# Patient Record
Sex: FEMALE | Race: WHITE | Employment: OTHER | URBAN - METROPOLITAN AREA
[De-identification: names, ages, dates, MRNs, and addresses within clinical notes are randomized per-mention and may not be internally consistent; named-entity substitution may affect disease eponyms.]

---

## 2017-11-18 ENCOUNTER — HOSPITAL ENCOUNTER (EMERGENCY)
Age: 77
Discharge: HOME | End: 2017-11-18
Attending: EMERGENCY MEDICINE
Payer: MEDICARE

## 2017-11-18 ENCOUNTER — APPOINTMENT (OUTPATIENT)
Dept: GENERAL RADIOLOGY | Age: 77
End: 2017-11-18
Attending: STUDENT IN AN ORGANIZED HEALTH CARE EDUCATION/TRAINING PROGRAM
Payer: MEDICARE

## 2017-11-18 VITALS
RESPIRATION RATE: 18 BRPM | HEART RATE: 79 BPM | WEIGHT: 117 LBS | SYSTOLIC BLOOD PRESSURE: 127 MMHG | TEMPERATURE: 97.6 F | DIASTOLIC BLOOD PRESSURE: 62 MMHG | OXYGEN SATURATION: 100 % | HEIGHT: 61 IN | BODY MASS INDEX: 22.09 KG/M2

## 2017-11-18 DIAGNOSIS — K85.30 DRUG-INDUCED ACUTE PANCREATITIS, UNSPECIFIED COMPLICATION STATUS: Primary | ICD-10-CM

## 2017-11-18 LAB
ALBUMIN SERPL-MCNC: 3 G/DL (ref 3.2–4.6)
ALBUMIN/GLOB SERPL: 0.8 {RATIO} (ref 1.2–3.5)
ALP SERPL-CCNC: 125 U/L (ref 50–136)
ALT SERPL-CCNC: 47 U/L (ref 12–65)
ANION GAP SERPL CALC-SCNC: 8 MMOL/L (ref 7–16)
AST SERPL-CCNC: 109 U/L (ref 15–37)
ATRIAL RATE: 67 BPM
ATRIAL RATE: 97 BPM
BASOPHILS # BLD: 0 K/UL (ref 0–0.2)
BASOPHILS NFR BLD: 0 % (ref 0–2)
BILIRUB SERPL-MCNC: 0.5 MG/DL (ref 0.2–1.1)
BUN SERPL-MCNC: 16 MG/DL (ref 8–23)
CALCIUM SERPL-MCNC: 8.1 MG/DL (ref 8.3–10.4)
CALCULATED P AXIS, ECG09: 66 DEGREES
CALCULATED P AXIS, ECG09: 80 DEGREES
CALCULATED R AXIS, ECG10: 57 DEGREES
CALCULATED R AXIS, ECG10: 86 DEGREES
CALCULATED T AXIS, ECG11: 33 DEGREES
CALCULATED T AXIS, ECG11: 69 DEGREES
CHLORIDE SERPL-SCNC: 110 MMOL/L (ref 98–107)
CO2 SERPL-SCNC: 24 MMOL/L (ref 21–32)
CREAT SERPL-MCNC: 0.89 MG/DL (ref 0.6–1)
DIAGNOSIS, 93000: NORMAL
DIAGNOSIS, 93000: NORMAL
DIFFERENTIAL METHOD BLD: ABNORMAL
EOSINOPHIL # BLD: 0.2 K/UL (ref 0–0.8)
EOSINOPHIL NFR BLD: 2 % (ref 0.5–7.8)
ERYTHROCYTE [DISTWIDTH] IN BLOOD BY AUTOMATED COUNT: 13.7 % (ref 11.9–14.6)
GLOBULIN SER CALC-MCNC: 3.7 G/DL (ref 2.3–3.5)
GLUCOSE SERPL-MCNC: 139 MG/DL (ref 65–100)
HCT VFR BLD AUTO: 39.8 % (ref 35.8–46.3)
HGB BLD-MCNC: 13.7 G/DL (ref 11.7–15.4)
IMM GRANULOCYTES # BLD: 0 K/UL (ref 0–0.5)
IMM GRANULOCYTES NFR BLD AUTO: 0 % (ref 0–5)
LIPASE SERPL-CCNC: 4137 U/L (ref 73–393)
LYMPHOCYTES # BLD: 1.2 K/UL (ref 0.5–4.6)
LYMPHOCYTES NFR BLD: 12 % (ref 13–44)
MCH RBC QN AUTO: 28.6 PG (ref 26.1–32.9)
MCHC RBC AUTO-ENTMCNC: 34.4 G/DL (ref 31.4–35)
MCV RBC AUTO: 83.1 FL (ref 79.6–97.8)
MONOCYTES # BLD: 0.3 K/UL (ref 0.1–1.3)
MONOCYTES NFR BLD: 3 % (ref 4–12)
NEUTS SEG # BLD: 8.8 K/UL (ref 1.7–8.2)
NEUTS SEG NFR BLD: 83 % (ref 43–78)
P-R INTERVAL, ECG05: 204 MS
P-R INTERVAL, ECG05: 206 MS
PLATELET # BLD AUTO: 281 K/UL (ref 150–450)
PMV BLD AUTO: 9.5 FL (ref 10.8–14.1)
POTASSIUM SERPL-SCNC: 3.7 MMOL/L (ref 3.5–5.1)
PROT SERPL-MCNC: 6.7 G/DL (ref 6.3–8.2)
Q-T INTERVAL, ECG07: 360 MS
Q-T INTERVAL, ECG07: 384 MS
QRS DURATION, ECG06: 72 MS
QRS DURATION, ECG06: 78 MS
QTC CALCULATION (BEZET), ECG08: 405 MS
QTC CALCULATION (BEZET), ECG08: 457 MS
RBC # BLD AUTO: 4.79 M/UL (ref 4.05–5.25)
SODIUM SERPL-SCNC: 142 MMOL/L (ref 136–145)
TROPONIN I BLD-MCNC: 0 NG/ML (ref 0.02–0.05)
TROPONIN I BLD-MCNC: 0 NG/ML (ref 0.02–0.05)
VENTRICULAR RATE, ECG03: 67 BPM
VENTRICULAR RATE, ECG03: 97 BPM
WBC # BLD AUTO: 10.6 K/UL (ref 4.3–11.1)

## 2017-11-18 PROCEDURE — 83690 ASSAY OF LIPASE: CPT | Performed by: STUDENT IN AN ORGANIZED HEALTH CARE EDUCATION/TRAINING PROGRAM

## 2017-11-18 PROCEDURE — 71020 XR CHEST PA LAT: CPT

## 2017-11-18 PROCEDURE — 84484 ASSAY OF TROPONIN QUANT: CPT

## 2017-11-18 PROCEDURE — 80053 COMPREHEN METABOLIC PANEL: CPT | Performed by: STUDENT IN AN ORGANIZED HEALTH CARE EDUCATION/TRAINING PROGRAM

## 2017-11-18 PROCEDURE — 74011250636 HC RX REV CODE- 250/636: Performed by: EMERGENCY MEDICINE

## 2017-11-18 PROCEDURE — 96360 HYDRATION IV INFUSION INIT: CPT | Performed by: EMERGENCY MEDICINE

## 2017-11-18 PROCEDURE — 85025 COMPLETE CBC W/AUTO DIFF WBC: CPT | Performed by: STUDENT IN AN ORGANIZED HEALTH CARE EDUCATION/TRAINING PROGRAM

## 2017-11-18 PROCEDURE — 93005 ELECTROCARDIOGRAM TRACING: CPT | Performed by: STUDENT IN AN ORGANIZED HEALTH CARE EDUCATION/TRAINING PROGRAM

## 2017-11-18 PROCEDURE — 93005 ELECTROCARDIOGRAM TRACING: CPT | Performed by: EMERGENCY MEDICINE

## 2017-11-18 PROCEDURE — 99285 EMERGENCY DEPT VISIT HI MDM: CPT | Performed by: EMERGENCY MEDICINE

## 2017-11-18 RX ADMIN — SODIUM CHLORIDE 1000 ML: 900 INJECTION, SOLUTION INTRAVENOUS at 11:51

## 2017-11-18 NOTE — ED TRIAGE NOTES
Pt states substernal chest pain that just started. Pt states she broke out in a sweat. Pt states she had diarrhea all morning.

## 2017-11-18 NOTE — ED PROVIDER NOTES
HPI:  78 F, hx of prior hypertension (taking off by PCP because it has improved), hyperlipidemia, prior  Gastric ulcer with surgery, here with epigastric pain that radiates into the chest.  Started today. Associated diaphoresis and nausea during this episode. Recently seen a primary care physician for sinusitis and was given 5 day course of Z-J Luis which she finished today. Denies any fever. Pain completely resolved at this point. She has not had any chest pain, dyspnea with exertion prior to today. No prior history of cardiac disease. Prior cholecystectomy. ROS  Constitutional: No fever, no chills  Skin: no rash  Eye: No vision changes  ENMT: No sore throat, no congestion  Respiratory: No shortness of breath, no cough  Cardiovascular: + chest pain, no palpitations  Gastrointestinal: No vomiting, + nausea, no diarrhea, no constipation, no rectal bleeding, + abdominal pain  : No dysuria, no hematuria  MSK: No back pain, no muscle pain, no joint pain  Neuro: No headache, no change in mental status, no numbness, no tingling, no weakness  Psych: No anxiety, no depression  Endocrine: No hyperglycemia  All other review of systems positive per history of present illness and the above otherwise negative or noncontributory.     Visit Vitals    /58    Pulse 74    Temp 98 °F (36.7 °C)    Resp 20    Ht 5' 1\" (1.549 m)    Wt 53.1 kg (117 lb)    SpO2 98%    BMI 22.11 kg/m2     Past Medical History:   Diagnosis Date    Abdominal pain, RUQ     STEPHEN (acute kidney injury) ([facility] [address] 12/4/2014    Back pain 6/10/2016    Bronchitis     Discharge from the vagina     Dysuria     Eczema 6/10/2016    Fungal dermatitis     Headache 6/10/2016    HTN (hypertension) 12/4/2014    Hypercholesteremia     in pravastatin    Hypercholesterolemia 12/4/2014    Hypertension     controlled by medication    Hypoalbuminemia 12/4/2014    Hypokalemia 12/4/2014    Hyponatremia 12/4/2014    Menopausal vaginal dryness     Osteoarthritis 6/10/2016    Osteoporosis 6/10/2016    PUD (peptic ulcer disease)     last 2003 which a rupture an extensive surgery    Sepsis ([facility] [place] [address] 12/4/2014    Sinusitis, acute frontal     also acute maxillary    Symptomatic cholelithiasis 5/20/2014    Tobacco abuse 12/5/2014    Tobacco abuse     UTI (lower urinary tract infection) 12/4/2014    UTI (urinary tract infection)     Vaginal candidiasis      Past Surgical History:   Procedure Laterality Date    ABDOMEN SURGERY PROC UNLISTED  2003    stomach surgery for ruptured ulcer and extensive rerouting of intestestines per patient     BREAST SURGERY PROCEDURE UNLISTED Left 1975    breast lumpectomy, benign  (left)    HX APPENDECTOMY  1977    with hysterectomy    HX BREAST BIOPSY Left 1975    HX CHOLECYSTECTOMY      HX ENDOSCOPY      HX HYSTERECTOMY  1977    HX OTHER SURGICAL      hernicolectomy 2 cm    HX TUBAL LIGATION  1975     Prior to Admission Medications   Prescriptions Last Dose Informant Patient Reported? Taking? azithromycin (ZITHROMAX) 250 mg tablet   No No   Sig: Take as usually directed on the package. ibuprofen (MOTRIN) 800 mg tablet   No No   Sig: Take 1 Tab by mouth three (3) times daily. pravastatin (PRAVACHOL) 40 mg tablet   No No   Sig: Take 1 Tab by mouth nightly. Facility-Administered Medications: None         Adult Exam   General: alert, no acute distress  Head: normocephalic, atraumatic  ENT: moist mucous membranes  Neck: supple, non-tender; full range of motion  Cardiovascular: regular rate and rhythm, normal peripheral perfusion, no edema.  Equal radial pulses  Respiratory: lungs are clear to auscultation; normal respirations; no wheezing, rales or rhonchi  Gastrointestinal: soft, non-tender; no rebound or guarding, no peritoneal signs, no distension  Back: non-tender, full range of motion  Musculoskeletal: normal range of motion, normal strength, no gross deformities  Neurological: alert and oriented x 4, no gross focal deficits; normal speech  Psychiatric: cooperative; appropriate mood and affect      EKG obtained and interpreted by me. Sinus rhythm rate of 97 with normal axis, interval.  Normal QTc interval.  No STEMI noted. Nonspecific T-wave inversion with downtrend and QRS complex noted aVL. Troponin is negative. fairly healthy. I will low suspicion for acute ACS. Normal EKG, normal troponin. She has no pain at this time. Asymptomatic. Her lipase level is 4100. Recently finished with azithromycin. Potentially may be a side effects of taken azithromycin. She had prior cholecystectomy. Liver enzymes within normal limits. Vital signs stable nontoxic well-appearing. Spoke with the on-call physician for primary care physician and recommend follow-up. They will see her in the hospital on Monday. She tolerated by mouth here. Serial troponin negative x 2   Second EKG obtained rate of 67 sinus rhythm normal axis and interval.  No ST changes consistent with acute STEMI. Nonspecific T wave inversion noted lead V5  No ectopy, Brugada. Tolerated PO here. Spoke with patient and daughter about the findings today. Her daughter works in our GI lab and is very familiar and is aware of pancreatitis. Recommend clear liquid diet, note: Zofran as needed. Stable for discharge home.       Recent Results (from the past 8 hour(s))   EKG, 12 LEAD, INITIAL    Collection Time: 11/18/17 10:19 AM   Result Value Ref Range    Ventricular Rate 97 BPM    Atrial Rate 97 BPM    P-R Interval 206 ms    QRS Duration 72 ms    Q-T Interval 360 ms    QTC Calculation (Bezet) 457 ms    Calculated P Axis 80 degrees    Calculated R Axis 86 degrees    Calculated T Axis 69 degrees    Diagnosis       Normal sinus rhythm  Septal infarct (cited on or before 05-MAY-2005)  Abnormal ECG  When compared with ECG of 05-MAY-2005 18:33,  RSR' pattern in V1 is no longer Present  Questionable change in initial forces of Septal leads     CBC WITH AUTOMATED DIFF    Collection Time: 11/18/17 10:27 AM   Result Value Ref Range    WBC 10.6 4.3 - 11.1 K/uL    RBC 4.79 4.05 - 5.25 M/uL    HGB 13.7 11.7 - 15.4 g/dL    HCT 39.8 35.8 - 46.3 %    MCV 83.1 79.6 - 97.8 FL    MCH 28.6 26.1 - 32.9 PG    MCHC 34.4 31.4 - 35.0 g/dL    RDW 13.7 11.9 - 14.6 %    PLATELET 654 180 - 939 K/uL    MPV 9.5 (L) 10.8 - 14.1 FL    DF AUTOMATED      NEUTROPHILS 83 (H) 43 - 78 %    LYMPHOCYTES 12 (L) 13 - 44 %    MONOCYTES 3 (L) 4.0 - 12.0 %    EOSINOPHILS 2 0.5 - 7.8 %    BASOPHILS 0 0.0 - 2.0 %    IMMATURE GRANULOCYTES 0 0.0 - 5.0 %    ABS. NEUTROPHILS 8.8 (H) 1.7 - 8.2 K/UL    ABS. LYMPHOCYTES 1.2 0.5 - 4.6 K/UL    ABS. MONOCYTES 0.3 0.1 - 1.3 K/UL    ABS. EOSINOPHILS 0.2 0.0 - 0.8 K/UL    ABS. BASOPHILS 0.0 0.0 - 0.2 K/UL    ABS. IMM. GRANS. 0.0 0.0 - 0.5 K/UL   METABOLIC PANEL, COMPREHENSIVE    Collection Time: 11/18/17 10:27 AM   Result Value Ref Range    Sodium 142 136 - 145 mmol/L    Potassium 3.7 3.5 - 5.1 mmol/L    Chloride 110 (H) 98 - 107 mmol/L    CO2 24 21 - 32 mmol/L    Anion gap 8 7 - 16 mmol/L    Glucose 139 (H) 65 - 100 mg/dL    BUN 16 8 - 23 MG/DL    Creatinine 0.89 0.6 - 1.0 MG/DL    GFR est AA >60 >60 ml/min/1.73m2    GFR est non-AA >60 >60 ml/min/1.73m2    Calcium 8.1 (L) 8.3 - 10.4 MG/DL    Bilirubin, total 0.5 0.2 - 1.1 MG/DL    ALT (SGPT) 47 12 - 65 U/L    AST (SGOT) 109 (H) 15 - 37 U/L    Alk. phosphatase 125 50 - 136 U/L    Protein, total 6.7 6.3 - 8.2 g/dL    Albumin 3.0 (L) 3.2 - 4.6 g/dL    Globulin 3.7 (H) 2.3 - 3.5 g/dL    A-G Ratio 0.8 (L) 1.2 - 3.5     LIPASE    Collection Time: 11/18/17 10:27 AM   Result Value Ref Range    Lipase 4137 (H) 73 - 393 U/L   POC TROPONIN-I    Collection Time: 11/18/17 10:27 AM   Result Value Ref Range    Troponin-I (POC) 0 (L) 0.02 - 0.05 ng/ml         Dragon voice recognition software was used to create this note.  Although the note has been reviewed and corrected where necessary, additional errors may have been overlooked and remain in the text.

## 2017-11-18 NOTE — DISCHARGE INSTRUCTIONS
Pancreatitis: Care Instructions  Your Care Instructions    The pancreas is an organ behind the stomach. It makes hormones and enzymes to help your body digest food. But if these enzymes attack the pancreas, it can get inflamed. This is called pancreatitis. Most cases are caused by gallstones or by heavy alcohol use. If you take care of yourself at home, it will help you get better. It will also help you avoid more problems with your pancreas. Follow-up care is a key part of your treatment and safety. Be sure to make and go to all appointments, and call your doctor if you are having problems. It's also a good idea to know your test results and keep a list of the medicines you take. How can you care for yourself at home? · Drink clear liquids and eat bland foods until you feel better. Screven foods include rice, dry toast, and crackers. They also include bananas and applesauce. · Eat a low-fat diet until your doctor says your pancreas is healed. · Do not drink alcohol. Tell your doctor if you need help to quit. Counseling, support groups, and sometimes medicines can help you stay sober. · Be safe with medicines. Read and follow all instructions on the label. ¨ If the doctor gave you a prescription medicine for pain, take it as prescribed. ¨ If you are not taking a prescription pain medicine, ask your doctor if you can take an over-the-counter medicine. · If your doctor prescribed antibiotics, take them as directed. Do not stop taking them just because you feel better. You need to take the full course of antibiotics. · Get extra rest until you feel better. To prevent future problems with your pancreas  · Do not drink alcohol. · Tell your doctors and pharmacist that you've had pancreatitis. They can help you avoid medicines that may cause this problem again. When should you call for help? Call 911 anytime you think you may need emergency care.  For example, call if:  ? · You vomit blood or what looks like coffee grounds. ? · Your stools are maroon or very bloody. ?Call your doctor now or seek immediate medical care if:  ? · You have new or worse belly pain. ? · Your stools are black and look like tar, or they have streaks of blood. ? · You are vomiting. ? Watch closely for changes in your health, and be sure to contact your doctor if:  ? · You do not get better as expected. Where can you learn more? Go to http://shira-kalen.info/. Enter T602 in the search box to learn more about \"Pancreatitis: Care Instructions. \"  Current as of: May 12, 2017  Content Version: 11.4  © 7448-8880 [employer]. Care instructions adapted under license by [employer] (which disclaims liability or warranty for this information). If you have questions about a medical condition or this instruction, always ask your healthcare professional. [address] any warranty or liability for your use of this information. Learning About Acute Pancreatitis  What is acute pancreatitis? The pancreas is an organ behind the stomach. It makes hormones like insulin that help control how your body uses sugar. It also makes enzymes that help you digest food. Usually these enzymes flow from the pancreas to the intestines. But if they leak into the pancreas, they can irritate it and cause pain and swelling. When this happens suddenly, it's called acute pancreatitis. What causes it? Most of the time, acute pancreatitis is caused by gallstones or by heavy alcohol use. But several other things can cause it, such as:  · High levels of fats in the blood. · An injury. · A problem after a surgery or a procedure. · Certain medicines. What are the symptoms? The main symptom is pain in the upper belly. The pain can be severe. You also may have a fever, nausea, or vomiting. Some people get so sick that they have problems breathing. They also may have low blood pressure.   How is it diagnosed? Your doctor will diagnose pancreatitis with an exam and by looking at your lab tests. Your doctor may think that you have this problem based on your symptoms and where you have pain in your belly. You may have blood tests of enzymes called amylase and lipase. In pancreatitis, the level of these enzymes is usually much higher than normal.  You also may have imaging tests of the belly. These may include an ultrasound, a CT scan, or an MRI. A special MRI called MRCP can show images of the bile ducts. This test can be very helpful when gallstones are causing the problem. How is it treated? Treatment of acute pancreatitis usually takes place in the hospital. It focuses on taking care of pain and supporting your body while your pancreas heals. In severe cases, treatment may occur in an intensive care unit to support breathing, treat serious infections, or help raise very low blood pressure. If a gallstone is causing the problem, the gallstone may need to be removed. This is done during a procedure called ERCP. The doctor puts a scope in your mouth and moves it gently through the stomach and into the ducts from the pancreas and gallbladder. The doctor is then able to see a stone and remove it. Sometimes the gallbladder, which makes gallstones, needs to be removed by surgery. People with pancreatitis often need a lot of fluid to help support their other organs and their blood pressure. They get fluids through a vein (intravenous, or IV). Instead of food by mouth, nutrition is sometimes given through a vein while the pancreas heals. Follow-up care is a key part of your treatment and safety. Be sure to make and go to all appointments, and call your doctor if you are having problems. It's also a good idea to know your test results and keep a list of the medicines you take. Where can you learn more? Go to http://shira-kalen.info/.   Enter M744 in the search box to learn more about \"Learning About Acute Pancreatitis. \"  Current as of: May 12, 2017  Content Version: 11.4  © 5659-2888 [employer]. Care instructions adapted under license by [employer] (which disclaims liability or warranty for this information). If you have questions about a medical condition or this instruction, always ask your healthcare professional. [place] [address] any warranty or liability for your use of this information.

## 2017-11-18 NOTE — Clinical Note
STOP AZITHROMYCIN. Follow-up with your primary care doctor on Monday. Clear liquid diet. Your lipase level was 4100. I have spoken with your primary care physician office. They will likely need to repeat lab work on Monday when the ICU.   Return  if you have any further concerns such as unable to tolerate pain, nausea, vomiting, fever, chest pain, shortness of breath

## 2017-11-18 NOTE — ED NOTES
I have reviewed discharge instructions with the patient. The patient verbalized understanding. Patient left ED via Discharge Method: ambulatory to Home with self). Opportunity for questions and clarification provided. Patient given 0 scripts. To continue your aftercare when you leave the hospital, you may receive an automated call from our care team to check in on how you are doing. This is a free service and part of our promise to provide the best care and service to meet your aftercare needs.  If you have questions, or wish to unsubscribe from this service please call [phone]. Thank you for Choosing our [facility].

## 2018-01-16 ENCOUNTER — HOSPITAL ENCOUNTER (OUTPATIENT)
Dept: MAMMOGRAPHY | Age: 78
Discharge: HOME | End: 2018-01-16
Attending: FAMILY MEDICINE
Payer: MEDICARE

## 2018-01-16 DIAGNOSIS — Z12.31 SCREENING MAMMOGRAM, ENCOUNTER FOR: ICD-10-CM

## 2018-01-16 DIAGNOSIS — R92.8 ABNORMAL MAMMOGRAM OF LEFT BREAST: ICD-10-CM

## 2018-01-16 DIAGNOSIS — N63.0 BREAST LUMP IN FEMALE: ICD-10-CM

## 2018-01-16 PROCEDURE — 76642 ULTRASOUND BREAST LIMITED: CPT

## 2018-01-16 PROCEDURE — 77066 DX MAMMO INCL CAD BI: CPT

## 2018-01-31 ENCOUNTER — HOSPITAL ENCOUNTER (OUTPATIENT)
Dept: MAMMOGRAPHY | Age: 78
Discharge: HOME | End: 2018-01-31
Attending: FAMILY MEDICINE
Payer: MEDICARE

## 2018-01-31 VITALS — SYSTOLIC BLOOD PRESSURE: 185 MMHG | HEART RATE: 52 BPM | DIASTOLIC BLOOD PRESSURE: 77 MMHG

## 2018-01-31 DIAGNOSIS — N63.20 LEFT BREAST MASS: ICD-10-CM

## 2018-01-31 DIAGNOSIS — R92.8 ABNORMALITY OF LEFT BREAST ON SCREENING MAMMOGRAM: ICD-10-CM

## 2018-01-31 PROCEDURE — 77065 DX MAMMO INCL CAD UNI: CPT

## 2018-01-31 PROCEDURE — 77030013267 US BX BREAST LT 1ST LESION W/CLIP AND SPECIMEN

## 2018-01-31 PROCEDURE — 74011000250 HC RX REV CODE- 250: Performed by: FAMILY MEDICINE

## 2018-01-31 PROCEDURE — 88305 TISSUE EXAM BY PATHOLOGIST: CPT | Performed by: FAMILY MEDICINE

## 2018-01-31 RX ORDER — LIDOCAINE HYDROCHLORIDE 10 MG/ML
5 INJECTION INFILTRATION; PERINEURAL
Status: COMPLETED | OUTPATIENT
Start: 2018-01-31 | End: 2018-01-31

## 2018-01-31 RX ADMIN — LIDOCAINE HYDROCHLORIDE 5 ML: 10 INJECTION, SOLUTION INFILTRATION; PERINEURAL at 09:28

## 2018-02-05 NOTE — PROGRESS NOTES
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 Dr. Boy Mcintyre and I discussed the results as well as the next steps with the patient, she will have a bilateral breast MRI and then will follow up with Dr. Esperanza Miller as she has been a previous patient of his. The patient has greast support and is eager to move forward and get the surgery behind her. She did receive a new breast cancer patient packet of information which includes educational information as well as my contact information in case she has any further questions.

## 2018-02-07 ENCOUNTER — HOSPITAL ENCOUNTER (OUTPATIENT)
Dept: MRI IMAGING | Age: 78
Discharge: HOME | End: 2018-02-07
Attending: FAMILY MEDICINE
Payer: MEDICARE

## 2018-02-07 VITALS — BODY MASS INDEX: 21.73 KG/M2 | WEIGHT: 115 LBS

## 2018-02-07 DIAGNOSIS — C50.912 BREAST CANCER, LEFT (HCC): ICD-10-CM

## 2018-02-07 LAB — CREAT BLD-MCNC: 0.9 MG/DL (ref 0.8–1.5)

## 2018-02-07 PROCEDURE — 82565 ASSAY OF CREATININE: CPT

## 2018-02-07 PROCEDURE — 77059 MRI BREAST BI W WO CONT: CPT

## 2018-02-07 PROCEDURE — 74011250636 HC RX REV CODE- 250/636: Performed by: FAMILY MEDICINE

## 2018-02-07 PROCEDURE — 74011000258 HC RX REV CODE- 258: Performed by: FAMILY MEDICINE

## 2018-02-07 PROCEDURE — A9577 INJ MULTIHANCE: HCPCS | Performed by: FAMILY MEDICINE

## 2018-02-07 RX ORDER — SODIUM CHLORIDE 0.9 % (FLUSH) 0.9 %
10 SYRINGE (ML) INJECTION
Status: COMPLETED | OUTPATIENT
Start: 2018-02-07 | End: 2018-02-07

## 2018-02-07 RX ADMIN — Medication 10 ML: at 12:15

## 2018-02-07 RX ADMIN — SODIUM CHLORIDE 100 ML: 900 INJECTION, SOLUTION INTRAVENOUS at 12:15

## 2018-02-07 RX ADMIN — GADOBENATE DIMEGLUMINE: 529 INJECTION, SOLUTION INTRAVENOUS at 12:15

## 2018-02-13 PROBLEM — C50.412 MALIGNANT NEOPLASM OF UPPER-OUTER QUADRANT OF LEFT BREAST IN FEMALE, ESTROGEN RECEPTOR POSITIVE (HCC): Status: ACTIVE | Noted: 2018-02-13

## 2018-02-13 PROBLEM — Z17.0 MALIGNANT NEOPLASM OF UPPER-OUTER QUADRANT OF LEFT BREAST IN FEMALE, ESTROGEN RECEPTOR POSITIVE (HCC): Status: ACTIVE | Noted: 2018-02-13

## 2018-02-15 RX ORDER — CEFAZOLIN SODIUM IN 0.9 % NACL 2 G/50 ML
2 INTRAVENOUS SOLUTION, PIGGYBACK (ML) INTRAVENOUS ONCE
Status: CANCELLED | OUTPATIENT
Start: 2018-02-22 | End: 2018-02-22

## 2018-02-21 ENCOUNTER — HOSPITAL ENCOUNTER (OUTPATIENT)
Dept: NUCLEAR MEDICINE | Age: 78
Discharge: HOME | End: 2018-02-21
Attending: SURGERY

## 2018-02-21 DIAGNOSIS — C50.412 MALIGNANT NEOPLASM OF UPPER-OUTER QUADRANT OF LEFT BREAST IN FEMALE, ESTROGEN RECEPTOR POSITIVE (HCC): ICD-10-CM

## 2018-02-21 DIAGNOSIS — Z17.0 MALIGNANT NEOPLASM OF UPPER-OUTER QUADRANT OF LEFT BREAST IN FEMALE, ESTROGEN RECEPTOR POSITIVE (HCC): ICD-10-CM

## 2018-02-22 ENCOUNTER — APPOINTMENT (OUTPATIENT)
Dept: NUCLEAR MEDICINE | Age: 78
End: 2018-02-22
Attending: SURGERY
Payer: MEDICARE

## 2018-02-22 ENCOUNTER — ANESTHESIA (OUTPATIENT)
Dept: SURGERY | Age: 78
End: 2018-02-22
Payer: MEDICARE

## 2018-02-22 ENCOUNTER — ANESTHESIA EVENT (OUTPATIENT)
Dept: SURGERY | Age: 78
End: 2018-02-22
Payer: MEDICARE

## 2018-02-22 ENCOUNTER — HOSPITAL ENCOUNTER (OUTPATIENT)
Age: 78
Setting detail: OUTPATIENT SURGERY
Discharge: HOME | End: 2018-02-22
Attending: SURGERY | Admitting: SURGERY
Payer: MEDICARE

## 2018-02-22 VITALS
RESPIRATION RATE: 24 BRPM | HEIGHT: 61 IN | HEART RATE: 63 BPM | SYSTOLIC BLOOD PRESSURE: 136 MMHG | DIASTOLIC BLOOD PRESSURE: 62 MMHG | BODY MASS INDEX: 21.64 KG/M2 | WEIGHT: 114.6 LBS | OXYGEN SATURATION: 97 % | TEMPERATURE: 97.4 F

## 2018-02-22 DIAGNOSIS — C50.412 MALIGNANT NEOPLASM OF UPPER-OUTER QUADRANT OF LEFT BREAST IN FEMALE, ESTROGEN RECEPTOR POSITIVE (HCC): Primary | ICD-10-CM

## 2018-02-22 DIAGNOSIS — Z17.0 MALIGNANT NEOPLASM OF UPPER-OUTER QUADRANT OF LEFT BREAST IN FEMALE, ESTROGEN RECEPTOR POSITIVE (HCC): Primary | ICD-10-CM

## 2018-02-22 PROCEDURE — 77030020143 HC AIRWY LARYN INTUB CGAS -A: Performed by: ANESTHESIOLOGY

## 2018-02-22 PROCEDURE — 74011250636 HC RX REV CODE- 250/636

## 2018-02-22 PROCEDURE — 76210000021 HC REC RM PH II 0.5 TO 1 HR: Performed by: SURGERY

## 2018-02-22 PROCEDURE — 76060000033 HC ANESTHESIA 1 TO 1.5 HR: Performed by: SURGERY

## 2018-02-22 PROCEDURE — 77030018836 HC SOL IRR NACL ICUM -A: Performed by: SURGERY

## 2018-02-22 PROCEDURE — A9541 TC99M SULFUR COLLOID: HCPCS

## 2018-02-22 PROCEDURE — 74011250636 HC RX REV CODE- 250/636: Performed by: SURGERY

## 2018-02-22 PROCEDURE — 77030020782 HC GWN BAIR PAWS FLX 3M -B: Performed by: ANESTHESIOLOGY

## 2018-02-22 PROCEDURE — 74011000250 HC RX REV CODE- 250: Performed by: SURGERY

## 2018-02-22 PROCEDURE — 74011000250 HC RX REV CODE- 250

## 2018-02-22 PROCEDURE — 77030032490 HC SLV COMPR SCD KNE COVD -B: Performed by: SURGERY

## 2018-02-22 PROCEDURE — 88307 TISSUE EXAM BY PATHOLOGIST: CPT | Performed by: SURGERY

## 2018-02-22 PROCEDURE — 74011250637 HC RX REV CODE- 250/637: Performed by: ANESTHESIOLOGY

## 2018-02-22 PROCEDURE — 88305 TISSUE EXAM BY PATHOLOGIST: CPT | Performed by: SURGERY

## 2018-02-22 PROCEDURE — 76210000016 HC OR PH I REC 1 TO 1.5 HR: Performed by: SURGERY

## 2018-02-22 PROCEDURE — 74011250636 HC RX REV CODE- 250/636: Performed by: ANESTHESIOLOGY

## 2018-02-22 PROCEDURE — 77030003028 HC SUT VCRL J&J -A: Performed by: SURGERY

## 2018-02-22 PROCEDURE — 77030031139 HC SUT VCRL2 J&J -A: Performed by: SURGERY

## 2018-02-22 PROCEDURE — 76010000149 HC OR TIME 1 TO 1.5 HR: Performed by: SURGERY

## 2018-02-22 PROCEDURE — 77030002996 HC SUT SLK J&J -A: Performed by: SURGERY

## 2018-02-22 PROCEDURE — 77030002933 HC SUT MCRYL J&J -A: Performed by: SURGERY

## 2018-02-22 PROCEDURE — 77030011640 HC PAD GRND REM COVD -A: Performed by: SURGERY

## 2018-02-22 RX ORDER — OXYCODONE HYDROCHLORIDE 5 MG/1
10 TABLET ORAL
Status: DISCONTINUED | OUTPATIENT
Start: 2018-02-22 | End: 2018-02-22 | Stop reason: HOSPADM

## 2018-02-22 RX ORDER — MIDAZOLAM HYDROCHLORIDE 1 MG/ML
2 INJECTION, SOLUTION INTRAMUSCULAR; INTRAVENOUS ONCE
Status: COMPLETED | OUTPATIENT
Start: 2018-02-22 | End: 2018-02-22

## 2018-02-22 RX ORDER — NALOXONE HYDROCHLORIDE 0.4 MG/ML
0.1 INJECTION, SOLUTION INTRAMUSCULAR; INTRAVENOUS; SUBCUTANEOUS AS NEEDED
Status: DISCONTINUED | OUTPATIENT
Start: 2018-02-22 | End: 2018-02-22 | Stop reason: HOSPADM

## 2018-02-22 RX ORDER — SODIUM CHLORIDE 0.9 % (FLUSH) 0.9 %
5-10 SYRINGE (ML) INJECTION EVERY 8 HOURS
Status: DISCONTINUED | OUTPATIENT
Start: 2018-02-22 | End: 2018-02-22 | Stop reason: HOSPADM

## 2018-02-22 RX ORDER — SODIUM CHLORIDE, SODIUM LACTATE, POTASSIUM CHLORIDE, CALCIUM CHLORIDE 600; 310; 30; 20 MG/100ML; MG/100ML; MG/100ML; MG/100ML
25 INJECTION, SOLUTION INTRAVENOUS CONTINUOUS
Status: DISCONTINUED | OUTPATIENT
Start: 2018-02-22 | End: 2018-02-22 | Stop reason: HOSPADM

## 2018-02-22 RX ORDER — SODIUM CHLORIDE 0.9 % (FLUSH) 0.9 %
5-10 SYRINGE (ML) INJECTION AS NEEDED
Status: DISCONTINUED | OUTPATIENT
Start: 2018-02-22 | End: 2018-02-22 | Stop reason: HOSPADM

## 2018-02-22 RX ORDER — FENTANYL CITRATE 50 UG/ML
INJECTION, SOLUTION INTRAMUSCULAR; INTRAVENOUS AS NEEDED
Status: DISCONTINUED | OUTPATIENT
Start: 2018-02-22 | End: 2018-02-22 | Stop reason: HOSPADM

## 2018-02-22 RX ORDER — HYDROMORPHONE HYDROCHLORIDE 2 MG/ML
0.5 INJECTION, SOLUTION INTRAMUSCULAR; INTRAVENOUS; SUBCUTANEOUS
Status: DISCONTINUED | OUTPATIENT
Start: 2018-02-22 | End: 2018-02-22 | Stop reason: HOSPADM

## 2018-02-22 RX ORDER — DEXAMETHASONE SODIUM PHOSPHATE 4 MG/ML
INJECTION, SOLUTION INTRA-ARTICULAR; INTRALESIONAL; INTRAMUSCULAR; INTRAVENOUS; SOFT TISSUE AS NEEDED
Status: DISCONTINUED | OUTPATIENT
Start: 2018-02-22 | End: 2018-02-22 | Stop reason: HOSPADM

## 2018-02-22 RX ORDER — CEFAZOLIN SODIUM/WATER 2 G/20 ML
2 SYRINGE (ML) INTRAVENOUS
Status: COMPLETED | OUTPATIENT
Start: 2018-02-22 | End: 2018-02-22

## 2018-02-22 RX ORDER — ACETAMINOPHEN 500 MG
1000 TABLET ORAL ONCE
Status: COMPLETED | OUTPATIENT
Start: 2018-02-22 | End: 2018-02-22

## 2018-02-22 RX ORDER — LIDOCAINE HYDROCHLORIDE 10 MG/ML
INJECTION INFILTRATION; PERINEURAL AS NEEDED
Status: DISCONTINUED | OUTPATIENT
Start: 2018-02-22 | End: 2018-02-22 | Stop reason: HOSPADM

## 2018-02-22 RX ORDER — LIDOCAINE HYDROCHLORIDE 10 MG/ML
0.1 INJECTION INFILTRATION; PERINEURAL AS NEEDED
Status: DISCONTINUED | OUTPATIENT
Start: 2018-02-22 | End: 2018-02-22 | Stop reason: HOSPADM

## 2018-02-22 RX ORDER — SODIUM CHLORIDE, SODIUM LACTATE, POTASSIUM CHLORIDE, CALCIUM CHLORIDE 600; 310; 30; 20 MG/100ML; MG/100ML; MG/100ML; MG/100ML
125 INJECTION, SOLUTION INTRAVENOUS CONTINUOUS
Status: DISCONTINUED | OUTPATIENT
Start: 2018-02-22 | End: 2018-02-22 | Stop reason: HOSPADM

## 2018-02-22 RX ORDER — KETOROLAC TROMETHAMINE 30 MG/ML
15 INJECTION, SOLUTION INTRAMUSCULAR; INTRAVENOUS AS NEEDED
Status: DISCONTINUED | OUTPATIENT
Start: 2018-02-22 | End: 2018-02-22 | Stop reason: HOSPADM

## 2018-02-22 RX ORDER — KETOROLAC TROMETHAMINE 30 MG/ML
INJECTION, SOLUTION INTRAMUSCULAR; INTRAVENOUS AS NEEDED
Status: DISCONTINUED | OUTPATIENT
Start: 2018-02-22 | End: 2018-02-22 | Stop reason: HOSPADM

## 2018-02-22 RX ORDER — PROMETHAZINE HYDROCHLORIDE 25 MG/1
25 TABLET ORAL
Qty: 20 TAB | Refills: 0 | Status: SHIPPED | OUTPATIENT
Start: 2018-02-22 | End: 2018-03-30

## 2018-02-22 RX ORDER — ONDANSETRON 2 MG/ML
INJECTION INTRAMUSCULAR; INTRAVENOUS AS NEEDED
Status: DISCONTINUED | OUTPATIENT
Start: 2018-02-22 | End: 2018-02-22 | Stop reason: HOSPADM

## 2018-02-22 RX ORDER — EPHEDRINE SULFATE 50 MG/ML
INJECTION, SOLUTION INTRAVENOUS AS NEEDED
Status: DISCONTINUED | OUTPATIENT
Start: 2018-02-22 | End: 2018-02-22 | Stop reason: HOSPADM

## 2018-02-22 RX ORDER — LORAZEPAM 1 MG/1
2 TABLET ORAL ONCE
Status: DISCONTINUED | OUTPATIENT
Start: 2018-02-22 | End: 2018-02-22

## 2018-02-22 RX ORDER — LIDOCAINE HYDROCHLORIDE 20 MG/ML
INJECTION, SOLUTION EPIDURAL; INFILTRATION; INTRACAUDAL; PERINEURAL AS NEEDED
Status: DISCONTINUED | OUTPATIENT
Start: 2018-02-22 | End: 2018-02-22 | Stop reason: HOSPADM

## 2018-02-22 RX ORDER — OXYCODONE AND ACETAMINOPHEN 5; 325 MG/1; MG/1
TABLET ORAL
Qty: 40 TAB | Refills: 0 | Status: SHIPPED | OUTPATIENT
Start: 2018-02-22 | End: 2018-03-30

## 2018-02-22 RX ORDER — PROPOFOL 10 MG/ML
INJECTION, EMULSION INTRAVENOUS AS NEEDED
Status: DISCONTINUED | OUTPATIENT
Start: 2018-02-22 | End: 2018-02-22 | Stop reason: HOSPADM

## 2018-02-22 RX ADMIN — KETOROLAC TROMETHAMINE 30 MG: 30 INJECTION, SOLUTION INTRAMUSCULAR; INTRAVENOUS at 10:00

## 2018-02-22 RX ADMIN — SODIUM CHLORIDE, SODIUM LACTATE, POTASSIUM CHLORIDE, AND CALCIUM CHLORIDE 25 ML/HR: 600; 310; 30; 20 INJECTION, SOLUTION INTRAVENOUS at 06:22

## 2018-02-22 RX ADMIN — ACETAMINOPHEN 1000 MG: 500 TABLET, FILM COATED ORAL at 08:22

## 2018-02-22 RX ADMIN — PROPOFOL 180 MG: 10 INJECTION, EMULSION INTRAVENOUS at 09:32

## 2018-02-22 RX ADMIN — EPHEDRINE SULFATE 5 MG: 50 INJECTION, SOLUTION INTRAVENOUS at 09:53

## 2018-02-22 RX ADMIN — FENTANYL CITRATE 50 MCG: 50 INJECTION, SOLUTION INTRAMUSCULAR; INTRAVENOUS at 09:22

## 2018-02-22 RX ADMIN — SODIUM CHLORIDE, SODIUM LACTATE, POTASSIUM CHLORIDE, AND CALCIUM CHLORIDE: 600; 310; 30; 20 INJECTION, SOLUTION INTRAVENOUS at 10:15

## 2018-02-22 RX ADMIN — Medication 2 G: at 09:30

## 2018-02-22 RX ADMIN — MIDAZOLAM HYDROCHLORIDE 2 MG: 1 INJECTION, SOLUTION INTRAMUSCULAR; INTRAVENOUS at 08:23

## 2018-02-22 RX ADMIN — ONDANSETRON 4 MG: 2 INJECTION INTRAMUSCULAR; INTRAVENOUS at 09:53

## 2018-02-22 RX ADMIN — DEXAMETHASONE SODIUM PHOSPHATE 4 MG: 4 INJECTION, SOLUTION INTRA-ARTICULAR; INTRALESIONAL; INTRAMUSCULAR; INTRAVENOUS; SOFT TISSUE at 09:39

## 2018-02-22 RX ADMIN — FENTANYL CITRATE 10 MCG: 50 INJECTION, SOLUTION INTRAMUSCULAR; INTRAVENOUS at 10:00

## 2018-02-22 RX ADMIN — LIDOCAINE HYDROCHLORIDE 70 MG: 20 INJECTION, SOLUTION EPIDURAL; INFILTRATION; INTRACAUDAL; PERINEURAL at 09:32

## 2018-02-22 RX ADMIN — FENTANYL CITRATE 15 MCG: 50 INJECTION, SOLUTION INTRAMUSCULAR; INTRAVENOUS at 10:05

## 2018-02-22 NOTE — IP AVS SNAPSHOT
[facility] 
 
 
 [address] [facility] [address] 
[phone] Patient: Doc Freeman MRN: [id number] About your hospitalization You were admitted on:  February 22, 2018 You last received care in the:  [facility] PACU You were discharged on:  February 22, 2018 Why you were hospitalized Your primary diagnosis was:  Not on File Follow-up Information Follow up With Details Comments Contact Info Merry Colbert MD   [address] 
[phone] Edel Leonardo MD  Follow up on Thursday March 1st at 3:30  [address] 
[address] [address] 
[phone] Your Scheduled Appointments Thursday March 01, 2018  3:30 PM EST Global Post Op with Edel Leonardo MD  
[facility] ([facility]) [address] [place] [address]  
[phone] Monday March 05, 2018 11:30 AM EST New Patient with Merry Colbert MD  
[facility] [address]  
[phone] Discharge Orders None A check silvia indicates which time of day the medication should be taken. My Medications START taking these medications Instructions Each Dose to Equal  
 Morning Noon Evening Bedtime  
 oxyCODONE-acetaminophen 5-325 mg per tablet Commonly known as:  PERCOCET Your last dose was: Your next dose is:    
   
   
 1-2 tabs by mouth every four hours prn pain  
     
   
   
   
  
 promethazine 25 mg tablet Commonly known as:  PHENERGAN Your last dose was: Your next dose is: Take 1 Tab by mouth every six (6) hours as needed for Nausea. 25 mg CHANGE how you take these medications Instructions Each Dose to Equal  
 Morning Noon Evening Bedtime ibuprofen 800 mg tablet Commonly known as:  MOTRIN What changed:  additional instructions Your last dose was: Your next dose is: Take 1 Tab by mouth three (3) times daily. 800 mg CONTINUE taking these medications Instructions Each Dose to Equal  
 Morning Noon Evening Bedtime BYSTOLIC 10 mg tablet Generic drug:  nebivolol Your last dose was: Your next dose is: Take 10 mg by mouth daily. 10 mg  
    
   
   
   
  
 pravastatin 40 mg tablet Commonly known as:  PRAVACHOL Your last dose was: Your next dose is: Take 1 Tab by mouth nightly. 40 mg Where to Get Your Medications Information on where to get these meds will be given to you by the nurse or doctor. ! Ask your nurse or doctor about these medications  
  oxyCODONE-acetaminophen 5-325 mg per tablet  
 promethazine 25 mg tablet Discharge Instructions May shower on Saturday [place] Node Biopsy for Breast Cancer: What to Expect at Home Your Recovery After a sentinel node biopsy, many women have no side effects. Some women have pain or bruising at the cut (incision) and feel tired. Your breast and underarm area may be slightly swollen. This may last a few days. You should feel close to normal in a few days. The incision the doctor made usually heals in about 2 weeks. The scar usually fades with time. Some women have a buildup of fluid in the area where the lymph nodes were removed. This is known as seroma. This goes away on its own, or your doctor can drain it. When you had this test, your doctor injected blue dye or radioactive material (or both) into your breast. The blue dye may give your breast a bluish color and turn your urine green for about 24 hours. The radioactive material leaves the body on its own in 24 to 48 hours. A sentinel node biopsy may be done at the same time as other breast surgeries. If this is the case, how you recover will be different. This care sheet gives you a general idea about how long it will take for you to recover. But each person recovers at a different pace. Follow the steps below to get better as quickly as possible. How can you care for yourself at home? Activity ? · Rest when you feel tired. Getting enough sleep will help you recover. ? · Try to walk each day. Start by walking a little more than you did the day before. Bit by bit, increase the amount you walk. Walking boosts blood flow and helps prevent pneumonia and constipation. ? · You may drive when you are no longer taking pain medicine and you feel up to it. ? · You can lift things when you feel comfortable doing so.  
? · Most women return to work and their normal routines in 2 to 7 days. ? · You may shower 24 to 48 hours after surgery, if your doctor okays it. Pat the incision dry. Do not take a bath for the first 2 weeks, or until your doctor tells you it is okay. ? · Avoid activity or exercise that may put stress on the cut. This includes washing windows, vacuuming, or gardening with the affected arm. Diet ? · You can eat your normal diet. If your stomach is upset, try bland, low-fat foods like plain rice, broiled chicken, toast, and yogurt. ? · You may notice that your bowels are not regular right after your surgery. This is common. Try to avoid constipation and straining with bowel movements. Take a fiber supplement such as Citrucel or Metamucil every day. If you have not had a bowel movement after a couple of days, take a mild laxative. Medicines ? · Your doctor will tell you if and when you can restart your medicines. He or she will also give you instructions about taking any new medicines.   
? · If you take blood thinners, such as warfarin (Coumadin), clopidogrel (Plavix), or aspirin, be sure to talk to your doctor. He or she will tell you if and when to start taking those medicines again. Make sure that you understand exactly what your doctor wants you to do. ? · Take pain medicines exactly as directed. ¨ If the doctor gave you a prescription medicine for pain, take it as prescribed. ¨ If you are not taking a prescription pain medicine, take an over-the-counter medicine such as acetaminophen (Tylenol), ibuprofen (Advil, Motrin), or naproxen (Aleve). Read and follow all instructions on the label. ¨ Do not take two or more pain medicines at the same time unless the doctor told you to. Many pain medicines have acetaminophen, which is Tylenol. Too much acetaminophen (Tylenol) can be harmful. ? · If your doctor prescribed antibiotics, take them as directed. Do not stop taking them just because you feel better. You need to take the full course of antibiotics. ? · If you think your pain medicine is making you sick to your stomach: 
¨ Take your medicine after meals (unless your doctor has told you not to). ¨ Ask your doctor for a different pain medicine. Incision care ? · If you have strips of tape on the cut (incision) the doctor made, leave the tape on for about 1 week or until it falls off.  
? · After you can shower, wash the area daily with warm, soapy water and pat it dry. Follow-up care is a key part of your treatment and safety. Be sure to make and go to all appointments, and call your doctor if you are having problems. It's also a good idea to know your test results and keep a list of the medicines you take. When should you call for help? Call 911 anytime you think you may need emergency care. For example, call if: 
? · You passed out (lost consciousness). ? · You have chest pain, are short of breath, or cough up blood. ?Call your doctor now or seek immediate medical care if: 
? · You have pain that does not get better after you take pain medicine. ? · You cannot pass stools or gas. ? · You are sick to your stomach or cannot drink fluids. ? · You have signs of a blood clot in your leg (called a deep vein thrombosis), such as: 
¨ Pain in your calf, back of the knee, thigh, or groin. ¨ Redness or swelling in your leg. ? · You have signs of infection, such as: 
¨ Increased pain, swelling, warmth, or redness. ¨ Red streaks leading from the incision. ¨ Pus draining from the incision. ¨ A fever. ? · You have loose stitches, or your incision comes open. ? · Bright red blood has soaked through the bandage over your incision. ? Watch closely for changes in your health, and be sure to contact your doctor if: 
? · You have any problems. ? · You have new or worse swelling or pain in your arm. Where can you learn more? Go to http://shira-kalen.info/. Enter 570 [id number] in the search box to learn more about \"[place] Node Biopsy for Breast Cancer: What to Expect at Home. \" Current as of: May 12, 2017 Content Version: 11.4 © 6339-9420 [employer]3 Search. Care instructions adapted under license by [employer] (which disclaims liability or warranty for this information). If you have questions about a medical condition or this instruction, always ask your healthcare professional. [address] any warranty or liability for your use of this information. After general anesthesia or intravenous sedation, for 24 hours or while taking prescription Narcotics: · Limit your activities · Do not drive and operate hazardous machinery · Do not make important personal or business decisions · Do  not drink alcoholic beverages · If you have not urinated within 8 hours after discharge, please contact your surgeon on call. *  Please give a list of your current medications to your Primary Care Provider.  
*  Please update this list whenever your medications are discontinued, doses are 
 changed, or new medications (including over-the-counter products) are added. *  Please carry medication information at all times in case of emergency situations. These are general instructions for a healthy lifestyle: No smoking/ No tobacco products/ Avoid exposure to second hand smoke Surgeon General's Warning:  Quitting smoking now greatly reduces serious risk to your health. Obesity, smoking, and sedentary lifestyle greatly increases your risk for illness A healthy diet, regular physical exercise & weight monitoring are important for maintaining a healthy lifestyle You may be retaining fluid if you have a history of heart failure or if you experience any of the following symptoms:  Weight gain of 3 pounds or more overnight or 5 pounds in a week, increased swelling in our hands or feet or shortness of breath while lying flat in bed. Please call your doctor as soon as you notice any of these symptoms; do not wait until your next office visit. Recognize signs and symptoms of STROKE: 
F-face looks uneven A-arms unable to move or move unevenly S-speech slurred or non-existent T-time-call 911 as soon as signs and symptoms begin-DO NOT go Back to bed or wait to see if you get better-TIME IS BRAIN. Introducing [facility]! Dee León introduces BO.LT patient portal. Now you can access parts of your medical record, email your doctor's office, and request medication refills online. 1. In your internet browser, go to [email or web address] 2. Click on the First Time User? Click Here link in the Sign In box. You will see the New Member Sign Up page. 3. Enter your BO.LT Access Code exactly as it appears below. You will not need to use this code after youve completed the sign-up process. If you do not sign up before the expiration date, you must request a new code. · BO.LT Access Code: [email or web address] Expires: 5/17/2018  9:23 AM 
 
 4. Enter the last four digits of your Social Security Number (xxxx) and Date of Birth (mm/dd/yyyy) as indicated and click Submit. You will be taken to the next sign-up page. 5. Create a LifeVantage ID. This will be your LifeVantage login ID and cannot be changed, so think of one that is secure and easy to remember. 6. Create a LifeVantage password. You can change your password at any time. 7. Enter your Password Reset Question and Answer. This can be used at a later time if you forget your password. 8. Enter your e-mail address. You will receive e-mail notification when new information is available in [address] 9. Click Sign Up. You can now view and download portions of your medical record. 10. Click the Download Summary menu link to download a portable copy of your medical information. If you have questions, please visit the Frequently Asked Questions section of the LifeVantage website. Remember, LifeVantage is NOT to be used for urgent needs. For medical emergencies, dial 911. Now available from your iPhone and Android! Providers Seen During Your Hospitalization Provider Specialty Primary office phone Trisha Lerma MD General Surgery [phone] Your Primary Care Physician (PCP) Primary Care Physician Office Phone Office Fax [address], [address] [place] [address] [phone] You are allergic to the following Allergen Reactions Azithromycin Other (comments)  
 pancreatitis Amoxicillin Diarrhea Codeine Nausea and Vomiting Recent Documentation Height Weight BMI OB Status Smoking Status 1.549 m 52 kg 21.65 kg/m2 Hysterectomy Current Every Day Smoker Emergency Contacts Name Discharge Info Relation Home Work Mobile Tia Murillo  Daughter [21] [phone] Rita Calix  Daughter [21] [phone] Fabiola Gutierrezissa  Daughter [21] [phone] Patient Belongings The following personal items are in your possession at time of discharge: 
  Dental Appliances: Uppers, Lowers         Home Medications: None   Jewelry: None  Clothing: Shirt, Pants, Footwear, Undergarments    Other Valuables: None Please provide this summary of care documentation to your next provider. Signatures-by signing, you are acknowledging that this After Visit Summary has been reviewed with you and you have received a copy. Patient Signature:  ____________________________________________________________ Date:  ____________________________________________________________  
  
[facility] Provider Signature:  ____________________________________________________________ Date:  ____________________________________________________________

## 2018-02-22 NOTE — ANESTHESIA POSTPROCEDURE EVALUATION
Post-Anesthesia Evaluation and Assessment    Patient: Lee Freedman MRN: [id number]  SSN: [id number]    YOB: 1940  Age: 78 y.o. Sex: female       Cardiovascular Function/Vital Signs  Visit Vitals    /61 (BP 1 Location: Right arm, BP Patient Position: At rest)    Pulse 61    Temp 36.3 °C (97.4 °F)    Resp 17    Ht 5' 1\" (1.549 m)    Wt 52 kg (114 lb 9.6 oz)    SpO2 96%    BMI 21.65 kg/m2       Patient is status post general anesthesia for Procedure(s):  LEFT BREAST LUMPECTOMY/ SENTINEL NODE BIOPSY. Nausea/Vomiting: None    Postoperative hydration reviewed and adequate. Pain:  Pain Scale 1: Numeric (0 - 10) (02/22/18 1120)  Pain Intensity 1: 2 (02/22/18 1120)   Managed    Neurological Status:   Neuro (WDL): Within Defined Limits (02/22/18 1120)  Neuro  Neurologic State: Sleeping (02/22/18 1024)  LUE Motor Response: Spontaneous  (02/22/18 1024)  LLE Motor Response: Spontaneous  (02/22/18 1024)  RUE Motor Response: Spontaneous  (02/22/18 1024)  RLE Motor Response: Spontaneous  (02/22/18 1024)   At baseline    Mental Status and Level of Consciousness: Arousable    Pulmonary Status:   O2 Device: Room air (02/22/18 1120)   Adequate oxygenation and airway patent    Complications related to anesthesia: None    Post-anesthesia assessment completed.  No concerns    Signed By: Alberto Bowen MD     February 22, 2018

## 2018-02-22 NOTE — OP NOTES
Breast Lumpectomy, needle localization with [place] Node    PRE-PROCEDURE DIAGNOSIS: left breast cancer     POSTOPERATIVE DIAGNOSIS: same    NAME OF PROCEDURE: left breast lumpectomy, and sentinel node biopsy with lymphatic mapping     SURGEON: Nickolas Izaguirre MD.     ASSISTANT: None. ANESTHESIA: General.     ESTIMATED BLOOD LOSS: 10ml    SPECIMENS: lumpectomy, inferior margin, sentinel node     Drains: none     INDICATIONS: The patient is a 79yo WF with diagnosis of a T1N0 left breast cancer. Risks, benefits, and alternatives were discussed in detail with the patient preoperatively. She understood the risks and wished to   proceed and consent was obtained. PROCEDURE: She was taken to the operating room, placed on table in supine   Position. The lymph node was mapped using radioactive tracer. The left breast and axilla were prepped and draped in   routine sterile fashion. The sentinel lymph node portion was done first.   A  Small incision was made at the inferior border of the hairline in the   axilla and dissection was carefully performed through the subcutaneous   tissue to the axillary lymph nodes identified by neoprobe. The sentinel node was    identified. It was dissected out. All lymph nodes with sufficient increase in uptake were removed and sent to pathology. Irrigation was performed and   the wound closed in 2 layers with 3-0 Vicryl and 4-0 subcu Monocryl. Sterile dressing was applied. Attention was then turned to the   lumpectomy. Incision was made along skin lines over the palpable mass in the 12 o'clock position  A skin ellipse was taken as the superficial margin. .Dissection   around the mass was performed with electrocautery to include a generous margin and the pectoral fascia as the deep margin. An extra 1cm inferior margin was taken as well and marked with suture on the surgical margin  Irrigation was performed of the cavity. Hemostasis was assured.  The   tissues were closed with 3-0 Vicryl and 4-0 subcuticular Monocryl. Sterile dressing applied. She tolerated the procedure well with no   complications and was taken to the recovery room in good condition.

## 2018-02-22 NOTE — ANESTHESIA PREPROCEDURE EVALUATION
Anesthetic History               Review of Systems / Medical History  Patient summary reviewed, nursing notes reviewed and pertinent labs reviewed    Pulmonary    COPD: moderate      Smoker         Neuro/Psych              Cardiovascular    Hypertension              Exercise tolerance: >4 METS     GI/Hepatic/Renal           PUD (remote hx)     Endo/Other        Arthritis and cancer (breast)     Other Findings            Physical Exam    Airway  Mallampati: II  TM Distance: 4 - 6 cm  Neck ROM: decreased range of motion   Mouth opening: Normal     Cardiovascular  Regular rate and rhythm,  S1 and S2 normal,  no murmur, click, rub, or gallop             Dental    Dentition: Full lower dentures and Full upper dentures     Pulmonary  Breath sounds clear to auscultation               Abdominal  GI exam deferred       Other Findings            Anesthetic Plan    ASA: 3  Anesthesia type: general            Anesthetic plan and risks discussed with: Patient and Son / Daughter

## 2018-02-22 NOTE — PERIOP NOTES
PT and family verbalized understanding of discharge paperwork including reasons to call MD, s/s of infection, diet, follow up, activity, medication (time to take next dose, signs of respiratory depression and interaction with home medication) and wound care. Denies questions     PT able to ambulate to bathroom and voided without difficulty.

## 2018-02-22 NOTE — DISCHARGE INSTRUCTIONS
May shower on Saturday       [place] Node Biopsy for Breast Cancer: What to Expect at Home  Your Recovery  After a sentinel node biopsy, many women have no side effects. Some women have pain or bruising at the cut (incision) and feel tired. Your breast and underarm area may be slightly swollen. This may last a few days. You should feel close to normal in a few days. The incision the doctor made usually heals in about 2 weeks. The scar usually fades with time. Some women have a buildup of fluid in the area where the lymph nodes were removed. This is known as seroma. This goes away on its own, or your doctor can drain it. When you had this test, your doctor injected blue dye or radioactive material (or both) into your breast. The blue dye may give your breast a bluish color and turn your urine green for about 24 hours. The radioactive material leaves the body on its own in 24 to 48 hours. A sentinel node biopsy may be done at the same time as other breast surgeries. If this is the case, how you recover will be different. This care sheet gives you a general idea about how long it will take for you to recover. But each person recovers at a different pace. Follow the steps below to get better as quickly as possible. How can you care for yourself at home? Activity  ? · Rest when you feel tired. Getting enough sleep will help you recover. ? · Try to walk each day. Start by walking a little more than you did the day before. Bit by bit, increase the amount you walk. Walking boosts blood flow and helps prevent pneumonia and constipation. ? · You may drive when you are no longer taking pain medicine and you feel up to it. ? · You can lift things when you feel comfortable doing so.   ? · Most women return to work and their normal routines in 2 to 7 days. ? · You may shower 24 to 48 hours after surgery, if your doctor okays it. Pat the incision dry.  Do not take a bath for the first 2 weeks, or until your doctor tells you it is okay. ? · Avoid activity or exercise that may put stress on the cut. This includes washing windows, vacuuming, or gardening with the affected arm. Diet  ? · You can eat your normal diet. If your stomach is upset, try bland, low-fat foods like plain rice, broiled chicken, toast, and yogurt. ? · You may notice that your bowels are not regular right after your surgery. This is common. Try to avoid constipation and straining with bowel movements. Take a fiber supplement such as Citrucel or Metamucil every day. If you have not had a bowel movement after a couple of days, take a mild laxative. Medicines  ? · Your doctor will tell you if and when you can restart your medicines. He or she will also give you instructions about taking any new medicines. ? · If you take blood thinners, such as warfarin (Coumadin), clopidogrel (Plavix), or aspirin, be sure to talk to your doctor. He or she will tell you if and when to start taking those medicines again. Make sure that you understand exactly what your doctor wants you to do. ? · Take pain medicines exactly as directed. ¨ If the doctor gave you a prescription medicine for pain, take it as prescribed. ¨ If you are not taking a prescription pain medicine, take an over-the-counter medicine such as acetaminophen (Tylenol), ibuprofen (Advil, Motrin), or naproxen (Aleve). Read and follow all instructions on the label. ¨ Do not take two or more pain medicines at the same time unless the doctor told you to. Many pain medicines have acetaminophen, which is Tylenol. Too much acetaminophen (Tylenol) can be harmful. ? · If your doctor prescribed antibiotics, take them as directed. Do not stop taking them just because you feel better. You need to take the full course of antibiotics. ? · If you think your pain medicine is making you sick to your stomach:  ¨ Take your medicine after meals (unless your doctor has told you not to).   ¨ Ask your doctor for a different pain medicine. Incision care  ? · If you have strips of tape on the cut (incision) the doctor made, leave the tape on for about 1 week or until it falls off.   ? · After you can shower, wash the area daily with warm, soapy water and pat it dry. Follow-up care is a key part of your treatment and safety. Be sure to make and go to all appointments, and call your doctor if you are having problems. It's also a good idea to know your test results and keep a list of the medicines you take. When should you call for help? Call 911 anytime you think you may need emergency care. For example, call if:  ? · You passed out (lost consciousness). ? · You have chest pain, are short of breath, or cough up blood. ?Call your doctor now or seek immediate medical care if:  ? · You have pain that does not get better after you take pain medicine. ? · You cannot pass stools or gas. ? · You are sick to your stomach or cannot drink fluids. ? · You have signs of a blood clot in your leg (called a deep vein thrombosis), such as:  ¨ Pain in your calf, back of the knee, thigh, or groin. ¨ Redness or swelling in your leg. ? · You have signs of infection, such as:  ¨ Increased pain, swelling, warmth, or redness. ¨ Red streaks leading from the incision. ¨ Pus draining from the incision. ¨ A fever. ? · You have loose stitches, or your incision comes open. ? · Bright red blood has soaked through the bandage over your incision. ? Watch closely for changes in your health, and be sure to contact your doctor if:  ? · You have any problems. ? · You have new or worse swelling or pain in your arm. Where can you learn more? Go to http://shira-kalen.info/. Enter 392 1987 in the search box to learn more about \"[place] Node Biopsy for Breast Cancer: What to Expect at Home. \"  Current as of: May 12, 2017  Content Version: 11.4  © 4963-4107 Healthwise, [employer].  Care instructions adapted under license by [facility] (which disclaims liability or warranty for this information). If you have questions about a medical condition or this instruction, always ask your healthcare professional. [address] any warranty or liability for your use of this information. After general anesthesia or intravenous sedation, for 24 hours or while taking prescription Narcotics:  · Limit your activities  · Do not drive and operate hazardous machinery  · Do not make important personal or business decisions  · Do  not drink alcoholic beverages  · If you have not urinated within 8 hours after discharge, please contact your surgeon on call. *  Please give a list of your current medications to your Primary Care Provider. *  Please update this list whenever your medications are discontinued, doses are      changed, or new medications (including over-the-counter products) are added. *  Please carry medication information at all times in case of emergency situations. These are general instructions for a healthy lifestyle:  No smoking/ No tobacco products/ Avoid exposure to second hand smoke  Surgeon General's Warning:  Quitting smoking now greatly reduces serious risk to your health. Obesity, smoking, and sedentary lifestyle greatly increases your risk for illness  A healthy diet, regular physical exercise & weight monitoring are important for maintaining a healthy lifestyle    You may be retaining fluid if you have a history of heart failure or if you experience any of the following symptoms:  Weight gain of 3 pounds or more overnight or 5 pounds in a week, increased swelling in our hands or feet or shortness of breath while lying flat in bed. Please call your doctor as soon as you notice any of these symptoms; do not wait until your next office visit.     Recognize signs and symptoms of STROKE:  F-face looks uneven  A-arms unable to move or move unevenly  S-speech slurred or non-existent  T-time-call 911 as soon as signs and symptoms begin-DO NOT go       Back to bed or wait to see if you get better-TIME IS BRAIN.

## 2018-02-22 NOTE — BRIEF OP NOTE
BRIEF OPERATIVE NOTE    Date of Procedure: 2/22/2018   Preoperative Diagnosis: Malignant neoplasm of left female breast, unspecified estrogen receptor status, unspecified site of breast ([facility] [C50.912]  Postoperative Diagnosis: Malignant neoplasm of left female breast, unspecified estrogen receptor status, unspecified site of breast ([facility].) [C50.912]    Procedure(s):  LEFT BREAST LUMPECTOMY/ SENTINEL NODE BIOPSY  Surgeon(s) and Role:     * Ada Workman MD - Primary         Assistant Staff: None      Surgical Staff:  Circ-1: Kirsten Kramer RN  Circ-Relief: Candice Davis RN  Scrub Tech-1: Brook Lipps  Event Time In   Incision Start 7945   Incision Close 1010     Anesthesia: General   Estimated Blood Loss: 10ml  Specimens:   ID Type Source Tests Collected by Time Destination   1 : LEFT BREAST MASS Preservative   Ada Workman MD 8/75/5932 8747 Pathology   2 : INFERIOR MARGIN Preservative Breast  Ada Workman MD 1/06/0349 0710 Pathology   3 : SENTINEL LYMPH NODE Preservative Lymph Node  Ada Workman MD 9/58/1521 9043 Pathology      Findings: palpable mass left breast   Complications: none  Implants: * No implants in log *

## 2018-03-05 PROBLEM — Z87.11 HISTORY OF PEPTIC ULCER: Chronic | Status: ACTIVE | Noted: 2018-03-05

## 2018-03-05 PROBLEM — Z87.19 HISTORY OF ACUTE PANCREATITIS: Chronic | Status: ACTIVE | Noted: 2018-03-05

## 2018-03-05 PROBLEM — Z87.11 HISTORY OF PEPTIC ULCER: Status: ACTIVE | Noted: 2018-03-05

## 2018-03-05 PROBLEM — Z87.19 HISTORY OF ACUTE PANCREATITIS: Status: ACTIVE | Noted: 2018-03-05

## 2018-03-13 ENCOUNTER — HOSPITAL ENCOUNTER (OUTPATIENT)
Dept: ULTRASOUND IMAGING | Age: 78
Discharge: HOME | End: 2018-03-13
Attending: FAMILY MEDICINE
Payer: MEDICARE

## 2018-03-13 DIAGNOSIS — Z87.11 HISTORY OF PEPTIC ULCER: ICD-10-CM

## 2018-03-13 DIAGNOSIS — R09.89 BILATERAL CAROTID BRUITS: ICD-10-CM

## 2018-03-13 DIAGNOSIS — Z87.19 HISTORY OF ACUTE PANCREATITIS: ICD-10-CM

## 2018-03-13 PROCEDURE — 93880 EXTRACRANIAL BILAT STUDY: CPT

## 2018-03-13 PROCEDURE — 76700 US EXAM ABDOM COMPLETE: CPT

## 2018-03-13 NOTE — PROGRESS NOTES
Please let Mrs. Majo Evans know that her abdominal ultrasound shows only a fatty liver. Her pancreas was unremarkable.

## 2018-03-14 NOTE — PROGRESS NOTES
I called patient to inform them of normal lab/radiology results. Patient verbalized understanding on all. She said that she stopped taking the antibiotics because it was causing her legs to hurt. ... She said she feels fine and thought the wbc elevated could be from her recent surgery on her breast. She would like a call back for the next steps at this point. This call was initiated due to the “Monthly Recall Report” 1st attempt.     I attempted to call patient to set up an appointment with Dr Elam.   Reason for visit: cpe   Date last seen: 11-21-16   Follow-up notes: patient is no longer a patient of Dr Elam.

## 2018-03-26 ENCOUNTER — PATIENT OUTREACH (OUTPATIENT)
Dept: CASE MANAGEMENT | Age: 78
End: 2018-03-26

## 2018-03-26 NOTE — PROGRESS NOTES
3/26/2018 Saw the patient for her initial consultation with medical oncology. She had a left lumpectomy 2/22/2018 the pathology is as follows:     LEFT BREAST MASS: INVASIVE DUCTAL ADENOCARCINOMA LEXY GRADE II/III. CARCINOMA IS 1.5 CM IN GREATEST DIMENSION. MARGINS ARE NEGATIVE FOR CARCINOMA. ONE LYMPH NODE NEGATIVE FOR METASTATIC CARCINOMA (0/1). BIOPSY SITE AND BENIGN SKIN. SEE ATTACHED TUMOR PROFILE. B: INFERIOR MARGIN:   NO CARCINOMA IDENTIFIED. C: SENTINEL LYMPH NODE: ONE LYMPH NODE NEGATIVE FOR METASTATIC CARCINOMA (0/1). % IN 55% Her 2 negative by [facility]    Dr Aimee Mas spent time explaining her pathology. She explained she could send Oncotype Dx to see if chemo would be of benefit. She explained the patient would be good candidate for the hormone blocking pill. The patient is adamant that she does not wish to do anything to treat her breast cancer further. She currently smokes a pack a day. She again does not wish to pursue radiation or even hormone blocking pill. She will not follow up with us unless needed.

## 2018-04-26 ENCOUNTER — HOSPITAL ENCOUNTER (EMERGENCY)
Age: 78
Discharge: HOME | End: 2018-04-26
Attending: EMERGENCY MEDICINE
Payer: MEDICARE

## 2018-04-26 ENCOUNTER — APPOINTMENT (OUTPATIENT)
Dept: GENERAL RADIOLOGY | Age: 78
End: 2018-04-26
Attending: EMERGENCY MEDICINE
Payer: MEDICARE

## 2018-04-26 VITALS
RESPIRATION RATE: 18 BRPM | DIASTOLIC BLOOD PRESSURE: 67 MMHG | TEMPERATURE: 98.3 F | SYSTOLIC BLOOD PRESSURE: 153 MMHG | HEART RATE: 72 BPM | WEIGHT: 115 LBS | BODY MASS INDEX: 21.03 KG/M2 | OXYGEN SATURATION: 98 %

## 2018-04-26 DIAGNOSIS — S32.010A CLOSED COMPRESSION FRACTURE OF FIRST LUMBAR VERTEBRA, INITIAL ENCOUNTER: Primary | ICD-10-CM

## 2018-04-26 PROCEDURE — 74011250637 HC RX REV CODE- 250/637: Performed by: EMERGENCY MEDICINE

## 2018-04-26 PROCEDURE — 96372 THER/PROPH/DIAG INJ SC/IM: CPT | Performed by: EMERGENCY MEDICINE

## 2018-04-26 PROCEDURE — 99284 EMERGENCY DEPT VISIT MOD MDM: CPT | Performed by: EMERGENCY MEDICINE

## 2018-04-26 PROCEDURE — 74011250636 HC RX REV CODE- 250/636: Performed by: EMERGENCY MEDICINE

## 2018-04-26 PROCEDURE — 72170 X-RAY EXAM OF PELVIS: CPT

## 2018-04-26 PROCEDURE — 72100 X-RAY EXAM L-S SPINE 2/3 VWS: CPT

## 2018-04-26 RX ORDER — MORPHINE SULFATE 15 MG/1
15 TABLET ORAL
Qty: 15 TAB | Refills: 0 | Status: SHIPPED | OUTPATIENT
Start: 2018-04-26 | End: 2018-04-30 | Stop reason: SDUPTHER

## 2018-04-26 RX ORDER — ONDANSETRON 8 MG/1
8 TABLET, ORALLY DISINTEGRATING ORAL
Qty: 12 TAB | Refills: 0 | Status: SHIPPED | OUTPATIENT
Start: 2018-04-26 | End: 2018-06-08

## 2018-04-26 RX ORDER — ACETAMINOPHEN 325 MG/1
650 TABLET ORAL
Status: DISCONTINUED | OUTPATIENT
Start: 2018-04-26 | End: 2018-04-26 | Stop reason: HOSPADM

## 2018-04-26 RX ORDER — ONDANSETRON 8 MG/1
8 TABLET, ORALLY DISINTEGRATING ORAL
Status: COMPLETED | OUTPATIENT
Start: 2018-04-26 | End: 2018-04-26

## 2018-04-26 RX ORDER — KETOROLAC TROMETHAMINE 30 MG/ML
15 INJECTION, SOLUTION INTRAMUSCULAR; INTRAVENOUS
Status: COMPLETED | OUTPATIENT
Start: 2018-04-26 | End: 2018-04-26

## 2018-04-26 RX ADMIN — ONDANSETRON 8 MG: 8 TABLET, ORALLY DISINTEGRATING ORAL at 07:47

## 2018-04-26 RX ADMIN — KETOROLAC TROMETHAMINE 15 MG: 30 INJECTION, SOLUTION INTRAMUSCULAR at 07:47

## 2018-04-26 NOTE — ED NOTES
I have reviewed discharge instructions with the patient. The patient verbalized understanding. Patient left ED via Discharge Method: ambulatory to Home with self. Opportunity for questions and clarification provided. Patient given 2 scripts. To continue your aftercare when you leave the hospital, you may receive an automated call from our care team to check in on how you are doing. This is a free service and part of our promise to provide the best care and service to meet your aftercare needs.  If you have questions, or wish to unsubscribe from this service please call [phone]. Thank you for Choosing our [facility] Emergency Department.

## 2018-04-26 NOTE — DISCHARGE INSTRUCTIONS
Compression Fracture of the Spine: Care Instructions  Your Care Instructions    A compression fracture happens when the front part of a spinal bone breaks and collapses. A fall or other accident can cause it. A minor injury or moving the wrong way can cause a break if you have thin or brittle bones (osteoporosis). These types of breaks will heal in 8 to 10 weeks. You will need rest and pain medicines. Your doctor may recommend physical therapy. Some doctors recommend that certain people with compression fractures wear braces. Your doctor also may treat thin or brittle bones. You may need surgery if you have lasting pain or if the bone presses on the spinal cord or nerves. You heal best when you take good care of yourself. Eat a variety of healthy foods, and don't smoke. Follow-up care is a key part of your treatment and safety. Be sure to make and go to all appointments, and call your doctor if you are having problems. It's also a good idea to know your test results and keep a list of the medicines you take. How can you care for yourself at home? · Be safe with medicines. Read and follow all instructions on the label. ¨ If the doctor gave you a prescription medicine for pain, take it as prescribed. ¨ If you are not taking a prescription pain medicine, ask your doctor if you can take an over-the-counter medicine. · Talk to your doctor about how to make your bones stronger. You may need medicines or a change in what you eat. · Be active only as directed by your doctor. When should you call for help? Call 911 anytime you think you may need emergency care. For example, call if:  ? · You are unable to move an arm or a leg at all. ?Call your doctor now or seek immediate medical care if:  ? · You have new or worse symptoms in your arms, legs, belly, or buttocks. Symptoms may include:  ¨ Numbness or tingling. ¨ Weakness. ¨ Pain. ? · You lose bladder or bowel control.    ? · You have belly pain, bloating, vomiting, or nausea. ? Watch closely for changes in your health, and be sure to contact your doctor if:  ? · You do not get better as expected. Where can you learn more? Go to http://shira-kalen.info/. Enter P445 in the search box to learn more about \"Compression Fracture of the Spine: Care Instructions. \"  Current as of: March 21, 2017  Content Version: 11.4  © 8381-1296 [employer]. Care instructions adapted under license by [employer] (which disclaims liability or warranty for this information). If you have questions about a medical condition or this instruction, always ask your healthcare professional. [address] any warranty or liability for your use of this information. Kyphoplasty: Before Your Procedure  What is kyphoplasty? Kyphoplasty (say \"IG-oml-whwo-giuseppe\") is a procedure for your back. It is done to relieve pain from compression fractures of the spine. It can return your vertebrae to a more normal shape. The doctor makes a small cut in your back. Then he or she inserts a hollow needle or tube called a trocar. Fluoroscopy, a kind of X-ray, is used to guide the needle to the fractured vertebra. When the needle is in place, the doctor inserts a balloon. The balloon is inflated and then deflated. Using the hollow needle, the doctor puts a cement substance into the space created by the balloon. It takes about 1 to 2 hours to treat each vertebra. You may go home that day, or you may spend the night in the hospital.  Most people are able to go back to their normal activities within a day. Follow-up care is a key part of your treatment and safety. Be sure to make and go to all appointments, and call your doctor if you are having problems. It's also a good idea to know your test results and keep a list of the medicines you take. What happens before the procedure? ?Preparing for the procedure  ?  · Understand exactly what procedure is planned, along with the risks, benefits, and other options. · Tell your doctors ALL the medicines, vitamins, supplements, and herbal remedies you take. Some of these can increase the risk of bleeding or interact with anesthesia. ? · If you take blood thinners, such as warfarin (Coumadin), clopidogrel (Plavix), or aspirin, be sure to talk to your doctor. He or she will tell you if you should stop taking these medicines before your procedure. Make sure that you understand exactly what your doctor wants you to do.   ? · Your doctor will tell you which medicines to take or stop before your procedure. You may need to stop taking certain medicines a week or more before the procedure. So talk to your doctor as soon as you can.   ? · If you have an advance directive, let your doctor know. It may include a living will and a durable power of  for health care. Bring a copy to the hospital. If you don't have one, you may want to prepare one. It lets your doctor and loved ones know your health care wishes. Doctors advise that everyone prepare these papers before any type of surgery or procedure. Procedures can be stressful. This information will help you understand what you can expect. And it will help you safely prepare for your procedure. What happens on the day of the procedure? · Follow the instructions exactly about when to stop eating and drinking. If you don't, your procedure may be canceled. If your doctor told you to take your medicines on the day of the procedure, take them with only a sip of water. ? · Take a bath or shower before you come in for your procedure. Do not apply lotions, perfumes, deodorants, or nail polish. ? · Take off all jewelry and piercings. And take out contact lenses, if you wear them. ? At the hospital or surgery center   · Bring a picture ID. ? · You will be kept comfortable and safe by your anesthesia provider.  You may get medicine that relaxes you or puts you in a light sleep. The area being worked on will be numb. Going home   · Be sure you have someone to drive you home. Anesthesia and pain medicine make it unsafe for you to drive. ? · You will be given more specific instructions about recovering from your procedure. They will cover things like diet, wound care, follow-up care, driving, and getting back to your normal routine. When should you call your doctor? · You have questions or concerns. ? · You don't understand how to prepare for your procedure. ? · You become ill before the procedure (such as fever, flu, or a cold). ? · You need to reschedule or have changed your mind about having the procedure. Where can you learn more? Go to http://shira-kalen.info/. Enter C178 in the search box to learn more about \"Kyphoplasty: Before Your Procedure. \"  Current as of: March 21, 2017  Content Version: 11.4  © 3626-9281 Healthwise, [employer]. Care instructions adapted under license by [employer] (which disclaims liability or warranty for this information). If you have questions about a medical condition or this instruction, always ask your healthcare professional. [place] [address] any warranty or liability for your use of this information.

## 2018-04-26 NOTE — ED PROVIDER NOTES
HPI Comments: 42-year-old female was pulling weeds in her yard yesterday and fell backwards onto her buttocks and back. Pain instantly at that time and worsening since. No radiation of the pain. Straight breast cancer. Denies fever or weight loss. No loss of bladder or bowel function. Patient is a 78 y.o. female presenting with back pain. The history is provided by the patient. Back Pain    This is a new problem. The current episode started yesterday. The problem has been gradually worsening. The problem occurs constantly. Patient reports not work related injury. The pain is associated with a fall. The pain is present in the lumbar spine. The quality of the pain is described as sharp. The pain does not radiate. The pain is severe. The symptoms are aggravated by certain positions. Pertinent negatives include no fever, no numbness, no weight loss, no abdominal pain, no perianal numbness, no bladder incontinence, no dysuria, no leg pain, no paresthesias, no paresis, no tingling and no weakness. She has tried nothing for the symptoms.         Past Medical History:   Diagnosis Date    STEPHEN (acute kidney injury) ([facility] [address] [address] 12/04/2014    pt denies this dx    Back pain 6/10/2016    Breast lump dx 2/2018    left    Bronchitis     Discharge from the vagina     Dysuria     Eczema 6/10/2016    Fungal dermatitis     Headache 6/10/2016    Hypercholesterolemia 12/4/2014    Hypertension     not well controlled--per pt    Menopausal vaginal dryness     Osteoarthritis 6/10/2016    Osteoporosis 6/10/2016    PUD (peptic ulcer disease)     last 2003 which a rupture an extensive surgery    Sepsis ([address] 12/4/2014    Tobacco abuse     1ppd x 49 yrs       Past Surgical History:   Procedure Laterality Date    ABDOMEN SURGERY PROC UNLISTED  2003    stomach surgery for ruptured ulcer and extensive rerouting of intestestines per patient     BREAST SURGERY PROCEDURE UNLISTED Left 1975    breast lumpectomy, benign  (left)    HX APPENDECTOMY  1977    with hysterectomy    HX BREAST BIOPSY Left 1975    HX BREAST LUMPECTOMY Left 2/22/2018    LEFT BREAST LUMPECTOMY/ SENTINEL NODE BIOPSY performed by Jesse Paredes MD at [facility] MAIN OR    HX CATARACT REMOVAL Bilateral     HX CHOLECYSTECTOMY      HX ENDOSCOPY      HX [facility] [place]    HX OTHER SURGICAL      hernicolectomy 2 cm    HX TUBAL LIGATION  1975         Family History:   Problem Relation Age of Onset    Diabetes Sister     Heart Disease Sister     Heart Disease Father     Heart Attack Father     Cancer Brother      prostate    Cancer Sister     Breast Cancer Sister 79    Heart Disease Sister     Heart Disease Brother     Hypertension Mother     Hypertension Other      Brother and sister with htn       Social History     Social History    Marital status:      Spouse name: N/A    Number of children: N/A    Years of education: N/A     Occupational History    Not on file. Social History Main Topics    Smoking status: Current Every Day Smoker     Packs/day: 1.00     Years: 49.00     Start date: 5/6/1966    Smokeless tobacco: Never Used    Alcohol use No    Drug use: No    Sexual activity: Not on file     Other Topics Concern    Not on file     Social History Narrative         ALLERGIES: Azithromycin; Amoxicillin; and Codeine    Review of Systems   Constitutional: Negative for fever, unexpected weight change and weight loss. Gastrointestinal: Positive for nausea. Negative for abdominal pain and vomiting. Genitourinary: Negative for bladder incontinence, difficulty urinating and dysuria. Musculoskeletal: Positive for back pain. Neurological: Negative for tingling, weakness, numbness and paresthesias. Vitals:    04/26/18 0701   BP: 160/76   Pulse: 93   Resp: 20   Temp: 98.5 °F (36.9 °C)   SpO2: 97%   Weight: 52.2 kg (115 lb)            Physical Exam   Constitutional: She appears well-developed and well-nourished.    Cardiovascular: Normal rate, regular rhythm and normal heart sounds. Pulmonary/Chest: Effort normal and breath sounds normal.   Abdominal: Soft. She exhibits no distension and no mass. There is no tenderness. There is no rebound and no guarding. Musculoskeletal: Normal range of motion. She exhibits tenderness (sacral or coccygeal tenderness. There ismoderate midline mid and lower lumbar tenderness. Lateral lumbar paraspinous tenderness present. No midline thoracic tenderness is present). Neurological: She has normal strength. No sensory deficit. Reflex Scores:       Patellar reflexes are 2+ on the right side and 2+ on the left side. Nursing note and vitals reviewed. MDM  Number of Diagnoses or Management Options  Closed compression fracture of first lumbar vertebra, initial encounter [facility]):   Diagnosis management comments: Back pain after pulling weeds and falling backwards. Midline tenderness present. X-ray ordered to evaluate for compression fracture. Neurologically intact. Muscle strain versus compression fracture. No radicular pain to suggest disc herniation. No loss of bladder or bowel dysfunction and no sign of cauda equina syndrome. 8:53 AM  Patient does not believe she'll be able to find a ride home. I have prescribed her pain medication to take upon return home. An additional dose of Tylenol will be given prior to discharge. Amount and/or Complexity of Data Reviewed  Tests in the radiology section of CPT®: ordered and reviewed (Xr Spine Lumb 2 Or 3 V    Result Date: 4/26/2018  Lumbar spine series: 4/26/2018 INDICATION: Trauma 3 views are remarkable for a subacute compression fracture of the body of L1 with loss of height of approximately 50%. Joint spaces are intact. There is mild mid lumbar scoliosis concave to the left. Abdominal sutures are noted. SI joints are unremarkable. IMPRESSION: Lumbar scoliosis, subacute compression fracture of the body of L1.     Xr Pelv Ap Only    Result Date: 4/26/2018  AP pelvis 4/26/2018 INDICATION: Pelvis pain since fall yesterday There is no fracture or dislocation. Joint spaces are intact. Soft tissues are unremarkable. IMPRESSION: No fracture    )  Discuss the patient with other providers: yes (Discussed with Dr. Chema Polanco. No CT needed.   He will obtain an MRI and evaluate for kyphoplasty and follow-up.)          ED Course       Procedures

## 2018-04-26 NOTE — ED TRIAGE NOTES
Complains of lower back pain after falling while pulling weeds yesterday. Denies hitting her head. States she landed on her butt. Went to a chiropractor last night.

## 2018-05-06 ENCOUNTER — APPOINTMENT (OUTPATIENT)
Dept: MRI IMAGING | Age: 78
End: 2018-05-06
Attending: EMERGENCY MEDICINE
Payer: MEDICARE

## 2018-05-06 ENCOUNTER — HOSPITAL ENCOUNTER (OUTPATIENT)
Age: 78
Setting detail: OBSERVATION
Discharge: HOME | End: 2018-05-09
Attending: EMERGENCY MEDICINE | Admitting: INTERNAL MEDICINE
Payer: MEDICARE

## 2018-05-06 DIAGNOSIS — R33.9 URINARY RETENTION: ICD-10-CM

## 2018-05-06 DIAGNOSIS — S32.010A CLOSED COMPRESSION FRACTURE OF FIRST LUMBAR VERTEBRA, INITIAL ENCOUNTER: Primary | ICD-10-CM

## 2018-05-06 DIAGNOSIS — G95.20 SPINAL CORD COMPRESSION, POST-TRAUMATIC (HCC): ICD-10-CM

## 2018-05-06 LAB
ANION GAP SERPL CALC-SCNC: 11 MMOL/L (ref 7–16)
BUN SERPL-MCNC: 33 MG/DL (ref 8–23)
CALCIUM SERPL-MCNC: 8.7 MG/DL (ref 8.3–10.4)
CHLORIDE SERPL-SCNC: 94 MMOL/L (ref 98–107)
CO2 SERPL-SCNC: 27 MMOL/L (ref 21–32)
CREAT SERPL-MCNC: 1.03 MG/DL (ref 0.6–1)
ERYTHROCYTE [DISTWIDTH] IN BLOOD BY AUTOMATED COUNT: 13.7 % (ref 11.9–14.6)
GLUCOSE SERPL-MCNC: 109 MG/DL (ref 65–100)
HCT VFR BLD AUTO: 36.9 % (ref 35.8–46.3)
HGB BLD-MCNC: 13.4 G/DL (ref 11.7–15.4)
MCH RBC QN AUTO: 28.9 PG (ref 26.1–32.9)
MCHC RBC AUTO-ENTMCNC: 36.3 G/DL (ref 31.4–35)
MCV RBC AUTO: 79.7 FL (ref 79.6–97.8)
PLATELET # BLD AUTO: 428 K/UL (ref 150–450)
PMV BLD AUTO: 9.1 FL (ref 10.8–14.1)
POTASSIUM SERPL-SCNC: 2.9 MMOL/L (ref 3.5–5.1)
RBC # BLD AUTO: 4.63 M/UL (ref 4.05–5.25)
SODIUM SERPL-SCNC: 132 MMOL/L (ref 136–145)
WBC # BLD AUTO: 12.2 K/UL (ref 4.3–11.1)

## 2018-05-06 PROCEDURE — 96375 TX/PRO/DX INJ NEW DRUG ADDON: CPT | Performed by: EMERGENCY MEDICINE

## 2018-05-06 PROCEDURE — 81003 URINALYSIS AUTO W/O SCOPE: CPT | Performed by: EMERGENCY MEDICINE

## 2018-05-06 PROCEDURE — 74011250636 HC RX REV CODE- 250/636: Performed by: EMERGENCY MEDICINE

## 2018-05-06 PROCEDURE — 51798 US URINE CAPACITY MEASURE: CPT | Performed by: EMERGENCY MEDICINE

## 2018-05-06 PROCEDURE — 96361 HYDRATE IV INFUSION ADD-ON: CPT | Performed by: EMERGENCY MEDICINE

## 2018-05-06 PROCEDURE — 96374 THER/PROPH/DIAG INJ IV PUSH: CPT | Performed by: EMERGENCY MEDICINE

## 2018-05-06 PROCEDURE — 77030005515 HC CATH URETH FOL14 BARD -B

## 2018-05-06 PROCEDURE — 85027 COMPLETE CBC AUTOMATED: CPT | Performed by: EMERGENCY MEDICINE

## 2018-05-06 PROCEDURE — 51702 INSERT TEMP BLADDER CATH: CPT | Performed by: EMERGENCY MEDICINE

## 2018-05-06 PROCEDURE — 80048 BASIC METABOLIC PNL TOTAL CA: CPT | Performed by: EMERGENCY MEDICINE

## 2018-05-06 PROCEDURE — 99285 EMERGENCY DEPT VISIT HI MDM: CPT | Performed by: EMERGENCY MEDICINE

## 2018-05-06 PROCEDURE — 72148 MRI LUMBAR SPINE W/O DYE: CPT

## 2018-05-06 RX ORDER — MORPHINE SULFATE 4 MG/ML
4 INJECTION INTRAVENOUS
Status: COMPLETED | OUTPATIENT
Start: 2018-05-06 | End: 2018-05-06

## 2018-05-06 RX ORDER — ONDANSETRON 2 MG/ML
4 INJECTION INTRAMUSCULAR; INTRAVENOUS
Status: COMPLETED | OUTPATIENT
Start: 2018-05-06 | End: 2018-05-06

## 2018-05-06 RX ADMIN — SODIUM CHLORIDE 1000 ML: 900 INJECTION, SOLUTION INTRAVENOUS at 20:15

## 2018-05-06 RX ADMIN — ONDANSETRON 4 MG: 2 INJECTION INTRAMUSCULAR; INTRAVENOUS at 20:18

## 2018-05-06 RX ADMIN — MORPHINE SULFATE 4 MG: 4 INJECTION INTRAVENOUS at 20:18

## 2018-05-06 NOTE — IP AVS SNAPSHOT
[facility] 
 
 
 [address] [place] [address] [address] [facility] 
[phone] Patient: Katherin Harper MRN: [id number] [id number] About your hospitalization You were admitted on: May 7, 2018 You last received care in the:  [facility] 7 MED SURG You were discharged on:  May 9, 2018 Why you were hospitalized Your primary diagnosis was:  Compression Fracture Of L1 Lumbar Vertebra (Hcc) Your diagnoses also included:  Urinary Retention, Htn (Hypertension), Hypokalemia, Hyponatremia Follow-up Information Follow up With Details Comments Contact Info Dez Muñoz MD On 5/16/2018 11 AM [address] 
[phone] Your Scheduled Appointments Wednesday May 16, 2018 11:00 AM EDT Extended Office Visit with Verenice Walter PA-C [address] ([facility]) [facility] [address]  
[phone] Discharge Orders None A check silvia indicates which time of day the medication should be taken. My Medications CONTINUE taking these medications Instructions Each Dose to Equal  
 Morning Noon Evening Bedtime  
 ibuprofen 800 mg tablet Commonly known as:  MOTRIN Your next dose is:  TODAY as needed Take 1 Tab by mouth three (3) times daily. 800 mg  
    
   
   
   
  
 losartan-hydroCHLOROthiazide 50-12.5 mg per tablet Commonly known as:  HYZAAR Your next dose is:  Tomorrow Morning Take 1 Tab by mouth daily. 1 Tab  
    
  
   
   
   
  
 morphine IR 15 mg tablet Commonly known as:  MS IR Your next dose is: Take on as needed schedule Take 1 Tab by mouth every six (6) hours as needed for Pain. Max Daily Amount: 60 mg.  
 15 mg  
    
   
   
   
  
 ondansetron 8 mg disintegrating tablet Commonly known as:  ZOFRAN ODT Your next dose is: Take on as needed schedule Take 1 Tab by mouth every eight (8) hours as needed for Nausea. 8 mg  
    
   
   
   
  
 pravastatin 40 mg tablet Commonly known as:  PRAVACHOL Your next dose is: Take tonight Take 1 Tab by mouth nightly. 40 mg Opioid Education Prescription Opioids: What You Need to Know: 
 
Prescription opioids can be used to help relieve moderate-to-severe pain and are often prescribed following a surgery or injury, or for certain health conditions. These medications can be an important part of treatment but also come with serious risks. Opioids are strong pain medicines. Examples include hydrocodone, oxycodone, fentanyl, and morphine. Heroin is an example of an illegal opioid. It is important to work with your health care provider to make sure you are getting the safest, most effective care. WHAT ARE THE RISKS AND SIDE EFFECTS OF OPIOID USE? Prescription opioids carry serious risks of addiction and overdose, especially with prolonged use. An opioid overdose, often marked by slow breathing, can cause sudden death. The use of prescription opioids can have a number of side effects as well, even when taken as directed. · Tolerance-meaning you might need to take more of a medication for the same pain relief · Physical dependence-meaning you have symptoms of withdrawal when the medication is stopped. Withdrawal symptoms can include nausea, sweating, chills, diarrhea, stomach cramps, and muscle aches. Withdrawal can last up to several weeks, depending on which drug you took and how long you took it. · Increased sensitivity to pain · Constipation · Nausea, vomiting, and dry mouth · Sleepiness and dizziness · Confusion · Depression · Low levels of testosterone that can result in lower sex drive, energy, and strength · Itching and sweating RISKS ARE GREATER WITH:      
· History of drug misuse, substance use disorder, or overdose · Mental health conditions (such as depression or anxiety) · Sleep apnea · Older age (72 years or older) · Pregnancy Avoid alcohol while taking prescription opioids. Also, unless specifically advised by your health care provider, medications to avoid include: · Benzodiazepines (such as Xanax or Valium) · Muscle relaxants (such as Soma or Flexeril) · Hypnotics (such as Ambien or Lunesta) · Other prescription opioids KNOW YOUR OPTIONS Talk to your health care provider about ways to manage your pain that don't involve prescription opioids. Some of these options may actually work better and have fewer risks and side effects. Options may include: 
· Pain relievers such as acetaminophen, ibuprofen, and naproxen · Some medications that are also used for depression or seizures · Physical therapy and exercise · Counseling to help patients learn how to cope better with triggers of pain and stress. · Application of heat or cold compress · Massage therapy · Relaxation techniques Be Informed Make sure you know the name of your medication, how much and how often to take it, and its potential risks & side effects. IF YOU ARE PRESCRIBED OPIOIDS FOR PAIN: 
· Never take opioids in greater amounts or more often than prescribed. Remember the goal is not to be pain-free but to manage your pain at a tolerable level. · Follow up with your primary care provider to: · Work together to create a plan on how to manage your pain. · Talk about ways to help manage your pain that don't involve prescription opioids. · Talk about any and all concerns and side effects. · Help prevent misuse and abuse. · Never sell or share prescription opioids · Help prevent misuse and abuse. · Store prescription opioids in a secure place and out of reach of others (this may include visitors, children, friends, and family).  
· Safely dispose of unused/unwanted prescription opioids: Find your community drug take-back program or your pharmacy mail-back program, or flush them down the toilet, following guidance from the Food and Drug Administration (www.fda.gov/Drugs/ResourcesForYou). · Visit www.cdc.gov/drugoverdose to learn about the risks of opioid abuse and overdose. · If you believe you may be struggling with addiction, tell your health care provider and ask for guidance or call Anneliese sambaash at 3-815-592-KZZF. Discharge Instructions Compression Fracture of the Spine: Care Instructions Your Care Instructions A compression fracture happens when the front part of a spinal bone breaks and collapses. A fall or other accident can cause it. A minor injury or moving the wrong way can cause a break if you have thin or brittle bones (osteoporosis). These types of breaks will heal in 8 to 10 weeks. You will need rest and pain medicines. Your doctor may recommend physical therapy. Some doctors recommend that certain people with compression fractures wear braces. Your doctor also may treat thin or brittle bones. You may need surgery if you have lasting pain or if the bone presses on the spinal cord or nerves. You heal best when you take good care of yourself. Eat a variety of healthy foods, and don't smoke. Follow-up care is a key part of your treatment and safety. Be sure to make and go to all appointments, and call your doctor if you are having problems. It's also a good idea to know your test results and keep a list of the medicines you take. How can you care for yourself at home? · Be safe with medicines. Read and follow all instructions on the label. ¨ If the doctor gave you a prescription medicine for pain, take it as prescribed. ¨ If you are not taking a prescription pain medicine, ask your doctor if you can take an over-the-counter medicine. · Talk to your doctor about how to make your bones stronger. You may need medicines or a change in what you eat. · Be active only as directed by your doctor. When should you call for help? Call 911 anytime you think you may need emergency care. For example, call if: 
? · You are unable to move an arm or a leg at all. ?Call your doctor now or seek immediate medical care if: 
? · You have new or worse symptoms in your arms, legs, belly, or buttocks. Symptoms may include: ¨ Numbness or tingling. ¨ Weakness. ¨ Pain. ? · You lose bladder or bowel control. ? · You have belly pain, bloating, vomiting, or nausea. ? Watch closely for changes in your health, and be sure to contact your doctor if: 
? · You do not get better as expected. Where can you learn more? Go to http://shira-kalen.info/. Enter P445 in the search box to learn more about \"Compression Fracture of the Spine: Care Instructions. \" Current as of: March 21, 2017 Content Version: 11.4 © 1087-4033 [employer]. Care instructions adapted under license by [employer] (which disclaims liability or warranty for this information). If you have questions about a medical condition or this instruction, always ask your healthcare professional. [place] [address] any warranty or liability for your use of this information. DISCHARGE SUMMARY from Nurse PATIENT INSTRUCTIONS: 
 
 
F-face looks uneven A-arms unable to move or move unevenly S-speech slurred or non-existent T-time-call 911 as soon as signs and symptoms begin-DO NOT go Back to bed or wait to see if you get better-TIME IS BRAIN. Warning Signs of HEART ATTACK Call 911 if you have these symptoms: 
? Chest discomfort.  Most heart attacks involve discomfort in the center of the chest that lasts more than a few minutes, or that goes away and comes back. It can feel like uncomfortable pressure, squeezing, fullness, or pain. ? Discomfort in other areas of the upper body. Symptoms can include pain or discomfort in one or both arms, the back, neck, jaw, or stomach. ? Shortness of breath with or without chest discomfort. ? Other signs may include breaking out in a cold sweat, nausea, or lightheadedness. Don't wait more than five minutes to call [address]! Fast action can save your life. Calling 911 is almost always the fastest way to get lifesaving treatment. Emergency Medical Services staff can begin treatment when they arrive  up to an hour sooner than if someone gets to the hospital by car. The discharge information has been reviewed with the patient. The patient verbalized understanding. Discharge medications reviewed with the patient and appropriate educational materials and side effects teaching were provided. ___________________________________________________________________________________________________________________________________ [employer] Announcement We are excited to announce that we are making your provider's discharge notes available to you in [employer]. You will see these notes when they are completed and signed by the physician that discharged you from your recent hospital stay. If you have any questions or concerns about any information you see in [employer], please call the Health Information Department where you were seen or reach out to your Primary Care Provider for more information about your plan of care. Introducing hospitals & [facility] SERVICES! [employer] introduces [employer] patient portal. Now you can access parts of your medical record, email your doctor's office, and request medication refills online. 1. In your internet browser, go to [email or web address] customer [email or web address] 2. Click on the First Time User? Click Here link in the Sign In box. You will see the New Member Sign Up page. 3. Enter your [employer] Access Code exactly as it appears below. You will not need to use this code after youve completed the sign-up process. If you do not sign up before the expiration date, you must request a new code. · [employer] Access Code: [email or web address] Expires: 5/17/2018 10:23 AM 
 
4. Enter the last four digits of your Social Security Number (xxxx) and Date of Birth (mm/dd/yyyy) as indicated and click Submit. You will be taken to the next sign-up page. 5. Create a [employer] ID. This will be your [employer] login ID and cannot be changed, so think of one that is secure and easy to remember. 6. Create a [employer] password. You can change your password at any time. 7. Enter your Password Reset Question and Answer. This can be used at a later time if you forget your password. 8. Enter your e-mail address. You will receive e-mail notification when new information is available in [address] 9. Click Sign Up. You can now view and download portions of your medical record. 10. Click the Download Summary menu link to download a portable copy of your medical information. If you have questions, please visit the Frequently Asked Questions section of the [employer] website. Remember, [employer] is NOT to be used for urgent needs. For medical emergencies, dial 911. Now available from your iPhone and Android! Introducing Poi Villanueva As a [employer] patient, I wanted to make you aware of our electronic visit tool called Pio Villanueva. [employer] 24/7 allows you to connect within minutes with a medical provider 24 hours a day, seven days a week via a mobile device or tablet or logging into a secure website from your computer. You can access Pio Villanueva from anywhere in the [place].  
 
A virtual visit might be right for you when you have a simple condition and feel like you just dont want to get out of bed, or cant get away from work for an appointment, when your regular [employer] provider is not available (evenings, weekends or holidays), or when youre out of town and need minor care. Electronic visits cost only $49 and if the [employer] 24/7 provider determines a prescription is needed to treat your condition, one can be electronically transmitted to a nearby pharmacy*. Please take a moment to enroll today if you have not already done so. The enrollment process is free and takes just a few minutes. To enroll, please download the [employer] 24/7 seth to your tablet or phone, or visit [email or web address] to enroll on your computer. And, as an [address] [place] [address] patient with a [employer] account, the results of your visits will be scanned into your electronic medical record and your primary care provider will be able to view the scanned results. We urge you to continue to see your regular [employer] provider for your ongoing medical care. And while your primary care provider may not be the one available when you seek a [employer] virtual visit, the peace of mind you get from getting a real diagnosis real time can be priceless. For more information on [employer], view our Frequently Asked Questions (FAQs) at [email or web address]. Sincerely, 
 
Jean Elaine MD 
Chief Medical Officer Miriam8 Marilia Ferrera *:  certain medications cannot be prescribed via [employer] Providers Seen During Your Hospitalization Provider Specialty Primary office phone Dinesh Taylor MD Emergency Medicine [phone] Leona Hartley, [facility] Internal Medicine [phone] Immunizations Administered for This Admission Name Date  
 TB Skin Test (PPD) Intradermal  Deferred (), 5/7/2018 Your Primary Care Physician (PCP) Primary Care Physician Office Phone Office Fax [address], [address] [place] [address] [phone] You are allergic to the following Allergen Reactions Azithromycin Other (comments)  
 pancreatitis Amoxicillin Diarrhea Codeine Nausea and Vomiting Recent Documentation OB Status Smoking Status Hysterectomy Current Every Day Smoker Emergency Contacts Name Discharge Info Relation Home Work Mobile Tia Murillo  Daughter [21] [phone] Tia Haque  Daughter [21] [phone] Carol Gutierrez  Daughter [21] [phone] Patient Belongings The following personal items are in your possession at time of discharge: 
  Dental Appliances: Lowers, Uppers         Home Medications: None   Jewelry: None  Clothing: None    Other Valuables: Cell Phone Please provide this summary of care documentation to your next provider. Signatures-by signing, you are acknowledging that this After Visit Summary has been reviewed with you and you have received a copy. Patient Signature:  ____________________________________________________________ Date:  ____________________________________________________________  
  
Ivana Liz Provider Signature:  ____________________________________________________________ Date:  ____________________________________________________________

## 2018-05-06 NOTE — ED TRIAGE NOTES
Has L1 compression fracture from a fall last week. Was evaluated then for the fall. Now, having difficulty emptying bladder. Only able to dribble.

## 2018-05-06 NOTE — ED NOTES
Pt states she fell a few days back and was dx with a  Compression fx at L1 in the spine. Pt states she is in a lot of pain for that and isn't having much relief. Pt states she is taking Morphine 10mg PO every 4 hours as needed for pain. Pt states since this began and she has been taking the medications, pt has had some difficulty urinating. Pt states last dose was 1105 today.

## 2018-05-06 NOTE — ED PROVIDER NOTES
Patient is a 78 y.o. female presenting with back pain and frequency. The history is provided by the patient and a relative. Back Pain    This is a recurrent problem. The current episode started more than 1 week ago. The problem has not changed since onset. The problem occurs constantly. Patient reports not work related injury. The pain is associated with a fall. The pain is present in the lumbar spine. The quality of the pain is described as aching. The pain does not radiate. The pain is moderate. The symptoms are aggravated by bending and twisting. Associated symptoms include dysuria. Pertinent negatives include no chest pain, no fever, no numbness, no headaches, no abdominal pain, no bladder incontinence and no weakness. She has tried analgesics for the symptoms. Risk factors include a history of cancer. patient had a recent left breast surgery with node biopsy  Urinary Frequency    Associated symptoms include frequency and back pain. Pertinent negatives include no chills, no vomiting, no flank pain and no abdominal pain.         Past Medical History:   Diagnosis Date    STEPHEN (acute kidney injury) ([facility] [address] 12/04/2014    pt denies this dx    Back pain 6/10/2016    Breast lump dx 2/2018    left    Bronchitis     Discharge from the vagina     Dysuria     Eczema 6/10/2016    Fungal dermatitis     Headache 6/10/2016    Hypercholesterolemia 12/4/2014    Hypertension     not well controlled--per pt    Menopausal vaginal dryness     Osteoarthritis 6/10/2016    Osteoporosis 6/10/2016    PUD (peptic ulcer disease)     last 2003 which a rupture an extensive surgery    Sepsis ([facility] [address] 12/4/2014    Tobacco abuse     1ppd x 49 yrs       Past Surgical History:   Procedure Laterality Date    ABDOMEN SURGERY PROC UNLISTED  2003    stomach surgery for ruptured ulcer and extensive rerouting of intestestines per patient     BREAST SURGERY PROCEDURE UNLISTED Left 1975    breast lumpectomy, benign  (left)    HX APPENDECTOMY 1977    with hysterectomy    HX BREAST BIOPSY Left 1975    HX BREAST LUMPECTOMY Left 2/22/2018    LEFT BREAST LUMPECTOMY/ SENTINEL NODE BIOPSY performed by Halle Trammell [facility] at [facility] MA[facility] OR    HX CATARACT REMOVAL Bilateral     HX CHOLECYSTECTOMY      HX ENDOSCOPY      HX [facility]    HX OTHER SURGICAL      hernicolectomy 2 cm    HX TUBAL LIGATION  1975         Family History:   Problem Relation Age of Onset    Diabetes Sister     Heart Disease Sister     Heart Disease Father     Heart Attack Father     Cancer Brother      prostate    Cancer Sister     Breast Cancer Sister 79    Heart Disease Sister     Heart Disease Brother     Hypertension Mother     Hypertension Other      Brother and sister with htn       Social History     Social History    Marital status:      Spouse name: N/A    Number of children: N/A    Years of education: N/A     Occupational History    Not on file. Social History Main Topics    Smoking status: Current Every Day Smoker     Packs/day: 1.00     Years: 49.00     Start date: 5/6/1966    Smokeless tobacco: Never Used    Alcohol use No    Drug use: No    Sexual activity: Not on file     Other Topics Concern    Not on file     Social History Narrative         ALLERGIES: Azithromycin; Amoxicillin; and Codeine    Review of Systems   Constitutional: Negative for chills and fever. HENT: Negative for congestion, ear pain and rhinorrhea. Eyes: Negative for photophobia and discharge. Respiratory: Negative for cough and shortness of breath. Cardiovascular: Negative for chest pain and palpitations. Gastrointestinal: Negative for abdominal pain, constipation, diarrhea and vomiting. Endocrine: Negative for cold intolerance and heat intolerance. Genitourinary: Positive for dysuria and frequency. Negative for bladder incontinence and flank pain. Musculoskeletal: Positive for back pain. Negative for arthralgias, myalgias and neck pain. Skin: Negative for rash and wound. Allergic/Immunologic: Negative for environmental allergies and food allergies. Neurological: Negative for syncope, weakness, numbness and headaches. Hematological: Negative for adenopathy. Does not bruise/bleed easily. Psychiatric/Behavioral: Negative for dysphoric mood. The patient is not nervous/anxious. All other systems reviewed and are negative. Vitals:    05/06/18 1732 05/06/18 1825   BP: 90/43 92/45   Pulse: (!) 104 95   Resp: 16    Temp: 98 °F (36.7 °C)    SpO2: 98% 97%            Physical Exam   Constitutional: She is oriented to person, place, and time. She appears well-developed and well-nourished. She appears distressed. HENT:   Head: Normocephalic and atraumatic. Mouth/Throat: Oropharynx is clear and moist. No oropharyngeal exudate. Eyes: Conjunctivae and EOM are normal. Pupils are equal, round, and reactive to light. Right eye exhibits no discharge. Left eye exhibits no discharge. No scleral icterus. Neck: Normal range of motion. Neck supple. No thyromegaly present. Cardiovascular: Normal rate, regular rhythm, normal heart sounds and intact distal pulses. Exam reveals no gallop and no friction rub. No murmur heard. Pulmonary/Chest: Effort normal and breath sounds normal. No respiratory distress. She has no wheezes. She exhibits no tenderness. Abdominal: Soft. Bowel sounds are normal. She exhibits no distension. There is no hepatosplenomegaly. There is no tenderness. There is no rebound and no guarding. No hernia. Mild suprapubic fullness     Musculoskeletal: Normal range of motion. She exhibits no edema or tenderness. Neurological: She is alert and oriented to person, place, and time. No cranial nerve deficit or sensory deficit. She exhibits normal muscle tone. She displays a negative Romberg sign. GCS eye subscore is 4. GCS verbal subscore is 5. GCS motor subscore is 6. Skin: Skin is warm. No rash noted.  She is not diaphoretic. No erythema. Psychiatric: She has a normal mood and affect. Her behavior is normal. Judgment and thought content normal.   Nursing note and vitals reviewed. MDM  Number of Diagnoses or Management Options  Closed compression fracture of first lumbar vertebra, initial encounter [facility]): established and worsening  Spinal cord compression, post-traumatic [facility]): new and requires workup  Urinary retention: new and requires workup  Diagnosis management comments: 12:06 AM  Case discussed with Dr. Stacia Johnson from neurosurgery  He will review MRI results and return call    12:35 AM  After discussing management options with Dr. Stacia Johnson, we will admit the patient to the hospital service for neuro surgery consult in the morning  Case is discussed with the hospitalist service, who graciously agreed to admit the patient. Re-exam:  Patient remains awake, alert and oriented, still having a moderate degree of pain  Patient  Remains with 5 out of 5 motor strength in both of her lower extremities  Tolerating Ballard catheter without difficulty         Amount and/or Complexity of Data Reviewed  Clinical lab tests: ordered and reviewed  Tests in the radiology section of CPT®: ordered and reviewed  Tests in the medicine section of CPT®: ordered and reviewed  Review and summarize past medical records: yes    Risk of Complications, Morbidity, and/or Mortality  Presenting problems: moderate  Diagnostic procedures: high  Management options: moderate  General comments: Elements of this note have been dictated via voice recognition software. Text and phrases may be limited by the accuracy of the software. The chart has been reviewed, but errors may still be present.       Patient Progress  Patient progress: stable        ED Course       Procedures

## 2018-05-07 PROBLEM — S32.010A COMPRESSION FRACTURE OF L1 LUMBAR VERTEBRA (HCC): Status: ACTIVE | Noted: 2018-05-07

## 2018-05-07 PROBLEM — R33.9 URINARY RETENTION: Status: ACTIVE | Noted: 2018-05-07

## 2018-05-07 LAB
ANION GAP SERPL CALC-SCNC: 9 MMOL/L (ref 7–16)
BUN SERPL-MCNC: 21 MG/DL (ref 8–23)
CALCIUM SERPL-MCNC: 8.4 MG/DL (ref 8.3–10.4)
CHLORIDE SERPL-SCNC: 105 MMOL/L (ref 98–107)
CO2 SERPL-SCNC: 24 MMOL/L (ref 21–32)
CREAT SERPL-MCNC: 0.71 MG/DL (ref 0.6–1)
GLUCOSE SERPL-MCNC: 101 MG/DL (ref 65–100)
POTASSIUM SERPL-SCNC: 3.4 MMOL/L (ref 3.5–5.1)
SODIUM SERPL-SCNC: 138 MMOL/L (ref 136–145)

## 2018-05-07 PROCEDURE — 36415 COLL VENOUS BLD VENIPUNCTURE: CPT | Performed by: INTERNAL MEDICINE

## 2018-05-07 PROCEDURE — 74011250636 HC RX REV CODE- 250/636: Performed by: INTERNAL MEDICINE

## 2018-05-07 PROCEDURE — 74011000302 HC RX REV CODE- 302: Performed by: INTERNAL MEDICINE

## 2018-05-07 PROCEDURE — 96376 TX/PRO/DX INJ SAME DRUG ADON: CPT | Performed by: EMERGENCY MEDICINE

## 2018-05-07 PROCEDURE — 96366 THER/PROPH/DIAG IV INF ADDON: CPT

## 2018-05-07 PROCEDURE — 74011250636 HC RX REV CODE- 250/636: Performed by: EMERGENCY MEDICINE

## 2018-05-07 PROCEDURE — 86580 TB INTRADERMAL TEST: CPT | Performed by: INTERNAL MEDICINE

## 2018-05-07 PROCEDURE — 80048 BASIC METABOLIC PNL TOTAL CA: CPT | Performed by: INTERNAL MEDICINE

## 2018-05-07 PROCEDURE — 74011250637 HC RX REV CODE- 250/637: Performed by: INTERNAL MEDICINE

## 2018-05-07 PROCEDURE — 96365 THER/PROPH/DIAG IV INF INIT: CPT

## 2018-05-07 PROCEDURE — 74011000258 HC RX REV CODE- 258: Performed by: INTERNAL MEDICINE

## 2018-05-07 PROCEDURE — 99218 HC RM OBSERVATION: CPT

## 2018-05-07 RX ORDER — DEXTROSE MONOHYDRATE AND SODIUM CHLORIDE 5; .9 G/100ML; G/100ML
75 INJECTION, SOLUTION INTRAVENOUS CONTINUOUS
Status: DISCONTINUED | OUTPATIENT
Start: 2018-05-07 | End: 2018-05-08

## 2018-05-07 RX ORDER — NICOTINE 7MG/24HR
1 PATCH, TRANSDERMAL 24 HOURS TRANSDERMAL EVERY 24 HOURS
Status: DISCONTINUED | OUTPATIENT
Start: 2018-05-07 | End: 2018-05-09 | Stop reason: HOSPADM

## 2018-05-07 RX ORDER — NALOXONE HYDROCHLORIDE 0.4 MG/ML
0.4 INJECTION, SOLUTION INTRAMUSCULAR; INTRAVENOUS; SUBCUTANEOUS AS NEEDED
Status: DISCONTINUED | OUTPATIENT
Start: 2018-05-07 | End: 2018-05-09 | Stop reason: HOSPADM

## 2018-05-07 RX ORDER — ACETAMINOPHEN 325 MG/1
650 TABLET ORAL
Status: DISCONTINUED | OUTPATIENT
Start: 2018-05-07 | End: 2018-05-09 | Stop reason: HOSPADM

## 2018-05-07 RX ORDER — MORPHINE SULFATE 4 MG/ML
6 INJECTION INTRAVENOUS
Status: COMPLETED | OUTPATIENT
Start: 2018-05-07 | End: 2018-05-07

## 2018-05-07 RX ORDER — SODIUM CHLORIDE 0.9 % (FLUSH) 0.9 %
5-10 SYRINGE (ML) INJECTION EVERY 8 HOURS
Status: DISCONTINUED | OUTPATIENT
Start: 2018-05-07 | End: 2018-05-09 | Stop reason: HOSPADM

## 2018-05-07 RX ORDER — OXYCODONE AND ACETAMINOPHEN 7.5; 325 MG/1; MG/1
1 TABLET ORAL
Status: DISCONTINUED | OUTPATIENT
Start: 2018-05-07 | End: 2018-05-09 | Stop reason: HOSPADM

## 2018-05-07 RX ORDER — ONDANSETRON 2 MG/ML
4 INJECTION INTRAMUSCULAR; INTRAVENOUS
Status: DISCONTINUED | OUTPATIENT
Start: 2018-05-07 | End: 2018-05-09 | Stop reason: HOSPADM

## 2018-05-07 RX ORDER — PRAVASTATIN SODIUM 20 MG/1
40 TABLET ORAL
Status: DISCONTINUED | OUTPATIENT
Start: 2018-05-07 | End: 2018-05-09 | Stop reason: HOSPADM

## 2018-05-07 RX ORDER — SODIUM CHLORIDE 0.9 % (FLUSH) 0.9 %
5-10 SYRINGE (ML) INJECTION AS NEEDED
Status: DISCONTINUED | OUTPATIENT
Start: 2018-05-07 | End: 2018-05-09 | Stop reason: HOSPADM

## 2018-05-07 RX ORDER — POTASSIUM CHLORIDE 14.9 MG/ML
20 INJECTION INTRAVENOUS
Status: COMPLETED | OUTPATIENT
Start: 2018-05-07 | End: 2018-05-07

## 2018-05-07 RX ORDER — MORPHINE SULFATE 10 MG/ML
5 INJECTION, SOLUTION INTRAMUSCULAR; INTRAVENOUS
Status: DISCONTINUED | OUTPATIENT
Start: 2018-05-07 | End: 2018-05-09 | Stop reason: HOSPADM

## 2018-05-07 RX ADMIN — POTASSIUM CHLORIDE 20 MEQ: 14.9 INJECTION, SOLUTION INTRAVENOUS at 04:32

## 2018-05-07 RX ADMIN — TUBERCULIN PURIFIED PROTEIN DERIVATIVE 5 UNITS: 5 INJECTION, SOLUTION INTRADERMAL at 02:22

## 2018-05-07 RX ADMIN — MORPHINE SULFATE 5 MG: 10 INJECTION INTRAMUSCULAR; INTRAVENOUS; SUBCUTANEOUS at 05:07

## 2018-05-07 RX ADMIN — Medication 10 ML: at 02:27

## 2018-05-07 RX ADMIN — Medication 10 ML: at 21:16

## 2018-05-07 RX ADMIN — Medication 10 ML: at 05:07

## 2018-05-07 RX ADMIN — OXYCODONE HYDROCHLORIDE AND ACETAMINOPHEN 1 TABLET: 7.5; 325 TABLET ORAL at 17:58

## 2018-05-07 RX ADMIN — POTASSIUM CHLORIDE 20 MEQ: 14.9 INJECTION, SOLUTION INTRAVENOUS at 02:29

## 2018-05-07 RX ADMIN — MORPHINE SULFATE 6 MG: 4 INJECTION INTRAVENOUS at 00:41

## 2018-05-07 RX ADMIN — OXYCODONE HYDROCHLORIDE AND ACETAMINOPHEN 1 TABLET: 7.5; 325 TABLET ORAL at 10:36

## 2018-05-07 RX ADMIN — PRAVASTATIN SODIUM 40 MG: 20 TABLET ORAL at 21:16

## 2018-05-07 RX ADMIN — DEXTROSE MONOHYDRATE AND SODIUM CHLORIDE 75 ML/HR: 5; .9 INJECTION, SOLUTION INTRAVENOUS at 02:24

## 2018-05-07 NOTE — PROGRESS NOTES
TRANSFER - IN REPORT:    Verbal report received from Sandrine Sarah RN(name) on Dede Johns  being received from ER(unit) for routine progression of care      Report consisted of patients Situation, Background, Assessment and   Recommendations(SBAR). Information from the following report(s) SBAR, Kardex, ED Summary, Intake/Output, MAR and Recent Results was reviewed with the receiving nurse. Opportunity for questions and clarification was provided. Assessment completed upon patients arrival to unit and care assumed.

## 2018-05-07 NOTE — PROGRESS NOTES
Spine  Patient seen and examined for L1 burst fracture with urinary retention    Patient reports fall onto buttocks 4/26/18 with acute onset of LBP but no leg pain or numbness. Patient has had normal several times a week BM but noticed not emptying bladder well. She states she feels the urge to urinate and denies any saddle anesthesia  She is unsure if difficulty urinating is related to her severe pain, her pain is under much better control since admission yesterday. Exam is normal with normal motor function and sensation and good rectal tone. Discussed options with patient in light of the fact we are unsure if bladder retention secondary to severe pain and medications or from neural compression. Patient would like to test ability to urinate tomorrow morning following catheter removal and then check bladder residual. If bladder working well she would to treat L1 fracture with bracing. If she fails test she would be amendable to surgical treatment.

## 2018-05-07 NOTE — PROGRESS NOTES
Spiritual Care Visit, initial visit. Visited with patient at bedside. Listened as patient spoke of her injury, of her gomez, her family and what they are doing for her.  affirmed both her illness as well as her gomez. Prayed for patient's healing and health. Visit by Yosef Espinal, Staff .  Andrea, [email or web address]., B.A.

## 2018-05-07 NOTE — PROGRESS NOTES
Admitted earlier today for back pain and urinary retention. To attempt voiding trial tomorrow morning. If able to void, will treat spinal compression fracture with a brace. If unable to tolerate voiding trial, she will likely pursue surgical intervention for her compression fracture.      Katya Johns MD

## 2018-05-07 NOTE — PROGRESS NOTES
Problem: Falls - Risk of  Goal: *Absence of Falls  Document Lucien Fall Risk and appropriate interventions in the flowsheet.    Outcome: Progressing Towards Goal  Fall Risk Interventions:  Mobility Interventions: Bed/chair exit alarm, Communicate number of staff needed for ambulation/transfer, Patient to call before getting OOB         Medication Interventions: Assess postural VS orthostatic hypotension, Bed/chair exit alarm, Patient to call before getting OOB    Elimination Interventions: Bed/chair exit alarm, Call light in reach, Patient to call for help with toileting needs    History of Falls Interventions: Bed/chair exit alarm, Door open when patient unattended, Investigate reason for fall

## 2018-05-07 NOTE — ED NOTES
TRANSFER - OUT REPORT:    Verbal report given to Mildred LUJAN on Toshia Ferro  being transferred to  [address] for routine progression of care       Report consisted of patients Situation, Background, Assessment and   Recommendations(SBAR). Information from the following report(s) SBAR, ED Summary, Intake/Output and MAR was reviewed with the receiving nurse. Lines:   Peripheral IV 05/06/18 Right Antecubital (Active)   Site Assessment Clean, dry, & intact 5/6/2018  8:21 PM   Phlebitis Assessment 0 5/6/2018  8:21 PM   Infiltration Assessment 0 5/6/2018  8:21 PM   Dressing Status Clean, dry, & intact 5/6/2018  8:21 PM   Dressing Type Transparent 5/6/2018  8:21 PM        Opportunity for questions and clarification was provided.       Patient transported with:   [employer]

## 2018-05-07 NOTE — H&P
HOSPITALIST H&P  NAME:  Donte Rosas   Age:  78 y.o.  :   1940   MRN:   [id number]  PCP: Olga Thomas MD  Consulting MD:  Treatment Team: Attending Provider: Landy Cisneros. Jordyn Glover MD; Primary Nurse: Kristie Terry  HPI:   Linda Myers is a 77 yo F with history of HTN, tobaccoa abuse, breast cancer s/p L mastectomy, who presents to the ED with urinary difficulty and dribbling for the last 4-5 days. She reports she fell ~20 while gardening and trying to pull up a vine, and she landed on her buttocks and back. Had severe pain start after that. Seen in ED on  and found to have L1 compression fracture. She was prescribed PO morphine 15 mg q 6 hours to control the pain, which she has been taking. Still having significant back pain despite this. Then over the last 4-5 days, again, she is unable to void well. She states she is eating and drinking okay. In the ED, she voided and post-void bladder scan showed >450 ml of urine retained. Ballard catheter inserted. MRI lumbar spine showing L1 compression fracture with retropulsion of posterior wall causing some impingement on lower conus. Of note, on exam, there is no reduced sensation of legs or saddle area and she has normal rectal tone.     Dr. Marcelo Manjarrez discussed the case with Dr. Sierra Shaffer (neurosurgery) and ospitalist service asked to admit for further evaluation and official neurosurgery consultation in the AM.    Complete ROS done and is as stated in HPI or otherwise negative  Past Medical History:   Diagnosis Date    STEPHEN [facility]acute kidney injury) ([facility] [address] 2014    pt denies this dx    Back pain 6/10/2016    Breast lump dx 2018    left    Bronchitis     Discharge from the vagina     Dysuria     Eczema 6/10/2016    Fungal dermatitis     Headache 6/10/2016    Hypercholesterolemia 2014    Hypertension     not well controlled--per pt    Menopausal vaginal dryness     Osteoarthritis 6/10/2016    Osteoporosis 6/10/2016    PUD (peptic ulcer disease)     last 2003 which a rupture an extensive surgery    Sepsis ([address] 12/4/2014    Tobacco abuse     1ppd x 49 yrs      Past Surgical History:   Procedure Laterality Date    ABDOMEN SURGERY PROC UNLISTED  2003    stomach surgery for ruptured ulcer and extensive rerouting of intestestines per patient     BREAST SURGERY PROCEDURE UNLISTED Left 1975    breast lumpectomy, benign  (left)    HX APPENDECTOMY  1977    with hysterectomy    HX BREAST BIOPSY Left 1975    HX BREAST LUMPECTOMY Left 2/22/2018    LEFT BREAST LUMPECTOMY/ SENTINEL NODE BIOPSY performed by Guicho Tavera MD at [facility] MAIN OR    HX CATARACT REMOVAL Bilateral     HX CHOLECYSTECTOMY      HX ENDOSCOPY      HX HYSTERECTOMY  1977    HX OTHER SURGICAL      hernicolectomy 2 cm    HX TUBAL LIGATION  1975      Prior to Admission Medications   Prescriptions Last Dose Informant Patient Reported? Taking?   ibuprofen (MOTRIN) 800 mg tablet   No Yes   Sig: Take 1 Tab by mouth three (3) times daily. losartan-hydroCHLOROthiazide (HYZAAR) 50-12.5 mg per tablet   No Yes   Sig: Take 1 Tab by mouth daily. morphine IR (MS IR) 15 mg tablet   No Yes   Sig: Take 1 Tab by mouth every six (6) hours as needed for Pain. Max Daily Amount: 60 mg.   ondansetron (ZOFRAN ODT) 8 mg disintegrating tablet   No Yes   Sig: Take 1 Tab by mouth every eight (8) hours as needed for Nausea. pravastatin (PRAVACHOL) 40 mg tablet   No Yes   Sig: Take 1 Tab by mouth nightly.       Facility-Administered Medications: None     Allergies   Allergen Reactions    Azithromycin Other (comments)     pancreatitis    Amoxicillin Diarrhea    Codeine Nausea and Vomiting      Social History   Substance Use Topics    Smoking status: Current Every Day Smoker     Packs/day: 1.00     Years: 49.00     Start date: 5/6/1966    Smokeless tobacco: Never Used    Alcohol use No      Family History   Problem Relation Age of Onset    Diabetes Sister     Heart Disease Sister  Heart Disease Father     Heart Attack Father     Cancer Brother      prostate    Cancer Sister     Breast Cancer Sister 79    Heart Disease Sister     Heart Disease Brother     Hypertension Mother     Hypertension Other      Brother and sister with htn      Objective:     Visit Vitals    /54    Pulse 95    Temp 98 °F (36.7 °C)    Resp 16    SpO2 98%      Temp (24hrs), Av °F (36.7 °C), Min:98 °F (36.7 °C), Max:98 °F (36.7 °C)    Oxygen Therapy  O2 Sat (%): 98 % (18)  Pulse via Oximetry: 95 beats per minute (18)  O2 Device: Room air (18)  Physical Exam:  General:    Alert, cooperative, no distress, appears stated age. Head:   Normocephalic, without obvious abnormality, atraumatic. Nose:  Nares normal. No drainage or sinus tenderness. Lungs:   Clear to auscultation bilaterally. No Wheezing or Rhonchi. No rales. Heart:   Regular rate and rhythm,  no murmur, rub or gallop. Abdomen:   Soft, non-tender. Not distended. Bowel sounds normal.   Extremities: No cyanosis. No edema. No clubbing  Skin:     Texture, turgor normal. No rashes or lesions. Not Jaundiced  Neurologic: Alert and oriented x 3, no focal deficits, legs 5/5 in strength, sensation of legs and saddle area normal, normal rectal tone (supervised by Nereyda Cortez).   Data Review:   Recent Results (from the past 24 hour(s))   CBC W/O DIFF    Collection Time: 18  8:20 PM   Result Value Ref Range    WBC 12.2 (H) 4.3 - 11.1 K/uL    RBC 4.63 4.05 - 5.25 M/uL    HGB 13.4 11.7 - 15.4 g/dL    HCT 36.9 35.8 - 46.3 %    MCV 79.7 79.6 - 97.8 FL    MCH 28.9 26.1 - 32.9 PG    MCHC 36.3 (H) 31.4 - 35.0 g/dL    RDW 13.7 11.9 - 14.6 %    PLATELET 021 244 - 069 K/uL    MPV 9.1 (L) 10.8 - 59.1 FL   METABOLIC PANEL, BASIC    Collection Time: 18  8:20 PM   Result Value Ref Range    Sodium 132 (L) 136 - 145 mmol/L    Potassium 2.9 (LL) 3.5 - 5.1 mmol/L    Chloride 94 (L) 98 - 107 mmol/L    CO2 27 21 - 32 mmol/L    Anion gap 11 7 - 16 mmol/L    Glucose 109 (H) 65 - 100 mg/dL    BUN 33 (H) 8 - 23 MG/DL    Creatinine 1.03 (H) 0.6 - 1.0 MG/DL    GFR est AA >60 >60 ml/min/1.73m2    GFR est non-AA 56 (L) >60 ml/min/1.73m2    Calcium 8.7 8.3 - 10.4 MG/DL     Imaging /Procedures /Studies   MRI LUMB SPINE WO CONT   Final Result   IMPRESSION:   1. Some compression of L1 seen with retropulsion of the posterior wall of L1   causing some impingement on the lower conus but only moderate canal narrowing. 2. Some spondylosis as described above. Assessment and Plan: Active Hospital Problems    Diagnosis Date Noted    Urinary retention 05/07/2018    Compression fracture of L1 lumbar vertebra (HCC) 05/07/2018    HTN (hypertension) 12/04/2014    Hypokalemia 12/04/2014    Hyponatremia 12/04/2014       PLAN  ·  Place in observation to medical bed. · Consult neurosurgery to evaluate for spinal cord compression from L1 compression fracture to see if she needs intervention. · NPO until neurosurgery evaluation. · Continue dupree for now- urinary retention may be related to spinal cord impingement at L1 level vs narcotic therapy. · If neurosurgery does not feel urinary retention neurologically mediated, recommend urology consult. · HTN- hold losartan/hctz for now as bp low-normal.  Consider switching to amlodipine vs other med if needed due to electrolyte abnormalities. · Hypokalemia- replete. · Hyponatremia- mild. Likely related to losartan/hctz that was started in March (had bp med switched from bystolic to losartan/hctz at that time). Monitor. · Start D5NS at 75 ml/hr as NPO. Code Status: Full    Anticipated discharge: 1-2 days pending clinical course    Signed By: Tahmina Alvarez.  Pradeep Chase MD     May 7, 2018      hypo

## 2018-05-07 NOTE — PROGRESS NOTES
Problem: Falls - Risk of  Goal: *Absence of Falls  Document Lucien Fall Risk and appropriate interventions in the flowsheet. Outcome: Progressing Towards Goal  Fall Risk Interventions:  Mobility Interventions: Bed/chair exit alarm         Medication Interventions: Bed/chair exit alarm    Elimination Interventions: Bed/chair exit alarm    History of Falls Interventions: Bed/chair exit alarm    Patient remains free from falls at this time. Uses call light appropriately when needing assistance.

## 2018-05-08 LAB
ANION GAP SERPL CALC-SCNC: 10 MMOL/L (ref 7–16)
BUN SERPL-MCNC: 9 MG/DL (ref 8–23)
CALCIUM SERPL-MCNC: 8.1 MG/DL (ref 8.3–10.4)
CHLORIDE SERPL-SCNC: 105 MMOL/L (ref 98–107)
CO2 SERPL-SCNC: 23 MMOL/L (ref 21–32)
CREAT SERPL-MCNC: 0.6 MG/DL (ref 0.6–1)
GLUCOSE SERPL-MCNC: 110 MG/DL (ref 65–100)
MAGNESIUM SERPL-MCNC: 1.7 MG/DL (ref 1.8–2.4)
MM INDURATION POC: 0 MM (ref 0–5)
POTASSIUM SERPL-SCNC: 2.8 MMOL/L (ref 3.5–5.1)
POTASSIUM SERPL-SCNC: 3.8 MMOL/L (ref 3.5–5.1)
PPD POC: NEGATIVE NEGATIVE
SODIUM SERPL-SCNC: 138 MMOL/L (ref 136–145)

## 2018-05-08 PROCEDURE — 36415 COLL VENOUS BLD VENIPUNCTURE: CPT | Performed by: INTERNAL MEDICINE

## 2018-05-08 PROCEDURE — 97161 PT EVAL LOW COMPLEX 20 MIN: CPT

## 2018-05-08 PROCEDURE — 96366 THER/PROPH/DIAG IV INF ADDON: CPT

## 2018-05-08 PROCEDURE — 74011250637 HC RX REV CODE- 250/637: Performed by: INTERNAL MEDICINE

## 2018-05-08 PROCEDURE — 99218 HC RM OBSERVATION: CPT

## 2018-05-08 PROCEDURE — 84132 ASSAY OF SERUM POTASSIUM: CPT | Performed by: INTERNAL MEDICINE

## 2018-05-08 PROCEDURE — 80048 BASIC METABOLIC PNL TOTAL CA: CPT | Performed by: INTERNAL MEDICINE

## 2018-05-08 PROCEDURE — G8979 MOBILITY GOAL STATUS: HCPCS

## 2018-05-08 PROCEDURE — 96376 TX/PRO/DX INJ SAME DRUG ADON: CPT

## 2018-05-08 PROCEDURE — G8978 MOBILITY CURRENT STATUS: HCPCS

## 2018-05-08 PROCEDURE — 96367 TX/PROPH/DG ADDL SEQ IV INF: CPT

## 2018-05-08 PROCEDURE — G8980 MOBILITY D/C STATUS: HCPCS

## 2018-05-08 PROCEDURE — 74011250636 HC RX REV CODE- 250/636: Performed by: INTERNAL MEDICINE

## 2018-05-08 PROCEDURE — 83735 ASSAY OF MAGNESIUM: CPT | Performed by: INTERNAL MEDICINE

## 2018-05-08 RX ORDER — MAGNESIUM SULFATE HEPTAHYDRATE 40 MG/ML
2 INJECTION, SOLUTION INTRAVENOUS ONCE
Status: COMPLETED | OUTPATIENT
Start: 2018-05-08 | End: 2018-05-08

## 2018-05-08 RX ORDER — POTASSIUM CHLORIDE 14.9 MG/ML
20 INJECTION INTRAVENOUS
Status: COMPLETED | OUTPATIENT
Start: 2018-05-08 | End: 2018-05-08

## 2018-05-08 RX ORDER — POTASSIUM CHLORIDE 20 MEQ/1
40 TABLET, EXTENDED RELEASE ORAL
Status: COMPLETED | OUTPATIENT
Start: 2018-05-08 | End: 2018-05-08

## 2018-05-08 RX ADMIN — MAGNESIUM SULFATE HEPTAHYDRATE 2 G: 40 INJECTION, SOLUTION INTRAVENOUS at 16:10

## 2018-05-08 RX ADMIN — POTASSIUM CHLORIDE 40 MEQ: 20 TABLET, EXTENDED RELEASE ORAL at 08:00

## 2018-05-08 RX ADMIN — POTASSIUM CHLORIDE 20 MEQ: 14.9 INJECTION, SOLUTION INTRAVENOUS at 10:00

## 2018-05-08 RX ADMIN — Medication 10 ML: at 21:28

## 2018-05-08 RX ADMIN — PRAVASTATIN SODIUM 40 MG: 20 TABLET ORAL at 21:28

## 2018-05-08 RX ADMIN — OXYCODONE HYDROCHLORIDE AND ACETAMINOPHEN 1 TABLET: 7.5; 325 TABLET ORAL at 05:58

## 2018-05-08 RX ADMIN — POTASSIUM CHLORIDE 20 MEQ: 14.9 INJECTION, SOLUTION INTRAVENOUS at 09:24

## 2018-05-08 RX ADMIN — Medication 10 ML: at 05:59

## 2018-05-08 RX ADMIN — OXYCODONE HYDROCHLORIDE AND ACETAMINOPHEN 1 TABLET: 7.5; 325 TABLET ORAL at 16:14

## 2018-05-08 RX ADMIN — OXYCODONE HYDROCHLORIDE AND ACETAMINOPHEN 1 TABLET: 7.5; 325 TABLET ORAL at 21:33

## 2018-05-08 RX ADMIN — Medication 10 ML: at 13:35

## 2018-05-08 RX ADMIN — ONDANSETRON 4 MG: 2 INJECTION INTRAMUSCULAR; INTRAVENOUS at 16:23

## 2018-05-08 NOTE — PROGRESS NOTES
Problem: Mobility Impaired (Adult and Pediatric)  Goal: *Acute Goals and Plan of Care (Insert Text)  LTG:  (1.)Ms. Linus Boothe will move from supine to sit and sit to supine, scoot up and down and roll side to side INDEPENDENTLY with bed flat within 7 treatment day(s). (2.)Ms. Linus Boothe will transfer from bed to chair and chair to bed with MODIFIED INDEPENDENCE using the least restrictive device within 7 treatment day(s). (3.)Ms. Linus Boothe will ambulate with SUPERVISION for 450 feet with the least restrictive device within 7 treatment day(s). (4.)Ms. Linus Boothe will ascend and descend 3 steps with SUPERVISION using handrail(s) within 7 days. ________________________________________________________________________________________________    PHYSICAL THERAPY: Initial Assessment, PM 5/8/2018  OBSERVATION: Hospital Day: 3  Payor: [facility] MEDICARE / Plan: [facility] [place] / Product Type: Managed Care Medicare /      NAME/AGE/GENDER: Dede Johns is a 78 y.o. female   PRIMARY DIAGNOSIS: Urinary retention  Compression fracture of L1 lumbar vertebra (HCC) Compression fracture of L1 lumbar vertebra (HCC) Compression fracture of L1 lumbar vertebra (HCC)        ICD-10: Treatment Diagnosis:    · Difficulty in walking, Not elsewhere classified (R26.2)  · Other abnormalities of gait and mobility (R26.89)  · History of falling (Z91.81)   Precaution/Allergies:  Azithromycin; Amoxicillin; and Codeine      ASSESSMENT:     Ms. Linus Boothe is a 78year old female admitted from home with urinary retention after recent L1 compression fracture. She has been staying with family since her fall and has had limited mobility due to pain. Presents in supine without complaints, agreeable to therapy evaluation. Transfers to sitting independently. LE assessment shows AROM WFL bilaterally with generally decreased strength. Sensation intact and equal. Pt performs sit-stand transfers with supervision.  Ambulates 250' in hallway with min handheld assist and significant lateral trunk sway. Reports feeling \"woozy\" however /65 and O2 sats 98% on room air. Ms. Chaka Cifuentes is reluctant to discuss discharge plans but it appears that she will stay with granddaughter who is at home during the day. Suggested rolling walker for a few days until pt feels stronger and balance improves. She is awaiting ortho recommendation for back brace. Will continue with therapy efforts during hospital stay to maximize safety and independence for discharge. Ms. Chaka Cifuentes was discharged from our facility before further treatment could be provided in this setting. This section established at most recent assessment   PROBLEM LIST (Impairments causing functional limitations):  1. Decreased Strength  2. Decreased ADL/Functional Activities  3. Decreased Transfer Abilities  4. Decreased Ambulation Ability/Technique  5. Decreased Balance  6. Decreased Activity Tolerance   INTERVENTIONS PLANNED: (Benefits and precautions of physical therapy have been discussed with the patient.)  1. Balance Exercise  2. Bed Mobility  3. Gait Training  4. Therapeutic Activites  5. Therapeutic Exercise/Strengthening  6. Transfer Training  7. Group Therapy     TREATMENT PLAN: Frequency/Duration: 3 times a week for one week  Rehabilitation Potential For Stated Goals: Good     RECOMMENDED REHABILITATION/EQUIPMENT: (at time of discharge pending progress): Due to the probability of continued deficits (see above) this patient will likely need continued skilled physical therapy after discharge. Equipment:    rolling walker              HISTORY:   History of Present Injury/Illness (Reason for Referral):  Per H&P, Jeffrey Hermosillo is a 79 yo F with history of HTN, tobaccoa abuse, breast cancer s/p L mastectomy, who presents to the ED with urinary difficulty and dribbling for the last 4-5 days.   She reports she fell ~4/25/18 while gardening and trying to pull up a vine, and she landed on her buttocks and back. Had severe pain start after that. Seen in ED on 4/26 and found to have L1 compression fracture. She was prescribed PO morphine 15 mg q 6 hours to control the pain, which she has been taking. Still having significant back pain despite this. Then over the last 4-5 days, again, she is unable to void well. She states she is eating and drinking okay. In the ED, she voided and post-void bladder scan showed >450 ml of urine retained. Ballard catheter inserted. MRI lumbar spine showing L1 compression fracture with retropulsion of posterior wall causing some impingement on lower conus. Of note, on exam, there is no reduced sensation of legs or saddle area and she has normal rectal tone.     Dr. Rashawn Gilmore discussed the case with Dr. Tiago Boone (neurosurgery) and ospitalist service asked to admit for further evaluation and official neurosurgery consultation in the AM\"    Past Medical History/Comorbidities:   Ms. Brooke Hartley  has a past medical history of STEPHEN (acute kidney injury) ([facility] [address] (12/04/2014); Back pain (6/10/2016); Breast lump (dx 2/2018); Bronchitis; Discharge from the vagina; Dysuria; Eczema (6/10/2016); Fungal dermatitis; Headache (6/10/2016); Hypercholesterolemia (12/4/2014); Hypertension; Menopausal vaginal dryness; Osteoarthritis (6/10/2016); Osteoporosis (6/10/2016); PUD (peptic ulcer disease); Sepsis ([facility] [address] (12/4/2014); and Tobacco abuse. She also has no past medical history of Adverse effect of anesthesia; Difficult intubation; Malignant hyperthermia due to anesthesia; Nausea & vomiting; or Pseudocholinesterase deficiency. Ms. Brooke Hartley  has a past surgical history that includes hx endoscopy; hx hysterectomy (1977); hx tubal ligation (1975); hx appendectomy (1977); hx other surgical; pr abdomen surgery proc unlisted (2003); hx cholecystectomy; hx cataract removal (Bilateral); pr breast surgery procedure unlisted (Left, 1975); hx breast biopsy (Left, 1975); and hx breast lumpectomy (Left, 2/22/2018).   Social History/Living Environment:   Home Environment: Private residence  # Steps to Enter: 3  One/Two Story Residence: One story  Living Alone: No  Support Systems: Child(sheela), Family member(s)  Patient Expects to be Discharged to[de-identified] Private residence  Current DME Used/Available at Home: None  Prior Level of Function/Work/Activity:  Has been staying with daughter and granddaughter after her fall due to pain and limited mobility. Has not been using any DME but reports unsteady with some pain. Granddaughter supervises her ADLs has no DME. Family home during the day. Number of Personal Factors/Comorbidities that affect the Plan of Care: 1-2: MODERATE COMPLEXITY   EXAMINATION:   Most Recent Physical Functioning:   Gross Assessment:  AROM: Within functional limits  Strength: Generally decreased, functional  Coordination: Within functional limits  Sensation: Intact               Posture:  Posture (WDL): Exceptions to WDL  Posture Assessment: Forward head, Rounded shoulders  Balance:  Sitting: Intact  Standing: Intact Bed Mobility:  Rolling: Independent  Supine to Sit: Independent  Sit to Supine: Independent  Scooting: Independent  Wheelchair Mobility:     Transfers:  Sit to Stand: Independent  Stand to Sit: Independent  Bed to Chair: Supervision  Gait:     Base of Support: Narrowed  Step Length: Left shortened;Right shortened  Gait Abnormalities: Trunk sway increased  Distance (ft): 250 Feet (ft)  Assistive Device: Other (comment) (handheld assist)  Ambulation - Level of Assistance: Minimal assistance  Interventions: Verbal cues; Safety awareness training      Body Structures Involved:  1. Bones Body Functions Affected:  1. Sensory/Pain  2. Movement Related Activities and Participation Affected:  1. General Tasks and Demands  2. Mobility  3. Self Care  4. Domestic Life  5.  Community, Social and [place]   Number of elements that affect the Plan of Care: 4+: HIGH COMPLEXITY   CLINICAL PRESENTATION:   Presentation: Stable and uncomplicated: LOW COMPLEXITY   CLINICAL DECISION MAKIN[address] [place] [id number] AM-PAC 6 Clicks   Basic Mobility Inpatient Short Form  How much difficulty does the patient currently have. .. Unable A Lot A Little None   1. Turning over in bed (including adjusting bedclothes, sheets and blankets)? [] 1   [] 2   [] 3   [x] 4   2. Sitting down on and standing up from a chair with arms ( e.g., wheelchair, bedside commode, etc.)   [] 1   [] 2   [] 3   [x] 4   3. Moving from lying on back to sitting on the side of the bed? [] 1   [] 2   [] 3   [x] 4   How much help from another person does the patient currently need. .. Total A Lot A Little None   4. Moving to and from a bed to a chair (including a wheelchair)? [] 1   [] 2   [] 3   [x] 4   5. Need to walk in hospital room? [] 1   [] 2   [x] 3   [] 4   6. Climbing 3-5 steps with a railing? [] 1   [] 2   [x] 3   [] 4   © 2007, Trustees of [address] [place] [address] [address] [address], under license to [employer]. All rights reserved      Score:  Initial: 22 Most Recent: X (Date: -- )    Interpretation of Tool:  Represents activities that are increasingly more difficult (i.e. Bed mobility, Transfers, Gait). Score 24 23 22-20 19-15 14-10 9-7 6     Modifier CH CI CJ CK CL CM CN      ? Mobility - Walking and Moving Around:     - CURRENT STATUS: CJ - 20%-39% impaired, limited or restricted    - GOAL STATUS: CH - 0% impaired, limited or restricted    - D/C STATUS:  CJ - 20%-39% impaired, limited or restricted  Payor: [address] [id number] / Plan: [facility] MEDICARE HMO/PPO / Product Type: Managed Care Medicare /      Medical Necessity:     · Patient demonstrates good rehab potential due to higher previous functional level.   Reason for Services/Other Comments:  · Patient continues to demonstrate capacity to improve strength, mobility, balance, transfers, activity tolerance which will increase independence, decrease amount of assistance required from caregiver and increase safety. Use of outcome tool(s) and clinical judgement create a POC that gives a: Clear prediction of patient's progress: LOW COMPLEXITY            TREATMENT:   (In addition to Assessment/Re-Assessment sessions the following treatments were rendered)   Pre-treatment Symptoms/Complaints:  \"thank you\"  Pain: Initial:   Pain Intensity 1: 0  Post Session:  0/10     Assessment/Reassessment only, no treatment provided today    Braces/Orthotics/Lines/Etc:   · O2 Device: Room air  Treatment/Session Assessment:    · Response to Treatment:  Pt performs mobility with supervision, min assist w/ambulation due to unsteady gait. · Interdisciplinary Collaboration:   o Physical Therapist  o Registered Nurse  · After treatment position/precautions:   o Supine in bed  o Bed/Chair-wheels locked  o Bed in low position  o Call light within reach   · Compliance with Program/Exercises: Will assess as treatment progresses. · Recommendations/Intent for next treatment session: \"Next visit will focus on advancements to more challenging activities and reduction in assistance provided\".   Total Treatment Duration:  PT Patient Time In/Time Out  Time In: 1438  Time Out: [address], DPT

## 2018-05-08 NOTE — PROGRESS NOTES
Patient told nurse that she has a chronic problem with her potassium. It is always low when she goes to the dr. And doesn't understand why.

## 2018-05-08 NOTE — PROGRESS NOTES
Problem: Interdisciplinary Rounds  Goal: Interdisciplinary Rounds  Outcome: Progressing Towards Goal  Interdisciplinary team rounds were held 5/8/2018 with the following team members:Care Management, Occupational Therapy, Physician and . Anticipate discharge home tomorrow. Plan of care discussed. See clinical pathway and/or care plan for interventions and desired outcomes.

## 2018-05-08 NOTE — PROGRESS NOTES
SW provided patient with copy of [address] letter, and answered questions as needed. Patient declined to sign document. Copy of document placed in patient's chart. MASSIMO met with patient regarding possible discharge needs. Per Dr. Tabby Pressley, [place] recommended for PT after discharge. Patient declined [place] or outpatient PT referrals. She reports plan to discharge home with daughter or to granddaughter's home. Patient was previously staying with her daughter and son-in-law in a one level home with a basement. The entrance of the home has 2 steps. Patient does not have any DME at home, and declined need for new DME order. She reports having no difficulties walking or managing ADLs prior to admission. SW will remain available as needed. Care Management Interventions  PCP Verified by CM: Yes  Last Visit to PCP: 02/08/18  Mode of Transport at Discharge:  Other (see comment) (family)  Transition of Care Consult (CM Consult): Discharge Planning  Discharge Durable Medical Equipment: No  Physical Therapy Consult: Yes  Occupational Therapy Consult: No  Speech Therapy Consult: No  Current Support Network: Relative's Home, Family Lives Nearby  Confirm Follow Up Transport: Family  Plan discussed with Pt/Family/Caregiver: Yes  Freedom of Choice Offered: Yes  Discharge Location  Discharge Placement: Home

## 2018-05-08 NOTE — PROGRESS NOTES
Called Pharmacy to ask about potassium for patient and it is still in the pharmacy. They were going to tube it.

## 2018-05-08 NOTE — PROGRESS NOTES
Hospitalist Progress Note    Subjective:   Daily Progress Note: 2018 11:41 AM    Ms. Rema Patel is a 79 yo WF who presented  for back pain after a recent fall associated with difficulty urinating and is found to have a L1 compression fracture on imaging. Ballard inserted on admission and was removed this morning for a voiding trial.  She has urinated well on her own this morning. Thus no plans for surgery for fracture, rather, plan is for back brace. Her potassium is quite low at 2.8 this morning. She is in good spirits, without dysuria or uncontrolled pain. Current Facility-Administered Medications   Medication Dose Route Frequency    potassium chloride 20 mEq in 100 ml IVPB  20 mEq IntraVENous Q2H    magnesium sulfate 2 g/50 ml IVPB (premix or compounded)  2 g IntraVENous ONCE    pravastatin (PRAVACHOL) tablet 40 mg  40 mg Oral QHS    sodium chloride (NS) flush 5-10 mL  5-10 mL IntraVENous Q8H    sodium chloride (NS) flush 5-10 mL  5-10 mL IntraVENous PRN    acetaminophen (TYLENOL) tablet 650 mg  650 mg Oral Q4H PRN    oxyCODONE-acetaminophen (PERCOCET 7.5) 7.5-325 mg per tablet 1 Tab  1 Tab Oral Q4H PRN    morphine injection 5 mg  5 mg IntraVENous Q4H PRN    naloxone (NARCAN) injection 0.4 mg  0.4 mg IntraVENous PRN    ondansetron (ZOFRAN) injection 4 mg  4 mg IntraVENous Q4H PRN    nicotine (NICODERM CQ) 7 mg/24 hr patch 1 Patch  1 Patch TransDERmal Q24H        Review of Systems  A comprehensive review of systems was negative except for that written in the HPI.     Objective:     Visit Vitals    /86 (BP 1 Location: Left leg, BP Patient Position: At rest)    Pulse 97    Temp 97.9 °F (36.6 °C)    Resp 20    SpO2 100%      O2 Device: Room air    Temp (24hrs), Av.2 °F (36.8 °C), Min:97.1 °F (36.2 °C), Max:99.6 °F (37.6 °C)          1901 -  0700  In: 0   Out: 3085 [Urine:3085]    General: awake, alert, oriented, normal weight, talkative  Eyes; non icteric  Neck; supple  CV: RRR  Pulm; non labored, no accessory muscle use  Neuro: moving all extremities without focal deficit, normal sensory exam    Additional comments:I reviewed the patient's new clinical lab test results. j    Data Review    Recent Results (from the past 24 hour(s))   PLEASE READ & DOCUMENT PPD TEST IN 24 HRS    Collection Time: 05/08/18  2:19 AM   Result Value Ref Range    PPD negative Negative    mm Induration 0 mm   METABOLIC PANEL, BASIC    Collection Time: 05/08/18  6:53 AM   Result Value Ref Range    Sodium 138 136 - 145 mmol/L    Potassium 2.8 (LL) 3.5 - 5.1 mmol/L    Chloride 105 98 - 107 mmol/L    CO2 23 21 - 32 mmol/L    Anion gap 10 7 - 16 mmol/L    Glucose 110 (H) 65 - 100 mg/dL    BUN 9 8 - 23 MG/DL    Creatinine 0.60 0.6 - 1.0 MG/DL    GFR est AA >60 >60 ml/min/1.73m2    GFR est non-AA >60 >60 ml/min/1.73m2    Calcium 8.1 (L) 8.3 - 10.4 MG/DL   MAGNESIUM    Collection Time: 05/08/18  6:53 AM   Result Value Ref Range    Magnesium 1.7 (L) 1.8 - 2.4 mg/dL         Assessment/Plan:     Principal Problem:    Compression fracture of L1 lumbar vertebra (HCC) (5/7/2018)    Active Problems:    Hypokalemia (12/4/2014)      Hyponatremia (12/4/2014)      HTN (hypertension) (12/4/2014)      Urinary retention (5/7/2018)      Give potassium 40 meq iv x one, 40 meq po x one, magnesium 2 g iv one. Recheck K one hour after replacement. PT/OT. Ortho to determine what kind of brace patient needs. Hopefully home in morning.      Care Plan discussed with: Patient/Family and Nurse      Signed By: Kyle Rodriguez MD     May 8, 2018

## 2018-05-09 VITALS
TEMPERATURE: 99 F | OXYGEN SATURATION: 95 % | DIASTOLIC BLOOD PRESSURE: 61 MMHG | RESPIRATION RATE: 18 BRPM | SYSTOLIC BLOOD PRESSURE: 121 MMHG | HEART RATE: 94 BPM

## 2018-05-09 LAB
ANION GAP SERPL CALC-SCNC: 10 MMOL/L (ref 7–16)
BUN SERPL-MCNC: 9 MG/DL (ref 8–23)
CALCIUM SERPL-MCNC: 8.5 MG/DL (ref 8.3–10.4)
CHLORIDE SERPL-SCNC: 106 MMOL/L (ref 98–107)
CO2 SERPL-SCNC: 22 MMOL/L (ref 21–32)
CREAT SERPL-MCNC: 0.61 MG/DL (ref 0.6–1)
GLUCOSE SERPL-MCNC: 82 MG/DL (ref 65–100)
MAGNESIUM SERPL-MCNC: 2.1 MG/DL (ref 1.8–2.4)
MM INDURATION POC: 0 MM (ref 0–5)
PHOSPHATE SERPL-MCNC: 2.5 MG/DL (ref 2.3–3.7)
POTASSIUM SERPL-SCNC: 4 MMOL/L (ref 3.5–5.1)
PPD POC: NEGATIVE NEGATIVE
SODIUM SERPL-SCNC: 138 MMOL/L (ref 136–145)

## 2018-05-09 PROCEDURE — 83735 ASSAY OF MAGNESIUM: CPT | Performed by: INTERNAL MEDICINE

## 2018-05-09 PROCEDURE — 99218 HC RM OBSERVATION: CPT

## 2018-05-09 PROCEDURE — 84100 ASSAY OF PHOSPHORUS: CPT | Performed by: INTERNAL MEDICINE

## 2018-05-09 PROCEDURE — 36415 COLL VENOUS BLD VENIPUNCTURE: CPT | Performed by: INTERNAL MEDICINE

## 2018-05-09 PROCEDURE — 80048 BASIC METABOLIC PNL TOTAL CA: CPT | Performed by: INTERNAL MEDICINE

## 2018-05-09 PROCEDURE — 74011250637 HC RX REV CODE- 250/637: Performed by: INTERNAL MEDICINE

## 2018-05-09 RX ADMIN — Medication 10 ML: at 06:19

## 2018-05-09 NOTE — DISCHARGE SUMMARY
Physician Discharge Summary       Patient: Daryn Valenzuela MRN: [id number]  SSN: [id number]    YOB: 1940  Age: 78 y.o. Sex: female    PCP: Nikia Hassan MD    Allergies: Azithromycin; Amoxicillin; and Codeine    Admit date: 5/6/2018  Admitting Provider: Pj Arrieta. Dinesh Albright MD    Discharge date: 5/9/2018  Discharging Provider: Deepa Snell MD    * Admission Diagnoses: Urinary retention  Compression fracture of L1 lumbar vertebra [facility])    * Discharge Diagnoses:    Hospital Problems as of 5/9/2018  Date Reviewed: 3/1/2018          Codes Class Noted - Resolved POA    Urinary retention ICD-10-CM: R33.9  ICD-9-CM: 788.20  5/7/2018 - Present Yes        * (Principal)Compression fracture of L1 lumbar vertebra ([facility] [address] ICD-10-CM: S32.010A  ICD-9-CM: 805.4  5/7/2018 - Present Yes        Hypokalemia ICD-10-CM: E87.6  ICD-9-CM: 276.8  12/4/2014 - Present Yes        Hyponatremia ICD-10-CM: E87.1  ICD-9-CM: 276.1  12/4/2014 - Present Yes        HTN (hypertension) (Chronic) ICD-10-CM: I10  ICD-9-CM: 401.9  12/4/2014 - Present Yes              * Hospital Course:     Ms. Dinesh Albright is a 77 yo WF who presented 5/6 for back pain after a recent fall associated with difficulty urinating and is found to have a L1 compression fracture on imaging. Dupree inserted on admission and was removed yesterday morning for a voiding trial.  She has urinated well on her own since dupree removal.  Thus no plans for surgery for fracture, rather, plan is for back brace and orthospine recommendations will be followed. Her potassium was quite low at 2.8 yesterday morning and her magnesium/potassium was replaced via IV and PO. Today her potassium and magnesium are within normal limits. She has declined home health PT services and is medically stable for discharge home.     Consults:   Orthospine Surgery    Significant Diagnostic Studies:     MRI lumbar spine without contrast May 6, 2018     Reference exam: Plain films April 26, 2018     INDICATION: L1 compression fracture after fall last week now having difficulty  emptying bladder     FINDINGS: Routine MRI of the lumbar spine was performed using sagittal and axial  imaging. The tip of the conus is seen at the L1 level, L1 shows compression with  posterior retropulsion of the wall, but only moderate canal narrowing with some  impingement on the lower conus. T11-T12 shows no canal or foraminal stenosis. T12-L1 shows no canal or foraminal stenosis. L1-L2 shows no canal or foraminal  stenosis. Probable simple cysts are seen within the left kidney. L2-L3 shows  some facet hypertrophy with mild canal narrowing but no neural foraminal  stenosis. L3-L4 shows facet hypertrophy but no canal or foraminal stenosis. L4-L5, L5-S1 show no canal or foraminal stenosis.     IMPRESSION  IMPRESSION:  1. Some compression of L1 seen with retropulsion of the posterior wall of L1  causing some impingement on the lower conus but only moderate canal narrowing. 2. Some spondylosis as described above. Discharge Exam:    General: awake, alert, oriented, normal weight  Eyes; non icteric, EOMI  Neck; supple  CV: RRR  Pulm; CTAB  Abd; soft, non tender, active BS  Neuro: normal strength and range of motion of all four extremities, normal speech, cranial nerves II-XII grossly intact    * Discharge Condition: stable  * Disposition: Home    Discharge Medications:  Current Discharge Medication List      CONTINUE these medications which have NOT CHANGED    Details   morphine IR (MS IR) 15 mg tablet Take 1 Tab by mouth every six (6) hours as needed for Pain. Max Daily Amount: 60 mg.  Qty: 15 Tab, Refills: 0    Associated Diagnoses: Closed compression fracture of first lumbar vertebra, initial encounter ([facility])      ondansetron (ZOFRAN ODT) 8 mg disintegrating tablet Take 1 Tab by mouth every eight (8) hours as needed for Nausea.   Qty: 12 Tab, Refills: 0      ibuprofen (MOTRIN) 800 mg tablet Take 1 Tab by mouth three (3) times daily. Qty: 90 Tab, Refills: 5    Associated Diagnoses: Osteoarthritis, unspecified osteoarthritis type, unspecified site      losartan-hydroCHLOROthiazide (HYZAAR) 50-12.5 mg per tablet Take 1 Tab by mouth daily. Qty: 30 Tab, Refills: 5    Associated Diagnoses: Essential hypertension      pravastatin (PRAVACHOL) 40 mg tablet Take 1 Tab by mouth nightly. Qty: 30 Tab, Refills: 5    Associated Diagnoses: Hypercholesterolemia             * Follow-up Care/Patient Instructions:   Activity: ad evonne  Diet: regular    Follow-up Information     Follow up With Details MD Luma On 5/16/2018 [id number]  [phone]            Signed:  Ralf Cantu MD  5/9/2018  9:30 AM

## 2018-05-09 NOTE — DISCHARGE INSTRUCTIONS
Compression Fracture of the Spine: Care Instructions  Your Care Instructions    A compression fracture happens when the front part of a spinal bone breaks and collapses. A fall or other accident can cause it. A minor injury or moving the wrong way can cause a break if you have thin or brittle bones (osteoporosis). These types of breaks will heal in 8 to 10 weeks. You will need rest and pain medicines. Your doctor may recommend physical therapy. Some doctors recommend that certain people with compression fractures wear braces. Your doctor also may treat thin or brittle bones. You may need surgery if you have lasting pain or if the bone presses on the spinal cord or nerves. You heal best when you take good care of yourself. Eat a variety of healthy foods, and don't smoke. Follow-up care is a key part of your treatment and safety. Be sure to make and go to all appointments, and call your doctor if you are having problems. It's also a good idea to know your test results and keep a list of the medicines you take. How can you care for yourself at home? · Be safe with medicines. Read and follow all instructions on the label. ¨ If the doctor gave you a prescription medicine for pain, take it as prescribed. ¨ If you are not taking a prescription pain medicine, ask your doctor if you can take an over-the-counter medicine. · Talk to your doctor about how to make your bones stronger. You may need medicines or a change in what you eat. · Be active only as directed by your doctor. When should you call for help? Call 911 anytime you think you may need emergency care. For example, call if:  ? · You are unable to move an arm or a leg at all. ?Call your doctor now or seek immediate medical care if:  ? · You have new or worse symptoms in your arms, legs, belly, or buttocks. Symptoms may include:  ¨ Numbness or tingling. ¨ Weakness. ¨ Pain. ? · You lose bladder or bowel control.    ? · You have belly pain, bloating, vomiting, or nausea. ? Watch closely for changes in your health, and be sure to contact your doctor if:  ? · You do not get better as expected. Where can you learn more? Go to http://shira-kalen.info/. Enter P445 in the search box to learn more about \"Compression Fracture of the Spine: Care Instructions. \"  Current as of: March 21, 2017  Content Version: 11.4  © 0040-3717 [employer]. Care instructions adapted under license by [employer] (which disclaims liability or warranty for this information). If you have questions about a medical condition or this instruction, always ask your healthcare professional. [place] [address] any warranty or liability for your use of this information. DISCHARGE SUMMARY from Nurse    PATIENT INSTRUCTIONS:    After general anesthesia or intravenous sedation, for 24 hours or while taking prescription Narcotics:  · Limit your activities  · Do not drive and operate hazardous machinery  · Do not make important personal or business decisions  · Do  not drink alcoholic beverages  · If you have not urinated within 8 hours after discharge, please contact your surgeon on call. Report the following to your surgeon:  · Excessive pain, swelling, redness or odor of or around the surgical area  · Temperature over 100.5  · Nausea and vomiting lasting longer than 4 hours or if unable to take medications  · Any signs of decreased circulation or nerve impairment to extremity: change in color, persistent  numbness, tingling, coldness or increase pain  · Any questions    What to do at Home:  Recommended activity: Activity as tolerated, diet as tolerated. *  Please give a list of your current medications to your Primary Care Provider. *  Please update this list whenever your medications are discontinued, doses are      changed, or new medications (including over-the-counter products) are added.     *  Please carry medication information at all times in case of emergency situations. These are general instructions for a healthy lifestyle:    No smoking/ No tobacco products/ Avoid exposure to second hand smoke  Surgeon General's Warning:  Quitting smoking now greatly reduces serious risk to your health. Obesity, smoking, and sedentary lifestyle greatly increases your risk for illness    A healthy diet, regular physical exercise & weight monitoring are important for maintaining a healthy lifestyle    You may be retaining fluid if you have a history of heart failure or if you experience any of the following symptoms:  Weight gain of 3 pounds or more overnight or 5 pounds in a week, increased swelling in our hands or feet or shortness of breath while lying flat in bed. Please call your doctor as soon as you notice any of these symptoms; do not wait until your next office visit. Recognize signs and symptoms of STROKE:    F-face looks uneven    A-arms unable to move or move unevenly    S-speech slurred or non-existent    T-time-call 911 as soon as signs and symptoms begin-DO NOT go       Back to bed or wait to see if you get better-TIME IS BRAIN. Warning Signs of HEART ATTACK     Call 911 if you have these symptoms:   Chest discomfort. Most heart attacks involve discomfort in the center of the chest that lasts more than a few minutes, or that goes away and comes back. It can feel like uncomfortable pressure, squeezing, fullness, or pain.  Discomfort in other areas of the upper body. Symptoms can include pain or discomfort in one or both arms, the back, neck, jaw, or stomach.  Shortness of breath with or without chest discomfort.  Other signs may include breaking out in a cold sweat, nausea, or lightheadedness. Don't wait more than five minutes to call 911 - MINUTES MATTER! Fast action can save your life. Calling 911 is almost always the fastest way to get lifesaving treatment.  Emergency Medical Services staff can begin treatment when they arrive -- up to an hour sooner than if someone gets to the hospital by car. The discharge information has been reviewed with the patient. The patient verbalized understanding. Discharge medications reviewed with the patient and appropriate educational materials and side effects teaching were provided.   ___________________________________________________________________________________________________________________________________

## 2018-05-09 NOTE — PROGRESS NOTES
Discharge instructions and prescriptions given and explained to pt. Pt verbalized understanding. Medication side effects sheet reviewed with pt. Pt to be discharged home, ride will be to the hospital soon.

## 2018-05-09 NOTE — PHYSICIAN ADVISORY
Letter of Determination:  Outpatient states receiving Observation Services    This case was reviewed, and I concur with Outpatient status receiving Observation services. This determination may change depending on further medical information, condition changes of the patient, or treatment requirements.       Ovi Ballesteros MD, TAMI,   Physician Justus Cabrera.

## 2018-05-13 ENCOUNTER — APPOINTMENT (OUTPATIENT)
Dept: CT IMAGING | Age: 78
End: 2018-05-13
Attending: EMERGENCY MEDICINE
Payer: MEDICARE

## 2018-05-13 ENCOUNTER — HOSPITAL ENCOUNTER (EMERGENCY)
Age: 78
Discharge: HOME | End: 2018-05-13
Attending: EMERGENCY MEDICINE
Payer: MEDICARE

## 2018-05-13 ENCOUNTER — APPOINTMENT (OUTPATIENT)
Dept: GENERAL RADIOLOGY | Age: 78
End: 2018-05-13
Attending: EMERGENCY MEDICINE
Payer: MEDICARE

## 2018-05-13 VITALS
RESPIRATION RATE: 16 BRPM | BODY MASS INDEX: 18.4 KG/M2 | HEIGHT: 62 IN | TEMPERATURE: 98.4 F | HEART RATE: 95 BPM | WEIGHT: 100 LBS | OXYGEN SATURATION: 96 % | SYSTOLIC BLOOD PRESSURE: 115 MMHG | DIASTOLIC BLOOD PRESSURE: 52 MMHG

## 2018-05-13 DIAGNOSIS — J32.4 PANSINUSITIS, UNSPECIFIED CHRONICITY: Primary | ICD-10-CM

## 2018-05-13 LAB
ALBUMIN SERPL-MCNC: 2.2 G/DL (ref 3.2–4.6)
ALBUMIN/GLOB SERPL: 0.6 {RATIO} (ref 1.2–3.5)
ALP SERPL-CCNC: 104 U/L (ref 50–136)
ALT SERPL-CCNC: 15 U/L (ref 12–65)
ANION GAP SERPL CALC-SCNC: 12 MMOL/L (ref 7–16)
APPEARANCE UR: ABNORMAL
AST SERPL-CCNC: 15 U/L (ref 15–37)
BACTERIA URNS QL MICRO: ABNORMAL /HPF
BASOPHILS # BLD: 0 K/UL (ref 0–0.2)
BASOPHILS NFR BLD: 0 % (ref 0–2)
BILIRUB SERPL-MCNC: 0.4 MG/DL (ref 0.2–1.1)
BILIRUB UR QL: NEGATIVE
BUN SERPL-MCNC: 9 MG/DL (ref 8–23)
CALCIUM SERPL-MCNC: 8 MG/DL (ref 8.3–10.4)
CASTS URNS QL MICRO: ABNORMAL /LPF
CHLORIDE SERPL-SCNC: 95 MMOL/L (ref 98–107)
CO2 SERPL-SCNC: 27 MMOL/L (ref 21–32)
COLOR UR: YELLOW
CREAT SERPL-MCNC: 0.91 MG/DL (ref 0.6–1)
DIFFERENTIAL METHOD BLD: ABNORMAL
EOSINOPHIL # BLD: 0 K/UL (ref 0–0.8)
EOSINOPHIL NFR BLD: 0 % (ref 0.5–7.8)
EPI CELLS #/AREA URNS HPF: ABNORMAL /HPF
ERYTHROCYTE [DISTWIDTH] IN BLOOD BY AUTOMATED COUNT: 14.2 % (ref 11.9–14.6)
FLUAV AG NPH QL IA: NEGATIVE
FLUBV AG NPH QL IA: NEGATIVE
GLOBULIN SER CALC-MCNC: 3.6 G/DL (ref 2.3–3.5)
GLUCOSE SERPL-MCNC: 130 MG/DL (ref 65–100)
GLUCOSE UR STRIP.AUTO-MCNC: NEGATIVE MG/DL
HCT VFR BLD AUTO: 34.6 % (ref 35.8–46.3)
HGB BLD-MCNC: 11.9 G/DL (ref 11.7–15.4)
HGB UR QL STRIP: NEGATIVE
IMM GRANULOCYTES # BLD: 0.1 K/UL (ref 0–0.5)
IMM GRANULOCYTES NFR BLD AUTO: 0 % (ref 0–5)
KETONES UR QL STRIP.AUTO: NEGATIVE MG/DL
LACTATE BLD-SCNC: 1 MMOL/L (ref 0.5–1.9)
LEUKOCYTE ESTERASE UR QL STRIP.AUTO: ABNORMAL
LYMPHOCYTES # BLD: 0.9 K/UL (ref 0.5–4.6)
LYMPHOCYTES NFR BLD: 5 % (ref 13–44)
MAGNESIUM SERPL-MCNC: 1.3 MG/DL (ref 1.8–2.4)
MCH RBC QN AUTO: 27.4 PG (ref 26.1–32.9)
MCHC RBC AUTO-ENTMCNC: 34.4 G/DL (ref 31.4–35)
MCV RBC AUTO: 79.7 FL (ref 79.6–97.8)
MONOCYTES # BLD: 1.2 K/UL (ref 0.1–1.3)
MONOCYTES NFR BLD: 6 % (ref 4–12)
NEUTS SEG # BLD: 18.3 K/UL (ref 1.7–8.2)
NEUTS SEG NFR BLD: 89 % (ref 43–78)
NITRITE UR QL STRIP.AUTO: NEGATIVE
PH UR STRIP: 7.5 [PH] (ref 5–9)
PLATELET # BLD AUTO: 430 K/UL (ref 150–450)
PMV BLD AUTO: 8.4 FL (ref 10.8–14.1)
POTASSIUM SERPL-SCNC: 2.4 MMOL/L (ref 3.5–5.1)
PROCALCITONIN SERPL-MCNC: 0.3 NG/ML
PROT SERPL-MCNC: 5.8 G/DL (ref 6.3–8.2)
PROT UR STRIP-MCNC: NEGATIVE MG/DL
RBC # BLD AUTO: 4.34 M/UL (ref 4.05–5.25)
RBC #/AREA URNS HPF: ABNORMAL /HPF
SODIUM SERPL-SCNC: 134 MMOL/L (ref 136–145)
SP GR UR REFRACTOMETRY: 1.01 (ref 1–1.02)
UROBILINOGEN UR QL STRIP.AUTO: 1 EU/DL (ref 0.2–1)
WBC # BLD AUTO: 20.5 K/UL (ref 4.3–11.1)
WBC URNS QL MICRO: ABNORMAL /HPF

## 2018-05-13 PROCEDURE — 85025 COMPLETE CBC W/AUTO DIFF WBC: CPT | Performed by: EMERGENCY MEDICINE

## 2018-05-13 PROCEDURE — 74011250636 HC RX REV CODE- 250/636: Performed by: EMERGENCY MEDICINE

## 2018-05-13 PROCEDURE — 74011636320 HC RX REV CODE- 636/320: Performed by: EMERGENCY MEDICINE

## 2018-05-13 PROCEDURE — 87086 URINE CULTURE/COLONY COUNT: CPT | Performed by: EMERGENCY MEDICINE

## 2018-05-13 PROCEDURE — 87040 BLOOD CULTURE FOR BACTERIA: CPT | Performed by: EMERGENCY MEDICINE

## 2018-05-13 PROCEDURE — 83735 ASSAY OF MAGNESIUM: CPT | Performed by: EMERGENCY MEDICINE

## 2018-05-13 PROCEDURE — 81001 URINALYSIS AUTO W/SCOPE: CPT | Performed by: EMERGENCY MEDICINE

## 2018-05-13 PROCEDURE — 80053 COMPREHEN METABOLIC PANEL: CPT | Performed by: EMERGENCY MEDICINE

## 2018-05-13 PROCEDURE — 87804 INFLUENZA ASSAY W/OPTIC: CPT | Performed by: EMERGENCY MEDICINE

## 2018-05-13 PROCEDURE — 96365 THER/PROPH/DIAG IV INF INIT: CPT | Performed by: EMERGENCY MEDICINE

## 2018-05-13 PROCEDURE — 84145 PROCALCITONIN (PCT): CPT | Performed by: EMERGENCY MEDICINE

## 2018-05-13 PROCEDURE — 74011250637 HC RX REV CODE- 250/637: Performed by: EMERGENCY MEDICINE

## 2018-05-13 PROCEDURE — 87205 SMEAR GRAM STAIN: CPT | Performed by: EMERGENCY MEDICINE

## 2018-05-13 PROCEDURE — 71046 X-RAY EXAM CHEST 2 VIEWS: CPT

## 2018-05-13 PROCEDURE — 99284 EMERGENCY DEPT VISIT MOD MDM: CPT | Performed by: EMERGENCY MEDICINE

## 2018-05-13 PROCEDURE — 70487 CT MAXILLOFACIAL W/DYE: CPT

## 2018-05-13 PROCEDURE — 96367 TX/PROPH/DG ADDL SEQ IV INF: CPT | Performed by: EMERGENCY MEDICINE

## 2018-05-13 PROCEDURE — 83605 ASSAY OF LACTIC ACID: CPT

## 2018-05-13 PROCEDURE — 74011000258 HC RX REV CODE- 258: Performed by: EMERGENCY MEDICINE

## 2018-05-13 RX ORDER — OXYMETAZOLINE HCL 0.05 %
2 SPRAY, NON-AEROSOL (ML) NASAL
Qty: 1 BOTTLE | Refills: 0 | Status: SHIPPED | OUTPATIENT
Start: 2018-05-13 | End: 2018-06-08

## 2018-05-13 RX ORDER — FLUTICASONE PROPIONATE 50 MCG
2 SPRAY, SUSPENSION (ML) NASAL DAILY
Qty: 1 BOTTLE | Refills: 3 | Status: SHIPPED | OUTPATIENT
Start: 2018-05-13 | End: 2018-10-24 | Stop reason: ALTCHOICE

## 2018-05-13 RX ORDER — POTASSIUM CHLORIDE 20MEQ/15ML
40 LIQUID (ML) ORAL
Status: COMPLETED | OUTPATIENT
Start: 2018-05-13 | End: 2018-05-13

## 2018-05-13 RX ORDER — SODIUM CHLORIDE 0.9 % (FLUSH) 0.9 %
5-10 SYRINGE (ML) INJECTION AS NEEDED
Status: DISCONTINUED | OUTPATIENT
Start: 2018-05-13 | End: 2018-05-14 | Stop reason: HOSPADM

## 2018-05-13 RX ORDER — SULFAMETHOXAZOLE AND TRIMETHOPRIM 800; 160 MG/1; MG/1
1 TABLET ORAL 2 TIMES DAILY
Qty: 28 TAB | Refills: 0 | Status: SHIPPED | OUTPATIENT
Start: 2018-05-13 | End: 2018-05-22

## 2018-05-13 RX ORDER — SODIUM CHLORIDE 0.9 % (FLUSH) 0.9 %
10 SYRINGE (ML) INJECTION
Status: COMPLETED | OUTPATIENT
Start: 2018-05-13 | End: 2018-05-13

## 2018-05-13 RX ORDER — OXYMETAZOLINE HCL 0.05 %
2 SPRAY, NON-AEROSOL (ML) NASAL
Status: COMPLETED | OUTPATIENT
Start: 2018-05-13 | End: 2018-05-13

## 2018-05-13 RX ORDER — MAGNESIUM SULFATE HEPTAHYDRATE 40 MG/ML
2 INJECTION, SOLUTION INTRAVENOUS
Status: COMPLETED | OUTPATIENT
Start: 2018-05-13 | End: 2018-05-13

## 2018-05-13 RX ORDER — GUAIFENESIN, PSEUDOEPHEDRINE HYDROCHLORIDE 600; 60 MG/1; MG/1
1 TABLET, EXTENDED RELEASE ORAL
Qty: 20 TAB | Refills: 0 | Status: SHIPPED | OUTPATIENT
Start: 2018-05-13 | End: 2018-06-08

## 2018-05-13 RX ORDER — SODIUM CHLORIDE 9 MG/ML
1000 INJECTION, SOLUTION INTRAVENOUS ONCE
Status: COMPLETED | OUTPATIENT
Start: 2018-05-13 | End: 2018-05-13

## 2018-05-13 RX ADMIN — IOPAMIDOL 100 ML: 755 INJECTION, SOLUTION INTRAVENOUS at 20:29

## 2018-05-13 RX ADMIN — OXYMETAZOLINE HYDROCHLORIDE 2 SPRAY: 5 SPRAY NASAL at 20:18

## 2018-05-13 RX ADMIN — POTASSIUM CHLORIDE 40 MEQ: 20 SOLUTION ORAL at 20:49

## 2018-05-13 RX ADMIN — SODIUM CHLORIDE 1000 ML: 900 INJECTION, SOLUTION INTRAVENOUS at 19:20

## 2018-05-13 RX ADMIN — Medication 10 ML: at 20:30

## 2018-05-13 RX ADMIN — CEFTRIAXONE SODIUM 2 G: 2 INJECTION, POWDER, FOR SOLUTION INTRAMUSCULAR; INTRAVENOUS at 20:52

## 2018-05-13 RX ADMIN — SODIUM CHLORIDE 100 ML: 900 INJECTION, SOLUTION INTRAVENOUS at 20:30

## 2018-05-13 RX ADMIN — MAGNESIUM SULFATE IN WATER 2 G: 40 INJECTION, SOLUTION INTRAVENOUS at 20:58

## 2018-05-13 NOTE — ED TRIAGE NOTES
Pt reports sinus pressure, bilateral ear pain, headache and fever that started today.       Jie Nguyễn RN

## 2018-05-13 NOTE — ED NOTES
IV placed and blood wk collected, orthostatics completed w/o pt tilting, no dizziness. Pt is completing her CXR at this time, primary RN notified.

## 2018-05-13 NOTE — ED PROVIDER NOTES
HPI Comments: Patient presents to the emergency room complaining of sinus infection. She reports significant nasal congestion and rhinorrhea and ear pain as well as scratchiness in the throat. She states the symptoms started prior to her hospitalization recently. She was hospitalized for an L1 compression fracture and some urinary retention and discharged yesterday. She has been voiding spontaneously and denies any dysuria although she has reported a cough which also was present prior to her hospitalization for the back pain. She reports cough is productive of sputum yellow in color. She was noted to have a fever of 103 at home today. Patient is a 78 y.o. female presenting with sinus problems. The history is provided by the patient. Sinus Infection    This is a new problem. The current episode started more than 2 days ago. The problem has been gradually worsening. There has been a fever of 103 - 104 F. The pain is at a severity of 10/10. The pain is severe. The pain has been constant since onset. Associated symptoms include chills, congestion, ear pain, sinus pressure, cough and rhinorrhea. Pertinent negatives include no sweats, no hoarse voice, no sore throat, no swollen glands, no shortness of breath, no neck pain, no neck pain, no headaches and no chest pain. She has tried nothing for the symptoms. The treatment provided no relief.         Past Medical History:   Diagnosis Date    STEPHEN (acute kidney injury) ([address] 12/04/2014    pt denies this dx    Back pain 6/10/2016    Breast lump dx 2/2018    left    Bronchitis     Discharge from the vagina     Dysuria     Eczema 6/10/2016    Fungal dermatitis     Headache 6/10/2016    Hypercholesterolemia 12/4/2014    Hypertension     not well controlled--per pt    Menopausal vaginal dryness     Osteoarthritis 6/10/2016    Osteoporosis 6/10/2016    PUD (peptic ulcer disease)     last 2003 which a rupture an extensive surgery    Sepsis ([address] 12/4/2014  Tobacco abuse     1ppd x 49 yrs       Past Surgical History:   Procedure Laterality Date    ABDOMEN SURGERY PROC UNLISTED  2003    stomach surgery for ruptured ulcer and extensive rerouting of intestestines per patient     BREAST SURGERY PROCEDURE UNLISTED Left 1975    breast lumpectomy, benign  (left)    HX APPENDECTOMY  1977    with hysterectomy    HX BREAST BIOPSY Left 1975    HX BREAST LUMPECTOMY Left 2/22/2018    LEFT BREAST LUMPECTOMY/ SENTINEL NODE BIOPSY performed by G[facility] at [facility] MAIN OR    HX CATARACT REMOVAL Bilateral     HX CHOLECYSTECTOMY      HX ENDOSCOPY      HX HYSTERECTOMY  1977    HX OTHER SURGICAL      hernicolectomy 2 cm    HX TUBAL LIGATION  1975         Family History:   Problem Relation Age of Onset    Diabetes Sister     Heart Disease Sister     Heart Disease Father     Heart Attack Father     Cancer Brother      prostate    Cancer Sister     Breast Cancer Sister 79    Heart Disease Sister     Heart Disease Brother     Hypertension Mother     Hypertension Other      Brother and sister with htn       Social History     Social History    Marital status:      Spouse name: N/A    Number of children: N/A    Years of education: N/A     Occupational History    Not on file. Social History Main Topics    Smoking status: Current Every Day Smoker     Packs/day: 1.00     Years: 49.00     Start date: 5/6/1966    Smokeless tobacco: Never Used    Alcohol use No    Drug use: No    Sexual activity: Not on file     Other Topics Concern    Not on file     Social History Narrative         ALLERGIES: Azithromycin; Amoxicillin; and Codeine    Review of Systems   Constitutional: Positive for chills. HENT: Positive for congestion, ear pain, rhinorrhea and sinus pressure. Negative for hoarse voice and sore throat. Respiratory: Positive for cough. Negative for shortness of breath. Cardiovascular: Negative for chest pain.    Musculoskeletal: Negative for neck pain. Neurological: Negative for headaches. All other systems reviewed and are negative. Vitals:    05/13/18 1849 05/13/18 1905 05/13/18 1907   BP: (!) 88/51 132/55    Pulse: (!) 120     Resp: 16     Temp: 98.4 °F (36.9 °C)     SpO2: 94%  96%   Weight: 45.4 kg (100 lb)     Height: 5' 2\" (1.575 m)              Physical Exam   Constitutional: She is oriented to person, place, and time. She appears well-developed and well-nourished. No distress. HENT:   Head: Normocephalic and atraumatic. Mouth/Throat: No oropharyngeal exudate. Mucous membranes are dry. Left TM is notably bulging mildly erythematous under pressure without any evidence of effusion in the middle ear. Right TM is normal in appearance with no sinus tenderness noted to percussion   Eyes: Conjunctivae and EOM are normal. Pupils are equal, round, and reactive to light. Neck: Normal range of motion. Neck supple. Cardiovascular: Normal rate, regular rhythm and normal heart sounds. Pulmonary/Chest: Effort normal and breath sounds normal.   Abdominal: Soft. Bowel sounds are normal.   Musculoskeletal: Normal range of motion. Neurological: She is alert and oriented to person, place, and time. Skin: Skin is warm and dry. Psychiatric: She has a normal mood and affect. Her behavior is normal.   Nursing note and vitals reviewed. MDM  Number of Diagnoses or Management Options  Diagnosis management comments: Given the patient's vital signs at presentation of a pulse of 120 and a blood pressure 88/51 although repeat blood pressure was 132/55 and a fever of 103 which is unusual in conjunction with only a sinus infection. Patient also has a recent hospitalization which will require further workup to include sepsis workup for possible pneumonia or urinary tract infection.        Amount and/or Complexity of Data Reviewed  Clinical lab tests: ordered and reviewed  Tests in the radiology section of CPT®: ordered and reviewed  Independent visualization of images, tracings, or specimens: yes    Risk of Complications, Morbidity, and/or Mortality  Presenting problems: moderate  Diagnostic procedures: moderate  Management options: moderate    Patient Progress  Patient progress: stable        ED Course       Procedures    Patient is clinically stable without signs of sepsis. Evaluation  Negative for evidence of pneumonia minimal evidence for possible urinary tract infection with CT of the sinuses demonstrates pansinusitis.   Although unusual to have such a high fever and eye white count was sinusitis his only focus of infection for which she'll be treated outpatient

## 2018-05-14 NOTE — DISCHARGE INSTRUCTIONS
Saline Nasal Washes: Care Instructions  Your Care Instructions  Saline nasal washes help keep the nasal passages open by washing out thick or dried mucus. This simple remedy can help relieve symptoms of allergies, sinusitis, and colds. It also can make the nose feel more comfortable by keeping the mucous membranes moist. You may notice a little burning sensation in your nose the first few times you use the solution, but this usually gets better in a few days. Follow-up care is a key part of your treatment and safety. Be sure to make and go to all appointments, and call your doctor if you are having problems. It's also a good idea to know your test results and keep a list of the medicines you take. How can you care for yourself at home? · You can buy premixed saline solution in a squeeze bottle or other sinus rinse products at a drugstore. Read and follow the instructions on the label. · You also can make your own saline solution by adding 1 teaspoon of salt and 1 teaspoon of baking soda to 2 cups of distilled water. · If you use a homemade solution, pour a small amount into a clean bowl. Using a rubber bulb syringe, squeeze the syringe and place the tip in the salt water. Pull a small amount of the salt water into the syringe by relaxing your hand. · Sit down with your head tilted slightly back. Do not lie down. Put the tip of the bulb syringe or the squeeze bottle a little way into one of your nostrils. Gently drip or squirt a few drops into the nostril. Repeat with the other nostril. Some sneezing and gagging are normal at first.  · Gently blow your nose. · Wipe the syringe or bottle tip clean after each use. · Repeat this 2 or 3 times a day. · Use nasal washes gently if you have nosebleeds often. When should you call for help? Watch closely for changes in your health, and be sure to contact your doctor if:  ? · You often get nosebleeds. ? · You have problems doing the nasal washes.    Where can you learn more? Go to http://shira-kalen.info/. Enter 071 [id number] [id number] 47 in the search box to learn more about \"Saline Nasal Washes: Care Instructions. \"  Current as of: May 12, 2017  Content Version: 11.4  © 0133-1068 [employer]. Care instructions adapted under license by [employer] (which disclaims liability or warranty for this information). If you have questions about a medical condition or this instruction, always ask your healthcare professional. [address] any warranty or liability for your use of this information. Sinusitis: Care Instructions  Your Care Instructions    Sinusitis is an infection of the lining of the sinus cavities in your head. Sinusitis often follows a cold. It causes pain and pressure in your head and face. In most cases, sinusitis gets better on its own in 1 to 2 weeks. But some mild symptoms may last for several weeks. Sometimes antibiotics are needed. Follow-up care is a key part of your treatment and safety. Be sure to make and go to all appointments, and call your doctor if you are having problems. It's also a good idea to know your test results and keep a list of the medicines you take. How can you care for yourself at home? · Take an over-the-counter pain medicine, such as acetaminophen (Tylenol), ibuprofen (Advil, Motrin), or naproxen (Aleve). Read and follow all instructions on the label. · If the doctor prescribed antibiotics, take them as directed. Do not stop taking them just because you feel better. You need to take the full course of antibiotics. · Be careful when taking over-the-counter cold or flu medicines and Tylenol at the same time. Many of these medicines have acetaminophen, which is Tylenol. Read the labels to make sure that you are not taking more than the recommended dose. Too much acetaminophen (Tylenol) can be harmful.   · Breathe warm, moist air from a steamy shower, a hot bath, or a sink filled with hot water. Avoid cold, dry air. Using a humidifier in your home may help. Follow the directions for cleaning the machine. · Use saline (saltwater) nasal washes to help keep your nasal passages open and wash out mucus and bacteria. You can buy saline nose drops at a grocery store or drugstore. Or you can make your own at home by adding 1 teaspoon of salt and 1 teaspoon of baking soda to 2 cups of distilled water. If you make your own, fill a bulb syringe with the solution, insert the tip into your nostril, and squeeze gently. [place] your nose. · Put a hot, wet towel or a warm gel pack on your face 3 or 4 times a day for 5 to 10 minutes each time. · Try a decongestant nasal spray like oxymetazoline (Afrin). Do not use it for more than 3 days in a row. Using it for more than 3 days can make your congestion worse. When should you call for help? Call your doctor now or seek immediate medical care if:  ? · You have new or worse swelling or redness in your face or around your eyes. ? · You have a new or higher fever. ? Watch closely for changes in your health, and be sure to contact your doctor if:  ? · You have new or worse facial pain. ? · The mucus from your nose becomes thicker (like pus) or has new blood in it. ? · You are not getting better as expected. Where can you learn more? Go to http://shira-kalen.info/. Enter K893 in the search box to learn more about \"Sinusitis: Care Instructions. \"  Current as of: May 12, 2017  Content Version: 11.4  © 0451-1599 [email or web address]. Care instructions adapted under license by [employer] (which disclaims liability or warranty for this information). If you have questions about a medical condition or this instruction, always ask your healthcare professional. [address] any warranty or liability for your use of this information.

## 2018-05-14 NOTE — ED NOTES
I have reviewed discharge instructions with the patient. The patient verbalized understanding. Patient left ED via Discharge Method: ambulatory to Home with family. Opportunity for questions and clarification provided. Patient given 4 scripts. To continue your aftercare when you leave the hospital, you may receive an automated call from our care team to check in on how you are doing. This is a free service and part of our promise to provide the best care and service to meet your aftercare needs.  If you have questions, or wish to unsubscribe from this service please call [phone]. Thank you for Choosing our [facility] Emergency Department.

## 2018-05-16 LAB
BACTERIA SPEC CULT: NORMAL
SERVICE CMNT-IMP: NORMAL

## 2018-05-18 LAB
BACTERIA SPEC CULT: NORMAL
SERVICE CMNT-IMP: NORMAL

## 2018-05-18 NOTE — PROGRESS NOTES
This patient was contacted and I spoke with her on the phone regarding preliminary blood culture results. Final culture and susceptibility are still pending. Patient states that she is feeling better as she developed a sinus infection while she was in the hospital and was placed on Bactrim which she has been taking. She denies any chills, fever or body aches. I advised patient if she started to feel worse or developed a fever or vomiting she needed to return to the emergency room. We will await final subculture and susceptibility.

## 2018-05-20 ENCOUNTER — HOSPITAL ENCOUNTER (OUTPATIENT)
Age: 78
Setting detail: OBSERVATION
Discharge: HOME | End: 2018-05-22
Admitting: INTERNAL MEDICINE
Payer: MEDICARE

## 2018-05-20 ENCOUNTER — APPOINTMENT (OUTPATIENT)
Dept: GENERAL RADIOLOGY | Age: 78
End: 2018-05-20
Payer: MEDICARE

## 2018-05-20 DIAGNOSIS — Z78.9 FAILURE OF OUTPATIENT TREATMENT: Primary | ICD-10-CM

## 2018-05-20 DIAGNOSIS — R11.10 INTRACTABLE VOMITING, PRESENCE OF NAUSEA NOT SPECIFIED, UNSPECIFIED VOMITING TYPE: ICD-10-CM

## 2018-05-20 LAB
ALBUMIN SERPL-MCNC: 2.7 G/DL (ref 3.2–4.6)
ALBUMIN/GLOB SERPL: 0.6 {RATIO} (ref 1.2–3.5)
ALP SERPL-CCNC: 111 U/L (ref 50–136)
ALT SERPL-CCNC: 22 U/L (ref 12–65)
ANION GAP SERPL CALC-SCNC: 14 MMOL/L (ref 7–16)
APPEARANCE UR: CLEAR
AST SERPL-CCNC: 23 U/L (ref 15–37)
BASOPHILS # BLD: 0.1 K/UL (ref 0–0.2)
BASOPHILS NFR BLD: 1 % (ref 0–2)
BILIRUB SERPL-MCNC: 0.3 MG/DL (ref 0.2–1.1)
BILIRUB UR QL: NEGATIVE
BUN SERPL-MCNC: 9 MG/DL (ref 8–23)
CALCIUM SERPL-MCNC: 9.1 MG/DL (ref 8.3–10.4)
CHLORIDE SERPL-SCNC: 94 MMOL/L (ref 98–107)
CO2 SERPL-SCNC: 23 MMOL/L (ref 21–32)
COLOR UR: YELLOW
CREAT SERPL-MCNC: 1.35 MG/DL (ref 0.6–1)
DIFFERENTIAL METHOD BLD: ABNORMAL
EOSINOPHIL # BLD: 0.1 K/UL (ref 0–0.8)
EOSINOPHIL NFR BLD: 1 % (ref 0.5–7.8)
ERYTHROCYTE [DISTWIDTH] IN BLOOD BY AUTOMATED COUNT: 15 % (ref 11.9–14.6)
GLOBULIN SER CALC-MCNC: 4.5 G/DL (ref 2.3–3.5)
GLUCOSE SERPL-MCNC: 102 MG/DL (ref 65–100)
GLUCOSE UR STRIP.AUTO-MCNC: NEGATIVE MG/DL
HCT VFR BLD AUTO: 36.3 % (ref 35.8–46.3)
HGB BLD-MCNC: 12.4 G/DL (ref 11.7–15.4)
HGB UR QL STRIP: NEGATIVE
IMM GRANULOCYTES # BLD: 0.1 K/UL (ref 0–0.5)
IMM GRANULOCYTES NFR BLD AUTO: 0 % (ref 0–5)
KETONES UR QL STRIP.AUTO: NEGATIVE MG/DL
LACTATE BLD-SCNC: 1.8 MMOL/L (ref 0.5–1.9)
LEUKOCYTE ESTERASE UR QL STRIP.AUTO: NEGATIVE
LYMPHOCYTES # BLD: 2.1 K/UL (ref 0.5–4.6)
LYMPHOCYTES NFR BLD: 13 % (ref 13–44)
MAGNESIUM SERPL-MCNC: 1.5 MG/DL (ref 1.8–2.4)
MCH RBC QN AUTO: 27.1 PG (ref 26.1–32.9)
MCHC RBC AUTO-ENTMCNC: 34.2 G/DL (ref 31.4–35)
MCV RBC AUTO: 79.4 FL (ref 79.6–97.8)
MONOCYTES # BLD: 0.7 K/UL (ref 0.1–1.3)
MONOCYTES NFR BLD: 4 % (ref 4–12)
NEUTS SEG # BLD: 12.4 K/UL (ref 1.7–8.2)
NEUTS SEG NFR BLD: 81 % (ref 43–78)
NITRITE UR QL STRIP.AUTO: NEGATIVE
PH UR STRIP: 7 [PH] (ref 5–9)
PLATELET # BLD AUTO: 623 K/UL (ref 150–450)
PMV BLD AUTO: 8 FL (ref 10.8–14.1)
POTASSIUM SERPL-SCNC: 3.9 MMOL/L (ref 3.5–5.1)
PROCALCITONIN SERPL-MCNC: 0.2 NG/ML
PROT SERPL-MCNC: 7.2 G/DL (ref 6.3–8.2)
PROT UR STRIP-MCNC: NEGATIVE MG/DL
RBC # BLD AUTO: 4.57 M/UL (ref 4.05–5.25)
SODIUM SERPL-SCNC: 131 MMOL/L (ref 136–145)
SP GR UR REFRACTOMETRY: 1 (ref 1–1.02)
UROBILINOGEN UR QL STRIP.AUTO: 0.2 EU/DL (ref 0.2–1)
WBC # BLD AUTO: 15.3 K/UL (ref 4.3–11.1)

## 2018-05-20 PROCEDURE — 80053 COMPREHEN METABOLIC PANEL: CPT

## 2018-05-20 PROCEDURE — 96372 THER/PROPH/DIAG INJ SC/IM: CPT

## 2018-05-20 PROCEDURE — 84145 PROCALCITONIN (PCT): CPT

## 2018-05-20 PROCEDURE — 96375 TX/PRO/DX INJ NEW DRUG ADDON: CPT

## 2018-05-20 PROCEDURE — 87040 BLOOD CULTURE FOR BACTERIA: CPT

## 2018-05-20 PROCEDURE — 74011250636 HC RX REV CODE- 250/636: Performed by: INTERNAL MEDICINE

## 2018-05-20 PROCEDURE — 71045 X-RAY EXAM CHEST 1 VIEW: CPT

## 2018-05-20 PROCEDURE — 74011000258 HC RX REV CODE- 258

## 2018-05-20 PROCEDURE — 81003 URINALYSIS AUTO W/O SCOPE: CPT

## 2018-05-20 PROCEDURE — 96365 THER/PROPH/DIAG IV INF INIT: CPT

## 2018-05-20 PROCEDURE — 99285 EMERGENCY DEPT VISIT HI MDM: CPT

## 2018-05-20 PROCEDURE — 99218 HC RM OBSERVATION: CPT

## 2018-05-20 PROCEDURE — 85025 COMPLETE CBC W/AUTO DIFF WBC: CPT

## 2018-05-20 PROCEDURE — 96367 TX/PROPH/DG ADDL SEQ IV INF: CPT

## 2018-05-20 PROCEDURE — 83735 ASSAY OF MAGNESIUM: CPT

## 2018-05-20 PROCEDURE — 74019 RADEX ABDOMEN 2 VIEWS: CPT

## 2018-05-20 PROCEDURE — 74011250636 HC RX REV CODE- 250/636

## 2018-05-20 PROCEDURE — 83605 ASSAY OF LACTIC ACID: CPT

## 2018-05-20 PROCEDURE — 74011250637 HC RX REV CODE- 250/637: Performed by: INTERNAL MEDICINE

## 2018-05-20 RX ORDER — PRAVASTATIN SODIUM 20 MG/1
40 TABLET ORAL
Status: DISCONTINUED | OUTPATIENT
Start: 2018-05-20 | End: 2018-05-20

## 2018-05-20 RX ORDER — PSEUDOEPHEDRINE HYDROCHLORIDE 60 MG/1
30 TABLET ORAL
Status: DISCONTINUED | OUTPATIENT
Start: 2018-05-20 | End: 2018-05-22 | Stop reason: HOSPADM

## 2018-05-20 RX ORDER — HEPARIN SODIUM 5000 [USP'U]/ML
5000 INJECTION, SOLUTION INTRAVENOUS; SUBCUTANEOUS EVERY 8 HOURS
Status: DISCONTINUED | OUTPATIENT
Start: 2018-05-20 | End: 2018-05-22 | Stop reason: HOSPADM

## 2018-05-20 RX ORDER — SODIUM CHLORIDE 0.9 % (FLUSH) 0.9 %
5-10 SYRINGE (ML) INJECTION AS NEEDED
Status: DISCONTINUED | OUTPATIENT
Start: 2018-05-20 | End: 2018-05-22 | Stop reason: HOSPADM

## 2018-05-20 RX ORDER — SODIUM CHLORIDE 0.9 % (FLUSH) 0.9 %
5-10 SYRINGE (ML) INJECTION EVERY 8 HOURS
Status: DISCONTINUED | OUTPATIENT
Start: 2018-05-20 | End: 2018-05-22 | Stop reason: HOSPADM

## 2018-05-20 RX ORDER — PRAVASTATIN SODIUM 20 MG/1
40 TABLET ORAL
Status: DISCONTINUED | OUTPATIENT
Start: 2018-05-21 | End: 2018-05-21

## 2018-05-20 RX ORDER — ACETAMINOPHEN 325 MG/1
650 TABLET ORAL
Status: DISCONTINUED | OUTPATIENT
Start: 2018-05-20 | End: 2018-05-22 | Stop reason: HOSPADM

## 2018-05-20 RX ORDER — FLUTICASONE PROPIONATE 50 MCG
2 SPRAY, SUSPENSION (ML) NASAL DAILY
Status: DISCONTINUED | OUTPATIENT
Start: 2018-05-21 | End: 2018-05-22 | Stop reason: HOSPADM

## 2018-05-20 RX ORDER — MAGNESIUM SULFATE HEPTAHYDRATE 40 MG/ML
2 INJECTION, SOLUTION INTRAVENOUS
Status: COMPLETED | OUTPATIENT
Start: 2018-05-20 | End: 2018-05-20

## 2018-05-20 RX ORDER — ONDANSETRON 2 MG/ML
4 INJECTION INTRAMUSCULAR; INTRAVENOUS
Status: DISCONTINUED | OUTPATIENT
Start: 2018-05-20 | End: 2018-05-22 | Stop reason: HOSPADM

## 2018-05-20 RX ORDER — AMOXICILLIN AND CLAVULANATE POTASSIUM 875; 125 MG/1; MG/1
1 TABLET, FILM COATED ORAL EVERY 12 HOURS
Status: DISCONTINUED | OUTPATIENT
Start: 2018-05-20 | End: 2018-05-22 | Stop reason: HOSPADM

## 2018-05-20 RX ORDER — HYDROCODONE BITARTRATE AND ACETAMINOPHEN 10; 325 MG/1; MG/1
1 TABLET ORAL
Status: DISCONTINUED | OUTPATIENT
Start: 2018-05-20 | End: 2018-05-22

## 2018-05-20 RX ORDER — IBUPROFEN 200 MG
1 TABLET ORAL DAILY
Status: DISCONTINUED | OUTPATIENT
Start: 2018-05-21 | End: 2018-05-21

## 2018-05-20 RX ADMIN — CEFTRIAXONE 1 G: 1 INJECTION, POWDER, FOR SOLUTION INTRAMUSCULAR; INTRAVENOUS at 09:31

## 2018-05-20 RX ADMIN — SODIUM CHLORIDE 1000 ML: 900 INJECTION, SOLUTION INTRAVENOUS at 09:23

## 2018-05-20 RX ADMIN — SODIUM CHLORIDE 500 ML: 900 INJECTION, SOLUTION INTRAVENOUS at 11:56

## 2018-05-20 RX ADMIN — PRAVASTATIN SODIUM 40 MG: 20 TABLET ORAL at 21:42

## 2018-05-20 RX ADMIN — ONDANSETRON 4 MG: 2 INJECTION INTRAMUSCULAR; INTRAVENOUS at 18:50

## 2018-05-20 RX ADMIN — MAGNESIUM SULFATE HEPTAHYDRATE 2 G: 40 INJECTION, SOLUTION INTRAVENOUS at 11:51

## 2018-05-20 RX ADMIN — FLUTICASONE PROPIONATE 2 SPRAY: 50 SPRAY, METERED NASAL at 18:43

## 2018-05-20 RX ADMIN — HYDROCODONE BITARTRATE AND ACETAMINOPHEN 1 TABLET: 10; 325 TABLET ORAL at 18:43

## 2018-05-20 RX ADMIN — HEPARIN SODIUM 5000 UNITS: 5000 INJECTION, SOLUTION INTRAVENOUS; SUBCUTANEOUS at 18:43

## 2018-05-20 RX ADMIN — AMOXICILLIN AND CLAVULANATE POTASSIUM 1 TABLET: 875; 125 TABLET, FILM COATED ORAL at 21:42

## 2018-05-20 NOTE — IP AVS SNAPSHOT
Saulo Johnson 
 
 
 [address] [id number] [facility] 
[phone] Patient: Melissa Marvin MRN: [id number] BET:[id number] About your hospitalization You were admitted on:  May 20, 2018 [facility] You were discharged on:  May 22, 2018 Why you were hospitalized Your primary diagnosis was: Intractable Vomiting Your diagnoses also included:  Kit (Acute Kidney Injury) (Hcc), Hyponatremia, Htn (Hypertension), Back Pain, Sinusitis, Compression Fracture Of L1 Lumbar Vertebra (Hcc) Follow-up Information Follow up With Details Comments Contact Info Whitney Chang MD In 1 week Call office to schedule [address] 
[phone] Daniel Holliday MD  As needed [address] [facility] [address] [facility] [address] 
[phone] Your Scheduled Appointments Friday May 25, 2018 10:00 AM EDT Extended Office Visit with Adam Gonzales PA-C [address] ([facility] [address]  
[phone] Discharge Orders None A check silvia indicates which time of day the medication should be taken. My Medications START taking these medications Instructions Each Dose to Equal  
 Morning Noon Evening Bedtime  
 pantoprazole 40 mg tablet Commonly known as:  PROTONIX Your next dose is:  Tomorrow Take 1 Tab by mouth daily. 40 mg  
    
  
   
   
   
  
 pseudoephedrine 30 mg tablet Commonly known as:  SUDAFED Your next dose is: Today Take 1 Tab by mouth every six (6) hours as needed for Congestion. 30 mg  
    
   
   
  
   
  
 sucralfate 1 gram tablet Commonly known as:  Pomerene Bushy Your next dose is: Today Take 1 Tab by mouth Before breakfast, lunch, dinner and at bedtime for 28 days. 1 g CONTINUE taking these medications Instructions Each Dose to Equal  
 Morning Noon Evening Bedtime  
 fluticasone 50 mcg/actuation nasal spray Commonly known as:  Brigid Bah Your next dose is:  Tomorrow 2 Sprays by Both Nostrils route daily. 2 [place] HYDROcodone-acetaminophen  mg tablet Commonly known as:  Austin Levi Your next dose is: Today Take 1 Tab by mouth every eight (8) hours as needed for Pain. Max Daily Amount: 3 Tabs. 1 Tab  
    
   
   
  
   
  
 losartan-hydroCHLOROthiazide 50-12.5 mg per tablet Commonly known as:  HYZAAR Your next dose is:  Tomorrow Take 1 Tab by mouth daily. 1 Tab  
    
  
   
   
   
  
 morphine IR 15 mg tablet Commonly known as:  MS IR Your next dose is:  NO LONGER TAKING Take 1 Tab by mouth every six (6) hours as needed for Pain. Max Daily Amount: 60 mg.  
 15 mg  
    
   
   
   
  
 ondansetron 8 mg disintegrating tablet Commonly known as:  ZOFRAN ODT Your next dose is: Today Take 1 Tab by mouth every eight (8) hours as needed for Nausea. 8 mg  
    
   
   
  
   
  
 oxymetazoline 0.05 % nasal spray Commonly known as:  AFRIN (OXYMETAZOLINE) Your next dose is: Today 2 Sprays by Both Nostrils route nightly as needed for Congestion. 2 Spray  
    
   
   
   
  
  
 pravastatin 40 mg tablet Commonly known as:  PRAVACHOL Your next dose is: Today Take 1 Tab by mouth nightly. 40 mg  
    
   
   
   
  
  
 PSEUDOEPHEDRINE-guaiFENesin  mg per tablet Commonly known as:  Erwin & Erwin D Your next dose is:  Tomorrow Take 1 Tab by mouth every morning. 1 Tab STOP taking these medications   
 ibuprofen 800 mg tablet Commonly known as:  MOTRIN  
   
  
 trimethoprim-sulfamethoxazole 160-800 mg per tablet Commonly known as:  BACTRIM DS Where to Get Your Medications Information on where to get these meds will be given to you by the nurse or doctor. ! Ask your nurse or doctor about these medications  
  pantoprazole 40 mg tablet  
 pseudoephedrine 30 mg tablet  
 sucralfate 1 gram tablet Opioid Education Prescription Opioids: What You Need to Know: 
 
 
PATIENT INSTRUCTIONS: 
 
After general anesthesia or intravenous sedation, for 24 hours or while taking prescription Narcotics: · Limit your activities · Do not drive and operate hazardous machinery · Do not make important personal or business decisions · Do  not drink alcoholic beverages · If you have not urinated within 8 hours after discharge, please contact your surgeon on call. Report the following to your surgeon: 
· Excessive pain, swelling, redness or odor of or around the surgical area · Temperature over 101 · Nausea and vomiting lasting longer than 4 hours or if unable to take medications · Any signs of decreased circulation or nerve impairment to extremity: change in color, persistent  numbness, tingling, coldness or increase pain · Any questions,follow up with PCP. Keep scheduled follow up appointment. *  Please give a list of your current medications to your Primary Care Provider. *  Please update this list whenever your medications are discontinued, doses are 
    changed, or new medications (including over-the-counter products) are added. *  Please carry medication information at all times in case of emergency situations. Soft-Textured, [place] Diet: Care Instructions Your Care Instructions A soft-textured, bland diet is used when you need food that is easy to chew, swallow, and digest. You will need to choose soft foods that are low in spices and seasonings. You will need to avoid high-fat foods, as well as caffeine and alcohol. Your doctor or dietitian can help you plan a soft-textured, bland diet based on your health and what you prefer to eat. Ask your doctor how long you should stay on this diet. As you get better, you will probably be able to go back to a regular diet. Talk with your doctor or dietitian before you make changes in your diet. Follow-up care is a key part of your treatment and safety. Be sure to make and go to all appointments, and call your doctor if you are having problems. It's also a good idea to know your test results and keep a list of the medicines you take. How can you care for yourself at home? · Choose foods that are easy to chew and swallow. Good choices are mashed potatoes, soft breads and rolls, cream soups, oatmeal, and Cream of Wheat. · Choose soft, well-cooked vegetables and soft or canned fruits. Good choices are applesauce, ripe bananas, and non-citrus fruit juice. · Try milk, yogurt, or other milk products, if you can digest dairy without too many problems. Your doctor may limit milk and milk products for a while. If so, he or she may recommend a calcium and vitamin D supplement. · Choose soft protein foods such as eggs, tofu, steamed fish, chicken, and turkey. Slow-cooking methods, such as stewing, will help soften meat. Chopping meat in a  or  also will make it easier to eat. · Avoid nuts, raw vegetables, hard crackers, tough meats, and prunes and prune juice.  
· Avoid foods that are very spicy, such as foods seasoned with black pepper, chili peppers, horseradish, or hot sauce. · Avoid highly acidic foods such as citrus fruits, citrus fruit juices, and tomato-based foods. · Avoid high-fat foods such as fried meat, chips, and rich desserts. · Check with your doctor before you drink alcohol or beverages that have caffeine, such as coffee, tea, and cola beverages. Where can you learn more? Go to http://shira-kalen.info/. Enter T622 in the search box to learn more about \"Soft-Textured, [place] Rough Diet: Care Instructions. \" Current as of: May 12, 2017 Content Version: 11.4 © 9003-6199 [employer] Care instructions adapted under license by [employer] (which disclaims liability or warranty for this information). If you have questions about a medical condition or this instruction, always ask your healthcare professional. [place] [address] any warranty or liability for your use of this information. These are general instructions for a healthy lifestyle: No smoking/ No tobacco products/ Avoid exposure to second hand smoke Surgeon General's Warning:  Quitting smoking now greatly reduces serious risk to your health. Obesity, smoking, and sedentary lifestyle greatly increases your risk for illness A healthy diet, regular physical exercise & weight monitoring are important for maintaining a healthy lifestyle You may be retaining fluid if you have a history of heart failure or if you experience any of the following symptoms:  Weight gain of 3 pounds or more overnight or 5 pounds in a week, increased swelling in our hands or feet or shortness of breath while lying flat in bed. Please call your doctor as soon as you notice any of these symptoms; do not wait until your next office visit. Recognize signs and symptoms of STROKE: 
 
F-face looks uneven A-arms unable to move or move even S-speech slurred or non-existent T-time-call 911 as soon as signs and symptoms begin-DO NOT go Back to bed or wait to see if you get better-TIME IS BRAIN. The discharge information has been reviewed with the patient. The patient verbalized understanding. Introducing [facility]! [facility] introduces [employer] patient portal. Now you can access parts of your medical record, email your doctor's office, and request medication refills online. 1. In your internet browser, go to [email or web address] 2. Click on the First Time User? Click Here link in the Sign In box. You will see the New Member Sign Up page. 3. Enter your [employer] Access Code exactly as it appears below. You will not need to use this code after youve completed the sign-up process. If you do not sign up before the expiration date, you must request a new code. · [employer] Access Code: [email or web address] Expires: 8/18/2018  8:54 AM 
 
4. Enter the last four digits of your Social Security Number (xxxx) and Date of Birth (mm/dd/yyyy) as indicated and click Submit. You will be taken to the next sign-up page. 5. Create a [employer] ID. This will be your [employer] login ID and cannot be changed, so think of one that is secure and easy to remember. 6. Create a [employer] password. You can change your password at any time. 7. Enter your Password Reset Question and Answer. This can be used at a later time if you forget your password. 8. Enter your e-mail address. You will receive e-mail notification when new information is available in [address] 9. Click Sign Up. You can now view and download portions of your medical record. 10. Click the Download Summary menu link to download a portable copy of your medical information. If you have questions, please visit the Frequently Asked Questions section of the [employer] website. Remember, [employer] is NOT to be used for urgent needs. For medical emergencies, dial 911. Now available from your iPhone and Android! Introducing Pio Villanueva As a [employer] patient, I wanted to make you aware of our electronic visit tool called Pio Villanueva. [employer] 24/7 allows you to connect within minutes with a medical provider 24 hours a day, seven days a week via a mobile device or tablet or logging into a secure website from your computer. You can access Pio Villanueva from anywhere in the [place]. A virtual visit might be right for you when you have a simple condition and feel like you just dont want to get out of bed, or cant get away from work for an appointment, when your regular [employer] provider is not available (evenings, weekends or holidays), or when youre out of town and need minor care. Electronic visits cost only $49 and if the [employer] 24/7 provider determines a prescription is needed to treat your condition, one can be electronically transmitted to a nearby pharmacy*. Please take a moment to enroll today if you have not already done so. The enrollment process is free and takes just a few minutes. To enroll, please download the [employer] 24/7 seth to your tablet or phone, or visit [email or web address] to enroll on your computer. And, as an [address] [place] [address] patient with a [employer] account, the results of your visits will be scanned into your electronic medical record and your primary care provider will be able to view the scanned results. We urge you to continue to see your regular [employer] provider for your ongoing medical care. And while your primary care provider may not be the one available when you seek a Pio Villanueva virtual visit, the peace of mind you get from getting a real diagnosis real time can be priceless. For more information on Pio Villanueva, view our Frequently Asked Questions (FAQs) at [email or web address]. Sincerely, 
 
Gilmar Riggs MD 
Chief Medical Officer Miriam Marilia Ferrera *:  certain medications cannot be prescribed via Pio Villanueva Unresulted Labs-Please follow up with your PCP about these lab tests Order Current Status CULTURE, BLOOD Preliminary result CULTURE, BLOOD Preliminary result MISC. LAB TEST Preliminary result Providers Seen During Your Hospitalization Provider Specialty Primary office phone Lu Flores MD Emergency Medicine [phone] Christina Dickson MD Internal Medicine [phone] Your Primary Care Physician (PCP) Primary Care Physician Office Phone Office Fax [address], [address] [place], [place] [phone] [phone] You are allergic to the following Allergen Reactions Azithromycin Other (comments)  
 pancreatitis Amoxicillin Diarrhea Codeine Nausea and Vomiting Recent Documentation Height Weight BMI OB Status Smoking Status 1.575 m 45.4 kg 18.29 kg/m2 Hysterectomy Current Every Day Smoker Emergency Contacts Name Discharge Info Relation Home Work Mobile Tia Murillo  Daughter [21] [phone] Shantelle Jacome  Daughter [21] [phone] Carol Gutierrez  Daughter [21] [phone] Patient Belongings The following personal items are in your possession at time of discharge: 
  Dental Appliances: Lowers, Uppers  Visual Aid: None Please provide this summary of care documentation to your next provider. Signatures-by signing, you are acknowledging that this After Visit Summary has been reviewed with you and you have received a copy. Patient Signature:  ____________________________________________________________ Date:  ____________________________________________________________  
  
Damion Ochoa Provider Signature:  ____________________________________________________________ Date:  ____________________________________________________________

## 2018-05-20 NOTE — PROGRESS NOTES
Gi consulted called. Norco 10mg given for c/o lower back pain 4/10. Pt vomitted moments after taking medication. Brown liquid emesis.  IV zofran given for n/v

## 2018-05-20 NOTE — ED NOTES
TRANSFER - OUT REPORT:    Verbal report given to Destiny(name) on David Reasoner  being transferred to [facility](unit) for routine progression of care       Report consisted of patients Situation, Background, Assessment and   Recommendations(SBAR). Information from the following report(s) Kardex and ED Summary was reviewed with the receiving nurse. Lines:   Peripheral IV 05/20/18 Right Antecubital (Active)   Site Assessment Clean, dry, & intact 5/20/2018  9:14 AM   Phlebitis Assessment 0 5/20/2018  9:14 AM   Infiltration Assessment 0 5/20/2018  9:14 AM   Dressing Status Clean, dry, & intact 5/20/2018  9:14 AM        Opportunity for questions and clarification was provided.       Patient transported with:   [employer]

## 2018-05-20 NOTE — H&P
History and Physical    Patient: Tre Lilly MRN: [id number]  SSN: [id number]    YOB: 1940  Age: 78 y.o. Sex: female        Date of Admission: 5/20/2018  8:58 AM  Primary Care Physician: Abraham Velasquez MD  Attending on Admission: Sage Reyes MD     Subjective   History of Present Illness: Tre Lilly is a 78 y.o. female with PMH of HTN who was hospitalized 5/6-5/9/2018 after sustained L1 compression fracture following a fall and developed urinary retention. She had some vomiting at the time that was attributed to her pain. She was seen in the ED again on 5/13 with elevated WBC and diagnosed with a sinus infection and given Bactrim, Flonase, Afrin and Mucinex. She returns today after she was called with abnormal BCx results and continued vomiting since her last discharge. Family reports 13 lb weight loss over the past 2 weeks. She notes that pills sometimes get stuck in her throat and then she starts vomiting, other times she gets nauseated without taking medication. No abdominal pain, daily BMs, no blood in her stool. Sinus symptoms began after the vomiting was occurring and she now has nasal congestion with decreased hearing. She has been eating but not as much as normal. Cr was elevated in the ED, along with elevated WBC. She has not been wearing her back brace because it is uncomfortable and she felt it was causing LE edema. Review of Systems:  10 point review of systems was obtained and was positive for vomiting, nausea, back pain, but was otherwise negative unless mentioned in the HPI.      Objective   Past Medical History:   Past Medical History:   Diagnosis Date    Hypercholesterolemia 12/4/2014    Hypertension     not well controlled--per pt    Osteoarthritis 6/10/2016    Osteoporosis 6/10/2016    PUD (peptic ulcer disease)     last 2003 which a rupture an extensive surgery    Tobacco abuse     1ppd x 49 yrs   L1 compression fracture    Past Surgical History:  Past Surgical History:   Procedure Laterality Date    ABDOMEN SURGERY PROC UNLISTED  2003    stomach surgery for ruptured ulcer and extensive rerouting of intestestines per patient     BREAST SURGERY PROCEDURE UNLISTED Left 1975    breast lumpectomy, benign  (left)    HX APPENDECTOMY  1977    with hysterectomy    HX BREAST BIOPSY Left 1975    HX BREAST LUMPECTOMY Left 2/22/2018    LEFT BREAST LUMPECTOMY/ SENTINEL NODE BIOPSY performed by [facility] at [facility] MA[facility] OR    HX CATARACT REMOVAL Bilateral     HX CHOLECYSTECTOMY      HX ENDOSCOPY      HX HYSTERECTOMY  1977    HX OTHER SURGICAL      hernicolectomy 2 cm    HX TUBAL LIGATION  1975       Past Family and Social History:  Social History     Social History    Marital status:      Spouse name: N/A    Number of children: N/A    Years of education: N/A     Occupational History    Not on file. Social History Main Topics    Smoking status: Current Every Day Smoker     Packs/day: 1.00     Years: 49.00     Start date: 5/6/1966    Smokeless tobacco: Never Used    Alcohol use No    Drug use: No    Sexual activity: Not on file     Other Topics Concern    Not on file     Social History Narrative     Family History   Problem Relation Age of Onset    Diabetes Sister     Heart Disease Sister     Heart Disease Father     Heart Attack Father     Cancer Brother      prostate    Cancer Sister     Breast Cancer Sister 79    Heart Disease Sister     Heart Disease Brother     Hypertension Mother     Hypertension Other      Brother and sister with htn       Allergy:     Allergies   Allergen Reactions    Azithromycin Other (comments)     pancreatitis    Amoxicillin Diarrhea    Codeine Nausea and Vomiting       Medications:    No current facility-administered medications on file prior to encounter.       Current Outpatient Prescriptions on File Prior to Encounter   Medication Sig Dispense Refill    trimethoprim-sulfamethoxazole (BACTRIM DS) 160-800 mg per tablet Take 1 Tab by mouth two (2) times a day for 14 days. 28 Tab 0    oxymetazoline (AFRIN, OXYMETAZOLINE,) 0.05 % nasal spray 2 Sprays by Both Nostrils route nightly as needed for Congestion. 1 Bottle 0    PSEUDOEPHEDRINE-guaiFENesin (MUCINEX D)  mg per tablet Take 1 Tab by mouth every morning. 20 Tab 0    fluticasone (FLONASE) 50 mcg/actuation nasal spray 2 Sprays by Both Nostrils route daily. 1 Bottle 3    HYDROcodone-acetaminophen (NORCO)  mg tablet Take 1 Tab by mouth every eight (8) hours as needed for Pain. Max Daily Amount: 3 Tabs. 60 Tab 0    morphine IR (MS IR) 15 mg tablet Take 1 Tab by mouth every six (6) hours as needed for Pain. Max Daily Amount: 60 mg. 15 Tab 0    ondansetron (ZOFRAN ODT) 8 mg disintegrating tablet Take 1 Tab by mouth every eight (8) hours as needed for Nausea. 12 Tab 0    ibuprofen (MOTRIN) 800 mg tablet Take 1 Tab by mouth three (3) times daily. 90 Tab 5    losartan-hydroCHLOROthiazide (HYZAAR) 50-12.5 mg per tablet Take 1 Tab by mouth daily. 30 Tab 5    pravastatin (PRAVACHOL) 40 mg tablet Take 1 Tab by mouth nightly. 30 Tab 5        Physical Examination   Vital signs:   Visit Vitals    /51    Pulse 85    Temp 98.6 °F (37 °C)    Resp 20    Ht 5' 2\" (1.575 m)    Wt 45.4 kg (100 lb)    SpO2 97%    BMI 18.29 kg/m2    Weight: 45.4 kg (100 lb) with Body mass index is 18.29 kg/(m^2). General: elderly female lying in bed, uncomfortable and [place]   HENT: Normocephalic and atraumatic. Pupils are equal, round, and reactive to light. Extraocular movements intact. Oropharynx pink/moist. TM with scarring on L, bulging some on the R, minimal erythema. Neck: Normal range of motion. Neck supple. Cardiovascular: Regular rate and rhythm; no murmurs, rubs, gallops. Pulmonary/Chest: No increased work of breathing. No respiratory distress. Clear to auscultation bilaterally. No wheezes, crackles. Abdominal: Soft, non distended, non tender. Extremities: No edema. Neurological: Cranial nerves II-XII intact. No focal deficits. Skin: Warm, intact. No rashes/lesions. Data   Pertinent Labs:  Recent Results (from the past 12 hour(s))   CBC WITH AUTOMATED DIFF    Collection Time: 05/20/18  9:09 AM   Result Value Ref Range    WBC 15.3 (H) 4.3 - 11.1 K/uL    RBC 4.57 4.05 - 5.25 M/uL    HGB 12.4 11.7 - 15.4 g/dL    HCT 36.3 35.8 - 46.3 %    MCV 79.4 (L) 79.6 - 97.8 FL    MCH 27.1 26.1 - 32.9 PG    MCHC 34.2 31.4 - 35.0 g/dL    RDW 15.0 (H) 11.9 - 14.6 %    PLATELET 326 (H) 699 - 450 K/uL    MPV 8.0 (L) 10.8 - 14.1 FL    DF AUTOMATED      NEUTROPHILS 81 (H) 43 - 78 %    LYMPHOCYTES 13 13 - 44 %    MONOCYTES 4 4.0 - 12.0 %    EOSINOPHILS 1 0.5 - 7.8 %    BASOPHILS 1 0.0 - 2.0 %    IMMATURE GRANULOCYTES 0 0.0 - 5.0 %    ABS. NEUTROPHILS 12.4 (H) 1.7 - 8.2 K/UL    ABS. LYMPHOCYTES 2.1 0.5 - 4.6 K/UL    ABS. MONOCYTES 0.7 0.1 - 1.3 K/UL    ABS. EOSINOPHILS 0.1 0.0 - 0.8 K/UL    ABS. BASOPHILS 0.1 0.0 - 0.2 K/UL    ABS. IMM. GRANS. 0.1 0.0 - 0.5 K/UL   METABOLIC PANEL, COMPREHENSIVE    Collection Time: 05/20/18  9:09 AM   Result Value Ref Range    Sodium 131 (L) 136 - 145 mmol/L    Potassium 3.9 3.5 - 5.1 mmol/L    Chloride 94 (L) 98 - 107 mmol/L    CO2 23 21 - 32 mmol/L    Anion gap 14 7 - 16 mmol/L    Glucose 102 (H) 65 - 100 mg/dL    BUN 9 8 - 23 MG/DL    Creatinine 1.35 (H) 0.6 - 1.0 MG/DL    GFR est AA 49 (L) >60 ml/min/1.73m2    GFR est non-AA 41 (L) >60 ml/min/1.73m2    Calcium 9.1 8.3 - 10.4 MG/DL    Bilirubin, total 0.3 0.2 - 1.1 MG/DL    ALT (SGPT) 22 12 - 65 U/L    AST (SGOT) 23 15 - 37 U/L    Alk.  phosphatase 111 50 - 136 U/L    Protein, total 7.2 6.3 - 8.2 g/dL    Albumin 2.7 (L) 3.2 - 4.6 g/dL    Globulin 4.5 (H) 2.3 - 3.5 g/dL    A-G Ratio 0.6 (L) 1.2 - 3.5     PROCALCITONIN    Collection Time: 05/20/18  9:09 AM   Result Value Ref Range    Procalcitonin 0.2 ng/mL   MAGNESIUM    Collection Time: 05/20/18  9:09 AM   Result Value Ref Range    Magnesium 1.5 (L) 1.8 - 2.4 mg/dL   POC LACTIC ACID    Collection Time: 05/20/18  9:16 AM   Result Value Ref Range    Lactic Acid (POC) 1.8 0.5 - 1.9 mmol/L   URINALYSIS W/ RFLX MICROSCOPIC    Collection Time: 05/20/18 10:55 AM   Result Value Ref Range    Color YELLOW      Appearance CLEAR      Specific gravity 1.003 1.001 - 1.023      pH (UA) 7.0 5.0 - 9.0      Protein NEGATIVE  NEG mg/dL    Glucose NEGATIVE  mg/dL    Ketone NEGATIVE  NEG mg/dL    Bilirubin NEGATIVE  NEG      Blood NEGATIVE  NEG      Urobilinogen 0.2 0.2 - 1.0 EU/dL    Nitrites NEGATIVE  NEG      Leukocyte Esterase NEGATIVE  NEG         Imaging Studies:     XR ABD (AP AND ERECT OR DECUB)   Final Result   IMPRESSION: No acute findings in the abdomen         XR CHEST PORT   Final Result   IMPRESSION: No acute abnormality          Emergency Department course:     Medications   pravastatin (PRAVACHOL) tablet 40 mg (not administered)   HYDROcodone-acetaminophen (NORCO)  mg tablet 1 Tab (not administered)   fluticasone (FLONASE) 50 mcg/actuation nasal spray 2 Spray (not administered)   sodium chloride 0.9 % bolus infusion 500 mL (0 mL IntraVENous IV Completed 5/20/18 1257)   sodium chloride 0.9 % bolus infusion 1,000 mL (0 mL IntraVENous IV Completed 5/20/18 1102)   cefTRIAXone (ROCEPHIN) 1 g in 0.9% sodium chloride (MBP/ADV) 50 mL (0 g IntraVENous IV Completed 5/20/18 1102)   magnesium sulfate 2 g/50 ml IVPB (premix or compounded) (0 g IntraVENous IV Completed 5/20/18 1251)        Assessment and Plan   Ms. Paula De La Rosa is a 79 yo F with HTN and HLD admitted with intractable vomiting and STEPHEN. 1. Intractable vomiting - Concern for stricture or other obstructive process. Pt does have h/o partial gastrectomy (or other similar procedure) 15 years ago. GI consult for potential EGD. Antiemetics will be continued. KUB r/o SBO. No abdominal tenderness on exam so will hold off on CT abd for now.   2. STEPHEN - Dehdyration from vomiting. IVF. 3. Hypomagnesemia - Replete. 4. Sinusitis - Likely from emesis extending upwards into her sinuses. She has been treated with Bactrim over the past week. Will discontinue and start Augmentin to cover for anaerobes. Decongestant may help with hearing difficulties. 5. HTN - Hold Hyzaar for nwo. 6. L1 compression fracture - Daughter to bring in back brace. Norco prn.  7. DVT ppx - heparin SQ    FULL CODE    Tia, daughter POA. Pt admitted as observation for work up of vomiting. On the date of admission approximately 55 minutes was spent with the patient with greater than 50% of time spent on counseling or coordination of care.

## 2018-05-20 NOTE — ED PROVIDER NOTES
HPI Comments: 68-year-old female complaining of head congestion fever chills cough sinus congestion and pressure. Patient was seen last week started with pneumonia she returned she wasn't getting better and had a CT which showed sinusitis she was switched to Bactrim however her blood cultures grew gram positive rods at this point the sensitivities are not known however she's not getting any better. Patient is a 78 y.o. female presenting with vomiting. The history is provided by the patient. Vomiting    This is a recurrent problem. The current episode started more than 1 week ago. The problem has been gradually worsening. The emesis has an appearance of bilious material. Patient reports a subjective fever - was not measured. Associated symptoms include chills, a fever, headaches, cough, URI and headaches. The patient is not pregnant. Her pertinent negatives include no irritable bowel syndrome, no bowel resection, no gastric bypass and no DM.         Past Medical History:   Diagnosis Date    STEPHEN (acute kidney injury) ([facility] [address] 12/04/2014    pt denies this dx    Back pain 6/10/2016    Breast lump dx 2/2018    left    Bronchitis     Discharge from the vagina     Dysuria     Eczema 6/10/2016    Fungal dermatitis     Headache 6/10/2016    Hypercholesterolemia 12/4/2014    Hypertension     not well controlled--per pt    Menopausal vaginal dryness     Osteoarthritis 6/10/2016    Osteoporosis 6/10/2016    PUD (peptic ulcer disease)     last 2003 which a rupture an extensive surgery    Sepsis ([facility] [address] 12/4/2014    Tobacco abuse     1ppd x 49 yrs       Past Surgical History:   Procedure Laterality Date    ABDOMEN SURGERY PROC UNLISTED  2003    stomach surgery for ruptured ulcer and extensive rerouting of intestestines per patient     BREAST SURGERY PROCEDURE UNLISTED Left 1975    breast lumpectomy, benign  (left)    HX APPENDECTOMY  1977    with hysterectomy    HX BREAST BIOPSY Left 1975    HX BREAST LUMPECTOMY Left 2/22/2018    LEFT BREAST LUMPECTOMY/ SENTINEL NODE BIOPSY performed by Kayden Cha MD at [facility] MAIN OR    HX CATARACT REMOVAL Bilateral     HX CHOLECYSTECTOMY      HX ENDOSCOPY      HX HYSTERECTOMY  1977    HX OTHER SURGICAL      hernicolectomy 2 cm    HX TUBAL LIGATION  1975         Family History:   Problem Relation Age of Onset    Diabetes Sister     Heart Disease Sister     Heart Disease Father     Heart Attack Father     Cancer Brother      prostate    Cancer Sister     Breast Cancer Sister 79    Heart Disease Sister     Heart Disease Brother     Hypertension Mother     Hypertension Other      Brother and sister with htn       Social History     Social History    Marital status:      Spouse name: N/A    Number of children: N/A    Years of education: N/A     Occupational History    Not on file. Social History Main Topics    Smoking status: Current Every Day Smoker     Packs/day: 1.00     Years: 49.00     Start date: 5/6/1966    Smokeless tobacco: Never Used    Alcohol use No    Drug use: No    Sexual activity: Not on file     Other Topics Concern    Not on file     Social History Narrative         ALLERGIES: Azithromycin; Amoxicillin; and Codeine    Review of Systems   Constitutional: Positive for chills and fever. Negative for activity change. HENT: Negative. Eyes: Negative. Respiratory: Positive for cough. Cardiovascular: Negative. Gastrointestinal: Positive for vomiting. Genitourinary: Negative. Musculoskeletal: Negative. Skin: Negative. Neurological: Positive for headaches. Psychiatric/Behavioral: Negative. All other systems reviewed and are negative. Vitals:    05/20/18 0900 05/20/18 0914   BP: 121/56    Pulse: (!) 108    Resp: 20    Temp: 98.6 °F (37 °C)    SpO2: 98% 98%   Weight: 45.4 kg (100 lb)    Height: 5' 2\" (1.575 m)             Physical Exam   Constitutional: She is oriented to person, place, and time.  She appears well-developed and well-nourished. No distress. HENT:   Head: Normocephalic and atraumatic. Right Ear: External ear normal. Tympanic membrane is erythematous. Tympanic membrane mobility is abnormal. A middle ear effusion is present. Left Ear: External ear normal. Tympanic membrane is erythematous. Tympanic membrane mobility is abnormal. A middle ear effusion is present. Nose: Nose normal.   Mouth/Throat: Oropharynx is clear and moist. No oropharyngeal exudate. Eyes: Conjunctivae and EOM are normal. Pupils are equal, round, and reactive to light. Right eye exhibits no discharge. Left eye exhibits no discharge. No scleral icterus. Neck: Normal range of motion. Neck supple. No JVD present. No tracheal deviation present. Cardiovascular: Normal rate, regular rhythm and intact distal pulses. Pulmonary/Chest: Effort normal and breath sounds normal. No stridor. No respiratory distress. She has no wheezes. She exhibits no tenderness. Abdominal: Soft. Bowel sounds are normal. She exhibits no distension and no mass. There is no tenderness. Musculoskeletal: Normal range of motion. She exhibits no edema or tenderness. Neurological: She is alert and oriented to person, place, and time. No cranial nerve deficit. Skin: Skin is warm and dry. No rash noted. She is not diaphoretic. No erythema. No pallor. Psychiatric: She has a normal mood and affect. Her behavior is normal. Thought content normal.   Nursing note and vitals reviewed. MDM  Number of Diagnoses or Management Options  Diagnosis management comments: Patient has been to the ED several times she was admitted to the hospital for back injury and dysuria/urinary retention. She will return to the ED for what initially sought to be pneumonia however after CT of the head showed pansinusitis. Patient's been having vomiting since before the original admission patient has lost approximately 13 pounds in the last month according to family. Patient's energy is gone and her hearing is decreased due to the sinus infection. .  She's been on Bactrim was not getting any better. She has allergies to amoxicillin and Zithromax. This felt due to her failure of outpatient treatment and her unexplained vomiting for over 3 weeks and loss of weight that she needed to be admitted for further workup and treatment.        Amount and/or Complexity of Data Reviewed  Clinical lab tests: ordered and reviewed  Tests in the radiology section of CPT®: ordered and reviewed  Tests in the medicine section of CPT®: ordered and reviewed          ED Course       Procedures

## 2018-05-20 NOTE — PROGRESS NOTES
TRANSFER - IN REPORT:    Verbal report received from [facility]) on [place]  being received from ED(unit) for routine progression of care      Report consisted of patients Situation, Background, Assessment and   Recommendations(SBAR). Information from the following report(s) ED Summary, Intake/Output, MAR and Recent Results was reviewed with the receiving nurse. Opportunity for questions and clarification was provided. Assessment completed upon patients arrival to unit and care assumed.

## 2018-05-20 NOTE — ED TRIAGE NOTES
Pt here a week ago for sinus infection. States she was called a day or two ago and told that blood culture grew something. Pt started vomiting this morning and just not feeling well. She is still having difficulty hearing from sinus infection.

## 2018-05-21 ENCOUNTER — ANESTHESIA EVENT (OUTPATIENT)
Dept: ENDOSCOPY | Age: 78
End: 2018-05-21
Payer: MEDICARE

## 2018-05-21 ENCOUNTER — ANESTHESIA (OUTPATIENT)
Dept: ENDOSCOPY | Age: 78
End: 2018-05-21
Payer: MEDICARE

## 2018-05-21 PROBLEM — R33.9 URINARY RETENTION: Status: RESOLVED | Noted: 2018-05-07 | Resolved: 2018-05-21

## 2018-05-21 LAB
ANION GAP SERPL CALC-SCNC: 12 MMOL/L (ref 7–16)
BUN SERPL-MCNC: 5 MG/DL (ref 8–23)
CALCIUM SERPL-MCNC: 8.3 MG/DL (ref 8.3–10.4)
CHLORIDE SERPL-SCNC: 104 MMOL/L (ref 98–107)
CO2 SERPL-SCNC: 20 MMOL/L (ref 21–32)
CREAT SERPL-MCNC: 0.83 MG/DL (ref 0.6–1)
ERYTHROCYTE [DISTWIDTH] IN BLOOD BY AUTOMATED COUNT: 15.3 % (ref 11.9–14.6)
GLUCOSE SERPL-MCNC: 89 MG/DL (ref 65–100)
HCT VFR BLD AUTO: 31.2 % (ref 35.8–46.3)
HGB BLD-MCNC: 10.3 G/DL (ref 11.7–15.4)
MAGNESIUM SERPL-MCNC: 1.7 MG/DL (ref 1.8–2.4)
MCH RBC QN AUTO: 27 PG (ref 26.1–32.9)
MCHC RBC AUTO-ENTMCNC: 33 G/DL (ref 31.4–35)
MCV RBC AUTO: 81.9 FL (ref 79.6–97.8)
PLATELET # BLD AUTO: 397 K/UL (ref 150–450)
PMV BLD AUTO: 8.4 FL (ref 10.8–14.1)
POTASSIUM SERPL-SCNC: 3.8 MMOL/L (ref 3.5–5.1)
RBC # BLD AUTO: 3.81 M/UL (ref 4.05–5.25)
SODIUM SERPL-SCNC: 136 MMOL/L (ref 136–145)
WBC # BLD AUTO: 7.7 K/UL (ref 4.3–11.1)

## 2018-05-21 PROCEDURE — C9113 INJ PANTOPRAZOLE SODIUM, VIA: HCPCS | Performed by: INTERNAL MEDICINE

## 2018-05-21 PROCEDURE — 96366 THER/PROPH/DIAG IV INF ADDON: CPT

## 2018-05-21 PROCEDURE — 94760 N-INVAS EAR/PLS OXIMETRY 1: CPT

## 2018-05-21 PROCEDURE — 99218 HC RM OBSERVATION: CPT

## 2018-05-21 PROCEDURE — 76040000025: Performed by: INTERNAL MEDICINE

## 2018-05-21 PROCEDURE — 96372 THER/PROPH/DIAG INJ SC/IM: CPT

## 2018-05-21 PROCEDURE — 76060000031 HC ANESTHESIA FIRST 0.5 HR: Performed by: INTERNAL MEDICINE

## 2018-05-21 PROCEDURE — 74011250636 HC RX REV CODE- 250/636: Performed by: INTERNAL MEDICINE

## 2018-05-21 PROCEDURE — 74011250637 HC RX REV CODE- 250/637: Performed by: INTERNAL MEDICINE

## 2018-05-21 PROCEDURE — 74011000250 HC RX REV CODE- 250

## 2018-05-21 PROCEDURE — G8978 MOBILITY CURRENT STATUS: HCPCS

## 2018-05-21 PROCEDURE — 97161 PT EVAL LOW COMPLEX 20 MIN: CPT

## 2018-05-21 PROCEDURE — 77010033678 HC OXYGEN DAILY

## 2018-05-21 PROCEDURE — 74011000250 HC RX REV CODE- 250: Performed by: INTERNAL MEDICINE

## 2018-05-21 PROCEDURE — 74011250636 HC RX REV CODE- 250/636

## 2018-05-21 PROCEDURE — 85027 COMPLETE CBC AUTOMATED: CPT | Performed by: INTERNAL MEDICINE

## 2018-05-21 PROCEDURE — 96375 TX/PRO/DX INJ NEW DRUG ADDON: CPT

## 2018-05-21 PROCEDURE — 87153 DNA/RNA SEQUENCING: CPT

## 2018-05-21 PROCEDURE — 96376 TX/PRO/DX INJ SAME DRUG ADON: CPT

## 2018-05-21 PROCEDURE — 83735 ASSAY OF MAGNESIUM: CPT | Performed by: INTERNAL MEDICINE

## 2018-05-21 PROCEDURE — G8980 MOBILITY D/C STATUS: HCPCS

## 2018-05-21 PROCEDURE — 87076 CULTURE ANAEROBE IDENT EACH: CPT

## 2018-05-21 PROCEDURE — 36415 COLL VENOUS BLD VENIPUNCTURE: CPT | Performed by: INTERNAL MEDICINE

## 2018-05-21 PROCEDURE — G8979 MOBILITY GOAL STATUS: HCPCS

## 2018-05-21 PROCEDURE — 80048 BASIC METABOLIC PNL TOTAL CA: CPT | Performed by: INTERNAL MEDICINE

## 2018-05-21 RX ORDER — MIDAZOLAM HYDROCHLORIDE 1 MG/ML
2 INJECTION, SOLUTION INTRAMUSCULAR; INTRAVENOUS
Status: CANCELLED | OUTPATIENT
Start: 2018-05-21

## 2018-05-21 RX ORDER — ALBUTEROL SULFATE 0.83 MG/ML
2.5 SOLUTION RESPIRATORY (INHALATION) AS NEEDED
Status: DISCONTINUED | OUTPATIENT
Start: 2018-05-21 | End: 2018-05-21 | Stop reason: HOSPADM

## 2018-05-21 RX ORDER — IBUPROFEN 200 MG
1 TABLET ORAL DAILY
Status: DISCONTINUED | OUTPATIENT
Start: 2018-05-21 | End: 2018-05-22 | Stop reason: HOSPADM

## 2018-05-21 RX ORDER — METOCLOPRAMIDE 10 MG/1
10 TABLET ORAL
Status: DISCONTINUED | OUTPATIENT
Start: 2018-05-21 | End: 2018-05-22 | Stop reason: HOSPADM

## 2018-05-21 RX ORDER — PROPOFOL 10 MG/ML
INJECTION, EMULSION INTRAVENOUS AS NEEDED
Status: DISCONTINUED | OUTPATIENT
Start: 2018-05-21 | End: 2018-05-21 | Stop reason: HOSPADM

## 2018-05-21 RX ORDER — FENTANYL CITRATE 50 UG/ML
100 INJECTION, SOLUTION INTRAMUSCULAR; INTRAVENOUS ONCE
Status: CANCELLED | OUTPATIENT
Start: 2018-05-21 | End: 2018-05-21

## 2018-05-21 RX ORDER — ONDANSETRON 2 MG/ML
4 INJECTION INTRAMUSCULAR; INTRAVENOUS ONCE
Status: DISCONTINUED | OUTPATIENT
Start: 2018-05-21 | End: 2018-05-21 | Stop reason: HOSPADM

## 2018-05-21 RX ORDER — DIPHENHYDRAMINE HYDROCHLORIDE 50 MG/ML
12.5 INJECTION, SOLUTION INTRAMUSCULAR; INTRAVENOUS
Status: DISCONTINUED | OUTPATIENT
Start: 2018-05-21 | End: 2018-05-21 | Stop reason: HOSPADM

## 2018-05-21 RX ORDER — LIDOCAINE HYDROCHLORIDE 10 MG/ML
0.1 INJECTION INFILTRATION; PERINEURAL AS NEEDED
Status: CANCELLED | OUTPATIENT
Start: 2018-05-21

## 2018-05-21 RX ORDER — OXYCODONE HYDROCHLORIDE 5 MG/1
10 TABLET ORAL
Status: DISCONTINUED | OUTPATIENT
Start: 2018-05-21 | End: 2018-05-21 | Stop reason: HOSPADM

## 2018-05-21 RX ORDER — OXYCODONE HYDROCHLORIDE 5 MG/1
5 TABLET ORAL
Status: DISCONTINUED | OUTPATIENT
Start: 2018-05-21 | End: 2018-05-21 | Stop reason: HOSPADM

## 2018-05-21 RX ORDER — SUCRALFATE 1 G/1
1 TABLET ORAL
Status: DISCONTINUED | OUTPATIENT
Start: 2018-05-21 | End: 2018-05-22 | Stop reason: HOSPADM

## 2018-05-21 RX ORDER — SODIUM CHLORIDE, SODIUM LACTATE, POTASSIUM CHLORIDE, CALCIUM CHLORIDE 600; 310; 30; 20 MG/100ML; MG/100ML; MG/100ML; MG/100ML
100 INJECTION, SOLUTION INTRAVENOUS CONTINUOUS
Status: CANCELLED | OUTPATIENT
Start: 2018-05-21 | End: 2018-05-22

## 2018-05-21 RX ORDER — SODIUM CHLORIDE, SODIUM LACTATE, POTASSIUM CHLORIDE, CALCIUM CHLORIDE 600; 310; 30; 20 MG/100ML; MG/100ML; MG/100ML; MG/100ML
INJECTION, SOLUTION INTRAVENOUS
Status: DISCONTINUED | OUTPATIENT
Start: 2018-05-21 | End: 2018-05-21 | Stop reason: HOSPADM

## 2018-05-21 RX ORDER — MIDAZOLAM HYDROCHLORIDE 1 MG/ML
2 INJECTION, SOLUTION INTRAMUSCULAR; INTRAVENOUS ONCE
Status: CANCELLED | OUTPATIENT
Start: 2018-05-21 | End: 2018-05-21

## 2018-05-21 RX ORDER — MAGNESIUM SULFATE HEPTAHYDRATE 40 MG/ML
2 INJECTION, SOLUTION INTRAVENOUS ONCE
Status: COMPLETED | OUTPATIENT
Start: 2018-05-21 | End: 2018-05-21

## 2018-05-21 RX ORDER — PRAVASTATIN SODIUM 20 MG/1
40 TABLET ORAL DAILY
Status: DISCONTINUED | OUTPATIENT
Start: 2018-05-21 | End: 2018-05-22 | Stop reason: HOSPADM

## 2018-05-21 RX ORDER — LIDOCAINE HYDROCHLORIDE 20 MG/ML
INJECTION, SOLUTION EPIDURAL; INFILTRATION; INTRACAUDAL; PERINEURAL AS NEEDED
Status: DISCONTINUED | OUTPATIENT
Start: 2018-05-21 | End: 2018-05-21 | Stop reason: HOSPADM

## 2018-05-21 RX ORDER — SODIUM CHLORIDE, SODIUM LACTATE, POTASSIUM CHLORIDE, CALCIUM CHLORIDE 600; 310; 30; 20 MG/100ML; MG/100ML; MG/100ML; MG/100ML
100 INJECTION, SOLUTION INTRAVENOUS CONTINUOUS
Status: DISCONTINUED | OUTPATIENT
Start: 2018-05-21 | End: 2018-05-21 | Stop reason: HOSPADM

## 2018-05-21 RX ORDER — NALOXONE HYDROCHLORIDE 0.4 MG/ML
0.1 INJECTION, SOLUTION INTRAMUSCULAR; INTRAVENOUS; SUBCUTANEOUS AS NEEDED
Status: DISCONTINUED | OUTPATIENT
Start: 2018-05-21 | End: 2018-05-21 | Stop reason: HOSPADM

## 2018-05-21 RX ORDER — HYDROMORPHONE HYDROCHLORIDE 2 MG/ML
0.5 INJECTION, SOLUTION INTRAMUSCULAR; INTRAVENOUS; SUBCUTANEOUS
Status: DISCONTINUED | OUTPATIENT
Start: 2018-05-21 | End: 2018-05-21 | Stop reason: HOSPADM

## 2018-05-21 RX ORDER — MORPHINE SULFATE 2 MG/ML
1 INJECTION, SOLUTION INTRAMUSCULAR; INTRAVENOUS ONCE
Status: COMPLETED | OUTPATIENT
Start: 2018-05-21 | End: 2018-05-21

## 2018-05-21 RX ADMIN — MORPHINE SULFATE 1 MG: 2 INJECTION, SOLUTION INTRAMUSCULAR; INTRAVENOUS at 11:01

## 2018-05-21 RX ADMIN — HYDROCODONE BITARTRATE AND ACETAMINOPHEN 1 TABLET: 10; 325 TABLET ORAL at 19:46

## 2018-05-21 RX ADMIN — PRAVASTATIN SODIUM 40 MG: 20 TABLET ORAL at 08:35

## 2018-05-21 RX ADMIN — SODIUM CHLORIDE 40 MG: 9 INJECTION INTRAMUSCULAR; INTRAVENOUS; SUBCUTANEOUS at 01:00

## 2018-05-21 RX ADMIN — SUCRALFATE 1 G: 1 TABLET ORAL at 15:49

## 2018-05-21 RX ADMIN — PROPOFOL 20 MG: 10 INJECTION, EMULSION INTRAVENOUS at 11:49

## 2018-05-21 RX ADMIN — AMOXICILLIN AND CLAVULANATE POTASSIUM 1 TABLET: 875; 125 TABLET, FILM COATED ORAL at 19:47

## 2018-05-21 RX ADMIN — MAGNESIUM SULFATE HEPTAHYDRATE 2 G: 40 INJECTION, SOLUTION INTRAVENOUS at 12:59

## 2018-05-21 RX ADMIN — METOCLOPRAMIDE HYDROCHLORIDE 10 MG: 10 TABLET ORAL at 15:49

## 2018-05-21 RX ADMIN — METOCLOPRAMIDE HYDROCHLORIDE 10 MG: 10 TABLET ORAL at 22:09

## 2018-05-21 RX ADMIN — HEPARIN SODIUM 5000 UNITS: 5000 INJECTION, SOLUTION INTRAVENOUS; SUBCUTANEOUS at 02:35

## 2018-05-21 RX ADMIN — PSEUDOEPHEDRINE HYDROCHLORIDE 30 MG: 60 TABLET, FILM COATED ORAL at 22:10

## 2018-05-21 RX ADMIN — HEPARIN SODIUM 5000 UNITS: 5000 INJECTION, SOLUTION INTRAVENOUS; SUBCUTANEOUS at 17:12

## 2018-05-21 RX ADMIN — PROPOFOL 10 MG: 10 INJECTION, EMULSION INTRAVENOUS at 11:51

## 2018-05-21 RX ADMIN — PROPOFOL 40 MG: 10 INJECTION, EMULSION INTRAVENOUS at 11:48

## 2018-05-21 RX ADMIN — AMOXICILLIN AND CLAVULANATE POTASSIUM 1 TABLET: 875; 125 TABLET, FILM COATED ORAL at 08:33

## 2018-05-21 RX ADMIN — SUCRALFATE 1 G: 1 TABLET ORAL at 22:09

## 2018-05-21 RX ADMIN — PSEUDOEPHEDRINE HYDROCHLORIDE 30 MG: 60 TABLET, FILM COATED ORAL at 17:12

## 2018-05-21 RX ADMIN — FLUTICASONE PROPIONATE 2 SPRAY: 50 SPRAY, METERED NASAL at 08:33

## 2018-05-21 RX ADMIN — PROPOFOL 10 MG: 10 INJECTION, EMULSION INTRAVENOUS at 11:50

## 2018-05-21 RX ADMIN — HEPARIN SODIUM 5000 UNITS: 5000 INJECTION, SOLUTION INTRAVENOUS; SUBCUTANEOUS at 08:41

## 2018-05-21 RX ADMIN — LIDOCAINE HYDROCHLORIDE 40 MG: 20 INJECTION, SOLUTION EPIDURAL; INFILTRATION; INTRACAUDAL; PERINEURAL at 11:48

## 2018-05-21 RX ADMIN — SODIUM CHLORIDE, SODIUM LACTATE, POTASSIUM CHLORIDE, CALCIUM CHLORIDE: 600; 310; 30; 20 INJECTION, SOLUTION INTRAVENOUS at 11:44

## 2018-05-21 NOTE — PROGRESS NOTES
Patient back from the GI lab.per stretcher,,awake,alert,with O2 per nasal cannula,,no  Distress noted so far,,no complaints of pain or nausea,,stated \"she is very hungry\". ...,,assessment completed,vital signs checked. Amy Judge

## 2018-05-21 NOTE — PERIOP NOTES
TRANSFER - OUT REPORT:    Verbal report given to Tania Garg RN on Maria Eugenia Rough  being transferred to 324 for routine post - op       Report consisted of patients Situation, Background, Assessment and   Recommendations(SBAR). Information from the following report(s) OR Summary, Procedure Summary, Intake/Output and MAR was reviewed with the receiving nurse. Opportunity for questions and clarification was provided.       Patient transported with:   O2 @ 2 liters

## 2018-05-21 NOTE — INTERVAL H&P NOTE
H&P Update: Maritza Lopez was seen and examined. History and physical has been reviewed. The patient has been examined.  There have been no significant clinical changes since the completion of the originally dated History and Physical.    Signed By: Perla Olivo MD     May 21, 2018 11:29 AM

## 2018-05-21 NOTE — PROGRESS NOTES
GI DAILY PROGRESS NOTE    Admit Date:  5/20/2018    Today's Date:  5/21/2018    CC:  N/V and dysphagia     Subjective:     EGD today with gastritis and empiric dilation. Patient reports feeling well this afternoon. Biggest complaint is ear fullness. Medications:   Current Facility-Administered Medications   Medication Dose Route Frequency    nicotine (NICODERM CQ) 21 mg/24 hr patch 1 Patch  1 Patch TransDERmal DAILY    pravastatin (PRAVACHOL) tablet 40 mg  40 mg Oral DAILY    sucralfate (CARAFATE) tablet 1 g  1 g Oral AC&HS    metoclopramide HCl (REGLAN) tablet 10 mg  10 mg Oral AC&HS    HYDROcodone-acetaminophen (NORCO)  mg tablet 1 Tab  1 Tab Oral Q8H PRN    fluticasone (FLONASE) 50 mcg/actuation nasal spray 2 Spray  2 Spray Both Nostrils DAILY    sodium chloride (NS) flush 5-10 mL  5-10 mL IntraVENous Q8H    sodium chloride (NS) flush 5-10 mL  5-10 mL IntraVENous PRN    acetaminophen (TYLENOL) tablet 650 mg  650 mg Oral Q4H PRN    ondansetron (ZOFRAN) injection 4 mg  4 mg IntraVENous Q4H PRN    heparin (porcine) injection 5,000 Units  5,000 Units SubCUTAneous Q8H    amoxicillin-clavulanate (AUGMENTIN) 875-125 mg per tablet 1 Tab  1 Tab Oral Q12H    pseudoephedrine (SUDAFED) tablet 30 mg  30 mg Oral Q4H PRN    pantoprazole (PROTONIX) 40 mg in sodium chloride 0.9% 10 mL injection  40 mg IntraVENous DAILY       Review of Systems:  ROS was obtained, with pertinent positives as listed above. No chest pain or SOB.     Diet:  Regular    Objective:   Vitals:  Visit Vitals    /67 (BP 1 Location: Right arm, BP Patient Position: At rest)    Pulse 95    Temp 98.1 °F (36.7 °C)    Resp 16    Ht 5' 2\" (1.575 m)    Wt 45.4 kg (100 lb)    SpO2 99%    BMI 18.29 kg/m2     Intake/Output:  05/21 0701 - 05/21 1900  In: 200 [I.V.:200]  Out: 0      Exam:  General appearance: alert, cooperative, no distress  Lungs: clear to auscultation bilaterally anteriorly  Heart: regular rate and rhythm  Abdomen: soft, non-tender. Bowel sounds normal. No masses, no organomegaly  Extremities: extremities normal, atraumatic, no cyanosis or edema  Neuro:  alert and oriented    Data Review (Labs):    Recent Labs      05/21/18   0250  05/20/18   0909   WBC  7.7  15.3*   HGB  10.3*  12.4   HCT  31.2*  36.3   PLT  397  623*   MCV  81.9  79.4*   NA  136  131*   K  3.8  3.9   CL  104  94*   CO2  20*  23   BUN  5*  9   CREA  0.83  1.35*   CA  8.3  9.1   MG  1.7*  1.5*   GLU  89  102*   AP   --   111   SGOT   --   23   ALT   --   22   TBILI   --   0.3   ALB   --   2.7*   TP   --   7.2     EGD 5/21/18  ASSESSMENT:  1. Normal esophagus- empiric dilation- no trauma  2. Bilroth II anatomy- both limbs normal  3. Bile gastritis     PLAN:   1. Reglan, carafate  2. F/U inpt     C Chloe Webster MD    Assessment:     Principal Problem:    Intractable vomiting (5/20/2018)    Active Problems:    Hyponatremia (12/4/2014)      STEPHEN (acute kidney injury) ([address] (12/4/2014)      HTN (hypertension) (12/4/2014)      Back pain (6/10/2016)      Sinusitis (6/10/2016)      Compression fracture of L1 lumbar vertebra ([address] (5/7/2018)      Patient is a 78 y.o. WF with h/o invasive ductal carcinoma s/p mastectomy 2/2018, HTN, HLD, OA, osteoporosis, PUD, partial gastrectomy, tobacco abuse, lumbar compression fracture, and recent diagnosis of sinusitis being treated with bactrim as an outpatient who is seen in consultation at the request of Dr. Annette Buck for N/V, weight loss, and dysphagia. Recent use of abx, NSAIDS, and pain medications may be contributing to nausea symptoms. EGD today with Bilroth II anatomy, bile gastritis and empiric dilation    Plan:     - Continue PPI, Carafate and Reglan  - Sinusitis treatment per primary team  - Will sign off and arraign out patient follow up. Please call with any questions/concerns    Maggie Bustamante NP    Patient is seen and examined in collaboration with Dr. Yamileth Parry.   Assessment and plan as per Dr. Jolie Edouard.

## 2018-05-21 NOTE — CONSULTS
[facility] Consult Note       Referring Physician:  Dr. Joy Ramos Date:  5/20/2018    Admit Date:  5/20/2018    Chief Complaint:  N/V, dysphagia    Subjective:     History of Present Illness:  Patient is a 78 y.o. WF with h/o invasive ductal carcinoma s/p mastectomy 2/2018, HTN, HLD, OA, osteoporosis, remote h/o PUD with partial gastrectomy, ongoing tobacco use with 1/2 ppd, lumbar compression fracture after fall earlier this month, and recent diagnosis of sinusitis currently being treated with bactrim who is seen in consultation at the request of Dr. Carmen Chambers for N/V and dysphagia. Patient reports back pain less severe than earlier in month. She complains of sinus drainage, sinus congestion, and pressure in her ears with a reduction in hearing acuity. She denies dizziness. Positive for N/V that is worse with PO intake. Pills and solid food to lesser degree feel they \"get stuck\" at level of sternal notch. Negative dysphagia with liquids. Negative for regurgitation of food/pills, hematemesis, and coffee-ground emesis. Negative for GERD, abdominal pain, diarrhea, rectal bleeding, melena. She does have occasional mild constipation. Reports good BM yesterday. XR abdomen this admission unremarkable. +NSAID use with ibuprofen. On bactrim as outpatient. Recent pain medication use as well. Also reports h/o sensitivity/poor tolerance of many medications. She was unable to tolerate Mucinex-D. She did not take chemotherapy after breast surgery for cancer performed 2/2018. Denies h/o radiation therapy as well. Negative for recent EGD. Negative for prior h/o colonoscopy. Family history negative for colon cancer. Positive for weight loss. ROS otherwise negative. Leukocytosis and mild STEPHEN noted on labs.     PMH:  Past Medical History:   Diagnosis Date    STEPHEN (acute kidney injury) ([facility] [address] 12/04/2014    pt denies this dx    Back pain 6/10/2016    Breast lump dx 2/2018    left    Bronchitis     Discharge from the vagina     Dysuria     Eczema 6/10/2016    Fungal dermatitis     Headache 6/10/2016    Hypercholesterolemia 12/4/2014    Hypertension     not well controlled--per pt    Intractable vomiting 5/20/2018    Menopausal vaginal dryness     Osteoarthritis 6/10/2016    Osteoporosis 6/10/2016    PUD (peptic ulcer disease)     last 2003 which a rupture an extensive surgery    Sepsis ([address] 12/4/2014    Tobacco abuse     1ppd x 49 yrs       PSH:  Past Surgical History:   Procedure Laterality Date    ABDOMEN SURGERY PROC UNLISTED  2003    stomach surgery for ruptured ulcer and extensive rerouting of intestestines per patient     BREAST SURGERY PROCEDURE UNLISTED Left 1975    breast lumpectomy, benign  (left)    HX APPENDECTOMY  1977    with hysterectomy    HX BREAST BIOPSY Left 1975    HX BREAST LUMPECTOMY Left 2/22/2018    LEFT BREAST LUMPECTOMY/ SENTINEL NODE BIOPSY performed by Sil [facility] at [facility] MAIN OR    HX CATARACT REMOVAL Bilateral     HX CHOLECYSTECTOMY      HX ENDOSCOPY      HX HYSTERECTOMY  1977    HX OTHER SURGICAL      hernicolectomy 2 cm    HX TUBAL LIGATION  1975       Allergies: Allergies   Allergen Reactions    Azithromycin Other (comments)     pancreatitis    Amoxicillin Diarrhea    Codeine Nausea and Vomiting       Home Medications:  Prior to Admission medications    Medication Sig Start Date End Date Taking? Authorizing Provider   trimethoprim-sulfamethoxazole (BACTRIM DS) 160-800 mg per tablet Take 1 Tab by mouth two (2) times a day for 14 days. 5/13/18 5/27/18  David Dale, DO   oxymetazoline (AFRIN, OXYMETAZOLINE,) 0.05 % nasal spray 2 Sprays by Both Nostrils route nightly as needed for Congestion. 5/13/18   David Dale, DO   PSEUDOEPHEDRINE-guaiFENesin [facility] D)  mg per tablet Take 1 Tab by mouth every morning.  5/13/18   David Dale, DO   fluticasone (FLONASE) 50 mcg/actuation nasal spray 2 Sprays by Both Nostrils route daily. 5/13/18   Julie Blizzard, DO   HYDROcodone-acetaminophen [facility])  mg tablet Take 1 Tab by mouth every eight (8) hours as needed for Pain. Max Daily Amount: 3 Tabs. 5/9/18   Dharmesh Mcguire MD   morphine IR (MS IR) 15 mg tablet Take 1 Tab by mouth every six (6) hours as needed for Pain. Max Daily Amount: 60 mg. 4/30/18   Dharmesh Mcguire MD   ondansetron (ZOFRAN ODT) 8 mg disintegrating tablet Take 1 Tab by mouth every eight (8) hours as needed for Nausea. 4/26/18   Benson Kawasaki, MD   ibuprofen (MOTRIN) 800 mg tablet Take 1 Tab by mouth three (3) times daily. 4/6/18   Dharmesh Mcguire MD   losartan-hydroCHLOROthiazide [facility]) 50-12.5 mg per tablet Take 1 Tab by mouth daily. 4/6/18   Dharmesh Mcguire MD   pravastatin (PRAVACHOL) 40 mg tablet Take 1 Tab by mouth nightly.  3/5/18   Dharmesh Mcguire MD       Hospital Medications:  Current Facility-Administered Medications   Medication Dose Route Frequency    pravastatin (PRAVACHOL) tablet 40 mg  40 mg Oral QHS    HYDROcodone-acetaminophen (NORCO)  mg tablet 1 Tab  1 Tab Oral Q8H PRN    [START ON 5/21/2018] fluticasone (FLONASE) 50 mcg/actuation nasal spray 2 Spray  2 Spray Both Nostrils DAILY    sodium chloride (NS) flush 5-10 mL  5-10 mL IntraVENous Q8H    sodium chloride (NS) flush 5-10 mL  5-10 mL IntraVENous PRN    acetaminophen (TYLENOL) tablet 650 mg  650 mg Oral Q4H PRN    ondansetron (ZOFRAN) injection 4 mg  4 mg IntraVENous Q4H PRN    heparin (porcine) injection 5,000 Units  5,000 Units SubCUTAneous Q8H    amoxicillin-clavulanate (AUGMENTIN) 875-125 mg per tablet 1 Tab  1 Tab Oral Q12H    pseudoephedrine (SUDAFED) tablet 30 mg  30 mg Oral Q4H PRN       Social History:  Social History   Substance Use Topics    Smoking status: Current Every Day Smoker     Packs/day: 1.00     Years: 49.00     Start date: 5/6/1966    Smokeless tobacco: Never Used    Alcohol use No         Family History:  Family History   Problem Relation Age of Onset    Diabetes Sister     Heart Disease Sister     Heart Disease Father     Heart Attack Father     Cancer Brother      prostate    Cancer Sister     Breast Cancer Sister 79    Heart Disease Sister     Heart Disease Brother     Hypertension Mother     Hypertension Other      Brother and sister with htn       Review of Systems:  A detailed 10 system ROS is obtained, with pertinent positives as listed above. All others are negative. Objective:     Physical Exam:  Vitals:  Visit Vitals    /65    Pulse 90    Temp 98.3 °F (36.8 °C)    Resp 20    Ht 5' 2\" (1.575 m)    Wt 45.4 kg (100 lb)    SpO2 95%    BMI 18.29 kg/m2     Gen:  NAD  HEENT: Anicteric sclera, mmm  Cardiovascular: Regular rate and rhythm. Respiratory:  CTA bilaterally  GI:  Abdomen soft, NT, ND, +BS  Rectal:  Deferred  Neurological:  AAOx3      Laboratory:    Recent Labs      05/20/18   0909   WBC  15.3*   HGB  12.4   HCT  36.3   PLT  623*   MCV  79.4*   NA  131*   K  3.9   CL  94*   CO2  23   BUN  9   CREA  1.35*   CA  9.1   MG  1.5*   GLU  102*   AP  111   SGOT  23   ALB  2.7*   TP  7.2          Assessment:       Active Problems:    Intractable vomiting (5/20/2018)    Dysphagia  Weight Loss    Patient is a 78 y.o. WF with h/o invasive ductal carcinoma s/p mastectomy 2/2018, HTN, HLD, OA, osteoporosis, PUD, partial gastrectomy, tobacco abuse, lumbar compression fracture, and recent diagnosis of sinusitis being treated with bactrim as an outpatient who is seen in consultation at the request of Dr. Ernie Costello for N/V, weight loss, and dysphagia. Recent use of abx, NSAIDS, and pain medications may be contributing to nausea symptoms. Good BM in last 24 hours, non-obstructive XR abdomen, and normal abdominal exam would indicate SBO, ileus, and severe constipation are not likely contributors to symptoms.  Nausea earlier in month felt to be due to back pain related to compression fracture however pain is now improved. Sinus pressure/drainage potentially could contribute to nausea as well. She has h/o PUD and prior gastric surgery. Given dysphagia, persistent N/V, and weight loss will pursue EGD evaluation. Plan:       1. Protonix 40 mg IV daily. First dose now. 2. NPO except meds after midnight for EGD in AM  3. PRN anti-emetics  4. IVF  5. Avoid NSAIDS  6. Minimize pain medication use  7. Primary team treating for possible sinusitis. 8. Tobacco cessation  9.  Needs outpatient colonscopy for screening    Will follow    Neema Cash MD  [facility], [place]

## 2018-05-21 NOTE — ANESTHESIA POSTPROCEDURE EVALUATION
Post-Anesthesia Evaluation and Assessment    Patient: Leon Orozco MRN: [id number]  SSN: [id number]    YOB: 1940  Age: 78 y.o. Sex: female       Cardiovascular Function/Vital Signs  Visit Vitals    /54    Pulse 93    Temp 37 °C (98.6 °F)    Resp 14    Ht 5' 2\" (1.575 m)    Wt 45.4 kg (100 lb)    SpO2 98%    BMI 18.29 kg/m2       Patient is status post total IV anesthesia anesthesia for Procedure(s):  ESOPHAGOGASTRODUODENOSCOPY (EGD)  ESOPHAGEAL DILATION. Nausea/Vomiting: None    Postoperative hydration reviewed and adequate. Pain:  Pain Scale 1: Visual (05/21/18 1158)  Pain Intensity 1: 0 (05/21/18 1158)   Managed    Neurological Status: At baseline    Mental Status and Level of Consciousness: Arousable    Pulmonary Status:   O2 Device: CO2 nasal cannula (05/21/18 1213)   Adequate oxygenation and airway patent    Complications related to anesthesia: None    Post-anesthesia assessment completed.  No concerns    Signed By: Claudia Pizarro MD     May 21, 2018

## 2018-05-21 NOTE — PROGRESS NOTES
's follow-up visit with Ms. Gutierrez's daughters as they waited after surgery. Conveyed care and concern along with assurance of prayers.      Wyvonne Square, [address]  Board Certified

## 2018-05-21 NOTE — PROGRESS NOTES
(Hospitalist) in to see patient,,orders given,,patient was given slow IV push Morphine (1 mg.),prn for paiin,lumbar area then taken to GI lab. For EGD this morning,per stretcher. .. Aneudy Ring Aneudy Olivier

## 2018-05-21 NOTE — PROCEDURES
ESOPHAGOGASTRODUODENOSCOPY    DATE of PROCEDURE: 5/21/2018    PT NAME: Lyn Ardon     [id number]    MEDICATION:   MAC      INSTRUMENT: GIFH 190    SPECIAL PROCEDURE: 56 fr arredondo  BLOOD LOSS- 0 or min. SPEC- no  IMPLANT- none    PROCEDURE:  Standard EGD w/ dilation      ASSESSMENT:  1. Normal esophagus- empiric dilation- no trauma  2. Bilroth II anatomy- both limbs normal  3. Bile gastritis    PLAN:   1. Reglan, carafate  2.  F/U inpt    C Cathy Bear MD

## 2018-05-21 NOTE — PROGRESS NOTES
Problem: Mobility Impaired (Adult and Pediatric)  Goal: *Acute Goals and Plan of Care (Insert Text)  1 WEEK GOALS :  (1.)Ms. Adam Ramachandran will move from supine to sit and sit to supine  with MODIFIED INDEPENDENCE within 7 treatment day(s). (2.)Ms. Adam Ramachandran will transfer from bed to chair and chair to bed with SUPERVISION using the least restrictive device within 7 treatment day(s). (3.)Ms. Adam Ramachandran will ambulate with SUPERVISION for 400 feet with the least restrictive device within 7 treatment day(s). 4) Pt ambulating up & down 2 steps no rail with SBA.    ________________________________________________________________________________________________    PHYSICAL THERAPY: Initial Assessment, Discharge, Treatment Day: Day of Assessment, AM 5/21/2018  OBSERVATION: Hospital Day: 2  Payor: [facility] [place] MEDICARE / Plan: [facility] [place] MEDICARE HMO/PPO / Product Type: Managed Care Medicare /      NAME/AGE/GENDER: Toshia Ferro is a 78 y.o. female   PRIMARY DIAGNOSIS: Nausea and vomiting, intractability of vomiting not specified, unspecified vomiting type [R11.2] Intractable vomiting Intractable vomiting  Procedure(s) (LRB):  ESOPHAGOGASTRODUODENOSCOPY (EGD) (N/A)  ESOPHAGEAL DILATION (N/A)  Day of Surgery  ICD-10: Treatment Diagnosis:    · Generalized Muscle Weakness (M62.81)  · Other lack of cordination (R27.8)  · Difficulty in walking, Not elsewhere classified (R26.2)  · Other abnormalities of gait and mobility (R26.89)   Precaution/Allergies:  Azithromycin; Amoxicillin; and Codeine      ASSESSMENT:     Ms. Adam Ramachandran presents with general weakness & unsteady gait & transfers. Pt had tendency to reach for support but refused to use a cane or assistive device. This pt is not functioning at her baseline which is independent. This pt will benefit from follow up therapy to help restore safe function & will need to return to home with assist , not recommended for pt to attempt to get around by herself.    Dr Vaishali Webb gave permission to get pt up today without TLSO that was proscribed by ortho after pt had a compression fracture ~ a month ago. This section established at most recent assessment   PROBLEM LIST (Impairments causing functional limitations):  1. Decreased Strength  2. Decreased ADL/Functional Activities  3. Decreased Transfer Abilities  4. Decreased Ambulation Ability/Technique  5. Decreased Balance  6. Decreased Activity Tolerance  7. Decreased Flexibility/Joint Mobility  8. Decreased [place] with Home Exercise Program   INTERVENTIONS PLANNED: (Benefits and precautions of physical therapy have been discussed with the patient.)  1. Bed Mobility  2. Family Education  3. Gait Training  4. Therapeutic Activites  5. Transfer Training     TREATMENT PLAN: Frequency/Duration: daily for duration of hospital stay  Rehabilitation Potential For Stated Goals: Good     RECOMMENDED REHABILITATION/EQUIPMENT: (at time of discharge pending progress): Due to the probability of continued deficits (see above) this patient will likely need continued skilled physical therapy after discharge. Equipment:    to be determined              HISTORY:   History of Present Injury/Illness (Reason for Referral):   Patient is a 78 y.o. WF with h/o invasive ductal carcinoma s/p mastectomy 2/2018, HTN, HLD, OA, osteoporosis, remote h/o PUD with partial gastrectomy, ongoing tobacco use with 1/2 ppd, lumbar compression fracture after fall earlier this month, and recent diagnosis of sinusitis currently being treated with bactrim who is seen in consultation at the request of Dr. Maya Parry for N/V and dysphagia. Patient reports back pain less severe than earlier in month. She complains of sinus drainage, sinus congestion, and pressure in her ears with a reduction in hearing acuity. She denies dizziness. Positive for N/V that is worse with PO intake. Pills and solid food to lesser degree feel they \"get stuck\" at level of sternal notch.   Negative dysphagia with liquids. Negative for regurgitation of food/pills, hematemesis, and coffee-ground emesis. Negative for GERD, abdominal pain, diarrhea, rectal bleeding, melena. She does have occasional mild constipation. Reports good BM yesterday. XR abdomen this admission unremarkable. +NSAID use with ibuprofen. On bactrim as outpatient. Recent pain medication use as well. Also reports h/o sensitivity/poor tolerance of many medications. She was unable to tolerate Mucinex-D. She did not take chemotherapy after breast surgery for cancer performed 2/2018. Denies h/o radiation therapy as well. Negative for recent EGD. Negative for prior h/o colonoscopy. Family history negative for colon cancer. Positive for weight loss. ROS otherwise negative. Leukocytosis and mild STEPHEN noted on labs.     Past Medical History/Comorbidities:   Ms. Marlys Basilio  has a past medical history of STEPHEN (acute kidney injury) ([facility] (12/04/2014); Back pain (6/10/2016); Breast lump (dx 2/2018); Bronchitis; Discharge from the vagina; Dysuria; Eczema (6/10/2016); Fungal dermatitis; Headache (6/10/2016); Hypercholesterolemia (12/4/2014); Hypertension; Intractable vomiting (5/20/2018); Menopausal vaginal dryness; Osteoarthritis (6/10/2016); Osteoporosis (6/10/2016); PUD (peptic ulcer disease); Sepsis ([facility] (12/4/2014); and Tobacco abuse. She also has no past medical history of Adverse effect of anesthesia; Difficult intubation; Malignant hyperthermia due to anesthesia; or Pseudocholinesterase deficiency. Ms. Marlys Basilio  has a past surgical history that includes hx endoscopy; hx hysterectomy (1977); hx tubal ligation (1975); hx appendectomy (1977); hx other surgical; pr abdomen surgery proc unlisted (2003); hx cholecystectomy; hx cataract removal (Bilateral); pr breast surgery procedure unlisted (Left, 1975); hx breast biopsy (Left, 1975); hx breast lumpectomy (Left, 2/22/2018); and hx endoscopy (05/21/2018).   Social History/Living Environment:   Home Environment: Private residence  # Steps to Enter: 2  Rails to [facility]: No  One/Two Story Residence: One story  Patient Expects to be Discharged to[de-identified] Private residence  Current DME Used/Available at Home: None  Prior Level of Function/Work/Activity:  Pt was independent without an assistive device prior to this admission  Personal Factors: Other factors that influence how disability is experienced by the patient:  Current & PMH   Number of Personal Factors/Comorbidities that affect the Plan of Care: 3+: HIGH COMPLEXITY   EXAMINATION:   Most Recent Physical Functioning:   Gross Assessment:  AROM: Within functional limits (all limbs & core)  Strength: Within functional limits (all limbs & core)  Coordination: Within functional limits (all limbs & core)                    Balance:  Sitting: Intact; Without support  Standing: Impaired; Without support Bed Mobility:  Supine to Sit: Stand-by assistance  Sit to Supine:  (NT)       Transfers:  Sit to Stand: Contact guard assistance  Stand to Sit: Contact guard assistance  Bed to Chair: Contact guard assistance  Gait:     Speed/Rosalind: Delayed  Step Length: Left shortened;Right shortened  Gait Abnormalities: Decreased step clearance (mild sway, pt has tendency to reach for support)  Distance (ft): 200 Feet (ft)  Assistive Device:  (none)  Ambulation - Level of Assistance:  (CGA)   Functional Mobility:         Gait/Ambulation:  cga        Transfers:  cga        Bed Mobility:  sba   Body Structures Involved:  1. Metabolic  2. Muscles Body Functions Affected:  1. Movement Related  2. Metobolic/Endocrine Activities and Participation Affected:  1. General Tasks and Demands  2.  Mobility   Number of elements that affect the Plan of Care: 4+: HIGH COMPLEXITY   CLINICAL PRESENTATION:   Presentation: Stable and uncomplicated: LOW COMPLEXITY   CLINICAL DECISION MAKIN [facility] [address] [id number] AM-PAC 6 Clicks   Basic Mobility Inpatient Short Form  How much difficulty does the patient currently have. .. Unable A Lot A Little None   1. Turning over in bed (including adjusting bedclothes, sheets and blankets)? [] 1   [] 2   [x] 3   [] 4   2. Sitting down on and standing up from a chair with arms ( e.g., wheelchair, bedside commode, etc.)   [] 1   [] 2   [x] 3   [] 4   3. Moving from lying on back to sitting on the side of the bed? [] 1   [] 2   [x] 3   [] 4   How much help from another person does the patient currently need. .. Total A Lot A Little None   4. Moving to and from a bed to a chair (including a wheelchair)? [] 1   [] 2   [x] 3   [] 4   5. Need to walk in hospital room? [] 1   [] 2   [x] 3   [] 4   6. Climbing 3-5 steps with a railing? [x] 1   [] 2   [] 3   [] 4   © 2007, Trustees of [address] [place] [address] [address] [id number], under license to [employer]. All rights reserved      Score:  Initial: 16 Most Recent: X (Date: -- )    Interpretation of Tool:  Represents activities that are increasingly more difficult (i.e. Bed mobility, Transfers, Gait). Score 24 23 22-20 19-15 14-10 9-7 6     Modifier CH CI CJ CK CL CM CN      ? Mobility - Walking and Moving Around:     - CURRENT STATUS: CK - 40%-59% impaired, limited or restricted    - GOAL STATUS: CJ - 20%-39% impaired, limited or restricted    - D/C STATUS:  CK - 40%-59% impaired, limited or restricted  Payor: Hortensia [id number] / Plan: [place] [facility] MEDICARE HMO/PPO / Product Type: Managed Care Medicare /      Medical Necessity:     · Patient is expected to demonstrate progress in strength, range of motion, balance, coordination and functional technique to decrease assistance required with bed mobility, transfers & gait. Reason for Services/Other Comments:  · Patient continues to require skilled intervention due to pt not independent with functional mobility.    Use of outcome tool(s) and clinical judgement create a POC that gives a: Clear prediction of patient's progress: LOW COMPLEXITY            TREATMENT:   (In addition to Assessment/Re-Assessment sessions the following treatments were rendered)   Pre-treatment Symptoms/Complaints:  Weakness & unsteadiness  Pain: Initial: visual scale  Pain Intensity 1: 0  Post Session:  0/10     Assessment/Reassessment only, no treatment provided today    Braces/Orthotics/Lines/Etc:   · none  Treatment/Session Assessment:    · Response to Treatment:  tolerated well, pt not agreeable that she is unsafe  · Interdisciplinary Collaboration:   o Registered Nurse  · After treatment position/precautions:   o Up in chair  o Bed alarm/tab alert on  o Bed/Chair-wheels locked  o Call light within reach  o RN notified   · Compliance with Program/Exercises: Will assess as treatment progresses. · Recommendations/Intent for next treatment session: \"Next visit will focus on reduction in assistance provided\".   Total Treatment Duration:  PT Patient Time In/Time Out  Time In: 0830  Time Out: Sage Johnson PT

## 2018-05-21 NOTE — H&P (VIEW-ONLY)
[facility] Consult Note       Referring Physician:  Dr. Hyun Gracia Date:  5/20/2018    Admit Date:  5/20/2018    Chief Complaint:  N/V, dysphagia    Subjective:     History of Present Illness:  Patient is a 78 y.o. WF with h/o invasive ductal carcinoma s/p mastectomy 2/2018, HTN, HLD, OA, osteoporosis, remote h/o PUD with partial gastrectomy, ongoing tobacco use with 1/2 ppd, lumbar compression fracture after fall earlier this month, and recent diagnosis of sinusitis currently being treated with bactrim who is seen in consultation at the request of Dr. Roly Mullins for N/V and dysphagia. Patient reports back pain less severe than earlier in month. She complains of sinus drainage, sinus congestion, and pressure in her ears with a reduction in hearing acuity. She denies dizziness. Positive for N/V that is worse with PO intake. Pills and solid food to lesser degree feel they \"get stuck\" at level of sternal notch. Negative dysphagia with liquids. Negative for regurgitation of food/pills, hematemesis, and coffee-ground emesis. Negative for GERD, abdominal pain, diarrhea, rectal bleeding, melena. She does have occasional mild constipation. Reports good BM yesterday. XR abdomen this admission unremarkable. +NSAID use with ibuprofen. On bactrim as outpatient. Recent pain medication use as well. Also reports h/o sensitivity/poor tolerance of many medications. She was unable to tolerate Mucinex-D. She did not take chemotherapy after breast surgery for cancer performed 2/2018. Denies h/o radiation therapy as well. Negative for recent EGD. Negative for prior h/o colonoscopy. Family history negative for colon cancer. Positive for weight loss. ROS otherwise negative. Leukocytosis and mild STEPHEN noted on labs.     PMH:  Past Medical History:   Diagnosis Date    STEPHEN (acute kidney injury) ([facility] [address] 12/04/2014    pt denies this dx    Back pain 6/10/2016    Breast lump dx 2/2018    left    Bronchitis     Discharge from the vagina     Dysuria     Eczema 6/10/2016    Fungal dermatitis     Headache 6/10/2016    Hypercholesterolemia 12/4/2014    Hypertension     not well controlled--per pt    Intractable vomiting 5/20/2018    Menopausal vaginal dryness     Osteoarthritis 6/10/2016    Osteoporosis 6/10/2016    PUD (peptic ulcer disease)     last 2003 which a rupture an extensive surgery    Sepsis ([address] 12/4/2014    Tobacco abuse     1ppd x 49 yrs       PSH:  Past Surgical History:   Procedure Laterality Date    ABDOMEN SURGERY PROC UNLISTED  2003    stomach surgery for ruptured ulcer and extensive rerouting of intestestines per patient     BREAST SURGERY PROCEDURE UNLISTED Left 1975    breast lumpectomy, benign  (left)    HX APPENDECTOMY  1977    with hysterectomy    HX BREAST BIOPSY Left 1975    HX BREAST LUMPECTOMY Left 2/22/2018    LEFT BREAST LUMPECTOMY/ SENTINEL NODE BIOPSY performed [facility]se [facility] MA[facility] OR    HX CATARACT REMOVAL Bilateral     HX CHOLECYSTECTOMY      HX ENDOSCOPY      HX HYSTERECTOMY  1977    HX OTHER SURGICAL      hernicolectomy 2 cm    HX TUBAL LIGATION  1975       Allergies: Allergies   Allergen Reactions    Azithromycin Other (comments)     pancreatitis    Amoxicillin Diarrhea    Codeine Nausea and Vomiting       Home Medications:  Prior to Admission medications    Medication Sig Start Date End Date Taking? Authorizing Provider   trimethoprim-sulfamethoxazole (BACTRIM DS) 160-800 mg per tablet Take 1 Tab by mouth two (2) times a day for 14 days. 5/13/18 5/27/18  Eyvonne Spare, DO   oxymetazoline (AFRIN, OXYMETAZOLINE,) 0.05 % nasal spray 2 Sprays by Both Nostrils route nightly as needed for Congestion. 5/13/18   Eyvonne Spare, DO   PSEUDOEPHEDRINE-guaiFENesin [facility] D)  mg per tablet Take 1 Tab by mouth every morning.  5/13/18   Eyvonne Spare, DO   fluticasone (FLONASE) 50 mcg/actuation nasal spray 2 Sprays by Both Nostrils route daily. 5/13/18   Shreyas Urbina DO   HYDROcodone-acetaminophen [facility])  mg tablet Take 1 Tab by mouth every eight (8) hours as needed for Pain. Max Daily Amount: 3 Tabs. 5/9/18   Zi Lane MD   morphine IR (MS IR) 15 mg tablet Take 1 Tab by mouth every six (6) hours as needed for Pain. Max Daily Amount: 60 mg. 4/30/18   Zi Lane MD   ondansetron (ZOFRAN ODT) 8 mg disintegrating tablet Take 1 Tab by mouth every eight (8) hours as needed for Nausea. 4/26/18   Keisha Chowdhury MD   ibuprofen (MOTRIN) 800 mg tablet Take 1 Tab by mouth three (3) times daily. 4/6/18   Zi Lane MD   losartan-hydroCHLOROthiazide [facility]) 50-12.5 mg per tablet Take 1 Tab by mouth daily. 4/6/18   Zi Lane MD   pravastatin (PRAVACHOL) 40 mg tablet Take 1 Tab by mouth nightly.  3/5/18   Zi Lane MD       Hospital Medications:  Current Facility-Administered Medications   Medication Dose Route Frequency    pravastatin (PRAVACHOL) tablet 40 mg  40 mg Oral QHS    HYDROcodone-acetaminophen (NORCO)  mg tablet 1 Tab  1 Tab Oral Q8H PRN    [START ON 5/21/2018] fluticasone (FLONASE) 50 mcg/actuation nasal spray 2 Spray  2 Spray Both Nostrils DAILY    sodium chloride (NS) flush 5-10 mL  5-10 mL IntraVENous Q8H    sodium chloride (NS) flush 5-10 mL  5-10 mL IntraVENous PRN    acetaminophen (TYLENOL) tablet 650 mg  650 mg Oral Q4H PRN    ondansetron (ZOFRAN) injection 4 mg  4 mg IntraVENous Q4H PRN    heparin (porcine) injection 5,000 Units  5,000 Units SubCUTAneous Q8H    amoxicillin-clavulanate (AUGMENTIN) 875-125 mg per tablet 1 Tab  1 Tab Oral Q12H    pseudoephedrine (SUDAFED) tablet 30 mg  30 mg Oral Q4H PRN       Social History:  Social History   Substance Use Topics    Smoking status: Current Every Day Smoker     Packs/day: 1.00     Years: 49.00     Start date: 5/6/1966    Smokeless tobacco: Never Used    Alcohol use No         Family History:  Family History   Problem Relation Age of Onset    Diabetes Sister     Heart Disease Sister     Heart Disease Father     Heart Attack Father     Cancer Brother      prostate    Cancer Sister     Breast Cancer Sister 79    Heart Disease Sister     Heart Disease Brother     Hypertension Mother     Hypertension Other      Brother and sister with htn       Review of Systems:  A detailed 10 system ROS is obtained, with pertinent positives as listed above. All others are negative. Objective:     Physical Exam:  Vitals:  Visit Vitals    /65    Pulse 90    Temp 98.3 °F (36.8 °C)    Resp 20    Ht 5' 2\" (1.575 m)    Wt 45.4 kg (100 lb)    SpO2 95%    BMI 18.29 kg/m2     Gen:  NAD  HEENT: Anicteric sclera, mmm  Cardiovascular: Regular rate and rhythm. Respiratory:  CTA bilaterally  GI:  Abdomen soft, NT, ND, +BS  Rectal:  Deferred  Neurological:  AAOx3      Laboratory:    Recent Labs      05/20/18   0909   WBC  15.3*   HGB  12.4   HCT  36.3   PLT  623*   MCV  79.4*   NA  131*   K  3.9   CL  94*   CO2  23   BUN  9   CREA  1.35*   CA  9.1   MG  1.5*   GLU  102*   AP  111   SGOT  23   ALB  2.7*   TP  7.2          Assessment:       Active Problems:    Intractable vomiting (5/20/2018)    Dysphagia  Weight Loss    Patient is a 78 y.o. WF with h/o invasive ductal carcinoma s/p mastectomy 2/2018, HTN, HLD, OA, osteoporosis, PUD, partial gastrectomy, tobacco abuse, lumbar compression fracture, and recent diagnosis of sinusitis being treated with bactrim as an outpatient who is seen in consultation at the request of Dr. Ernie Costello for N/V, weight loss, and dysphagia. Recent use of abx, NSAIDS, and pain medications may be contributing to nausea symptoms. Good BM in last 24 hours, non-obstructive XR abdomen, and normal abdominal exam would indicate SBO, ileus, and severe constipation are not likely contributors to symptoms.  Nausea earlier in month felt to be due to back pain related to compression fracture however pain is now improved. Sinus pressure/drainage potentially could contribute to nausea as well. She has h/o PUD and prior gastric surgery. Given dysphagia, persistent N/V, and weight loss will pursue EGD evaluation. Plan:       1. Protonix 40 mg IV daily. First dose now. 2. NPO except meds after midnight for EGD in AM  3. PRN anti-emetics  4. IVF  5. Avoid NSAIDS  6. Minimize pain medication use  7. Primary team treating for possible sinusitis. 8. Tobacco cessation  9.  Needs outpatient colonscopy for screening    Will follow    Homero Alcantara MD  [facility], [place]

## 2018-05-21 NOTE — PROGRESS NOTES
Up to the bathroom and voided,,no complaints of nausea or pain,,immediate needs attended to by her daughter. .. Claudia Bunch

## 2018-05-21 NOTE — PROGRESS NOTES
Patient ambulated down the kimbrough,no DME's,,accompanied by PT personnels,,sitting up on recliner chair at this time,,NPO except for meds. ,,complaints of lower back pain,,pain level=2/10 as stated,,IV access on left AC space intact with fluids infusing,,no distress noted,,very alert,,has productive coughing,clear  Sputum. .. Annamary Ripa Annamary Ripa

## 2018-05-21 NOTE — PROGRESS NOTES
Progress Note    Patient: Henry Whitehead MRN: [id number]  SSN: [id number]    YOB: 1940  Age: 78 y.o. Sex: female      Admit Date: 5/20/2018    LOS: 0 days     Subjective:     Pt is having some back pain. No vomiting since admission. Objective:     Vitals:    05/21/18 1256 05/21/18 1353 05/21/18 1515 05/21/18 1551   BP: 124/67   124/75   Pulse: 95   83   Resp: 16   16   Temp: 98.1 °F (36.7 °C)   98.1 °F (36.7 °C)   SpO2: 99% 99% 97% 98%   Weight:       Height:              Physical Exam:   General: elderly female lying in bed, [place], NAD  HENT: Normocephalic and atraumatic. Pupils are equal, round, and reactive to light. Extraocular movements intact. Oropharynx pink/moist.  Neck: Normal range of motion. Neck supple. Cardiovascular: Regular rate and rhythm; no murmurs, rubs, gallops. Pulmonary/Chest: No increased work of breathing. No respiratory distress. Clear to auscultation bilaterally. No wheezes, crackles. Abdominal: Soft, non distended, non tender. Extremities: No edema. Neurological: Cranial nerves II-XII intact. No focal deficits. Skin: Warm, intact. No rashes/lesions.        Lab/Data Review:  Recent Results (from the past 24 hour(s))   METABOLIC PANEL, BASIC    Collection Time: 05/21/18  2:50 AM   Result Value Ref Range    Sodium 136 136 - 145 mmol/L    Potassium 3.8 3.5 - 5.1 mmol/L    Chloride 104 98 - 107 mmol/L    CO2 20 (L) 21 - 32 mmol/L    Anion gap 12 7 - 16 mmol/L    Glucose 89 65 - 100 mg/dL    BUN 5 (L) 8 - 23 MG/DL    Creatinine 0.83 0.6 - 1.0 MG/DL    GFR est AA >60 >60 ml/min/1.73m2    GFR est non-AA >60 >60 ml/min/1.73m2    Calcium 8.3 8.3 - 10.4 MG/DL   CBC W/O DIFF    Collection Time: 05/21/18  2:50 AM   Result Value Ref Range    WBC 7.7 4.3 - 11.1 K/uL    RBC 3.81 (L) 4.05 - 5.25 M/uL    HGB 10.3 (L) 11.7 - 15.4 g/dL    HCT 31.2 (L) 35.8 - 46.3 %    MCV 81.9 79.6 - 97.8 FL    MCH 27.0 26.1 - 32.9 PG    MCHC 33.0 31.4 - 35.0 g/dL    RDW 15.3 (H) 11.9 - 14.6 %    PLATELET 953 802 - 184 K/uL    MPV 8.4 (L) 10.8 - 14.1 FL   MAGNESIUM    Collection Time: 05/21/18  2:50 AM   Result Value Ref Range    Magnesium 1.7 (L) 1.8 - 2.4 mg/dL       Assessment:   Ms. Betty Michael is a 77 yo F with HTN and HLD admitted with intractable vomiting and STEPHEN. Plan:   1. Intractable vomiting - No vomiting since admission. EGD this morning. GI following. 1/2 BCx + for GNR on 5/13. Awaiting final culture results. 2. STEPHEN - Dehdyration from vomiting. Cr normalized. 3. Hypomagnesemia - Replete. 4. Sinusitis - Likely from emesis extending upwards into her sinuses. She has been treated with Bactrim over the past week. This was discontinued and Augmentin started yesterday to cover for anaerobes. Continue decongestant. WBC normalized. 5. HTN - Hyzaar being held. 6. L1 compression fracture - Daughter to bring in back brace. Norco prn.  7. DVT ppx - heparin SQ    FULL CODE    Dispo - Probable d/c tomorrow pending EGD and symptoms.    Signed By: Oriana Nathan MD     May 21, 2018

## 2018-05-21 NOTE — PERIOP NOTES
TRANSFER - IN REPORT:    Verbal report received from [address] on [address]  being received from 3rd floor ortho (unit) for routine progression of care      Report consisted of patients Situation, Background, Assessment and   Recommendations(SBAR). Information from the following report(s) SBAR, Procedure Summary, Intake/Output, MAR and Recent Results was reviewed with the receiving nurse. Opportunity for questions and clarification was provided. Assessment completed upon patients arrival to unit and care assumed.

## 2018-05-21 NOTE — PROGRESS NOTES
Medicare Outpatient Observation Notice provided to the patient. Oral explanation was provided and all questions answered. Signed document placed in the medical record under media tab.  Copy to patient    DCR

## 2018-05-21 NOTE — ANESTHESIA PREPROCEDURE EVALUATION
Anesthetic History               Review of Systems / Medical History  Patient summary reviewed, nursing notes reviewed and pertinent labs reviewed    Pulmonary    COPD: moderate      Smoker         Neuro/Psych              Cardiovascular    Hypertension              Exercise tolerance: >4 METS     GI/Hepatic/Renal           PUD (remote hx)     Endo/Other        Arthritis and cancer (breast)     Other Findings              Physical Exam    Airway  Mallampati: II  TM Distance: 4 - 6 cm  Neck ROM: decreased range of motion   Mouth opening: Normal     Cardiovascular  Regular rate and rhythm,  S1 and S2 normal,  no murmur, click, rub, or gallop             Dental    Dentition: Full lower dentures and Full upper dentures     Pulmonary  Breath sounds clear to auscultation               Abdominal  GI exam deferred       Other Findings            Anesthetic Plan    ASA: 3  Anesthesia type: total IV anesthesia            Anesthetic plan and risks discussed with: Patient and Son / Daughter

## 2018-05-21 NOTE — PROGRESS NOTES
Initial visit by  to convey care and concern and encourage patient that  services are available if desired. No needs were voiced during the visit. Provided business card for future reference.      Wallace Lewis 70  Board Certified

## 2018-05-22 VITALS
OXYGEN SATURATION: 98 % | HEIGHT: 62 IN | WEIGHT: 100 LBS | HEART RATE: 86 BPM | TEMPERATURE: 97.7 F | SYSTOLIC BLOOD PRESSURE: 136 MMHG | DIASTOLIC BLOOD PRESSURE: 60 MMHG | BODY MASS INDEX: 18.4 KG/M2 | RESPIRATION RATE: 18 BRPM

## 2018-05-22 PROCEDURE — 96376 TX/PRO/DX INJ SAME DRUG ADON: CPT

## 2018-05-22 PROCEDURE — 74011000250 HC RX REV CODE- 250: Performed by: INTERNAL MEDICINE

## 2018-05-22 PROCEDURE — C9113 INJ PANTOPRAZOLE SODIUM, VIA: HCPCS | Performed by: INTERNAL MEDICINE

## 2018-05-22 PROCEDURE — 74011250637 HC RX REV CODE- 250/637: Performed by: INTERNAL MEDICINE

## 2018-05-22 PROCEDURE — 99218 HC RM OBSERVATION: CPT

## 2018-05-22 PROCEDURE — 74011250636 HC RX REV CODE- 250/636: Performed by: INTERNAL MEDICINE

## 2018-05-22 PROCEDURE — 96372 THER/PROPH/DIAG INJ SC/IM: CPT

## 2018-05-22 RX ORDER — PANTOPRAZOLE SODIUM 40 MG/1
40 TABLET, DELAYED RELEASE ORAL DAILY
Qty: 10 TAB | Refills: 0 | Status: SHIPPED | OUTPATIENT
Start: 2018-05-22 | End: 2018-06-08

## 2018-05-22 RX ORDER — PSEUDOEPHEDRINE HCL 30 MG
30 TABLET ORAL
Qty: 15 TAB | Refills: 0 | Status: SHIPPED | OUTPATIENT
Start: 2018-05-22 | End: 2018-06-08

## 2018-05-22 RX ORDER — SUCRALFATE 1 G/1
1 TABLET ORAL
Qty: 112 TAB | Refills: 0 | Status: SHIPPED | OUTPATIENT
Start: 2018-05-22 | End: 2018-06-19

## 2018-05-22 RX ADMIN — METOCLOPRAMIDE HYDROCHLORIDE 10 MG: 10 TABLET ORAL at 10:24

## 2018-05-22 RX ADMIN — SUCRALFATE 1 G: 1 TABLET ORAL at 10:24

## 2018-05-22 RX ADMIN — HEPARIN SODIUM 5000 UNITS: 5000 INJECTION, SOLUTION INTRAVENOUS; SUBCUTANEOUS at 10:19

## 2018-05-22 RX ADMIN — FLUTICASONE PROPIONATE 2 SPRAY: 50 SPRAY, METERED NASAL at 07:58

## 2018-05-22 RX ADMIN — PSEUDOEPHEDRINE HYDROCHLORIDE 30 MG: 60 TABLET, FILM COATED ORAL at 06:39

## 2018-05-22 RX ADMIN — AMOXICILLIN AND CLAVULANATE POTASSIUM 1 TABLET: 875; 125 TABLET, FILM COATED ORAL at 07:56

## 2018-05-22 RX ADMIN — METOCLOPRAMIDE HYDROCHLORIDE 10 MG: 10 TABLET ORAL at 06:39

## 2018-05-22 RX ADMIN — SUCRALFATE 1 G: 1 TABLET ORAL at 06:39

## 2018-05-22 RX ADMIN — PSEUDOEPHEDRINE HYDROCHLORIDE 30 MG: 60 TABLET, FILM COATED ORAL at 11:37

## 2018-05-22 RX ADMIN — SODIUM CHLORIDE 40 MG: 9 INJECTION INTRAMUSCULAR; INTRAVENOUS; SUBCUTANEOUS at 07:56

## 2018-05-22 RX ADMIN — HEPARIN SODIUM 5000 UNITS: 5000 INJECTION, SOLUTION INTRAVENOUS; SUBCUTANEOUS at 01:52

## 2018-05-22 RX ADMIN — HYDROCODONE BITARTRATE AND ACETAMINOPHEN 1 TABLET: 10; 325 TABLET ORAL at 07:56

## 2018-05-22 RX ADMIN — PRAVASTATIN SODIUM 40 MG: 20 TABLET ORAL at 07:56

## 2018-05-22 NOTE — PROGRESS NOTES
Shift assessment complete. Pt resting on side of bed eating breakfast. Call light within reach, side rails up x 3, and bed rails up x 3. Pt states she feels much better other than her ears \"being stopped up\". No nausea or vomiting. Pt states she wants to go home today. No needs at this time.

## 2018-05-22 NOTE — PROGRESS NOTES
Discharge instructions reviewed, prescriptions given to pt. Pt taken down via wheelchair, home with family.

## 2018-05-22 NOTE — PHYSICIAN ADVISORY
Letter of Determination:  Outpatient states receiving Observation Services    This case was reviewed, and I concur with Outpatient status receiving Observation services. This determination may change depending on further medical information, condition changes of the patient, or treatment requirements.       Lance Walton MD, TAMI,   Physician Justus Cabrera.

## 2018-05-22 NOTE — PROGRESS NOTES
Pt resting quietly in the bed but was assisted up to the bathroom. Pt has c/o back pain,  10 mg Norco given PO. Pt reported about the EGD exam they did today. Lungs clear bilaterally,  No  Noted distress.

## 2018-05-22 NOTE — DISCHARGE INSTRUCTIONS
DISCHARGE SUMMARY from Nurse    The following personal items collected during your admission are returned to you:   Dental Appliance: Dental Appliances: Lowers, Uppers  Vision: Visual Aid: None  Hearing Aid:   na  Jewelry:   na  Clothing:   self  Other Valuables:   na  Valuables sent to safe:   na    PATIENT INSTRUCTIONS:    After general anesthesia or intravenous sedation, for 24 hours or while taking prescription Narcotics:  · Limit your activities  · Do not drive and operate hazardous machinery  · Do not make important personal or business decisions  · Do  not drink alcoholic beverages  · If you have not urinated within 8 hours after discharge, please contact your surgeon on call. Report the following to your surgeon:  · Excessive pain, swelling, redness or odor of or around the surgical area  · Temperature over 101  · Nausea and vomiting lasting longer than 4 hours or if unable to take medications  · Any signs of decreased circulation or nerve impairment to extremity: change in color, persistent  numbness, tingling, coldness or increase pain  · Any questions,follow up with PCP. Keep scheduled follow up appointment. *  Please give a list of your current medications to your Primary Care Provider. *  Please update this list whenever your medications are discontinued, doses are      changed, or new medications (including over-the-counter products) are added. *  Please carry medication information at all times in case of emergency situations. Soft-Textured, Colo Diet: Care Instructions  Your Care Instructions    A soft-textured, bland diet is used when you need food that is easy to chew, swallow, and digest. You will need to choose soft foods that are low in spices and seasonings. You will need to avoid high-fat foods, as well as caffeine and alcohol. Your doctor or dietitian can help you plan a soft-textured, bland diet based on your health and what you prefer to eat.  Ask your doctor how long you should stay on this diet. As you get better, you will probably be able to go back to a regular diet. Talk with your doctor or dietitian before you make changes in your diet. Follow-up care is a key part of your treatment and safety. Be sure to make and go to all appointments, and call your doctor if you are having problems. It's also a good idea to know your test results and keep a list of the medicines you take. How can you care for yourself at home? · Choose foods that are easy to chew and swallow. Good choices are mashed potatoes, soft breads and rolls, cream soups, oatmeal, and Cream of Wheat. · Choose soft, well-cooked vegetables and soft or canned fruits. Good choices are applesauce, ripe bananas, and non-citrus fruit juice. · Try milk, yogurt, or other milk products, if you can digest dairy without too many problems. Your doctor may limit milk and milk products for a while. If so, he or she may recommend a calcium and vitamin D supplement. · Choose soft protein foods such as eggs, tofu, steamed fish, chicken, and turkey. Slow-cooking methods, such as stewing, will help soften meat. Chopping meat in a  or  also will make it easier to eat. · Avoid nuts, raw vegetables, hard crackers, tough meats, and prunes and prune juice. · Avoid foods that are very spicy, such as foods seasoned with black pepper, chili peppers, horseradish, or hot sauce. · Avoid highly acidic foods such as citrus fruits, citrus fruit juices, and tomato-based foods. · Avoid high-fat foods such as fried meat, chips, and rich desserts. · Check with your doctor before you drink alcohol or beverages that have caffeine, such as coffee, tea, and cola beverages. Where can you learn more? Go to http://shira-kalen.info/. Enter F768 in the search box to learn more about \"Soft-Textured, Suzzane Proper Diet: Care Instructions. \"  Current as of:  May 12, 2017  Content Version: 11.4  © 0304-0456 Healthwise[employer]. Care instructions adapted under license by [employer] (which disclaims liability or warranty for this information). If you have questions about a medical condition or this instruction, always ask your healthcare professional. [address] any warranty or liability for your use of this information. These are general instructions for a healthy lifestyle:    No smoking/ No tobacco products/ Avoid exposure to second hand smoke    Surgeon General's Warning:  Quitting smoking now greatly reduces serious risk to your health. Obesity, smoking, and sedentary lifestyle greatly increases your risk for illness    A healthy diet, regular physical exercise & weight monitoring are important for maintaining a healthy lifestyle    You may be retaining fluid if you have a history of heart failure or if you experience any of the following symptoms:  Weight gain of 3 pounds or more overnight or 5 pounds in a week, increased swelling in our hands or feet or shortness of breath while lying flat in bed. Please call your doctor as soon as you notice any of these symptoms; do not wait until your next office visit. Recognize signs and symptoms of STROKE:    F-face looks uneven    A-arms unable to move or move even    S-speech slurred or non-existent    T-time-call 911 as soon as signs and symptoms begin-DO NOT go       Back to bed or wait to see if you get better-TIME IS BRAIN. The discharge information has been reviewed with the patient. The patient verbalized understanding.

## 2018-05-22 NOTE — DISCHARGE SUMMARY
Discharge Summary     Patient: Laurie Smith MRN: [id number]  SSN: [id number]    YOB: 1940  Age: 78 y.o.   Sex: female       Admit Date: 5/20/2018    Discharge Date: 5/22/2018      Admission Diagnoses: Nausea and vomiting, intractability of vomiting not specified, unspecified vomiting type [R11.2]    Discharge Diagnoses:   Problem List as of 5/22/2018  Date Reviewed: 5/21/2018          Codes Class Noted - Resolved    * (Principal)Intractable vomiting ICD-10-CM: R11.10  ICD-9-CM: 536.2  5/20/2018 - Present        Compression fracture of L1 lumbar vertebra ([facility] ICD-10-CM: S32.010A  ICD-9-CM: 805.4  5/7/2018 - Present        History of peptic ulcer (Chronic) ICD-10-CM: Z87.11  ICD-9-CM: V12.71  3/5/2018 - Present        History of acute pancreatitis (Chronic) ICD-10-CM: Z87.19  ICD-9-CM: V12.79  3/5/2018 - Present        Malignant neoplasm of upper-outer quadrant of left breast in female, estrogen receptor positive ([facility] ICD-10-CM: C50.412, Z17.0  ICD-9-CM: 174.4, V86.0  2/13/2018 - Present        Osteoarthritis ICD-10-CM: M19.90  ICD-9-CM: 715.90  6/10/2016 - Present        Osteoporosis ICD-10-CM: M81.0  ICD-9-CM: 733.00  6/10/2016 - Present        Back pain ICD-10-CM: M54.9  ICD-9-CM: 724.5  6/10/2016 - Present        Sinusitis ICD-10-CM: J32.9  ICD-9-CM: 473.9  6/10/2016 - Present        Eczema ICD-10-CM: L30.9  ICD-9-CM: 692.9  6/10/2016 - Present        Tobacco abuse ICD-10-CM: Z72.0  ICD-9-CM: 305.1  12/5/2014 - Present        Hyponatremia ICD-10-CM: E87.1  ICD-9-CM: 276.1  12/4/2014 - Present        STEPHEN (acute kidney injury) ([facility] ICD-10-CM: N17.9  ICD-9-CM: 584.9  12/4/2014 - Present        HTN (hypertension) (Chronic) ICD-10-CM: I10  ICD-9-CM: 401.9  12/4/2014 - Present        Hypercholesterolemia (Chronic) ICD-10-CM: E78.00  ICD-9-CM: 272.0  12/4/2014 - Present        RESOLVED: Urinary retention ICD-10-CM: R33.9  ICD-9-CM: 788.20  5/7/2018 - 5/21/2018        RESOLVED: Headache ICD-10-CM: R51  ICD-9-CM: 784.0  6/10/2016 - 5/21/2018        RESOLVED: Sepsis ([address] ICD-10-CM: A41.9  ICD-9-CM: 038.9, 995.91  12/4/2014 - 5/21/2018        RESOLVED: UTI (lower urinary tract infection) ICD-10-CM: N39.0  ICD-9-CM: 599.0  12/4/2014 - 5/21/2018        RESOLVED: Hypokalemia ICD-10-CM: E87.6  ICD-9-CM: 276.8  12/4/2014 - 5/21/2018        RESOLVED: Hypoalbuminemia ICD-10-CM: E88.09  ICD-9-CM: 273.8  12/4/2014 - 5/21/2018        RESOLVED: Symptomatic cholelithiasis ICD-10-CM: K80.20  ICD-9-CM: 574.20  5/20/2014 - 5/21/2018               Discharge Condition: [facility], [place] Course:   Ms.Annette Freddie Menendez is a 78 y.o. female with PMH of HTN, invasive ductal carcinoma s/p mastectomy 2/2018, HTN, HLD, OA, osteoporosis, PUD, partial gastrectomy, tobacco abuse, lumbar compression fracture, and recent diagnosis of sinusitis on 5/13/18 being treated with bactrim as an outpatient who was hospitalized 5/6-5/9/2018 after sustained L1 compression fracture following a fall and developed urinary retention. Has been taking NSAIDs at home. She was admitted with a diagnosis of vomiting and after she was called with abnormal BCx (GNR) results from 5/13/18 and continued vomiting since her last discharge. Further questioning suggested dysphagia. She was started on ivf, antiemetics. GI consulted and patient underwent EGD with empiric esophageal dilation. She had bile gastritis and she continued treatment with protonix and carafate. Her nausea, vomiting and dysphagia resolved. She was able to tolerate food. She remained afebrile, with normalization of leukocytes. Repeated blood cultures came back negative. Patient completed treatment with augmentin,f for a total of 9 days. She was counseled to stop.  She is stable enough to be discharge and continue care at home    Physical exam:  VS stable  Alert, in no distress, able to speak in full sentences  Heent: allen  Neck: supple  Cardiac: normal s1, s2, no murmurs, no gallops  Lungs: clear, no rales  Abdomen: soft, non tender, no masses, no rebound  Extremities: no edema, atraumatic, no cyanosis  Neurology: grossly normal  Vascular: pulses present     Consults: Gastroenterology    Significant Diagnostic Studies: SEE DC SUMMARY NOTE    Disposition: home    Discharge Medications:   No current facility-administered medications for this encounter. Current Outpatient Prescriptions:     pseudoephedrine (SUDAFED) 30 mg tablet, Take 1 Tab by mouth every six (6) hours as needed for Congestion. , Disp: 15 Tab, Rfl: 0    pantoprazole (PROTONIX) 40 mg tablet, Take 1 Tab by mouth daily. , Disp: 10 Tab, Rfl: 0    sucralfate (CARAFATE) 1 gram tablet, Take 1 Tab by mouth Before breakfast, lunch, dinner and at bedtime for 28 days. , Disp: 112 Tab, Rfl: 0    oxymetazoline (AFRIN, OXYMETAZOLINE,) 0.05 % nasal spray, 2 Sprays by Both Nostrils route nightly as needed for Congestion. , Disp: 1 Bottle, Rfl: 0    PSEUDOEPHEDRINE-guaiFENesin (MUCINEX D)  mg per tablet, Take 1 Tab by mouth every morning., Disp: 20 Tab, Rfl: 0    fluticasone (FLONASE) 50 mcg/actuation nasal spray, 2 Sprays by Both Nostrils route daily. , Disp: 1 Bottle, Rfl: 3    HYDROcodone-acetaminophen (NORCO)  mg tablet, Take 1 Tab by mouth every eight (8) hours as needed for Pain. Max Daily Amount: 3 Tabs., Disp: 60 Tab, Rfl: 0    morphine IR (MS IR) 15 mg tablet, Take 1 Tab by mouth every six (6) hours as needed for Pain. Max Daily Amount: 60 mg., Disp: 15 Tab, Rfl: 0    ondansetron (ZOFRAN ODT) 8 mg disintegrating tablet, Take 1 Tab by mouth every eight (8) hours as needed for Nausea., Disp: 12 Tab, Rfl: 0    losartan-hydroCHLOROthiazide (HYZAAR) 50-12.5 mg per tablet, Take 1 Tab by mouth daily. , Disp: 30 Tab, Rfl: 5    pravastatin (PRAVACHOL) 40 mg tablet, Take 1 Tab by mouth nightly., Disp: 30 Tab, Rfl: 5    Activity: Activity as tolerated  Diet: Regular Diet  Wound Care: None needed    Follow-up Appointments Procedures    FOLLOW UP VISIT Appointment in: One Week pcp     pcp     Standing Status:   Standing     Number of Occurrences:   1     Order Specific Question:   Appointment in     Answer:    One Week    FOLLOW UP VISIT Appointment in: One Month Gastroenterology     Gastroenterology     Standing Status:   Standing     Number of Occurrences:   1     Standing Expiration Date:   5/23/2018     Order Specific Question:   Appointment in     Answer:   One Month       Signed By: Ani Alejandre MD     May 22, 2018

## 2018-05-25 LAB
BACTERIA SPEC CULT: NORMAL
BACTERIA SPEC CULT: NORMAL
SERVICE CMNT-IMP: NORMAL
SERVICE CMNT-IMP: NORMAL

## 2018-05-30 LAB
BACTERIA SPEC CULT: ABNORMAL
GRAM STN SPEC: ABNORMAL
GRAM STN SPEC: ABNORMAL
Lab: NORMAL
REFERENCE LAB,REFLB: NORMAL
SERVICE CMNT-IMP: ABNORMAL
TEST DESCRIPTION:,ATST: NORMAL

## 2018-06-08 PROBLEM — E83.42 HYPOMAGNESEMIA: Status: ACTIVE | Noted: 2018-06-08

## 2018-06-28 PROBLEM — Z98.890 S/P LUMPECTOMY, LEFT BREAST: Status: ACTIVE | Noted: 2018-06-28

## 2018-07-02 ENCOUNTER — PATIENT OUTREACH (OUTPATIENT)
Dept: CASE MANAGEMENT | Age: 78
End: 2018-07-02

## 2018-07-02 NOTE — PROGRESS NOTES
Medication Adherence Outreach    Date/Time of Call:  7/2/2018  3:20 pm    Name of medication and dosage:   * Losartin /HCTZ 50/12.5 mg 1 every day    Does the patient know the purpose and dosage of medication? * Yes    Are you getting a #30 day or #90 day supply of your medication? * 30 day supply  * Discussed insurance eligibility to obtain 90 day supply of medication. Are you still taking this medication? If not, why? * Yes    Transportation issues or any problems paying for the medication or other reason? * No    What pharmacy are you using to fill your medication and do you know the last time that you got your medication filled? * [facility]   * Last refill 6/4/2018      Are you having any side effects from taking your medication? * No          Any other questions or concerns expressed by the patient. * No    Comments:  * Discussed medication adherence with Mrs. Santa Lenont and she states she has no barriers with obtaining or taking her medication.           Call Completed By:  31 Arley Griggs

## 2018-08-21 PROBLEM — F17.200 SMOKER: Status: ACTIVE | Noted: 2018-08-21

## 2018-08-30 ENCOUNTER — HOSPITAL ENCOUNTER (OUTPATIENT)
Dept: MAMMOGRAPHY | Age: 78
Discharge: HOME | End: 2018-08-30
Attending: FAMILY MEDICINE
Payer: MEDICARE

## 2018-08-30 DIAGNOSIS — M80.00XA AGE-RELATED OSTEOPOROSIS WITH CURRENT PATHOLOGICAL FRACTURE, INITIAL ENCOUNTER: ICD-10-CM

## 2018-08-30 PROCEDURE — 77080 DXA BONE DENSITY AXIAL: CPT

## 2018-09-06 ENCOUNTER — HOSPITAL ENCOUNTER (OUTPATIENT)
Dept: CT IMAGING | Age: 78
Discharge: HOME | End: 2018-09-06
Attending: FAMILY MEDICINE
Payer: MEDICARE

## 2018-09-06 DIAGNOSIS — R05.3 CHRONIC COUGH: ICD-10-CM

## 2018-09-06 PROCEDURE — 71250 CT THORAX DX C-: CPT

## 2018-09-06 NOTE — PROGRESS NOTES
Please let mrs. Valentina Bettencourt know that her chest CT was normal except for a 1.3cm thyroid nodules. They recommend following up with an ultrasound of her thyroid.

## 2018-09-19 NOTE — PROGRESS NOTES
Please let Mrs. Valentina Bettencourt know that her bone density shows osteoporosis. I think she should be taking prolia to prevent more fractures like the one she had in her back. See if she is interested in going to the infusion center to get these injections or if she would rather just try fosamax.

## 2018-09-28 ENCOUNTER — HOSPITAL ENCOUNTER (OUTPATIENT)
Dept: ULTRASOUND IMAGING | Age: 78
Discharge: HOME | End: 2018-09-28
Attending: FAMILY MEDICINE
Payer: MEDICARE

## 2018-09-28 DIAGNOSIS — E04.1 THYROID NODULE: ICD-10-CM

## 2018-09-28 PROCEDURE — 76536 US EXAM OF HEAD AND NECK: CPT

## 2018-10-01 NOTE — PROGRESS NOTES
Please let this patient know that her thyroid has a large nodule and she needs to be referred to endocrinology.

## 2018-10-08 PROBLEM — M80.00XA AGE-RELATED OSTEOPOROSIS WITH CURRENT PATHOLOGICAL FRACTURE: Status: ACTIVE | Noted: 2018-10-08

## 2018-10-24 PROBLEM — E04.2 MULTIPLE THYROID NODULES: Status: ACTIVE | Noted: 2018-10-24

## 2018-12-03 NOTE — PROGRESS NOTES
System down time [id number]. Writer met with pt to discuss discharge planning. [facility] had made arrangements for pt to receive home care services from [facility] home care (phone [phone]; fax [phone]) when he discharged from their facility. They had not yet started providing services. Pt would like to have home care from [facility] when he discharges from [facility]. He is hoping for discharge tomorrow.    Pts pulmonologist at [facility] is Dr. Suni Shukla, phone [phone].   Explained that we try to make patients with COPD or pneumonia a follow up appt with either their primary doctor or pulmonary doctor within 1 week of discharge to make sure they are doing well. Pt wishes to discuss this with his wife and requests I not make such an appt for him at this time.  CM will check back with him tomorrow and make appt if pt wishes us to do so.     Spoke with Judi at [facility]. They had planned for pt to receive RN and OT visits from them. [facility] will need the following faxed to them at discharge:  H & P, AVS and order to resume home care services.   Shonda Martinez RN CM (pager [phone]; phone [phone])

## 2019-01-19 ENCOUNTER — HOSPITAL ENCOUNTER (OUTPATIENT)
Dept: MAMMOGRAPHY | Age: 79
Discharge: HOME | End: 2019-01-19
Attending: FAMILY MEDICINE
Payer: MEDICARE

## 2019-01-19 DIAGNOSIS — Z12.39 SCREENING FOR BREAST CANCER: ICD-10-CM

## 2019-01-19 PROCEDURE — 77067 SCR MAMMO BI INCL CAD: CPT

## 2019-05-05 ENCOUNTER — HOSPITAL ENCOUNTER (EMERGENCY)
Age: 79
Discharge: HOME | End: 2019-05-05
Attending: EMERGENCY MEDICINE
Payer: MEDICARE

## 2019-05-05 ENCOUNTER — APPOINTMENT (OUTPATIENT)
Dept: GENERAL RADIOLOGY | Age: 79
End: 2019-05-05
Payer: MEDICARE

## 2019-05-05 VITALS
HEART RATE: 88 BPM | WEIGHT: 115 LBS | BODY MASS INDEX: 21.16 KG/M2 | HEIGHT: 62 IN | DIASTOLIC BLOOD PRESSURE: 85 MMHG | OXYGEN SATURATION: 99 % | TEMPERATURE: 97.9 F | RESPIRATION RATE: 14 BRPM | SYSTOLIC BLOOD PRESSURE: 149 MMHG

## 2019-05-05 DIAGNOSIS — J42 CHRONIC BRONCHITIS, UNSPECIFIED CHRONIC BRONCHITIS TYPE (HCC): Primary | ICD-10-CM

## 2019-05-05 PROCEDURE — 71046 X-RAY EXAM CHEST 2 VIEWS: CPT

## 2019-05-05 PROCEDURE — 99283 EMERGENCY DEPT VISIT LOW MDM: CPT | Performed by: EMERGENCY MEDICINE

## 2019-05-05 PROCEDURE — 74011250637 HC RX REV CODE- 250/637: Performed by: EMERGENCY MEDICINE

## 2019-05-05 PROCEDURE — 74011000250 HC RX REV CODE- 250: Performed by: EMERGENCY MEDICINE

## 2019-05-05 PROCEDURE — 94640 AIRWAY INHALATION TREATMENT: CPT

## 2019-05-05 RX ORDER — IPRATROPIUM BROMIDE AND ALBUTEROL SULFATE 2.5; .5 MG/3ML; MG/3ML
3 SOLUTION RESPIRATORY (INHALATION)
Status: COMPLETED | OUTPATIENT
Start: 2019-05-05 | End: 2019-05-05

## 2019-05-05 RX ORDER — HYDROCODONE BITARTRATE AND HOMATROPINE METHYLBROMIDE 1.5; 5 MG/5ML; MG/5ML
5 SYRUP ORAL
Qty: 120 ML | Refills: 0 | Status: SHIPPED | OUTPATIENT
Start: 2019-05-05 | End: 2019-05-08

## 2019-05-05 RX ORDER — PREDNISONE 20 MG/1
40 TABLET ORAL DAILY
Qty: 10 TAB | Refills: 0 | Status: SHIPPED | OUTPATIENT
Start: 2019-05-05 | End: 2019-05-10

## 2019-05-05 RX ORDER — ALBUTEROL SULFATE 90 UG/1
2 AEROSOL, METERED RESPIRATORY (INHALATION)
Qty: 1 INHALER | Refills: 0 | Status: SHIPPED | OUTPATIENT
Start: 2019-05-05 | End: 2020-07-14

## 2019-05-05 RX ORDER — HYDROCODONE BITARTRATE AND HOMATROPINE METHYLBROMIDE 1.5; 5 MG/5ML; MG/5ML
5 SYRUP ORAL
Status: COMPLETED | OUTPATIENT
Start: 2019-05-05 | End: 2019-05-05

## 2019-05-05 RX ADMIN — IPRATROPIUM BROMIDE AND ALBUTEROL SULFATE 3 ML: .5; 3 SOLUTION RESPIRATORY (INHALATION) at 08:31

## 2019-05-05 RX ADMIN — HYDROCODONE BITARTRATE AND HOMATROPINE METHYLBROMIDE 5 ML: 5; 1.5 SOLUTION ORAL at 08:32

## 2019-05-05 NOTE — ED TRIAGE NOTES
Patient reports cough and feeling poorly for a couple of weeks. States she saw her PCP and was put on an abx for 1 week, which she completed 1 week ago. Reports congestion in her nose and sinus pressure. States her throat doesn't feel sore, but it feels irritated.

## 2019-05-05 NOTE — DISCHARGE INSTRUCTIONS
Take medications as prescribed  Call and arrange follow-up with your primary care physician  Call and arrange follow-up with pulmonary  Return to the ER for any new or worsening symptoms    Learning About Chronic Bronchitis  What is chronic bronchitis? Chronic bronchitis is long-term swelling and the buildup of mucus in the airways of your lungs. The airways (bronchial tubes) get inflamed and make a lot of mucus. This can narrow or block the airways, making it hard for you to breathe. It is a form of COPD (chronic obstructive pulmonary disease). Chronic bronchitis is usually caused by smoking. But chemical fumes, dust, or air pollution also can cause it over time. What can you expect when you have chronic bronchitis? Chronic bronchitis gets worse over time. You cannot undo the damage to your lungs. Over time, you may find that:  · You get short of breath even when you do simple things like get dressed or fix a meal.  · It is hard to eat or exercise. · You lose weight and feel weaker. Over many years, the swelling and mucus from chronic bronchitis make it more likely that you will get lung infections. But there are things you can do to prevent more damage and feel better. What are the symptoms? The main symptoms of chronic bronchitis are:  · A cough that will not go away. · Mucus that comes up when you cough. · Shortness of breath that gets worse when you exercise. At times, your symptoms may suddenly flare up and get much worse. This is a called an exacerbation (say \"egg-MALLY--BAY-shbecka\"). When this happens, your usual symptoms quickly get worse and stay bad. This can be dangerous. You may have to go to the hospital.  How can you keep chronic bronchitis from getting worse? Don't smoke. That is the best way to keep chronic bronchitis from getting worse. If you already smoke, it is never too late to stop. If you need help quitting, talk to your doctor about stop-smoking programs and medicines. These can increase your chances of quitting for good. You can do other things to keep chronic bronchitis from getting worse:  · Avoid bad air. Air pollution, chemical fumes, and dust also can make chronic bronchitis worse. · Get a flu shot every year. A shot may keep the flu from turning into something more serious, like pneumonia. A flu shot also may lower your chances of having a flare-up. · Get a pneumococcal shot. A shot can prevent some of the serious complications of pneumonia. Ask your doctor how often you should get this shot. How is chronic bronchitis treated? Chronic bronchitis is treated with medicines and oxygen. You also can take steps at home to stay healthy and keep your condition from getting worse. Medicines and oxygen therapy  · You may be taking medicines such as:  ? Bronchodilators. These help open your airways and make breathing easier. Bronchodilators are either short-acting (work for 6 to 9 hours) or long-acting (work for 24 hours). You inhale most bronchodilators, so they start to act quickly. Always carry your quick-relief inhaler with you in case you need it while you are away from home. ? Corticosteroids. These reduce airway inflammation. They come in pill or inhaled form. You must take these medicines every day for them to work well. ? Antibiotics. These medicines are used when you have a bacterial lung infection. · Take your medicines exactly as prescribed. Call your doctor if you think you are having a problem with your medicine. · Oxygen therapy boosts the amount of oxygen in your blood and helps you breathe easier. Use the flow rate your doctor has recommended, and do not change it without talking to your doctor first.  Other care at home  · If your doctor recommends it, get more exercise. Walking is a good choice. Bit by bit, increase the amount you walk every day. Try for at least 30 minutes on most days of the week.   · Learn breathing methods--such as breathing through pursed lips--to help you become less short of breath. · If your doctor has not set you up with a pulmonary rehabilitation program, talk to him or her about whether rehab is right for you. Rehab includes exercise programs, education about your disease and how to manage it, help with diet and other changes, and emotional support. · Eat regular, healthy meals. Use bronchodilators about 1 hour before you eat to make it easier to eat. Eat several small meals instead of three large ones. Drink beverages at the end of the meal. Avoid foods that are hard to chew. Follow-up care is a key part of your treatment and safety. Be sure to make and go to all appointments, and call your doctor if you are having problems. It's also a good idea to know your test results and keep a list of the medicines you take. Where can you learn more? Go to http://shira-kalen.info/. Enter V739 in the search box to learn more about \"Learning About Chronic Bronchitis. \"  Current as of: September 5, 2018  Content Version: 11.9  © 7078-2712 [employer]. Care instructions adapted under license by [employer] (which disclaims liability or warranty for this information). If you have questions about a medical condition or this instruction, always ask your healthcare professional. [address] any warranty or liability for your use of this information.

## 2019-05-05 NOTE — ED NOTES
I have reviewed discharge instructions with the patient. The patient verbalized understanding. Patient left ED via Discharge Method: ambulatory to Home with daughter Opportunity for questions and clarification provided. Patient given 3 scripts. To continue your aftercare when you leave the hospital, you may receive an automated call from our care team to check in on how you are doing. This is a free service and part of our promise to provide the best care and service to meet your aftercare needs.  If you have questions, or wish to unsubscribe from this service please call [phone]. Thank you for Choosing our [facility] Emergency Department.

## 2019-05-05 NOTE — ED PROVIDER NOTES
Patient presents to ER complaining of cough. Reports She's had cough, fairly persistently for the past couple weeks. Reports cough has been intermittently productive of sputum. Reports shortness of breath as well. Patient is a smoker. Reports she has used albuterol as needed in the past but currently does not have any. States her primary care physician did place her on antibiotics without any improvement. The history is provided by the patient. Cough This is a recurrent problem. The current episode started more than 1 week ago. The problem occurs constantly. The cough is productive of sputum. There has been no fever. Associated symptoms include myalgias and shortness of breath. Pertinent negatives include no eye redness, no wheezing and no confusion. She is a smoker. Past Medical History:  
Diagnosis Date  STEPHEN (acute kidney injury) ([address] 12/04/2014  
 pt denies this dx  Back pain 6/10/2016  Breast cancer ([address] 2018  Breast lump dx 2/2018  
 left  Bronchitis  Discharge from the vagina  Dysuria  Eczema 6/10/2016  Fungal dermatitis  Headache 6/10/2016  Hypercholesterolemia 12/4/2014  Hypertension   
 not well controlled--per pt  Intractable vomiting 5/20/2018  Menopausal vaginal dryness  Osteoarthritis 6/10/2016  Osteoporosis 6/10/2016  PUD (peptic ulcer disease)   
 last 2003 which a rupture an extensive surgery  Sepsis ([address] 12/4/2014  Tobacco abuse   
 1ppd x 49 yrs Past Surgical History:  
Procedure Laterality Date  ABDOMEN SURGERY PROC UNLISTED  2003  
 stomach surgery for ruptured ulcer and extensive rerouting of intestestines per patient  BREAST SURGERY PROCEDURE UNLISTED Left 1975  
 breast lumpectomy, benign  (left) [address]  
 with hysterectomy [address] BIOPSY Left 1975  HX BREAST LUMPECTOMY Left 2/22/2018  LEFT BREAST LUMPECTOMY/ SENTINEL NODE BIOPSY performed by Carlos Parker Jourdan Kwok MD at [address] CATARACT REMOVAL Bilateral 2018  HX CHOLECYSTECTOMY  HX ENDOSCOPY    
 HX ENDOSCOPY  05/21/2018  
 empiric dilation [facility]  HX OTHER SURGICAL    
 hernicolectomy 2 cm [address] Family History:  
Problem Relation Age of Onset  Diabetes Sister  Heart Disease Sister  Heart Disease Father  Heart Attack Father  Cancer Brother   
     prostate  Cancer Sister  Breast Cancer Sister 79  
 Heart Disease Sister  Heart Disease Brother  Hypertension Mother  Hypertension Other Brother and sister with htn  Thyroid Cancer Neg Hx Social History Socioeconomic History  Marital status:  Spouse name: Not on file  Number of children: Not on file  Years of education: Not on file  Highest education level: Not on file Occupational History  Not on file Social Needs  Financial resource strain: Not on file  Food insecurity:  
  Worry: Not on file Inability: Not on file  Transportation needs:  
  Medical: Not on file Non-medical: Not on file Tobacco Use  Smoking status: Current Every Day Smoker Packs/day: 1.00 Years: 49.00 Pack years: 49.00 Start date: 5/6/1966  Smokeless tobacco: Never Used Substance and Sexual Activity  Alcohol use: No  
 Drug use: No  
 Sexual activity: Not Currently Lifestyle  Physical activity:  
  Days per week: Not on file Minutes per session: Not on file  Stress: Not on file Relationships  Social connections:  
  Talks on phone: Not on file Gets together: Not on file Attends Anabaptism service: Not on file Active member of club or organization: Not on file Attends meetings of clubs or organizations: Not on file Relationship status: Not on file  Intimate partner violence:  
  Fear of current or ex partner: Not on file Emotionally abused: Not on file Physically abused: Not on file Forced sexual activity: Not on file Other Topics Concern  Not on file Social History Narrative  Not on file ALLERGIES: Azithromycin; Amoxicillin; and Codeine Review of Systems Constitutional: Negative for fatigue, fever and unexpected weight change. Eyes: Negative for photophobia, redness and visual disturbance. Respiratory: Positive for cough and shortness of breath. Negative for wheezing. Cardiovascular: Negative for palpitations and leg swelling. Gastrointestinal: Negative for abdominal pain. Endocrine: Negative for polydipsia, polyphagia and polyuria. Genitourinary: Negative for flank pain, frequency and urgency. Musculoskeletal: Positive for myalgias. Negative for back pain. Skin: Negative for color change and pallor. Neurological: Negative for syncope and numbness. Hematological: Negative for adenopathy. Psychiatric/Behavioral: Negative for behavioral problems and confusion. All other systems reviewed and are negative. Vitals:  
 05/05/19 0749 BP: 157/67 Pulse: 98 Resp: 16 Temp: 98.3 °F (36.8 °C) SpO2: 98% Weight: 52.2 kg (115 lb) Height: 5' 2\" (1.575 m) Physical Exam  
Constitutional: She is oriented to person, place, and time. She appears well-developed and well-nourished. Eyes: Pupils are equal, round, and reactive to light. EOM are normal. Right eye exhibits no discharge. Left eye exhibits no discharge. Neck: Normal range of motion. Neck supple. No thyromegaly present. Cardiovascular: Normal rate and regular rhythm. Exam reveals no friction rub. No murmur heard. Pulmonary/Chest: Effort normal. No respiratory distress. She has wheezes. She has no rales. She exhibits no tenderness. Abdominal: Soft. Bowel sounds are normal.  
Musculoskeletal: She exhibits no edema. Neurological: She is alert and oriented to person, place, and time. Nursing note and vitals reviewed.  
  
 
JOHN 
 Number of Diagnoses or Management Options Diagnosis management comments: Patient with some wheezing here. We'll treat with nebs, as well as antitussives 9:16 AM 
Wheezing and cough has improved. Chest x-ray is clear Scarring in the apex noted chronically. This patient was also chest x-ray. We'll discharge him on prednisone, albuterol, as well as antitussives. She will be given the number. Call and follow-up with Pulmonary, longtime smoker, likely has chronic lung disease Amount and/or Complexity of Data Reviewed Tests in the radiology section of CPT®: ordered and reviewed Risk of Complications, Morbidity, and/or Mortality Presenting problems: moderate Diagnostic procedures: low Management options: low Patient Progress Patient progress: stable Procedures Voice dictation software was used during the making of this note. This software is not perfect and grammatical and other typographical errors may be present. This note has been proofread, but may still contain errors.  
Cheryl Morataya MD; 5/5/2019 @9:17 AM  
===================================================================

## 2019-05-08 PROBLEM — J43.8 PARASEPTAL EMPHYSEMA (HCC): Status: ACTIVE | Noted: 2019-05-08

## 2019-05-08 PROBLEM — F17.200 SMOKER: Status: RESOLVED | Noted: 2018-08-21 | Resolved: 2019-05-08

## 2019-10-30 ENCOUNTER — HOSPITAL ENCOUNTER (OUTPATIENT)
Dept: ULTRASOUND IMAGING | Age: 79
Discharge: HOME | End: 2019-10-30
Attending: FAMILY MEDICINE
Payer: MEDICARE

## 2019-10-30 DIAGNOSIS — I73.9 CLAUDICATION OF LEFT LOWER EXTREMITY (HCC): ICD-10-CM

## 2019-10-30 PROCEDURE — 93926 LOWER EXTREMITY STUDY: CPT

## 2019-11-05 NOTE — PROGRESS NOTES
Please let Mrs. 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 She needs to see a vascular surgeon to have this addressed.   I will place the referral.

## 2019-12-26 ENCOUNTER — APPOINTMENT (OUTPATIENT)
Dept: GENERAL RADIOLOGY | Age: 79
DRG: 439 | End: 2019-12-26
Attending: EMERGENCY MEDICINE
Payer: MEDICARE

## 2019-12-26 ENCOUNTER — HOSPITAL ENCOUNTER (INPATIENT)
Age: 79
LOS: 4 days | Discharge: HOME | DRG: 439 | End: 2019-12-30
Attending: EMERGENCY MEDICINE | Admitting: INTERNAL MEDICINE
Payer: MEDICARE

## 2019-12-26 ENCOUNTER — APPOINTMENT (OUTPATIENT)
Dept: CT IMAGING | Age: 79
DRG: 439 | End: 2019-12-26
Attending: EMERGENCY MEDICINE
Payer: MEDICARE

## 2019-12-26 DIAGNOSIS — K85.90 ACUTE PANCREATITIS, UNSPECIFIED COMPLICATION STATUS, UNSPECIFIED PANCREATITIS TYPE: Primary | ICD-10-CM

## 2019-12-26 LAB
ALBUMIN SERPL-MCNC: 3.2 G/DL (ref 3.2–4.6)
ALBUMIN/GLOB SERPL: 0.9 {RATIO} (ref 1.2–3.5)
ALP SERPL-CCNC: 149 U/L (ref 50–136)
ALT SERPL-CCNC: 17 U/L (ref 12–65)
ANION GAP SERPL CALC-SCNC: 8 MMOL/L (ref 7–16)
AST SERPL-CCNC: 13 U/L (ref 15–37)
ATRIAL RATE: 100 BPM
BASOPHILS # BLD: 0.1 K/UL (ref 0–0.2)
BASOPHILS NFR BLD: 1 % (ref 0–2)
BILIRUB SERPL-MCNC: 0.4 MG/DL (ref 0.2–1.1)
BUN SERPL-MCNC: 34 MG/DL (ref 8–23)
CALCIUM SERPL-MCNC: 9.3 MG/DL (ref 8.3–10.4)
CALCULATED P AXIS, ECG09: 79 DEGREES
CALCULATED R AXIS, ECG10: 68 DEGREES
CALCULATED T AXIS, ECG11: 72 DEGREES
CHLORIDE SERPL-SCNC: 104 MMOL/L (ref 98–107)
CHOLEST SERPL-MCNC: 80 MG/DL
CO2 SERPL-SCNC: 23 MMOL/L (ref 21–32)
CREAT SERPL-MCNC: 1.12 MG/DL (ref 0.6–1)
DIAGNOSIS, 93000: NORMAL
DIFFERENTIAL METHOD BLD: ABNORMAL
EOSINOPHIL # BLD: 0.1 K/UL (ref 0–0.8)
EOSINOPHIL NFR BLD: 0 % (ref 0.5–7.8)
ERYTHROCYTE [DISTWIDTH] IN BLOOD BY AUTOMATED COUNT: 14.2 % (ref 11.9–14.6)
GLOBULIN SER CALC-MCNC: 3.7 G/DL (ref 2.3–3.5)
GLUCOSE SERPL-MCNC: 120 MG/DL (ref 65–100)
HCT VFR BLD AUTO: 40.6 % (ref 35.8–46.3)
HDLC SERPL-MCNC: 48 MG/DL (ref 40–60)
HDLC SERPL: 1.7 {RATIO}
HGB BLD-MCNC: 13.8 G/DL (ref 11.7–15.4)
IMM GRANULOCYTES # BLD AUTO: 0.1 K/UL (ref 0–0.5)
IMM GRANULOCYTES NFR BLD AUTO: 1 % (ref 0–5)
LACTATE BLD-SCNC: 1.69 MMOL/L (ref 0.5–1.9)
LDLC SERPL CALC-MCNC: 16.2 MG/DL
LIPASE SERPL-CCNC: 2602 U/L (ref 73–393)
LIPID PROFILE,FLP: NORMAL
LYMPHOCYTES # BLD: 2.9 K/UL (ref 0.5–4.6)
LYMPHOCYTES NFR BLD: 16 % (ref 13–44)
MCH RBC QN AUTO: 28.3 PG (ref 26.1–32.9)
MCHC RBC AUTO-ENTMCNC: 34 G/DL (ref 31.4–35)
MCV RBC AUTO: 83.2 FL (ref 79.6–97.8)
MONOCYTES # BLD: 1.2 K/UL (ref 0.1–1.3)
MONOCYTES NFR BLD: 7 % (ref 4–12)
NEUTS SEG # BLD: 13.2 K/UL (ref 1.7–8.2)
NEUTS SEG NFR BLD: 76 % (ref 43–78)
NRBC # BLD: 0 K/UL (ref 0–0.2)
P-R INTERVAL, ECG05: 152 MS
PLATELET # BLD AUTO: 478 K/UL (ref 150–450)
PMV BLD AUTO: 9.3 FL (ref 9.4–12.3)
POTASSIUM SERPL-SCNC: 3.2 MMOL/L (ref 3.5–5.1)
PROCALCITONIN SERPL-MCNC: 0.07 NG/ML
PROT SERPL-MCNC: 6.9 G/DL (ref 6.3–8.2)
Q-T INTERVAL, ECG07: 322 MS
QRS DURATION, ECG06: 78 MS
QTC CALCULATION (BEZET), ECG08: 415 MS
RBC # BLD AUTO: 4.88 M/UL (ref 4.05–5.2)
SODIUM SERPL-SCNC: 135 MMOL/L (ref 136–145)
TRIGL SERPL-MCNC: 79 MG/DL (ref 35–150)
TROPONIN I SERPL-MCNC: <0.02 NG/ML (ref 0.02–0.05)
VENTRICULAR RATE, ECG03: 100 BPM
VLDLC SERPL CALC-MCNC: 15.8 MG/DL (ref 6–23)
WBC # BLD AUTO: 17.5 K/UL (ref 4.3–11.1)

## 2019-12-26 PROCEDURE — 96375 TX/PRO/DX INJ NEW DRUG ADDON: CPT | Performed by: EMERGENCY MEDICINE

## 2019-12-26 PROCEDURE — 83605 ASSAY OF LACTIC ACID: CPT

## 2019-12-26 PROCEDURE — 96376 TX/PRO/DX INJ SAME DRUG ADON: CPT | Performed by: EMERGENCY MEDICINE

## 2019-12-26 PROCEDURE — 77030020263 HC SOL INJ SOD CL0.9% LFCR 1000ML

## 2019-12-26 PROCEDURE — 74011250637 HC RX REV CODE- 250/637: Performed by: INTERNAL MEDICINE

## 2019-12-26 PROCEDURE — 80053 COMPREHEN METABOLIC PANEL: CPT

## 2019-12-26 PROCEDURE — 74011250636 HC RX REV CODE- 250/636: Performed by: INTERNAL MEDICINE

## 2019-12-26 PROCEDURE — 99284 EMERGENCY DEPT VISIT MOD MDM: CPT | Performed by: EMERGENCY MEDICINE

## 2019-12-26 PROCEDURE — 80061 LIPID PANEL: CPT

## 2019-12-26 PROCEDURE — 65270000029 HC RM PRIVATE

## 2019-12-26 PROCEDURE — 96374 THER/PROPH/DIAG INJ IV PUSH: CPT | Performed by: EMERGENCY MEDICINE

## 2019-12-26 PROCEDURE — 71045 X-RAY EXAM CHEST 1 VIEW: CPT

## 2019-12-26 PROCEDURE — 74177 CT ABD & PELVIS W/CONTRAST: CPT

## 2019-12-26 PROCEDURE — 85025 COMPLETE CBC W/AUTO DIFF WBC: CPT

## 2019-12-26 PROCEDURE — 74011000258 HC RX REV CODE- 258: Performed by: EMERGENCY MEDICINE

## 2019-12-26 PROCEDURE — 83690 ASSAY OF LIPASE: CPT

## 2019-12-26 PROCEDURE — 74011636320 HC RX REV CODE- 636/320: Performed by: EMERGENCY MEDICINE

## 2019-12-26 PROCEDURE — 84484 ASSAY OF TROPONIN QUANT: CPT

## 2019-12-26 PROCEDURE — 74011250636 HC RX REV CODE- 250/636: Performed by: EMERGENCY MEDICINE

## 2019-12-26 PROCEDURE — 84145 PROCALCITONIN (PCT): CPT

## 2019-12-26 PROCEDURE — 93005 ELECTROCARDIOGRAM TRACING: CPT | Performed by: EMERGENCY MEDICINE

## 2019-12-26 RX ORDER — SODIUM CHLORIDE 9 MG/ML
100 INJECTION, SOLUTION INTRAVENOUS CONTINUOUS
Status: DISPENSED | OUTPATIENT
Start: 2019-12-26 | End: 2019-12-27

## 2019-12-26 RX ORDER — HYDROMORPHONE HYDROCHLORIDE 1 MG/ML
1 INJECTION, SOLUTION INTRAMUSCULAR; INTRAVENOUS; SUBCUTANEOUS
Status: DISCONTINUED | OUTPATIENT
Start: 2019-12-26 | End: 2019-12-30 | Stop reason: HOSPADM

## 2019-12-26 RX ORDER — SODIUM CHLORIDE 0.9 % (FLUSH) 0.9 %
5-40 SYRINGE (ML) INJECTION EVERY 8 HOURS
Status: DISCONTINUED | OUTPATIENT
Start: 2019-12-26 | End: 2019-12-30 | Stop reason: HOSPADM

## 2019-12-26 RX ORDER — MORPHINE SULFATE 4 MG/ML
8 INJECTION INTRAVENOUS
Status: COMPLETED | OUTPATIENT
Start: 2019-12-26 | End: 2019-12-26

## 2019-12-26 RX ORDER — MORPHINE SULFATE 4 MG/ML
4 INJECTION INTRAVENOUS
Status: COMPLETED | OUTPATIENT
Start: 2019-12-26 | End: 2019-12-26

## 2019-12-26 RX ORDER — PRAVASTATIN SODIUM 20 MG/1
40 TABLET ORAL DAILY
Status: DISCONTINUED | OUTPATIENT
Start: 2019-12-27 | End: 2019-12-30 | Stop reason: HOSPADM

## 2019-12-26 RX ORDER — ONDANSETRON 2 MG/ML
4 INJECTION INTRAMUSCULAR; INTRAVENOUS
Status: COMPLETED | OUTPATIENT
Start: 2019-12-26 | End: 2019-12-26

## 2019-12-26 RX ORDER — OXYCODONE AND ACETAMINOPHEN 5; 325 MG/1; MG/1
1 TABLET ORAL
Status: DISCONTINUED | OUTPATIENT
Start: 2019-12-26 | End: 2019-12-30 | Stop reason: HOSPADM

## 2019-12-26 RX ORDER — ACETAMINOPHEN 325 MG/1
650 TABLET ORAL
Status: DISCONTINUED | OUTPATIENT
Start: 2019-12-26 | End: 2019-12-30 | Stop reason: HOSPADM

## 2019-12-26 RX ORDER — SODIUM CHLORIDE 0.9 % (FLUSH) 0.9 %
5-40 SYRINGE (ML) INJECTION AS NEEDED
Status: DISCONTINUED | OUTPATIENT
Start: 2019-12-26 | End: 2019-12-30 | Stop reason: HOSPADM

## 2019-12-26 RX ORDER — LEVOFLOXACIN 500 MG/1
500 TABLET, FILM COATED ORAL EVERY 24 HOURS
Status: DISCONTINUED | OUTPATIENT
Start: 2019-12-26 | End: 2019-12-26

## 2019-12-26 RX ORDER — POTASSIUM CHLORIDE 14.9 MG/ML
20 INJECTION INTRAVENOUS
Status: COMPLETED | OUTPATIENT
Start: 2019-12-26 | End: 2019-12-26

## 2019-12-26 RX ORDER — FAMOTIDINE 10 MG/1
20 TABLET ORAL DAILY
Status: DISCONTINUED | OUTPATIENT
Start: 2019-12-26 | End: 2019-12-30 | Stop reason: HOSPADM

## 2019-12-26 RX ORDER — SODIUM CHLORIDE 0.9 % (FLUSH) 0.9 %
10 SYRINGE (ML) INJECTION
Status: COMPLETED | OUTPATIENT
Start: 2019-12-26 | End: 2019-12-26

## 2019-12-26 RX ORDER — HEPARIN SODIUM 5000 [USP'U]/ML
5000 INJECTION, SOLUTION INTRAVENOUS; SUBCUTANEOUS EVERY 8 HOURS
Status: DISCONTINUED | OUTPATIENT
Start: 2019-12-26 | End: 2019-12-30 | Stop reason: HOSPADM

## 2019-12-26 RX ORDER — ONDANSETRON 2 MG/ML
4 INJECTION INTRAMUSCULAR; INTRAVENOUS
Status: DISCONTINUED | OUTPATIENT
Start: 2019-12-26 | End: 2019-12-30 | Stop reason: HOSPADM

## 2019-12-26 RX ADMIN — PROMETHAZINE HYDROCHLORIDE 12.5 MG: 25 INJECTION INTRAMUSCULAR; INTRAVENOUS at 11:48

## 2019-12-26 RX ADMIN — IOPAMIDOL 100 ML: 755 INJECTION, SOLUTION INTRAVENOUS at 10:11

## 2019-12-26 RX ADMIN — ONDANSETRON 4 MG: 2 INJECTION INTRAMUSCULAR; INTRAVENOUS at 22:36

## 2019-12-26 RX ADMIN — HEPARIN SODIUM 5000 UNITS: 5000 INJECTION INTRAVENOUS; SUBCUTANEOUS at 22:36

## 2019-12-26 RX ADMIN — ONDANSETRON 4 MG: 2 INJECTION INTRAMUSCULAR; INTRAVENOUS at 09:58

## 2019-12-26 RX ADMIN — FAMOTIDINE 20 MG: 10 TABLET ORAL at 12:25

## 2019-12-26 RX ADMIN — MORPHINE SULFATE 8 MG: 4 INJECTION INTRAVENOUS at 09:39

## 2019-12-26 RX ADMIN — SODIUM CHLORIDE 100 ML: 900 INJECTION, SOLUTION INTRAVENOUS at 10:11

## 2019-12-26 RX ADMIN — MORPHINE SULFATE 4 MG: 4 INJECTION INTRAVENOUS at 08:14

## 2019-12-26 RX ADMIN — OXYCODONE HYDROCHLORIDE AND ACETAMINOPHEN 1 TABLET: 5; 325 TABLET ORAL at 16:46

## 2019-12-26 RX ADMIN — Medication 10 ML: at 10:11

## 2019-12-26 RX ADMIN — Medication 10 ML: at 10:53

## 2019-12-26 RX ADMIN — ONDANSETRON 4 MG: 2 INJECTION INTRAMUSCULAR; INTRAVENOUS at 16:46

## 2019-12-26 RX ADMIN — OXYCODONE HYDROCHLORIDE AND ACETAMINOPHEN 1 TABLET: 5; 325 TABLET ORAL at 22:36

## 2019-12-26 RX ADMIN — Medication 10 ML: at 14:45

## 2019-12-26 RX ADMIN — SODIUM CHLORIDE 100 ML/HR: 900 INJECTION, SOLUTION INTRAVENOUS at 22:36

## 2019-12-26 RX ADMIN — ONDANSETRON 4 MG: 2 INJECTION INTRAMUSCULAR; INTRAVENOUS at 08:14

## 2019-12-26 RX ADMIN — OXYCODONE HYDROCHLORIDE AND ACETAMINOPHEN 1 TABLET: 5; 325 TABLET ORAL at 11:52

## 2019-12-26 RX ADMIN — LEVOFLOXACIN 750 MG: 500 TABLET, FILM COATED ORAL at 12:27

## 2019-12-26 RX ADMIN — POTASSIUM CHLORIDE 20 MEQ: 200 INJECTION, SOLUTION INTRAVENOUS at 14:40

## 2019-12-26 RX ADMIN — SODIUM CHLORIDE 100 ML/HR: 900 INJECTION, SOLUTION INTRAVENOUS at 10:53

## 2019-12-26 RX ADMIN — DIATRIZOATE MEGLUMINE AND DIATRIZOATE SODIUM 15 ML: 660; 100 LIQUID ORAL; RECTAL at 08:40

## 2019-12-26 RX ADMIN — HEPARIN SODIUM 5000 UNITS: 5000 INJECTION INTRAVENOUS; SUBCUTANEOUS at 14:40

## 2019-12-26 RX ADMIN — HYDROMORPHONE HYDROCHLORIDE 1 MG: 1 INJECTION, SOLUTION INTRAMUSCULAR; INTRAVENOUS; SUBCUTANEOUS at 14:40

## 2019-12-26 RX ADMIN — POTASSIUM CHLORIDE 20 MEQ: 200 INJECTION, SOLUTION INTRAVENOUS at 12:30

## 2019-12-26 NOTE — ED NOTES
Notified Alley Cartagena in [address] that patient completed Gastroview oral contrast at 4290.

## 2019-12-26 NOTE — ED PROVIDER NOTES
Sunday night patient started with some epigastric pain going through to her back. Symptoms have progressed to today. No shortness of breath numbness or diaphoresis. Had some nausea and vomiting this morning so came in. History of pancreatitis from possible azithromycin in the past.  Currently on doxycycline for a sinus infection. The history is provided by the patient. No  was used. Chest Pain    This is a new problem. The current episode started more than 2 days ago. The problem has been gradually worsening. Duration of episode(s) is 4 days. The problem occurs daily. The pain is associated with normal activity and eating food. The pain is present in the epigastric region. The pain is moderate. The quality of the pain is described as sharp. The pain radiates to the mid back. The symptoms are aggravated by eating. Associated symptoms include abdominal pain, back pain, nausea and vomiting. Pertinent negatives include no cough, no diaphoresis, no dizziness, no exertional chest pressure, no fever, no headaches, no irregular heartbeat, no leg pain, no lower extremity edema, no malaise/fatigue, no numbness, no orthopnea, no palpitations, no shortness of breath and no weakness. She has tried nothing for the symptoms. Risk factors include hypertension. Her past medical history is significant for HTN. Her past medical history does not include DM.         Past Medical History:   Diagnosis Date    STEPHEN (acute kidney injury) ([address] 12/04/2014    pt denies this dx    Back pain 6/10/2016    Breast cancer ([facility] 2018    Breast lump dx 2/2018    left    Bronchitis     Discharge from the vagina     Dysuria     Eczema 6/10/2016    Fungal dermatitis     Headache 6/10/2016    Hypercholesterolemia 12/4/2014    Hypertension     not well controlled--per pt    Intractable vomiting 5/20/2018    Menopausal vaginal dryness     Osteoarthritis 6/10/2016    Osteoporosis 6/10/2016    PUD (peptic ulcer disease)     last 2003 which a rupture an extensive surgery    Sepsis ([facility] [address] 12/4/2014    Tobacco abuse     1ppd x 49 yrs       Past Surgical History:   Procedure Laterality Date    ABDOMEN SURGERY PROC UNLISTED  2003    stomach surgery for ruptured ulcer and extensive rerouting of intestestines per patient     BREAST SURGERY PROCEDURE UNLISTED Left 1975    breast lumpectomy, benign  (left)    HX APPENDECTOMY  1977    with hysterectomy    HX BREAST BIOPSY Left 1975    HX BREAST LUMPECTOMY Left 2/22/2018    LEFT BREAST LUMPECTOMY/ SENTINEL NODE BIOPSY performed by Amy Tirado MD at [facility] MA[facility] OR    HX CATARACT REMOVAL Bilateral 2018    HX CHOLECYSTECTOMY      HX ENDOSCOPY      HX ENDOSCOPY  05/21/2018    empiric dilation    HX HYSTERECTOMY  1977    HX OTHER SURGICAL      hernicolectomy 2 cm    HX TUBAL LIGATION  1975         Family History:   Problem Relation Age of Onset    Diabetes Sister     Heart Disease Sister     Heart Disease Father     Heart Attack Father     Cancer Brother         prostate    Cancer Sister     Breast Cancer Sister 79    Heart Disease Sister     Heart Disease Brother     Hypertension Mother     No Known Problems Brother     Hypertension Other         Brother and sister with htn    No Known Problems Maternal Grandmother     No Known Problems Maternal Grandfather     No Known Problems Paternal Grandmother     No Known Problems Paternal Grandfather     Thyroid Cancer Neg Hx        Social History     Socioeconomic History    Marital status:      Spouse name: Not on file    Number of children: Not on file    Years of education: Not on file    Highest education level: Not on file   Occupational History    Not on file   Social Needs    Financial resource strain: Not on file    Food insecurity:     Worry: Not on file     Inability: Not on file    Transportation needs:     Medical: Not on file     Non-medical: Not on file   Tobacco Use    Smoking status: Current Every Day Smoker     Packs/day: 1.00     Years: 49.00     Pack years: 49.00     Types: Cigarettes     Start date: 5/6/1966    Smokeless tobacco: Never Used   Substance and Sexual Activity    Alcohol use: No    Drug use: No    Sexual activity: Not Currently     Partners: Male     Birth control/protection: None   Lifestyle    Physical activity:     Days per week: Not on file     Minutes per session: Not on file    Stress: Not on file   Relationships    Social connections:     Talks on phone: Not on file     Gets together: Not on file     Attends Restorationism service: Not on file     Active member of club or organization: Not on file     Attends meetings of clubs or organizations: Not on file     Relationship status: Not on file    Intimate partner violence:     Fear of current or ex partner: Not on file     Emotionally abused: Not on file     Physically abused: Not on file     Forced sexual activity: Not on file   Other Topics Concern    Not on file   Social History Narrative    Not on file         ALLERGIES: Azithromycin and Codeine    Review of Systems   Constitutional: Negative for chills, diaphoresis, fever and malaise/fatigue. HENT: Negative for rhinorrhea and sore throat. Eyes: Negative for pain and redness. Respiratory: Negative for cough, chest tightness, shortness of breath and wheezing. Cardiovascular: Positive for chest pain. Negative for palpitations, orthopnea and leg swelling. Gastrointestinal: Positive for abdominal pain, nausea and vomiting. Negative for diarrhea. Genitourinary: Negative for dysuria and hematuria. Musculoskeletal: Positive for back pain. Negative for gait problem, neck pain and neck stiffness. Skin: Negative for color change and rash. Neurological: Negative for dizziness, weakness, numbness and headaches.        Vitals:    12/26/19 0743   BP: 148/67   Pulse: 92   Resp: 16   SpO2: 99%   Weight: 52.6 kg (116 lb)   Height: 5' 2\" (1.575 m) Physical Exam  Constitutional:       Appearance: She is well-developed. HENT:      Head: Normocephalic and atraumatic. Neck:      Musculoskeletal: Normal range of motion and neck supple. Cardiovascular:      Rate and Rhythm: Normal rate and regular rhythm. Pulmonary:      Effort: Pulmonary effort is normal.      Breath sounds: Normal breath sounds. Abdominal:      General: Bowel sounds are normal.      Palpations: Abdomen is soft. Tenderness: There is tenderness (epigastric). Musculoskeletal: Normal range of motion. General: No tenderness. Skin:     General: Skin is warm and dry. Neurological:      General: No focal deficit present. Mental Status: She is alert and oriented to person, place, and time. MDM  Number of Diagnoses or Management Options  Diagnosis management comments: Pt with recurrent pancreatitis. Will admit. NV with it. Amount and/or Complexity of Data Reviewed  Clinical lab tests: ordered and reviewed  Tests in the radiology section of CPT®: ordered and reviewed  Tests in the medicine section of CPT®: ordered and reviewed    Patient Progress  Patient progress: stable         Procedures          EKG: normal sinus rhythm, nonspecific ST and T waves changes. XR CHEST PORT (Final result)   Result time 12/26/19 08:16:39   Final result by Cydney Ingram MD (12/26/19 08:16:39)                Impression:    IMPRESSION: No acute cardiopulmonary finding. Narrative:    1 View portable chest x-ray 12/26/2019 7:55 AM    Indication: Reports chest pain x5 days    Comparison: 5/5/2019 chest x-ray    Findings: This portable upright AP chest of 07 55 shows the lungs well inflated. Monitoring leads overlie the chest. Cardiomediastinal silhouette within normal  limits. The lungs are clear and normally inflated. There is normal pulmonary  vascularity.  There is no pleural abnormality.                ECG Results     None   Results Include:    Recent Results (from the past 24 hour(s))   CBC WITH AUTOMATED DIFF    Collection Time: 12/26/19  7:46 AM   Result Value Ref Range    WBC 17.5 (H) 4.3 - 11.1 K/uL    RBC 4.88 4.05 - 5.2 M/uL    HGB 13.8 11.7 - 15.4 g/dL    HCT 40.6 35.8 - 46.3 %    MCV 83.2 79.6 - 97.8 FL    MCH 28.3 26.1 - 32.9 PG    MCHC 34.0 31.4 - 35.0 g/dL    RDW 14.2 11.9 - 14.6 %    PLATELET 107 (H) 833 - 450 K/uL    MPV 9.3 (L) 9.4 - 12.3 FL    ABSOLUTE NRBC 0.00 0.0 - 0.2 K/uL    DF AUTOMATED      NEUTROPHILS 76 43 - 78 %    LYMPHOCYTES 16 13 - 44 %    MONOCYTES 7 4.0 - 12.0 %    EOSINOPHILS 0 (L) 0.5 - 7.8 %    BASOPHILS 1 0.0 - 2.0 %    IMMATURE GRANULOCYTES 1 0.0 - 5.0 %    ABS. NEUTROPHILS 13.2 (H) 1.7 - 8.2 K/UL    ABS. LYMPHOCYTES 2.9 0.5 - 4.6 K/UL    ABS. MONOCYTES 1.2 0.1 - 1.3 K/UL    ABS. EOSINOPHILS 0.1 0.0 - 0.8 K/UL    ABS. BASOPHILS 0.1 0.0 - 0.2 K/UL    ABS. IMM. GRANS. 0.1 0.0 - 0.5 K/UL   METABOLIC PANEL, COMPREHENSIVE    Collection Time: 12/26/19  7:46 AM   Result Value Ref Range    Sodium 135 (L) 136 - 145 mmol/L    Potassium 3.2 (L) 3.5 - 5.1 mmol/L    Chloride 104 98 - 107 mmol/L    CO2 23 21 - 32 mmol/L    Anion gap 8 7 - 16 mmol/L    Glucose 120 (H) 65 - 100 mg/dL    BUN 34 (H) 8 - 23 MG/DL    Creatinine 1.12 (H) 0.6 - 1.0 MG/DL    GFR est AA >60 >60 ml/min/1.73m2    GFR est non-AA 50 (L) >60 ml/min/1.73m2    Calcium 9.3 8.3 - 10.4 MG/DL    Bilirubin, total 0.4 0.2 - 1.1 MG/DL    ALT (SGPT) 17 12 - 65 U/L    AST (SGOT) 13 (L) 15 - 37 U/L    Alk.  phosphatase 149 (H) 50 - 136 U/L    Protein, total 6.9 6.3 - 8.2 g/dL    Albumin 3.2 3.2 - 4.6 g/dL    Globulin 3.7 (H) 2.3 - 3.5 g/dL    A-G Ratio 0.9 (L) 1.2 - 3.5     TROPONIN I    Collection Time: 12/26/19  7:46 AM   Result Value Ref Range    Troponin-I, Qt. <0.02 (L) 0.02 - 0.05 NG/ML   LIPASE    Collection Time: 12/26/19  7:46 AM   Result Value Ref Range    Lipase 2,602 (H) 73 - 393 U/L   POC LACTIC ACID    Collection Time: 12/26/19  8:25 AM   Result Value Ref Range    Lactic Acid (POC) 1.69 0.5 - 1.9 mmol/L

## 2019-12-26 NOTE — PROGRESS NOTES
TRANSFER - IN REPORT:    Verbal report received from TAI [place] on Karissa Roswell  being received from ED for routine progression of care      Report consisted of patients Situation, Background, Assessment and   Recommendations(SBAR). Information from the following report(s) SBAR was reviewed with the receiving nurse. Opportunity for questions and clarification was provided. Assessment completed upon patients arrival to unit and care assumed.

## 2019-12-26 NOTE — PROGRESS NOTES
Care Management Interventions  Mode of Transport at Discharge: Self  Transition of Care Consult (CM Consult): Discharge Planning  Physical Therapy Consult: No  Occupational Therapy Consult: No  Speech Therapy Consult: No  Discharge Location  Discharge Placement: Home    CM met with patient in room. Patient stated she was tired and is on pain medications and would like for CM to return on a different day. CM will return at a different time. CM will continue to follow patient during hospitalization for discharge planning and needs. Please consult CM for any discharge needs.

## 2019-12-26 NOTE — ROUTINE PROCESS
TRANSFER - OUT REPORT: 
 
Verbal report given to Jasmina Nelson RN on Evonnie Rounds  being transferred to 6th floor for routine progression of care Report consisted of patients Situation, Background, Assessment and  
Recommendations(SBAR). Information from the following report(s) ED Summary was reviewed with the receiving nurse. Lines:  
Peripheral IV 12/26/19 Right Antecubital (Active) Opportunity for questions and clarification was provided.    
 
Patient transported with:

## 2019-12-26 NOTE — PROGRESS NOTES
12/26/19 1054   Dual Skin Pressure Injury Assessment   Dual Skin Pressure Injury Assessment WDL   Second Care Provider (Based on [address] [place] [address]) Obed Day RN     Incisional scar healed to ABD. Scattered moles to chest and back. No open areas noted.

## 2019-12-26 NOTE — H&P
Hospitalist Note     Admit Date:  2019  7:33 AM   Name:  Lisa Rodriguez   Age:  70 y.o.  :  1940   MRN:  [id number]   PCP:  Zachariah Hodgson MD  Treatment Team: Attending Provider: Glenis Woodruff MD; Utilization Review: Aimee Wong    HPI/Subjective:   Ms. Pablo James is a 82-year-old female with past medical history of hypertension, hyperlipidemia, GERD, breast CA (s/p lumpectomy and sentinel node removal) who enters with worsening epigastric pain, nausea vomiting since . Pain is located in the epigastric and lower substernal region with radiation to her back. Pain is sharp and 8/10 without any sedation or alleviating factors. He has been consistent since . She has been having nausea and multiple episodes of vomiting since this morning and unable to tolerate any p.o. Patient denies any fever, chills, shortness of breath, dysuria, headaches. She had 1 episode of pancreatitis in the past and was told that it was due to azithromycin. She is a current daily smoker (1/2 pack per day) for more than 30yrs. Denies alcohol abuse. States she is currently taking doxycycline for sinus infection. 10 systems reviewed and negative except as noted in HPI.       Past Medical History:   Diagnosis Date    STEPHEN (acute kidney injury) ([address] 2014    pt denies this dx    Back pain 6/10/2016    Breast cancer ([address] 2018    Breast lump dx 2018    left    Bronchitis     Discharge from the vagina     Dysuria     Eczema 6/10/2016    Fungal dermatitis     Headache 6/10/2016    Hypercholesterolemia 2014    Hypertension     not well controlled--per pt    Intractable vomiting 2018    Menopausal vaginal dryness     Osteoarthritis 6/10/2016    Osteoporosis 6/10/2016    PUD (peptic ulcer disease)     last  which a rupture an extensive surgery    Sepsis ([address] 2014    Tobacco abuse     1ppd x 49 yrs      Past Surgical History:   Procedure Laterality Date    ABDOMEN SURGERY PROC UNLISTED  2003    stomach surgery for ruptured ulcer and extensive rerouting of intestestines per patient     BREAST SURGERY PROCEDURE UNLISTED Left 1975    breast lumpectomy, benign  (left)    HX APPENDECTOMY  1977    with hysterectomy    HX BREAST BIOPSY Left 1975    HX BREAST LUMPECTOMY Left 2/22/2018    LEFT BREAST LUMPECTOMY/ SENTINEL NODE BIOPSY performed [facility]    HX CATARACT REMOVAL Bilateral 2018    HX CHOLECYSTECTOMY      HX ENDOSCOPY      HX ENDOSCOPY  05/21/2018    empiric dilation    HX HYSTERECTOMY  1977    HX OTHER SURGICAL      hernicolectomy 2 cm    HX TUBAL LIGATION  1975      Allergies   Allergen Reactions    Azithromycin Other (comments)     pancreatitis    Codeine Nausea and Vomiting      Social History     Tobacco Use    Smoking status: Current Every Day Smoker     Packs/day: 1.00     Years: 49.00     Pack years: 49.00     Types: Cigarettes     Start date: 5/6/1966    Smokeless tobacco: Never Used   Substance Use Topics    Alcohol use: No      Family History   Problem Relation Age of Onset    Diabetes Sister     Heart Disease Sister     Heart Disease Father     Heart Attack Father     Cancer Brother         prostate    Cancer Sister     Breast Cancer Sister 79    Heart Disease Sister     Heart Disease Brother     Hypertension Mother     No Known Problems Brother     Hypertension Other         Brother and sister with htn    No Known Problems Maternal Grandmother     No Known Problems Maternal Grandfather     No Known Problems Paternal Grandmother     No Known Problems Paternal Grandfather     Thyroid Cancer Neg Hx       Immunization History   Administered Date(s) Administered    Influenza High Dose Vaccine PF 09/04/2016, 08/29/2018, 09/21/2019    Pneumococcal Conjugate (PCV-13) 09/04/2016    TB Skin Test (PPD) Intradermal 05/07/2018     PTA Medications:  Prior to Admission Medications   Prescriptions Last Dose Informant Patient Reported? Taking? albuterol (PROVENTIL HFA, VENTOLIN HFA, PROAIR HFA) 90 mcg/actuation inhaler 11/26/2019 at Unknown time  No Yes   Sig: Take 2 Puffs by inhalation every four (4) hours as needed for Wheezing or Shortness of Breath. alendronate (FOSAMAX) 70 mg tablet 12/19/2019 at Unknown time  No Yes   Sig: TAKE 1 TABLET BY MOUTH EVERY 7 DAYS   Patient taking differently: Take 70 mg by mouth every seven (7) days. TAKE 1 TABLET BY MOUTH EVERY 7 DAYS   diclofenac (VOLTAREN) 1 % gel 11/26/2019 at Unknown time  No Yes   Sig: Apply 2 g to affected area four (4) times daily. Patient taking differently: Apply 2 g to affected area as needed. doxycycline (VIBRAMYCIN) 100 mg capsule 12/25/2019 at Unknown time  No Yes   Sig: Take 1 Cap by mouth two (2) times a day. ibuprofen (MOTRIN) 800 mg tablet 12/26/2019 at Unknown time  No Yes   Sig: Take 1 Tab by mouth three (3) times daily. Patient taking differently: Take 800 mg by mouth every eight (8) hours as needed. losartan-hydroCHLOROthiazide (HYZAAR) 50-12.5 mg per tablet 12/26/2019 at Unknown time  No Yes   Sig: Take 1 Tab by mouth daily. pravastatin (PRAVACHOL) 40 mg tablet 12/26/2019 at Unknown time  No Yes   Sig: Take 1 Tab by mouth nightly. Patient taking differently: Take 40 mg by mouth daily. Facility-Administered Medications: None       Objective:     Patient Vitals for the past 24 hrs:   Temp Pulse Resp BP SpO2   12/26/19 1050 97.5 °F (36.4 °C) 87 16 144/71 97 %   12/26/19 0743 98.6 °F (37 °C) 92 16 148/67 99 %     Oxygen Therapy  O2 Sat (%): 97 % (12/26/19 1050)  O2 Device: Room air (12/26/19 0743)    Estimated body mass index is 21.22 kg/m² as calculated from the following:    Height as of this encounter: 5' 2\" (1.575 m). Weight as of this encounter: 52.6 kg (116 lb).     No intake or output data in the 24 hours ending 12/26/19 1158    *Note that automatically entered I/Os may not be accurate; dependent on patient compliance with collection and accurate  by assistants. Physical Exam:  General:     alert, awake, no acute distress. wellnourished  Head:   normocephalic, atraumatic  Eyes, Ears, nose: PERRL, EOMI. Neck:    supple, non-tender    Lungs:   CTAB, no wheezing, rhonchi, rales  Cardiac:   RRR, Normal S1 and S2. Abdomen:   Soft, non distended,+BS, +TTP in epigastric w/ slight guarding. No rebound  Extremities:   Warm, dry. No edema   Skin:   No rashes, no jaundice  Neuro:  Alert and oriented to person, time, place, situation. No gross focal neurological deficit  Psychiatric:  No anxiety, calm, cooperative      Data Review:   Recent Results (from the past 24 hour(s))   EKG, 12 LEAD, INITIAL    Collection Time: 12/26/19  7:38 AM   Result Value Ref Range    Ventricular Rate 100 BPM    Atrial Rate 100 BPM    P-R Interval 152 ms    QRS Duration 78 ms    Q-T Interval 322 ms    QTC Calculation (Bezet) 415 ms    Calculated P Axis 79 degrees    Calculated R Axis 68 degrees    Calculated T Axis 72 degrees    Diagnosis       !! AGE AND GENDER SPECIFIC ECG ANALYSIS !! Normal sinus rhythm  RSR' or QR pattern in V1 suggests right ventricular conduction delay  Abnormal ECG  When compared with ECG of 18-NOV-2017 12:55,  Vent.  rate has increased BY  33 BPM  Nonspecific T wave abnormality no longer evident in Inferior leads  Nonspecific T wave abnormality no longer evident in Anterolateral leads  Confirmed by [facility]  MD ()FREDDIE ([id number]) on 12/26/2019 9:23:57 AM     CBC WITH AUTOMATED DIFF    Collection Time: 12/26/19  7:46 AM   Result Value Ref Range    WBC 17.5 (H) 4.3 - 11.1 K/uL    RBC 4.88 4.05 - 5.2 M/uL    HGB 13.8 11.7 - 15.4 g/dL    HCT 40.6 35.8 - 46.3 %    MCV 83.2 79.6 - 97.8 FL    MCH 28.3 26.1 - 32.9 PG    MCHC 34.0 31.4 - 35.0 g/dL    RDW 14.2 11.9 - 14.6 %    PLATELET 211 (H) 398 - 450 K/uL    MPV 9.3 (L) 9.4 - 12.3 FL    ABSOLUTE NRBC 0.00 0.0 - 0.2 K/uL    DF AUTOMATED      NEUTROPHILS 76 43 - 78 %    LYMPHOCYTES 16 13 - 44 %    MONOCYTES 7 4.0 - 12.0 %    EOSINOPHILS 0 (L) 0.5 - 7.8 %    BASOPHILS 1 0.0 - 2.0 %    IMMATURE GRANULOCYTES 1 0.0 - 5.0 %    ABS. NEUTROPHILS 13.2 (H) 1.7 - 8.2 K/UL    ABS. LYMPHOCYTES 2.9 0.5 - 4.6 K/UL    ABS. MONOCYTES 1.2 0.1 - 1.3 K/UL    ABS. EOSINOPHILS 0.1 0.0 - 0.8 K/UL    ABS. BASOPHILS 0.1 0.0 - 0.2 K/UL    ABS. IMM. GRANS. 0.1 0.0 - 0.5 K/UL   METABOLIC PANEL, COMPREHENSIVE    Collection Time: 12/26/19  7:46 AM   Result Value Ref Range    Sodium 135 (L) 136 - 145 mmol/L    Potassium 3.2 (L) 3.5 - 5.1 mmol/L    Chloride 104 98 - 107 mmol/L    CO2 23 21 - 32 mmol/L    Anion gap 8 7 - 16 mmol/L    Glucose 120 (H) 65 - 100 mg/dL    BUN 34 (H) 8 - 23 MG/DL    Creatinine 1.12 (H) 0.6 - 1.0 MG/DL    GFR est AA >60 >60 ml/min/1.73m2    GFR est non-AA 50 (L) >60 ml/min/1.73m2    Calcium 9.3 8.3 - 10.4 MG/DL    Bilirubin, total 0.4 0.2 - 1.1 MG/DL    ALT (SGPT) 17 12 - 65 U/L    AST (SGOT) 13 (L) 15 - 37 U/L    Alk.  phosphatase 149 (H) 50 - 136 U/L    Protein, total 6.9 6.3 - 8.2 g/dL    Albumin 3.2 3.2 - 4.6 g/dL    Globulin 3.7 (H) 2.3 - 3.5 g/dL    A-G Ratio 0.9 (L) 1.2 - 3.5     TROPONIN I    Collection Time: 12/26/19  7:46 AM   Result Value Ref Range    Troponin-I, Qt. <0.02 (L) 0.02 - 0.05 NG/ML   LIPASE    Collection Time: 12/26/19  7:46 AM   Result Value Ref Range    Lipase 2,602 (H) 73 - 393 U/L   PROCALCITONIN    Collection Time: 12/26/19  7:46 AM   Result Value Ref Range    Procalcitonin 0.07 ng/mL   POC LACTIC ACID    Collection Time: 12/26/19  8:25 AM   Result Value Ref Range    Lactic Acid (POC) 1.69 0.5 - 1.9 mmol/L       All Micro Results     None          Current Facility-Administered Medications   Medication Dose Route Frequency    sodium chloride (NS) flush 5-40 mL  5-40 mL IntraVENous Q8H    sodium chloride (NS) flush 5-40 mL  5-40 mL IntraVENous PRN    acetaminophen (TYLENOL) tablet 650 mg  650 mg Oral Q4H PRN    oxyCODONE-acetaminophen (PERCOCET) 5-325 mg per tablet 1 Tab  1 Tab Oral Q4H PRN    HYDROmorphone (PF) (DILAUDID) injection 1 mg  1 mg IntraVENous Q6H PRN    ondansetron (ZOFRAN) injection 4 mg  4 mg IntraVENous Q4H PRN    heparin (porcine) injection 5,000 Units  5,000 Units SubCUTAneous Q8H    0.9% sodium chloride infusion  100 mL/hr IntraVENous CONTINUOUS    . PHARMACY TO SUBSTITUTE PER PROTOCOL (Reordered from: losartan-hydroCHLOROthiazide (HYZAAR) 50-12.5 mg per tablet)    Per Protocol    [START ON 12/27/2019] pravastatin (PRAVACHOL) tablet 40 mg  40 mg Oral DAILY       Other Studies:  Ct Abd Pelv W Cont    Result Date: 12/26/2019  CT of the Abdomen with Contrast and CT of the Pelvis with Contrast 12/26/2019 10:16 AM Indication: Chest pain, nausea and vomiting onset 5 days ago. Pancreatitis. Comparison: None available at this hospital PACS Technique:  Multiple contiguous axial images encompassing the abdomen and pelvis are obtained following the administration of approximately 100cc of Isovue 370 nonionic intravenous contrast by power injector. Dilute oral contrast is administered. For this CT scanner at least one of the following techniques is utilized to decrease patient radiation dose: Automatic exposure control, KVP and mA modulation based on patient weight, and iterative reconstruction. IV contrast is given to better delineate vascular structures and increase sensitivity of lesions in the solid organs. CT abdomen: Lung bases-some scarring, chronic changes. No free air in the abdomen. Ventral wall intact. There is surgery change at the GE junction and there has been distal gastrectomy. There has been cholecystectomy. No discrete liver lesion. There is clearly edema/induration in the keisha hepatis region about the head and neck of the pancreas. Head of the pancreas heterogeneously enhances. Rest of the pancreas appearance not remarkable. Remnant stomach shows no acute finding.  Spleen, adrenals and kidneys show no acute finding with mild left renal cortical volume loss and a few tiny cysts at this kidney. Aorta is tortuous and shows moderately heavy atherosclerosis, no acute vascular lesion with the main portal vein patent. No enlarged abdomen adenopathy. Small bowel loops, colon nondistended with no inflammation or obvious lesion. CT pelvis: Pelvic small bowel loops, rectosigmoid colon nondistended with no inflammation or obvious lesion. Midline bladder is well-distended with no focal finding. Status post hysterectomy, no adnexal mass. No lymphadenopathy, no acute vascular lesion seen. IMPRESSION: 1. Changes consistent with acute pancreatitis at the head and neck of the pancreas with clearly evident surrounding acute inflammatory fluid. Region of the head of the pancreas is hypoattenuating. 2. Prior surgery changes at the stomach and cholecystectomy as well. Other chronic changes as detailed above. Xr Chest Port    Result Date: 12/26/2019  1 View portable chest x-ray 12/26/2019 7:55 AM Indication: Reports chest pain x5 days Comparison: 5/5/2019 chest x-ray Findings: This portable upright AP chest of 07 55 shows the lungs well inflated. Monitoring leads overlie the chest. Cardiomediastinal silhouette within normal limits. The lungs are clear and normally inflated. There is normal pulmonary vascularity. There is no pleural abnormality. IMPRESSION: No acute cardiopulmonary finding.        Assessment and Plan:     Hospital Problems as of 12/26/2019 Date Reviewed: 12/26/2019          Codes Class Noted - Resolved POA    * (Principal) Acute pancreatitis (Chronic) ICD-10-CM: K85.90  ICD-9-CM: 230.4  12/26/2019 - Present Yes        History of peptic ulcer (Chronic) ICD-10-CM: Z87.11  ICD-9-CM: V12.71  3/5/2018 - Present Yes        Malignant neoplasm of upper-outer quadrant of left breast in female, estrogen receptor positive ([facility] ICD-10-CM: C50.412, Z17.0  ICD-9-CM: 174.4, V86.0  2/13/2018 - Present         Hypokalemia (Chronic) ICD-10-CM: E87.6  ICD-9-CM: 276.8  12/4/2014 - Present Yes        STEPHEN (acute kidney injury) ([facility] (Chronic) ICD-10-CM: N17.9  ICD-9-CM: 584.9  12/4/2014 - Present Yes        HTN (hypertension) (Chronic) ICD-10-CM: I10  ICD-9-CM: 401.9  12/4/2014 - Present Yes        Hypercholesterolemia (Chronic) ICD-10-CM: E78.00  ICD-9-CM: 272.0  12/4/2014 - Present Yes              Plan:  Ms. Obdulia Beck is a 72-year-old female with past medical history of hypertension, hyperlipidemia, GERD, breast CA (s/p lumpectomy and sentinel node removal), recent sinus infection currently on doxycycline who enters with worsening epigastric pain, nausea vomiting since Sunday. # Acute pancreatitis  - afebrile with leukocytosis (17) and lipase 2602  - CT A/P: findings consistent with acute pancreatitis. - pain control  - antiemetic  - clear diet   - IVF  - lipid panel    # sinus infection  - currently on doxycycline has 2 more pills left  - dc doxycycline as there has been some reported cases of acute pancreatitis  - will give levoquin to complete the course    # STEPHEN   - IVF  - monitor    # hypokalemia  - replete    # HTN: c/w meds  # HLD: c/w meds  # GERD: pepcid      Diet:  DIET CLEAR LIQUID  DVT PPx: Heparin SQ  Code: Full Code  Estimated LOS:  Greater than 2 midnights      Medical Decision Making:    Labs/Imaging reviewed. Additional information obtained from family. Additional information obtained from ED provider. Patient is high risk due to medical condition and comorbidities. In addition, requires monitoring due to receiving medications. Plan discussed with nursing staff. Plan discussed with patient/family. All questions/concerns were addressed. Pt/family agrees with the plan.        Signed:  Jovanny Stewart MD

## 2019-12-26 NOTE — PROGRESS NOTES
Hourly rounding completed on this shift. All needs met. PRN nausea medication and pain medication given per MAR. Pt is currently resting in bed. Will continue to monitor and give report to oncoming nurse.

## 2019-12-26 NOTE — PROGRESS NOTES
Pt currently vomiting and too early for Zofran. Contacted Dr. Ra Rollins and new order for phenergan.

## 2019-12-26 NOTE — ED TRIAGE NOTES
Pt arrives via pov with daughter. Pt ambulatory to ed room 2. Pt states she has been having n/v and cp since Sunday. Pt states in 2003 pt had abdominal surgery and had tube placed in upper abdomen. Pt states pain is in epigastric region. Pt denies shob. Pt also states she has chronic back pain. Pt is a/o x 4. Pt states pain increases and starts with eating.

## 2019-12-27 LAB
ANION GAP SERPL CALC-SCNC: 8 MMOL/L (ref 7–16)
BUN SERPL-MCNC: 26 MG/DL (ref 8–23)
CALCIUM SERPL-MCNC: 8.3 MG/DL (ref 8.3–10.4)
CHLORIDE SERPL-SCNC: 109 MMOL/L (ref 98–107)
CO2 SERPL-SCNC: 21 MMOL/L (ref 21–32)
CREAT SERPL-MCNC: 0.79 MG/DL (ref 0.6–1)
ERYTHROCYTE [DISTWIDTH] IN BLOOD BY AUTOMATED COUNT: 14.4 % (ref 11.9–14.6)
GLUCOSE SERPL-MCNC: 105 MG/DL (ref 65–100)
HCT VFR BLD AUTO: 40.8 % (ref 35.8–46.3)
HGB BLD-MCNC: 13.9 G/DL (ref 11.7–15.4)
MCH RBC QN AUTO: 28 PG (ref 26.1–32.9)
MCHC RBC AUTO-ENTMCNC: 34.1 G/DL (ref 31.4–35)
MCV RBC AUTO: 82.3 FL (ref 79.6–97.8)
NRBC # BLD: 0 K/UL (ref 0–0.2)
PLATELET # BLD AUTO: 410 K/UL (ref 150–450)
PMV BLD AUTO: 9.8 FL (ref 9.4–12.3)
POTASSIUM SERPL-SCNC: 3.4 MMOL/L (ref 3.5–5.1)
RBC # BLD AUTO: 4.96 M/UL (ref 4.05–5.2)
SODIUM SERPL-SCNC: 138 MMOL/L (ref 136–145)
WBC # BLD AUTO: 21.8 K/UL (ref 4.3–11.1)

## 2019-12-27 PROCEDURE — 80048 BASIC METABOLIC PNL TOTAL CA: CPT

## 2019-12-27 PROCEDURE — 36415 COLL VENOUS BLD VENIPUNCTURE: CPT

## 2019-12-27 PROCEDURE — 77030020263 HC SOL INJ SOD CL0.9% LFCR 1000ML

## 2019-12-27 PROCEDURE — 74011250637 HC RX REV CODE- 250/637: Performed by: INTERNAL MEDICINE

## 2019-12-27 PROCEDURE — 65270000029 HC RM PRIVATE

## 2019-12-27 PROCEDURE — 74011250636 HC RX REV CODE- 250/636: Performed by: INTERNAL MEDICINE

## 2019-12-27 PROCEDURE — 85027 COMPLETE CBC AUTOMATED: CPT

## 2019-12-27 RX ORDER — IBUPROFEN 200 MG
1 TABLET ORAL EVERY 24 HOURS
Status: DISCONTINUED | OUTPATIENT
Start: 2019-12-27 | End: 2019-12-30 | Stop reason: HOSPADM

## 2019-12-27 RX ADMIN — HYDROMORPHONE HYDROCHLORIDE 1 MG: 1 INJECTION, SOLUTION INTRAMUSCULAR; INTRAVENOUS; SUBCUTANEOUS at 17:38

## 2019-12-27 RX ADMIN — HEPARIN SODIUM 5000 UNITS: 5000 INJECTION INTRAVENOUS; SUBCUTANEOUS at 21:31

## 2019-12-27 RX ADMIN — OXYCODONE HYDROCHLORIDE AND ACETAMINOPHEN 1 TABLET: 5; 325 TABLET ORAL at 04:57

## 2019-12-27 RX ADMIN — ONDANSETRON 4 MG: 2 INJECTION INTRAMUSCULAR; INTRAVENOUS at 21:41

## 2019-12-27 RX ADMIN — OXYCODONE HYDROCHLORIDE AND ACETAMINOPHEN 1 TABLET: 5; 325 TABLET ORAL at 13:00

## 2019-12-27 RX ADMIN — LOSARTAN POTASSIUM: 50 TABLET, FILM COATED ORAL at 08:39

## 2019-12-27 RX ADMIN — ONDANSETRON 4 MG: 2 INJECTION INTRAMUSCULAR; INTRAVENOUS at 04:57

## 2019-12-27 RX ADMIN — FAMOTIDINE 20 MG: 10 TABLET ORAL at 08:40

## 2019-12-27 RX ADMIN — Medication 10 ML: at 13:03

## 2019-12-27 RX ADMIN — HEPARIN SODIUM 5000 UNITS: 5000 INJECTION INTRAVENOUS; SUBCUTANEOUS at 13:01

## 2019-12-27 RX ADMIN — Medication 10 ML: at 21:31

## 2019-12-27 RX ADMIN — PRAVASTATIN SODIUM 40 MG: 20 TABLET ORAL at 08:39

## 2019-12-27 RX ADMIN — HEPARIN SODIUM 5000 UNITS: 5000 INJECTION INTRAVENOUS; SUBCUTANEOUS at 04:58

## 2019-12-27 NOTE — PROGRESS NOTES
Patient was calm  Very pleasant  Receptive to  presence and support    Patient wanted to share a story of gomez with zahraa  Shared about her dying brother who had rejected the [employer] most of his life  The  led him to salvation before he     Thanked her for sharing  Prayer offered      Ethel Lowery, staff , [address], [address] [facility]  /   [email or web address]

## 2019-12-27 NOTE — PROGRESS NOTES
Hospitalist Progress Note    2019  Admit Date: 2019  7:33 AM   NAME: Trista Liao   :  1940   MRN:  [id number]   Attending: Aldo Saldivar MD  PCP:  Iwona More MD    SUBJECTIVE:   As previously documented: 30-year-old female with past medical history of hypertension, hyperlipidemia, GERD, breast CA (s/p lumpectomy and sentinel node removal) who enters with worsening epigastric pain, nausea vomiting since . Pain is located in the epigastric and lower substernal region with radiation to her back. Pain is sharp and 8/10 without any sedation or alleviating factors associated with multiple episodes of nausea/vomiting. She had 1 episode of pancreatitis in the past and was told that it was due to azithromycin. She is a current daily smoker (1/2 pack per day) for more than 30yrs. Denies alcohol abuse. Was taking doxycycline for sinus infection prior to arrival.     - reports lingering \"soreness\" throughout abdomen but pain much improved from prior. No vomiting since last night. Review of Systems negative with exception of pertinent positives noted above  PHYSICAL EXAM     Visit Vitals  /55 (BP 1 Location: Right arm, BP Patient Position: At rest)   Pulse 100   Temp 97.7 °F (36.5 °C)   Resp 18   Ht 5' 2\" (1.575 m)   Wt 52.6 kg (116 lb)   SpO2 93%   BMI 21.22 kg/m²      Temp (24hrs), Av °F (36.7 °C), Min:97.7 °F (36.5 °C), Max:98.3 °F (36.8 °C)    Oxygen Therapy  O2 Sat (%): 93 % (19 1512)  O2 Device: Room air (19 0743)    Intake/Output Summary (Last 24 hours) at 2019 1836  Last data filed at 2019 1826  Gross per 24 hour   Intake 600 ml   Output    Net 600 ml      General: No acute distress    Lungs:  CTA Bilaterally.    Heart:  Regular rate and rhythm,  No murmur, rub, or gallop  Abdomen: Soft, Non distended, Non tender, Positive bowel sounds  Extremities: No cyanosis, clubbing or edema  Neurologic:  No focal deficits    ASSESSMENT Active Hospital Problems    Diagnosis Date Noted    Acute pancreatitis 12/26/2019    History of peptic ulcer 03/05/2018    HTN (hypertension) 12/04/2014    Hypercholesterolemia 12/04/2014    STEPHEN (acute kidney injury) ([facility] 12/04/2014    Hypokalemia 12/04/2014     A/P  Acute pancreatitis  - afebrile with leukocytosis (17) and lipase 2602  - CT A/P: findings consistent with acute pancreatitis.   - pain control  - antiemetic  - continue clear diet   - IVF  - lipid panel unremarkable     # sinus infection  - currently on doxycycline has 2 more pills left  - dc doxycycline as there has been some reported cases of acute pancreatitis  - cont levaquin to complete course     # STEPHEN   - improved on IVF, continue     # hypokalemia  - replete, repeat in AM     # HTN: c/w meds  # HLD: c/w meds  # GERD: pepcid     Diet:  DIET CLEAR LIQUID  DVT PPx: Heparin SQ  Code: Full Code  Estimated LOS:  Greater than 2 midnights    Dispo - consider advancing diet in AM and possible discharge 1-2 days    Signed By: Nichole Higginbotham DO     December 27, 2019

## 2019-12-28 ENCOUNTER — APPOINTMENT (OUTPATIENT)
Dept: GENERAL RADIOLOGY | Age: 79
DRG: 439 | End: 2019-12-28
Attending: INTERNAL MEDICINE
Payer: MEDICARE

## 2019-12-28 LAB
ALBUMIN SERPL-MCNC: 2.1 G/DL (ref 3.2–4.6)
ALBUMIN/GLOB SERPL: 0.8 {RATIO} (ref 1.2–3.5)
ALP SERPL-CCNC: 132 U/L (ref 50–136)
ALT SERPL-CCNC: 12 U/L (ref 12–65)
ANION GAP SERPL CALC-SCNC: 8 MMOL/L (ref 7–16)
AST SERPL-CCNC: 19 U/L (ref 15–37)
BILIRUB SERPL-MCNC: 0.5 MG/DL (ref 0.2–1.1)
BUN SERPL-MCNC: 32 MG/DL (ref 8–23)
CALCIUM SERPL-MCNC: 7.7 MG/DL (ref 8.3–10.4)
CHLORIDE SERPL-SCNC: 107 MMOL/L (ref 98–107)
CO2 SERPL-SCNC: 22 MMOL/L (ref 21–32)
CREAT SERPL-MCNC: 0.82 MG/DL (ref 0.6–1)
ERYTHROCYTE [DISTWIDTH] IN BLOOD BY AUTOMATED COUNT: 14.4 % (ref 11.9–14.6)
GLOBULIN SER CALC-MCNC: 2.6 G/DL (ref 2.3–3.5)
GLUCOSE SERPL-MCNC: 92 MG/DL (ref 65–100)
HCT VFR BLD AUTO: 38.6 % (ref 35.8–46.3)
HGB BLD-MCNC: 13 G/DL (ref 11.7–15.4)
LIPASE SERPL-CCNC: 2453 U/L (ref 73–393)
MCH RBC QN AUTO: 27.5 PG (ref 26.1–32.9)
MCHC RBC AUTO-ENTMCNC: 33.7 G/DL (ref 31.4–35)
MCV RBC AUTO: 81.6 FL (ref 79.6–97.8)
NRBC # BLD: 0 K/UL (ref 0–0.2)
PLATELET # BLD AUTO: 330 K/UL (ref 150–450)
PMV BLD AUTO: 9.7 FL (ref 9.4–12.3)
POTASSIUM SERPL-SCNC: 3.5 MMOL/L (ref 3.5–5.1)
PROT SERPL-MCNC: 4.7 G/DL (ref 6.3–8.2)
RBC # BLD AUTO: 4.73 M/UL (ref 4.05–5.2)
SODIUM SERPL-SCNC: 137 MMOL/L (ref 136–145)
WBC # BLD AUTO: 25.6 K/UL (ref 4.3–11.1)

## 2019-12-28 PROCEDURE — 74011250637 HC RX REV CODE- 250/637: Performed by: INTERNAL MEDICINE

## 2019-12-28 PROCEDURE — 80053 COMPREHEN METABOLIC PANEL: CPT

## 2019-12-28 PROCEDURE — 74011250636 HC RX REV CODE- 250/636: Performed by: INTERNAL MEDICINE

## 2019-12-28 PROCEDURE — 36415 COLL VENOUS BLD VENIPUNCTURE: CPT

## 2019-12-28 PROCEDURE — 83690 ASSAY OF LIPASE: CPT

## 2019-12-28 PROCEDURE — 74018 RADEX ABDOMEN 1 VIEW: CPT

## 2019-12-28 PROCEDURE — 85027 COMPLETE CBC AUTOMATED: CPT

## 2019-12-28 PROCEDURE — 65270000029 HC RM PRIVATE

## 2019-12-28 RX ORDER — POLYETHYLENE GLYCOL 3350 17 G/17G
17 POWDER, FOR SOLUTION ORAL DAILY
Status: DISCONTINUED | OUTPATIENT
Start: 2019-12-29 | End: 2019-12-29

## 2019-12-28 RX ORDER — FACIAL-BODY WIPES
10 EACH TOPICAL DAILY PRN
Status: DISCONTINUED | OUTPATIENT
Start: 2019-12-28 | End: 2019-12-30 | Stop reason: HOSPADM

## 2019-12-28 RX ADMIN — BISACODYL 10 MG: 10 SUPPOSITORY RECTAL at 15:30

## 2019-12-28 RX ADMIN — HYDROMORPHONE HYDROCHLORIDE 1 MG: 1 INJECTION, SOLUTION INTRAMUSCULAR; INTRAVENOUS; SUBCUTANEOUS at 17:46

## 2019-12-28 RX ADMIN — HEPARIN SODIUM 5000 UNITS: 5000 INJECTION INTRAVENOUS; SUBCUTANEOUS at 06:26

## 2019-12-28 RX ADMIN — LEVOFLOXACIN 750 MG: 500 TABLET, FILM COATED ORAL at 13:23

## 2019-12-28 RX ADMIN — Medication 10 ML: at 21:02

## 2019-12-28 RX ADMIN — Medication 10 ML: at 13:25

## 2019-12-28 RX ADMIN — LOSARTAN POTASSIUM: 50 TABLET, FILM COATED ORAL at 08:37

## 2019-12-28 RX ADMIN — PRAVASTATIN SODIUM 40 MG: 20 TABLET ORAL at 08:37

## 2019-12-28 RX ADMIN — HEPARIN SODIUM 5000 UNITS: 5000 INJECTION INTRAVENOUS; SUBCUTANEOUS at 21:02

## 2019-12-28 RX ADMIN — Medication 10 ML: at 04:26

## 2019-12-28 RX ADMIN — FAMOTIDINE 20 MG: 10 TABLET ORAL at 08:37

## 2019-12-28 RX ADMIN — HEPARIN SODIUM 5000 UNITS: 5000 INJECTION INTRAVENOUS; SUBCUTANEOUS at 13:23

## 2019-12-28 NOTE — CONSULTS
[facility] Consult Note       Primary GI Physician:  Dr. Altagracia Trevino    Referring Provider:  Dr. Alirio Bill    Consult Date:  12/28/2019    Admit Date:  12/26/2019    Chief Complaint:  Recurrent pancreatitis, ? ileus awaiting KUB    Subjective:     History of Present Illness:  Patient is a 70 y.o. female with PMH of duodenal ulcer s/p Billroth II surgery, HTN, HLD, breast cancer s/p lumpectomy and sentinel node removal, OA, tobacco abuse, who is seen in consultation at the request of Dr. Alirio Bill for recurrent pancreatitis, ? ileus awaiting KUB. She was admitted after presenting to the ER with severe epigastric sharp pain, radiating to her back, with associated nausea and vomiting black emesis, which worsened Thursday after drinking coffee, but had been occurring since eating a larger meal on Sunday. She had a small harder BM Thursday, but had not had much of a BM since Sunday. She was noted on labs to have an elevated lipase and CT scan concerning for pancreatitis. She has had improvement in the epigastric pain since admission, with no further nausea or vomiting, but has been having lower abd soreness pain, radiating through her sides and into her back, with off and on gas pains, severe at times with coughing, and feels full. She has had the urge to have a BM, but has not been able. She denies any bloody or black stools prior to the admission. She has not had other chest pain or difficulty swallowing. She does not have a history of constipation. She had an EGD in 2018 with gastritis, but otherwise negative. She has not had a prior colonoscopy. She had an episode of pancreatitis in Nov 2017, felt to be related to Azithromycin. She has recently been taking Doxycycline for a sinus infection.     PMH:  Past Medical History:   Diagnosis Date    STEPHEN (acute kidney injury) ([address] 12/04/2014    pt denies this dx    Back pain 6/10/2016    Breast cancer ([facility] [address] 2018    Breast lump dx 2/2018    left    Bronchitis     Discharge from the vagina     Dysuria     Eczema 6/10/2016    Fungal dermatitis     Headache 6/10/2016    Hypercholesterolemia 12/4/2014    Hypertension     not well controlled--per pt    Intractable vomiting 5/20/2018    Menopausal vaginal dryness     Osteoarthritis 6/10/2016    Osteoporosis 6/10/2016    PUD (peptic ulcer disease)     last 2003 which a rupture an extensive surgery    Sepsis ([facility] [address] 12/4/2014    Tobacco abuse     1ppd x 49 yrs     Pancreatitis in 2017 - felt to be related to Azithromycin (Lipase elevated at that time at 4137 - no imaging)    EGD 7 Aug 2003 with Dr. Maggi Bucio with giant duodenal ulcer with adherent blood clot, s/p injection of epi, old blood present in stomach indicating re-bleeding without active bleeding being identified. Small HH without endoscopic esophagitis. Unable to enter the descending duodenum. Duodenal deformity. EGD 21 May 2018 with Dr. Cynthia Grimes with normal esophagus, empiric dilation (no trauma), Billroth II anatomy - both limbs normal, bile gastritis. PSH:  Past Surgical History:   Procedure Laterality Date    ABDOMEN SURGERY PROC UNLISTED  2003    stomach surgery for ruptured ulcer and extensive rerouting of intestestines per patient     BREAST SURGERY PROCEDURE UNLISTED Left 1975    breast lumpectomy, benign  (left)    HX APPENDECTOMY  1977    with hysterectomy    HX BREAST BIOPSY Left 1975    HX BREAST LUMPECTOMY Left 2/22/2018    LEFT BREAST LUMPECTOMY/ SENTINEL NODE BIOPSY performed [facility] Lee [facility] OR    HX CATARACT REMOVAL Bilateral 2018    HX CHOLECYSTECTOMY      HX ENDOSCOPY      HX ENDOSCOPY  05/21/2018    empiric dilation    HX HYSTERECTOMY  1977    HX OTHER SURGICAL      hernicolectomy 2 cm    HX TUBAL LIGATION  1975       Allergies:   Allergies   Allergen Reactions    Azithromycin Other (comments)     pancreatitis    Codeine Nausea and Vomiting       Home Medications:  Prior to Admission medications    Medication Sig Start Date End Date Taking? Authorizing Provider   doxycycline (VIBRAMYCIN) 100 mg capsule Take 1 Cap by mouth two (2) times a day. 12/16/19  Yes Eliana Atkins PA-C   losartan-hydroCHLOROthiazide [facility]) 50-12.5 mg per tablet Take 1 Tab by mouth daily. 11/25/19  Yes Carlos Manuel Rivero MD   pravastatin (PRAVACHOL) 40 mg tablet Take 1 Tab by mouth nightly. Patient taking differently: Take 40 mg by mouth daily. 10/16/19  Yes Carlos Manuel Rivero MD   ibuprofen (MOTRIN) 800 mg tablet Take 1 Tab by mouth three (3) times daily. Patient taking differently: Take 800 mg by mouth every eight (8) hours as needed. 10/16/19  Yes Carlos Manuel Rivero MD   alendronate (FOSAMAX) 70 mg tablet TAKE 1 TABLET BY MOUTH EVERY 7 DAYS  Patient taking differently: Take 70 mg by mouth every seven (7) days. TAKE 1 TABLET BY MOUTH EVERY 7 DAYS 10/16/19  Yes Carlos Manuel Rivero MD   albuterol (PROVENTIL HFA, VENTOLIN HFA, PROAIR HFA) 90 mcg/actuation inhaler Take 2 Puffs by inhalation every four (4) hours as needed for Wheezing or Shortness of Breath. 5/5/19  Yes Diego Gomes MD   diclofenac (VOLTAREN) 1 % gel Apply 2 g to affected area four (4) times daily. Patient taking differently: Apply 2 g to affected area as needed.  4/9/19  Yes Carlos Manuel Rivero MD       Hospital Medications:  Current Facility-Administered Medications   Medication Dose Route Frequency    nicotine (NICODERM CQ) 14 mg/24 hr patch 1 Patch  1 Patch TransDERmal Q24H    sodium chloride (NS) flush 5-40 mL  5-40 mL IntraVENous Q8H    sodium chloride (NS) flush 5-40 mL  5-40 mL IntraVENous PRN    acetaminophen (TYLENOL) tablet 650 mg  650 mg Oral Q4H PRN    oxyCODONE-acetaminophen (PERCOCET) 5-325 mg per tablet 1 Tab  1 Tab Oral Q4H PRN    HYDROmorphone (PF) (DILAUDID) injection 1 mg  1 mg IntraVENous Q6H PRN    ondansetron (ZOFRAN) injection 4 mg  4 mg IntraVENous Q4H PRN    heparin (porcine) injection 5,000 Units  5,000 Units SubCUTAneous Q8H    pravastatin (PRAVACHOL) tablet 40 mg  40 mg Oral DAILY    losartan/hydroCHLOROthiazide (HYZAAR) 50/12.5 mg   Oral DAILY    famotidine (PEPCID) tablet 20 mg  20 mg Oral DAILY    levoFLOXacin (LEVAQUIN) tablet 750 mg  750 mg Oral Q48H       Social History:  Social History     Tobacco Use    Smoking status: Current Every Day Smoker     Packs/day: 1.00     Years: 49.00     Pack years: 49.00     Types: Cigarettes     Start date: 5/6/1966    Smokeless tobacco: Never Used   Substance Use Topics    Alcohol use: No       Pt drinks ETOH occasionally. She denies any history of drug use. Family History:  Family History   Problem Relation Age of Onset    Diabetes Sister     Heart Disease Sister     Heart Disease Father     Heart Attack Father     Cancer Brother         prostate    Cancer Sister     Breast Cancer Sister 79    Heart Disease Sister     Heart Disease Brother     Hypertension Mother     No Known Problems Brother     Hypertension Other         Brother and sister with htn    No Known Problems Maternal Grandmother     No Known Problems Maternal Grandfather     No Known Problems Paternal Grandmother     No Known Problems Paternal Grandfather     Thyroid Cancer Neg Hx        Review of Systems:  A detailed 10 system ROS is obtained, with pertinent positives as listed above. All others are negative. Diet:  Clear liquids    Objective:     Physical Exam:  Vitals:  Visit Vitals  /57 (BP 1 Location: Right arm, BP Patient Position: At rest)   Pulse 98   Temp 98 °F (36.7 °C)   Resp 18   Ht 5' 2\" (1.575 m)   Wt 52.6 kg (116 lb)   SpO2 92%   BMI 21.22 kg/m²     Gen:  Pt is alert, cooperative, no acute distress, lying in bed, appears uncomfortable  Skin:  Extremities and face reveal no rashes. HEENT: Sclerae anicteric. Extra-occular muscles are intact. No oral ulcers. No abnormal pigmentation of the lips. The neck is supple. Cardiovascular: Regular rate and rhythm.  No murmurs, gallops, or rubs. Respiratory:  Comfortable breathing with no accessory muscle use. Clear breath sounds anteriorly with no wheezes, rales, or rhonchi. GI:  Abdomen nondistended, soft, and mildly tender over abdomen. Decreased bowel sounds. No enlargement of the liver or spleen. No masses palpable. Rectal:  Deferred  Musculoskeletal:  No pitting edema of the lower legs. Neurological:  Gross memory appears intact. Patient is alert and oriented. Psychiatric:  Mood appears appropriate with judgement intact. Lymphatic:  No cervical or supraclavicular adenopathy. Laboratory:    Recent Labs     12/28/19  0617 12/27/19  0620 12/26/19  0746   WBC 25.6* 21.8* 17.5*   HGB 13.0 13.9 13.8   HCT 38.6 40.8 40.6    410 478*   MCV 81.6 82.3 83.2    138 135*   K 3.5 3.4* 3.2*    109* 104   CO2 22 21 23   BUN 32* 26* 34*   CREA 0.82 0.79 1.12*   CA 7.7* 8.3 9.3   GLU 92 105* 120*     --  149*   SGOT 19  --  13*   ALT 12  --  17   TBILI 0.5  --  0.4   ALB 2.1*  --  3.2   TP 4.7*  --  6.9   LPSE 2,453*  --  2,602*      Ref. Range 12/26/2019 08:25   Lactic Acid (POC) Latest Ref Range: 0.5 - 1.9 mmol/L 1.69      Ref. Range 12/26/2019 07:46   Procalcitonin Latest Units: ng/mL 0.07   Triglyceride Latest Ref Range: 35 - 150 MG/DL 79   Cholesterol, total Latest Ref Range: <200 MG/DL 80   HDL Cholesterol Latest Ref Range: 40 - 60 MG/DL 48   CHOL/HDL Ratio Latest Units:   1.7   VLDL, calculated Latest Ref Range: 6.0 - 23.0 MG/DL 15.8   LDL, calculated Latest Ref Range: <100 MG/DL 16.2   Troponin-I, Qt. Latest Ref Range: 0.02 - 0.05 NG/ML <0.02 (L)     CT of the Abdomen with Contrast and CT of the Pelvis with Contrast 12/26/2019  10:16 AM   Indication: Chest pain, nausea and vomiting onset 5 days ago.  Pancreatitis.   Comparison: None available at this hospital PACS   Technique:  Multiple contiguous axial images encompassing the abdomen and pelvis  are obtained following the administration of approximately 100cc of Isovue 370  nonionic intravenous contrast by power injector. Dilute oral contrast is  administered. For this CT scanner at least one of the following techniques is  utilized to decrease patient radiation dose: Automatic exposure control, KVP and  mA modulation based on patient weight, and iterative reconstruction. IV  contrast is given to better delineate vascular structures and increase  sensitivity of lesions in the solid organs.   CT abdomen: Lung bases-some scarring, chronic changes. No free air in the  abdomen. Ventral wall intact. There is surgery change at the GE junction and there has been distal  gastrectomy. There has been cholecystectomy. No discrete liver lesion. There is clearly edema/induration in the keisha hepatis region about the head and  neck of the pancreas. Head of the pancreas heterogeneously enhances. Rest of the  pancreas appearance not remarkable. Remnant stomach shows no acute finding. Spleen, adrenals and kidneys show no  acute finding with mild left renal cortical volume loss and a few tiny cysts at  this kidney. Aorta is tortuous and shows moderately heavy atherosclerosis, no acute vascular  lesion with the main portal vein patent. No enlarged abdomen adenopathy. Small bowel loops, colon nondistended with no inflammation or obvious lesion.   CT pelvis: Pelvic small bowel loops, rectosigmoid colon nondistended with no  inflammation or obvious lesion. Midline bladder is well-distended with no focal  finding. Status post hysterectomy, no adnexal mass. No lymphadenopathy, no acute  vascular lesion seen.   IMPRESSION  IMPRESSION:  1. Changes consistent with acute pancreatitis at the head and neck of the  pancreas with clearly evident surrounding acute inflammatory fluid. Region of  the head of the pancreas is hypoattenuating.   2. Prior surgery changes at the stomach and cholecystectomy as well. Other  chronic changes as detailed above.     Assessment: Principal Problem:    Acute pancreatitis (12/26/2019)    Active Problems:    Hypokalemia (12/4/2014)      STEPHEN (acute kidney injury) ([facility] [address] (12/4/2014)      HTN (hypertension) (12/4/2014)      Hypercholesterolemia (12/4/2014)      History of peptic ulcer (3/5/2018)    69 yo female pt of Dr. Danny Marte with PMH of duodenal ulcer s/p Billroth II surgery, HTN, HLD, breast cancer s/p lumpectomy and sentinel node removal, OA, tobacco abuse, who is seen in consultation 28 Dec 2019 at the request of Dr. Karthik Zapata for recurrent pancreatitis, ? ileus awaiting KUB, who was admitted after presenting with epi pain, nausea, vomiting, and noted to have elevated lipase and CT scan abd/pelvis with changes consistent with acute pancreatitis at the head and neck of the pancreas with clearly evident surrounding acute inflammatory fluid, and region of  the head of the pancreas is hypoattenuating. LFTs were negative. She has had improvement in those symptoms, but has been having constipation and increased lower abd pain and gas. Concern for possible ileus, possible obstruction, but less likely given exam, no nausea and vomiting now, and CT findings. KUB pending. Plan:     -Supportive care, IVF. -NPO until review KUB. -Treating constipation with suppository, Miralax.  -Follow. Patient is seen and examined in collaboration with Dr. Zeny Stiles. Assessment and plan as per Dr. Erwin Cristobal. Irene Vasquez [facility]  [facility]

## 2019-12-28 NOTE — PROGRESS NOTES
Hospitalist Progress Note    2019  Admit Date: 2019  7:33 AM   NAME: Yuliet Nuñez   :  1940   MRN:  [id number]   Attending: Aaliyah Knox DO  PCP:  Karon Garcia MD    SUBJECTIVE:   As previously documented: 57-year-old female with past medical history of hypertension, hyperlipidemia, GERD, breast CA (s/p lumpectomy and sentinel node removal) who enters with worsening epigastric pain, nausea vomiting since . Pain is located in the epigastric and lower substernal region with radiation to her back. Pain is sharp and 8/10 without any sedation or alleviating factors associated with multiple episodes of nausea/vomiting. She had 1 episode of pancreatitis in the past and was told that it was due to azithromycin. She is a current daily smoker (1/2 pack per day) for more than 30yrs. Denies alcohol abuse. Was taking doxycycline for sinus infection prior to arrival.     - reports lingering \"soreness\" throughout abdomen but pain much improved from prior. No vomiting since last night.  - appears much more uncomfortable today. Reports abdominal fullness and gas w/o BM. Last BM reported was 6 days ago. Reports epigastric pain as present on admission is improving.        Review of Systems negative with exception of pertinent positives noted above  PHYSICAL EXAM     Visit Vitals  BP 96/60 (BP 1 Location: Right arm, BP Patient Position: At rest)   Pulse 100   Temp 98.2 °F (36.8 °C)   Resp 20   Ht 5' 2\" (1.575 m)   Wt 52.6 kg (116 lb)   SpO2 94%   BMI 21.22 kg/m²      Temp (24hrs), Av.9 °F (36.6 °C), Min:97.1 °F (36.2 °C), Max:98.3 °F (36.8 °C)    Oxygen Therapy  O2 Sat (%): 94 % (19 1515)  O2 Device: Room air (19 9937)  ETCO2 (mmHg): 3 mmHg (19 0539)    Intake/Output Summary (Last 24 hours) at 2019 1530  Last data filed at 2019 1300  Gross per 24 hour   Intake 835 ml   Output    Net 835 ml      General: No acute distress    Lungs:  CTA Bilaterally. Heart:  Regular rate and rhythm,  No murmur, rub, or gallop  Abdomen: Soft, minimally TTP in epigastric, slightly distended with hypoactive BS   Extremities: No cyanosis, clubbing or edema  Neurologic:  No focal deficits    ASSESSMENT      Active Hospital Problems    Diagnosis Date Noted    Acute pancreatitis 12/26/2019    History of peptic ulcer 03/05/2018    HTN (hypertension) 12/04/2014    Hypercholesterolemia 12/04/2014    STEPHEN (acute kidney injury) ([facility] [address] 12/04/2014    Hypokalemia 12/04/2014     A/P  #Acute pancreatitis  - afebrile with leukocytosis (17) and lipase 2602 on admission  - CT A/P: findings consistent with acute pancreatitis. - pain control  - antiemetic  - cont CLD  - IVF  - lipid panel unremarkable  - Appreciate GI consult as second episode of pancreatitis w/o clear etiology    # leukocytosis   - otherwise w/o evidence of sepsis. - likely r/t pancreatitis  - Check procal     #Constipation  - no evidence of CT of ileus or obstruction  - added miralax and dulc suppository       # sinus infection  - currently on doxycycline has 2 more pills left  - dc doxycycline as there has been some reported cases of acute pancreatitis  - cont levaquin to complete course     # STEPHEN   - improved on IVF, continue     # hypokalemia  - corrected     # HTN: c/w meds  # HLD: c/w meds  # GERD: pepcid     Diet:  DIET CLEAR LIQUID  DVT PPx: Heparin SQ  Code: Full Code  Estimated LOS:  Greater than 2 midnights    Dispo - if abdominal fullness/constipation improved in AM will advance diet and ?home soon.      Signed By: Loly Dillard,      December 28, 2019

## 2019-12-28 NOTE — PROGRESS NOTES
Hourly rounding completed on this shift. All needs met. PRN pain medication given per MAR. Pt is currently resting in bed. Will continue to monitor and give report to oncoming nurse.

## 2019-12-29 LAB
ANION GAP SERPL CALC-SCNC: 10 MMOL/L (ref 7–16)
BUN SERPL-MCNC: 37 MG/DL (ref 8–23)
CALCIUM SERPL-MCNC: 7.6 MG/DL (ref 8.3–10.4)
CHLORIDE SERPL-SCNC: 102 MMOL/L (ref 98–107)
CO2 SERPL-SCNC: 23 MMOL/L (ref 21–32)
CREAT SERPL-MCNC: 0.89 MG/DL (ref 0.6–1)
ERYTHROCYTE [DISTWIDTH] IN BLOOD BY AUTOMATED COUNT: 14.5 % (ref 11.9–14.6)
GLUCOSE SERPL-MCNC: 74 MG/DL (ref 65–100)
HCT VFR BLD AUTO: 32.9 % (ref 35.8–46.3)
HGB BLD-MCNC: 11.4 G/DL (ref 11.7–15.4)
LIPASE SERPL-CCNC: 730 U/L (ref 73–393)
MCH RBC QN AUTO: 28.4 PG (ref 26.1–32.9)
MCHC RBC AUTO-ENTMCNC: 34.7 G/DL (ref 31.4–35)
MCV RBC AUTO: 81.8 FL (ref 79.6–97.8)
NRBC # BLD: 0 K/UL (ref 0–0.2)
PLATELET # BLD AUTO: 286 K/UL (ref 150–450)
PMV BLD AUTO: 9.9 FL (ref 9.4–12.3)
POTASSIUM SERPL-SCNC: 2.7 MMOL/L (ref 3.5–5.1)
PROCALCITONIN SERPL-MCNC: 0.68 NG/ML
RBC # BLD AUTO: 4.02 M/UL (ref 4.05–5.2)
SODIUM SERPL-SCNC: 135 MMOL/L (ref 136–145)
WBC # BLD AUTO: 16 K/UL (ref 4.3–11.1)

## 2019-12-29 PROCEDURE — 74011250637 HC RX REV CODE- 250/637: Performed by: INTERNAL MEDICINE

## 2019-12-29 PROCEDURE — 83690 ASSAY OF LIPASE: CPT

## 2019-12-29 PROCEDURE — 85027 COMPLETE CBC AUTOMATED: CPT

## 2019-12-29 PROCEDURE — 74011250636 HC RX REV CODE- 250/636: Performed by: INTERNAL MEDICINE

## 2019-12-29 PROCEDURE — 80048 BASIC METABOLIC PNL TOTAL CA: CPT

## 2019-12-29 PROCEDURE — 84145 PROCALCITONIN (PCT): CPT

## 2019-12-29 PROCEDURE — 36415 COLL VENOUS BLD VENIPUNCTURE: CPT

## 2019-12-29 PROCEDURE — 65270000029 HC RM PRIVATE

## 2019-12-29 RX ORDER — LOSARTAN POTASSIUM 50 MG/1
50 TABLET ORAL DAILY
Status: DISCONTINUED | OUTPATIENT
Start: 2019-12-29 | End: 2019-12-30 | Stop reason: HOSPADM

## 2019-12-29 RX ORDER — POTASSIUM CHLORIDE 20 MEQ/1
40 TABLET, EXTENDED RELEASE ORAL
Status: COMPLETED | OUTPATIENT
Start: 2019-12-29 | End: 2019-12-29

## 2019-12-29 RX ORDER — POTASSIUM CHLORIDE 14.9 MG/ML
20 INJECTION INTRAVENOUS
Status: COMPLETED | OUTPATIENT
Start: 2019-12-29 | End: 2019-12-29

## 2019-12-29 RX ORDER — POLYETHYLENE GLYCOL 3350 17 G/17G
17 POWDER, FOR SOLUTION ORAL 3 TIMES DAILY
Status: DISCONTINUED | OUTPATIENT
Start: 2019-12-29 | End: 2019-12-30 | Stop reason: HOSPADM

## 2019-12-29 RX ORDER — DOCUSATE SODIUM 100 MG/1
100 CAPSULE, LIQUID FILLED ORAL DAILY
Status: DISCONTINUED | OUTPATIENT
Start: 2019-12-29 | End: 2019-12-30 | Stop reason: HOSPADM

## 2019-12-29 RX ORDER — SIMETHICONE 80 MG
80 TABLET,CHEWABLE ORAL
Status: COMPLETED | OUTPATIENT
Start: 2019-12-29 | End: 2019-12-29

## 2019-12-29 RX ADMIN — HEPARIN SODIUM 5000 UNITS: 5000 INJECTION INTRAVENOUS; SUBCUTANEOUS at 16:32

## 2019-12-29 RX ADMIN — POTASSIUM CHLORIDE 40 MEQ: 20 TABLET, EXTENDED RELEASE ORAL at 08:40

## 2019-12-29 RX ADMIN — HEPARIN SODIUM 5000 UNITS: 5000 INJECTION INTRAVENOUS; SUBCUTANEOUS at 21:39

## 2019-12-29 RX ADMIN — FAMOTIDINE 20 MG: 10 TABLET ORAL at 08:41

## 2019-12-29 RX ADMIN — LOSARTAN POTASSIUM 50 MG: 50 TABLET, FILM COATED ORAL at 08:40

## 2019-12-29 RX ADMIN — POTASSIUM CHLORIDE 20 MEQ: 200 INJECTION, SOLUTION INTRAVENOUS at 09:36

## 2019-12-29 RX ADMIN — DOCUSATE SODIUM 100 MG: 100 CAPSULE, LIQUID FILLED ORAL at 09:38

## 2019-12-29 RX ADMIN — Medication 10 ML: at 21:40

## 2019-12-29 RX ADMIN — PRAVASTATIN SODIUM 40 MG: 20 TABLET ORAL at 08:40

## 2019-12-29 RX ADMIN — Medication 10 ML: at 05:11

## 2019-12-29 RX ADMIN — Medication 10 ML: at 16:33

## 2019-12-29 RX ADMIN — SODIUM PHOSPHATE 1 ENEMA: 7; 19 ENEMA RECTAL at 09:51

## 2019-12-29 RX ADMIN — POLYETHYLENE GLYCOL 3350 17 G: 17 POWDER, FOR SOLUTION ORAL at 08:43

## 2019-12-29 RX ADMIN — SIMETHICONE CHEW TAB 80 MG 80 MG: 80 TABLET ORAL at 08:40

## 2019-12-29 RX ADMIN — HEPARIN SODIUM 5000 UNITS: 5000 INJECTION INTRAVENOUS; SUBCUTANEOUS at 05:12

## 2019-12-29 RX ADMIN — POLYETHYLENE GLYCOL 3350 17 G: 17 POWDER, FOR SOLUTION ORAL at 16:00

## 2019-12-29 RX ADMIN — POTASSIUM CHLORIDE 20 MEQ: 200 INJECTION, SOLUTION INTRAVENOUS at 11:21

## 2019-12-29 NOTE — PROGRESS NOTES
Hourly rounds completed on patient. Patient had flatus all night, no BM. Patient denies any needs at this time. Will report to oncoming nurse.

## 2019-12-29 NOTE — PROGRESS NOTES
.  [facility] Progress Note             Date: 12/29/2019    GI Problem: Constipation    History of Present Illness:  Patient is a 70 y.o. female with PMH Billroth II for duodenal ulcer who is seen in consultation for recurrence of acute pancreatitis. Lipase was > 2000. Abdominal pain has basically resolved. Now lots of gas and no bm. KUB shows significant constipation. Hospital Medications:  Current Facility-Administered Medications   Medication Dose Route Frequency    losartan (COZAAR) tablet 50 mg  50 mg Oral DAILY    potassium chloride 20 mEq in 100 ml IVPB  20 mEq IntraVENous Q2H    sodium phosphate (FLEET'S) enema 1 Enema  1 Enema Rectal NOW    docusate sodium (COLACE) capsule 100 mg  100 mg Oral DAILY    polyethylene glycol (MIRALAX) packet 17 g  17 g Oral TID    bisacodyl (DULCOLAX) suppository 10 mg  10 mg Rectal DAILY PRN    nicotine (NICODERM CQ) 14 mg/24 hr patch 1 Patch  1 Patch TransDERmal Q24H    sodium chloride (NS) flush 5-40 mL  5-40 mL IntraVENous Q8H    sodium chloride (NS) flush 5-40 mL  5-40 mL IntraVENous PRN    acetaminophen (TYLENOL) tablet 650 mg  650 mg Oral Q4H PRN    oxyCODONE-acetaminophen (PERCOCET) 5-325 mg per tablet 1 Tab  1 Tab Oral Q4H PRN    HYDROmorphone (PF) (DILAUDID) injection 1 mg  1 mg IntraVENous Q6H PRN    ondansetron (ZOFRAN) injection 4 mg  4 mg IntraVENous Q4H PRN    heparin (porcine) injection 5,000 Units  5,000 Units SubCUTAneous Q8H    pravastatin (PRAVACHOL) tablet 40 mg  40 mg Oral DAILY    famotidine (PEPCID) tablet 20 mg  20 mg Oral DAILY       Objective:     Physical Exam:  Vitals:  Visit Vitals  /51 (BP 1 Location: Right arm, BP Patient Position: At rest)   Pulse 91   Temp 98.3 °F (36.8 °C)   Resp 18   Ht 5' 2\" (1.575 m)   Wt 52.6 kg (116 lb)   SpO2 96%   BMI 21.22 kg/m²       General: No acute distress. Skin:  Extremities and face reveal no rashes. No vargas erythema. No telangiectasias on the chest wall.   HEENT: Sclerae anicteric. No oral ulcers. No abnormal pigmentation of the lips. The neck is supple. Cardiovascular: Regular rate and rhythm. No murmurs, gallops, or rubs. Respiratory:  Comfortable breathing  With no accessory muscle use. Clear breath sounds with no wheezes, rales, or rhonchi. GI:  Abdomen nondistended, soft, and nontender. Normal active bowel sounds. No enlargement of the liver or spleen. No masses palpable. Musculoskeletal:  No pitting edema of the lower legs. Extremities have good range of motion. Neurological:  Gross memory appears intact. Patient is alert and oriented. Psychiatric:  Mood appears appropriate with judgement intact. Lymphatic:  No cervical or supraclavicular adenopathy. Laboratory:    Recent Labs     12/29/19  0605   WBC 16.0*   RBC 4.02*   HGB 11.4*   HCT 32.9*         Recent Labs     12/29/19  0605   GLU 74   *   K 2.7*      CO2 23   BUN 37*   CREA 0.89   CA 7.6*     No results for input(s): PTP, INR, APTT, INREXT in the last 72 hours.   Recent Labs     12/29/19  0605 12/28/19  0617   SGOT  --  19   AP  --  132   ALB  --  2.1*   TP  --  4.7*   LPSE 730* 2,453*       Assessment:       A 70 y.o. female with pancreatitis improving but severe constipation      Plan:       Miralax tid  Full liquid diet    Signed By: Pushpa Walker MD     December 29, 2019

## 2019-12-29 NOTE — PROGRESS NOTES
Hospitalist Progress Note    2019  Admit Date: 2019  7:33 AM   NAME: Thiago Hobbs   :  1940   MRN:  [id number]   Attending: Dirk Hernandez DO  PCP:  Candy Cage MD    SUBJECTIVE:   As previously documented: 72-year-old female with past medical history of hypertension, hyperlipidemia, GERD, breast CA (s/p lumpectomy and sentinel node removal) who enters with worsening epigastric pain, nausea vomiting since . Pain is located in the epigastric and lower substernal region with radiation to her back. Pain is sharp and 8/10 without any sedation or alleviating factors associated with multiple episodes of nausea/vomiting. She had 1 episode of pancreatitis in the past and was told that it was due to azithromycin. She is a current daily smoker (1/2 pack per day) for more than 30yrs. Denies alcohol abuse. Was taking doxycycline for sinus infection prior to arrival.    Epigastric pain improved and diet advanced. Pt developed symptomatic constipation.  - reports she passed gas all night so abdominal fullness improved but still no true BM. Passed what looks like may be a few clots in toilet when trying to have a BM. Denies known hx of hemorrhoids but thinks the dulcolax suppository \"irritated\" her.           Review of Systems negative with exception of pertinent positives noted above  PHYSICAL EXAM     Visit Vitals  BP 90/50 (BP 1 Location: Right arm, BP Patient Position: At rest)   Pulse (!) 113   Temp 98.3 °F (36.8 °C)   Resp 18   Ht 5' 2\" (1.575 m)   Wt 52.6 kg (116 lb)   SpO2 95%   BMI 21.22 kg/m²      Temp (24hrs), Av.2 °F (36.8 °C), Min:97.9 °F (36.6 °C), Max:98.3 °F (36.8 °C)    Oxygen Therapy  O2 Sat (%): 95 % (19 1303)  O2 Device: Room air (19 0743)  ETCO2 (mmHg): 3 mmHg (19 0539)    Intake/Output Summary (Last 24 hours) at 2019 1550  Last data filed at 2019 0839  Gross per 24 hour   Intake 360 ml   Output    Net 360 ml General: No acute distress    Lungs:  CTA Bilaterally. Heart:  Regular rate and rhythm,  No murmur, rub, or gallop  Abdomen: Soft, minimally TTP in epigastric, slightly distended with hypoactive BS   Extremities: No cyanosis, clubbing or edema  Neurologic:  No focal deficits    ASSESSMENT      Active Hospital Problems    Diagnosis Date Noted    Acute pancreatitis 12/26/2019    History of peptic ulcer 03/05/2018    HTN (hypertension) 12/04/2014    Hypercholesterolemia 12/04/2014    STEPHEN (acute kidney injury) ([facility] [address] 12/04/2014    Hypokalemia 12/04/2014     A/P  #Acute pancreatitis    - CT A/P: findings consistent with acute pancreatitis. - clinically improving, lipase trending down  - monitor on FLD, IVF dc'd    # leukocytosis   - otherwise w/o evidence of sepsis. - likely r/t pancreatitis  - WBC trending down    #Constipation  - no evidence of CT of ileus or obstruction  - KUB showing right side colon stool burdern  - monitor with increased stool regimen, enema     # sinus infection  - - dc doxycycline as there has been some reported cases of acute pancreatitis  - completed course with levaquin     # STEPHEN   - resolved, monitor off of IVF    # hypokalemia  - replete, repeat with mg in AM     # HTN: c/w meds  # HLD: c/w meds  # GERD: pepcid     Diet:  DIET CLEAR LIQUID  DVT PPx: Heparin SQ  Code: Full Code  Estimated LOS:  Greater than 2 midnights    Dispo - if abdominal fullness/constipation improved in AM will advance diet and ?home soon.      Signed By: Charbel Lala DO     December 29, 2019

## 2019-12-30 VITALS
TEMPERATURE: 97.8 F | SYSTOLIC BLOOD PRESSURE: 117 MMHG | RESPIRATION RATE: 20 BRPM | BODY MASS INDEX: 21.35 KG/M2 | WEIGHT: 116 LBS | DIASTOLIC BLOOD PRESSURE: 57 MMHG | HEART RATE: 90 BPM | OXYGEN SATURATION: 99 % | HEIGHT: 62 IN

## 2019-12-30 LAB
ANION GAP SERPL CALC-SCNC: 8 MMOL/L (ref 7–16)
BUN SERPL-MCNC: 21 MG/DL (ref 8–23)
CALCIUM SERPL-MCNC: 8.2 MG/DL (ref 8.3–10.4)
CHLORIDE SERPL-SCNC: 102 MMOL/L (ref 98–107)
CO2 SERPL-SCNC: 21 MMOL/L (ref 21–32)
CREAT SERPL-MCNC: 0.83 MG/DL (ref 0.6–1)
ERYTHROCYTE [DISTWIDTH] IN BLOOD BY AUTOMATED COUNT: 14.6 % (ref 11.9–14.6)
GLUCOSE SERPL-MCNC: 186 MG/DL (ref 65–100)
HCT VFR BLD AUTO: 32.9 % (ref 35.8–46.3)
HGB BLD-MCNC: 11.1 G/DL (ref 11.7–15.4)
MAGNESIUM SERPL-MCNC: 2.4 MG/DL (ref 1.8–2.4)
MCH RBC QN AUTO: 27.8 PG (ref 26.1–32.9)
MCHC RBC AUTO-ENTMCNC: 33.7 G/DL (ref 31.4–35)
MCV RBC AUTO: 82.5 FL (ref 79.6–97.8)
NRBC # BLD: 0 K/UL (ref 0–0.2)
PLATELET # BLD AUTO: 339 K/UL (ref 150–450)
PMV BLD AUTO: 9.7 FL (ref 9.4–12.3)
POTASSIUM SERPL-SCNC: 3 MMOL/L (ref 3.5–5.1)
RBC # BLD AUTO: 3.99 M/UL (ref 4.05–5.2)
SODIUM SERPL-SCNC: 131 MMOL/L (ref 136–145)
WBC # BLD AUTO: 16.4 K/UL (ref 4.3–11.1)

## 2019-12-30 PROCEDURE — 83735 ASSAY OF MAGNESIUM: CPT

## 2019-12-30 PROCEDURE — 36415 COLL VENOUS BLD VENIPUNCTURE: CPT

## 2019-12-30 PROCEDURE — 74011250636 HC RX REV CODE- 250/636: Performed by: INTERNAL MEDICINE

## 2019-12-30 PROCEDURE — 74011250637 HC RX REV CODE- 250/637: Performed by: INTERNAL MEDICINE

## 2019-12-30 PROCEDURE — 85027 COMPLETE CBC AUTOMATED: CPT

## 2019-12-30 PROCEDURE — 80048 BASIC METABOLIC PNL TOTAL CA: CPT

## 2019-12-30 RX ORDER — LOSARTAN POTASSIUM 50 MG/1
50 TABLET ORAL DAILY
Qty: 30 TAB | Refills: 0 | Status: SHIPPED | OUTPATIENT
Start: 2019-12-31 | End: 2020-03-22

## 2019-12-30 RX ORDER — POTASSIUM CHLORIDE 20 MEQ/1
40 TABLET, EXTENDED RELEASE ORAL DAILY
Status: DISCONTINUED | OUTPATIENT
Start: 2019-12-30 | End: 2019-12-30 | Stop reason: HOSPADM

## 2019-12-30 RX ORDER — FAMOTIDINE 20 MG/1
20 TABLET, FILM COATED ORAL DAILY
Qty: 30 TAB | Refills: 0 | Status: SHIPPED | OUTPATIENT
Start: 2019-12-31 | End: 2020-03-16 | Stop reason: CLARIF

## 2019-12-30 RX ORDER — POTASSIUM CHLORIDE 20 MEQ/1
40 TABLET, EXTENDED RELEASE ORAL DAILY
Qty: 6 TAB | Refills: 0 | Status: SHIPPED | OUTPATIENT
Start: 2019-12-31 | End: 2020-01-03 | Stop reason: ALTCHOICE

## 2019-12-30 RX ADMIN — FAMOTIDINE 20 MG: 10 TABLET ORAL at 09:44

## 2019-12-30 RX ADMIN — POTASSIUM CHLORIDE 40 MEQ: 1500 TABLET, EXTENDED RELEASE ORAL at 12:27

## 2019-12-30 RX ADMIN — Medication 10 ML: at 13:12

## 2019-12-30 RX ADMIN — PRAVASTATIN SODIUM 40 MG: 20 TABLET ORAL at 09:44

## 2019-12-30 RX ADMIN — Medication 10 ML: at 05:29

## 2019-12-30 RX ADMIN — HEPARIN SODIUM 5000 UNITS: 5000 INJECTION INTRAVENOUS; SUBCUTANEOUS at 05:29

## 2019-12-30 NOTE — PROGRESS NOTES
.  [facility] Progress Note             Date: 12/30/2019    GI Problem: Constipation    History of Present Illness:  Patient is a 70 y.o. female with PMH Billroth II for duodenal ulcer who is seen in consultation for recurrence of acute pancreatitis. Lipase was > 2000 on admission. ABD pain resolved and had constipation. Had multiple BMs yesterday and ready to go home. Hospital Medications:  Current Facility-Administered Medications   Medication Dose Route Frequency    potassium chloride (K-DUR, KLOR-CON) SR tablet 40 mEq  40 mEq Oral DAILY    losartan (COZAAR) tablet 50 mg  50 mg Oral DAILY    docusate sodium (COLACE) capsule 100 mg  100 mg Oral DAILY    polyethylene glycol (MIRALAX) packet 17 g  17 g Oral TID    bisacodyl (DULCOLAX) suppository 10 mg  10 mg Rectal DAILY PRN    nicotine (NICODERM CQ) 14 mg/24 hr patch 1 Patch  1 Patch TransDERmal Q24H    sodium chloride (NS) flush 5-40 mL  5-40 mL IntraVENous Q8H    sodium chloride (NS) flush 5-40 mL  5-40 mL IntraVENous PRN    acetaminophen (TYLENOL) tablet 650 mg  650 mg Oral Q4H PRN    oxyCODONE-acetaminophen (PERCOCET) 5-325 mg per tablet 1 Tab  1 Tab Oral Q4H PRN    HYDROmorphone (PF) (DILAUDID) injection 1 mg  1 mg IntraVENous Q6H PRN    ondansetron (ZOFRAN) injection 4 mg  4 mg IntraVENous Q4H PRN    heparin (porcine) injection 5,000 Units  5,000 Units SubCUTAneous Q8H    pravastatin (PRAVACHOL) tablet 40 mg  40 mg Oral DAILY    famotidine (PEPCID) tablet 20 mg  20 mg Oral DAILY       Objective:     Physical Exam:  Vitals:  Visit Vitals  /57 (BP 1 Location: Right arm, BP Patient Position: Head of bed elevated (Comment degrees))   Pulse 90   Temp 97.8 °F (36.6 °C)   Resp 20   Ht 5' 2\" (1.575 m)   Wt 52.6 kg (116 lb)   SpO2 99%   BMI 21.22 kg/m²       General: No acute distress. Skin:  Extremities and face reveal no rashes. No vargas erythema. No telangiectasias on the chest wall. HEENT: Sclerae anicteric.  No oral ulcers. No abnormal pigmentation of the lips. The neck is supple. Cardiovascular: Regular rate and rhythm. No murmurs, gallops, or rubs. Respiratory:  Comfortable breathing  With no accessory muscle use. Clear breath sounds with no wheezes, rales, or rhonchi. GI:  Abdomen nondistended, soft, and nontender. Normal active bowel sounds. No enlargement of the liver or spleen. No masses palpable. Musculoskeletal:  No pitting edema of the lower legs. Extremities have good range of motion. Neurological:  Gross memory appears intact. Patient is alert and oriented. Psychiatric:  Mood appears appropriate with judgement intact. Lymphatic:  No cervical or supraclavicular adenopathy. Laboratory:    Recent Labs     12/30/19  0814   WBC 16.4*   RBC 3.99*   HGB 11.1*   HCT 32.9*         Recent Labs     12/30/19  0814   *   *   K 3.0*      CO2 21   BUN 21   CREA 0.83   CA 8.2*     No results for input(s): PTP, INR, APTT, INREXT, INREXT in the last 72 hours. Recent Labs     12/29/19  0605 12/28/19  0617   SGOT  --  19   AP  --  132   ALB  --  2.1*   TP  --  4.7*   LPSE 730* 2,453*     Pancreatitis in 2017 - felt to be related to Azithromycin (Lipase elevated at that time at 4137 - no imaging)     EGD 7 Aug 2003 with Dr. 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 Small HH without endoscopic esophagitis. Unable to enter the descending duodenum. Duodenal deformity.     EGD 21 May 2018 with Dr. Yvrose Celaya with normal esophagus, empiric dilation (no trauma), Billroth II anatomy - both limbs normal, bile gastritis.       CT of the Abdomen with Contrast and CT of the Pelvis with Contrast 12/26/2019  10:16 AM   Indication: Chest pain, nausea and vomiting onset 5 days ago.  Pancreatitis.   Comparison: None available at this hospital PACS   Technique:  Multiple contiguous axial images encompassing the abdomen and pelvis  are obtained following the administration of approximately 100cc of Isovue 370  nonionic intravenous contrast by power injector. Dilute oral contrast is  administered.  For this CT scanner at least one of the following techniques is  utilized to decrease patient radiation dose: Automatic exposure control, KVP and  mA modulation based on patient weight, and iterative reconstruction.    IV  contrast is given to better delineate vascular structures and increase  sensitivity of lesions in the solid organs.   CT abdomen: Lung bases-some scarring, chronic changes. No free air in the  abdomen. Ventral wall intact. There is surgery change at the GE junction and there has been distal  gastrectomy. There has been cholecystectomy. No discrete liver lesion. There is clearly edema/induration in the keisha hepatis region about the head and  neck of the pancreas. Head of the pancreas heterogeneously enhances. Rest of the  pancreas appearance not remarkable. Remnant stomach shows no acute finding. Spleen, adrenals and kidneys show no  acute finding with mild left renal cortical volume loss and a few tiny cysts at  this kidney. Aorta is tortuous and shows moderately heavy atherosclerosis, no acute vascular  lesion with the main portal vein patent. No enlarged abdomen adenopathy. Small bowel loops, colon nondistended with no inflammation or obvious lesion.   CT pelvis: Pelvic small bowel loops, rectosigmoid colon nondistended with no  inflammation or obvious lesion. Midline bladder is well-distended with no focal  finding. Status post hysterectomy, no adnexal mass. No lymphadenopathy, no acute  vascular lesion seen.   IMPRESSION  IMPRESSION:  1. Changes consistent with acute pancreatitis at the head and neck of the  pancreas with clearly evident surrounding acute inflammatory fluid.  Region of  the head of the pancreas is hypoattenuating.   2. Prior surgery changes at the stomach and cholecystectomy as well. Other  chronic changes as detailed above.     Assessment:       69 yo female pt of Dr. Sanchez Claros with PMH of duodenal ulcer s/p Billroth II surgery, HTN, HLD, breast cancer s/p lumpectomy and sentinel node removal, OA, tobacco abuse, who is seen in consultation 28 Dec 2019 at the request of Dr. Tomy Blackman for recurrent pancreatitis, and possible ileus  who was admitted after presenting with epi pain, nausea, vomiting, and noted to have elevated lipase and CT scan abd/pelvis with changes consistent with acute pancreatitis at the head and neck of the pancreas with clearly evident surrounding acute inflammatory fluid, and region of  the head of the pancreas is hypoattenuating. LFTs were negative. She has had improvement in those symptoms, but has been having constipation and increased lower abd pain and gas. KUB 12/28 with findings consistent with constipation w/o obstruction or ileus. Plan:       - ABD pain and constipation resolved  - Pt to be D/C today. Will FU in our office - please call with and questions or concerns.       Poornima Xavier NP  [facility]  Patient is seen and examined in collaboration with Dr. Sukh Toussaint. Assessment and plan as per Dr. Daisy Ibarra.

## 2019-12-30 NOTE — DISCHARGE INSTRUCTIONS
Patient Education     DISCHARGE SUMMARY from Nurse    PATIENT INSTRUCTIONS:    After general anesthesia or intravenous sedation, for 24 hours or while taking prescription Narcotics:  · Limit your activities  · Do not drive and operate hazardous machinery  · Do not make important personal or business decisions  · Do  not drink alcoholic beverages  · If you have not urinated within 8 hours after discharge, please contact your surgeon on call. Report the following to your surgeon:  · Excessive pain, swelling, redness or odor of or around the surgical area  · Temperature over 100.5  · Nausea and vomiting lasting longer than 4 hours or if unable to take medications  · Any signs of decreased circulation or nerve impairment to extremity: change in color, persistent  numbness, tingling, coldness or increase pain  · Any questions    What to do at Home:  Recommended activity: Activity as tolerated,     If you experience any of the following symptoms fever, new or unrelieved pain, persistent nausea or vomiting, or any other worrisome symptoms, please follow up with pcp. *  Please give a list of your current medications to your Primary Care Provider. *  Please update this list whenever your medications are discontinued, doses are      changed, or new medications (including over-the-counter products) are added. *  Please carry medication information at all times in case of emergency situations. These are general instructions for a healthy lifestyle:    No smoking/ No tobacco products/ Avoid exposure to second hand smoke  Surgeon General's Warning:  Quitting smoking now greatly reduces serious risk to your health.     Obesity, smoking, and sedentary lifestyle greatly increases your risk for illness    A healthy diet, regular physical exercise & weight monitoring are important for maintaining a healthy lifestyle    You may be retaining fluid if you have a history of heart failure or if you experience any of the following symptoms:  Weight gain of 3 pounds or more overnight or 5 pounds in a week, increased swelling in our hands or feet or shortness of breath while lying flat in bed. Please call your doctor as soon as you notice any of these symptoms; do not wait until your next office visit. The discharge information has been reviewed with the patient. The patient verbalized understanding. Discharge medications reviewed with the patient and appropriate educational materials and side effects teaching were provided. ___________________________________________________________________________________________________________________________________     Learning About Acute Pancreatitis  What is acute pancreatitis? The pancreas is an organ behind the stomach. It makes hormones like insulin that help control how your body uses sugar. It also makes enzymes that help you digest food. Usually these enzymes flow from the pancreas to the intestines. But if they leak into the pancreas, they can irritate it and cause pain and swelling. When this happens suddenly, it's called acute pancreatitis. What causes it? Most of the time, acute pancreatitis is caused by gallstones or by heavy alcohol use. But several other things can cause it, such as:  · High levels of fats in the blood. · An injury. · A problem after a surgery or a procedure. · Certain medicines. What are the symptoms? The main symptom is pain in the upper belly. The pain can be severe. You also may have a fever, nausea, or vomiting. Some people get so sick that they have problems breathing. They also may have low blood pressure. How is it diagnosed? Your doctor will diagnose pancreatitis with an exam and by looking at your lab tests. Your doctor may think that you have this problem based on your symptoms and where you have pain in your belly. You may have blood tests of enzymes called amylase and lipase.  In pancreatitis, the level of these enzymes is usually much higher than normal.  You also may have imaging tests of the belly. These may include an ultrasound, a CT scan, or an MRI. A special MRI called MRCP can show images of the bile ducts. This test can be very helpful when gallstones are causing the problem. How is it treated? Treatment of acute pancreatitis usually takes place in the hospital. It focuses on taking care of pain and supporting your body while your pancreas heals. In severe cases, treatment may occur in an intensive care unit to support breathing, treat serious infections, or help raise very low blood pressure. If a gallstone is causing the problem, the gallstone may need to be removed. This is done during a procedure called ERCP. The doctor puts a scope in your mouth and moves it gently through the stomach and into the ducts from the pancreas and gallbladder. The doctor is then able to see a stone and remove it. Sometimes the gallbladder, which makes gallstones, needs to be removed by surgery. People with pancreatitis often need a lot of fluid to help support their other organs and their blood pressure. They get fluids through a vein (intravenous, or IV). Instead of food by mouth, nutrition is sometimes given through a vein while the pancreas heals. Follow-up care is a key part of your treatment and safety. Be sure to make and go to all appointments, and call your doctor if you are having problems. It's also a good idea to know your test results and keep a list of the medicines you take. Where can you learn more? Go to http://shira-kalen.info/. Enter J009 in the search box to learn more about \"Learning About Acute Pancreatitis. \"  Current as of: November 7, 2018  Content Version: 12.2  © 2787-1897 [employer]. Care instructions adapted under license by [employer] (which disclaims liability or warranty for this information).  If you have questions about a medical condition or this instruction, always ask your healthcare professional. [place] [address] any warranty or liability for your use of this information.

## 2019-12-30 NOTE — PROGRESS NOTES
Discharge instructions and prescriptions provided and explained to patient, patient voiced understanding. Medication side effect sheet reviewed with pt. No home meds or valuables to return. Opportunity for questions provided. Patient waiting on her ride and Instructed to call once ready to leave the floor.

## 2019-12-30 NOTE — DISCHARGE SUMMARY
Hospitalist Discharge Summary     Patient ID:  Maurice Brown  [id number]  45 y.o.  1940  Admit date: 12/26/2019  7:33 AM  Discharge date and time: 12/30/2019  Attending: Jermain Goodman DO  PCP:  Jennifer Harvey MD  Treatment Team: Attending Provider: Jermain Goodman DO; Utilization Review: Guicho Farnsworth; Care Manager: Noman Bello RN; Primary Nurse: Elisa Subramanian Consulting Provider: Greg Saldana MD    Principal Diagnosis Acute pancreatitis   Principal Problem:    Acute pancreatitis (12/26/2019)    Active Problems:    Hypokalemia (12/4/2014)      STEPHEN (acute kidney injury) ([facility] [address] (12/4/2014)      HTN (hypertension) (12/4/2014)      Hypercholesterolemia (12/4/2014)      History of peptic ulcer (3/5/2018)             Hospital Course:  Please refer to the admission H&P for details of presentation. In summary, the patient is 65-year-old female with past medical history of hypertension, hyperlipidemia, GERD, breast CA (s/p lumpectomy and sentinel node removal) who enters with worsening epigastric pain, nausea vomiting since Sunday. Pain is located in the epigastric and lower substernal region with radiation to her back. Pain is sharp and 8/10 without any sedation or alleviating factors associated with multiple episodes of nausea/vomiting.      She had 1 episode of pancreatitis in the past and was told that it was due to azithromycin. She is a current daily smoker (1/2 pack per day) for more than 30yrs.  Denies alcohol abuse. Was taking doxycycline for sinus infection prior to arrival.    Patient admitted for pancreatitis. Antibiotic changed to levaquin and completed course. GI was consulted. Her epigastric pain improved and diet was advanced to full liquids w/o nausea/vomiting. She did develop significant constipation now relieved after stool softeners and enema.       She is clinically improved and requesting to discharge home.  Will continue potassium at home for a few days and continue to hold hctz. Would advise patient to get BMP at follow-up appt. Significant Diagnostic Studies:   Final [99] 12/28/2019 14:22 12/28/2019 14:30   Study Result     EXAMINATION: XR ABD (KUB)  12/28/2019 2:30 PM     ACCESSION NUMBER:  [id number]     COMPARISON: None available     INDICATION:  constipation, abd pain     TECHNIQUE: Supine views of the abdomen is provided.     FINDINGS:  There is a moderate amount of retained stool in the right side of the colon. Gas  is seen throughout remainder of the colon. This is a nonobstructive pattern. Surgical clips are seen in the left upper abdomen. There is an old compression  fracture in L1. The lung bases are clear.     IMPRESSION  IMPRESSION:  Moderate amount of retained stool in the right colon supports constipation        Final [99] 12/26/2019 10:10 12/26/2019 10:16   Study Result     CT of the Abdomen with Contrast and CT of the Pelvis with Contrast 12/26/2019  10:16 AM     Indication: Chest pain, nausea and vomiting onset 5 days ago. Pancreatitis.     Comparison: None available at this hospital PACS     Technique:  Multiple contiguous axial images encompassing the abdomen and pelvis  are obtained following the administration of approximately 100cc of Isovue 370  nonionic intravenous contrast by power injector. Dilute oral contrast is  administered. For this CT scanner at least one of the following techniques is  utilized to decrease patient radiation dose: Automatic exposure control, KVP and  mA modulation based on patient weight, and iterative reconstruction. IV  contrast is given to better delineate vascular structures and increase  sensitivity of lesions in the solid organs.     CT abdomen: Lung bases-some scarring, chronic changes. No free air in the  abdomen. Ventral wall intact. There is surgery change at the GE junction and there has been distal  gastrectomy. There has been cholecystectomy. No discrete liver lesion.   There is clearly edema/induration in the keisha hepatis region about the head and  neck of the pancreas. Head of the pancreas heterogeneously enhances. Rest of the  pancreas appearance not remarkable. Remnant stomach shows no acute finding. Spleen, adrenals and kidneys show no  acute finding with mild left renal cortical volume loss and a few tiny cysts at  this kidney. Aorta is tortuous and shows moderately heavy atherosclerosis, no acute vascular  lesion with the main portal vein patent. No enlarged abdomen adenopathy. Small bowel loops, colon nondistended with no inflammation or obvious lesion.     CT pelvis: Pelvic small bowel loops, rectosigmoid colon nondistended with no  inflammation or obvious lesion. Midline bladder is well-distended with no focal  finding. Status post hysterectomy, no adnexal mass. No lymphadenopathy, no acute  vascular lesion seen.     IMPRESSION  IMPRESSION:  1. Changes consistent with acute pancreatitis at the head and neck of the  pancreas with clearly evident surrounding acute inflammatory fluid. Region of  the head of the pancreas is hypoattenuating.     2. Prior surgery changes at the stomach and cholecystectomy as well. Other  chronic changes as detailed above.           Final [99] 12/26/2019 07:47 12/26/2019 07:55   Study Result     1 View portable chest x-ray 12/26/2019 7:55 AM     Indication: Reports chest pain x5 days     Comparison: 5/5/2019 chest x-ray     Findings: This portable upright AP chest of 07 55 shows the lungs well inflated. Monitoring leads overlie the chest. Cardiomediastinal silhouette within normal  limits. The lungs are clear and normally inflated. There is normal pulmonary  vascularity.  There is no pleural abnormality.     IMPRESSION  IMPRESSION: No acute cardiopulmonary finding.            Labs: Results:       Chemistry Recent Labs     12/30/19  0814 12/29/19  0605 12/28/19  0617   * 74 92   * 135* 137   K 3.0* 2.7* 3.5    102 107   CO2 21 23 22   BUN 21 37* 32*   CREA 0.83 0.89 0.82   CA 8.2* 7.6* 7.7*   AGAP 8 10 8   AP  --   --  132   TP  --   --  4.7*   ALB  --   --  2.1*   GLOB  --   --  2.6   AGRAT  --   --  0.8*      CBC w/Diff Recent Labs     12/30/19  0814 12/29/19  0605 12/28/19  0617   WBC 16.4* 16.0* 25.6*   RBC 3.99* 4.02* 4.73   HGB 11.1* 11.4* 13.0   HCT 32.9* 32.9* 38.6    286 330      Cardiac Enzymes No results for input(s): CPK, CKND1, PUJA in the last 72 hours. No lab exists for component: CKRMB, TROIP   Coagulation No results for input(s): PTP, INR, APTT, INREXT in the last 72 hours. Lipid Panel Lab Results   Component Value Date/Time    Cholesterol, total 80 12/26/2019 07:46 AM    HDL Cholesterol 48 12/26/2019 07:46 AM    LDL, calculated 16.2 12/26/2019 07:46 AM    VLDL, calculated 15.8 12/26/2019 07:46 AM    Triglyceride 79 12/26/2019 07:46 AM    CHOL/HDL Ratio 1.7 12/26/2019 07:46 AM      BNP No results for input(s): BNPP in the last 72 hours. Liver Enzymes Recent Labs     12/28/19  0617   TP 4.7*   ALB 2.1*      SGOT 19      Thyroid Studies Lab Results   Component Value Date/Time    TSH 1.850 10/09/2019 08:51 AM            Discharge Exam:  Visit Vitals  /57 (BP 1 Location: Right arm, BP Patient Position: Head of bed elevated (Comment degrees)) Comment (BP Patient Position): 45   Pulse 90   Temp 97.8 °F (36.6 °C)   Resp 20   Ht 5' 2\" (1.575 m)   Wt 52.6 kg (116 lb)   SpO2 99%   BMI 21.22 kg/m²     General appearance: alert, cooperative, no distress, appears stated age  Lungs: clear to auscultation bilaterally  Heart: regular rate and rhythm, S1, S2 normal, no murmur, click, rub or gallop  Abdomen: soft, non-tender.  Bowel sounds normal. No masses,  no organomegaly  Extremities: no cyanosis or edema  Neurologic: Grossly normal    Disposition: home  Discharge Condition: stable  Patient Instructions:   Current Discharge Medication List      START taking these medications    Details   famotidine (PEPCID) 20 mg tablet Take 1 Tab by mouth daily. Qty: 30 Tab, Refills: 0      losartan (COZAAR) 50 mg tablet Take 1 Tab by mouth daily. Qty: 30 Tab, Refills: 0      potassium chloride (K-DUR, KLOR-CON) 20 mEq tablet Take 2 Tabs by mouth daily for 3 days. Qty: 6 Tab, Refills: 0         CONTINUE these medications which have NOT CHANGED    Details   pravastatin (PRAVACHOL) 40 mg tablet Take 1 Tab by mouth nightly. Qty: 30 Tab, Refills: 5    Associated Diagnoses: Hypercholesterolemia      ibuprofen (MOTRIN) 800 mg tablet Take 1 Tab by mouth three (3) times daily. Qty: 90 Tab, Refills: 5    Associated Diagnoses: Osteoarthritis, unspecified osteoarthritis type, unspecified site      alendronate (FOSAMAX) 70 mg tablet TAKE 1 TABLET BY MOUTH EVERY 7 DAYS  Qty: 4 Tab, Refills: 5    Associated Diagnoses: Age-related osteoporosis with current pathological fracture, initial encounter      albuterol (PROVENTIL HFA, VENTOLIN HFA, PROAIR HFA) 90 mcg/actuation inhaler Take 2 Puffs by inhalation every four (4) hours as needed for Wheezing or Shortness of Breath. Qty: 1 Inhaler, Refills: 0      diclofenac (VOLTAREN) 1 % gel Apply 2 g to affected area four (4) times daily.   Qty: 100 g, Refills: 5    Associated Diagnoses: Osteoarthritis, unspecified osteoarthritis type, unspecified site         STOP taking these medications       doxycycline (VIBRAMYCIN) 100 mg capsule Comments:   Reason for Stopping:         losartan-hydroCHLOROthiazide (HYZAAR) 50-12.5 mg per tablet Comments:   Reason for Stopping:               Activity: as tolerated  Diet: advanced to GI bland as tolerated      Follow-up  · PCP in 1 week    Time spent to discharge patient 35 minutes  Signed:  Rohini Arnold DO  12/30/2019  1:33 PM

## 2020-02-15 ENCOUNTER — HOSPITAL ENCOUNTER (OUTPATIENT)
Dept: MAMMOGRAPHY | Age: 80
Discharge: HOME | End: 2020-02-15
Attending: FAMILY MEDICINE
Payer: MEDICARE

## 2020-02-15 DIAGNOSIS — Z12.31 ENCOUNTER FOR SCREENING MAMMOGRAM FOR BREAST CANCER: ICD-10-CM

## 2020-02-15 PROCEDURE — 77067 SCR MAMMO BI INCL CAD: CPT

## 2020-03-17 ENCOUNTER — ANESTHESIA EVENT (OUTPATIENT)
Dept: ENDOSCOPY | Age: 80
End: 2020-03-17
Payer: MEDICARE

## 2020-03-17 NOTE — PROGRESS NOTES
Pt called and reminded of appointment and updated on new policies and procedures regarding COVID-19. Pt verbalized understanding at this time.

## 2020-03-18 ENCOUNTER — HOSPITAL ENCOUNTER (OUTPATIENT)
Age: 80
Setting detail: OUTPATIENT SURGERY
Discharge: HOME | End: 2020-03-18
Attending: INTERNAL MEDICINE | Admitting: INTERNAL MEDICINE
Payer: MEDICARE

## 2020-03-18 ENCOUNTER — ANESTHESIA (OUTPATIENT)
Dept: ENDOSCOPY | Age: 80
End: 2020-03-18
Payer: MEDICARE

## 2020-03-18 VITALS
DIASTOLIC BLOOD PRESSURE: 73 MMHG | TEMPERATURE: 98.6 F | RESPIRATION RATE: 14 BRPM | OXYGEN SATURATION: 97 % | HEART RATE: 61 BPM | SYSTOLIC BLOOD PRESSURE: 171 MMHG

## 2020-03-18 PROCEDURE — 88305 TISSUE EXAM BY PATHOLOGIST: CPT

## 2020-03-18 PROCEDURE — 74011250636 HC RX REV CODE- 250/636: Performed by: NURSE ANESTHETIST, CERTIFIED REGISTERED

## 2020-03-18 PROCEDURE — 77030008969: Performed by: INTERNAL MEDICINE

## 2020-03-18 PROCEDURE — 74011000250 HC RX REV CODE- 250: Performed by: NURSE ANESTHETIST, CERTIFIED REGISTERED

## 2020-03-18 PROCEDURE — 76060000032 HC ANESTHESIA 0.5 TO 1 HR: Performed by: INTERNAL MEDICINE

## 2020-03-18 PROCEDURE — 77030028690 HC NDL ASPIR ULTRSND BSC -E: Performed by: INTERNAL MEDICINE

## 2020-03-18 PROCEDURE — 76040000026: Performed by: INTERNAL MEDICINE

## 2020-03-18 PROCEDURE — 77030021593 HC FCPS BIOP ENDOSC BSC -A: Performed by: INTERNAL MEDICINE

## 2020-03-18 PROCEDURE — 88312 SPECIAL STAINS GROUP 1: CPT

## 2020-03-18 PROCEDURE — 74011250636 HC RX REV CODE- 250/636: Performed by: ANESTHESIOLOGY

## 2020-03-18 RX ORDER — FENTANYL CITRATE 50 UG/ML
100 INJECTION, SOLUTION INTRAMUSCULAR; INTRAVENOUS AS NEEDED
Status: DISCONTINUED | OUTPATIENT
Start: 2020-03-18 | End: 2020-03-18 | Stop reason: HOSPADM

## 2020-03-18 RX ORDER — NALOXONE HYDROCHLORIDE 0.4 MG/ML
0.1 INJECTION, SOLUTION INTRAMUSCULAR; INTRAVENOUS; SUBCUTANEOUS AS NEEDED
Status: DISCONTINUED | OUTPATIENT
Start: 2020-03-18 | End: 2020-03-18 | Stop reason: HOSPADM

## 2020-03-18 RX ORDER — MIDAZOLAM HYDROCHLORIDE 1 MG/ML
2 INJECTION, SOLUTION INTRAMUSCULAR; INTRAVENOUS
Status: DISCONTINUED | OUTPATIENT
Start: 2020-03-18 | End: 2020-03-18 | Stop reason: HOSPADM

## 2020-03-18 RX ORDER — LIDOCAINE HYDROCHLORIDE 10 MG/ML
0.1 INJECTION INFILTRATION; PERINEURAL AS NEEDED
Status: DISCONTINUED | OUTPATIENT
Start: 2020-03-18 | End: 2020-03-18 | Stop reason: HOSPADM

## 2020-03-18 RX ORDER — OXYCODONE HYDROCHLORIDE 5 MG/1
5 TABLET ORAL
Status: DISCONTINUED | OUTPATIENT
Start: 2020-03-18 | End: 2020-03-18 | Stop reason: HOSPADM

## 2020-03-18 RX ORDER — PROPOFOL 10 MG/ML
INJECTION, EMULSION INTRAVENOUS AS NEEDED
Status: DISCONTINUED | OUTPATIENT
Start: 2020-03-18 | End: 2020-03-18 | Stop reason: HOSPADM

## 2020-03-18 RX ORDER — LIDOCAINE HYDROCHLORIDE 20 MG/ML
INJECTION, SOLUTION EPIDURAL; INFILTRATION; INTRACAUDAL; PERINEURAL AS NEEDED
Status: DISCONTINUED | OUTPATIENT
Start: 2020-03-18 | End: 2020-03-18 | Stop reason: HOSPADM

## 2020-03-18 RX ORDER — OXYCODONE HYDROCHLORIDE 5 MG/1
10 TABLET ORAL
Status: DISCONTINUED | OUTPATIENT
Start: 2020-03-18 | End: 2020-03-18 | Stop reason: HOSPADM

## 2020-03-18 RX ORDER — SODIUM CHLORIDE, SODIUM LACTATE, POTASSIUM CHLORIDE, CALCIUM CHLORIDE 600; 310; 30; 20 MG/100ML; MG/100ML; MG/100ML; MG/100ML
75 INJECTION, SOLUTION INTRAVENOUS CONTINUOUS
Status: DISCONTINUED | OUTPATIENT
Start: 2020-03-18 | End: 2020-03-18 | Stop reason: HOSPADM

## 2020-03-18 RX ORDER — PROPOFOL 10 MG/ML
INJECTION, EMULSION INTRAVENOUS
Status: DISCONTINUED | OUTPATIENT
Start: 2020-03-18 | End: 2020-03-18 | Stop reason: HOSPADM

## 2020-03-18 RX ORDER — ONDANSETRON 2 MG/ML
4 INJECTION INTRAMUSCULAR; INTRAVENOUS ONCE
Status: DISCONTINUED | OUTPATIENT
Start: 2020-03-18 | End: 2020-03-18 | Stop reason: HOSPADM

## 2020-03-18 RX ORDER — HYDROMORPHONE HYDROCHLORIDE 2 MG/ML
0.5 INJECTION, SOLUTION INTRAMUSCULAR; INTRAVENOUS; SUBCUTANEOUS
Status: DISCONTINUED | OUTPATIENT
Start: 2020-03-18 | End: 2020-03-18 | Stop reason: HOSPADM

## 2020-03-18 RX ORDER — SODIUM CHLORIDE, SODIUM LACTATE, POTASSIUM CHLORIDE, CALCIUM CHLORIDE 600; 310; 30; 20 MG/100ML; MG/100ML; MG/100ML; MG/100ML
1000 INJECTION, SOLUTION INTRAVENOUS CONTINUOUS
Status: DISCONTINUED | OUTPATIENT
Start: 2020-03-18 | End: 2020-03-18 | Stop reason: HOSPADM

## 2020-03-18 RX ORDER — DIPHENHYDRAMINE HYDROCHLORIDE 50 MG/ML
12.5 INJECTION, SOLUTION INTRAMUSCULAR; INTRAVENOUS ONCE
Status: DISCONTINUED | OUTPATIENT
Start: 2020-03-18 | End: 2020-03-18 | Stop reason: HOSPADM

## 2020-03-18 RX ADMIN — LIDOCAINE HYDROCHLORIDE 40 MG: 20 INJECTION, SOLUTION EPIDURAL; INFILTRATION; INTRACAUDAL; PERINEURAL at 12:40

## 2020-03-18 RX ADMIN — PROPOFOL 50 MG: 10 INJECTION, EMULSION INTRAVENOUS at 12:40

## 2020-03-18 RX ADMIN — PROPOFOL 20 MG: 10 INJECTION, EMULSION INTRAVENOUS at 12:42

## 2020-03-18 RX ADMIN — SODIUM CHLORIDE, SODIUM LACTATE, POTASSIUM CHLORIDE, AND CALCIUM CHLORIDE 1000 ML: 600; 310; 30; 20 INJECTION, SOLUTION INTRAVENOUS at 10:56

## 2020-03-18 RX ADMIN — PROPOFOL 160 MCG/KG/MIN: 10 INJECTION, EMULSION INTRAVENOUS at 12:40

## 2020-03-18 RX ADMIN — PROPOFOL 20 MG: 10 INJECTION, EMULSION INTRAVENOUS at 12:49

## 2020-03-18 NOTE — ROUTINE PROCESS
VSS. Denies c/o pain or discomfort. Pt education reviewed with patient and family. Pt discharged via wheelchair.

## 2020-03-18 NOTE — OP NOTES
Gastroenterology Procedure Note              Procedure:  Esophagogastroduodenoscopy, Endoscopic ultrasound with Doppler     Date of Procedure:  3/18/2020    Patient:  Mj Villalobos     1940    Indication:  Nausea, vomiting, history of recurrent acute pancreatitis, history of duodenal ulcer     Sedation:  MAC    Pre-Procedure Physical Exam:    Mental status:  alert and oriented  Airway:  normal oropharyngeal airway and neck mobility  CV:  regular rate and rhythm  Respiratory:  clear to auscultation    Procedure:  A History and Physical has been performed, and patient medication allergies have been reviewed. Risks of perforation, hemorrhage, adverse drug reaction, and aspiration were discussed. Informed consent was obtained for the procedure, including sedation. The patient was placed in the left lateral decubitus position. The heart rate, oxygen saturations, blood pressure, and response to care were monitored throughout the procedure. The linear echoendoscope was passed through the mouth and advanced under direct vision to the distal duodenum. As the scope was slowly withdrawn, detailed endoscopic images were obtained from the surrounding organs. The patient tolerated the procedure well. The gastroscope was passed through the mouth and advanced under direct vision to the second portion of the duodenum. A careful inspection was made as the gastroscope was withdrawn, including a retroflexed view of the proximal stomach. The patient tolerated the procedure well. Findings:     ENDOSCOPIC FINDINGS:      OROPHARYNX: Cords and pyriform recesses appear normal.   ESOPHAGUS: The esophagus is normal. The proximal, mid, and distal portions are normal. The Z-Line is intact. STOMACH: Copious bilous fluid and erythematous gastritis was seen in the body and antrum. Biopsies were obtained from the body and antrum for H pylori.  A large amount of retained food and vegetable debris was seen in the gastric body along the greater curvature. Evidence of prior gastroduodenal anastomosis. The examined portion of the small bowel was unremarkable. PANCREAS:  The pancreas is well-visualized from head to tail. In the head of the pancreas there is an 8 x 7 mm round, hypoechoic lesion. This partially compresses the common bile duct. FNA was performed using a 25-gauge Zadspace acquire needle. 2 passes were made and sent for pathology. The main pancreatic duct has a smooth regular cores measuring up to 3 mm in the head, 3 mm in the body and remains mildly dilated at 2 mm and the tail. A single visible sidebranch measuring 3 mm is seen at the junction of the body and tail. BILIARY TREE: The gallbladder is absent. The common bile duct is well-visualized from its insertion in the ampulla to the bifurcation of right and left hepatic ducts. It is mildly dilated measuring up to 10 mm maximally with an incomplete taper down to the ampulla. There are no intraductal stones, sludge or debris. The ampulla appears normal endosonographically. OTHER ORGANS:  Views of the left lobe of the liver demonstrate no solid mass lesions. There is no ascites in the upper abdomen. The left adrenal gland appears normal. The major vascular structures including the portal vein, splenic vessels and celiac artery appear unremarkable. There are no pathologically enlarged posterior mediastinal or upper abdominal lymph nodes. Specimen:  Yes    Estimated Blood Loss:  Minimal    Implant:  None           Impression:    1. Bile gastritis. 2. Gastric bezoar. 3. Small pancreatic head lesion. This may represent a lymph node or neuroendocrine tumor. It is not convincingly worrisome in appearance. Biopsies were obtained. 4. Mild segmental dilatation of the main pancreatic duct was single visible sidebranch could represent early IPMN. This could account for recurrent episodes of pancreatitis. Plan:  1.  Follow up results of pathology. 2. Avoid NSAIDs for 48 hours. 3. Further recommendations will be based on pathology results and patient's clinical course.      Signed:  Esperanza Trujillo MD  3/18/2020  1:17 PM

## 2020-03-18 NOTE — ANESTHESIA PREPROCEDURE EVALUATION
Anesthetic History               Review of Systems / Medical History  Patient summary reviewed, nursing notes reviewed and pertinent labs reviewed    Pulmonary    COPD: moderate      Smoker         Neuro/Psych              Cardiovascular    Hypertension              Exercise tolerance: >4 METS     GI/Hepatic/Renal           PUD (remote hx)     Endo/Other        Arthritis and cancer (breast)     Other Findings              Physical Exam    Airway  Mallampati: II  TM Distance: 4 - 6 cm  Neck ROM: decreased range of motion   Mouth opening: Normal     Cardiovascular  Regular rate and rhythm,  S1 and S2 normal,  no murmur, click, rub, or gallop             Dental    Dentition: Full lower dentures and Full upper dentures     Pulmonary  Breath sounds clear to auscultation               Abdominal  GI exam deferred       Other Findings            Anesthetic Plan    ASA: 3  Anesthesia type: total IV anesthesia            Anesthetic plan and risks discussed with: Patient

## 2020-03-18 NOTE — ANESTHESIA POSTPROCEDURE EVALUATION
Procedure(s):  ENDOSCOPIC ULTRASOUND (EUS)/BMI 19  ESOPHAGOGASTRODUODENOSCOPY (EGD)  ESOPHAGOGASTRODUODENAL (EGD) BIOPSY  FINE NEEDLE ASPIRATION. total IV anesthesia    Anesthesia Post Evaluation      Multimodal analgesia: multimodal analgesia used between 6 hours prior to anesthesia start to PACU discharge  Patient location during evaluation: bedside  Patient participation: complete - patient participated  Level of consciousness: responsive to verbal stimuli  Pain management: adequate  Airway patency: patent  Anesthetic complications: no  Cardiovascular status: hemodynamically stable  Respiratory status: spontaneous ventilation  Hydration status: stable        Vitals Value Taken Time   /63 3/18/2020  1:29 PM   Temp 37 °C (98.6 °F) 3/18/2020  1:21 PM   Pulse 70 3/18/2020  1:33 PM   Resp 14 3/18/2020  1:21 PM   SpO2 94 % 3/18/2020  1:33 PM   Vitals shown include unvalidated device data.

## 2020-03-18 NOTE — DISCHARGE INSTRUCTIONS
Gastrointestinal Esophagogastroduodenoscopy (EGD) - Upper Exam Discharge Instructions    1. Call Dr. Estella Yee at [phone] for any problems or questions. 2. Contact the doctor's office for follow up appointment as directed. 3. Medication may cause drowsiness for several hours, therefore:  · Do not drive or operate machinery for remainder of the day. · No alcohol today. · Do not make any important or legal decisions for 24 hours. · Do not sign any legal documents for 24 hours. 5. Ordinarily, you may resume regular diet and activity after exam unless otherwise specified by your physician. 6. For mild soreness in your throat you may use Cepacol throat lozenges or warm salt-water gargles as needed. Any additional instructions:  Dr. Estella Yee will call to discuss biopsy results. Instructions given to Bossman Goetz and other family members.

## 2020-06-22 ENCOUNTER — HOSPITAL ENCOUNTER (OUTPATIENT)
Dept: MRI IMAGING | Age: 80
Discharge: HOME | End: 2020-06-22
Attending: INTERNAL MEDICINE
Payer: MEDICARE

## 2020-06-22 DIAGNOSIS — K86.9 PANCREATIC LESION: ICD-10-CM

## 2020-06-22 PROCEDURE — 74181 MRI ABDOMEN W/O CONTRAST: CPT

## 2020-10-02 PROBLEM — J44.9 CHRONIC OBSTRUCTIVE PULMONARY DISEASE (HCC): Status: ACTIVE | Noted: 2020-10-02

## 2021-01-22 ENCOUNTER — TRANSCRIBE ORDER (OUTPATIENT)
Dept: SCHEDULING | Age: 81
End: 2021-01-22

## 2021-01-22 DIAGNOSIS — Z12.31 SCREENING MAMMOGRAM FOR HIGH-RISK PATIENT: Primary | ICD-10-CM

## 2021-02-20 ENCOUNTER — HOSPITAL ENCOUNTER (OUTPATIENT)
Dept: MAMMOGRAPHY | Age: 81
Discharge: HOME | End: 2021-02-20
Attending: FAMILY MEDICINE
Payer: MEDICARE

## 2021-02-20 DIAGNOSIS — Z12.31 SCREENING MAMMOGRAM FOR HIGH-RISK PATIENT: ICD-10-CM

## 2021-02-20 PROCEDURE — 77067 SCR MAMMO BI INCL CAD: CPT

## 2021-04-01 ENCOUNTER — HOSPITAL ENCOUNTER (OUTPATIENT)
Dept: GENERAL RADIOLOGY | Age: 81
Discharge: HOME | End: 2021-04-01
Payer: MEDICARE

## 2021-04-01 DIAGNOSIS — M25.541 ARTHRALGIA OF BOTH HANDS: ICD-10-CM

## 2021-04-01 DIAGNOSIS — M25.542 ARTHRALGIA OF BOTH HANDS: ICD-10-CM

## 2021-04-01 PROCEDURE — 73120 X-RAY EXAM OF HAND: CPT

## 2021-10-25 ENCOUNTER — HOSPITAL ENCOUNTER (OUTPATIENT)
Dept: GENERAL RADIOLOGY | Age: 81
Discharge: HOME | End: 2021-10-25
Payer: MEDICARE

## 2021-10-25 DIAGNOSIS — R20.2 NUMBNESS AND TINGLING IN BOTH HANDS: ICD-10-CM

## 2021-10-25 DIAGNOSIS — R20.0 NUMBNESS AND TINGLING IN BOTH HANDS: ICD-10-CM

## 2021-10-25 PROCEDURE — 72050 X-RAY EXAM NECK SPINE 4/5VWS: CPT

## 2022-01-31 ENCOUNTER — TRANSCRIBE ORDER (OUTPATIENT)
Dept: SCHEDULING | Age: 82
End: 2022-01-31

## 2022-01-31 DIAGNOSIS — Z12.31 VISIT FOR SCREENING MAMMOGRAM: Primary | ICD-10-CM

## 2022-02-26 ENCOUNTER — HOSPITAL ENCOUNTER (OUTPATIENT)
Dept: MAMMOGRAPHY | Age: 82
Discharge: HOME | End: 2022-02-26
Attending: FAMILY MEDICINE
Payer: MEDICARE

## 2022-02-26 DIAGNOSIS — Z12.31 VISIT FOR SCREENING MAMMOGRAM: ICD-10-CM

## 2022-02-26 PROCEDURE — 77067 SCR MAMMO BI INCL CAD: CPT

## 2022-03-18 PROBLEM — E04.2 MULTIPLE THYROID NODULES: Status: ACTIVE | Noted: 2018-10-24

## 2022-03-18 PROBLEM — C50.412 MALIGNANT NEOPLASM OF UPPER-OUTER QUADRANT OF LEFT BREAST IN FEMALE, ESTROGEN RECEPTOR POSITIVE (HCC): Status: ACTIVE | Noted: 2018-02-13

## 2022-03-18 PROBLEM — Z87.11 HISTORY OF PEPTIC ULCER: Status: ACTIVE | Noted: 2018-03-05

## 2022-03-18 PROBLEM — Z17.0 MALIGNANT NEOPLASM OF UPPER-OUTER QUADRANT OF LEFT BREAST IN FEMALE, ESTROGEN RECEPTOR POSITIVE (HCC): Status: ACTIVE | Noted: 2018-02-13

## 2022-03-19 PROBLEM — M80.00XA AGE-RELATED OSTEOPOROSIS WITH CURRENT PATHOLOGICAL FRACTURE: Status: ACTIVE | Noted: 2018-10-08

## 2022-03-19 PROBLEM — J44.9 CHRONIC OBSTRUCTIVE PULMONARY DISEASE (HCC): Status: ACTIVE | Noted: 2020-10-02

## 2022-03-19 PROBLEM — K85.90 ACUTE PANCREATITIS: Status: ACTIVE | Noted: 2019-12-26

## 2022-03-19 PROBLEM — J43.8 PARASEPTAL EMPHYSEMA (HCC): Status: ACTIVE | Noted: 2019-05-08

## 2022-03-19 PROBLEM — R11.10 INTRACTABLE VOMITING: Status: ACTIVE | Noted: 2018-05-20

## 2022-03-19 PROBLEM — Z87.19 HISTORY OF ACUTE PANCREATITIS: Status: ACTIVE | Noted: 2018-03-05

## 2022-03-19 PROBLEM — E83.42 HYPOMAGNESEMIA: Status: ACTIVE | Noted: 2018-06-08

## 2022-03-19 PROBLEM — S32.010A COMPRESSION FRACTURE OF L1 LUMBAR VERTEBRA (HCC): Status: ACTIVE | Noted: 2018-05-07

## 2022-03-19 PROBLEM — Z98.890 S/P LUMPECTOMY, LEFT BREAST: Status: ACTIVE | Noted: 2018-06-28

## 2022-05-31 ENCOUNTER — TELEPHONE (OUTPATIENT)
Dept: RHEUMATOLOGY | Age: 82
End: 2022-05-31

## 2022-05-31 NOTE — TELEPHONE ENCOUNTER
She feels that the injection she is taking weekly is giving her side effect after her 3rd or 4th injection she started having arm , leg , and hand pain that is awful,  She did read the medication insert and this is a side effect of the medication.  What should she do for or about this

## 2022-05-31 NOTE — TELEPHONE ENCOUNTER
Can you please schedule the patient to come in to see me in person in the next week to 2 weeks time since she last saw me in November 2021. I will have to discuss the Enbrel shot and the concerned side effects with her when she comes to see me, but until then she needs to continue the Enbrel shot every week. Please let her know this.

## 2022-06-08 ENCOUNTER — OFFICE VISIT (OUTPATIENT)
Dept: RHEUMATOLOGY | Age: 82
End: 2022-06-08
Payer: COMMERCIAL

## 2022-06-08 VITALS
BODY MASS INDEX: 18.22 KG/M2 | SYSTOLIC BLOOD PRESSURE: 132 MMHG | DIASTOLIC BLOOD PRESSURE: 61 MMHG | WEIGHT: 99 LBS | HEIGHT: 62 IN | HEART RATE: 76 BPM

## 2022-06-08 DIAGNOSIS — Z79.899 LONG-TERM USE OF HIGH-RISK MEDICATION: ICD-10-CM

## 2022-06-08 DIAGNOSIS — M05.79 SEROPOSITIVE RHEUMATOID ARTHRITIS OF MULTIPLE SITES (HCC): Primary | ICD-10-CM

## 2022-06-08 PROCEDURE — 1123F ACP DISCUSS/DSCN MKR DOCD: CPT | Performed by: INTERNAL MEDICINE

## 2022-06-08 PROCEDURE — 99214 OFFICE O/P EST MOD 30 MIN: CPT | Performed by: INTERNAL MEDICINE

## 2022-06-08 RX ORDER — PREDNISONE 20 MG/1
TABLET ORAL
Qty: 21 TABLET | Refills: 0 | Status: SHIPPED | OUTPATIENT
Start: 2022-06-08 | End: 2022-07-20

## 2022-06-08 ASSESSMENT — ROUTINE ASSESSMENT OF PATIENT INDEX DATA (RAPID3)
ON A SCALE OF ONE TO TEN, HOW MUCH OF A PROBLEM HAS UNUSUAL FATIGUE OR TIREDNESS BEEN FOR YOU OVER THE PAST WEEK?: 6
ON A SCALE OF ONE TO TEN, HOW MUCH PAIN HAVE YOU HAD BECAUSE OF YOUR CONDITION OVER THE PAST WEEK?: 8
WHEN YOU AWAKENED IN THE MORNING OVER THE LAST WEEK, PLEASE INDICATE THE AMOUNT OF TIME IT TAKES UNTIL YOU ARE AS LIMBER AS YOU WILL BE FOR THE DAY: 1 HOUR
ON A SCALE OF ONE TO TEN, CONSIDERING ALL THE WAYS IN WHICH ILLNESS AND HEALTH CONDITIONS MAY AFFECT YOU AT THIS TIME, PLEASE INDICATE BELOW HOW YOU ARE DOING:: 8
ON A SCALE OF ONE TO TEN, HOW DIFFICULT WAS IT FOR YOU TO COMPLETE THE LISTED DAILY PHYSICAL TASKS OVER THE LAST WEEK: 1.5

## 2022-06-08 ASSESSMENT — JOINT PAIN
TOTAL NUMBER OF TENDER JOINTS: 22
TOTAL NUMBER OF SWOLLEN JOINTS: 10

## 2022-06-08 NOTE — PROGRESS NOTES
Elvira Silva M.D.  [address] [place] [address], [address] [facility]  Office : [phone], Fax: [phone] OFFICE VISIT NOTE  Date of Visit:  2022 2:47 PM    Patient Information:  Name:  Irving Shen  :  1940  Age:  80 y.o. Gender:  female      Ms. Yun Aguirre is here today for follow-up of seropositive rheumatoid arthritis and discuss about whether the Enbrel shot has been helpful or not. Last visit: 2021      History of Present Illness: On talking to the patient she states that since starting the Enbrel she has been experiencing more pain in her joints and her muscles. I do believe that she has active disease and the Enbrel has not completely started to help her underlying disease. Her current joint complaints are as mentioned below. Since the last visit, patient is feeling \"worse to horrible\". Pain: 8/10  Location:  Bilateral hip and lower back and mid back pain. Some shoulder blade pain. Bilateral shoulder pain with neck stiffness with pain with neck ROM. Occasional occipital headaches. Bilateral wrist pain and swelling. Some pain and swelling of the MCP and PIP joints with no warmth and redness. Bilateral ankle pain and swelling with no buckling. Quality:  Sharp pain. Modifying Factors: Prolonged standing as well as walking does seem to worsen the lower back as well as hip pain. Associated Symptoms: Has been having claudication pain involving the left calf with tingling and numbness from the left hip to the toes which has been intermittent as well. Some weakness of the left leg. Intermittent tingling and numbness from the fingertips to the mid arms. Some difficulty opening jars as well as buttoning and unbuttoning.      DMARD/Biologic 2022   AM Stiffness 1 hour   Pain 8   Fatigue 6   MDHAQ 1.5   Patient Global Score 8   Medication Name Enbrel     Last TB screen: 2022  TB result: Negative     Current dose of Left 1975    BREAST LUMPECTOMY Left 2/22/2018    LEFT BREAST LUMPECTOMY/ SENTINEL NODE BIOPSY performed by Thor Lazcano MD at [facility] MAIN OR    BREAST SURGERY Left 1975    breast lumpectomy, benign  (left)    CATARACT REMOVAL Bilateral 2018    w/IOL    CHOLECYSTECTOMY      HYSTERECTOMY (CERVIX STATUS UNKNOWN)  1977    OTHER SURGICAL HISTORY      hernicolectomy 2 cm    MI ABDOMEN SURGERY PROC UNLISTED  2003    stomach surgery for ruptured ulcer and extensive rerouting of intestestines per patient    [facility] ENDOSCOPY  05/21/2018    empiric dilation    UPPER GASTROINTESTINAL ENDOSCOPY      US BREAST NEEDLE BIOPSY LEFT Left 1/31/2018    US BREAST NEEDLE BIOPSY LEFT 1/31/2018 [facility]       Family History  Family History   Problem Relation Age of Onset    Thyroid Cancer Neg Hx     No Known Problems Paternal Grandfather     No Known Problems Paternal Grandmother     No Known Problems Maternal Grandfather     No Known Problems Maternal Grandmother     Hypertension Other         Brother and sister with htn    No Known Problems Brother     Hypertension Mother     Heart Disease Brother     Heart Disease Sister     Breast Cancer Sister 79    Cancer Sister     Heart Disease Father     Heart Attack Father     Cancer Brother         prostate    Diabetes Sister     Heart Disease Sister        Social History  Social History     Socioeconomic History    Marital status:       Spouse name: None    Number of children: None    Years of education: None    Highest education level: None   Occupational History    None   Tobacco Use    Smoking status: Current Every Day Smoker     Packs/day: 0.50     Start date: 5/6/1966    Smokeless tobacco: Never Used   Substance and Sexual Activity    Alcohol use: No    Drug use: No    Sexual activity: None   Other Topics Concern    None   Social History Narrative    None     Social Determinants of Health Financial Resource Strain:     Difficulty of Paying Living Expenses: Not on file   Food Insecurity:     Worried About Running Out of Food in the Last Year: Not on file    Nik of Food in the Last Year: Not on file   Transportation Needs:     Lack of Transportation (Medical): Not on file    Lack of Transportation (Non-Medical): Not on file   Physical Activity:     Days of Exercise per Week: Not on file    Minutes of Exercise per Session: Not on file   Stress:     Feeling of Stress : Not on file   Social Connections:     Frequency of Communication with Friends and Family: Not on file    Frequency of Social Gatherings with Friends and Family: Not on file    Attends Congregation Services: Not on file    Active Member of [address] [place] [address] Urgent Group or Organizations: Not on file    Attends Club or Organization Meetings: Not on file    Marital Status: Not on file   Intimate Partner Violence:     Fear of Current or Ex-Partner: Not on file    Emotionally Abused: Not on file    Physically Abused: Not on file    Sexually Abused: Not on file   Housing Stability:     Unable to Pay for Housing in the Last Year: Not on file    Number of [place] in the Last Year: Not on file    Unstable Housing in the Last Year: Not on file               Allergy:  Allergies   Allergen Reactions    Azithromycin Other (See Comments)     pancreatitis    Codeine Nausea And Vomiting         Current Medications:  Outpatient Encounter Medications as of 6/8/2022   Medication Sig Dispense Refill    predniSONE (DELTASONE) 20 MG tablet Take 1 pill once a day after breakfast for 2 weeks then 1/2 a pill once a day after breakfast for 2 weeks and then stop.  21 tablet 0    alendronate (FOSAMAX) 70 MG tablet TAKE 1 TABLET BY MOUTH EVERY WEEK      donepezil (ARICEPT) 5 MG tablet Take 5 mg by mouth      Etanercept (ENBREL SURECLICK) 50 MG/ML SOAJ Inject 50 mg into the skin every 7 days      losartan (COZAAR) 50 MG tablet Take 50 mg by mouth daily  pravastatin (PRAVACHOL) 40 MG tablet Take 40 mg by mouth      [DISCONTINUED] folic acid (FOLVITE) 1 MG tablet Take 1 mg by mouth daily (Patient not taking: Reported on 6/8/2022)      [DISCONTINUED] ibuprofen (ADVIL;MOTRIN) 800 MG tablet TAKE ONE TABLET BY MOUTH EVERY 8 HOURS AS NEEDED (Patient not taking: Reported on 6/8/2022)      [DISCONTINUED] umeclidinium-vilanterol (ANORO ELLIPTA) 62.5-25 MCG/INH AEPB inhaler Inhale 1 puff into the lungs daily (Patient not taking: Reported on 6/8/2022)       No facility-administered encounter medications on file as of 6/8/2022. REVIEW OF SYSTEMS: The following systems were reviewed with patient today and were negative except for the following (depicted with an \"X\"):        \"X\" General  \"X\" Head and Neck  \"X\" Heart and Breathing  \"X\" Gastrointestinal   x Fever/chills  x Hair loss   Shortness of breath   Upset stomach    Falls  x Dry mouth   Coughing   Diarrhea / constipation    Wt loss   Mouth sores   Wheezing   Heartburn    Wt gain   Ringing ears   Chest pain   Dark or bloody stools    Night sweats   Diff. swallowing  X None of above   Nausea or vomiting    None of above   None of above     X None of above                \"X\" Skin  \"X\" Neurology  \"X\" Urinary/Gyn  \"X\" Other    Easy bruising  x Numbness/ tingling   Female problems   Depression    Rashes  x Weakness   Problems with urination   Feeling anxious    Sun sensitivity   Headaches  X None of above  x Problems sleeping   X None of above   None of above      None of above          Physical Exam:  Blood pressure 132/61, pulse 76, height 5' 2\" (1.575 m), weight 99 lb (44.9 kg). General:  Patient alert, cooperative and in no apparent distress. HEENT: Pupils equally reactive to light and accommodation, minimal scleral injection noted. Heart: Regular rate and rhythm, normal S1 and S2, no rubs or gallops. Lungs: Clear to auscultation bilaterally. Abdomen: Soft, nontender, no hepatosplenomegaly.   Skin:  No rashes. No nail abnormalities. Neurologic:  Oriented, normal speech and affect. Normal gait. Extremities:  No edema in bilateral lower extremities with no cyanosis or clubbing. Muskoskeletal Exam:     I examined the shoulders, elbows, wrists, MCPs, PIPs, DIPs and knees bilaterally for strength, range of motion, deformity, tenderness, swelling, and synovitis. he findings are:         Physical Exam              Joint Exam 06/08/2022        Right  Left   Acromioclavicular   Tender      Elbow   Tender   Tender   Wrist  Swollen Tender  Swollen Tender   MCP 2  Swollen Tender  Swollen Tender   MCP 3  Swollen Tender  Swollen Tender   MCP 4  Swollen Tender  Swollen Tender   MCP 5  Swollen Tender  Swollen Tender   PIP 2   Tender   Tender   PIP 3   Tender   Tender   PIP 4   Tender   Tender   PIP 5   Tender   Tender   Cervical Spine   Tender      Lumbar Spine   Tender      Sacroiliac   Tender   Tender   Knee   Tender   Tender   Ankle  Swollen Tender  Swollen Tender     cJADAS 10:- 5.72     Patient otherwise has a normal joint exam without other evidence of joint tenderness, synovitis, warmth, erythema, decreased ROM, weakness or deformities. Radiology Reports Reviewed (if available):  Last 3 months  [unfilled]    Lab Reports Reviewed (if available): Last 3 months    Orders Only on 03/30/2022   Component Date Value Ref Range Status    WBC 03/30/2022 9.9  3.4 - 10.8 x10E3/uL Final    RBC 03/30/2022 4.49  3.77 - 5.28 x10E6/uL Final    Hemoglobin 03/30/2022 12.4  11.1 - 15.9 g/dL Final    Hematocrit 03/30/2022 37.9  34.0 - 46.6 % Final    MCV 03/30/2022 84  79 - 97 fL Final    MCH 03/30/2022 27.6  26.6 - 33.0 pg Final    MCHC 03/30/2022 32.7  31.5 - 35.7 g/dL Final    RDW 03/30/2022 14.3  11.7 - 15.4 % Final    Platelets 01/49/0782 349  150 - 450 x10E3/uL Final    Neutrophils % 03/30/2022 78  Not Estab. % Final    Lymphocytes % 03/30/2022 15  Not Estab. % Final    Monocytes % 03/30/2022 5  Not Estab. % Final    Eosinophils % 03/30/2022 2  Not Estab. % Final    Basophils % 03/30/2022 0  Not Estab. % Final    Neutrophils Absolute 03/30/2022 7.7* 1.4 - 7.0 x10E3/uL Final    Lymphocytes Absolute 03/30/2022 1.4  0.7 - 3.1 x10E3/uL Final    Monocytes Absolute 03/30/2022 0.5  0.1 - 0.9 x10E3/uL Final    Eosinophils Absolute 03/30/2022 0.2  0.0 - 0.4 x10E3/uL Final    Basophils Absolute 03/30/2022 0.0  0.0 - 0.2 x10E3/uL Final    Immature Granulocytes 03/30/2022 0  Not Estab. % Final    GRANULOCYTE ABSOLUTE COUNT 03/30/2022 0.0  0.0 - 0.1 x10E3/uL Final    Glucose 03/30/2022 87  65 - 99 mg/dL Final    BUN 03/30/2022 19  8 - 27 mg/dL Final    CREATININE 03/30/2022 0.81  0.57 - 1.00 mg/dL Final    EGFR 03/30/2022 74  >59 mL/min/1.73 Final    Bun/Cre Ratio 03/30/2022 23  12 - 28 NA Final    Sodium 03/30/2022 140  134 - 144 mmol/L Final    Potassium 03/30/2022 3.8  3.5 - 5.2 mmol/L Final    Chloride 03/30/2022 106  96 - 106 mmol/L Final    CO2 03/30/2022 18* 20 - 29 mmol/L Final    Calcium 03/30/2022 8.7  8.7 - 10.3 mg/dL Final    Total Protein 03/30/2022 6.2  6.0 - 8.5 g/dL Final    Albumin 03/30/2022 3.7  3.7 - 4.7 g/dL Final    Globulin, Total 03/30/2022 2.5  1.5 - 4.5 g/dL Final    Albumin/Globulin Ratio 03/30/2022 1.5  1.2 - 2.2 NA Final    Total Bilirubin 03/30/2022 0.3  0.0 - 1.2 mg/dL Final    Alkaline Phosphatase 03/30/2022 78  44 - 121 IU/L Final    AST 03/30/2022 21  0 - 40 IU/L Final    ALT 03/30/2022 13  0 - 32 IU/L Final    TSH 03/30/2022 1.370  0.450 - 4.500 uIU/mL Final    Cholesterol, Total 03/30/2022 106  100 - 199 mg/dL Final    Triglycerides 03/30/2022 165* 0 - 149 mg/dL Final    HDL 03/30/2022 41  >39 mg/dL Final    VLDL 03/30/2022 27  5 - 40 mg/dL Final    LDL Calculated 03/30/2022 38  0 - 99 mg/dL Final         The results above were reviewed and discussed with patient. Assessment/Plan:   Amie Carter is a 80 y.o. female who presents with:     1. Seropositive rheumatoid arthritis of multiple sites [facility]): She was instructed to continue Enbrel 50 mg 1 shot to be administered to self every week. Since she has been on Enbrel for the last 2 months I would like to give it another month to see if it will help her underlying disease. To help her get over the flare she was started on a burst and taper of prednisone as mentioned below. I did  the patient that if she is sick requiring her to be on an antibiotic or antiviral drug she will need to skip administering the Enbrel until she has completed the antibiotic/antiviral course and is over the infection. Patient did verbalize understanding to this. -     predniSONE (DELTASONE) 20 MG tablet; Take 1 pill once a day after breakfast for 2 weeks then 1/2 a pill once a day after breakfast for 2 weeks and then stop. -     C-Reactive Protein; Future  -     C-Reactive Protein    2. Long-term use of high-risk medication: If there is any noted abnormality I will keep the patient informed but if not I will review her labs with her on follow-up. -     CBC with Auto Differential; Future  -     Comprehensive Metabolic Panel; Future  -     Comprehensive Metabolic Panel  -     CBC with Auto Differential    Disease activity plan:  As stated above. Steroid management plan:  As stated above, if applicable. Pain management plan:  As stated above, if applicable. Weight management plan:  Weight loss through diet and exercise is always encouraged    Disease prognosis: Good    I appreciate the opportunity to continue to participate in the care of this patient. Follow-up and Dispositions    · Return in about 6 weeks (around 7/20/2022). Electronically signed by:  Shivani Schuster MD      This note was dictated using dragon voice recognition software.   It has been proofread, but there may still exist voice recognition errors that the author did not detect.                --------------------------------------------------------------------------------------------------------------------------------------------------------------------------------------------------------------------------------

## 2022-06-09 ENCOUNTER — TELEPHONE (OUTPATIENT)
Dept: VASCULAR SURGERY | Age: 82
End: 2022-06-09

## 2022-06-09 LAB
ALBUMIN SERPL-MCNC: 3.2 G/DL (ref 3.2–4.6)
ALBUMIN/GLOB SERPL: 1 {RATIO} (ref 1.2–3.5)
ALP SERPL-CCNC: 86 U/L (ref 50–136)
ALT SERPL-CCNC: 10 U/L (ref 12–65)
ANION GAP SERPL CALC-SCNC: 9 MMOL/L (ref 7–16)
AST SERPL-CCNC: 11 U/L (ref 15–37)
BASOPHILS # BLD: 0.1 K/UL (ref 0–0.2)
BASOPHILS NFR BLD: 1 % (ref 0–2)
BILIRUB SERPL-MCNC: 0.2 MG/DL (ref 0.2–1.1)
BUN SERPL-MCNC: 10 MG/DL (ref 8–23)
CALCIUM SERPL-MCNC: 9 MG/DL (ref 8.3–10.4)
CHLORIDE SERPL-SCNC: 107 MMOL/L (ref 98–107)
CO2 SERPL-SCNC: 22 MMOL/L (ref 21–32)
CREAT SERPL-MCNC: 0.9 MG/DL (ref 0.6–1)
CRP SERPL-MCNC: 1.9 MG/DL (ref 0–0.9)
DIFFERENTIAL METHOD BLD: ABNORMAL
EOSINOPHIL # BLD: 0.5 K/UL (ref 0–0.8)
EOSINOPHIL NFR BLD: 8 % (ref 0.5–7.8)
ERYTHROCYTE [DISTWIDTH] IN BLOOD BY AUTOMATED COUNT: 15.8 % (ref 11.9–14.6)
GLOBULIN SER CALC-MCNC: 3.3 G/DL (ref 2.3–3.5)
GLUCOSE SERPL-MCNC: 105 MG/DL (ref 65–100)
HCT VFR BLD AUTO: 36.6 % (ref 35.8–46.3)
HGB BLD-MCNC: 11.2 G/DL (ref 11.7–15.4)
IMM GRANULOCYTES # BLD AUTO: 0 K/UL (ref 0–0.5)
IMM GRANULOCYTES NFR BLD AUTO: 0 % (ref 0–5)
LYMPHOCYTES # BLD: 1.5 K/UL (ref 0.5–4.6)
LYMPHOCYTES NFR BLD: 22 % (ref 13–44)
MCH RBC QN AUTO: 25.9 PG (ref 26.1–32.9)
MCHC RBC AUTO-ENTMCNC: 30.6 G/DL (ref 31.4–35)
MCV RBC AUTO: 84.5 FL (ref 79.6–97.8)
MONOCYTES # BLD: 0.4 K/UL (ref 0.1–1.3)
MONOCYTES NFR BLD: 6 % (ref 4–12)
NEUTS SEG # BLD: 4.3 K/UL (ref 1.7–8.2)
NEUTS SEG NFR BLD: 63 % (ref 43–78)
NRBC # BLD: 0 K/UL (ref 0–0.2)
PLATELET # BLD AUTO: 413 K/UL (ref 150–450)
PMV BLD AUTO: 9.6 FL (ref 9.4–12.3)
POTASSIUM SERPL-SCNC: 3.8 MMOL/L (ref 3.5–5.1)
PROT SERPL-MCNC: 6.5 G/DL (ref 6.3–8.2)
RBC # BLD AUTO: 4.33 M/UL (ref 4.05–5.2)
SODIUM SERPL-SCNC: 138 MMOL/L (ref 136–145)
WBC # BLD AUTO: 6.9 K/UL (ref 4.3–11.1)

## 2022-06-09 NOTE — TELEPHONE ENCOUNTER
Pt c/o Left leg pain (calf), she does have arthritis, been getting shots for that once a week (p so far) pain started after the first or second shot-pain off and on, also both ankles swollen -her arthritis Dr told her to call us, no heat or redness. If she absolutely come in she would wait -b/c of co-pay and co-insurance.      ** per Gabriela Brar NP stated she can come in to get u/s (DVT study) to r/o bld clot, can wait, if things call us back for u/s    -informed pt of this, per pt she wants to wait -if it does get worse or any heat/redness she will call us back

## 2022-07-20 ENCOUNTER — OFFICE VISIT (OUTPATIENT)
Dept: RHEUMATOLOGY | Age: 82
End: 2022-07-20
Payer: COMMERCIAL

## 2022-07-20 VITALS
SYSTOLIC BLOOD PRESSURE: 142 MMHG | BODY MASS INDEX: 17.78 KG/M2 | WEIGHT: 96.6 LBS | HEIGHT: 62 IN | HEART RATE: 74 BPM | DIASTOLIC BLOOD PRESSURE: 67 MMHG

## 2022-07-20 DIAGNOSIS — M05.79 SEROPOSITIVE RHEUMATOID ARTHRITIS OF MULTIPLE SITES (HCC): Primary | ICD-10-CM

## 2022-07-20 DIAGNOSIS — Z79.899 LONG-TERM USE OF HIGH-RISK MEDICATION: ICD-10-CM

## 2022-07-20 LAB
ALBUMIN SERPL-MCNC: 3.3 G/DL (ref 3.2–4.6)
ALBUMIN/GLOB SERPL: 1 {RATIO} (ref 1.2–3.5)
ALP SERPL-CCNC: 85 U/L (ref 50–136)
ALT SERPL-CCNC: 14 U/L (ref 12–65)
ANION GAP SERPL CALC-SCNC: 7 MMOL/L (ref 7–16)
AST SERPL-CCNC: 13 U/L (ref 15–37)
BASOPHILS # BLD: 0.1 K/UL (ref 0–0.2)
BASOPHILS NFR BLD: 1 % (ref 0–2)
BILIRUB SERPL-MCNC: 0.4 MG/DL (ref 0.2–1.1)
BUN SERPL-MCNC: 8 MG/DL (ref 8–23)
CALCIUM SERPL-MCNC: 9.1 MG/DL (ref 8.3–10.4)
CHLORIDE SERPL-SCNC: 110 MMOL/L (ref 98–107)
CO2 SERPL-SCNC: 23 MMOL/L (ref 21–32)
CREAT SERPL-MCNC: 0.7 MG/DL (ref 0.6–1)
CRP SERPL-MCNC: 1.2 MG/DL (ref 0–0.9)
DIFFERENTIAL METHOD BLD: ABNORMAL
EOSINOPHIL # BLD: 0.4 K/UL (ref 0–0.8)
EOSINOPHIL NFR BLD: 5 % (ref 0.5–7.8)
ERYTHROCYTE [DISTWIDTH] IN BLOOD BY AUTOMATED COUNT: 17.8 % (ref 11.9–14.6)
GLOBULIN SER CALC-MCNC: 3.3 G/DL (ref 2.3–3.5)
GLUCOSE SERPL-MCNC: 91 MG/DL (ref 65–100)
HCT VFR BLD AUTO: 41.4 % (ref 35.8–46.3)
HGB BLD-MCNC: 12.8 G/DL (ref 11.7–15.4)
IMM GRANULOCYTES # BLD AUTO: 0 K/UL (ref 0–0.5)
IMM GRANULOCYTES NFR BLD AUTO: 0 % (ref 0–5)
LYMPHOCYTES # BLD: 2 K/UL (ref 0.5–4.6)
LYMPHOCYTES NFR BLD: 22 % (ref 13–44)
MCH RBC QN AUTO: 26.9 PG (ref 26.1–32.9)
MCHC RBC AUTO-ENTMCNC: 30.9 G/DL (ref 31.4–35)
MCV RBC AUTO: 87.2 FL (ref 79.6–97.8)
MONOCYTES # BLD: 0.7 K/UL (ref 0.1–1.3)
MONOCYTES NFR BLD: 8 % (ref 4–12)
NEUTS SEG # BLD: 5.8 K/UL (ref 1.7–8.2)
NEUTS SEG NFR BLD: 64 % (ref 43–78)
NRBC # BLD: 0 K/UL (ref 0–0.2)
PLATELET # BLD AUTO: 386 K/UL (ref 150–450)
PMV BLD AUTO: 9.9 FL (ref 9.4–12.3)
POTASSIUM SERPL-SCNC: 4.1 MMOL/L (ref 3.5–5.1)
PROT SERPL-MCNC: 6.6 G/DL (ref 6.3–8.2)
RBC # BLD AUTO: 4.75 M/UL (ref 4.05–5.2)
SODIUM SERPL-SCNC: 140 MMOL/L (ref 136–145)
WBC # BLD AUTO: 9 K/UL (ref 4.3–11.1)

## 2022-07-20 PROCEDURE — 99214 OFFICE O/P EST MOD 30 MIN: CPT | Performed by: INTERNAL MEDICINE

## 2022-07-20 PROCEDURE — 1123F ACP DISCUSS/DSCN MKR DOCD: CPT | Performed by: INTERNAL MEDICINE

## 2022-07-20 RX ORDER — IBUPROFEN 800 MG/1
800 TABLET ORAL 2 TIMES DAILY WITH MEALS
Qty: 180 TABLET | Refills: 0 | Status: ON HOLD | OUTPATIENT
Start: 2022-07-20 | End: 2022-10-03

## 2022-07-20 ASSESSMENT — ROUTINE ASSESSMENT OF PATIENT INDEX DATA (RAPID3)
ON A SCALE OF ONE TO TEN, CONSIDERING ALL THE WAYS IN WHICH ILLNESS AND HEALTH CONDITIONS MAY AFFECT YOU AT THIS TIME, PLEASE INDICATE BELOW HOW YOU ARE DOING:: 4
WHEN YOU AWAKENED IN THE MORNING OVER THE LAST WEEK, PLEASE INDICATE THE AMOUNT OF TIME IT TAKES UNTIL YOU ARE AS LIMBER AS YOU WILL BE FOR THE DAY: > 1 HOUR
ON A SCALE OF ONE TO TEN, HOW MUCH OF A PROBLEM HAS UNUSUAL FATIGUE OR TIREDNESS BEEN FOR YOU OVER THE PAST WEEK?: 4
ON A SCALE OF ONE TO TEN, HOW DIFFICULT WAS IT FOR YOU TO COMPLETE THE LISTED DAILY PHYSICAL TASKS OVER THE LAST WEEK: 1.1
ON A SCALE OF ONE TO TEN, HOW MUCH PAIN HAVE YOU HAD BECAUSE OF YOUR CONDITION OVER THE PAST WEEK?: 6

## 2022-07-20 ASSESSMENT — JOINT PAIN
TOTAL NUMBER OF SWOLLEN JOINTS: 6
TOTAL NUMBER OF TENDER JOINTS: 17

## 2022-07-20 NOTE — PROGRESS NOTES
Tracy Miramontes M.D.  [address] [place] [address], [address] [facility]  Office : [phone], Fax: [phone] OFFICE VISIT NOTE  Date of Visit:  2022 11:55 AM    Patient Information:  Name:  Torie Staton  :  1940  Age:  80 y.o. Gender:  female      Ms. Mindy Calloway is here today for follow-up of seropositive rheumatoid arthritis. Last visit: 2022      History of Present Illness: On talking to  the patient today she states that she has not had to skip administering the Enbrel for any reason since she last saw me. She does believe that the Enbrel is helping relieve a lot of her joint pain and swelling since she has been off the prednisone for the last week to 10 days now, with not much recurrence of her joint pain and swelling. Her current joint complaints are as mentioned below. Since the last visit, patient is feeling \"poor\". Pain: 6/10  Location:  Has pain and swelling of the MCP and PIP joints with no warmth and redness. Some pain with some swelling of the wrists with no warmth and redness. Occasional elbow to the wrists worse in the right than the left forearm. Constant tingling and numbness of her hands. Bilateral shoulder and para spinal muscle pain. Some right knee pain with buckling with some swelling with no warmth and redness. Bilateral ankle swelling with some blockage of the left femoral artery. No pain in her feet with some swelling with no warmth and redness. Quality:  Deep achy pain. Modifying Factors:  Lifting and grasping things worsens the hand pain. Associated Symptoms: No tingling, numbness or pain down the arms or legs. Has some difficulty opening jars with no difficulty buttoning and unbuttoning.      DMARD/Biologic 2022   AM Stiffness > 1 hour   Pain 6   Fatigue 4   MDHAQ 1.1   Patient Global Score 4   Medication Name Enbrel      Last TB screen: 2022  TB result: Negative     Current dose of steroids:none  How long on current dose of steroids:na  How long on continuous steroid therapy:na     Past DMARDs, if applicable (methotrexate, plaquenil/hydroxychloroquine, sulfasalazine, Arava/leflunomide):none     Past biologics, if applicable (enbrel, humira, simponi, cimzia, xeljanz, orencia, remicade, simponi aria, actemra, rituximab, otezla, stelara, cosentyx): Currently on Enbrel injection 50 mg every 7 days. Past NSAIDs, if applicable (motrin, aleve, naproxen, advil, ibuprofen, celebrex, voltaren/diclofenac, etc.): Currently off ibuprofen 800 mg. Last BBV:6513  Past osteoporosis drugs, if applicable (fosamax, actonel, boniva, reclast, prolia, forteo):fosamax 70 weekly     BMI:17.67  Current exercise regimen, if any:none  Current vitamin D dose:none  Current calcium dose:none  Fractures since last visit, if any: none      The patient otherwise has no significant interval changes in health or medical history to report.      History Reviewed:    Past Medical History  Past Medical History:   Diagnosis Date    EPIFANIO (acute kidney injury) ([address] 12/04/2014    pt denies this dx    Arthritis     RA-abdelrahman hands    Back pain 6/10/2016    Breast cancer ([address] 2018    Breast lump dx 2/2018    left    Bronchitis     Chronic obstructive pulmonary disease ([address]     Discharge from the vagina     Dysuria     Eczema 6/10/2016    Fungal dermatitis     Headache 6/10/2016    Hypercholesterolemia 12/4/2014    Hypertension     not well controlled--per pt    Intractable vomiting 5/20/2018    Menopausal vaginal dryness     Osteoarthritis 6/10/2016    Osteoporosis 6/10/2016    Pancreatitis     2 episodes    PUD (peptic ulcer disease)     last 2003 which a rupture an extensive surgery    Sepsis ([address] 12/4/2014    Thyroid nodule     Tobacco abuse     1ppd x 49 yrs       Past Surgical History  Past Surgical History:   Procedure Laterality Date    APPENDECTOMY  1977    with hysterectomy    BREAST BIOPSY Left 1975    BREAST 7/20/2022   Medication Sig Dispense Refill    alendronate (FOSAMAX) 70 MG tablet TAKE 1 TABLET BY MOUTH EVERY WEEK      donepezil (ARICEPT) 5 MG tablet Take 5 mg by mouth      Etanercept (ENBREL SURECLICK) 50 MG/ML SOAJ Inject 50 mg into the skin every 7 days      losartan (COZAAR) 50 MG tablet Take 50 mg by mouth daily      pravastatin (PRAVACHOL) 40 MG tablet Take 40 mg by mouth      [DISCONTINUED] predniSONE (DELTASONE) 20 MG tablet Take 1 pill once a day after breakfast for 2 weeks then 1/2 a pill once a day after breakfast for 2 weeks and then stop. (Patient not taking: Reported on 7/20/2022) 21 tablet 0     No facility-administered encounter medications on file as of 7/20/2022. REVIEW OF SYSTEMS: The following systems were reviewed with patient today and were negative except for the following (depicted with an \"X\"):        \"X\" General  \"X\" Head and Neck  \"X\" Heart and Breathing  \"X\" Gastrointestinal    Fever/chills   Hair loss   Shortness of breath   Upset stomach    Falls   Dry mouth   Coughing  x Diarrhea / constipation    Wt loss   Mouth sores   Wheezing   Heartburn    Wt gain   Ringing ears   Chest pain   Dark or bloody stools    Night sweats   Diff. swallowing  X None of above   Nausea or vomiting   X None of above  X None of above      None of above                \"X\" Skin  \"X\" Neurology  \"X\" Urinary/Gyn  \"X\" Other    Easy bruising  x Numbness/ tingling   Female problems   Depression    Rashes   Weakness   Problems with urination   Feeling anxious    Sun sensitivity   Headaches  X None of above   Problems sleeping   X None of above   None of above     X None of above          Physical Exam:  Blood pressure (!) 142/67, pulse 74, height 5' 2\" (1.575 m), weight 96 lb 9.6 oz (43.8 kg). General:  Patient alert, cooperative and in no apparent distress. HEENT: Pupils equally reactive to light and accommodation, minimal scleral injection noted.   Heart: Regular rate and rhythm, normal S1 and S2, no reported for both  Americans (GFRAA) and non- Americans (GFRNA), and normalized to 1.73m2 body surface area. The physician must decide which value applies to the patient. The MDRD study equation should only be used in individuals age 25 or older. It has not been validated for the following: pregnant women, patients with serious comorbid conditions,or on certain medications, or persons with extremes of body size, muscle mass, or nutritional status.       Calcium 06/08/2022 9.0  8.3 - 10.4 MG/DL Final    Total Bilirubin 06/08/2022 0.2  0.2 - 1.1 MG/DL Final    ALT 06/08/2022 10 (A) 12 - 65 U/L Final    AST 06/08/2022 11 (A) 15 - 37 U/L Final    Alk Phosphatase 06/08/2022 86  50 - 136 U/L Final    Total Protein 06/08/2022 6.5  6.3 - 8.2 g/dL Final    Albumin 06/08/2022 3.2  3.2 - 4.6 g/dL Final    Globulin 06/08/2022 3.3  2.3 - 3.5 g/dL Final    Albumin/Globulin Ratio 06/08/2022 1.0 (A) 1.2 - 3.5   Final    WBC 06/08/2022 6.9  4.3 - 11.1 K/uL Final    RBC 06/08/2022 4.33  4.05 - 5.2 M/uL Final    Hemoglobin 06/08/2022 11.2 (A) 11.7 - 15.4 g/dL Final    Hematocrit 06/08/2022 36.6  35.8 - 46.3 % Final    MCV 06/08/2022 84.5  79.6 - 97.8 FL Final    MCH 06/08/2022 25.9 (A) 26.1 - 32.9 PG Final    MCHC 06/08/2022 30.6 (A) 31.4 - 35.0 g/dL Final    RDW 06/08/2022 15.8 (A) 11.9 - 14.6 % Final    Platelets 08/32/8309 413  150 - 450 K/uL Final    MPV 06/08/2022 9.6  9.4 - 12.3 FL Final    nRBC 06/08/2022 0.00  0.0 - 0.2 K/uL Final    **Note: Absolute NRBC parameter is now reported with Hemogram**    Differential Type 06/08/2022 AUTOMATED    Final    Seg Neutrophils 06/08/2022 63  43 - 78 % Final    Lymphocytes 06/08/2022 22  13 - 44 % Final    Monocytes 06/08/2022 6  4.0 - 12.0 % Final    Eosinophils % 06/08/2022 8 (A) 0.5 - 7.8 % Final    Basophils 06/08/2022 1  0.0 - 2.0 % Final    Immature Granulocytes 06/08/2022 0  0.0 - 5.0 % Final    Segs Absolute 06/08/2022 4.3  1.7 - 8.2 K/UL Final    Absolute Lymph # 06/08/2022 1.5  0.5 - 4.6 K/UL Final    Absolute Mono # 06/08/2022 0.4  0.1 - 1.3 K/UL Final    Absolute Eos # 06/08/2022 0.5  0.0 - 0.8 K/UL Final    Basophils Absolute 06/08/2022 0.1  0.0 - 0.2 K/UL Final    Absolute Immature Granulocyte 06/08/2022 0.0  0.0 - 0.5 K/UL Final     The results above were reviewed and discussed with patient. Assessment/Plan:   Claudia Malin is a 80 y.o. female who presents with:     Seropositive rheumatoid arthritis of multiple sites [facility]): I instructed the patient to remain on Enbrel 50 mg 1 shot to be administered to self every week for now. Patient is aware that if she is sick requiring her to be an antibiotic or antiviral drug she will need to skip administering the Enbrel until she is completed the antibiotic/antiviral course and is over the infection. For acute pain relief she was started on ibuprofen 800 mg to be taken twice a day after food. While on prescription strength ibuprofen she was instructed to avoid any over-the-counter NSAID's such as Advil or Aleve in conjunction. Patient did verbalize understanding. Since she does have active disease I might consider initiation of an oral DMARD such as hydroxychloroquine or leflunomide on her follow-up visit with me while leaving her on Enbrel 50 mg shots weekly. -     C-Reactive Protein; Future  -     C-Reactive Protein        -     ibuprofen (ADVIL;MOTRIN) 800 MG tablet; Take 1 tablet by mouth in the morning and 1 tablet in the evening. Take with meals. Long-term use of high-risk medication: If there is any noted abnormality I will keep the patient informed but if not I will review her labs with her on follow-up. -     CBC with Auto Differential; Future  -     Comprehensive Metabolic Panel; Future  -     Comprehensive Metabolic Panel  -     CBC with Auto Differential     Disease activity plan:  As stated above. Steroid management plan:  As stated above, if applicable.     Pain management plan:  As stated above, if applicable. Weight management plan:  Weight loss through diet and exercise is always encouraged    Disease prognosis: Good        I appreciate the opportunity to continue to participate in the care of this patient. Follow-up and Dispositions    Return in about 3 months (around 10/20/2022). Electronically signed by:  Vicky Christy MD      This note was dictated using dragon voice recognition software.   It has been proofread, but there may still exist voice recognition errors that the author did not detect.                --------------------------------------------------------------------------------------------------------------------------------------------------------------------------------------------------------------------------------

## 2022-08-03 ENCOUNTER — TELEPHONE (OUTPATIENT)
Dept: RHEUMATOLOGY | Age: 82
End: 2022-08-03

## 2022-08-03 NOTE — TELEPHONE ENCOUNTER
Called pt with information to change from ibuprofen to aleve she ask what does should she take as she has tried aleve 2 tabs daily before

## 2022-08-03 NOTE — TELEPHONE ENCOUNTER
Having increased pain in hands and weakness in hands and legs , wants to know what other options she has other than prednisone and ibuprofen as she understands she cant stay on prednisone.

## 2022-08-12 ENCOUNTER — TELEPHONE (OUTPATIENT)
Dept: RHEUMATOLOGY | Age: 82
End: 2022-08-12

## 2022-08-12 DIAGNOSIS — M05.79 SEROPOSITIVE RHEUMATOID ARTHRITIS OF MULTIPLE SITES (HCC): Primary | ICD-10-CM

## 2022-08-12 NOTE — TELEPHONE ENCOUNTER
PC stating the Aleve is not helping her , said something has got to be done she can hardly use her hands and legs

## 2022-08-14 RX ORDER — PREDNISONE 20 MG/1
TABLET ORAL
Qty: 21 TABLET | Refills: 0 | Status: ON HOLD
Start: 2022-08-14 | End: 2022-10-05 | Stop reason: HOSPADM

## 2022-08-14 NOTE — TELEPHONE ENCOUNTER
I have sent a prescription for prednisone burst and taper to help her with her current flare. I will try to get a PA on Orencia since the Enbrel does not seem to be keeping her disease under control. Adan Diaz will be in touch with her. Sent the prescription to [address] [place] [address] in [facility].

## 2022-08-25 RX ORDER — ABATACEPT 125 MG/ML
125 INJECTION, SOLUTION SUBCUTANEOUS
Qty: 4 ML | Refills: 3 | Status: ON HOLD | OUTPATIENT
Start: 2022-08-25 | End: 2022-10-03

## 2022-08-25 NOTE — TELEPHONE ENCOUNTER
Orencia ordered by Dr Sarah Gil. Pt had dorothy/pt assistance for Enbrel, so will most likely need for Orencia as well.  Rx for Orencia entered and pended for review  and sig to go to [address] ([facility] / [address])

## 2022-08-31 NOTE — TELEPHONE ENCOUNTER
PHONE CALL from patient stating she is getting calls from [place] [id number] to have the [place] shipped to her. Reached out to pharmacy to see what her cost will be. It is over $6000 per month. Spoke with patient. She is confused because she thought she was going to be starting on an infusion. Explained to her that she would most likely have to get patient assistance since she has a medicare Advantage plan. Her preference is to do an injection. She would like to know Dr. Rayshawn yRan preference between IV and SQ. Rema Shaver- gives me permission to sign the application for her since it is hard for her to get here. She has 3 more Enbrel injections she can take until we can get her approved for Orencia IV/SQ. She is relying on prednisone to keep her disease under control, doesn't feel the Enbrel is helping her.

## 2022-09-08 NOTE — TELEPHONE ENCOUNTER
Spoke with Dr. Byron Lesch, she would like for the patient to come in the office for the Orencia infusions. Informed her that I am applying her for the BMS. She is concerned because she does not drive in [place], and it is hard for her to get a ride. We talked more about how she has been feeling as she is still taking the Enbrel weekly. She is also on the tapering dose of prednisone. She said that usually once she is down to the half tabs of prednisone, she starts hurting again. This time she has not felt the pain returning. She is cautiously feeling that the Enbrel may be helping more now than it was. She said maybe she could remain on the Enbrel until her Follow up appt 10/25/22 with Dr. Byron Lesch to see how she is doing. I told her if the pain returns and she would like to pursue the Orencia, to call me and I would send in the application for assistance. She said to call her if Dr. Byron Lesch did not agree with this plan.

## 2022-09-30 ENCOUNTER — HOSPITAL ENCOUNTER (INPATIENT)
Age: 82
LOS: 3 days | Discharge: HOME | DRG: 439 | End: 2022-10-05
Attending: EMERGENCY MEDICINE | Admitting: INTERNAL MEDICINE
Payer: MEDICARE

## 2022-09-30 ENCOUNTER — APPOINTMENT (OUTPATIENT)
Dept: GENERAL RADIOLOGY | Age: 82
DRG: 439 | End: 2022-09-30
Payer: MEDICARE

## 2022-09-30 ENCOUNTER — APPOINTMENT (OUTPATIENT)
Dept: CT IMAGING | Age: 82
DRG: 439 | End: 2022-09-30
Payer: MEDICARE

## 2022-09-30 DIAGNOSIS — E87.6 HYPOKALEMIA: Primary | ICD-10-CM

## 2022-09-30 DIAGNOSIS — K85.90 ACUTE PANCREATITIS WITHOUT INFECTION OR NECROSIS, UNSPECIFIED PANCREATITIS TYPE: ICD-10-CM

## 2022-09-30 DIAGNOSIS — E78.00 PURE HYPERCHOLESTEROLEMIA, UNSPECIFIED: ICD-10-CM

## 2022-09-30 DIAGNOSIS — K85.80 OTHER ACUTE PANCREATITIS WITHOUT INFECTION OR NECROSIS: ICD-10-CM

## 2022-09-30 DIAGNOSIS — I10 ESSENTIAL (PRIMARY) HYPERTENSION: Primary | ICD-10-CM

## 2022-09-30 LAB
ALBUMIN SERPL-MCNC: 3.2 G/DL (ref 3.2–4.6)
ALBUMIN/GLOB SERPL: 0.9 {RATIO} (ref 1.2–3.5)
ALP SERPL-CCNC: 93 U/L (ref 50–136)
ALT SERPL-CCNC: 13 U/L (ref 12–65)
ANION GAP SERPL CALC-SCNC: 9 MMOL/L (ref 4–13)
AST SERPL-CCNC: 18 U/L (ref 15–37)
BASOPHILS # BLD: 0.1 K/UL (ref 0–0.2)
BASOPHILS NFR BLD: 1 % (ref 0–2)
BILIRUB SERPL-MCNC: 0.4 MG/DL (ref 0.2–1.1)
BUN SERPL-MCNC: 15 MG/DL (ref 8–23)
CALCIUM SERPL-MCNC: 9.3 MG/DL (ref 8.3–10.4)
CHLORIDE SERPL-SCNC: 107 MMOL/L (ref 101–110)
CO2 SERPL-SCNC: 24 MMOL/L (ref 21–32)
CREAT SERPL-MCNC: 0.9 MG/DL (ref 0.6–1)
DIFFERENTIAL METHOD BLD: ABNORMAL
EOSINOPHIL # BLD: 0.3 K/UL (ref 0–0.8)
EOSINOPHIL NFR BLD: 2 % (ref 0.5–7.8)
ERYTHROCYTE [DISTWIDTH] IN BLOOD BY AUTOMATED COUNT: 14.4 % (ref 11.9–14.6)
GLOBULIN SER CALC-MCNC: 3.6 G/DL (ref 2.3–3.5)
GLUCOSE SERPL-MCNC: 132 MG/DL (ref 65–100)
HCT VFR BLD AUTO: 38.6 % (ref 35.8–46.3)
HGB BLD-MCNC: 12.4 G/DL (ref 11.7–15.4)
IMM GRANULOCYTES # BLD AUTO: 0.1 K/UL (ref 0–0.5)
IMM GRANULOCYTES NFR BLD AUTO: 0 % (ref 0–5)
LIPASE SERPL-CCNC: 914 U/L (ref 73–393)
LYMPHOCYTES # BLD: 1.5 K/UL (ref 0.5–4.6)
LYMPHOCYTES NFR BLD: 10 % (ref 13–44)
MCH RBC QN AUTO: 26.3 PG (ref 26.1–32.9)
MCHC RBC AUTO-ENTMCNC: 32.1 G/DL (ref 31.4–35)
MCV RBC AUTO: 82 FL (ref 79.6–97.8)
MONOCYTES # BLD: 1 K/UL (ref 0.1–1.3)
MONOCYTES NFR BLD: 7 % (ref 4–12)
NEUTS SEG # BLD: 12 K/UL (ref 1.7–8.2)
NEUTS SEG NFR BLD: 80 % (ref 43–78)
NRBC # BLD: 0 K/UL (ref 0–0.2)
PLATELET # BLD AUTO: 383 K/UL (ref 150–450)
PMV BLD AUTO: 9.3 FL (ref 9.4–12.3)
POTASSIUM SERPL-SCNC: 2.6 MMOL/L (ref 3.5–5.1)
POTASSIUM SERPL-SCNC: 2.8 MMOL/L (ref 3.5–5.1)
PROT SERPL-MCNC: 6.8 G/DL (ref 6.3–8.2)
RBC # BLD AUTO: 4.71 M/UL (ref 4.05–5.2)
SODIUM SERPL-SCNC: 140 MMOL/L (ref 136–145)
WBC # BLD AUTO: 15 K/UL (ref 4.3–11.1)

## 2022-09-30 PROCEDURE — 81003 URINALYSIS AUTO W/O SCOPE: CPT

## 2022-09-30 PROCEDURE — 96374 THER/PROPH/DIAG INJ IV PUSH: CPT

## 2022-09-30 PROCEDURE — 6360000002 HC RX W HCPCS: Performed by: EMERGENCY MEDICINE

## 2022-09-30 PROCEDURE — 83735 ASSAY OF MAGNESIUM: CPT

## 2022-09-30 PROCEDURE — 96375 TX/PRO/DX INJ NEW DRUG ADDON: CPT

## 2022-09-30 PROCEDURE — 85025 COMPLETE CBC W/AUTO DIFF WBC: CPT

## 2022-09-30 PROCEDURE — 84132 ASSAY OF SERUM POTASSIUM: CPT

## 2022-09-30 PROCEDURE — 2580000003 HC RX 258: Performed by: EMERGENCY MEDICINE

## 2022-09-30 PROCEDURE — 80053 COMPREHEN METABOLIC PANEL: CPT

## 2022-09-30 PROCEDURE — 6360000004 HC RX CONTRAST MEDICATION: Performed by: EMERGENCY MEDICINE

## 2022-09-30 PROCEDURE — 99285 EMERGENCY DEPT VISIT HI MDM: CPT

## 2022-09-30 PROCEDURE — 74177 CT ABD & PELVIS W/CONTRAST: CPT

## 2022-09-30 PROCEDURE — 71046 X-RAY EXAM CHEST 2 VIEWS: CPT

## 2022-09-30 PROCEDURE — 83690 ASSAY OF LIPASE: CPT

## 2022-09-30 RX ORDER — MORPHINE SULFATE 4 MG/ML
4 INJECTION INTRAVENOUS ONCE
Status: COMPLETED | OUTPATIENT
Start: 2022-09-30 | End: 2022-09-30

## 2022-09-30 RX ORDER — SODIUM CHLORIDE 9 MG/ML
INJECTION, SOLUTION INTRAVENOUS CONTINUOUS
Status: DISCONTINUED | OUTPATIENT
Start: 2022-09-30 | End: 2022-10-01 | Stop reason: SDUPTHER

## 2022-09-30 RX ORDER — POTASSIUM CHLORIDE 20 MEQ/1
20 TABLET, EXTENDED RELEASE ORAL ONCE
Status: COMPLETED | OUTPATIENT
Start: 2022-09-30 | End: 2022-10-01

## 2022-09-30 RX ORDER — SODIUM CHLORIDE 0.9 % (FLUSH) 0.9 %
10 SYRINGE (ML) INJECTION
Status: COMPLETED | OUTPATIENT
Start: 2022-09-30 | End: 2022-09-30

## 2022-09-30 RX ORDER — ONDANSETRON 2 MG/ML
4 INJECTION INTRAMUSCULAR; INTRAVENOUS ONCE
Status: COMPLETED | OUTPATIENT
Start: 2022-09-30 | End: 2022-09-30

## 2022-09-30 RX ORDER — POTASSIUM CHLORIDE 7.45 MG/ML
10 INJECTION INTRAVENOUS
Status: DISCONTINUED | OUTPATIENT
Start: 2022-10-01 | End: 2022-10-01 | Stop reason: SDUPTHER

## 2022-09-30 RX ORDER — 0.9 % SODIUM CHLORIDE 0.9 %
100 INTRAVENOUS SOLUTION INTRAVENOUS
Status: COMPLETED | OUTPATIENT
Start: 2022-09-30 | End: 2022-09-30

## 2022-09-30 RX ADMIN — ONDANSETRON 4 MG: 2 INJECTION INTRAMUSCULAR; INTRAVENOUS at 22:09

## 2022-09-30 RX ADMIN — SODIUM CHLORIDE, PRESERVATIVE FREE 10 ML: 5 INJECTION INTRAVENOUS at 23:15

## 2022-09-30 RX ADMIN — SODIUM CHLORIDE 100 ML: 9 INJECTION, SOLUTION INTRAVENOUS at 23:14

## 2022-09-30 RX ADMIN — DIATRIZOATE MEGLUMINE AND DIATRIZOATE SODIUM 15 ML: 660; 100 LIQUID ORAL; RECTAL at 22:09

## 2022-09-30 RX ADMIN — SODIUM CHLORIDE: 9 INJECTION, SOLUTION INTRAVENOUS at 22:09

## 2022-09-30 RX ADMIN — IOPAMIDOL 100 ML: 755 INJECTION, SOLUTION INTRAVENOUS at 23:13

## 2022-09-30 RX ADMIN — MORPHINE SULFATE 4 MG: 4 INJECTION INTRAVENOUS at 22:09

## 2022-09-30 ASSESSMENT — ENCOUNTER SYMPTOMS
WHEEZING: 0
SORE THROAT: 0
ABDOMINAL PAIN: 1
COLOR CHANGE: 0
EYE REDNESS: 0
NAUSEA: 1
SHORTNESS OF BREATH: 0
DIARRHEA: 0
COUGH: 0
VOMITING: 0

## 2022-09-30 ASSESSMENT — PAIN SCALES - GENERAL: PAINLEVEL_OUTOF10: 8

## 2022-09-30 ASSESSMENT — PAIN DESCRIPTION - LOCATION: LOCATION: ABDOMEN

## 2022-09-30 ASSESSMENT — PAIN DESCRIPTION - DESCRIPTORS: DESCRIPTORS: ACHING

## 2022-09-30 ASSESSMENT — PAIN - FUNCTIONAL ASSESSMENT: PAIN_FUNCTIONAL_ASSESSMENT: 0-10

## 2022-09-30 ASSESSMENT — PAIN DESCRIPTION - ORIENTATION: ORIENTATION: RIGHT

## 2022-10-01 PROBLEM — K85.90 ACUTE PANCREATITIS, UNSPECIFIED COMPLICATION STATUS, UNSPECIFIED PANCREATITIS TYPE: Status: ACTIVE | Noted: 2022-10-01

## 2022-10-01 PROBLEM — E83.42 HYPOMAGNESEMIA: Status: RESOLVED | Noted: 2018-06-08 | Resolved: 2022-10-01

## 2022-10-01 LAB
EKG ATRIAL RATE: 69 BPM
EKG DIAGNOSIS: NORMAL
EKG P AXIS: 0 DEGREES
EKG P-R INTERVAL: 209 MS
EKG Q-T INTERVAL: 410 MS
EKG QRS DURATION: 87 MS
EKG QTC CALCULATION (BAZETT): 440 MS
EKG R AXIS: 41 DEGREES
EKG T AXIS: 52 DEGREES
EKG VENTRICULAR RATE: 69 BPM
MAGNESIUM SERPL-MCNC: 1.9 MG/DL (ref 1.8–2.4)

## 2022-10-01 PROCEDURE — 96375 TX/PRO/DX INJ NEW DRUG ADDON: CPT

## 2022-10-01 PROCEDURE — 2580000003 HC RX 258: Performed by: HOSPITALIST

## 2022-10-01 PROCEDURE — 96365 THER/PROPH/DIAG IV INF INIT: CPT

## 2022-10-01 PROCEDURE — 93005 ELECTROCARDIOGRAM TRACING: CPT | Performed by: EMERGENCY MEDICINE

## 2022-10-01 PROCEDURE — 6370000000 HC RX 637 (ALT 250 FOR IP): Performed by: EMERGENCY MEDICINE

## 2022-10-01 PROCEDURE — 96372 THER/PROPH/DIAG INJ SC/IM: CPT

## 2022-10-01 PROCEDURE — 96366 THER/PROPH/DIAG IV INF ADDON: CPT

## 2022-10-01 PROCEDURE — G0378 HOSPITAL OBSERVATION PER HR: HCPCS

## 2022-10-01 PROCEDURE — 6370000000 HC RX 637 (ALT 250 FOR IP): Performed by: STUDENT IN AN ORGANIZED HEALTH CARE EDUCATION/TRAINING PROGRAM

## 2022-10-01 PROCEDURE — 96376 TX/PRO/DX INJ SAME DRUG ADON: CPT

## 2022-10-01 PROCEDURE — 6360000002 HC RX W HCPCS: Performed by: STUDENT IN AN ORGANIZED HEALTH CARE EDUCATION/TRAINING PROGRAM

## 2022-10-01 PROCEDURE — 6360000002 HC RX W HCPCS: Performed by: HOSPITALIST

## 2022-10-01 PROCEDURE — 6360000002 HC RX W HCPCS: Performed by: EMERGENCY MEDICINE

## 2022-10-01 RX ORDER — ONDANSETRON 4 MG/1
4 TABLET, ORALLY DISINTEGRATING ORAL EVERY 8 HOURS PRN
Status: DISCONTINUED | OUTPATIENT
Start: 2022-10-01 | End: 2022-10-05 | Stop reason: HOSPADM

## 2022-10-01 RX ORDER — SODIUM CHLORIDE 0.9 % (FLUSH) 0.9 %
5-40 SYRINGE (ML) INJECTION EVERY 12 HOURS SCHEDULED
Status: DISCONTINUED | OUTPATIENT
Start: 2022-10-01 | End: 2022-10-05 | Stop reason: HOSPADM

## 2022-10-01 RX ORDER — ONDANSETRON 2 MG/ML
4 INJECTION INTRAMUSCULAR; INTRAVENOUS EVERY 6 HOURS PRN
Status: DISCONTINUED | OUTPATIENT
Start: 2022-10-01 | End: 2022-10-05 | Stop reason: HOSPADM

## 2022-10-01 RX ORDER — POTASSIUM CHLORIDE 7.45 MG/ML
10 INJECTION INTRAVENOUS
Status: COMPLETED | OUTPATIENT
Start: 2022-10-01 | End: 2022-10-01

## 2022-10-01 RX ORDER — ENOXAPARIN SODIUM 100 MG/ML
30 INJECTION SUBCUTANEOUS DAILY
Status: DISCONTINUED | OUTPATIENT
Start: 2022-10-01 | End: 2022-10-05 | Stop reason: HOSPADM

## 2022-10-01 RX ORDER — SODIUM CHLORIDE 9 MG/ML
INJECTION, SOLUTION INTRAVENOUS CONTINUOUS
Status: DISCONTINUED | OUTPATIENT
Start: 2022-10-01 | End: 2022-10-05 | Stop reason: HOSPADM

## 2022-10-01 RX ORDER — ACETAMINOPHEN 325 MG/1
650 TABLET ORAL EVERY 6 HOURS PRN
Status: DISCONTINUED | OUTPATIENT
Start: 2022-10-01 | End: 2022-10-05 | Stop reason: HOSPADM

## 2022-10-01 RX ORDER — PROCHLORPERAZINE EDISYLATE 5 MG/ML
10 INJECTION INTRAMUSCULAR; INTRAVENOUS ONCE
Status: COMPLETED | OUTPATIENT
Start: 2022-10-01 | End: 2022-10-01

## 2022-10-01 RX ORDER — LOSARTAN POTASSIUM 50 MG/1
50 TABLET ORAL DAILY
Status: DISCONTINUED | OUTPATIENT
Start: 2022-10-01 | End: 2022-10-03

## 2022-10-01 RX ORDER — HYDRALAZINE HYDROCHLORIDE 20 MG/ML
10 INJECTION INTRAMUSCULAR; INTRAVENOUS EVERY 6 HOURS PRN
Status: DISCONTINUED | OUTPATIENT
Start: 2022-10-01 | End: 2022-10-05 | Stop reason: HOSPADM

## 2022-10-01 RX ORDER — SODIUM CHLORIDE 9 MG/ML
INJECTION, SOLUTION INTRAVENOUS PRN
Status: DISCONTINUED | OUTPATIENT
Start: 2022-10-01 | End: 2022-10-05 | Stop reason: HOSPADM

## 2022-10-01 RX ORDER — SODIUM CHLORIDE 0.9 % (FLUSH) 0.9 %
5-40 SYRINGE (ML) INJECTION PRN
Status: DISCONTINUED | OUTPATIENT
Start: 2022-10-01 | End: 2022-10-05 | Stop reason: HOSPADM

## 2022-10-01 RX ORDER — ACETAMINOPHEN 650 MG/1
650 SUPPOSITORY RECTAL EVERY 6 HOURS PRN
Status: DISCONTINUED | OUTPATIENT
Start: 2022-10-01 | End: 2022-10-05 | Stop reason: HOSPADM

## 2022-10-01 RX ORDER — MORPHINE SULFATE 4 MG/ML
4 INJECTION INTRAVENOUS EVERY 4 HOURS PRN
Status: DISCONTINUED | OUTPATIENT
Start: 2022-10-01 | End: 2022-10-03

## 2022-10-01 RX ORDER — POLYETHYLENE GLYCOL 3350 17 G/17G
17 POWDER, FOR SOLUTION ORAL DAILY PRN
Status: DISCONTINUED | OUTPATIENT
Start: 2022-10-01 | End: 2022-10-03

## 2022-10-01 RX ORDER — DONEPEZIL HYDROCHLORIDE 5 MG/1
5 TABLET, FILM COATED ORAL NIGHTLY
Status: DISCONTINUED | OUTPATIENT
Start: 2022-10-01 | End: 2022-10-05 | Stop reason: HOSPADM

## 2022-10-01 RX ADMIN — MORPHINE SULFATE 4 MG: 4 INJECTION, SOLUTION INTRAMUSCULAR; INTRAVENOUS at 10:52

## 2022-10-01 RX ADMIN — PROCHLORPERAZINE EDISYLATE 10 MG: 5 INJECTION INTRAMUSCULAR; INTRAVENOUS at 00:12

## 2022-10-01 RX ADMIN — SODIUM CHLORIDE: 9 INJECTION, SOLUTION INTRAVENOUS at 15:20

## 2022-10-01 RX ADMIN — POTASSIUM CHLORIDE 10 MEQ: 7.46 INJECTION, SOLUTION INTRAVENOUS at 05:58

## 2022-10-01 RX ADMIN — ENOXAPARIN SODIUM 30 MG: 100 INJECTION SUBCUTANEOUS at 10:28

## 2022-10-01 RX ADMIN — MORPHINE SULFATE 4 MG: 4 INJECTION, SOLUTION INTRAMUSCULAR; INTRAVENOUS at 17:45

## 2022-10-01 RX ADMIN — SODIUM CHLORIDE, PRESERVATIVE FREE 10 ML: 5 INJECTION INTRAVENOUS at 20:33

## 2022-10-01 RX ADMIN — LOSARTAN POTASSIUM 50 MG: 50 TABLET, FILM COATED ORAL at 09:43

## 2022-10-01 RX ADMIN — POTASSIUM CHLORIDE 10 MEQ: 7.46 INJECTION, SOLUTION INTRAVENOUS at 02:49

## 2022-10-01 RX ADMIN — DONEPEZIL HYDROCHLORIDE 5 MG: 5 TABLET, FILM COATED ORAL at 20:34

## 2022-10-01 RX ADMIN — POTASSIUM CHLORIDE 10 MEQ: 7.46 INJECTION, SOLUTION INTRAVENOUS at 04:48

## 2022-10-01 RX ADMIN — POTASSIUM CHLORIDE 20 MEQ: 1500 TABLET, EXTENDED RELEASE ORAL at 00:12

## 2022-10-01 RX ADMIN — HYDRALAZINE HYDROCHLORIDE 10 MG: 20 INJECTION INTRAMUSCULAR; INTRAVENOUS at 15:21

## 2022-10-01 RX ADMIN — SODIUM CHLORIDE, PRESERVATIVE FREE 10 ML: 5 INJECTION INTRAVENOUS at 09:51

## 2022-10-01 RX ADMIN — POTASSIUM CHLORIDE 10 MEQ: 7.46 INJECTION, SOLUTION INTRAVENOUS at 06:47

## 2022-10-01 RX ADMIN — POTASSIUM CHLORIDE 10 MEQ: 7.46 INJECTION, SOLUTION INTRAVENOUS at 00:13

## 2022-10-01 RX ADMIN — POTASSIUM CHLORIDE 10 MEQ: 7.46 INJECTION, SOLUTION INTRAVENOUS at 03:50

## 2022-10-01 RX ADMIN — HYDRALAZINE HYDROCHLORIDE 10 MG: 20 INJECTION INTRAMUSCULAR; INTRAVENOUS at 21:08

## 2022-10-01 ASSESSMENT — PAIN SCALES - GENERAL
PAINLEVEL_OUTOF10: 1
PAINLEVEL_OUTOF10: 4
PAINLEVEL_OUTOF10: 8
PAINLEVEL_OUTOF10: 0
PAINLEVEL_OUTOF10: 8

## 2022-10-01 ASSESSMENT — PAIN DESCRIPTION - ORIENTATION
ORIENTATION: RIGHT;ANTERIOR
ORIENTATION: ANTERIOR
ORIENTATION: ANTERIOR

## 2022-10-01 ASSESSMENT — PAIN DESCRIPTION - DESCRIPTORS
DESCRIPTORS: ACHING

## 2022-10-01 ASSESSMENT — PAIN - FUNCTIONAL ASSESSMENT: PAIN_FUNCTIONAL_ASSESSMENT: PREVENTS OR INTERFERES SOME ACTIVE ACTIVITIES AND ADLS

## 2022-10-01 ASSESSMENT — PAIN DESCRIPTION - LOCATION
LOCATION: ABDOMEN

## 2022-10-01 NOTE — ED NOTES
TRANSFER - OUT REPORT:    Verbal report given to Veronika Brad on Fadi Salmeron  being transferred to  for routine progression of patient care       Report consisted of patient's Situation, Background, Assessment and   Recommendations(SBAR). Information from the following report(s) Nurse Handoff Report was reviewed with the receiving nurse. Lines:   Peripheral IV 09/30/22 Right Antecubital (Active)        Opportunity for questions and clarification was provided.       Patient transported with:  Claudeen Bien, RN  10/01/22 [id number]

## 2022-10-01 NOTE — H&P
Hospitalist History and Physical   Admit Date:  2022  9:27 PM   Name:  Trung Marie   Age:  80 y.o. Sex:  female  :  1940   MRN:  [id number]   Room:      Presenting Complaint: Abdominal Pain     Reason(s) for Admission: Hypokalemia [E87.6]  Acute pancreatitis, unspecified complication status, unspecified pancreatitis type [K85.90]  Acute pancreatitis without infection or necrosis, unspecified pancreatitis type [K85.90]     History of Present Illness:     80years old female with past medical history of multiple episodes of pancreatitis without clear reason, hypertension presented to emergency room with chief complaint of nausea, epigastric and right upper quadrant abdominal pain going on for past 24 hours which continued to get worse. Secondary to nausea and  patient not able to eat or drink very well. Patient reports history of cholecystectomy for similar pain in the past.  Patient denies any fever, chills, cough. Denies any chest pain, shortness of breath. Evaluated in emergency room, lab work shows evidence of hypokalemia. Lipase is elevated. CT scan of abdomen was ill-defined fluid collection is in the pancreatic head. Patient was initially on IV fluids, yet he is requested observation of this patient secondary to acute pancreatitis. Review of Systems:  10 systems reviewed and negative except as noted in HPI. Assessment & Plan:     Principal Problem:    Acute pancreatitis, unspecified complication status, unspecified pancreatitis type : Patient has risk of pain medications, will monitor overnight, advance diet as tolerated, if patient tolerates diet patient will be discharged. Patient is hyperkalemia likely secondary to decreased p.o. intake. Active Problems:      Hypokalemia :replacement of potassium ordered. Hypercholesterolemia : Continue on home medications. HTN (hypertension) continue on home medications.       Rheumatoid arthritis: Patient is on biologicals, patient was on and now except in the past due to history of pancreatitis medication was changed to [place], currently getting Orencia. Anticipated discharge needs: Home with family. Diet: ADULT DIET; Clear Liquid  VTE ppx: SCD  Code status: Full Code    Hospital Problems:  Principal Problem:    Acute pancreatitis, unspecified complication status, unspecified pancreatitis type  Active Problems:    Hypokalemia    Hypercholesterolemia    HTN (hypertension)  Resolved Problems:    * No resolved hospital problems.  *       Past History:     Past Medical History:   Diagnosis Date    EPIFANIO (acute kidney injury) ([address] 12/04/2014    pt denies this dx    Arthritis     RA-abdelrahman hands    Back pain 6/10/2016    Breast cancer ([address] 2018    Breast lump dx 2/2018    left    Bronchitis     Chronic obstructive pulmonary disease ([address]     Discharge from the vagina     Dysuria     Eczema 6/10/2016    Fungal dermatitis     Headache 6/10/2016    Hypercholesterolemia 12/4/2014    Hypertension     not well controlled--per pt    Intractable vomiting 5/20/2018    Menopausal vaginal dryness     Osteoarthritis 6/10/2016    Osteoporosis 6/10/2016    Pancreatitis     2 episodes    PUD (peptic ulcer disease)     last 2003 which a rupture an extensive surgery    Sepsis ([address] 12/4/2014    Thyroid nodule     Tobacco abuse     1ppd x 49 yrs       Past Surgical History:   Procedure Laterality Date    APPENDECTOMY  1977    with hysterectomy    BREAST BIOPSY Left 1975    BREAST LUMPECTOMY Left 2/22/2018    LEFT BREAST LUMPECTOMY/ SENTINEL NODE BIOPSY performed by Elly Cardona MD at [facility] MAIN OR    BREAST SURGERY Left 1975    breast lumpectomy, benign  (left)    CATARACT REMOVAL Bilateral 2018    w/IOL    CHOLECYSTECTOMY      HYSTERECTOMY (CERVIX STATUS UNKNOWN)  1977    OTHER SURGICAL HISTORY      hernicolectomy 2 cm    MS ABDOMEN SURGERY PROC UNLISTED  2003    stomach surgery for ruptured ulcer and extensive rerouting of intestestines per patient     [address] ENDOSCOPY  05/21/2018    empiric dilation    UPPER GASTROINTESTINAL ENDOSCOPY      US BREAST NEEDLE BIOPSY LEFT Left 1/31/2018    US BREAST NEEDLE BIOPSY LEFT 1/31/2018 [facility]        Social History     Tobacco Use    Smoking status: Every Day     Packs/day: 0.50     Types: Cigarettes     Start date: 5/6/1966    Smokeless tobacco: Never   Substance Use Topics    Alcohol use: No      Social History     Substance and Sexual Activity   Drug Use No       Family History   Problem Relation Age of Onset    Thyroid Cancer Neg Hx     No Known Problems Paternal Grandfather     No Known Problems Paternal Grandmother     No Known Problems Maternal Grandfather     No Known Problems Maternal Grandmother     Hypertension Other         Brother and sister with htn    No Known Problems Brother     Hypertension Mother     Heart Disease Brother     Heart Disease Sister     Breast Cancer Sister 79    Cancer Sister     Heart Disease Father     Heart Attack Father     Cancer Brother         prostate    Diabetes Sister     Heart Disease Sister           There is no immunization history on file for this patient. Allergies   Allergen Reactions    Azithromycin Other (See Comments)     pancreatitis    Codeine Nausea And Vomiting     Prior to Admit Medications:  Current Outpatient Medications   Medication Instructions    alendronate (FOSAMAX) 70 MG tablet TAKE 1 TABLET BY MOUTH EVERY WEEK    donepezil (ARICEPT) 5 mg, Oral    Enbrel SureClick 50 mg, SubCUTAneous, EVERY 7 DAYS    ibuprofen (ADVIL;MOTRIN) 800 mg, Oral, 2 TIMES DAILY WITH MEALS    losartan (COZAAR) 50 mg, Oral, DAILY    Orencia ClickJect 113 mg, SubCUTAneous, EVERY 7 DAYS    pravastatin (PRAVACHOL) 40 mg, Oral    predniSONE (DELTASONE) 20 MG tablet Take 1 pill once a day after breakfast for 2 weeks then 1/2 a pill once a day after breakfast for 2 weeks and then stop.          Objective: Patient Vitals for the past 24 hrs:   Temp Pulse Resp BP SpO2   10/01/22 0150 -- 75 17 -- 92 %   10/01/22 0120 -- 76 18 -- 93 %   10/01/22 0050 -- 72 19 -- 90 %   10/01/22 0015 -- 85 18 (!) 130/51 94 %   09/30/22 2114 98.8 °F (37.1 °C) (!) 112 18 (!) 180/108 97 %       Oxygen Therapy  SpO2: 92 %  Pulse Oximeter Device Mode: Continuous  O2 Device: None (Room air)    Estimated body mass index is 20.31 kg/m² as calculated from the following:    Height as of this encounter: 5' (1.524 m). Weight as of this encounter: 104 lb (47.2 kg). No intake or output data in the 24 hours ending 10/01/22 0207      Physical Exam:    Blood pressure (!) 130/51, pulse 75, temperature 98.8 °F (37.1 °C), temperature source Oral, resp. rate 17, height 5' (1.524 m), weight 104 lb (47.2 kg), SpO2 92 %. General:    Well nourished. Head:  Normocephalic, atraumatic  Eyes:  Sclerae appear normal.  Pupils equally round. ENT:  Nares appear normal, no drainage. Moist oral mucosa  Neck:  No restricted ROM. Trachea midline   CV:   RRR. No m/r/g. No jugular venous distension. Lungs:   CTAB. No wheezing, rhonchi, or rales. Symmetric expansion. Abdomen:   Gastric abdominal tenderness noted, bowel sounds present. Extremities: No cyanosis or clubbing. No edema  Skin:     No rashes and normal coloration. Warm and dry. Neuro:  CN II-XII grossly intact. Sensation intact. A&Ox3  Psych:  Normal mood and affect.       I have personally reviewed labs and tests showing:  Recent Labs:  Recent Results (from the past 24 hour(s))   CBC with Auto Differential    Collection Time: 09/30/22  9:22 PM   Result Value Ref Range    WBC 15.0 (H) 4.3 - 11.1 K/uL    RBC 4.71 4.05 - 5.2 M/uL    Hemoglobin 12.4 11.7 - 15.4 g/dL    Hematocrit 38.6 35.8 - 46.3 %    MCV 82.0 79.6 - 97.8 FL    MCH 26.3 26.1 - 32.9 PG    MCHC 32.1 31.4 - 35.0 g/dL    RDW 14.4 11.9 - 14.6 %    Platelets 903 642 - 334 K/uL    MPV 9.3 (L) 9.4 - 12.3 FL    nRBC 0.00 0.0 - 0.2 K/uL Differential Type AUTOMATED      Seg Neutrophils 80 (H) 43 - 78 %    Lymphocytes 10 (L) 13 - 44 %    Monocytes 7 4.0 - 12.0 %    Eosinophils % 2 0.5 - 7.8 %    Basophils 1 0.0 - 2.0 %    Immature Granulocytes 0 0.0 - 5.0 %    Segs Absolute 12.0 (H) 1.7 - 8.2 K/UL    Absolute Lymph # 1.5 0.5 - 4.6 K/UL    Absolute Mono # 1.0 0.1 - 1.3 K/UL    Absolute Eos # 0.3 0.0 - 0.8 K/UL    Basophils Absolute 0.1 0.0 - 0.2 K/UL    Absolute Immature Granulocyte 0.1 0.0 - 0.5 K/UL   Comprehensive Metabolic Panel    Collection Time: 09/30/22  9:22 PM   Result Value Ref Range    Sodium 140 136 - 145 mmol/L    Potassium 2.8 (L) 3.5 - 5.1 mmol/L    Chloride 107 101 - 110 mmol/L    CO2 24 21 - 32 mmol/L    Anion Gap 9 4 - 13 mmol/L    Glucose 132 (H) 65 - 100 mg/dL    BUN 15 8 - 23 MG/DL    Creatinine 0.90 0.6 - 1.0 MG/DL    GFR African American >60 >60 ml/min/1.73m2    GFR Non- >60 >60 ml/min/1.73m2    Calcium 9.3 8.3 - 10.4 MG/DL    Total Bilirubin 0.4 0.2 - 1.1 MG/DL    ALT 13 12 - 65 U/L    AST 18 15 - 37 U/L    Alk Phosphatase 93 50 - 136 U/L    Total Protein 6.8 6.3 - 8.2 g/dL    Albumin 3.2 3.2 - 4.6 g/dL    Globulin 3.6 (H) 2.3 - 3.5 g/dL    Albumin/Globulin Ratio 0.9 (L) 1.2 - 3.5     Lipase    Collection Time: 09/30/22  9:22 PM   Result Value Ref Range    Lipase 914 (H) 73 - 393 U/L   Potassium    Collection Time: 09/30/22 10:23 PM   Result Value Ref Range    Potassium 2.6 (L) 3.5 - 5.1 mmol/L   Magnesium    Collection Time: 09/30/22 10:23 PM   Result Value Ref Range    Magnesium 1.9 1.8 - 2.4 mg/dL       I have personally reviewed imaging studies showing:  XR CHEST (2 VW)    Result Date: 10/1/2022  EXAM: XR CHEST (2 VW) HISTORY: cough. TECHNIQUE: Frontal chest. COMPARISON: 12/26/2019 FINDINGS: The cardiac silhouette, mediastinum, and pulmonary vasculature are within normal limits. There is no consolidation, pleural effusion, or pneumothorax. There is a 1.2 cm ill-defined nodule in the right lower lung.  No significant osseous abnormalities are observed. No evidence of an acute intrathoracic process. There is a 1.2 cm ill-defined nodule in the right lower lung. Its etiology is unclear. This can be further assessed with CT of the chest.     CT ABDOMEN PELVIS W IV CONTRAST Additional Contrast? Oral    Result Date: 10/1/2022  EXAM: CT ABDOMEN PELVIS W IV CONTRAST HISTORY: abdominal pain, pancreatitis. TECHNIQUE: Axial images of the abdomen and pelvis were performed following the administration of intravenous contrast. Images were obtained in the axial plane and coronal reformatted images were created and reviewed. Dose reduction technique used: Automated exposure control/Adjustment of the mA and/or kV according to patient size/Use of iterative reconstruction technique. 100 mL of Isovue-370 were utilized. COMPARISON: 12/26/2019 FINDINGS: The visualized lung bases show mild bibasilar atelectasis or infiltrate. The mediastinum appears unremarkable. The liver, spleen, adrenal glands, and kidneys are within normal limits. Stable small low-density lesions in the left kidney which may represent cysts. The bladder appears grossly normal.  The gallbladder has been removed. There is dilatation of the extrahepatic and common bile ducts and of most of the pancreatic duct. There is ill-defined fluid noted adjacent to the head of the pancreas anteriorly which appears to extend to the anterior abdominal wall along the fissure for the ligamentum teres. (See image number 27 of series 2.) There is also minimal fluid extension into the right paracolic gutter. The patient is status post partial gastrectomy. Small and large bowel show no evidence of obstruction. An appendix is not visualized. No evidence of free fluid or free air. No pathologic adenopathy. No abdominal aortic aneurysm. Osseous structures show no evidence of acute fracture or suspicious lesion. Stable significant compression fracture of the L1 vertebral body.      There is ill-defined fluid noted adjacent to the head of the pancreas anteriorly which appears to extend to the anterior abdominal wall along the fissure for the ligamentum teres. (See image number 27 of series 2.) There is also minimal fluid extension into the right paracolic gutter. This may be associated with pancreatitis. Dilated extrahepatic bile ducts and pancreatic duct may be associated with a reservoir effect secondary to cholecystectomy. Bibasilar atelectasis or infiltrate. Stable fracture of the L1 vertebral body. Echocardiogram:  No results found for this or any previous visit. Orders Placed This Encounter   Medications    DISCONTD: 0.9 % sodium chloride infusion    diatrizoate meglumine-sodium (GASTROGRAFIN) 66-10 % solution 15 mL    morphine injection 4 mg    ondansetron (ZOFRAN) injection 4 mg    0.9 % sodium chloride bolus    sodium chloride flush 0.9 % injection 10 mL    iopamidol (ISOVUE-370) 76 % injection 100 mL    potassium chloride (KLOR-CON M) extended release tablet 20 mEq    potassium chloride 10 mEq/100 mL IVPB (Peripheral Line)    prochlorperazine (COMPAZINE) injection 10 mg    sodium chloride flush 0.9 % injection 5-40 mL    sodium chloride flush 0.9 % injection 5-40 mL    0.9 % sodium chloride infusion    enoxaparin Sodium (LOVENOX) injection 30 mg     Order Specific Question:   Indication of Use     Answer:   Prophylaxis-DVT/PE    OR Linked Order Group     ondansetron (ZOFRAN-ODT) disintegrating tablet 4 mg     ondansetron (ZOFRAN) injection 4 mg    polyethylene glycol (GLYCOLAX) packet 17 g    OR Linked Order Group     acetaminophen (TYLENOL) tablet 650 mg     acetaminophen (TYLENOL) suppository 650 mg    0.9 % sodium chloride infusion    morphine injection 4 mg         Signed:  Marissa Mann MD    Part of this note may have been written by using a voice dictation software. The note has been proof read but may still contain some grammatical/other typographical errors.

## 2022-10-01 NOTE — ED TRIAGE NOTES
Per patient ruq abdominal pain after eating egg/galan sandwich this am. Denies gu complicaitons.  Denies n/v/d.

## 2022-10-01 NOTE — ED PROVIDER NOTES
Emergency Department Provider Note                   PCP:                Luan Shaw MD               Age: 80 y.o. Sex: female       ICD-10-CM    1. Hypokalemia  E87.6       2. Acute pancreatitis without infection or necrosis, unspecified pancreatitis type  K85.90           DISPOSITION          MDM  Number of Diagnoses or Management Options  Acute pancreatitis without infection or necrosis, unspecified pancreatitis type  Hypokalemia  Diagnosis management comments: Patient's potassium is very low at 2.6. I will replete with IV and oral potassium. Her lipase is little elevated over 900. I will get a CT scan to evaluate for intra-abdominal abnormality. Given her extensive smoking history and this possible weight change I will get a chest x-ray to screen for any obvious pulmonary masses or issues. ED Course as of 10/01/22 0023   Fri Sep 30, 2022   2316 Potassium is very low at 2.6. I will give her some IV and oral potassium. With her potassium being low I will check a magnesium. [AC]   Sat Oct 01, 2022   0018 CT scan shows some fluid around the head of the pancreas.   With that, her slight lipase elevation, her leukocytosis, and her hypokalemia I do think she would benefit from hospital in for further treatment and evaluation. [AC]      ED Course User Index  [AC] Jamie Cruz MD        Orders Placed This Encounter   Procedures    CT ABDOMEN PELVIS W IV CONTRAST Additional Contrast? Oral    XR CHEST (2 VW)    CBC with Auto Differential    Comprehensive Metabolic Panel    Lipase    Potassium    Magnesium    POCT Urine Dipstick    POCT Urinalysis no Micro    Insert peripheral IV        Medications   0.9 % sodium chloride infusion ( IntraVENous New Bag 9/30/22 2209)   potassium chloride 10 mEq/100 mL IVPB (Peripheral Line) (10 mEq IntraVENous New Bag 10/1/22 0013)   diatrizoate meglumine-sodium (GASTROGRAFIN) 66-10 % solution 15 mL (15 mLs Oral Given 9/30/22 2209)   morphine injection 4 mg (4 mg IntraVENous Given 9/30/22 2209)   ondansetron (ZOFRAN) injection 4 mg (4 mg IntraVENous Given 9/30/22 2209)   0.9 % sodium chloride bolus (100 mLs IntraVENous New Bag 9/30/22 2314)   sodium chloride flush 0.9 % injection 10 mL (10 mLs IntraVENous Given 9/30/22 2315)   iopamidol (ISOVUE-370) 76 % injection 100 mL (100 mLs IntraVENous Given 9/30/22 2313)   potassium chloride (KLOR-CON M) extended release tablet 20 mEq (20 mEq Oral Given 10/1/22 0012)   prochlorperazine (COMPAZINE) injection 10 mg (10 mg IntraVENous Given 10/1/22 0012)       New Prescriptions    No medications on file        Prema Joel is a 80 y.o. female who presents to the Emergency Department with chief complaint of    Chief Complaint   Patient presents with    Abdominal Pain      70-year-old lady presents with concerns about abdominal pain that is epigastric and right-sided. She said this describes gotten worse over the last 24 hours. She does have a history of pancreatitis for which he has previously been hospitalized. Pancreatitis was idiopathic as she does not drink alcohol and she had previously had her gallbladder taken out. She did with her current pain she is not having any vomiting or diarrhea but has had some mild nausea. No blood in her bowels. She notes that she thinks since July she has lost between 15 and 20 pounds. Although, at a rheumatology visit on July 20 her weight was listed as 94 pounds and today she weighed in at 104 pounds. She denies any fevers or chills and has had no cough or shortness of breath. She does not drink or do drugs but does smoke extensively. She has a history of left-sided breast cancer that was currently in remission. She has osteoporosis and has had previous insufficiency fractures. No other associated symptoms. Elements of this note were created using speech recognition software. As such, errors of speech recognition may be present.             Review of Systems Constitutional:  Positive for unexpected weight change. Negative for activity change, chills and fever. HENT:  Negative for congestion and sore throat. Eyes:  Negative for redness and visual disturbance. Respiratory:  Negative for cough, shortness of breath and wheezing. Cardiovascular:  Negative for chest pain and palpitations. Gastrointestinal:  Positive for abdominal pain and nausea. Negative for diarrhea and vomiting. Endocrine: Negative for polydipsia and polyuria. Genitourinary:  Negative for flank pain and hematuria. Musculoskeletal:  Negative for joint swelling and myalgias. Skin:  Negative for color change and rash. Allergic/Immunologic: Negative for immunocompromised state. Neurological:  Negative for dizziness, light-headedness and headaches. Hematological:  Negative for adenopathy. Psychiatric/Behavioral:  Negative for confusion.       Past Medical History:   Diagnosis Date    EPIFANIO (acute kidney injury) ([address] 12/04/2014    pt denies this dx    Arthritis     RA-abdelrahman hands    Back pain 6/10/2016    Breast cancer ([address] 2018    Breast lump dx 2/2018    left    Bronchitis     Chronic obstructive pulmonary disease ([address]     Discharge from the vagina     Dysuria     Eczema 6/10/2016    Fungal dermatitis     Headache 6/10/2016    Hypercholesterolemia 12/4/2014    Hypertension     not well controlled--per pt    Intractable vomiting 5/20/2018    Menopausal vaginal dryness     Osteoarthritis 6/10/2016    Osteoporosis 6/10/2016    Pancreatitis     2 episodes    PUD (peptic ulcer disease)     last 2003 which a rupture an extensive surgery    Sepsis ([address] 12/4/2014    Thyroid nodule     Tobacco abuse     1ppd x 49 yrs        Past Surgical History:   Procedure Laterality Date    APPENDECTOMY  1977    with hysterectomy    BREAST BIOPSY Left 1975    BREAST LUMPECTOMY Left 2/22/2018    LEFT BREAST LUMPECTOMY/ SENTINEL NODE BIOPSY performed by Edgardo Mosley MD at [address] SURGERY Left 1975    breast lumpectomy, benign  (left)    CATARACT REMOVAL Bilateral 2018    w/IOL    CHOLECYSTECTOMY      HYSTERECTOMY (CERVIX STATUS UNKNOWN)  1977    OTHER SURGICAL HISTORY      hernicolectomy 2 cm    MA ABDOMEN SURGERY PROC UNLISTED  2003    stomach surgery for ruptured ulcer and extensive rerouting of intestestines per patient     [address] ENDOSCOPY  05/21/2018    empiric dilation    UPPER GASTROINTESTINAL ENDOSCOPY      US BREAST NEEDLE BIOPSY LEFT Left 1/31/2018    US BREAST NEEDLE BIOPSY LEFT 1/31/2018 [facility]        Family History   Problem Relation Age of Onset    Thyroid Cancer Neg Hx     No Known Problems Paternal Grandfather     No Known Problems Paternal Grandmother     No Known Problems Maternal Grandfather     No Known Problems Maternal Grandmother     Hypertension Other         Brother and sister with htn    No Known Problems Brother     Hypertension Mother     Heart Disease Brother     Heart Disease Sister     Breast Cancer Sister 79    Cancer Sister     Heart Disease Father     Heart Attack Father     Cancer Brother         prostate    Diabetes Sister     Heart Disease Sister         Social History     Socioeconomic History    Marital status:    Tobacco Use    Smoking status: Every Day     Packs/day: 0.50     Types: Cigarettes     Start date: 5/6/1966    Smokeless tobacco: Never   Substance and Sexual Activity    Alcohol use: No    Drug use: No         Azithromycin and Codeine     Previous Medications    ABATACEPT (ORENCIA CLICKJECT) 244 MG/ML SOAJ    Inject 125 mg into the skin every 7 days    ALENDRONATE (FOSAMAX) 70 MG TABLET    TAKE 1 TABLET BY MOUTH EVERY WEEK    DONEPEZIL (ARICEPT) 5 MG TABLET    Take 5 mg by mouth    ETANERCEPT (ENBREL SURECLICK) 50 MG/ML SOAJ    Inject 50 mg into the skin every 7 days    IBUPROFEN (ADVIL;MOTRIN) 800 MG TABLET    Take 1 tablet by mouth in the morning and 1 tablet in the evening.  Take with meals. LOSARTAN (COZAAR) 50 MG TABLET    Take 50 mg by mouth daily    PRAVASTATIN (PRAVACHOL) 40 MG TABLET    Take 40 mg by mouth    PREDNISONE (DELTASONE) 20 MG TABLET    Take 1 pill once a day after breakfast for 2 weeks then 1/2 a pill once a day after breakfast for 2 weeks and then stop. Vitals signs and nursing note reviewed. Patient Vitals for the past 4 hrs:   Temp Pulse Resp BP SpO2   10/01/22 0015 -- 85 18 (!) 130/51 94 %   09/30/22 2114 98.8 °F (37.1 °C) (!) 112 18 (!) 180/108 97 %          Physical Exam  Vitals and nursing note reviewed. Constitutional:       Appearance: Normal appearance. HENT:      Head: Normocephalic and atraumatic. Mouth/Throat:      Mouth: Mucous membranes are moist.   Eyes:      General:         Right eye: No discharge. Left eye: No discharge. Cardiovascular:      Rate and Rhythm: Normal rate and regular rhythm. Pulses: Normal pulses. Pulmonary:      Effort: Pulmonary effort is normal.      Breath sounds: Normal breath sounds. No wheezing. Abdominal:      General: Bowel sounds are normal.      Tenderness: There is abdominal tenderness. There is no guarding or rebound. Comments: Epigastric tenderness to palpation with no rebound or guarding. Musculoskeletal:      Right lower leg: No edema. Left lower leg: No edema. Lymphadenopathy:      Cervical: No cervical adenopathy. Skin:     Coloration: Skin is not jaundiced. Neurological:      General: No focal deficit present. Mental Status: She is alert and oriented to person, place, and time. Mental status is at baseline. Procedures    Results for orders placed or performed during the hospital encounter of 09/30/22   CT ABDOMEN PELVIS W IV CONTRAST Additional Contrast? Oral    Narrative    EXAM: CT ABDOMEN PELVIS W IV CONTRAST    HISTORY: abdominal pain, pancreatitis.      TECHNIQUE: Axial images of the abdomen and pelvis were performed following the  administration of intravenous contrast. Images were obtained in the axial plane  and coronal reformatted images were created and reviewed. Dose reduction technique used: Automated exposure control/Adjustment of the mA  and/or kV according to patient size/Use of iterative reconstruction technique. 100 mL of Isovue-370 were utilized. COMPARISON: 12/26/2019    FINDINGS:   The visualized lung bases show mild bibasilar atelectasis or infiltrate. The  mediastinum appears unremarkable. The liver, spleen, adrenal glands, and kidneys are within normal limits. Stable  small low-density lesions in the left kidney which may represent cysts. The  bladder appears grossly normal.  The gallbladder has been removed. There is dilatation of the extrahepatic and common bile ducts and of most of the  pancreatic duct. There is ill-defined fluid noted adjacent to the head of the pancreas anteriorly  which appears to extend to the anterior abdominal wall along the fissure for the  ligamentum teres. (See image number 27 of series 2.) There is also minimal fluid  extension into the right paracolic gutter. The patient is status post partial gastrectomy. Small and large bowel show no  evidence of obstruction. An appendix is not visualized. No evidence of free fluid or free air. No pathologic adenopathy. No abdominal  aortic aneurysm. Osseous structures show no evidence of acute fracture or suspicious lesion. Stable significant compression fracture of the L1 vertebral body. Impression    There is ill-defined fluid noted adjacent to the head of the pancreas anteriorly  which appears to extend to the anterior abdominal wall along the fissure for the  ligamentum teres. (See image number 27 of series 2.) There is also minimal fluid  extension into the right paracolic gutter. This may be associated with  pancreatitis.     Dilated extrahepatic bile ducts and pancreatic duct may be associated with a  reservoir effect secondary to cholecystectomy. Bibasilar atelectasis or infiltrate. Stable fracture of the L1 vertebral body.    CBC with Auto Differential   Result Value Ref Range    WBC 15.0 (H) 4.3 - 11.1 K/uL    RBC 4.71 4.05 - 5.2 M/uL    Hemoglobin 12.4 11.7 - 15.4 g/dL    Hematocrit 38.6 35.8 - 46.3 %    MCV 82.0 79.6 - 97.8 FL    MCH 26.3 26.1 - 32.9 PG    MCHC 32.1 31.4 - 35.0 g/dL    RDW 14.4 11.9 - 14.6 %    Platelets 754 103 - 557 K/uL    MPV 9.3 (L) 9.4 - 12.3 FL    nRBC 0.00 0.0 - 0.2 K/uL    Differential Type AUTOMATED      Seg Neutrophils 80 (H) 43 - 78 %    Lymphocytes 10 (L) 13 - 44 %    Monocytes 7 4.0 - 12.0 %    Eosinophils % 2 0.5 - 7.8 %    Basophils 1 0.0 - 2.0 %    Immature Granulocytes 0 0.0 - 5.0 %    Segs Absolute 12.0 (H) 1.7 - 8.2 K/UL    Absolute Lymph # 1.5 0.5 - 4.6 K/UL    Absolute Mono # 1.0 0.1 - 1.3 K/UL    Absolute Eos # 0.3 0.0 - 0.8 K/UL    Basophils Absolute 0.1 0.0 - 0.2 K/UL    Absolute Immature Granulocyte 0.1 0.0 - 0.5 K/UL   Comprehensive Metabolic Panel   Result Value Ref Range    Sodium 140 136 - 145 mmol/L    Potassium 2.8 (L) 3.5 - 5.1 mmol/L    Chloride 107 101 - 110 mmol/L    CO2 24 21 - 32 mmol/L    Anion Gap 9 4 - 13 mmol/L    Glucose 132 (H) 65 - 100 mg/dL    BUN 15 8 - 23 MG/DL    Creatinine 0.90 0.6 - 1.0 MG/DL    GFR African American >60 >60 ml/min/1.73m2    GFR Non- >60 >60 ml/min/1.73m2    Calcium 9.3 8.3 - 10.4 MG/DL    Total Bilirubin 0.4 0.2 - 1.1 MG/DL    ALT 13 12 - 65 U/L    AST 18 15 - 37 U/L    Alk Phosphatase 93 50 - 136 U/L    Total Protein 6.8 6.3 - 8.2 g/dL    Albumin 3.2 3.2 - 4.6 g/dL    Globulin 3.6 (H) 2.3 - 3.5 g/dL    Albumin/Globulin Ratio 0.9 (L) 1.2 - 3.5     Lipase   Result Value Ref Range    Lipase 914 (H) 73 - 393 U/L   Potassium   Result Value Ref Range    Potassium 2.6 (L) 3.5 - 5.1 mmol/L        CT ABDOMEN PELVIS W IV CONTRAST Additional Contrast? Oral   Final Result   There is ill-defined fluid noted adjacent to the head of the pancreas anteriorly   which appears to extend to the anterior abdominal wall along the fissure for the   ligamentum teres. (See image number 27 of series 2.) There is also minimal fluid   extension into the right paracolic gutter. This may be associated with   pancreatitis. Dilated extrahepatic bile ducts and pancreatic duct may be associated with a   reservoir effect secondary to cholecystectomy. Bibasilar atelectasis or infiltrate. Stable fracture of the L1 vertebral body. XR CHEST (2 VW)    (Results Pending)                       Voice dictation software was used during the making of this note. This software is not perfect and grammatical and other typographical errors may be present. This note has not been completely proofread for errors.         Jerrell Kerr MD  10/01/22 [id number]

## 2022-10-01 NOTE — PROGRESS NOTES
Patient seen and examined at the bedside. H&P written 10/1. Please follow assessment and plan per H&P. Restarted blood pressure medications, added hydralazine as needed. Continue to advance diet as tolerated    Will not bill for this encounter.

## 2022-10-02 PROBLEM — K85.80 OTHER ACUTE PANCREATITIS WITHOUT INFECTION OR NECROSIS: Status: ACTIVE | Noted: 2022-10-02

## 2022-10-02 LAB
ALBUMIN SERPL-MCNC: 2.4 G/DL (ref 3.2–4.6)
ALBUMIN/GLOB SERPL: 0.8 {RATIO} (ref 1.2–3.5)
ALP SERPL-CCNC: 80 U/L (ref 50–136)
ALT SERPL-CCNC: 12 U/L (ref 12–65)
ANION GAP SERPL CALC-SCNC: 7 MMOL/L (ref 4–13)
AST SERPL-CCNC: 12 U/L (ref 15–37)
BASOPHILS # BLD: 0.1 K/UL (ref 0–0.2)
BASOPHILS NFR BLD: 1 % (ref 0–2)
BILIRUB SERPL-MCNC: 0.5 MG/DL (ref 0.2–1.1)
BUN SERPL-MCNC: 7 MG/DL (ref 8–23)
CALCIUM SERPL-MCNC: 7.9 MG/DL (ref 8.3–10.4)
CHLORIDE SERPL-SCNC: 113 MMOL/L (ref 101–110)
CO2 SERPL-SCNC: 21 MMOL/L (ref 21–32)
CREAT SERPL-MCNC: 0.5 MG/DL (ref 0.6–1)
DIFFERENTIAL METHOD BLD: ABNORMAL
EOSINOPHIL # BLD: 0.2 K/UL (ref 0–0.8)
EOSINOPHIL NFR BLD: 2 % (ref 0.5–7.8)
ERYTHROCYTE [DISTWIDTH] IN BLOOD BY AUTOMATED COUNT: 14.6 % (ref 11.9–14.6)
GLOBULIN SER CALC-MCNC: 3 G/DL (ref 2.3–3.5)
GLUCOSE SERPL-MCNC: 97 MG/DL (ref 65–100)
HCT VFR BLD AUTO: 33.8 % (ref 35.8–46.3)
HGB BLD-MCNC: 10.8 G/DL (ref 11.7–15.4)
IMM GRANULOCYTES # BLD AUTO: 0 K/UL (ref 0–0.5)
IMM GRANULOCYTES NFR BLD AUTO: 0 % (ref 0–5)
LYMPHOCYTES # BLD: 0.9 K/UL (ref 0.5–4.6)
LYMPHOCYTES NFR BLD: 9 % (ref 13–44)
MAGNESIUM SERPL-MCNC: 1.7 MG/DL (ref 1.8–2.4)
MCH RBC QN AUTO: 26.1 PG (ref 26.1–32.9)
MCHC RBC AUTO-ENTMCNC: 32 G/DL (ref 31.4–35)
MCV RBC AUTO: 81.6 FL (ref 79.6–97.8)
MONOCYTES # BLD: 0.7 K/UL (ref 0.1–1.3)
MONOCYTES NFR BLD: 7 % (ref 4–12)
NEUTS SEG # BLD: 8.2 K/UL (ref 1.7–8.2)
NEUTS SEG NFR BLD: 81 % (ref 43–78)
NRBC # BLD: 0 K/UL (ref 0–0.2)
PLATELET # BLD AUTO: 337 K/UL (ref 150–450)
PMV BLD AUTO: 9.8 FL (ref 9.4–12.3)
POTASSIUM SERPL-SCNC: 2.8 MMOL/L (ref 3.5–5.1)
PROT SERPL-MCNC: 5.4 G/DL (ref 6.3–8.2)
RBC # BLD AUTO: 4.14 M/UL (ref 4.05–5.2)
SODIUM SERPL-SCNC: 141 MMOL/L (ref 136–145)
WBC # BLD AUTO: 10.1 K/UL (ref 4.3–11.1)

## 2022-10-02 PROCEDURE — 96375 TX/PRO/DX INJ NEW DRUG ADDON: CPT

## 2022-10-02 PROCEDURE — 96372 THER/PROPH/DIAG INJ SC/IM: CPT

## 2022-10-02 PROCEDURE — 6370000000 HC RX 637 (ALT 250 FOR IP): Performed by: FAMILY MEDICINE

## 2022-10-02 PROCEDURE — 2500000003 HC RX 250 WO HCPCS: Performed by: HOSPITALIST

## 2022-10-02 PROCEDURE — 2580000003 HC RX 258: Performed by: HOSPITALIST

## 2022-10-02 PROCEDURE — 6370000000 HC RX 637 (ALT 250 FOR IP): Performed by: INTERNAL MEDICINE

## 2022-10-02 PROCEDURE — 85025 COMPLETE CBC W/AUTO DIFF WBC: CPT

## 2022-10-02 PROCEDURE — 1100000000 HC RM PRIVATE

## 2022-10-02 PROCEDURE — 6370000000 HC RX 637 (ALT 250 FOR IP): Performed by: HOSPITALIST

## 2022-10-02 PROCEDURE — 96376 TX/PRO/DX INJ SAME DRUG ADON: CPT

## 2022-10-02 PROCEDURE — 6370000000 HC RX 637 (ALT 250 FOR IP): Performed by: STUDENT IN AN ORGANIZED HEALTH CARE EDUCATION/TRAINING PROGRAM

## 2022-10-02 PROCEDURE — 80053 COMPREHEN METABOLIC PANEL: CPT

## 2022-10-02 PROCEDURE — 6360000002 HC RX W HCPCS: Performed by: HOSPITALIST

## 2022-10-02 PROCEDURE — 6360000002 HC RX W HCPCS: Performed by: FAMILY MEDICINE

## 2022-10-02 PROCEDURE — 36415 COLL VENOUS BLD VENIPUNCTURE: CPT

## 2022-10-02 PROCEDURE — 83735 ASSAY OF MAGNESIUM: CPT

## 2022-10-02 PROCEDURE — 6360000002 HC RX W HCPCS: Performed by: STUDENT IN AN ORGANIZED HEALTH CARE EDUCATION/TRAINING PROGRAM

## 2022-10-02 RX ORDER — POTASSIUM CHLORIDE 7.45 MG/ML
10 INJECTION INTRAVENOUS
Status: COMPLETED | OUTPATIENT
Start: 2022-10-02 | End: 2022-10-03

## 2022-10-02 RX ORDER — POTASSIUM CHLORIDE 20 MEQ/1
20 TABLET, EXTENDED RELEASE ORAL 2 TIMES DAILY WITH MEALS
Status: DISCONTINUED | OUTPATIENT
Start: 2022-10-02 | End: 2022-10-05 | Stop reason: HOSPADM

## 2022-10-02 RX ORDER — MULTIVITAMIN WITH IRON
1 TABLET ORAL DAILY
Status: COMPLETED | OUTPATIENT
Start: 2022-10-02 | End: 2022-10-02

## 2022-10-02 RX ORDER — LANOLIN ALCOHOL/MO/W.PET/CERES
400 CREAM (GRAM) TOPICAL 2 TIMES DAILY
Status: DISCONTINUED | OUTPATIENT
Start: 2022-10-02 | End: 2022-10-05 | Stop reason: HOSPADM

## 2022-10-02 RX ORDER — MULTIPLE VITAMINS W/ MINERALS TAB 9MG-400MCG
1 TAB ORAL DAILY
Status: DISCONTINUED | OUTPATIENT
Start: 2022-10-03 | End: 2022-10-03 | Stop reason: CLARIF

## 2022-10-02 RX ORDER — LABETALOL HYDROCHLORIDE 5 MG/ML
10 INJECTION, SOLUTION INTRAVENOUS EVERY 4 HOURS PRN
Status: DISCONTINUED | OUTPATIENT
Start: 2022-10-02 | End: 2022-10-05

## 2022-10-02 RX ORDER — AMLODIPINE BESYLATE 5 MG/1
5 TABLET ORAL DAILY
Status: DISCONTINUED | OUTPATIENT
Start: 2022-10-02 | End: 2022-10-05

## 2022-10-02 RX ADMIN — SODIUM CHLORIDE: 9 INJECTION, SOLUTION INTRAVENOUS at 20:04

## 2022-10-02 RX ADMIN — MORPHINE SULFATE 4 MG: 4 INJECTION, SOLUTION INTRAMUSCULAR; INTRAVENOUS at 15:46

## 2022-10-02 RX ADMIN — LABETALOL HYDROCHLORIDE 10 MG: 5 INJECTION INTRAVENOUS at 17:56

## 2022-10-02 RX ADMIN — ONDANSETRON 4 MG: 4 TABLET, ORALLY DISINTEGRATING ORAL at 04:22

## 2022-10-02 RX ADMIN — POTASSIUM CHLORIDE 20 MEQ: 1500 TABLET, EXTENDED RELEASE ORAL at 20:08

## 2022-10-02 RX ADMIN — POTASSIUM CHLORIDE 10 MEQ: 7.46 INJECTION, SOLUTION INTRAVENOUS at 23:22

## 2022-10-02 RX ADMIN — SODIUM CHLORIDE: 9 INJECTION, SOLUTION INTRAVENOUS at 00:18

## 2022-10-02 RX ADMIN — ENOXAPARIN SODIUM 30 MG: 100 INJECTION SUBCUTANEOUS at 08:40

## 2022-10-02 RX ADMIN — SODIUM CHLORIDE, PRESERVATIVE FREE 10 ML: 5 INJECTION INTRAVENOUS at 20:11

## 2022-10-02 RX ADMIN — LABETALOL HYDROCHLORIDE 10 MG: 5 INJECTION INTRAVENOUS at 12:01

## 2022-10-02 RX ADMIN — LOSARTAN POTASSIUM 50 MG: 50 TABLET, FILM COATED ORAL at 08:40

## 2022-10-02 RX ADMIN — SODIUM CHLORIDE, PRESERVATIVE FREE 10 ML: 5 INJECTION INTRAVENOUS at 08:40

## 2022-10-02 RX ADMIN — ACETAMINOPHEN 650 MG: 325 TABLET, FILM COATED ORAL at 20:29

## 2022-10-02 RX ADMIN — LABETALOL HYDROCHLORIDE 10 MG: 5 INJECTION INTRAVENOUS at 23:24

## 2022-10-02 RX ADMIN — SALINE NASAL SPRAY 2 SPRAY: 1.5 SOLUTION NASAL at 18:00

## 2022-10-02 RX ADMIN — MAGNESIUM GLUCONATE 500 MG ORAL TABLET 400 MG: 500 TABLET ORAL at 20:08

## 2022-10-02 RX ADMIN — AMLODIPINE BESYLATE 5 MG: 5 TABLET ORAL at 08:40

## 2022-10-02 RX ADMIN — Medication 1 TABLET: at 16:25

## 2022-10-02 RX ADMIN — POTASSIUM CHLORIDE 10 MEQ: 7.46 INJECTION, SOLUTION INTRAVENOUS at 20:08

## 2022-10-02 RX ADMIN — POTASSIUM CHLORIDE 10 MEQ: 7.46 INJECTION, SOLUTION INTRAVENOUS at 22:24

## 2022-10-02 RX ADMIN — LABETALOL HYDROCHLORIDE 10 MG: 5 INJECTION INTRAVENOUS at 05:02

## 2022-10-02 RX ADMIN — HYDRALAZINE HYDROCHLORIDE 10 MG: 20 INJECTION INTRAMUSCULAR; INTRAVENOUS at 03:35

## 2022-10-02 RX ADMIN — DONEPEZIL HYDROCHLORIDE 5 MG: 5 TABLET, FILM COATED ORAL at 20:08

## 2022-10-02 RX ADMIN — POTASSIUM CHLORIDE 10 MEQ: 7.46 INJECTION, SOLUTION INTRAVENOUS at 21:20

## 2022-10-02 ASSESSMENT — PAIN DESCRIPTION - ORIENTATION
ORIENTATION: MID;UPPER
ORIENTATION: MID;ANTERIOR

## 2022-10-02 ASSESSMENT — PAIN SCALES - GENERAL
PAINLEVEL_OUTOF10: 7
PAINLEVEL_OUTOF10: 5
PAINLEVEL_OUTOF10: 1
PAINLEVEL_OUTOF10: 2

## 2022-10-02 ASSESSMENT — PAIN DESCRIPTION - FREQUENCY: FREQUENCY: INTERMITTENT

## 2022-10-02 ASSESSMENT — PAIN DESCRIPTION - DESCRIPTORS
DESCRIPTORS: THROBBING
DESCRIPTORS: ACHING

## 2022-10-02 ASSESSMENT — PAIN - FUNCTIONAL ASSESSMENT: PAIN_FUNCTIONAL_ASSESSMENT: ACTIVITIES ARE NOT PREVENTED

## 2022-10-02 ASSESSMENT — PAIN SCALES - WONG BAKER: WONGBAKER_NUMERICALRESPONSE: 0

## 2022-10-02 ASSESSMENT — PAIN DESCRIPTION - PAIN TYPE: TYPE: ACUTE PAIN

## 2022-10-02 ASSESSMENT — PAIN DESCRIPTION - LOCATION
LOCATION: ABDOMEN
LOCATION: ABDOMEN

## 2022-10-02 NOTE — PROGRESS NOTES
Hospitalist Progress Note   Admit Date:  2022  9:27 PM   Name:  Lessie Dakins   Age:  80 y.o. Sex:  female  :  1940   MRN:  [id number]   Room:      Presenting Complaint: Abdominal Pain     Reason(s) for Admission: Hypokalemia [E87.6]  Acute pancreatitis, unspecified complication status, unspecified pancreatitis type [K85.90]  Acute pancreatitis without infection or necrosis, unspecified pancreatitis type [K85.90]     Hospital Course:   80years old female with past medical history of multiple episodes of pancreatitis without clear reason, hypertension presented to emergency room with chief complaint of nausea, epigastric and right upper quadrant abdominal pain going on for past 24 hours which continued to get worse. Secondary to nausea and  patient not able to eat or drink very well. Patient reports history of cholecystectomy for similar pain in the past.  Patient denies any fever, chills, cough. Denies any chest pain, shortness of breath. Evaluated in emergency room, lab work shows evidence of hypokalemia. Lipase is elevated. CT scan of abdomen was ill-defined fluid collection is in the pancreatic head. Patient was initially on IV fluids      Subjective & 24hr Events (10/02/22): Patient was seen and examined at the bedside. No overnight events. Patient tolerating liquid diet well. Will advance today. Patient did state that she has been having some difficulty with weight changes over the past month. Patient denied any cardiac chest pain, shortness of breath, abdominal pain, fever/chills.       Assessment & Plan:   Acute pancreatitis  - Pain management  - Advance diet as tolerated  - 10/2 patient will be advanced to regular diet  - Patient likely to be discharged tomorrow 10/3  - Patient should follow-up with GI outpatient    Hypokalemia  - Replete as needed    Hyperlipidemia  - Continue home medications    Hypertension  - Continue home medications    Rheumatoid arthritis  - Secondary to her pancreatitis patient has history of medication changed to Orencia      Anticipated discharge needs:    Dispo pending clinical course. Patient likely to be discharged next 24 hours    Diet:  ADULT ORAL NUTRITION SUPPLEMENT; Breakfast; Standard 4 oz Oral Supplement  ADULT DIET; Regular  DVT PPx: SCDs  Code status: Full Code    Hospital Problems:  Principal Problem:    Acute pancreatitis, unspecified complication status, unspecified pancreatitis type  Active Problems:    Hypercholesterolemia    HTN (hypertension)    Hypokalemia  Resolved Problems:    * No resolved hospital problems. *      Objective:   Patient Vitals for the past 24 hrs:   Temp Pulse Resp BP SpO2   10/02/22 0746 98.1 °F (36.7 °C) 72 18 (!) 179/69 94 %   10/02/22 0600 -- -- -- (!) 166/66 --   10/02/22 0430 -- -- -- (!) 174/70 --   10/02/22 0400 -- -- -- (!) 178/72 --   10/02/22 0335 -- -- -- (!) 181/65 --   10/02/22 0257 98.2 °F (36.8 °C) 86 18 (!) 181/65 96 %   10/01/22 2215 98 °F (36.7 °C) (!) 102 18 (!) 163/74 95 %   10/01/22 2108 -- -- -- (!) 176/73 --   10/01/22 2036 98.2 °F (36.8 °C) 88 17 (!) 176/73 92 %   10/01/22 1936 -- 88 -- -- 93 %   10/01/22 1815 98.1 °F (36.7 °C) 86 18 (!) 163/61 93 %   10/01/22 1735 -- -- -- (!) 195/59 --   10/01/22 1512 98.6 °F (37 °C) 80 18 (!) 215/76 98 %       Oxygen Therapy  SpO2: 94 %  Pulse Oximeter Device Mode: Continuous  O2 Device: None (Room air)    Estimated body mass index is 20.31 kg/m² as calculated from the following:    Height as of this encounter: 5' (1.524 m). Weight as of this encounter: 104 lb (47.2 kg). Intake/Output Summary (Last 24 hours) at 10/2/2022 1147  Last data filed at 10/2/2022 0746  Gross per 24 hour   Intake 2479.23 ml   Output 2210 ml   Net 269.23 ml         Physical Exam:   Blood pressure (!) 179/69, pulse 72, temperature 98.1 °F (36.7 °C), temperature source Oral, resp. rate 18, height 5' (1.524 m), weight 104 lb (47.2 kg), SpO2 94 %.   General: Well nourished. Head:  Normocephalic, atraumatic  Eyes:  Sclerae appear normal.  Pupils equally round. ENT:  Nares appear normal, no drainage. Moist oral mucosa  Neck:  No restricted ROM. Trachea midline   CV:   RRR. No m/r/g. No jugular venous distension. Lungs:   CTAB. No wheezing, rhonchi, or rales. Symmetric expansion. Abdomen: Bowel sounds present. Soft, nontender, nondistended. Extremities: No cyanosis or clubbing. No edema  Skin:     No rashes and normal coloration. Warm and dry. Neuro:  CN II-XII grossly intact. Sensation intact. A&Ox3  Psych:  Normal mood and affect.       I have personally reviewed labs and tests showing:  Recent Labs:  Recent Results (from the past 48 hour(s))   CBC with Auto Differential    Collection Time: 09/30/22  9:22 PM   Result Value Ref Range    WBC 15.0 (H) 4.3 - 11.1 K/uL    RBC 4.71 4.05 - 5.2 M/uL    Hemoglobin 12.4 11.7 - 15.4 g/dL    Hematocrit 38.6 35.8 - 46.3 %    MCV 82.0 79.6 - 97.8 FL    MCH 26.3 26.1 - 32.9 PG    MCHC 32.1 31.4 - 35.0 g/dL    RDW 14.4 11.9 - 14.6 %    Platelets 673 828 - 640 K/uL    MPV 9.3 (L) 9.4 - 12.3 FL    nRBC 0.00 0.0 - 0.2 K/uL    Differential Type AUTOMATED      Seg Neutrophils 80 (H) 43 - 78 %    Lymphocytes 10 (L) 13 - 44 %    Monocytes 7 4.0 - 12.0 %    Eosinophils % 2 0.5 - 7.8 %    Basophils 1 0.0 - 2.0 %    Immature Granulocytes 0 0.0 - 5.0 %    Segs Absolute 12.0 (H) 1.7 - 8.2 K/UL    Absolute Lymph # 1.5 0.5 - 4.6 K/UL    Absolute Mono # 1.0 0.1 - 1.3 K/UL    Absolute Eos # 0.3 0.0 - 0.8 K/UL    Basophils Absolute 0.1 0.0 - 0.2 K/UL    Absolute Immature Granulocyte 0.1 0.0 - 0.5 K/UL   Comprehensive Metabolic Panel    Collection Time: 09/30/22  9:22 PM   Result Value Ref Range    Sodium 140 136 - 145 mmol/L    Potassium 2.8 (L) 3.5 - 5.1 mmol/L    Chloride 107 101 - 110 mmol/L    CO2 24 21 - 32 mmol/L    Anion Gap 9 4 - 13 mmol/L    Glucose 132 (H) 65 - 100 mg/dL    BUN 15 8 - 23 MG/DL    Creatinine 0.90 0.6 - 1.0 MG/DL    GFR African American >60 >60 ml/min/1.73m2    GFR Non- >60 >60 ml/min/1.73m2    Calcium 9.3 8.3 - 10.4 MG/DL    Total Bilirubin 0.4 0.2 - 1.1 MG/DL    ALT 13 12 - 65 U/L    AST 18 15 - 37 U/L    Alk Phosphatase 93 50 - 136 U/L    Total Protein 6.8 6.3 - 8.2 g/dL    Albumin 3.2 3.2 - 4.6 g/dL    Globulin 3.6 (H) 2.3 - 3.5 g/dL    Albumin/Globulin Ratio 0.9 (L) 1.2 - 3.5     Lipase    Collection Time: 09/30/22  9:22 PM   Result Value Ref Range    Lipase 914 (H) 73 - 393 U/L   Potassium    Collection Time: 09/30/22 10:23 PM   Result Value Ref Range    Potassium 2.6 (L) 3.5 - 5.1 mmol/L   Magnesium    Collection Time: 09/30/22 10:23 PM   Result Value Ref Range    Magnesium 1.9 1.8 - 2.4 mg/dL   EKG 12 Lead    Collection Time: 10/01/22 12:33 AM   Result Value Ref Range    Ventricular Rate 69 BPM    Atrial Rate 69 BPM    P-R Interval 209 ms    QRS Duration 87 ms    Q-T Interval 410 ms    QTc Calculation (Bazett) 440 ms    P Axis 0 degrees    R Axis 41 degrees    T Axis 52 degrees    Diagnosis Sinus rhythm    Comprehensive Metabolic Panel w/ Reflex to MG    Collection Time: 10/02/22  6:17 AM   Result Value Ref Range    Sodium 141 136 - 145 mmol/L    Potassium 2.8 (L) 3.5 - 5.1 mmol/L    Chloride 113 (H) 101 - 110 mmol/L    CO2 21 21 - 32 mmol/L    Anion Gap 7 4 - 13 mmol/L    Glucose 97 65 - 100 mg/dL    BUN 7 (L) 8 - 23 MG/DL    Creatinine 0.50 (L) 0.6 - 1.0 MG/DL    GFR African American >60 >60 ml/min/1.73m2    GFR Non- >60 >60 ml/min/1.73m2    Calcium 7.9 (L) 8.3 - 10.4 MG/DL    Total Bilirubin 0.5 0.2 - 1.1 MG/DL    ALT 12 12 - 65 U/L    AST 12 (L) 15 - 37 U/L    Alk Phosphatase 80 50 - 136 U/L    Total Protein 5.4 (L) 6.3 - 8.2 g/dL    Albumin 2.4 (L) 3.2 - 4.6 g/dL    Globulin 3.0 2.3 - 3.5 g/dL    Albumin/Globulin Ratio 0.8 (L) 1.2 - 3.5     CBC with Auto Differential    Collection Time: 10/02/22  6:17 AM   Result Value Ref Range    WBC 10.1 4.3 - 11.1 K/uL    RBC 4.14 4.05 - 5.2 M/uL    Hemoglobin 10.8 (L) 11.7 - 15.4 g/dL    Hematocrit 33.8 (L) 35.8 - 46.3 %    MCV 81.6 79.6 - 97.8 FL    MCH 26.1 26.1 - 32.9 PG    MCHC 32.0 31.4 - 35.0 g/dL    RDW 14.6 11.9 - 14.6 %    Platelets 927 496 - 100 K/uL    MPV 9.8 9.4 - 12.3 FL    nRBC 0.00 0.0 - 0.2 K/uL    Differential Type AUTOMATED      Seg Neutrophils 81 (H) 43 - 78 %    Lymphocytes 9 (L) 13 - 44 %    Monocytes 7 4.0 - 12.0 %    Eosinophils % 2 0.5 - 7.8 %    Basophils 1 0.0 - 2.0 %    Immature Granulocytes 0 0.0 - 5.0 %    Segs Absolute 8.2 1.7 - 8.2 K/UL    Absolute Lymph # 0.9 0.5 - 4.6 K/UL    Absolute Mono # 0.7 0.1 - 1.3 K/UL    Absolute Eos # 0.2 0.0 - 0.8 K/UL    Basophils Absolute 0.1 0.0 - 0.2 K/UL    Absolute Immature Granulocyte 0.0 0.0 - 0.5 K/UL   Magnesium    Collection Time: 10/02/22  6:17 AM   Result Value Ref Range    Magnesium 1.7 (L) 1.8 - 2.4 mg/dL       I have personally reviewed imaging studies showing: Other Studies:  XR CHEST (2 VW)   Final Result   No evidence of an acute intrathoracic process. There is a 1.2 cm ill-defined nodule in the right lower lung. Its etiology is   unclear. This can be further assessed with CT of the chest.         CT ABDOMEN PELVIS W IV CONTRAST Additional Contrast? Oral   Final Result   There is ill-defined fluid noted adjacent to the head of the pancreas anteriorly   which appears to extend to the anterior abdominal wall along the fissure for the   ligamentum teres. (See image number 27 of series 2.) There is also minimal fluid   extension into the right paracolic gutter. This may be associated with   pancreatitis. Dilated extrahepatic bile ducts and pancreatic duct may be associated with a   reservoir effect secondary to cholecystectomy. Bibasilar atelectasis or infiltrate. Stable fracture of the L1 vertebral body.           Current Meds:  Current Facility-Administered Medications   Medication Dose Route Frequency    labetalol (NORMODYNE;TRANDATE) injection 10 mg  10 mg IntraVENous Q4H PRN    amLODIPine (NORVASC) tablet 5 mg  5 mg Oral Daily    multivitamin 1 tablet  1 tablet Oral Daily    sodium chloride flush 0.9 % injection 5-40 mL  5-40 mL IntraVENous 2 times per day    sodium chloride flush 0.9 % injection 5-40 mL  5-40 mL IntraVENous PRN    0.9 % sodium chloride infusion   IntraVENous PRN    enoxaparin Sodium (LOVENOX) injection 30 mg  30 mg SubCUTAneous Daily    ondansetron (ZOFRAN-ODT) disintegrating tablet 4 mg  4 mg Oral Q8H PRN    Or    ondansetron (ZOFRAN) injection 4 mg  4 mg IntraVENous Q6H PRN    polyethylene glycol (GLYCOLAX) packet 17 g  17 g Oral Daily PRN    acetaminophen (TYLENOL) tablet 650 mg  650 mg Oral Q6H PRN    Or    acetaminophen (TYLENOL) suppository 650 mg  650 mg Rectal Q6H PRN    0.9 % sodium chloride infusion   IntraVENous Continuous    morphine injection 4 mg  4 mg IntraVENous Q4H PRN    hydrALAZINE (APRESOLINE) injection 10 mg  10 mg IntraVENous Q6H PRN    losartan (COZAAR) tablet 50 mg  50 mg Oral Daily    donepezil (ARICEPT) tablet 5 mg  5 mg Oral Nightly    sodium chloride (OCEAN, BABY AYR) 0.65 % nasal spray 2 spray  2 spray Each Nostril Q2H PRN       Signed:  Farnaz Moss MD    Part of this note may have been written by using a voice dictation software. The note has been proof read but may still contain some grammatical/other typographical errors.

## 2022-10-02 NOTE — PROGRESS NOTES
END OF SHIFT NOTE:    INTAKE/OUTPUT  10/01 0701 - 10/02 0700  In: 2729.2 [P.O.:750; I.V.:1979.2]  Out: 2410 [Urine:2410]  Voiding: Yes  Catheter: No  Drain:      Flatus: Patient does have flatus present. Stool: 0 occurrences. Characteristics:  Stool Appearance: Unable to assess  Stool Color: Unable to assess  Stool Amount: Unable to assess  Stool Assessment  Stool Appearance: Unable to assess  Stool Color: Unable to assess  Stool Amount: Unable to assess  Stool Source: Rectum  Last BM (including prior to admit): 09/30/22    Emesis: 0 occurrences. Characteristics:        VITAL SIGNS  Patient Vitals for the past 12 hrs:   Temp Pulse Resp BP SpO2   10/02/22 1745 -- 76 -- (!) 161/56 --   10/02/22 1529 98.5 °F (36.9 °C) 78 18 (!) 186/74 93 %   10/02/22 1421 -- 70 -- (!) 158/62 --   10/02/22 1158 97.5 °F (36.4 °C) (!) 105 18 (!) 173/71 95 %   10/02/22 0746 98.1 °F (36.7 °C) 72 18 (!) 179/69 94 %       Pain Assessment  Pain Level: 5 (10/02/22 1630)  Pain Location: Abdomen  Patient's Stated Pain Goal: 0 - No pain    Ambulating  Yes with assistance    Shift report given to oncoming nurse at the bedside.     Kayla Peraza RN

## 2022-10-02 NOTE — PROGRESS NOTES
Patient admitted with acute pancreatitis, is asymptomatic with BP of 181/65 and HR-86. Has PRN Hydralazine for SBP>170 was given at 3:35 and on rechecking BP after half an hr its 178/72 and HR - 86. Hospitalist was notified and order to give labitolol 10 mg q 4 prn if systolic more than 378 was received.

## 2022-10-02 NOTE — PROGRESS NOTES
Reviewed notes for new spiritual concerns. Church     SPIRITUAL/ CANCER     FULL CODE     DAUGHTER -  PASHA     LIVES IN [place]      ATTEMPTED VISIT    PATIENT RESTING PEACEFULLY    ROOM DARK    NO FAMILY PRESENT              Will follow as needed.

## 2022-10-02 NOTE — PROGRESS NOTES
END OF SHIFT NOTE:    INTAKE/OUTPUT  10/01 0701 - 10/02 0700  In: 2729.2 [P.O.:750; I.V.:1979.2]  Out: 2210 [Urine:2210]  Voiding: Yes  Catheter: No          Flatus: Patient does have flatus present. Stool:  0 occurrences. Emesis: 0 occurrences. VITAL SIGNS  Patient Vitals for the past 12 hrs:   Temp Pulse Resp BP SpO2   10/02/22 0430 -- -- -- (!) 174/70 --   10/02/22 0400 -- -- -- (!) 178/72 --   10/02/22 0335 -- -- -- (!) 181/65 --   10/02/22 0257 98.2 °F (36.8 °C) 86 18 (!) 181/65 96 %   10/01/22 2215 98 °F (36.7 °C) (!) 102 18 (!) 163/74 95 %   10/01/22 2108 -- -- -- (!) 176/73 --   10/01/22 2036 98.2 °F (36.8 °C) 88 17 (!) 176/73 92 %   10/01/22 1936 -- 88 -- -- 93 %   10/01/22 1815 98.1 °F (36.7 °C) 86 18 (!) 163/61 93 %         Ambulating  Yes    Shift report given to oncoming nurse at the bedside.     Sajan Deleon RN

## 2022-10-02 NOTE — PROGRESS NOTES
Patient was admitted with acute pancreatitis, HTN and hypokalemia, has last K of 2.8 which was this AM Lab resulted at 6:17, no potassium was given and is not on potassium protocol. Is asymptomatic for now but was on clear liquids and had started her on Regular today, had one episode of nausea after lunch, not after that and she is on cont. NS @ 100 ml/hr. Hospitalist was notified and orders to replace potassium was received.

## 2022-10-02 NOTE — CONSULTS
Comprehensive Nutrition Assessment    Type and Reason for Visit: Initial, Consult, Positive Nutrition Screen  Malnutrition Screening Tool: Malnutrition Screen  Have you recently lost weight without trying?: 14 to 23 pounds (2 points)  Have you been eating poorly because of a decreased appetite?: No (0 points)  Malnutrition Screening Tool Score: 2 and Unintentional Weight Loss (Hospitalists)    Nutrition Recommendations/Plan:   Meals and Snacks:  Diet: Continue current order  Nutrition Supplement Therapy:  Medical food supplement therapy:  Initiate Ensure Enlive twice per day (this provides 350 kcal and 20 grams protein per bottle)     Malnutrition Assessment:  Malnutrition Status: Insufficient data (reported weight loss however cannot confirm, appetite at baseline, NFPE inconclusive)    Nutrition Assessment:  Nutrition History: Pt reports appetite and intake at baseline PTA, denies any \"pancreatitis flare ups. \" Pt stated she had an MD appointment ~1 month ago and weighed 112lb, pt states over last 1 month she has continued to lose weight and weighed ~96lb PTA. Measured weight of 105lb during admission. Review of MD office appointments reflects weights from 96-105lb throughout last 1 year. Pt reports sporadic intake of boost/ensure PTA. Do You Have Any Cultural, Moravian, or Ethnic Food Preferences?: No   Nutrition Background:  Pt with PMH significant for multiple episodes of pancreatitis without clear reason, hypertension [facility]30 with chief complaint of nausea, epigastric and right upper quadrant abdominal pain going on for past 24 hours which continued to get worse. Nutrition Interval:  Pt seen at bedside, just completed lunch. Diet advanced to regular 10/2 from FLQ, and pt consumed ~60% of regular diet. Pt provided nutrition hx as above. Pt agreeable to starting ensure enlive, prefers chocolate flavor.       Current Nutrition Therapies:  ADULT ORAL NUTRITION SUPPLEMENT; Breakfast; Standard 4 oz Oral Supplement  ADULT DIET; Regular    Current Intake:   Average Meal Intake: 51-75% Average Supplements Intake: None Ordered      Anthropometric Measures:  Height: 5' (152.4 cm)  Current Body Wt: 104 lb 0.9 oz (47.2 kg) (10/2), Weight source: Bed Scale  BMI: 20.3, Underweight (BMI less than 22) age over 72  Admission Body Weight: 95 lb 14.4 oz (43.5 kg) (9/30 ; unknown source)  Ideal Body Weight (Kg) (Calculated): 45 kg (100 lbs), 104.1 %  Usual Body Wt: 112 lb (50.8 kg) (per pt ; no hx of stated weight per multiple MD visits), Percent weight change: -7.1       Edema:Peripheral Vascular  Peripheral Vascular (WDL): Within Defined Limits   BMI Category Underweight (BMI less than 22) age over 72    Estimated Daily Nutrient Needs:  Energy (kcal/day): 9143-3863 (30-35kcal/kg) (Kcal/kg Weight used: 47.2 kg Current  Protein (g/day): 59-71 (1.25-1.5g/kg) Weight Used: (Current) 47.2 kg  Fluid (ml/day):   (1 ml/kcal)    Nutrition Diagnosis:   Inadequate oral intake related to  (variable appetite, unintentional weight loss) as evidenced by  (pt reported weight loss and PO intake as above)    Nutrition Interventions:   Food and/or Nutrient Delivery: Continue Current Diet, Start Oral Nutrition Supplement     Coordination of Nutrition Care: Continue to monitor while inpatient       Goals:       Active Goal: Meet at least 75% of estimated needs, by next RD assessment       Nutrition Monitoring and Evaluation:      Food/Nutrient Intake Outcomes: Food and Nutrient Intake, Supplement Intake  Physical Signs/Symptoms Outcomes: Meal Time Behavior, Weight    Discharge Planning:    Continue Oral Nutrition Supplement    Valentina Subramanian RD, LD  Contact: [phone]

## 2022-10-03 ENCOUNTER — APPOINTMENT (OUTPATIENT)
Dept: GENERAL RADIOLOGY | Age: 82
DRG: 439 | End: 2022-10-03
Payer: MEDICARE

## 2022-10-03 ENCOUNTER — ANESTHESIA EVENT (OUTPATIENT)
Dept: ENDOSCOPY | Age: 82
DRG: 439 | End: 2022-10-03
Payer: MEDICARE

## 2022-10-03 LAB
ANION GAP SERPL CALC-SCNC: 5 MMOL/L (ref 4–13)
BILIRUB UR QL: NEGATIVE
BUN SERPL-MCNC: 7 MG/DL (ref 8–23)
CALCIUM SERPL-MCNC: 8 MG/DL (ref 8.3–10.4)
CHLORIDE SERPL-SCNC: 113 MMOL/L (ref 101–110)
CO2 SERPL-SCNC: 21 MMOL/L (ref 21–32)
CREAT SERPL-MCNC: 0.4 MG/DL (ref 0.6–1)
GLUCOSE SERPL-MCNC: 97 MG/DL (ref 65–100)
GLUCOSE UR QL STRIP.AUTO: NEGATIVE MG/DL
KETONES UR-MCNC: NEGATIVE MG/DL
LEUKOCYTE ESTERASE UR QL STRIP: NEGATIVE
LIPASE SERPL-CCNC: 1095 U/L (ref 73–393)
NITRITE UR QL: NEGATIVE
PH UR: 6 [PH] (ref 5–9)
POTASSIUM SERPL-SCNC: 3.6 MMOL/L (ref 3.5–5.1)
PROT UR QL: NEGATIVE MG/DL
RBC # UR STRIP: NEGATIVE /UL
SODIUM SERPL-SCNC: 139 MMOL/L (ref 136–145)
SP GR UR: 1.01 (ref 1–1.02)
UROBILINOGEN UR QL: 0.2 EU/DL (ref 0.2–1)

## 2022-10-03 PROCEDURE — 97530 THERAPEUTIC ACTIVITIES: CPT

## 2022-10-03 PROCEDURE — 6360000002 HC RX W HCPCS: Performed by: STUDENT IN AN ORGANIZED HEALTH CARE EDUCATION/TRAINING PROGRAM

## 2022-10-03 PROCEDURE — 36415 COLL VENOUS BLD VENIPUNCTURE: CPT

## 2022-10-03 PROCEDURE — 74018 RADEX ABDOMEN 1 VIEW: CPT

## 2022-10-03 PROCEDURE — 6370000000 HC RX 637 (ALT 250 FOR IP): Performed by: HOSPITALIST

## 2022-10-03 PROCEDURE — 80048 BASIC METABOLIC PNL TOTAL CA: CPT

## 2022-10-03 PROCEDURE — 6360000002 HC RX W HCPCS: Performed by: FAMILY MEDICINE

## 2022-10-03 PROCEDURE — 83690 ASSAY OF LIPASE: CPT

## 2022-10-03 PROCEDURE — C9113 INJ PANTOPRAZOLE SODIUM, VIA: HCPCS | Performed by: STUDENT IN AN ORGANIZED HEALTH CARE EDUCATION/TRAINING PROGRAM

## 2022-10-03 PROCEDURE — 6370000000 HC RX 637 (ALT 250 FOR IP): Performed by: FAMILY MEDICINE

## 2022-10-03 PROCEDURE — 6360000002 HC RX W HCPCS: Performed by: HOSPITALIST

## 2022-10-03 PROCEDURE — 97161 PT EVAL LOW COMPLEX 20 MIN: CPT

## 2022-10-03 PROCEDURE — 6370000000 HC RX 637 (ALT 250 FOR IP): Performed by: STUDENT IN AN ORGANIZED HEALTH CARE EDUCATION/TRAINING PROGRAM

## 2022-10-03 PROCEDURE — 2500000003 HC RX 250 WO HCPCS: Performed by: HOSPITALIST

## 2022-10-03 PROCEDURE — 2580000003 HC RX 258: Performed by: HOSPITALIST

## 2022-10-03 PROCEDURE — 97535 SELF CARE MNGMENT TRAINING: CPT

## 2022-10-03 PROCEDURE — A4216 STERILE WATER/SALINE, 10 ML: HCPCS | Performed by: STUDENT IN AN ORGANIZED HEALTH CARE EDUCATION/TRAINING PROGRAM

## 2022-10-03 PROCEDURE — 2580000003 HC RX 258: Performed by: STUDENT IN AN ORGANIZED HEALTH CARE EDUCATION/TRAINING PROGRAM

## 2022-10-03 PROCEDURE — 6370000000 HC RX 637 (ALT 250 FOR IP): Performed by: INTERNAL MEDICINE

## 2022-10-03 PROCEDURE — 1100000000 HC RM PRIVATE

## 2022-10-03 PROCEDURE — 97165 OT EVAL LOW COMPLEX 30 MIN: CPT

## 2022-10-03 RX ORDER — DOCUSATE SODIUM 100 MG/1
100 CAPSULE, LIQUID FILLED ORAL DAILY
Status: DISCONTINUED | OUTPATIENT
Start: 2022-10-03 | End: 2022-10-05 | Stop reason: HOSPADM

## 2022-10-03 RX ORDER — MULTIVITAMIN WITH IRON
1 TABLET ORAL DAILY
Status: DISCONTINUED | OUTPATIENT
Start: 2022-10-03 | End: 2022-10-05 | Stop reason: HOSPADM

## 2022-10-03 RX ORDER — HYDROMORPHONE HYDROCHLORIDE 2 MG/1
1 TABLET ORAL
Status: DISCONTINUED | OUTPATIENT
Start: 2022-10-03 | End: 2022-10-03

## 2022-10-03 RX ORDER — POLYETHYLENE GLYCOL 3350 17 G/17G
17 POWDER, FOR SOLUTION ORAL DAILY
Status: DISCONTINUED | OUTPATIENT
Start: 2022-10-03 | End: 2022-10-05 | Stop reason: HOSPADM

## 2022-10-03 RX ORDER — MORPHINE SULFATE 2 MG/ML
4 INJECTION, SOLUTION INTRAMUSCULAR; INTRAVENOUS EVERY 4 HOURS PRN
Status: DISCONTINUED | OUTPATIENT
Start: 2022-10-03 | End: 2022-10-05 | Stop reason: HOSPADM

## 2022-10-03 RX ORDER — HYDROMORPHONE HYDROCHLORIDE 1 MG/ML
1 INJECTION, SOLUTION INTRAMUSCULAR; INTRAVENOUS; SUBCUTANEOUS
Status: DISCONTINUED | OUTPATIENT
Start: 2022-10-03 | End: 2022-10-03

## 2022-10-03 RX ORDER — POTASSIUM CHLORIDE 1.5 G/1.77G
40 POWDER, FOR SOLUTION ORAL ONCE
Status: DISCONTINUED | OUTPATIENT
Start: 2022-10-03 | End: 2022-10-03

## 2022-10-03 RX ORDER — LOSARTAN POTASSIUM 50 MG/1
100 TABLET ORAL DAILY
Status: DISCONTINUED | OUTPATIENT
Start: 2022-10-03 | End: 2022-10-05 | Stop reason: HOSPADM

## 2022-10-03 RX ADMIN — MORPHINE SULFATE 4 MG: 2 INJECTION, SOLUTION INTRAMUSCULAR; INTRAVENOUS at 09:20

## 2022-10-03 RX ADMIN — POTASSIUM CHLORIDE 10 MEQ: 7.46 INJECTION, SOLUTION INTRAVENOUS at 01:25

## 2022-10-03 RX ADMIN — MORPHINE SULFATE 4 MG: 4 INJECTION, SOLUTION INTRAMUSCULAR; INTRAVENOUS at 00:26

## 2022-10-03 RX ADMIN — MAGNESIUM GLUCONATE 500 MG ORAL TABLET 400 MG: 500 TABLET ORAL at 20:41

## 2022-10-03 RX ADMIN — SODIUM CHLORIDE, PRESERVATIVE FREE 10 ML: 5 INJECTION INTRAVENOUS at 08:36

## 2022-10-03 RX ADMIN — ONDANSETRON 4 MG: 4 TABLET, ORALLY DISINTEGRATING ORAL at 03:06

## 2022-10-03 RX ADMIN — SODIUM CHLORIDE: 9 INJECTION, SOLUTION INTRAVENOUS at 22:08

## 2022-10-03 RX ADMIN — HYDROMORPHONE HYDROCHLORIDE 1 MG: 1 INJECTION, SOLUTION INTRAMUSCULAR; INTRAVENOUS; SUBCUTANEOUS at 02:37

## 2022-10-03 RX ADMIN — AMLODIPINE BESYLATE 5 MG: 5 TABLET ORAL at 08:36

## 2022-10-03 RX ADMIN — POTASSIUM CHLORIDE 10 MEQ: 7.46 INJECTION, SOLUTION INTRAVENOUS at 00:27

## 2022-10-03 RX ADMIN — SODIUM CHLORIDE: 9 INJECTION, SOLUTION INTRAVENOUS at 13:31

## 2022-10-03 RX ADMIN — LABETALOL HYDROCHLORIDE 10 MG: 5 INJECTION INTRAVENOUS at 19:24

## 2022-10-03 RX ADMIN — SODIUM CHLORIDE 40 MG: 9 INJECTION INTRAMUSCULAR; INTRAVENOUS; SUBCUTANEOUS at 10:38

## 2022-10-03 RX ADMIN — DONEPEZIL HYDROCHLORIDE 5 MG: 5 TABLET, FILM COATED ORAL at 20:41

## 2022-10-03 RX ADMIN — MAGNESIUM GLUCONATE 500 MG ORAL TABLET 400 MG: 500 TABLET ORAL at 08:36

## 2022-10-03 RX ADMIN — LOSARTAN POTASSIUM 100 MG: 50 TABLET, FILM COATED ORAL at 08:36

## 2022-10-03 RX ADMIN — DOCUSATE SODIUM 100 MG: 100 CAPSULE, LIQUID FILLED ORAL at 09:23

## 2022-10-03 RX ADMIN — POTASSIUM CHLORIDE 20 MEQ: 1500 TABLET, EXTENDED RELEASE ORAL at 08:36

## 2022-10-03 RX ADMIN — ENOXAPARIN SODIUM 30 MG: 100 INJECTION SUBCUTANEOUS at 08:36

## 2022-10-03 RX ADMIN — POLYETHYLENE GLYCOL 3350 17 G: 17 POWDER, FOR SOLUTION ORAL at 09:23

## 2022-10-03 RX ADMIN — ONDANSETRON 4 MG: 2 INJECTION INTRAMUSCULAR; INTRAVENOUS at 05:51

## 2022-10-03 RX ADMIN — SODIUM CHLORIDE, PRESERVATIVE FREE 10 ML: 5 INJECTION INTRAVENOUS at 20:42

## 2022-10-03 RX ADMIN — Medication 1 TABLET: at 12:34

## 2022-10-03 RX ADMIN — ONDANSETRON 4 MG: 2 INJECTION INTRAMUSCULAR; INTRAVENOUS at 12:34

## 2022-10-03 RX ADMIN — POTASSIUM CHLORIDE 20 MEQ: 1500 TABLET, EXTENDED RELEASE ORAL at 17:28

## 2022-10-03 ASSESSMENT — PAIN DESCRIPTION - DESCRIPTORS
DESCRIPTORS: SHARP
DESCRIPTORS: STABBING;SHARP
DESCRIPTORS: ACHING

## 2022-10-03 ASSESSMENT — PAIN DESCRIPTION - LOCATION
LOCATION: ABDOMEN

## 2022-10-03 ASSESSMENT — PAIN - FUNCTIONAL ASSESSMENT
PAIN_FUNCTIONAL_ASSESSMENT: PREVENTS OR INTERFERES SOME ACTIVE ACTIVITIES AND ADLS
PAIN_FUNCTIONAL_ASSESSMENT: ACTIVITIES ARE NOT PREVENTED
PAIN_FUNCTIONAL_ASSESSMENT: PREVENTS OR INTERFERES SOME ACTIVE ACTIVITIES AND ADLS

## 2022-10-03 ASSESSMENT — PAIN SCALES - GENERAL
PAINLEVEL_OUTOF10: 10
PAINLEVEL_OUTOF10: 5
PAINLEVEL_OUTOF10: 6
PAINLEVEL_OUTOF10: 4
PAINLEVEL_OUTOF10: 6
PAINLEVEL_OUTOF10: 4

## 2022-10-03 ASSESSMENT — PAIN DESCRIPTION - FREQUENCY: FREQUENCY: INTERMITTENT

## 2022-10-03 ASSESSMENT — PAIN DESCRIPTION - ORIENTATION
ORIENTATION: MID;ANTERIOR;RIGHT
ORIENTATION: MID;UPPER
ORIENTATION: ANTERIOR;RIGHT

## 2022-10-03 ASSESSMENT — PAIN SCALES - WONG BAKER
WONGBAKER_NUMERICALRESPONSE: 2
WONGBAKER_NUMERICALRESPONSE: 2

## 2022-10-03 ASSESSMENT — PAIN DESCRIPTION - PAIN TYPE: TYPE: ACUTE PAIN

## 2022-10-03 NOTE — PROGRESS NOTES
ACUTE OCCUPATIONAL THERAPY GOALS:   (Developed with and agreed upon by patient and/or caregiver.)  1. Patient will complete lower body bathing and dressing with JOLANTA and adaptive equipment as needed. 2.Patient will complete upper body bathing and dressing with MOD I and adaptive equipment as needed. 3. Patient will complete toileting with MOD I.   4. Patient will tolerate 30 minutes of OT treatment with 1-2 rest breaks to increase activity tolerance for ADLs. 5. Patient will complete functional transfers with MOD I and adaptive equipment as needed. 6. Patient will complete simple grooming task standing at the sink with MOD I. Timeframe: 7 visits      OCCUPATIONAL THERAPY Initial Assessment and Daily Note       OT Visit Days: 1  Acknowledge Orders  Time  OT Charge Capture  Rehab Caseload Tracker      Harpreet De Guzman is a 80 y.o. female   PRIMARY DIAGNOSIS: Acute pancreatitis, unspecified complication status, unspecified pancreatitis type  Hypokalemia [E87.6]  Acute pancreatitis, unspecified complication status, unspecified pancreatitis type [K85.90]  Acute pancreatitis without infection or necrosis, unspecified pancreatitis type [K85.90]  Other acute pancreatitis without infection or necrosis [K85.80]  Procedure(s) (LRB):  EGD ESOPHAGOGASTRODUODENOSCOPY/  (N/A)     Reason for Referral: Generalized Muscle Weakness (M62.81)  Other lack of cordination (R27.8)  Difficulty in walking, Not elsewhere classified (R26.2)  Inpatient: Payor:  Ou / Plan: Mike Castro PPO / Product Type: Medicare /     ASSESSMENT:     REHAB RECOMMENDATIONS:   Recommendation to date pending progress:  Setting:  Home Health Therapy    Equipment:    To Be Determined     ASSESSMENT:  Ms. Dacia Vargas presented to the hospital with nausea and abdominal pain. At baseline, pt lives alone and is independent for ADLs/IADLs. Drives. Today, pt was received supine in the bed.  Completed bed mobility, LB dressing, functional transfers, ambulation without AD, toileting, and grooming tasks standing at the sink with overall CGA. Pt with decreased safety awareness but not receptive safety cues from therapist. Jaskarankevin Section currently demonstrates overall deficits in strength, balance, activity tolerance, and ADL performance. Patient would benefit from skilled OT services at this time in order to address functional deficits and OT goals stated above. [address] [facility] [address] [id number] AM-PAC 6 Clicks Daily Activity Inpatient Short Form:    AM-PAC Daily Activity Inpatient   How much help for putting on and taking off regular lower body clothing?: A Little  How much help for Bathing?: A Little  How much help for Toileting?: A Little  How much help for putting on and taking off regular upper body clothing?: A Little  How much help for taking care of personal grooming?: A Little  How much help for eating meals?: None  AM-PAC Inpatient Daily Activity Raw Score: 19  AM-PAC Inpatient ADL T-Scale Score : 40.22  ADL Inpatient CMS 0-100% Score: 42.8  ADL Inpatient CMS G-Code Modifier : CK       SUBJECTIVE:     Ms. [facility] [place] states, \"I have to get to my family reunion on Sunday. \"     Social/Functional Lives With: Alone  Type of Home: Apartment  ADL Assistance: Independent  Homemaking Assistance: Independent  Ambulation Assistance: Independent    OBJECTIVE:     George Welch / Srikanth Burt / AIRWAY: IV    RESTRICTIONS/PRECAUTIONS:       PAIN: VITALS / O2:   Pre Treatment:          Post Treatment: no change        Vitals          Oxygen            GROSS EVALUATION: INTACT IMPAIRED   (See Comments)   UE AROM [x] []   UE PROM [x] []   Strength []  Generalized weakness BUEs, functional for bADLs     Posture / Balance []  Fair+ sitting and standing    Sensation [x]     Coordination []       Tone [x]       Edema [x]    Activity Tolerance [] Seated rest breaks required       Hand Dominance R [] L []      COGNITION/  PERCEPTION: INTACT IMPAIRED   (See Comments) Orientation [x]     Vision [x]     Hearing [x]     Cognition  []  Decreased safety awareness    Perception []       MOBILITY: I Mod I S SBA CGA Min Mod Max Total  NT x2 Comments:   Bed Mobility    Rolling [] [] [] [] [] [] [] [] [] [x] []    Supine to Sit [] [] [] [] [x] [] [] [] [] [] []    Scooting [] [] [] [] [x] [] [] [] [] [] []    Sit to Supine [] [] [] [] [x] [] [] [] [] [] []    Transfers    Sit to Stand [] [] [] [] [x] [] [] [] [] [] []    Bed to Chair [] [] [] [] [x] [] [] [] [] [] []    Stand to Sit [] [] [] [] [x] [] [] [] [] [] []    Tub/Shower [] [] [] [] [] [] [] [] [] [x] []     Toilet [] [] [] [] [x] [] [] [] [] [] []      [] [] [] [] [] [] [] [] [] [] []    I=Independent, Mod I=Modified Independent, S=Supervision/Setup, SBA=Standby Assistance, CGA=Contact Guard Assistance, Min=Minimal Assistance, Mod=Moderate Assistance, Max=Maximal Assistance, Total=Total Assistance, NT=Not Tested    ACTIVITIES OF DAILY LIVING: I Mod I S SBA CGA Min Mod Max Total NT Comments   BASIC ADLs:              Upper Body Bathing  [] [] [] [] [] [] [] [] [] [x]    Lower Body Bathing [] [] [] [] [] [] [] [] [] [x]    Toileting [] [] [] [] [x] [] [] [] [] []    Upper Body Dressing [] [] [] [] [] [] [] [] [] [x]    Lower Body Dressing [] [] [] [] [x] [] [] [] [] [] Socks    Feeding [] [] [] [] [] [] [] [] [] [x]    Grooming [] [] [] [] [x] [] [] [] [] [] Washed face/hands and brushed teeth standing at the sink    Personal Device Care [] [] [] [] [] [] [] [] [] [x]    Functional Mobility [] [] [] [] [x] [] [] [] [] []    I=Independent, Mod I=Modified Independent, S=Supervision/Setup, SBA=Standby Assistance, CGA=Contact Guard Assistance, Min=Minimal Assistance, Mod=Moderate Assistance, Max=Maximal Assistance, Total=Total Assistance, NT=Not Tested    PLAN:   FREQUENCY/DURATION   OT Plan of Care: 3 times/week for duration of hospital stay or until stated goals are met, whichever comes first.    PROBLEM LIST:   (Skilled intervention is medically necessary to address:)  Decreased ADL/Functional Activities  Decreased Activity Tolerance  Decreased Balance  Decreased Cognition  Decreased Coordination  Decreased Safety Awareness  Decreased Strength  Decreased Transfer Abilities   INTERVENTIONS PLANNED:  (Benefits and precautions of occupational therapy have been discussed with the patient.)  Self Care Training  Therapeutic Activity  Therapeutic Exercise/HEP  Neuromuscular Re-education  Manual Therapy  Education         TREATMENT:     EVALUATION: LOW COMPLEXITY: (Untimed Charge)    TREATMENT:   Co-Treatment PT/OT necessary due to patient's decreased overall endurance/tolerance levels, as well as need for high level skilled assistance to complete functional transfers/mobility and functional tasks  Self Care (10 minutes): Patient participated in toileting, lower body dressing, and grooming ADLs in unsupported sitting and standing with minimal verbal cueing to increase independence, decrease assistance required, increase activity tolerance, and increase safety awareness. Patient also participated in functional mobility, functional transfer, and energy conservation training to increase independence, decrease assistance required, increase activity tolerance, and increase safety awareness.      TREATMENT GRID:  N/A    AFTER TREATMENT PRECAUTIONS: Call light within reach, Chair, Needs within reach, and RN notified    INTERDISCIPLINARY COLLABORATION:  RN/ PCT, PT/ PTA, and OT/ CEDILLO    EDUCATION:       TOTAL TREATMENT DURATION AND TIME:  Time In: 1411  Time Out: [facility]  Minutes: Bo Triplett OT

## 2022-10-03 NOTE — PROGRESS NOTES
END OF SHIFT NOTE:    INTAKE/OUTPUT  10/02 0701 - 10/03 0700  In: 3174.4 [P.O.:450; I.V.:2724.4]  Out: 1350 [Urine:1350]  Voiding: Yes  Catheter: No  Drain:      Flatus: Patient does have flatus present. Stool: 0 occurrences. Characteristics:  Stool Appearance: Unable to assess  Stool Color: Unable to assess  Stool Amount: Unable to assess  Stool Assessment  Stool Appearance: Unable to assess  Stool Color: Unable to assess  Stool Amount: Unable to assess  Stool Source: Rectum  Last BM (including prior to admit): 09/30/22    Emesis: 0 occurrences. Characteristics:        VITAL SIGNS  Patient Vitals for the past 12 hrs:   Temp Pulse Resp BP SpO2   10/03/22 1508 98.6 °F (37 °C) 80 17 (!) 152/60 96 %   10/03/22 1107 98.1 °F (36.7 °C) 70 18 (!) 160/59 95 %   10/03/22 0727 97.9 °F (36.6 °C) 77 19 (!) 160/69 93 %       Pain Assessment  Pain Level: 6 (10/03/22 1000)  Pain Location: Abdomen  Patient's Stated Pain Goal: 0 - No pain    Ambulating  Yes with assistance    Shift report given to oncoming nurse at the bedside.     Kayla Peraza RN

## 2022-10-03 NOTE — PROGRESS NOTES
END OF SHIFT NOTE:    INTAKE/OUTPUT  10/02 0701 - 10/03 0700  In: 3174.4 [P.O.:450; I.V.:2724.4]  Out: 1350 [Urine:1350]  Voiding: Yes  Catheter: No        Flatus: Patient does have flatus present. Stool:  0 occurrences. Emesis: 0 occurrences. VITAL SIGNS  Patient Vitals for the past 12 hrs:   Temp Pulse Resp BP SpO2   10/03/22 0321 97.9 °F (36.6 °C) 69 18 (!) 161/80 90 %   10/03/22 0307 -- -- 18 -- --   10/03/22 0237 -- -- 18 -- --   10/03/22 0056 -- -- 18 -- --   10/03/22 0026 -- -- 18 -- --   10/02/22 2310 98.4 °F (36.9 °C) 74 17 (!) 161/61 95 %   10/02/22 1940 -- 72 -- -- 95 %   10/02/22 1936 98.8 °F (37.1 °C) 72 17 (!) 160/54 95 %       Ambulating  Yes    Shift report given to oncoming nurse at the bedside.     Osito Oh RN

## 2022-10-03 NOTE — PROGRESS NOTES
Patient had K of 2.8 from yesterdays AM lab, Hospitalist was notified last night and order was placed, K replacement was done overnight by giving 6 doses, has no labs order. And patient had 2 episodes of vomiting and still nauseated, can you put her back on morphine or any other pain med, as patient is requesting not to get dilaudid anymore. Hospitalist was notified via perfect serve and lab orders and d/c hydromorphone and put on 4 morphine q 4 were received.

## 2022-10-03 NOTE — PROGRESS NOTES
Hospitalist Progress Note   Admit Date:  2022  9:27 PM   Name:  Marianna General   Age:  80 y.o. Sex:  female  :  1940   MRN:  [id number]   Room:      Presenting Complaint: Abdominal Pain     Reason(s) for Admission: Hypokalemia [E87.6]  Acute pancreatitis, unspecified complication status, unspecified pancreatitis type [K85.90]  Acute pancreatitis without infection or necrosis, unspecified pancreatitis type [K85.90]  Other acute pancreatitis without infection or necrosis [K85.80]     Hospital Course:   80years old female with past medical history of multiple episodes of pancreatitis without clear reason, hypertension presented to emergency room with chief complaint of nausea, epigastric and right upper quadrant abdominal pain going on for past 24 hours which continued to get worse. Secondary to nausea and  patient not able to eat or drink very well. Patient reports history of cholecystectomy for similar pain in the past.  Patient denies any fever, chills, cough. Denies any chest pain, shortness of breath. Evaluated in emergency room, lab work shows evidence of hypokalemia. Lipase is elevated. CT scan of abdomen was ill-defined fluid collection is in the pancreatic head. Patient was initially on IV fluids      Subjective & 24hr Events (10/03/22): Patient was seen and examined at the bedside. Overnight patient complaining of unbearable pain. Patient with nausea and vomiting and 10 out of 10 pain. Patient also with some pain this morning. Patient and patient's daughters at bedside admitted patient had been taking 800 mg ibuprofen and Advil since July due to rheumatologic pain. Assessment & Plan:   Acute pancreatitis  - Pain management  - Advance diet as tolerated  - GI consulted, appreciate recommendations  - KUB negative for any acute process  - Repeat lipase elevated  - Continue n.p.o.  - Patient admitted to taking large amounts of NSAIDs for rheumatologic pain.   GI can consider EGD if pain continues  - Continue antiemetics    Hypokalemia  - Replete as needed    Hyperlipidemia  - Continue home medications    Hypertension  - Continue home medications   -10/3 increase losartan from 50 to 100 mg secondary to elevated blood pressures    Rheumatoid arthritis  - Secondary to her pancreatitis patient has history of medication changed to [place]      Anticipated discharge needs:    Dispo pending clinical course. Diet:  Diet NPO  DVT PPx: SCDs  Code status: Full Code    Hospital Problems:  Principal Problem:    Acute pancreatitis, unspecified complication status, unspecified pancreatitis type  Active Problems:    Other acute pancreatitis without infection or necrosis    Hypercholesterolemia    HTN (hypertension)    Hypokalemia  Resolved Problems:    * No resolved hospital problems. *      Objective:   Patient Vitals for the past 24 hrs:   Temp Pulse Resp BP SpO2   10/03/22 1107 98.1 °F (36.7 °C) 70 18 (!) 160/59 95 %   10/03/22 0727 97.9 °F (36.6 °C) 77 19 (!) 160/69 93 %   10/03/22 0321 97.9 °F (36.6 °C) 69 18 (!) 161/80 90 %   10/03/22 0307 -- -- 18 -- --   10/03/22 0237 -- -- 18 -- --   10/03/22 0056 -- -- 18 -- --   10/03/22 0026 -- -- 18 -- --   10/02/22 2310 98.4 °F (36.9 °C) 74 17 (!) 161/61 95 %   10/02/22 1940 -- 72 -- -- 95 %   10/02/22 1936 98.8 °F (37.1 °C) 72 17 (!) 160/54 95 %   10/02/22 1745 -- 76 -- (!) 161/56 --   10/02/22 1529 98.5 °F (36.9 °C) 78 18 (!) 186/74 93 %   10/02/22 1421 -- 70 -- (!) 158/62 --       Oxygen Therapy  SpO2: 95 %  Pulse Oximeter Device Mode: Continuous  O2 Device: None (Room air)    Estimated body mass index is 20.32 kg/m² as calculated from the following:    Height as of this encounter: 5' (1.524 m). Weight as of this encounter: 104 lb 0.9 oz (47.2 kg).     Intake/Output Summary (Last 24 hours) at 10/3/2022 1304  Last data filed at 10/3/2022 1247  Gross per 24 hour   Intake 3104.43 ml   Output 1450 ml   Net 1654.43 ml         Physical Exam: Blood pressure (!) 160/59, pulse 70, temperature 98.1 °F (36.7 °C), temperature source Oral, resp. rate 18, height 5' (1.524 m), weight 104 lb 0.9 oz (47.2 kg), SpO2 95 %. General:    Well nourished. Head:  Normocephalic, atraumatic  Eyes:  Sclerae appear normal.  Pupils equally round. ENT:  Nares appear normal, no drainage. Moist oral mucosa  Neck:  No restricted ROM. Trachea midline   CV:   RRR. No m/r/g. No jugular venous distension. Lungs:   CTAB. No wheezing, rhonchi, or rales. Symmetric expansion. Abdomen: Bowel sounds present. Soft, midepigastric tenderness, nondistended. Extremities: No cyanosis or clubbing. No edema  Skin:     No rashes and normal coloration. Warm and dry. Neuro:  CN II-XII grossly intact. Sensation intact. A&Ox3  Psych:  Normal mood and affect.       I have personally reviewed labs and tests showing:  Recent Labs:  Recent Results (from the past 48 hour(s))   Comprehensive Metabolic Panel w/ Reflex to MG    Collection Time: 10/02/22  6:17 AM   Result Value Ref Range    Sodium 141 136 - 145 mmol/L    Potassium 2.8 (L) 3.5 - 5.1 mmol/L    Chloride 113 (H) 101 - 110 mmol/L    CO2 21 21 - 32 mmol/L    Anion Gap 7 4 - 13 mmol/L    Glucose 97 65 - 100 mg/dL    BUN 7 (L) 8 - 23 MG/DL    Creatinine 0.50 (L) 0.6 - 1.0 MG/DL    GFR African American >60 >60 ml/min/1.73m2    GFR Non- >60 >60 ml/min/1.73m2    Calcium 7.9 (L) 8.3 - 10.4 MG/DL    Total Bilirubin 0.5 0.2 - 1.1 MG/DL    ALT 12 12 - 65 U/L    AST 12 (L) 15 - 37 U/L    Alk Phosphatase 80 50 - 136 U/L    Total Protein 5.4 (L) 6.3 - 8.2 g/dL    Albumin 2.4 (L) 3.2 - 4.6 g/dL    Globulin 3.0 2.3 - 3.5 g/dL    Albumin/Globulin Ratio 0.8 (L) 1.2 - 3.5     CBC with Auto Differential    Collection Time: 10/02/22  6:17 AM   Result Value Ref Range    WBC 10.1 4.3 - 11.1 K/uL    RBC 4.14 4.05 - 5.2 M/uL    Hemoglobin 10.8 (L) 11.7 - 15.4 g/dL    Hematocrit 33.8 (L) 35.8 - 46.3 %    MCV 81.6 79.6 - 97.8 FL MCH 26.1 26.1 - 32.9 PG    MCHC 32.0 31.4 - 35.0 g/dL    RDW 14.6 11.9 - 14.6 %    Platelets 002 269 - 307 K/uL    MPV 9.8 9.4 - 12.3 FL    nRBC 0.00 0.0 - 0.2 K/uL    Differential Type AUTOMATED      Seg Neutrophils 81 (H) 43 - 78 %    Lymphocytes 9 (L) 13 - 44 %    Monocytes 7 4.0 - 12.0 %    Eosinophils % 2 0.5 - 7.8 %    Basophils 1 0.0 - 2.0 %    Immature Granulocytes 0 0.0 - 5.0 %    Segs Absolute 8.2 1.7 - 8.2 K/UL    Absolute Lymph # 0.9 0.5 - 4.6 K/UL    Absolute Mono # 0.7 0.1 - 1.3 K/UL    Absolute Eos # 0.2 0.0 - 0.8 K/UL    Basophils Absolute 0.1 0.0 - 0.2 K/UL    Absolute Immature Granulocyte 0.0 0.0 - 0.5 K/UL   Magnesium    Collection Time: 10/02/22  6:17 AM   Result Value Ref Range    Magnesium 1.7 (L) 1.8 - 2.4 mg/dL   Basic Metabolic Panel w/ Reflex to MG    Collection Time: 10/03/22  7:01 AM   Result Value Ref Range    Sodium 139 136 - 145 mmol/L    Potassium 3.6 3.5 - 5.1 mmol/L    Chloride 113 (H) 101 - 110 mmol/L    CO2 21 21 - 32 mmol/L    Anion Gap 5 4 - 13 mmol/L    Glucose 97 65 - 100 mg/dL    BUN 7 (L) 8 - 23 MG/DL    Creatinine 0.40 (L) 0.6 - 1.0 MG/DL    GFR African American >60 >60 ml/min/1.73m2    GFR Non- >60 >60 ml/min/1.73m2    Calcium 8.0 (L) 8.3 - 10.4 MG/DL   Lipase    Collection Time: 10/03/22  7:01 AM   Result Value Ref Range    Lipase 1,095 (H) 73 - 393 U/L       I have personally reviewed imaging studies showing: Other Studies:  XR ABDOMEN (KUB) (SINGLE AP VIEW)   Final Result   Unremarkable bowel gas pattern. XR CHEST (2 VW)   Final Result   No evidence of an acute intrathoracic process. There is a 1.2 cm ill-defined nodule in the right lower lung. Its etiology is   unclear.  This can be further assessed with CT of the chest.         CT ABDOMEN PELVIS W IV CONTRAST Additional Contrast? Oral   Final Result   There is ill-defined fluid noted adjacent to the head of the pancreas anteriorly   which appears to extend to the anterior abdominal wall along the fissure for the   ligamentum teres. (See image number 27 of series 2.) There is also minimal fluid   extension into the right paracolic gutter. This may be associated with   pancreatitis. Dilated extrahepatic bile ducts and pancreatic duct may be associated with a   reservoir effect secondary to cholecystectomy. Bibasilar atelectasis or infiltrate. Stable fracture of the L1 vertebral body.           Current Meds:  Current Facility-Administered Medications   Medication Dose Route Frequency    morphine injection 4 mg  4 mg IntraVENous Q4H PRN    losartan (COZAAR) tablet 100 mg  100 mg Oral Daily    polyethylene glycol (GLYCOLAX) packet 17 g  17 g Oral Daily    docusate sodium (COLACE) capsule 100 mg  100 mg Oral Daily    pantoprazole (PROTONIX) 40 mg in sodium chloride (PF) 10 mL injection  40 mg IntraVENous Daily    multivitamin 1 tablet  1 tablet Oral Daily    labetalol (NORMODYNE;TRANDATE) injection 10 mg  10 mg IntraVENous Q4H PRN    amLODIPine (NORVASC) tablet 5 mg  5 mg Oral Daily    potassium chloride (KLOR-CON M) extended release tablet 20 mEq  20 mEq Oral BID WC    magnesium oxide (MAG-OX) tablet 400 mg  400 mg Oral BID    sodium chloride flush 0.9 % injection 5-40 mL  5-40 mL IntraVENous 2 times per day    sodium chloride flush 0.9 % injection 5-40 mL  5-40 mL IntraVENous PRN    0.9 % sodium chloride infusion   IntraVENous PRN    enoxaparin Sodium (LOVENOX) injection 30 mg  30 mg SubCUTAneous Daily    ondansetron (ZOFRAN-ODT) disintegrating tablet 4 mg  4 mg Oral Q8H PRN    Or    ondansetron (ZOFRAN) injection 4 mg  4 mg IntraVENous Q6H PRN    acetaminophen (TYLENOL) tablet 650 mg  650 mg Oral Q6H PRN    Or    acetaminophen (TYLENOL) suppository 650 mg  650 mg Rectal Q6H PRN    0.9 % sodium chloride infusion   IntraVENous Continuous    hydrALAZINE (APRESOLINE) injection 10 mg  10 mg IntraVENous Q6H PRN    donepezil (ARICEPT) tablet 5 mg  5 mg Oral Nightly    sodium chloride (OCEAN, BABY AYR) 0.65 % nasal spray 2 spray  2 spray Each Nostril Q2H PRN       Signed:  Benjamin Guevara MD    Part of this note may have been written by using a voice dictation software. The note has been proof read but may still contain some grammatical/other typographical errors.

## 2022-10-03 NOTE — PROGRESS NOTES
ACUTE PHYSICAL THERAPY GOALS:   (Developed with and agreed upon by patient and/or caregiver. )    LTG:  (1.)Ms. Harman Ayers will move from supine to sit and sit to supine , scoot up and down, and roll side to side in bed with INDEPENDENT within 7 treatment day(s). (2.)Ms. Harman Ayers will transfer from bed to chair and chair to bed with INDEPENDENT using the least restrictive device within 7 treatment day(s). (3.)Ms. Harman Ayers will ambulate with INDEPENDENT for 300 feet with the least restrictive device within 7 treatment day(s). (4.)Ms. Harman Ayers will tolerate at least 23 min of dynamic standing activity to assist standing ADLs with the least restrictive device within 7 treatment days.       ________________________________________________________________________________________________      PHYSICAL THERAPY Initial Assessment, Daily Note, and PM  (Link to Caseload Tracking: PT Visit Days : 1  Acknowledge Orders  Time In/Out  PT Charge Capture  Rehab Caseload Tracker    Rachell Chamberlain is a 80 y.o. female   PRIMARY DIAGNOSIS: Acute pancreatitis, unspecified complication status, unspecified pancreatitis type  Hypokalemia [E87.6]  Acute pancreatitis, unspecified complication status, unspecified pancreatitis type [K85.90]  Acute pancreatitis without infection or necrosis, unspecified pancreatitis type [K85.90]  Other acute pancreatitis without infection or necrosis [K85.80]  Procedure(s) (LRB):  EGD ESOPHAGOGASTRODUODENOSCOPY/  (N/A)     Reason for Referral: Generalized Muscle Weakness (M62.81)  Other lack of cordination (R27.8)  Difficulty in walking, Not elsewhere classified (R26.2)  Other abnormalities of gait and mobility (R26.89)  Inpatient: Payor: aJsvir Varghese / Plan: Frank Moore PPO / Product Type: Medicare /     ASSESSMENT:     REHAB RECOMMENDATIONS:   Recommendation to date pending progress:  Setting:  Home Health Therapy    Equipment:    To Be Determined     ASSESSMENT:  MsJanae Harman Ayers presents in supine, agreeable to session with encouragement. Upon entering, Pnt is agreeable to PT treatment. she reports 3/10 pain in her abdomen  at rest. Pnt performed supine > sit with min A, sitting EOB with good sitting balance control. Sit > stand with CGA using  HHA. Gait x 20, 10 ft with CGA, cues for step length. Gait is noted to be slow but steady. Stand > sit with CGA, followed by positioning for comfort. At end of session pt up in bedside chair with all needs within reach, alarm activated for safety, RN notified. Overall, she presents as functioning below her baseline, with deficits in mobility including transfers, gait, balance, and activity tolerance. Pt will continue to benefit from skilled therapy services to address stated deficits to promote return to highest level of function, independence, and safety. Will continue to follow.        [facility] [address] [address] AM-PAC 6 Clicks Basic Mobility Inpatient Short Form  AM-PAC Mobility Inpatient   How much difficulty turning over in bed?: A Little  How much difficulty sitting down on / standing up from a chair with arms?: A Little  How much difficulty moving from lying on back to sitting on side of bed?: A Little  How much help from another person moving to and from a bed to a chair?: A Little  How much help from another person needed to walk in hospital room?: A Little  How much help from another person for climbing 3-5 steps with a railing?: A Lot  AM-PAC Inpatient Mobility Raw Score : 17  AM-PAC Inpatient T-Scale Score : 42.13  Mobility Inpatient CMS 0-100% Score: 50.57  Mobility Inpatient CMS G-Code Modifier : CK    SUBJECTIVE:   Ms. [facility] states, \"You girls bring me that table\"     Social/Functional Lives With: Alone  Type of Home: Apartment  ADL Assistance: Independent  Homemaking Assistance: Independent  Ambulation Assistance: Independent    OBJECTIVE:     PAIN: Viri Love / O2: Dheeraj Guerrier / Kaylynn Rosales / [place]:   Pre Treatment: 3/10         Post Treatment: 3/10 Vitals Oxygen      IV    RESTRICTIONS/PRECAUTIONS:                    GROSS EVALUATION: Intact Impaired (Comments):   AROM [x]     PROM [x]    Strength []  Generalized weakness   Balance []  Trunk sway with ambulation   Posture [] Forward Head  Rounded Shoulders   Sensation [x]     Coordination []   Mildly decreased step length   Tone [x]     Edema [x]    Activity Tolerance []  Fatigues with in room ambulation    []      COGNITION/  PERCEPTION: Intact Impaired (Comments):   Orientation [x]     Vision [x]     Hearing [x]     Cognition  [x]       MOBILITY: I Mod I S SBA CGA Min Mod Max Total  NT x2 Comments:   Bed Mobility    Rolling [] [] [] [] [] [x] [] [] [] [] []    Supine to Sit [] [] [] [] [] [x] [] [] [] [] []    Scooting [] [] [] [] [] [x] [] [] [] [] []    Sit to Supine [] [] [] [] [] [] [] [] [] [] []    Transfers    Sit to Stand [] [] [] [] [x] [] [] [] [] [] [x]    Bed to Chair [] [] [] [] [x] [] [] [] [] [] [x]    Stand to Sit [] [] [] [] [x] [] [] [] [] [] [x]     [] [] [] [] [] [] [] [] [] [] []    I=Independent, Mod I=Modified Independent, S=Supervision, SBA=Standby Assistance, CGA=Contact Guard Assistance,   Min=Minimal Assistance, Mod=Moderate Assistance, Max=Maximal Assistance, Total=Total Assistance, NT=Not Tested    GAIT: I Mod I S SBA CGA Min Mod Max Total  NT x2 Comments:   Level of Assistance [] [] [] [] [x] [] [] [] [] [] [x]    Distance 20, 10 feet    DME None    Gait Quality Decreased lamar , Decreased step clearance, Decreased step length, and Decreased stance    Weightbearing Status      Stairs      I=Independent, Mod I=Modified Independent, S=Supervision, SBA=Standby Assistance, CGA=Contact Guard Assistance,   Min=Minimal Assistance, Mod=Moderate Assistance, Max=Maximal Assistance, Total=Total Assistance, NT=Not Tested    PLAN:   FREQUENCY AND DURATION: 3 times/week for duration of hospital stay or until stated goals are met, whichever comes first.    THERAPY PROGNOSIS: Excellent    PROBLEM LIST:   (Skilled intervention is medically necessary to address:)  Decreased ADL/Functional Activities  Decreased Activity Tolerance  Decreased AROM/PROM  Decreased Balance  Decreased Coordination  Decreased Gait Ability  Decreased Transfer Abilities INTERVENTIONS PLANNED:   (Benefits and precautions of physical therapy have been discussed with the patient.)  Self Care Training  Therapeutic Activity  Therapeutic Exercise/HEP  Neuromuscular Re-education  Gait Training  Manual Therapy  Modalities  Education       TREATMENT:   EVALUATION: LOW COMPLEXITY: (Untimed Charge)    TREATMENT:   Therapeutic Activity (10 Minutes): Therapeutic activity included Rolling, Supine to Sit, Scooting, Transfer Training, Ambulation on level ground, Sitting balance , and Standing balance to improve functional Activity tolerance, Balance, Coordination, Mobility, Strength, and ROM.     TREATMENT GRID:  N/A    AFTER TREATMENT PRECAUTIONS: Bed/Chair Locked, Call light within reach, Chair, Needs within reach, and RN notified    INTERDISCIPLINARY COLLABORATION:  RN/ PCT, PT/ PTA, and OT/ CEDILLO    EDUCATION: Education Given To: Patient    TIME IN/OUT:  Time In: 1411  Time Out: [address]  Minutes: JAMAR Marroquin

## 2022-10-03 NOTE — CARE COORDINATION
RNCM attempted to assess patient for discharge planning. Patient alert and oriented but pain management was an issue to finish discussion. Attempts to contact, patient's daughter Reji Swain without success; received voicemail. RNCM will attempt again.        ASSESSMENT NOTE    Attending Physician: Bertin Roth MD  Admit Problem: Hypokalemia [E87.6]  Acute pancreatitis, unspecified complication status, unspecified pancreatitis type [K85.90]  Acute pancreatitis without infection or necrosis, unspecified pancreatitis type [K85.90]  Other acute pancreatitis without infection or necrosis [K85.80]  Date/Time of Admission: 9/30/2022  9:27 PM  Problem List:  Patient Active Problem List   Diagnosis    PAD (peripheral artery disease) ([address]    Hypercholesterolemia    Back pain    Malignant neoplasm of upper-outer quadrant of left breast in female, estrogen receptor positive ([address]    History of peptic ulcer    Multiple thyroid nodules    Sinusitis    Intractable vomiting    History of acute pancreatitis    Compression fracture of L1 lumbar vertebra ([address]    Personal history of tobacco use, presenting hazards to health    HTN (hypertension)    Age-related osteoporosis with current pathological fracture    Chronic obstructive pulmonary disease (HCC)    Eczema    Osteoarthritis    Paraseptal emphysema ([address]    Hypokalemia    Osteoporosis    S/P lumpectomy, left breast    Acute pancreatitis    EPIFANIO (acute kidney injury) ([address]    Acute pancreatitis, unspecified complication status, unspecified pancreatitis type    Other acute pancreatitis without infection or necrosis       Service Assessment  Patient Orientation Alert and Oriented   Cognition Alert   History Provided By Patient   Primary Caregiver Self   Accompanied By/Relationship     Support Systems Children   Patient's Healthcare Decision Maker is: Legal Next of Kin (patient's daughter:  Ariel Villar  [phone])   PCP Verified by CM Yes (Dr Kacey Blackmon  [phone]) Last Visit to PCP     Prior Functional Level     Current Functional Level     Can patient return to prior living arrangement Yes   Ability to make needs known: Good   Family able to assist with home care needs:     Would you like for me to discuss the discharge plan with any other family members/significant others, and if so, who? Financial Resources Russ Glass ([employer])   Community Resources     CM/SW Referral       Social/Functional History  Lives With Alone   Type of [facility] [address] Access     Entrance Stairs - Number of Steps     Entrance Stairs - Rails     Bathroom Shower/Tub     Bathroom Toilet     Bathroom Equipment     Bathroom Accessibility     Home Equipment     Receives Help From Family   ADL Assistance     [employer]     Grooming     Feeding     Toileting     [address] Work     Driving     Shopping          Other (Comment)     [address] Responsibilities     Meal Prep Responsibility     Laundry Responsibility     Cleaning Responsibility     Bill Paying/Finance Responsibility     Shopping Responsibility     Dependent Care Responsibility     Health Care Management     Other (Comment)     Ambulation Assistance     Transfer Assistance     Active      Patient's  Info     Mode of Transportation     Education     Occupation Retired   Type of Occupation       Discharge Planning   Type of [address] Prior To Admission None   Potential Assistance Needed N/A   DME     DME     DME Ordered? No   Potential Assistance Purchasing Medications No   Meds-to-Beds: Does the patient want to have any new prescriptions delivered to bedside prior to discharge?      Type of Home Care Services None   Patient expects to be discharged to: House   Follow Up Appointment: Best Day/Time     One/Two Story Residence:     # of Interior Steps     Height of Each Step (in)     [employer] Available     History of Falls? No     Services At/After Discharge  Transition of Care Consult (CM Consult): Discharge Planning, SNF   Internal Home Health     Internal Hospice     Reason Outside Agency [address]     Partner SNF     Reason Why Partner SNF Not Chosen     Internal Comfort Care     Reason Outside [address] Discharge [place] (SNF)   [address] Provided? No   Mode of Transport at Discharge [address] Time of Discharge     Confirm Follow Up Transport       Condition of Participation: Discharge Planning  The plan for Transition of Care is related to the following treatment goals: return to safe level of functioning   The Patient and/or Patient Representative was provided with a Choice of Provider? Patient   Name of the Patient Representative who was provided with the Choice of Provider and agrees with the Discharge Plan? The Patient and/or Patient Representative Agree with the Discharge Plan? Yes   Freedom of Choice list was provided with basic dialogue that supports the individualized plan of care/goals, treatment preferences, and shares the quality data associated with the providers?  Yes     Documentation for Discharge Appeal  Discharge Appealed by     Date notified by [facility] of appeal request:     Time notified by [facility]IO of appeal request:     Detailed Notice of Discharge given to:     Date Notice of Discharge given:     Time Notice of Discharge given:     Date [facility] sent [facility] [address] [facility] [facility] [facility]     Time records sent to [address] [facility] [facility] [employer]     Date Notified of Outcome     Time Notified of Outcome     Outcome of appeal           All Murphy RN 10/03/22 1:47 PM

## 2022-10-03 NOTE — CONSULTS
Patient seen and examined. Agree with SARA's note as below. Patient with epigastric pain, similar sx to previous episodes of pancreatitis. Start clear liquid diet  EGD tomorrow to evaluate for NSAID induced gastritis/pud      Vivian Purcell MD  [facility], [place]        [facility] Consult Note       Primary GI Physician: Dr. Lee Primrose    Referring Provider:  Dr. Barbara Faith Physician: Dr. Staci Abel Date:  10/3/2022    Admit Date:  9/30/2022    Chief Complaint:  pancreatitis    Subjective:     History of Present Illness:  Patient is a 80 y.o. female with PMH below- including HTN, HLD, Breast ca s/p lumpectomy and sentinel node removal, RA, Duodenal ulcer s/p Billroth II surgery, hx recurring idiopathic pancreatitis, seen in GI consultation at the request of Dr. Hank Plascencia for pancreatitis. She was admitted to [facility] 9/30 after she presented to the ED with c/o nausea, progressively worsening epigastric and RUQ pain x24hr. She has denied fevers/chills, chest pain/shortness of breath. She has history of cholecystectomy in the past for similar pain. For her history of RA, treatment reportedly switched to Orencia due to history of pancreatitis. In the ER labs revealed hyperlipasemia (Lipase ) and CT abdomen revealed ill defined fluid collection in the pancreatic head. Other labs were notable for hypokalemia. She denies use of ETOH since her  was alive (23 years ago), she endorses taking 800 mg Ibuprofen BID for years. Denies HB, GERD, or taking any medications for this. She denies hematochezia or melena. Main complaints today are RUQ pain and belching. EUS 3/18/20 with Dr. Momo Guerrero  Impression:     1. Bile gastritis. 2. Gastric bezoar. 3. Small pancreatic head lesion. This may represent a lymph node or neuroendocrine tumor. It is not convincingly worrisome in appearance. Biopsies were obtained.     4. Mild segmental dilatation of the main pancreatic duct was single visible sidebranch could represent early IPMN. This could account for recurrent episodes of pancreatitis. **Pancreatic lesion bx with no intact tissue remaining following processing. Per path note- called with biopsies negative for ca. Recommended MRCP in 4wks from that time. Gastric bx with mild chronic gastritis without activity. MRCP 6/22/20   IMPRESSION:   1. Suboptimal assessment of prior pancreatic changes on this noncontrast study. No evolving contour deforming mass is seen. No worrisome secondary findings such   as pancreatic ductal dilation or atrophy are seen. An additional 6 mm T2   hyperintense lesion is seen in the pancreatic CAD appear stable and nonenhancing   on the prior CT study suggesting a cystic lesion. This is also incompletely   characterized on this exam although no clearly worrisome noncontrast features   are seen. A follow-up pancreatic protocol MRI in 6 months would be recommended   for reassessment of these changes. 2.  1.3 cm T2 hyperintense lesion in the superior right lobe of the liver felt   to represent a benign hemangioma as described above. 3. No acute inflammatory changes appreciated on the current examination.      PMH:  Past Medical History:   Diagnosis Date    EPIFANIO (acute kidney injury) ([address] 12/04/2014    pt denies this dx    Arthritis     RA-abdelrahman hands    Back pain 6/10/2016    Breast cancer ([address] 2018    Breast lump dx 2/2018    left    Bronchitis     Chronic obstructive pulmonary disease ([address]     Discharge from the vagina     Dysuria     Eczema 6/10/2016    Fungal dermatitis     Headache 6/10/2016    Hypercholesterolemia 12/4/2014    Hypertension     not well controlled--per pt    Intractable vomiting 5/20/2018    Menopausal vaginal dryness     Osteoarthritis 6/10/2016    Osteoporosis 6/10/2016    Pancreatitis     2 episodes    PUD (peptic ulcer disease)     last 2003 which a rupture an extensive surgery    Sepsis ([address] 12/4/2014    Thyroid nodule     Tobacco abuse     1ppd x 49 yrs       PSH:  Past Surgical History:   Procedure Laterality Date    APPENDECTOMY  1977    with hysterectomy    BREAST BIOPSY Left 1975    BREAST LUMPECTOMY Left 2/22/2018    LEFT BREAST LUMPECTOMY/ SENTINEL NODE BIOPSY performed by Yovany Araujo MD at [facility] MAIN OR    BREAST SURGERY Left 1975    breast lumpectomy, benign  (left)    CATARACT REMOVAL Bilateral 2018    w/IOL    CHOLECYSTECTOMY      HYSTERECTOMY (CERVIX STATUS UNKNOWN)  1977    OTHER SURGICAL HISTORY      hernicolectomy 2 cm    VT ABDOMEN SURGERY PROC UNLISTED  2003    stomach surgery for ruptured ulcer and extensive rerouting of intestestines per patient     [address] [place] ENDOSCOPY  05/21/2018    empiric dilation    UPPER GASTROINTESTINAL ENDOSCOPY      US BREAST NEEDLE BIOPSY LEFT Left 1/31/2018    US BREAST NEEDLE BIOPSY LEFT 1/31/2018 [facility]       Allergies: Allergies   Allergen Reactions    Azithromycin Other (See Comments)     pancreatitis    Codeine Nausea And Vomiting       Home Medications:  Prior to Admission medications    Medication Sig Start Date End Date Taking? Authorizing Provider   predniSONE (DELTASONE) 20 MG tablet Take 1 pill once a day after breakfast for 2 weeks then 1/2 a pill once a day after breakfast for 2 weeks and then stop.   Patient not taking: Reported on 10/1/2022 8/14/22   Kevin Salinas MD   alendronate (FOSAMAX) 70 MG tablet TAKE 1 TABLET BY MOUTH EVERY WEEK 4/8/22   Ar Automatic Reconciliation   donepezil (ARICEPT) 5 MG tablet Take 5 mg by mouth 4/8/22   Ar Automatic Reconciliation   Etanercept (ENBREL SURECLICK) 50 MG/ML SOAJ Inject 50 mg into the skin every 7 days 3/2/22   Ar Automatic Reconciliation   losartan (COZAAR) 50 MG tablet Take 50 mg by mouth daily 4/8/22   Ar Automatic Reconciliation   pravastatin (PRAVACHOL) 40 MG tablet Take 40 mg by mouth 4/8/22   Ar Automatic Reconciliation Hospital Medications:  Current Facility-Administered Medications   Medication Dose Route Frequency    morphine injection 4 mg  4 mg IntraVENous Q4H PRN    losartan (COZAAR) tablet 100 mg  100 mg Oral Daily    polyethylene glycol (GLYCOLAX) packet 17 g  17 g Oral Daily    docusate sodium (COLACE) capsule 100 mg  100 mg Oral Daily    pantoprazole (PROTONIX) 40 mg in sodium chloride (PF) 10 mL injection  40 mg IntraVENous Daily    labetalol (NORMODYNE;TRANDATE) injection 10 mg  10 mg IntraVENous Q4H PRN    amLODIPine (NORVASC) tablet 5 mg  5 mg Oral Daily    Thera M Plus TABS 1 tablet  1 tablet Oral Daily    potassium chloride (KLOR-CON M) extended release tablet 20 mEq  20 mEq Oral BID WC    magnesium oxide (MAG-OX) tablet 400 mg  400 mg Oral BID    sodium chloride flush 0.9 % injection 5-40 mL  5-40 mL IntraVENous 2 times per day    sodium chloride flush 0.9 % injection 5-40 mL  5-40 mL IntraVENous PRN    0.9 % sodium chloride infusion   IntraVENous PRN    enoxaparin Sodium (LOVENOX) injection 30 mg  30 mg SubCUTAneous Daily    ondansetron (ZOFRAN-ODT) disintegrating tablet 4 mg  4 mg Oral Q8H PRN    Or    ondansetron (ZOFRAN) injection 4 mg  4 mg IntraVENous Q6H PRN    acetaminophen (TYLENOL) tablet 650 mg  650 mg Oral Q6H PRN    Or    acetaminophen (TYLENOL) suppository 650 mg  650 mg Rectal Q6H PRN    0.9 % sodium chloride infusion   IntraVENous Continuous    hydrALAZINE (APRESOLINE) injection 10 mg  10 mg IntraVENous Q6H PRN    donepezil (ARICEPT) tablet 5 mg  5 mg Oral Nightly    sodium chloride (OCEAN, BABY AYR) 0.65 % nasal spray 2 spray  2 spray Each Nostril Q2H PRN       Social History:  Social History     Tobacco Use    Smoking status: Every Day     Packs/day: 0.50     Types: Cigarettes     Start date: 5/6/1966    Smokeless tobacco: Never   Substance Use Topics    Alcohol use: No       Family History:  Family History   Problem Relation Age of Onset    Thyroid Cancer Neg Hx     No Known Problems Paternal Grandfather     No Known Problems Paternal Grandmother     No Known Problems Maternal Grandfather     No Known Problems Maternal Grandmother     Hypertension Other         Brother and sister with htn    No Known Problems Brother     Hypertension Mother     Heart Disease Brother     Heart Disease Sister     Breast Cancer Sister 79    Cancer Sister     Heart Disease Father     Heart Attack Father     Cancer Brother         prostate    Diabetes Sister     Heart Disease Sister        Review of Systems:  A detailed 10 system ROS is obtained, with pertinent positives as listed above. All others are negative. Diet:  NPO    Objective:     Physical Exam:  Vitals:  BP (!) 160/69 Comment: nurse notified  Pulse 77   Temp 97.9 °F (36.6 °C) (Oral)   Resp 19   Ht 5' (1.524 m)   Wt 104 lb 0.9 oz (47.2 kg)   SpO2 93%   BMI 20.32 kg/m²   Gen:  Pt is alert, cooperative, no acute distress, patient is repetitive with answers, medicated   Skin:  Extremities and face reveal no rashes. HEENT: Sclerae anicteric. No oral ulcers. No abnormal pigmentation of the lips. Cardiovascular: Regular rate and rhythm. No murmurs, gallops, or rubs. Respiratory:  Comfortable breathing with no accessory muscle use. Clear breath sounds anteriorly with no wheezes, rales, or rhonchi. GI:  Abdomen nondistended, soft, and RUQ tenderness. Normal active bowel sounds. Surgical scar from previous abdominal surgery  Rectal:  Deferred  Musculoskeletal:  No pitting edema of the lower legs. Neurological:  Gross memory appears intact. Patient is alert and oriented, but currently medicated  Psychiatric:  Mood appears appropriate with judgement intact.       Laboratory:    Recent Labs     09/30/22  2122 09/30/22  2223 10/02/22  0617 10/03/22  0701   WBC 15.0*  --  10.1  --    HGB 12.4  --  10.8*  --    HCT 38.6  --  33.8*  --      --  337  --    MCV 82.0  --  81.6  --      --  141 139   K 2.8* 2.6* 2.8* 3.6     -- 113* 113*   CO2 24  --  21 21   BUN 15  --  7* 7*   MG  --  1.9 1.7*  --    AST 18  --  12*  --    ALT 13  --  12  --       CT chest 9/30/22   No evidence of an acute intrathoracic process. There is a 1.2 cm ill-defined nodule in the right lower lung. Its etiology is   unclear. This can be further assessed with CT of the chest.         CT a/p 9/30   There is ill-defined fluid noted adjacent to the head of the pancreas anteriorly   which appears to extend to the anterior abdominal wall along the fissure for the   ligamentum teres. (See image number 27 of series 2.) There is also minimal fluid   extension into the right paracolic gutter. This may be associated with   pancreatitis. Dilated extrahepatic bile ducts and pancreatic duct may be associated with a   reservoir effect secondary to cholecystectomy. Bibasilar atelectasis or infiltrate. Stable fracture of the L1 vertebral body. Assessment:     Principal Problem:    Acute pancreatitis, unspecified complication status, unspecified pancreatitis type  Active Problems:    Other acute pancreatitis without infection or necrosis    Hypercholesterolemia    HTN (hypertension)    Hypokalemia  Resolved Problems:    * No resolved hospital problems. *    80 y.o. female with PMH above- including HTN, HLD, Breast ca s/p lumpectomy and sentinel node removal, RA (on Orencia), Duodenal ulcer s/p Billroth II surgery, hx recurring idiopathic pancreatitis, hx cholecystectomy, seen in GI consultation at the request of Dr. Tandy Nageotte for pancreatitis in pt admitted with nausea, epigastric to RUQ pain with lipase 914 (now increased to 1,095), normal LFTs and CT with ill defined fluid collection in the pancreatic head. Pt with hx recurrent pancreatitis, last possibly in 2020. EUS 3/18/20 with bile gastritis, small gastric bezoar, small pancreatic head lesion (?LN or neuroendocrine tumor), mild segmental dilatation of main pancreatic duct- ?early IPMN.   BX with no intact tissue following processing. Per note, called with bx neg for ca. MRCP 6/22/20 with suboptimal assessment of prior pancreatic changes. Then no evolving contour deforming mass seen and no worrisome secondary findings such as pancreatic ductal dilation or atrophy seen. An additional 6mm T2 hyperintense lesion seen in pancreatic head- stable and reportedly suggestive of cystic lesion. Recommended f/u MRCP in 6mo. Plan:     - Supportive care with IVFs, Anti-emetics prn, Analgesics prn- per primary team.  - Bowel rest.  Gradually advance as her pain improves  - Note CT findings 9/30.    - may consider EGD due to nsaid consumption.    [facility]

## 2022-10-03 NOTE — PROGRESS NOTES
Patient was admitted with acute pancreatitis, has unbearable pain on RUQ, was given Morphine 4 mg at 0026 and it help for half an hr and she started having it back again its stabbing pain, is nonalcoholic as per patient. She have Tylenol 650 mg as her other PRN med. Hospitalist was notified and order to d/c morphine and put hydromorphone 1 mg q 3 h prn was received.

## 2022-10-04 ENCOUNTER — ANESTHESIA (OUTPATIENT)
Dept: ENDOSCOPY | Age: 82
DRG: 439 | End: 2022-10-04
Payer: MEDICARE

## 2022-10-04 LAB
ALBUMIN SERPL-MCNC: 2.1 G/DL (ref 3.2–4.6)
ALBUMIN/GLOB SERPL: 0.6 {RATIO} (ref 1.2–3.5)
ALP SERPL-CCNC: 92 U/L (ref 50–136)
ALT SERPL-CCNC: 12 U/L (ref 12–65)
ANION GAP SERPL CALC-SCNC: 8 MMOL/L (ref 4–13)
AST SERPL-CCNC: 14 U/L (ref 15–37)
BASOPHILS # BLD: 0.1 K/UL (ref 0–0.2)
BASOPHILS NFR BLD: 1 % (ref 0–2)
BILIRUB SERPL-MCNC: 0.6 MG/DL (ref 0.2–1.1)
BUN SERPL-MCNC: 6 MG/DL (ref 8–23)
CALCIUM SERPL-MCNC: 8 MG/DL (ref 8.3–10.4)
CHLORIDE SERPL-SCNC: 116 MMOL/L (ref 101–110)
CO2 SERPL-SCNC: 19 MMOL/L (ref 21–32)
CREAT SERPL-MCNC: 0.4 MG/DL (ref 0.6–1)
DIFFERENTIAL METHOD BLD: ABNORMAL
EOSINOPHIL # BLD: 0.2 K/UL (ref 0–0.8)
EOSINOPHIL NFR BLD: 2 % (ref 0.5–7.8)
ERYTHROCYTE [DISTWIDTH] IN BLOOD BY AUTOMATED COUNT: 14.9 % (ref 11.9–14.6)
GLOBULIN SER CALC-MCNC: 3.3 G/DL (ref 2.3–3.5)
GLUCOSE SERPL-MCNC: 80 MG/DL (ref 65–100)
HCT VFR BLD AUTO: 31.2 % (ref 35.8–46.3)
HGB BLD-MCNC: 10.1 G/DL (ref 11.7–15.4)
IMM GRANULOCYTES # BLD AUTO: 0 K/UL (ref 0–0.5)
IMM GRANULOCYTES NFR BLD AUTO: 0 % (ref 0–5)
LYMPHOCYTES # BLD: 1 K/UL (ref 0.5–4.6)
LYMPHOCYTES NFR BLD: 11 % (ref 13–44)
MCH RBC QN AUTO: 26.4 PG (ref 26.1–32.9)
MCHC RBC AUTO-ENTMCNC: 32.4 G/DL (ref 31.4–35)
MCV RBC AUTO: 81.7 FL (ref 79.6–97.8)
MONOCYTES # BLD: 0.7 K/UL (ref 0.1–1.3)
MONOCYTES NFR BLD: 8 % (ref 4–12)
NEUTS SEG # BLD: 7 K/UL (ref 1.7–8.2)
NEUTS SEG NFR BLD: 78 % (ref 43–78)
NRBC # BLD: 0 K/UL (ref 0–0.2)
PLATELET # BLD AUTO: 336 K/UL (ref 150–450)
PMV BLD AUTO: 9.9 FL (ref 9.4–12.3)
POTASSIUM SERPL-SCNC: 3.6 MMOL/L (ref 3.5–5.1)
PROT SERPL-MCNC: 5.4 G/DL (ref 6.3–8.2)
RBC # BLD AUTO: 3.82 M/UL (ref 4.05–5.2)
SODIUM SERPL-SCNC: 143 MMOL/L (ref 136–145)
WBC # BLD AUTO: 9 K/UL (ref 4.3–11.1)

## 2022-10-04 PROCEDURE — 36415 COLL VENOUS BLD VENIPUNCTURE: CPT

## 2022-10-04 PROCEDURE — 2580000003 HC RX 258: Performed by: ANESTHESIOLOGY

## 2022-10-04 PROCEDURE — 6370000000 HC RX 637 (ALT 250 FOR IP): Performed by: FAMILY MEDICINE

## 2022-10-04 PROCEDURE — 6360000002 HC RX W HCPCS: Performed by: STUDENT IN AN ORGANIZED HEALTH CARE EDUCATION/TRAINING PROGRAM

## 2022-10-04 PROCEDURE — 80053 COMPREHEN METABOLIC PANEL: CPT

## 2022-10-04 PROCEDURE — 85025 COMPLETE CBC W/AUTO DIFF WBC: CPT

## 2022-10-04 PROCEDURE — 6370000000 HC RX 637 (ALT 250 FOR IP): Performed by: INTERNAL MEDICINE

## 2022-10-04 PROCEDURE — 2500000003 HC RX 250 WO HCPCS

## 2022-10-04 PROCEDURE — 6360000002 HC RX W HCPCS

## 2022-10-04 PROCEDURE — 2709999900 HC NON-CHARGEABLE SUPPLY: Performed by: INTERNAL MEDICINE

## 2022-10-04 PROCEDURE — 2500000003 HC RX 250 WO HCPCS: Performed by: HOSPITALIST

## 2022-10-04 PROCEDURE — 2580000003 HC RX 258: Performed by: STUDENT IN AN ORGANIZED HEALTH CARE EDUCATION/TRAINING PROGRAM

## 2022-10-04 PROCEDURE — C9113 INJ PANTOPRAZOLE SODIUM, VIA: HCPCS | Performed by: STUDENT IN AN ORGANIZED HEALTH CARE EDUCATION/TRAINING PROGRAM

## 2022-10-04 PROCEDURE — 1100000000 HC RM PRIVATE

## 2022-10-04 PROCEDURE — 7100000011 HC PHASE II RECOVERY - ADDTL 15 MIN: Performed by: INTERNAL MEDICINE

## 2022-10-04 PROCEDURE — 2580000003 HC RX 258: Performed by: HOSPITALIST

## 2022-10-04 PROCEDURE — 3700000000 HC ANESTHESIA ATTENDED CARE: Performed by: INTERNAL MEDICINE

## 2022-10-04 PROCEDURE — 7100000010 HC PHASE II RECOVERY - FIRST 15 MIN: Performed by: INTERNAL MEDICINE

## 2022-10-04 PROCEDURE — 3609017100 HC EGD: Performed by: INTERNAL MEDICINE

## 2022-10-04 PROCEDURE — 6370000000 HC RX 637 (ALT 250 FOR IP): Performed by: STUDENT IN AN ORGANIZED HEALTH CARE EDUCATION/TRAINING PROGRAM

## 2022-10-04 PROCEDURE — 0DJ08ZZ INSPECTION OF UPPER INTESTINAL TRACT, VIA NATURAL OR ARTIFICIAL OPENING ENDOSCOPIC: ICD-10-PCS | Performed by: INTERNAL MEDICINE

## 2022-10-04 PROCEDURE — A4216 STERILE WATER/SALINE, 10 ML: HCPCS | Performed by: STUDENT IN AN ORGANIZED HEALTH CARE EDUCATION/TRAINING PROGRAM

## 2022-10-04 RX ORDER — SODIUM CHLORIDE 0.9 % (FLUSH) 0.9 %
5-40 SYRINGE (ML) INJECTION PRN
Status: DISCONTINUED | OUTPATIENT
Start: 2022-10-04 | End: 2022-10-04 | Stop reason: HOSPADM

## 2022-10-04 RX ORDER — DEXTROSE MONOHYDRATE 100 MG/ML
INJECTION, SOLUTION INTRAVENOUS CONTINUOUS PRN
Status: DISCONTINUED | OUTPATIENT
Start: 2022-10-04 | End: 2022-10-04 | Stop reason: HOSPADM

## 2022-10-04 RX ORDER — SODIUM CHLORIDE 9 MG/ML
INJECTION, SOLUTION INTRAVENOUS PRN
Status: DISCONTINUED | OUTPATIENT
Start: 2022-10-04 | End: 2022-10-04 | Stop reason: HOSPADM

## 2022-10-04 RX ORDER — GLYCOPYRROLATE 0.2 MG/ML
INJECTION INTRAMUSCULAR; INTRAVENOUS PRN
Status: DISCONTINUED | OUTPATIENT
Start: 2022-10-04 | End: 2022-10-04 | Stop reason: SDUPTHER

## 2022-10-04 RX ORDER — PROPOFOL 10 MG/ML
INJECTION, EMULSION INTRAVENOUS PRN
Status: DISCONTINUED | OUTPATIENT
Start: 2022-10-04 | End: 2022-10-04 | Stop reason: SDUPTHER

## 2022-10-04 RX ORDER — SODIUM CHLORIDE 0.9 % (FLUSH) 0.9 %
5-40 SYRINGE (ML) INJECTION EVERY 12 HOURS SCHEDULED
Status: DISCONTINUED | OUTPATIENT
Start: 2022-10-04 | End: 2022-10-04 | Stop reason: HOSPADM

## 2022-10-04 RX ORDER — PROPOFOL 10 MG/ML
INJECTION, EMULSION INTRAVENOUS CONTINUOUS PRN
Status: DISCONTINUED | OUTPATIENT
Start: 2022-10-04 | End: 2022-10-04 | Stop reason: SDUPTHER

## 2022-10-04 RX ORDER — LIDOCAINE HYDROCHLORIDE 10 MG/ML
1 INJECTION, SOLUTION INFILTRATION; PERINEURAL
Status: DISCONTINUED | OUTPATIENT
Start: 2022-10-04 | End: 2022-10-04 | Stop reason: HOSPADM

## 2022-10-04 RX ORDER — LIDOCAINE HYDROCHLORIDE 20 MG/ML
INJECTION, SOLUTION EPIDURAL; INFILTRATION; INTRACAUDAL; PERINEURAL PRN
Status: DISCONTINUED | OUTPATIENT
Start: 2022-10-04 | End: 2022-10-04 | Stop reason: SDUPTHER

## 2022-10-04 RX ORDER — HYDRALAZINE HYDROCHLORIDE 25 MG/1
25 TABLET, FILM COATED ORAL EVERY 8 HOURS SCHEDULED
Status: DISCONTINUED | OUTPATIENT
Start: 2022-10-04 | End: 2022-10-05 | Stop reason: HOSPADM

## 2022-10-04 RX ORDER — SODIUM CHLORIDE, SODIUM LACTATE, POTASSIUM CHLORIDE, CALCIUM CHLORIDE 600; 310; 30; 20 MG/100ML; MG/100ML; MG/100ML; MG/100ML
INJECTION, SOLUTION INTRAVENOUS CONTINUOUS
Status: DISCONTINUED | OUTPATIENT
Start: 2022-10-04 | End: 2022-10-04

## 2022-10-04 RX ADMIN — SODIUM CHLORIDE: 9 INJECTION, SOLUTION INTRAVENOUS at 23:11

## 2022-10-04 RX ADMIN — POTASSIUM CHLORIDE 20 MEQ: 1500 TABLET, EXTENDED RELEASE ORAL at 08:40

## 2022-10-04 RX ADMIN — DOCUSATE SODIUM 100 MG: 100 CAPSULE, LIQUID FILLED ORAL at 08:39

## 2022-10-04 RX ADMIN — HYDRALAZINE HYDROCHLORIDE 25 MG: 25 TABLET, FILM COATED ORAL at 21:15

## 2022-10-04 RX ADMIN — DONEPEZIL HYDROCHLORIDE 5 MG: 5 TABLET, FILM COATED ORAL at 20:27

## 2022-10-04 RX ADMIN — PROPOFOL 140 MCG/KG/MIN: 10 INJECTION, EMULSION INTRAVENOUS at 14:17

## 2022-10-04 RX ADMIN — GLYCOPYRROLATE 0.2 MG: 0.2 INJECTION, SOLUTION INTRAMUSCULAR; INTRAVENOUS at 14:16

## 2022-10-04 RX ADMIN — PROPOFOL 20 MG: 10 INJECTION, EMULSION INTRAVENOUS at 14:19

## 2022-10-04 RX ADMIN — LABETALOL HYDROCHLORIDE 10 MG: 5 INJECTION INTRAVENOUS at 12:28

## 2022-10-04 RX ADMIN — Medication 1 TABLET: at 08:40

## 2022-10-04 RX ADMIN — SODIUM CHLORIDE, POTASSIUM CHLORIDE, SODIUM LACTATE AND CALCIUM CHLORIDE: 600; 310; 30; 20 INJECTION, SOLUTION INTRAVENOUS at 13:20

## 2022-10-04 RX ADMIN — POTASSIUM CHLORIDE 20 MEQ: 1500 TABLET, EXTENDED RELEASE ORAL at 16:46

## 2022-10-04 RX ADMIN — SODIUM CHLORIDE 40 MG: 9 INJECTION INTRAMUSCULAR; INTRAVENOUS; SUBCUTANEOUS at 08:40

## 2022-10-04 RX ADMIN — SODIUM CHLORIDE, PRESERVATIVE FREE 10 ML: 5 INJECTION INTRAVENOUS at 08:40

## 2022-10-04 RX ADMIN — SODIUM CHLORIDE, PRESERVATIVE FREE 10 ML: 5 INJECTION INTRAVENOUS at 20:28

## 2022-10-04 RX ADMIN — MAGNESIUM GLUCONATE 500 MG ORAL TABLET 400 MG: 500 TABLET ORAL at 20:27

## 2022-10-04 RX ADMIN — PROPOFOL 40 MG: 10 INJECTION, EMULSION INTRAVENOUS at 14:17

## 2022-10-04 RX ADMIN — PROPOFOL 20 MG: 10 INJECTION, EMULSION INTRAVENOUS at 14:18

## 2022-10-04 RX ADMIN — AMLODIPINE BESYLATE 5 MG: 5 TABLET ORAL at 08:38

## 2022-10-04 RX ADMIN — LIDOCAINE HYDROCHLORIDE 50 MG: 20 INJECTION, SOLUTION EPIDURAL; INFILTRATION; INTRACAUDAL; PERINEURAL at 14:17

## 2022-10-04 RX ADMIN — LOSARTAN POTASSIUM 100 MG: 50 TABLET, FILM COATED ORAL at 08:39

## 2022-10-04 RX ADMIN — HYDRALAZINE HYDROCHLORIDE 25 MG: 25 TABLET, FILM COATED ORAL at 15:41

## 2022-10-04 RX ADMIN — MAGNESIUM GLUCONATE 500 MG ORAL TABLET 400 MG: 500 TABLET ORAL at 08:39

## 2022-10-04 ASSESSMENT — LIFESTYLE VARIABLES: SMOKING_STATUS: 1

## 2022-10-04 ASSESSMENT — PAIN SCALES - GENERAL: PAINLEVEL_OUTOF10: 0

## 2022-10-04 ASSESSMENT — ENCOUNTER SYMPTOMS: SHORTNESS OF BREATH: 1

## 2022-10-04 ASSESSMENT — PAIN - FUNCTIONAL ASSESSMENT: PAIN_FUNCTIONAL_ASSESSMENT: NONE - DENIES PAIN

## 2022-10-04 ASSESSMENT — PAIN SCALES - WONG BAKER: WONGBAKER_NUMERICALRESPONSE: 0

## 2022-10-04 ASSESSMENT — COPD QUESTIONNAIRES: CAT_SEVERITY: MODERATE

## 2022-10-04 NOTE — PROGRESS NOTES
TRANSFER - IN REPORT:    Verbal report received from [facility] on Liz Vizcarra  being received from [phone] for ordered procedure      Report consisted of patient's Situation, Background, Assessment and   Recommendations(SBAR). Information from the following report(s) Nurse Handoff Report was reviewed with the receiving nurse. Opportunity for questions and clarification was provided. Assessment completed upon patient's arrival to unit and care assumed.

## 2022-10-04 NOTE — ANESTHESIA POSTPROCEDURE EVALUATION
Department of Anesthesiology  Postprocedure Note    Patient: Rei Srinivasan  MRN: [id number]  YOB: 1940  Date of evaluation: 10/4/2022      Procedure Summary     Date: 10/04/22 Room / Location: [facility] ENDO 05 / [facility] ENDOSCOPY    Anesthesia Start: 8891 Anesthesia Stop: 4182    Procedure: EGD ESOPHAGOGASTRODUODENOSCOPY/  Diagnosis:       Epigastric pain      (Epigastric pain [R10.13])    Surgeons: Eva Cota MD Responsible Provider: Cinthia Brown MD    Anesthesia Type: TIVA ASA Status: 3          Anesthesia Type: TIVA    Vipin Phase I: Vipin Score: 10    Vipin Phase II: Vipin Score: 10      Anesthesia Post Evaluation    Patient location during evaluation: PACU  Patient participation: complete - patient participated  Level of consciousness: awake and alert  Airway patency: patent  Nausea: well controlled. Complications: no  Cardiovascular status: acceptable.   Respiratory status: acceptable  Hydration status: stable

## 2022-10-04 NOTE — OP NOTE
Esophagogastroduodenoscopy    DATE of PROCEDURE: 10/4/2022    MEDICATIONS ADMINISTERED: MAC    INSTRUMENT: GIF    PROCEDURE:  After obtaining informed consent, the patient was placed in the left lateral position and sedated. The endoscope was advanced under direct vision without difficulty. The esophagus, stomach (including retroflexed views) and duodenum were evaluated. The patient was taken to the recovery area in stable condition. FINDINGS:    OROPHARYNX: Cords and pyriform recesses normal.    ESOPHAGUS: The esophagus is normal. The proximal, mid and distal portions are normal. The Z-Line is unremarkable. STOMACH: The fundus on antegrade and retroflex views is normal. The body, antrum and pylorus are normal.  Small hiatal hernia. S/p billroth gastrojejunostomy. Anastomosis is widely patent.      JEJUNUM: The examined portions are normal.     Estimated blood loss: 0-minimal     PLAN:  Follow up as outpatient  Continue treatment for pancreatitis  \    Ginger Richardson MD  [facility] [place]

## 2022-10-04 NOTE — ANESTHESIA PRE PROCEDURE
Department of Anesthesiology  Preprocedure Note       Name:  Familia Vela   Age:  80 y.o.  :  1940                                          MRN:  [id number]         Date:  10/4/2022      Surgeon: Marily Wilkerson):  Vivian Purcell MD    Procedure: Procedure(s):  EGD ESOPHAGOGASTRODUODENOSCOPY/     Medications prior to admission:   Prior to Admission medications    Medication Sig Start Date End Date Taking? Authorizing Provider   predniSONE (DELTASONE) 20 MG tablet Take 1 pill once a day after breakfast for 2 weeks then 1/2 a pill once a day after breakfast for 2 weeks and then stop.   Patient not taking: Reported on 10/1/2022 8/14/22   Radha Hunt MD   alendronate (FOSAMAX) 70 MG tablet TAKE 1 TABLET BY MOUTH EVERY WEEK 22   Ar Automatic Reconciliation   donepezil (ARICEPT) 5 MG tablet Take 5 mg by mouth 22   Ar Automatic Reconciliation   Etanercept (ENBREL SURECLICK) 50 MG/ML SOAJ Inject 50 mg into the skin every 7 days 3/2/22   Ar Automatic Reconciliation   losartan (COZAAR) 50 MG tablet Take 50 mg by mouth daily 22   Ar Automatic Reconciliation   pravastatin (PRAVACHOL) 40 MG tablet Take 40 mg by mouth 22   Ar Automatic Reconciliation       Current medications:    Current Facility-Administered Medications   Medication Dose Route Frequency Provider Last Rate Last Admin    lidocaine 1 % injection 1 mL  1 mL IntraDERmal Once PRN Marilyn Jacobson MD        lactated ringers infusion   IntraVENous Continuous Marilyn Jacobson MD 75 mL/hr at 10/04/22 1320 New Bag at 10/04/22 1320    sodium chloride flush 0.9 % injection 5-40 mL  5-40 mL IntraVENous 2 times per day Marilyn Jacobson MD        sodium chloride flush 0.9 % injection 5-40 mL  5-40 mL IntraVENous PRN Marilyn Jacobson MD        0.9 % sodium chloride infusion   IntraVENous PRN Marilyn aJcobson MD        lactated ringers infusion   IntraVENous Continuous Marilyn Jacobson MD        sodium chloride flush 0.9 % injection 5-40 mL  5-40 mL IntraVENous 2 times per day Juan José Marquez MD        sodium chloride flush 0.9 % injection 5-40 mL  5-40 mL IntraVENous PRN Juan José Marquez MD        0.9 % sodium chloride infusion   IntraVENous PRN Juan José Marquez MD        glucose chewable tablet 16 g  4 tablet Oral PRN Juan José Marquez MD        dextrose bolus 10% 125 mL  125 mL IntraVENous PRN Juan José Marquez MD        Or    dextrose bolus 10% 250 mL  250 mL IntraVENous PRN Juan José Marquez MD        glucagon (rDNA) injection 1 mg  1 mg SubCUTAneous PRN Juan José Marquez MD        dextrose 10 % infusion   IntraVENous Continuous PRN Juan José Marquez MD        hydrALAZINE (APRESOLINE) tablet 25 mg  25 mg Oral 3 times per day Nathan Portillo MD        morphine injection 4 mg  4 mg IntraVENous Q4H PRN Stephon Hamilton MD   4 mg at 10/03/22 0920    losartan (COZAAR) tablet 100 mg  100 mg Oral Daily Nathan Portillo MD   100 mg at 10/04/22 0839    polyethylene glycol (GLYCOLAX) packet 17 g  17 g Oral Daily Nathan Portillo MD   17 g at 10/03/22 1827    docusate sodium (COLACE) capsule 100 mg  100 mg Oral Daily Nathan Portillo MD   100 mg at 10/04/22 0839    pantoprazole (PROTONIX) 40 mg in sodium chloride (PF) 10 mL injection  40 mg IntraVENous Daily Nathan Portillo MD   40 mg at 10/04/22 0840    multivitamin 1 tablet  1 tablet Oral Daily Iram Chopra MD   1 tablet at 10/04/22 0840    labetalol (NORMODYNE;TRANDATE) injection 10 mg  10 mg IntraVENous Q4H PRN Stephon Hamilton MD   10 mg at 10/04/22 1228    amLODIPine (NORVASC) tablet 5 mg  5 mg Oral Daily Nathan Portillo MD   5 mg at 10/04/22 6804    potassium chloride (KLOR-CON M) extended release tablet 20 mEq  20 mEq Oral BID  Rakan Edwards MD   20 mEq at 10/04/22 0840    magnesium oxide (MAG-OX) tablet 400 mg  400 mg Oral BID Rakan Edwards MD   400 mg at 10/04/22 0839    sodium chloride flush 0.9 % injection 5-40 mL  5-40 mL IntraVENous 2 times per day Sosa Christy MD   10 mL at 10/04/22 0839    sodium chloride flush 0.9 % injection 5-40 mL  5-40 mL IntraVENous PRN Stephon Hamilton MD        0.9 % sodium chloride infusion   IntraVENous PRN Hernandez Hardy MD        enoxaparin Sodium (LOVENOX) injection 30 mg  30 mg SubCUTAneous Daily Stephon Hamilton MD   30 mg at 10/03/22 0836    ondansetron (ZOFRAN-ODT) disintegrating tablet 4 mg  4 mg Oral Q8H PRN Stephon Hamilton MD   4 mg at 10/03/22 0306    Or    ondansetron (ZOFRAN) injection 4 mg  4 mg IntraVENous Q6H PRN Stephon Hamilton MD   4 mg at 10/03/22 1234    acetaminophen (TYLENOL) tablet 650 mg  650 mg Oral Q6H PRN Stephon Hamilton MD   650 mg at 10/02/22 2029    Or    acetaminophen (TYLENOL) suppository 650 mg  650 mg Rectal Q6H PRN Stephon Hamilton MD        0.9 % sodium chloride infusion   IntraVENous Continuous Stephon Hamilton  mL/hr at 10/03/22 2208 New Bag at 10/03/22 2208    hydrALAZINE (APRESOLINE) injection 10 mg  10 mg IntraVENous Q6H PRN Bertin Roth MD   10 mg at 10/02/22 0335    donepezil (ARICEPT) tablet 5 mg  5 mg Oral Nightly Bertin Roth MD   5 mg at 10/03/22 2041    sodium chloride (OCEAN, BABY AYR) 0.65 % nasal spray 2 spray  2 spray Each Nostril Q2H PRN Himanshu Leong MD   2 spray at 10/02/22 1800       Allergies:     Allergies   Allergen Reactions    Azithromycin Other (See Comments)     pancreatitis    Codeine Nausea And Vomiting       Problem List:    Patient Active Problem List   Diagnosis Code    PAD (peripheral artery disease) ([facility]) I73.9    Hypercholesterolemia E78.00    Back pain M54.9    Malignant neoplasm of upper-outer quadrant of left breast in female, estrogen receptor positive ([facility] [address] C50.412, Z17.0    History of peptic ulcer Z87.11    Multiple thyroid nodules E04.2    Sinusitis J32.9    Intractable vomiting R11.10    History of acute pancreatitis Z87.19    Compression fracture of L1 lumbar vertebra (HCC) S32.010A    Personal history of tobacco use, presenting hazards to health Z87.891    HTN (hypertension) I10    Age-related osteoporosis with current pathological fracture M80.00XA    Chronic obstructive pulmonary disease (HCC) J44.9    Eczema L30.9    Osteoarthritis M19.90    Paraseptal emphysema (HCC) J43.8    Hypokalemia E87.6    Osteoporosis M81.0    S/P lumpectomy, left breast Z98.890    Acute pancreatitis K85.90    EPIFANIO (acute kidney injury) ([address] N17.9    Acute pancreatitis, unspecified complication status, unspecified pancreatitis type K85.90    Other acute pancreatitis without infection or necrosis K85.80       Past Medical History:        Diagnosis Date    EPIFANIO (acute kidney injury) ([address] 12/04/2014    pt denies this dx    Arthritis     RA-abdelrahman hands    Back pain 6/10/2016    Breast cancer ([address] 2018    Breast lump dx 2/2018    left    Bronchitis     Chronic obstructive pulmonary disease ([address]     Discharge from the vagina     Dysuria     Eczema 6/10/2016    Fungal dermatitis     Headache 6/10/2016    Hypercholesterolemia 12/4/2014    Hypertension     not well controlled--per pt    Intractable vomiting 5/20/2018    Menopausal vaginal dryness     Osteoarthritis 6/10/2016    Osteoporosis 6/10/2016    Pancreatitis     2 episodes    PUD (peptic ulcer disease)     last 2003 which a rupture an extensive surgery    Sepsis ([address] 12/4/2014    Thyroid nodule     Tobacco abuse     1ppd x 49 yrs       Past Surgical History:        Procedure Laterality Date    APPENDECTOMY  1977    with hysterectomy    BREAST BIOPSY Left 1975    BREAST LUMPECTOMY Left 2/22/2018    LEFT BREAST LUMPECTOMY/ SENTINEL NODE BIOPSY performed by Chucho Emerson MD at [address] [facility] Left 1975    breast lumpectomy, benign  (left)    CATARACT REMOVAL Bilateral 2018    w/IOL    CHOLECYSTECTOMY      HYSTERECTOMY (CERVIX STATUS UNKNOWN)  1977    OTHER SURGICAL HISTORY      hernicolectomy 2 cm    [facility]BDOM[facility] PROC UNLISTED  2003    stomach surgery for ruptured ulcer and extensive rerouting of intestestines per patient    [facility] ENDOSCOPY  05/21/2018    empiric dilation    UPPER GASTROINTESTINAL ENDOSCOPY      US BREAST NEEDLE BIOPSY LEFT Left 1/31/2018    US BREAST NEEDLE BIOPSY LEFT 1/31/2018 [facility]       Social History:    Social History     Tobacco Use    Smoking status: Every Day     Packs/day: 0.50     Types: Cigarettes     Start date: 5/6/1966    Smokeless tobacco: Never   Substance Use Topics    Alcohol use: No                                Ready to quit: Not Answered  Counseling given: Not Answered      Vital Signs (Current):   Vitals:    10/04/22 0745 10/04/22 1105 10/04/22 1106 10/04/22 1309   BP: (!) 166/55 (!) 188/78 (!) 180/66 (!) 169/77   Pulse: 83 (!) 101 87 72   Resp:  18  18   Temp:  98.6 °F (37 °C)  98.8 °F (37.1 °C)   TempSrc:  Oral  Oral   SpO2: 96% 92% 96% 95%   Weight:    104 lb (47.2 kg)   Height:    5' 2\" (1.575 m)                                              BP Readings from Last 3 Encounters:   10/04/22 (!) 169/77   07/20/22 (!) 142/67   06/08/22 132/61       NPO Status: Time of last liquid consumption: 2300                        Time of last solid consumption: 1800                        Date of last liquid consumption: 10/03/22                        Date of last solid food consumption: 10/02/22    BMI:   Wt Readings from Last 3 Encounters:   10/04/22 104 lb (47.2 kg)   07/20/22 96 lb 9.6 oz (43.8 kg)   06/08/22 99 lb (44.9 kg)     Body mass index is 19.02 kg/m².     CBC:   Lab Results   Component Value Date/Time    WBC 9.0 10/04/2022 07:54 AM    RBC 3.82 10/04/2022 07:54 AM    HGB 10.1 10/04/2022 07:54 AM    HCT 31.2 10/04/2022 07:54 AM    MCV 81.7 10/04/2022 07:54 AM    RDW 14.9 10/04/2022 07:54 AM     10/04/2022 07:54 AM       CMP:   Lab Results   Component Value Date/Time     10/04/2022 07:54 AM    K 3.6 10/04/2022 07:54 AM     10/04/2022 07:54 AM    CO2 19 10/04/2022 07:54 AM    BUN 6 10/04/2022 07:54 AM    CREATININE 0.40 10/04/2022 07:54 AM    GFRAA >60 10/03/2022 07:01 AM    AGRATIO 1.5 03/30/2022 09:25 AM    LABGLOM >60 10/04/2022 07:54 AM    GLUCOSE 80 10/04/2022 07:54 AM    PROT 5.4 10/04/2022 07:54 AM    CALCIUM 8.0 10/04/2022 07:54 AM    BILITOT 0.6 10/04/2022 07:54 AM    ALKPHOS 92 10/04/2022 07:54 AM    ALKPHOS 78 03/30/2022 09:25 AM    AST 14 10/04/2022 07:54 AM    ALT 12 10/04/2022 07:54 AM       POC Tests: No results for input(s): POCGLU, POCNA, POCK, POCCL, POCBUN, POCHEMO, POCHCT in the last 72 hours. Coags: No results found for: PROTIME, INR, APTT    HCG (If Applicable): No results found for: PREGTESTUR, PREGSERUM, HCG, HCGQUANT     ABGs: No results found for: PHART, PO2ART, LUS5ZBH, IUQ9CEH, BEART, R4IALFIX     Type & Screen (If Applicable):  No results found for: LABABO, LABRH    Drug/Infectious Status (If Applicable):  No results found for: HIV, HEPCAB    COVID-19 Screening (If Applicable): No results found for: COVID19        Anesthesia Evaluation  Patient summary reviewed and Nursing notes reviewed no history of anesthetic complications:   Airway: Mallampati: II  TM distance: >3 FB   Neck ROM: full  Mouth opening: > = 3 FB   Dental:    (+) lower dentures, upper dentures and edentulous      Pulmonary:   (+) COPD: moderate,  shortness of breath: chronic,  rhonchi:bilateral current smoker                           Cardiovascular:  Exercise tolerance: no interval change, Limited by intermittent PINK. Denied chest pain, active SOB, syncope   (+) hypertension:, hyperlipidemia        Rhythm: regular  Rate: normal                    Neuro/Psych:   (+) headaches:, depression/anxiety             GI/Hepatic/Renal:   (+) GERD: well controlled, PUD,          ROS comment: H/o pancreatitis . Endo/Other:    (+) : arthritis:., malignancy/cancer (breast). Abdominal:             Vascular:           Other Findings:           Anesthesia Plan      TIVA     ASA 3       Induction: intravenous. Anesthetic plan and risks discussed with patient.                         Philly Reese MD   10/4/2022

## 2022-10-04 NOTE — PROGRESS NOTES
END OF SHIFT NOTE:    INTAKE/OUTPUT  10/03 0701 - 10/04 0700  In: 2696.6 [P.O.:420; I.V.:2276.6]  Out: 2850 [Urine:2850]  Voiding: Yes  Catheter: No        Flatus: Patient does not have flatus present. Stool:  0 occurrences. Emesis: 0 occurrences. VITAL SIGNS  Patient Vitals for the past 12 hrs:   Temp Pulse Resp BP SpO2   10/04/22 0352 98.2 °F (36.8 °C) 81 16 (!) 168/67 95 %   10/03/22 2332 98.6 °F (37 °C) 85 17 (!) 167/68 92 %   10/03/22 1934 -- 74 -- -- 94 %   10/03/22 1923 99 °F (37.2 °C) 75 17 (!) 169/60 94 %         Ambulating  Yes    Shift report given to oncoming nurse at the bedside.     Catrachito Parekh RN

## 2022-10-04 NOTE — PROGRESS NOTES
END OF SHIFT NOTE:    INTAKE/OUTPUT  10/03 0701 - 10/04 0700  In: 2696.6 [P.O.:420; I.V.:2276.6]  Out: 2850 [Urine:2850]  Voiding: Yes  Catheter: No  Drain:      Flatus: Patient does have flatus present. Stool: 1 occurrences. Characteristics:  Stool Appearance: Formed  Stool Color: Brown  Stool Amount: Medium  Stool Assessment  Stool Appearance: Formed  Stool Color: Brown  Stool Amount: Medium  Stool Source: Rectum  Last BM (including prior to admit): 10/04/22    Emesis: 0 occurrences. Characteristics:        VITAL SIGNS  Patient Vitals for the past 12 hrs:   Temp Pulse Resp BP SpO2   10/04/22 1526 97.9 °F (36.6 °C) 78 16 (!) 160/64 96 %   10/04/22 1500 -- 80 16 (!) 134/56 96 %   10/04/22 1450 -- 89 16 133/62 96 %   10/04/22 1440 -- 74 16 (!) 114/51 93 %   10/04/22 1431 98.6 °F (37 °C) 82 15 (!) 109/56 96 %   10/04/22 1417 -- 75 22 (!) 177/70 97 %   10/04/22 1309 98.8 °F (37.1 °C) 72 18 (!) 169/77 95 %   10/04/22 1106 -- 87 -- (!) 180/66 96 %   10/04/22 1105 98.6 °F (37 °C) (!) 101 18 (!) 188/78 92 %   10/04/22 0745 -- 83 -- (!) 166/55 96 %   10/04/22 0743 98.4 °F (36.9 °C) 71 18 (!) 170/62 95 %       Pain Assessment  Pain Level: 6 (10/04/22 1417)  Pain Location: Abdomen  Patient's Stated Pain Goal: 2    Ambulating  Yes with assistance    Shift report given to oncoming nurse at the bedside.     Willie Summers RN

## 2022-10-04 NOTE — PROGRESS NOTES
TRANSFER - IN REPORT:    Verbal report received from [place] on Lata Veronica  being received from GI Lab for ordered procedure      Report consisted of patient's Situation, Background, Assessment and   Recommendations(SBAR). Information from the following report(s) Nurse Handoff Report was reviewed with the receiving nurse. Opportunity for questions and clarification was provided. Assessment completed upon patient's arrival to unit and care assumed.

## 2022-10-04 NOTE — PROGRESS NOTES
Hospitalist Progress Note   Admit Date:  2022  9:27 PM   Name:  Maribell Garland   Age:  80 y.o. Sex:  female  :  1940   MRN:  [id number]   Room:  ENDO/    Presenting Complaint: Abdominal Pain     Reason(s) for Admission: Hypokalemia [E87.6]  Acute pancreatitis, unspecified complication status, unspecified pancreatitis type [K85.90]  Acute pancreatitis without infection or necrosis, unspecified pancreatitis type [K85.90]  Other acute pancreatitis without infection or necrosis [K85.80]     Hospital Course:   80years old female with past medical history of multiple episodes of pancreatitis without clear reason, hypertension presented to emergency room with chief complaint of nausea, epigastric and right upper quadrant abdominal pain going on for past 24 hours which continued to get worse. Secondary to nausea and  patient not able to eat or drink very well. Patient reports history of cholecystectomy for similar pain in the past.  Patient denies any fever, chills, cough. Denies any chest pain, shortness of breath. Evaluated in emergency room, lab work shows evidence of hypokalemia. Lipase is elevated. CT scan of abdomen was ill-defined fluid collection is in the pancreatic head. Patient was initially on IV fluids      Subjective & 24hr Events (10/04/22): Patient was seen and examined at the bedside. Patient's pain better controlled this morning. Patient undergo EGD today to rule out NSAID induced gastritis/PUD  Assessment & Plan:   Acute pancreatitis  - Pain management  - Advance diet as tolerated  - GI consulted, appreciate recommendations  - KUB negative for any acute process  - Repeat lipase elevated  - Continue n.p.o.  - Patient admitted to taking large amounts of NSAIDs for rheumatologic pain.   GI can consider EGD if pain continues  - Continue antiemetics  - 10/4 patient to undergo EGD 10/4 to rule out NSAID induced gastritis/PUD  - GI recommendations appreciated    Hypokalemia  - Replete as needed    Hyperlipidemia  - Continue home medications    Hypertension  - Continue home medications   -10/3 increase losartan from 50 to 100 mg secondary to elevated blood pressures  - 10/4 added p.o. hydralazine    Rheumatoid arthritis  - Secondary to her pancreatitis patient has history of medication changed to [place]      Anticipated discharge needs:    Dispo pending clinical course. Diet:  Diet NPO  DVT PPx: SCDs  Code status: Full Code    Hospital Problems:  Principal Problem:    Acute pancreatitis, unspecified complication status, unspecified pancreatitis type  Active Problems:    Other acute pancreatitis without infection or necrosis    Hypercholesterolemia    HTN (hypertension)    Hypokalemia  Resolved Problems:    * No resolved hospital problems. *      Objective:   Patient Vitals for the past 24 hrs:   Temp Pulse Resp BP SpO2   10/04/22 1309 98.8 °F (37.1 °C) 72 18 (!) 169/77 95 %   10/04/22 1106 -- 87 -- (!) 180/66 96 %   10/04/22 1105 98.6 °F (37 °C) (!) 101 18 (!) 188/78 92 %   10/04/22 0745 -- 83 -- (!) 166/55 96 %   10/04/22 0743 98.4 °F (36.9 °C) 71 18 (!) 170/62 95 %   10/04/22 0352 98.2 °F (36.8 °C) 81 16 (!) 168/67 95 %   10/03/22 2332 98.6 °F (37 °C) 85 17 (!) 167/68 92 %   10/03/22 1934 -- 74 -- -- 94 %   10/03/22 1923 99 °F (37.2 °C) 75 17 (!) 169/60 94 %   10/03/22 1508 98.6 °F (37 °C) 80 17 (!) 152/60 96 %       Oxygen Therapy  SpO2: 95 %  Pulse Oximeter Device Mode: Continuous  Pulse Oximeter Device Location: Left, Finger  O2 Device: None (Room air)  Oxygen Therapy: None (Room air)    Estimated body mass index is 19.02 kg/m² as calculated from the following:    Height as of this encounter: 5' 2\" (1.575 m). Weight as of this encounter: 104 lb (47.2 kg).     Intake/Output Summary (Last 24 hours) at 10/4/2022 1332  Last data filed at 10/4/2022 1022  Gross per 24 hour   Intake 2516.62 ml   Output 2850 ml   Net -333.38 ml         Physical Exam: Blood pressure (!) 169/77, pulse 72, temperature 98.8 °F (37.1 °C), temperature source Oral, resp. rate 18, height 5' 2\" (1.575 m), weight 104 lb (47.2 kg), SpO2 95 %. General:    Well nourished. Head:  Normocephalic, atraumatic  Eyes:  Sclerae appear normal.  Pupils equally round. ENT:  Nares appear normal, no drainage. Moist oral mucosa  Neck:  No restricted ROM. Trachea midline   CV:   RRR. No m/r/g. No jugular venous distension. Lungs:   CTAB. No wheezing, rhonchi, or rales. Symmetric expansion. Abdomen: Bowel sounds present. Soft, midepigastric tenderness, nondistended. Extremities: No cyanosis or clubbing. No edema  Skin:     No rashes and normal coloration. Warm and dry. Neuro:  CN II-XII grossly intact. Sensation intact. A&Ox3  Psych:  Normal mood and affect.       I have personally reviewed labs and tests showing:  Recent Labs:  Recent Results (from the past 48 hour(s))   Basic Metabolic Panel w/ Reflex to MG    Collection Time: 10/03/22  7:01 AM   Result Value Ref Range    Sodium 139 136 - 145 mmol/L    Potassium 3.6 3.5 - 5.1 mmol/L    Chloride 113 (H) 101 - 110 mmol/L    CO2 21 21 - 32 mmol/L    Anion Gap 5 4 - 13 mmol/L    Glucose 97 65 - 100 mg/dL    BUN 7 (L) 8 - 23 MG/DL    Creatinine 0.40 (L) 0.6 - 1.0 MG/DL    GFR African American >60 >60 ml/min/1.73m2    GFR Non- >60 >60 ml/min/1.73m2    Calcium 8.0 (L) 8.3 - 10.4 MG/DL   Lipase    Collection Time: 10/03/22  7:01 AM   Result Value Ref Range    Lipase 1,095 (H) 73 - 393 U/L   Comprehensive Metabolic Panel w/ Reflex to MG    Collection Time: 10/04/22  7:54 AM   Result Value Ref Range    Sodium 143 136 - 145 mmol/L    Potassium 3.6 3.5 - 5.1 mmol/L    Chloride 116 (H) 101 - 110 mmol/L    CO2 19 (L) 21 - 32 mmol/L    Anion Gap 8 4 - 13 mmol/L    Glucose 80 65 - 100 mg/dL    BUN 6 (L) 8 - 23 MG/DL    Creatinine 0.40 (L) 0.6 - 1.0 MG/DL    Est, Glom Filt Rate >60 >60 ml/min/1.73m2    Calcium 8.0 (L) 8.3 - 10.4 MG/DL    Total Bilirubin 0.6 0.2 - 1.1 MG/DL    ALT 12 12 - 65 U/L    AST 14 (L) 15 - 37 U/L    Alk Phosphatase 92 50 - 136 U/L    Total Protein 5.4 (L) 6.3 - 8.2 g/dL    Albumin 2.1 (L) 3.2 - 4.6 g/dL    Globulin 3.3 2.3 - 3.5 g/dL    Albumin/Globulin Ratio 0.6 (L) 1.2 - 3.5     CBC with Auto Differential    Collection Time: 10/04/22  7:54 AM   Result Value Ref Range    WBC 9.0 4.3 - 11.1 K/uL    RBC 3.82 (L) 4.05 - 5.2 M/uL    Hemoglobin 10.1 (L) 11.7 - 15.4 g/dL    Hematocrit 31.2 (L) 35.8 - 46.3 %    MCV 81.7 79.6 - 97.8 FL    MCH 26.4 26.1 - 32.9 PG    MCHC 32.4 31.4 - 35.0 g/dL    RDW 14.9 (H) 11.9 - 14.6 %    Platelets 770 791 - 502 K/uL    MPV 9.9 9.4 - 12.3 FL    nRBC 0.00 0.0 - 0.2 K/uL    Differential Type AUTOMATED      Seg Neutrophils 78 43 - 78 %    Lymphocytes 11 (L) 13 - 44 %    Monocytes 8 4.0 - 12.0 %    Eosinophils % 2 0.5 - 7.8 %    Basophils 1 0.0 - 2.0 %    Immature Granulocytes 0 0.0 - 5.0 %    Segs Absolute 7.0 1.7 - 8.2 K/UL    Absolute Lymph # 1.0 0.5 - 4.6 K/UL    Absolute Mono # 0.7 0.1 - 1.3 K/UL    Absolute Eos # 0.2 0.0 - 0.8 K/UL    Basophils Absolute 0.1 0.0 - 0.2 K/UL    Absolute Immature Granulocyte 0.0 0.0 - 0.5 K/UL       I have personally reviewed imaging studies showing: Other Studies:  XR ABDOMEN (KUB) (SINGLE AP VIEW)   Final Result   Unremarkable bowel gas pattern. XR CHEST (2 VW)   Final Result   No evidence of an acute intrathoracic process. There is a 1.2 cm ill-defined nodule in the right lower lung. Its etiology is   unclear. This can be further assessed with CT of the chest.         CT ABDOMEN PELVIS W IV CONTRAST Additional Contrast? Oral   Final Result   There is ill-defined fluid noted adjacent to the head of the pancreas anteriorly   which appears to extend to the anterior abdominal wall along the fissure for the   ligamentum teres. (See image number 27 of series 2.) There is also minimal fluid   extension into the right paracolic gutter.  This may be associated with   pancreatitis. Dilated extrahepatic bile ducts and pancreatic duct may be associated with a   reservoir effect secondary to cholecystectomy. Bibasilar atelectasis or infiltrate. Stable fracture of the L1 vertebral body.           Current Meds:  Current Facility-Administered Medications   Medication Dose Route Frequency    lidocaine 1 % injection 1 mL  1 mL IntraDERmal Once PRN    lactated ringers infusion   IntraVENous Continuous    sodium chloride flush 0.9 % injection 5-40 mL  5-40 mL IntraVENous 2 times per day    sodium chloride flush 0.9 % injection 5-40 mL  5-40 mL IntraVENous PRN    0.9 % sodium chloride infusion   IntraVENous PRN    lactated ringers infusion   IntraVENous Continuous    sodium chloride flush 0.9 % injection 5-40 mL  5-40 mL IntraVENous 2 times per day    sodium chloride flush 0.9 % injection 5-40 mL  5-40 mL IntraVENous PRN    0.9 % sodium chloride infusion   IntraVENous PRN    glucose chewable tablet 16 g  4 tablet Oral PRN    dextrose bolus 10% 125 mL  125 mL IntraVENous PRN    Or    dextrose bolus 10% 250 mL  250 mL IntraVENous PRN    glucagon (rDNA) injection 1 mg  1 mg SubCUTAneous PRN    dextrose 10 % infusion   IntraVENous Continuous PRN    hydrALAZINE (APRESOLINE) tablet 25 mg  25 mg Oral 3 times per day    morphine injection 4 mg  4 mg IntraVENous Q4H PRN    losartan (COZAAR) tablet 100 mg  100 mg Oral Daily    polyethylene glycol (GLYCOLAX) packet 17 g  17 g Oral Daily    docusate sodium (COLACE) capsule 100 mg  100 mg Oral Daily    pantoprazole (PROTONIX) 40 mg in sodium chloride (PF) 10 mL injection  40 mg IntraVENous Daily    multivitamin 1 tablet  1 tablet Oral Daily    labetalol (NORMODYNE;TRANDATE) injection 10 mg  10 mg IntraVENous Q4H PRN    amLODIPine (NORVASC) tablet 5 mg  5 mg Oral Daily    potassium chloride (KLOR-CON M) extended release tablet 20 mEq  20 mEq Oral BID WC    magnesium oxide (MAG-OX) tablet 400 mg  400 mg Oral BID    sodium chloride flush 0.9 % injection 5-40 mL  5-40 mL IntraVENous 2 times per day    sodium chloride flush 0.9 % injection 5-40 mL  5-40 mL IntraVENous PRN    0.9 % sodium chloride infusion   IntraVENous PRN    enoxaparin Sodium (LOVENOX) injection 30 mg  30 mg SubCUTAneous Daily    ondansetron (ZOFRAN-ODT) disintegrating tablet 4 mg  4 mg Oral Q8H PRN    Or    ondansetron (ZOFRAN) injection 4 mg  4 mg IntraVENous Q6H PRN    acetaminophen (TYLENOL) tablet 650 mg  650 mg Oral Q6H PRN    Or    acetaminophen (TYLENOL) suppository 650 mg  650 mg Rectal Q6H PRN    0.9 % sodium chloride infusion   IntraVENous Continuous    hydrALAZINE (APRESOLINE) injection 10 mg  10 mg IntraVENous Q6H PRN    donepezil (ARICEPT) tablet 5 mg  5 mg Oral Nightly    sodium chloride (OCEAN, BABY AYR) 0.65 % nasal spray 2 spray  2 spray Each Nostril Q2H PRN       Signed:  Rebecca Delaney MD    Part of this note may have been written by using a voice dictation software. The note has been proof read but may still contain some grammatical/other typographical errors.

## 2022-10-04 NOTE — PROGRESS NOTES
TRANSFER - OUT REPORT:    Verbal report given to [place] on Mirna Montalvo  being transferred to GI Lab for ordered procedure       Report consisted of patient's Situation, Background, Assessment and   Recommendations(SBAR). Information from the following report(s) Nurse Handoff Report was reviewed with the receiving nurse. Lines:   Peripheral IV 10/01/22 Right Wrist (Active)   Site Assessment Clean, dry & intact 10/04/22 1315   Line Status Flushed; Infusing 10/04/22 1315   Line Care Connections checked and tightened 10/03/22 1905   Phlebitis Assessment No symptoms 10/04/22 1315   Infiltration Assessment 0 10/04/22 1315   Alcohol Cap Used Yes 10/03/22 1905   Dressing Status Clean, dry & intact 10/04/22 0645   Dressing Type Transparent 10/03/22 1905        Opportunity for questions and clarification was provided.       Patient transported with:  [employer]

## 2022-10-04 NOTE — PROGRESS NOTES
TRANSFER - OUT REPORT:    Verbal report given to Emeli Covington RN  on Doraine Section  being transferred to [facility] for routine post-op       Report consisted of patient's Situation, Background, Assessment and   Recommendations(SBAR). Information from the following report(s) Nurse Handoff Report and Event Log was reviewed with the receiving nurse. Lines:   Peripheral IV 10/01/22 Right Wrist (Active)   Site Assessment Clean, dry & intact 10/04/22 1315   Line Status Flushed; Infusing 10/04/22 1315   Line Care Connections checked and tightened 10/03/22 1905   Phlebitis Assessment No symptoms 10/04/22 1315   Infiltration Assessment 0 10/04/22 1315   Alcohol Cap Used Yes 10/03/22 1905   Dressing Status Clean, dry & intact 10/04/22 0645   Dressing Type Transparent 10/03/22 1905        Opportunity for questions and clarification was provided.       Patient transported with:  [employer]

## 2022-10-04 NOTE — H&P
History and Physical for Outpatient Procedure             Date: 10/4/2022     History of Present Illness:  Patient presents for EGD due to complaint of abdominal pain.        Past Medical History:   Diagnosis Date    EPIFANIO (acute kidney injury) ([address] 12/04/2014    pt denies this dx    Arthritis     RA-abdelrahman hands    Back pain 6/10/2016    Breast cancer ([address] 2018    Breast lump dx 2/2018    left    Bronchitis     Chronic obstructive pulmonary disease ([address]     Discharge from the vagina     Dysuria     Eczema 6/10/2016    Fungal dermatitis     Headache 6/10/2016    Hypercholesterolemia 12/4/2014    Hypertension     not well controlled--per pt    Intractable vomiting 5/20/2018    Menopausal vaginal dryness     Osteoarthritis 6/10/2016    Osteoporosis 6/10/2016    Pancreatitis     2 episodes    PUD (peptic ulcer disease)     last 2003 which a rupture an extensive surgery    Sepsis ([address] 12/4/2014    Thyroid nodule     Tobacco abuse     1ppd x 49 yrs     Past Surgical History:   Procedure Laterality Date    APPENDECTOMY  1977    with hysterectomy    BREAST BIOPSY Left 1975    BREAST LUMPECTOMY Left 2/22/2018    LEFT BREAST LUMPECTOMY/ SENTINEL NODE BIOPSY performed by Mae Murray MD at [facility] MA[facility] OR    BREAST SURGERY Left 1975    breast lumpectomy, benign  (left)    CATARACT REMOVAL Bilateral 2018    w/IOL    CHOLECYSTECTOMY      HYSTERECTOMY (CERVIX STATUS UNKNOWN)  1977    OTHER SURGICAL HISTORY      hernicolectomy 2 cm    MT ABDOMEN SURGERY PROC UNLISTED  2003    stomach surgery for ruptured ulcer and extensive rerouting of intestestines per patient     [address]  05/21/2018    empiric dilation    UPPER GASTROINTESTINAL ENDOSCOPY      US BREAST NEEDLE BIOPSY LEFT Left 1/31/2018    US BREAST NEEDLE BIOPSY LEFT 1/31/2018 [facility]     In and  Family History   Problem Relation Age of Onset    Thyroid Cancer Neg Hx     No Known Problems Paternal Grandfather     No Known Problems Paternal Grandmother     No Known Problems Maternal Grandfather     No Known Problems Maternal Grandmother     Hypertension Other         Brother and sister with htn    No Known Problems Brother     Hypertension Mother     Heart Disease Brother     Heart Disease Sister     Breast Cancer Sister 79    Cancer Sister     Heart Disease Father     Heart Attack Father     Cancer Brother         prostate    Diabetes Sister     Heart Disease Sister      Social History     Tobacco Use    Smoking status: Every Day     Packs/day: 0.50     Types: Cigarettes     Start date: 5/6/1966    Smokeless tobacco: Never   Substance Use Topics    Alcohol use: No        Allergies   Allergen Reactions    Azithromycin Other (See Comments)     pancreatitis    Codeine Nausea And Vomiting     Current Facility-Administered Medications   Medication Dose Route Frequency    morphine injection 4 mg  4 mg IntraVENous Q4H PRN    losartan (COZAAR) tablet 100 mg  100 mg Oral Daily    polyethylene glycol (GLYCOLAX) packet 17 g  17 g Oral Daily    docusate sodium (COLACE) capsule 100 mg  100 mg Oral Daily    pantoprazole (PROTONIX) 40 mg in sodium chloride (PF) 10 mL injection  40 mg IntraVENous Daily    multivitamin 1 tablet  1 tablet Oral Daily    labetalol (NORMODYNE;TRANDATE) injection 10 mg  10 mg IntraVENous Q4H PRN    amLODIPine (NORVASC) tablet 5 mg  5 mg Oral Daily    potassium chloride (KLOR-CON M) extended release tablet 20 mEq  20 mEq Oral BID WC    magnesium oxide (MAG-OX) tablet 400 mg  400 mg Oral BID    sodium chloride flush 0.9 % injection 5-40 mL  5-40 mL IntraVENous 2 times per day    sodium chloride flush 0.9 % injection 5-40 mL  5-40 mL IntraVENous PRN    0.9 % sodium chloride infusion   IntraVENous PRN    enoxaparin Sodium (LOVENOX) injection 30 mg  30 mg SubCUTAneous Daily    ondansetron (ZOFRAN-ODT) disintegrating tablet 4 mg  4 mg Oral Q8H PRN    Or    ondansetron (ZOFRAN) injection 4 mg  4 mg IntraVENous Q6H PRN acetaminophen (TYLENOL) tablet 650 mg  650 mg Oral Q6H PRN    Or    acetaminophen (TYLENOL) suppository 650 mg  650 mg Rectal Q6H PRN    0.9 % sodium chloride infusion   IntraVENous Continuous    hydrALAZINE (APRESOLINE) injection 10 mg  10 mg IntraVENous Q6H PRN    donepezil (ARICEPT) tablet 5 mg  5 mg Oral Nightly    sodium chloride (OCEAN, BABY AYR) 0.65 % nasal spray 2 spray  2 spray Each Nostril Q2H PRN        Review of Systems:  A detailed 10 organ review of systems is obtained with pertinent positives as listed in the History of Present Illness. All others are negative. Objective:     Physical Exam:  BP (!) 166/55   Pulse 83   Temp 98.4 °F (36.9 °C) (Oral)   Resp 18   Ht 5' (1.524 m)   Wt 104 lb 0.9 oz (47.2 kg)   SpO2 96%   BMI 20.32 kg/m²    General:  Alert and oriented. Heart: Regular rate and rhythm  Lungs:  Clear to auscultation bilaterally  Abdomen: Soft, nontender, nondistended    Impression/Plan:     Proceed with EGD as planned. I have discussed with the patient the technique, benefits, alternatives, and risks of these procedures, including medication reaction, immediate or delayed bleeding, or perforation of the gastrointestinal tract.      Signed By: Lara Packer MD     October 4, 2022

## 2022-10-05 VITALS
TEMPERATURE: 97.7 F | RESPIRATION RATE: 20 BRPM | HEIGHT: 62 IN | OXYGEN SATURATION: 96 % | WEIGHT: 104 LBS | HEART RATE: 86 BPM | SYSTOLIC BLOOD PRESSURE: 180 MMHG | DIASTOLIC BLOOD PRESSURE: 68 MMHG | BODY MASS INDEX: 19.14 KG/M2

## 2022-10-05 PROBLEM — K85.90 ACUTE PANCREATITIS, UNSPECIFIED COMPLICATION STATUS, UNSPECIFIED PANCREATITIS TYPE: Status: RESOLVED | Noted: 2022-10-01 | Resolved: 2022-10-05

## 2022-10-05 LAB
ALBUMIN SERPL-MCNC: 2.1 G/DL (ref 3.2–4.6)
ALBUMIN/GLOB SERPL: 0.7 {RATIO} (ref 1.2–3.5)
ALP SERPL-CCNC: 75 U/L (ref 50–136)
ALT SERPL-CCNC: 12 U/L (ref 12–65)
ANION GAP SERPL CALC-SCNC: 9 MMOL/L (ref 4–13)
AST SERPL-CCNC: 13 U/L (ref 15–37)
BASOPHILS # BLD: 0.1 K/UL (ref 0–0.2)
BASOPHILS NFR BLD: 1 % (ref 0–2)
BILIRUB SERPL-MCNC: 0.5 MG/DL (ref 0.2–1.1)
BUN SERPL-MCNC: 4 MG/DL (ref 8–23)
CALCIUM SERPL-MCNC: 7.5 MG/DL (ref 8.3–10.4)
CHLORIDE SERPL-SCNC: 114 MMOL/L (ref 101–110)
CO2 SERPL-SCNC: 20 MMOL/L (ref 21–32)
CREAT SERPL-MCNC: 0.4 MG/DL (ref 0.6–1)
DIFFERENTIAL METHOD BLD: ABNORMAL
EOSINOPHIL # BLD: 0.3 K/UL (ref 0–0.8)
EOSINOPHIL NFR BLD: 3 % (ref 0.5–7.8)
ERYTHROCYTE [DISTWIDTH] IN BLOOD BY AUTOMATED COUNT: 14.9 % (ref 11.9–14.6)
GLOBULIN SER CALC-MCNC: 3.2 G/DL (ref 2.3–3.5)
GLUCOSE SERPL-MCNC: 82 MG/DL (ref 65–100)
HCT VFR BLD AUTO: 30.8 % (ref 35.8–46.3)
HGB BLD-MCNC: 9.7 G/DL (ref 11.7–15.4)
IMM GRANULOCYTES # BLD AUTO: 0 K/UL (ref 0–0.5)
IMM GRANULOCYTES NFR BLD AUTO: 0 % (ref 0–5)
LYMPHOCYTES # BLD: 0.9 K/UL (ref 0.5–4.6)
LYMPHOCYTES NFR BLD: 10 % (ref 13–44)
MAGNESIUM SERPL-MCNC: 1.8 MG/DL (ref 1.8–2.4)
MCH RBC QN AUTO: 25.5 PG (ref 26.1–32.9)
MCHC RBC AUTO-ENTMCNC: 31.5 G/DL (ref 31.4–35)
MCV RBC AUTO: 81.1 FL (ref 79.6–97.8)
MONOCYTES # BLD: 0.8 K/UL (ref 0.1–1.3)
MONOCYTES NFR BLD: 9 % (ref 4–12)
NEUTS SEG # BLD: 6.9 K/UL (ref 1.7–8.2)
NEUTS SEG NFR BLD: 77 % (ref 43–78)
NRBC # BLD: 0 K/UL (ref 0–0.2)
PLATELET # BLD AUTO: 387 K/UL (ref 150–450)
PMV BLD AUTO: 9.7 FL (ref 9.4–12.3)
POTASSIUM SERPL-SCNC: 3.1 MMOL/L (ref 3.5–5.1)
PROT SERPL-MCNC: 5.3 G/DL (ref 6.3–8.2)
RBC # BLD AUTO: 3.8 M/UL (ref 4.05–5.2)
SODIUM SERPL-SCNC: 143 MMOL/L (ref 136–145)
WBC # BLD AUTO: 9 K/UL (ref 4.3–11.1)

## 2022-10-05 PROCEDURE — 85025 COMPLETE CBC W/AUTO DIFF WBC: CPT

## 2022-10-05 PROCEDURE — 6370000000 HC RX 637 (ALT 250 FOR IP): Performed by: INTERNAL MEDICINE

## 2022-10-05 PROCEDURE — 36415 COLL VENOUS BLD VENIPUNCTURE: CPT

## 2022-10-05 PROCEDURE — 6360000002 HC RX W HCPCS: Performed by: HOSPITALIST

## 2022-10-05 PROCEDURE — 6370000000 HC RX 637 (ALT 250 FOR IP): Performed by: FAMILY MEDICINE

## 2022-10-05 PROCEDURE — 2500000003 HC RX 250 WO HCPCS: Performed by: HOSPITALIST

## 2022-10-05 PROCEDURE — 6370000000 HC RX 637 (ALT 250 FOR IP): Performed by: STUDENT IN AN ORGANIZED HEALTH CARE EDUCATION/TRAINING PROGRAM

## 2022-10-05 PROCEDURE — 80053 COMPREHEN METABOLIC PANEL: CPT

## 2022-10-05 PROCEDURE — 2580000003 HC RX 258: Performed by: HOSPITALIST

## 2022-10-05 PROCEDURE — 83735 ASSAY OF MAGNESIUM: CPT

## 2022-10-05 RX ORDER — AMLODIPINE BESYLATE 10 MG/1
10 TABLET ORAL DAILY
Qty: 30 TABLET | Refills: 0 | Status: SHIPPED | OUTPATIENT
Start: 2022-10-06 | End: 2022-10-12 | Stop reason: SDUPTHER

## 2022-10-05 RX ORDER — LOSARTAN POTASSIUM 100 MG/1
100 TABLET ORAL DAILY
Qty: 30 TABLET | Refills: 0 | Status: SHIPPED | OUTPATIENT
Start: 2022-10-05 | End: 2022-10-12 | Stop reason: SDUPTHER

## 2022-10-05 RX ORDER — ONDANSETRON 4 MG/1
4 TABLET, ORALLY DISINTEGRATING ORAL EVERY 8 HOURS PRN
Qty: 30 TABLET | Refills: 0 | Status: SHIPPED | OUTPATIENT
Start: 2022-10-05

## 2022-10-05 RX ORDER — AMLODIPINE BESYLATE 10 MG/1
10 TABLET ORAL DAILY
Status: DISCONTINUED | OUTPATIENT
Start: 2022-10-05 | End: 2022-10-05 | Stop reason: HOSPADM

## 2022-10-05 RX ORDER — HYDROCODONE BITARTRATE AND ACETAMINOPHEN 5; 325 MG/1; MG/1
1 TABLET ORAL EVERY 6 HOURS PRN
Status: DISCONTINUED | OUTPATIENT
Start: 2022-10-05 | End: 2022-10-05 | Stop reason: HOSPADM

## 2022-10-05 RX ORDER — HYDROCODONE BITARTRATE AND ACETAMINOPHEN 5; 325 MG/1; MG/1
1 TABLET ORAL EVERY 6 HOURS PRN
Qty: 12 TABLET | Refills: 0 | Status: SHIPPED | OUTPATIENT
Start: 2022-10-05 | End: 2022-10-08

## 2022-10-05 RX ORDER — HYDRALAZINE HYDROCHLORIDE 25 MG/1
25 TABLET, FILM COATED ORAL EVERY 8 HOURS SCHEDULED
Qty: 90 TABLET | Refills: 0 | Status: SHIPPED | OUTPATIENT
Start: 2022-10-05 | End: 2022-10-12 | Stop reason: SDUPTHER

## 2022-10-05 RX ADMIN — MAGNESIUM GLUCONATE 500 MG ORAL TABLET 400 MG: 500 TABLET ORAL at 08:37

## 2022-10-05 RX ADMIN — LABETALOL HYDROCHLORIDE 10 MG: 5 INJECTION INTRAVENOUS at 00:18

## 2022-10-05 RX ADMIN — POLYETHYLENE GLYCOL 3350 17 G: 17 POWDER, FOR SOLUTION ORAL at 08:39

## 2022-10-05 RX ADMIN — HYDRALAZINE HYDROCHLORIDE 25 MG: 25 TABLET, FILM COATED ORAL at 14:28

## 2022-10-05 RX ADMIN — HYDRALAZINE HYDROCHLORIDE 25 MG: 25 TABLET, FILM COATED ORAL at 04:58

## 2022-10-05 RX ADMIN — SODIUM CHLORIDE, PRESERVATIVE FREE 10 ML: 5 INJECTION INTRAVENOUS at 08:40

## 2022-10-05 RX ADMIN — LOSARTAN POTASSIUM 100 MG: 50 TABLET, FILM COATED ORAL at 08:36

## 2022-10-05 RX ADMIN — MORPHINE SULFATE 4 MG: 2 INJECTION, SOLUTION INTRAMUSCULAR; INTRAVENOUS at 01:20

## 2022-10-05 RX ADMIN — POTASSIUM CHLORIDE 20 MEQ: 1500 TABLET, EXTENDED RELEASE ORAL at 08:37

## 2022-10-05 RX ADMIN — ENOXAPARIN SODIUM 30 MG: 100 INJECTION SUBCUTANEOUS at 08:38

## 2022-10-05 RX ADMIN — DOCUSATE SODIUM 100 MG: 100 CAPSULE, LIQUID FILLED ORAL at 08:37

## 2022-10-05 RX ADMIN — AMLODIPINE BESYLATE 10 MG: 5 TABLET ORAL at 08:36

## 2022-10-05 RX ADMIN — Medication 1 TABLET: at 08:37

## 2022-10-05 ASSESSMENT — PAIN - FUNCTIONAL ASSESSMENT: PAIN_FUNCTIONAL_ASSESSMENT: PREVENTS OR INTERFERES SOME ACTIVE ACTIVITIES AND ADLS

## 2022-10-05 ASSESSMENT — PAIN SCALES - WONG BAKER: WONGBAKER_NUMERICALRESPONSE: 2

## 2022-10-05 ASSESSMENT — PAIN DESCRIPTION - ORIENTATION: ORIENTATION: RIGHT;UPPER;ANTERIOR

## 2022-10-05 ASSESSMENT — PAIN DESCRIPTION - DESCRIPTORS: DESCRIPTORS: STABBING;SHARP

## 2022-10-05 ASSESSMENT — PAIN SCALES - GENERAL
PAINLEVEL_OUTOF10: 0
PAINLEVEL_OUTOF10: 7
PAINLEVEL_OUTOF10: 5

## 2022-10-05 ASSESSMENT — PAIN DESCRIPTION - LOCATION: LOCATION: ABDOMEN

## 2022-10-05 NOTE — PROGRESS NOTES
END OF SHIFT NOTE:    INTAKE/OUTPUT  10/04 0701 - 10/05 0700  In: 2597.9 [P.O.:460; I.V.:2137.9]  Out: 2750 [Urine:2750]  Voiding: Yes  Catheter: No        Flatus: Patient does have flatus present. Stool:  0 occurrences. Emesis: 0 occurrences. VITAL SIGNS  Patient Vitals for the past 12 hrs:   Temp Pulse Resp BP SpO2   10/05/22 0458 -- -- -- (!) 158/60 --   10/05/22 0349 98.4 °F (36.9 °C) 65 18 (!) 158/60 95 %   10/05/22 0150 -- -- 18 -- --   10/05/22 0120 -- -- 19 -- --   10/04/22 2330 98.2 °F (36.8 °C) 84 18 (!) 175/64 95 %   10/04/22 2115 -- -- -- (!) 160/61 --   10/04/22 1934 -- 84 -- -- 95 %   10/04/22 1915 98.2 °F (36.8 °C) 84 17 (!) 160/61 95 %       Ambulating  Yes    Shift report given to oncoming nurse at the bedside.     Yanique Duque RN

## 2022-10-05 NOTE — DISCHARGE SUMMARY
Hospitalist Discharge Summary   Admit Date:  2022  9:27 PM   DC Note date: 10/5/2022  Name:  Sandy Ortega   Age:  80 y.o. Sex:  female  :  1940   MRN:  [id number]   Room:    PCP:  Jesus Bal MD    Presenting Complaint: Abdominal Pain     Initial Admission Diagnosis: Hypokalemia [E87.6]  Acute pancreatitis, unspecified complication status, unspecified pancreatitis type [K85.90]  Acute pancreatitis without infection or necrosis, unspecified pancreatitis type [K85.90]  Other acute pancreatitis without infection or necrosis [K85.80]     Problem List for this Hospitalization (present on admission):    Principal Problem (Resolved):    Acute pancreatitis, unspecified complication status, unspecified pancreatitis type  Active Problems:    Other acute pancreatitis without infection or necrosis    Hypercholesterolemia    HTN (hypertension)    Hypokalemia      Hospital Course:  Ms. Kim Chappell is a 79 y/o WF with HTN, RA, idiopathic pancreatitis (s/p EUS in ) who was admitted to our service on  with acute pancreatitis. She was made NPO and started on IVFs. GI was consulted on 10/3 given ongoing NSAID use at home to eval for PUD. She underwent EGD 10/4 which was unremarkable. Her pain has improved and diet was advanced to regular on 10/5. She has tolerated a regular diet and eager to go home. have refilled her losartan and new scripts for Norvasc and hydralazine added. She is medically stable for discharge. I    Disposition: Home  Diet: ADULT DIET; Regular; Low Fat/Low Chol/High Fiber/DAVID  Code Status: Full Code    Follow Ups:      Time spent in patient discharge and coordination 35 minutes. Follow up labs/diagnostics (ultimately defer to outpatient provider):  N/A    Plan was discussed with patient, RN, CM. All questions answered. Patient was stable at time of discharge. Instructions given to call a physician or return if any concerns.     Current Discharge Medication List START taking these medications    Details   HYDROcodone-acetaminophen (NORCO) 5-325 MG per tablet Take 1 tablet by mouth every 6 hours as needed for Pain for up to 3 days. Qty: 12 tablet, Refills: 0    Comments: Reduce doses taken as pain becomes manageable  Associated Diagnoses: Other acute pancreatitis without infection or necrosis      ondansetron (ZOFRAN-ODT) 4 MG disintegrating tablet Take 1 tablet by mouth every 8 hours as needed for Nausea or Vomiting  Qty: 30 tablet, Refills: 0      amLODIPine (NORVASC) 10 MG tablet Take 1 tablet by mouth daily  Qty: 30 tablet, Refills: 0      hydrALAZINE (APRESOLINE) 25 MG tablet Take 1 tablet by mouth every 8 hours  Qty: 90 tablet, Refills: 0           CONTINUE these medications which have CHANGED    Details   losartan (COZAAR) 100 MG tablet Take 1 tablet by mouth daily  Qty: 30 tablet, Refills: 0           CONTINUE these medications which have NOT CHANGED    Details   alendronate (FOSAMAX) 70 MG tablet TAKE 1 TABLET BY MOUTH EVERY WEEK      donepezil (ARICEPT) 5 MG tablet Take 5 mg by mouth      Etanercept (ENBREL SURECLICK) 50 MG/ML SOAJ Inject 50 mg into the skin every 7 days      pravastatin (PRAVACHOL) 40 MG tablet Take 40 mg by mouth           STOP taking these medications       Abatacept (ORENCIA CLICKJECT) 377 MG/ML SOAJ Comments:   Reason for Stopping:         predniSONE (DELTASONE) 20 MG tablet Comments:   Reason for Stopping:         ibuprofen (ADVIL;MOTRIN) 800 MG tablet Comments:   Reason for Stopping:               Procedures done this admission:  Procedure(s):  EGD ESOPHAGOGASTRODUODENOSCOPY/  203    Consults this admission:  IP CONSULT TO DIETITIAN  IP CONSULT TO GI    Echocardiogram results:  No results found for this or any previous visit. Diagnostic Imaging/Tests:   XR CHEST (2 VW)    Result Date: 10/1/2022  No evidence of an acute intrathoracic process. There is a 1.2 cm ill-defined nodule in the right lower lung. Its etiology is unclear. This can be further assessed with CT of the chest.     XR ABDOMEN (KUB) (SINGLE AP VIEW)    Result Date: 10/3/2022  Unremarkable bowel gas pattern. CT ABDOMEN PELVIS W IV CONTRAST Additional Contrast? Oral    Result Date: 10/1/2022  There is ill-defined fluid noted adjacent to the head of the pancreas anteriorly which appears to extend to the anterior abdominal wall along the fissure for the ligamentum teres. (See image number 27 of series 2.) There is also minimal fluid extension into the right paracolic gutter. This may be associated with pancreatitis. Dilated extrahepatic bile ducts and pancreatic duct may be associated with a reservoir effect secondary to cholecystectomy. Bibasilar atelectasis or infiltrate. Stable fracture of the L1 vertebral body.        Labs: Results:       BMP, Mg, Phos Recent Labs     10/03/22  0701 10/04/22  0754 10/05/22  0449    143 143   K 3.6 3.6 3.1*   * 116* 114*   CO2 21 19* 20*   ANIONGAP 5 8 9   BUN 7* 6* 4*   CREATININE 0.40* 0.40* 0.40*   LABGLOM >60 >60 >60   GFRAA >60  --   --    CALCIUM 8.0* 8.0* 7.5*   GLUCOSE 97 80 82   MG  --   --  1.8      CBC Recent Labs     10/04/22  0754 10/05/22  0449   WBC 9.0 9.0   RBC 3.82* 3.80*   HGB 10.1* 9.7*   HCT 31.2* 30.8*   MCV 81.7 81.1   MCH 26.4 25.5*   MCHC 32.4 31.5   RDW 14.9* 14.9*    387   MPV 9.9 9.7   NRBC 0.00 0.00   SEGS 78 77   LYMPHOPCT 11* 10*   EOSRELPCT 2 3   MONOPCT 8 9   BASOPCT 1 1   IMMGRAN 0 0   SEGSABS 7.0 6.9   LYMPHSABS 1.0 0.9   EOSABS 0.2 0.3   MONOSABS 0.7 0.8   BASOSABS 0.1 0.1   ABSIMMGRAN 0.0 0.0      LFT Recent Labs     10/04/22  0754 10/05/22  0449   BILITOT 0.6 0.5   ALKPHOS 92 75   AST 14* 13*   ALT 12 12   PROT 5.4* 5.3*   LABALBU 2.1* 2.1*   GLOB 3.3 3.2      Cardiac  No results found for: NTPROBNP, TROPHS   Coags No results found for: PROTIME, INR, APTT   A1c No results found for: LABA1C, EAG   Lipids Lab Results   Component Value Date/Time    CHOL 106 03/30/2022 09:25 AM [address] 38 03/30/2022 09:25 AM    LABVLDL 15.8 12/26/2019 07:46 AM    HDL 41 03/30/2022 09:25 AM    CHOLHDLRATIO 1.7 12/26/2019 07:46 AM    TRIG 165 03/30/2022 09:25 AM      Thyroid  No results found for: Alma Kiser     Most Recent UA Lab Results   Component Value Date/Time    GLUCOSEU Negative 09/30/2022 11:00 PM    BLOODU Negative 09/30/2022 11:00 PM        No results for input(s): CULTURE in the last 720 hours.     All Labs from Last 24 Hrs:  Recent Results (from the past 24 hour(s))   Comprehensive Metabolic Panel w/ Reflex to MG    Collection Time: 10/05/22  4:49 AM   Result Value Ref Range    Sodium 143 136 - 145 mmol/L    Potassium 3.1 (L) 3.5 - 5.1 mmol/L    Chloride 114 (H) 101 - 110 mmol/L    CO2 20 (L) 21 - 32 mmol/L    Anion Gap 9 4 - 13 mmol/L    Glucose 82 65 - 100 mg/dL    BUN 4 (L) 8 - 23 MG/DL    Creatinine 0.40 (L) 0.6 - 1.0 MG/DL    Est, Glom Filt Rate >60 >60 ml/min/1.73m2    Calcium 7.5 (L) 8.3 - 10.4 MG/DL    Total Bilirubin 0.5 0.2 - 1.1 MG/DL    ALT 12 12 - 65 U/L    AST 13 (L) 15 - 37 U/L    Alk Phosphatase 75 50 - 136 U/L    Total Protein 5.3 (L) 6.3 - 8.2 g/dL    Albumin 2.1 (L) 3.2 - 4.6 g/dL    Globulin 3.2 2.3 - 3.5 g/dL    Albumin/Globulin Ratio 0.7 (L) 1.2 - 3.5     CBC with Auto Differential    Collection Time: 10/05/22  4:49 AM   Result Value Ref Range    WBC 9.0 4.3 - 11.1 K/uL    RBC 3.80 (L) 4.05 - 5.2 M/uL    Hemoglobin 9.7 (L) 11.7 - 15.4 g/dL    Hematocrit 30.8 (L) 35.8 - 46.3 %    MCV 81.1 79.6 - 97.8 FL    MCH 25.5 (L) 26.1 - 32.9 PG    MCHC 31.5 31.4 - 35.0 g/dL    RDW 14.9 (H) 11.9 - 14.6 %    Platelets 538 963 - 190 K/uL    MPV 9.7 9.4 - 12.3 FL    nRBC 0.00 0.0 - 0.2 K/uL    Differential Type AUTOMATED      Seg Neutrophils 77 43 - 78 %    Lymphocytes 10 (L) 13 - 44 %    Monocytes 9 4.0 - 12.0 %    Eosinophils % 3 0.5 - 7.8 %    Basophils 1 0.0 - 2.0 %    Immature Granulocytes 0 0.0 - 5.0 %    Segs Absolute 6.9 1.7 - 8.2 K/UL    Absolute Lymph # 0.9 0.5 - 4.6 K/UL Absolute Mono # 0.8 0.1 - 1.3 K/UL    Absolute Eos # 0.3 0.0 - 0.8 K/UL    Basophils Absolute 0.1 0.0 - 0.2 K/UL    Absolute Immature Granulocyte 0.0 0.0 - 0.5 K/UL   Magnesium    Collection Time: 10/05/22  4:49 AM   Result Value Ref Range    Magnesium 1.8 1.8 - 2.4 mg/dL       Allergies   Allergen Reactions    Azithromycin Other (See Comments)     pancreatitis    Codeine Nausea And Vomiting       There is no immunization history on file for this patient. Recent Vital Data:  Patient Vitals for the past 24 hrs:   Temp Pulse Resp BP SpO2   10/05/22 1146 98.4 °F (36.9 °C) 71 18 (!) 155/71 96 %   10/05/22 0836 -- -- -- (!) 158/65 --   10/05/22 0605 97.7 °F (36.5 °C) 68 17 (!) 158/65 95 %   10/05/22 0458 -- -- -- (!) 158/60 --   10/05/22 0349 98.4 °F (36.9 °C) 65 18 (!) 158/60 95 %   10/05/22 0150 -- -- 18 -- --   10/05/22 0120 -- -- 19 -- --   10/04/22 2330 98.2 °F (36.8 °C) 84 18 (!) 175/64 95 %   10/04/22 2115 -- -- -- (!) 160/61 --   10/04/22 1934 -- 84 -- -- 95 %   10/04/22 1915 98.2 °F (36.8 °C) 84 17 (!) 160/61 95 %   10/04/22 1526 97.9 °F (36.6 °C) 78 16 (!) 160/64 96 %   10/04/22 1500 -- 80 16 (!) 134/56 96 %   10/04/22 1450 -- 89 16 133/62 96 %   10/04/22 1440 -- 74 16 (!) 114/51 93 %   10/04/22 1431 98.6 °F (37 °C) 82 15 (!) 109/56 96 %   10/04/22 1417 -- 75 22 (!) 177/70 97 %       Oxygen Therapy  SpO2: 96 %  Pulse Oximetry Type: Continuous  Pulse via Oximetry: 82 beats per minute  Pulse Oximeter Device Mode: Continuous  Pulse Oximeter Device Location: Left, Finger  O2 Device: None (Room air)  Oxygen Therapy: None (Room air)    Estimated body mass index is 19.02 kg/m² as calculated from the following:    Height as of this encounter: 5' 2\" (1.575 m). Weight as of this encounter: 104 lb (47.2 kg).     Intake/Output Summary (Last 24 hours) at 10/5/2022 1324  Last data filed at 10/5/2022 1204  Gross per 24 hour   Intake 2597.9 ml   Output 3150 ml   Net -552.1 ml         Physical Exam:  General: Thin. No overt distress. Head:               Normocephalic, atraumatic  Eyes:               Sclerae appear normal. Pupils equally round. ENT:                Nares appear normal, no drainage. Moist oral mucosa  Neck:               No restricted ROM. Trachea midline. CV:                  RRR. No m/r/g. No jugular venous distension. Lungs:             CTAB. No wheezing, rhonchi, or rales. Respirations even, unlabored. Abdomen: Bowel sounds present. Soft, nondistended. Mild epigastric tenderness to palpation, no rashes or bruising. Extremities:     No cyanosis or clubbing. No edema. Skin:                No rashes and normal coloration. Warm and dry. Neuro:             CN II-XII grossly intact. Sensation intact. A&Ox3  Psych:             Normal mood and affect. Signed:  Lulu Harris MD    Part of this note may have been written by using a voice dictation software. The note has been proof read but may still contain some grammatical/other typographical errors.

## 2022-10-05 NOTE — PROGRESS NOTES
Physical Therapy Note:    Attempted to see patient this AM for physical therapy treatment  session. Patient refused therapy this am but requesting that this writer return after lunch. Will follow and re-attempt as schedule permits/patient available.  Thank you,    Cami Murillo [facility]     Rehab Caseload Tracker [As Noted in HPI] : as noted in HPI [Negative] : Heme/Lymph

## 2022-10-05 NOTE — PROGRESS NOTES
Hospitalist Progress Note   Admit Date:  2022  9:27 PM   Name:  Britta Pruett   Age:  80 y.o. Sex:  female  :  1940   MRN:  [id number]   Room:      Reason(s) for Admission: Hypokalemia [E87.6]  Acute pancreatitis, unspecified complication status, unspecified pancreatitis type [K85.90]  Acute pancreatitis without infection or necrosis, unspecified pancreatitis type [K85.90]  Other acute pancreatitis without infection or necrosis [facility] Course & Interval History:   Ms. Doug Grace is a 79 y/o WF with HTN, RA, idiopathic pancreatitis (s/p EUS in ) who was admitted to our service on  with acute pancreatitis. She was made NPO and started on IVFs. GI was consulted on 10/3 given ongoing NSAID use at home to eval for PUD. She underwent EGD 10/4 which was unremarkable. Subjective/24hr Events (10/05/22): Had an episode of epigastric pain yesterday, feels ready to advance her diet to regular food. BP uncontrolled. No N/V/D, chest pain or SOB. Assessment & Plan:   # Acute pancreatitis   - Advance diet, stop IVFs if tolerable. PO pain control. # HypoK   - Mg normal, replace PO. # HTN   - Increase Norvasc to 10mg, con't Losartan and hydralazine. # RA   - Outpatient mgmt    Discharge Planning: Home pending diet, today v tomororw. Diet:  ADULT DIET;  Regular; Low Fat/Low Chol/High Fiber/DAVID  DVT PPx: Lovenox  Code status: Full Code    Hospital Problems             Last Modified POA    * (Principal) Acute pancreatitis, unspecified complication status, unspecified pancreatitis type 10/1/2022 Yes    Other acute pancreatitis without infection or necrosis 10/2/2022 Yes    Hypercholesterolemia 10/1/2022 Yes    HTN (hypertension) 10/1/2022 Yes    Hypokalemia 10/1/2022 Yes       Objective:   Patient Vitals for the past 24 hrs:   Temp Pulse Resp BP SpO2   10/05/22 0605 97.7 °F (36.5 °C) 68 17 (!) 158/65 95 %   10/05/22 0458 -- -- -- (!) 158/60 --   10/05/22 0349 98.4 °F (36.9 °C) 65 18 (!) 158/60 95 %   10/05/22 0150 -- -- 18 -- --   10/05/22 0120 -- -- 19 -- --   10/04/22 2330 98.2 °F (36.8 °C) 84 18 (!) 175/64 95 %   10/04/22 2115 -- -- -- (!) 160/61 --   10/04/22 1934 -- 84 -- -- 95 %   10/04/22 1915 98.2 °F (36.8 °C) 84 17 (!) 160/61 95 %   10/04/22 1526 97.9 °F (36.6 °C) 78 16 (!) 160/64 96 %   10/04/22 1500 -- 80 16 (!) 134/56 96 %   10/04/22 1450 -- 89 16 133/62 96 %   10/04/22 1440 -- 74 16 (!) 114/51 93 %   10/04/22 1431 98.6 °F (37 °C) 82 15 (!) 109/56 96 %   10/04/22 1417 -- 75 22 (!) 177/70 97 %   10/04/22 1309 98.8 °F (37.1 °C) 72 18 (!) 169/77 95 %   10/04/22 1106 -- 87 -- (!) 180/66 96 %   10/04/22 1105 98.6 °F (37 °C) (!) 101 18 (!) 188/78 92 %       Estimated body mass index is 19.02 kg/m² as calculated from the following:    Height as of this encounter: 5' 2\" (1.575 m). Weight as of this encounter: 104 lb (47.2 kg). Intake/Output Summary (Last 24 hours) at 10/5/2022 0830  Last data filed at 10/5/2022 0502  Gross per 24 hour   Intake 2597.9 ml   Output 2750 ml   Net -152.1 ml         Physical Exam:   Blood pressure (!) 158/65, pulse 68, temperature 97.7 °F (36.5 °C), temperature source Oral, resp. rate 17, height 5' 2\" (1.575 m), weight 104 lb (47.2 kg), SpO2 95 %. General:    Thin. No overt distress. Head:  Normocephalic, atraumatic  Eyes:  Sclerae appear normal. Pupils equally round. ENT:  Nares appear normal, no drainage. Moist oral mucosa  Neck:  No restricted ROM. Trachea midline. CV:   RRR. No m/r/g. No jugular venous distension. Lungs:   CTAB. No wheezing, rhonchi, or rales. Respirations even, unlabored. Abdomen: Bowel sounds present. Soft, nondistended. Mild epigastric tenderness to palpation, no rashes or bruising. Extremities: No cyanosis or clubbing. No edema. Skin:     No rashes and normal coloration. Warm and dry. Neuro:  CN II-XII grossly intact. Sensation intact. A&Ox3  Psych:  Normal mood and affect.       I have reviewed ordered lab tests and independently visualized imaging below:    Recent Labs:  Recent Results (from the past 48 hour(s))   Comprehensive Metabolic Panel w/ Reflex to MG    Collection Time: 10/04/22  7:54 AM   Result Value Ref Range    Sodium 143 136 - 145 mmol/L    Potassium 3.6 3.5 - 5.1 mmol/L    Chloride 116 (H) 101 - 110 mmol/L    CO2 19 (L) 21 - 32 mmol/L    Anion Gap 8 4 - 13 mmol/L    Glucose 80 65 - 100 mg/dL    BUN 6 (L) 8 - 23 MG/DL    Creatinine 0.40 (L) 0.6 - 1.0 MG/DL    Est, Glom Filt Rate >60 >60 ml/min/1.73m2    Calcium 8.0 (L) 8.3 - 10.4 MG/DL    Total Bilirubin 0.6 0.2 - 1.1 MG/DL    ALT 12 12 - 65 U/L    AST 14 (L) 15 - 37 U/L    Alk Phosphatase 92 50 - 136 U/L    Total Protein 5.4 (L) 6.3 - 8.2 g/dL    Albumin 2.1 (L) 3.2 - 4.6 g/dL    Globulin 3.3 2.3 - 3.5 g/dL    Albumin/Globulin Ratio 0.6 (L) 1.2 - 3.5     CBC with Auto Differential    Collection Time: 10/04/22  7:54 AM   Result Value Ref Range    WBC 9.0 4.3 - 11.1 K/uL    RBC 3.82 (L) 4.05 - 5.2 M/uL    Hemoglobin 10.1 (L) 11.7 - 15.4 g/dL    Hematocrit 31.2 (L) 35.8 - 46.3 %    MCV 81.7 79.6 - 97.8 FL    MCH 26.4 26.1 - 32.9 PG    MCHC 32.4 31.4 - 35.0 g/dL    RDW 14.9 (H) 11.9 - 14.6 %    Platelets 595 587 - 889 K/uL    MPV 9.9 9.4 - 12.3 FL    nRBC 0.00 0.0 - 0.2 K/uL    Differential Type AUTOMATED      Seg Neutrophils 78 43 - 78 %    Lymphocytes 11 (L) 13 - 44 %    Monocytes 8 4.0 - 12.0 %    Eosinophils % 2 0.5 - 7.8 %    Basophils 1 0.0 - 2.0 %    Immature Granulocytes 0 0.0 - 5.0 %    Segs Absolute 7.0 1.7 - 8.2 K/UL    Absolute Lymph # 1.0 0.5 - 4.6 K/UL    Absolute Mono # 0.7 0.1 - 1.3 K/UL    Absolute Eos # 0.2 0.0 - 0.8 K/UL    Basophils Absolute 0.1 0.0 - 0.2 K/UL    Absolute Immature Granulocyte 0.0 0.0 - 0.5 K/UL   Comprehensive Metabolic Panel w/ Reflex to MG    Collection Time: 10/05/22  4:49 AM   Result Value Ref Range    Sodium 143 136 - 145 mmol/L    Potassium 3.1 (L) 3.5 - 5.1 mmol/L    Chloride 114 (H) 101 - 110 mmol/L CO2 20 (L) 21 - 32 mmol/L    Anion Gap 9 4 - 13 mmol/L    Glucose 82 65 - 100 mg/dL    BUN 4 (L) 8 - 23 MG/DL    Creatinine 0.40 (L) 0.6 - 1.0 MG/DL    Est, Glom Filt Rate >60 >60 ml/min/1.73m2    Calcium 7.5 (L) 8.3 - 10.4 MG/DL    Total Bilirubin 0.5 0.2 - 1.1 MG/DL    ALT 12 12 - 65 U/L    AST 13 (L) 15 - 37 U/L    Alk Phosphatase 75 50 - 136 U/L    Total Protein 5.3 (L) 6.3 - 8.2 g/dL    Albumin 2.1 (L) 3.2 - 4.6 g/dL    Globulin 3.2 2.3 - 3.5 g/dL    Albumin/Globulin Ratio 0.7 (L) 1.2 - 3.5     CBC with Auto Differential    Collection Time: 10/05/22  4:49 AM   Result Value Ref Range    WBC 9.0 4.3 - 11.1 K/uL    RBC 3.80 (L) 4.05 - 5.2 M/uL    Hemoglobin 9.7 (L) 11.7 - 15.4 g/dL    Hematocrit 30.8 (L) 35.8 - 46.3 %    MCV 81.1 79.6 - 97.8 FL    MCH 25.5 (L) 26.1 - 32.9 PG    MCHC 31.5 31.4 - 35.0 g/dL    RDW 14.9 (H) 11.9 - 14.6 %    Platelets 282 711 - 823 K/uL    MPV 9.7 9.4 - 12.3 FL    nRBC 0.00 0.0 - 0.2 K/uL    Differential Type AUTOMATED      Seg Neutrophils 77 43 - 78 %    Lymphocytes 10 (L) 13 - 44 %    Monocytes 9 4.0 - 12.0 %    Eosinophils % 3 0.5 - 7.8 %    Basophils 1 0.0 - 2.0 %    Immature Granulocytes 0 0.0 - 5.0 %    Segs Absolute 6.9 1.7 - 8.2 K/UL    Absolute Lymph # 0.9 0.5 - 4.6 K/UL    Absolute Mono # 0.8 0.1 - 1.3 K/UL    Absolute Eos # 0.3 0.0 - 0.8 K/UL    Basophils Absolute 0.1 0.0 - 0.2 K/UL    Absolute Immature Granulocyte 0.0 0.0 - 0.5 K/UL   Magnesium    Collection Time: 10/05/22  4:49 AM   Result Value Ref Range    Magnesium 1.8 1.8 - 2.4 mg/dL         Other Studies:  XR ABDOMEN (KUB) (SINGLE AP VIEW)    Result Date: 10/3/2022  ABDOMINAL FILMS, 1 view(s). INDICATION: Abdominal pain, more in the right. TECHNIQUE:  Supine and views of the abdomen on 1 image(s). COMPARISON: CT scan 4 days prior FINDINGS: Scattered contrast throughout the colon. Surgical changes upper abdomen. No abnormally dilated bowel loops. Unremarkable bowel gas pattern.       Current Meds:  Current Facility-Administered Medications   Medication Dose Route Frequency    amLODIPine (NORVASC) tablet 10 mg  10 mg Oral Daily    HYDROcodone-acetaminophen (NORCO) 5-325 MG per tablet 1 tablet  1 tablet Oral Q6H PRN    hydrALAZINE (APRESOLINE) tablet 25 mg  25 mg Oral 3 times per day    morphine injection 4 mg  4 mg IntraVENous Q4H PRN    losartan (COZAAR) tablet 100 mg  100 mg Oral Daily    polyethylene glycol (GLYCOLAX) packet 17 g  17 g Oral Daily    docusate sodium (COLACE) capsule 100 mg  100 mg Oral Daily    pantoprazole (PROTONIX) 40 mg in sodium chloride (PF) 10 mL injection  40 mg IntraVENous Daily    multivitamin 1 tablet  1 tablet Oral Daily    potassium chloride (KLOR-CON M) extended release tablet 20 mEq  20 mEq Oral BID WC    magnesium oxide (MAG-OX) tablet 400 mg  400 mg Oral BID    sodium chloride flush 0.9 % injection 5-40 mL  5-40 mL IntraVENous 2 times per day    sodium chloride flush 0.9 % injection 5-40 mL  5-40 mL IntraVENous PRN    0.9 % sodium chloride infusion   IntraVENous PRN    enoxaparin Sodium (LOVENOX) injection 30 mg  30 mg SubCUTAneous Daily    ondansetron (ZOFRAN-ODT) disintegrating tablet 4 mg  4 mg Oral Q8H PRN    Or    ondansetron (ZOFRAN) injection 4 mg  4 mg IntraVENous Q6H PRN    acetaminophen (TYLENOL) tablet 650 mg  650 mg Oral Q6H PRN    Or    acetaminophen (TYLENOL) suppository 650 mg  650 mg Rectal Q6H PRN    0.9 % sodium chloride infusion   IntraVENous Continuous    hydrALAZINE (APRESOLINE) injection 10 mg  10 mg IntraVENous Q6H PRN    donepezil (ARICEPT) tablet 5 mg  5 mg Oral Nightly    sodium chloride (OCEAN, BABY AYR) 0.65 % nasal spray 2 spray  2 spray Each Nostril Q2H PRN       Signed:  Indira Valencia MD    Part of this note may have been written by using a voice dictation software. The note has been proof read but may still contain some grammatical/other typographical errors.

## 2022-10-05 NOTE — CARE COORDINATION
Patient with discharge orders today. Therapy recommending home health at discharge, patient declined services. Patient has met all treatment goals and milestones. Patient's family to provide transportation. CM following until discharged.          ASSESSMENT NOTE    Attending Physician: Constance Gore MD  Admit Problem: Hypokalemia [E87.6]  Acute pancreatitis, unspecified complication status, unspecified pancreatitis type [K85.90]  Acute pancreatitis without infection or necrosis, unspecified pancreatitis type [K85.90]  Other acute pancreatitis without infection or necrosis [K85.80]  Date/Time of Admission: 9/30/2022  9:27 PM  Problem List:  Patient Active Problem List   Diagnosis    PAD (peripheral artery disease) ([address]    Hypercholesterolemia    Back pain    Malignant neoplasm of upper-outer quadrant of left breast in female, estrogen receptor positive ([address]    History of peptic ulcer    Multiple thyroid nodules    Sinusitis    Intractable vomiting    History of acute pancreatitis    Compression fracture of L1 lumbar vertebra ([address]    Personal history of tobacco use, presenting hazards to health    HTN (hypertension)    Age-related osteoporosis with current pathological fracture    Chronic obstructive pulmonary disease (HCC)    Eczema    Osteoarthritis    Paraseptal emphysema ([address]    Hypokalemia    Osteoporosis    S/P lumpectomy, left breast    Acute pancreatitis    EPIFANIO (acute kidney injury) ([address]    Other acute pancreatitis without infection or necrosis       Service Assessment  Patient Orientation Alert and Oriented   Cognition Alert   History Provided By Patient   Primary Caregiver Self   Accompanied By/Relationship     Support Systems Children   Patient's Healthcare Decision Maker is: Legal Next of Kin (patient's daughter:  Haley [facility]  [phone])   PCP Verified by CM Yes (Dr Emilee Salazar  [phone])   Last Visit to PCP     Prior Functional Level     Current Functional Level     Can patient return to prior living arrangement Yes   Ability to make needs known: Good   Family able to assist with home care needs:     Would you like for me to discuss the discharge plan with any other family members/significant others, and if so, who? Financial Resources Russ Glass ([employer])   Community Resources     CM/SW Referral       Social/Functional History  Lives With Alone   Type of [facility] [address] Access     Entrance Stairs - Number of Steps     Entrance Stairs - Rails     Bathroom Shower/Tub     Bathroom Toilet     Bathroom Equipment     Bathroom Accessibility     Home Equipment     Receives Help From Family   ADL Assistance Independent   Bath     Dressing     Grooming     Feeding     Toileting     [place]   Meal Prep     Laundry     [address] Work     Driving     Shopping          Other (Comment)     [employer] Responsibilities     Meal Prep Responsibility     Laundry Responsibility     Cleaning Responsibility     Bill Paying/Finance Responsibility     [address] Management     Other (Comment)     Ambulation Assistance Independent   Transfer Assistance     Active      Patient's  Info     Mode of Transportation     Education     Occupation Retired   Type of Occupation       Discharge Planning   Type of [address] Prior To Admission None   Potential Assistance Needed N/A   DME     DME     DME Ordered? No   Potential Assistance Purchasing Medications No   Meds-to-Beds: Does the patient want to have any new prescriptions delivered to bedside prior to discharge?      Type of Home Care Services None   Patient expects to be discharged to: House   Follow Up Appointment: Best Day/Time     One/Two Story Residence:     # of Interior Steps     Height of Each Step (in) [address] [facility] Chair Available     History of Falls? No     Services At/After Discharge  Transition of Care Consult (CM Consult): Discharge Planning, [address]   Internal Home Health     Internal Hospice     Reason Outside Agency [address]     Partner SNF     Reason Why Partner SNF Not Chosen     Internal Comfort Care     Reason Outside [address] Discharge [address] Resource Information Provided? No   Mode of Transport at Discharge [address] Time of Discharge     Confirm Follow Up Transport       Condition of Participation: Discharge Planning  The plan for Transition of Care is related to the following treatment goals: return to safe level of functioning   The Patient and/or Patient Representative was provided with a Choice of Provider? Patient   Name of the Patient Representative who was provided with the Choice of Provider and agrees with the Discharge Plan? The Patient and/or Patient Representative Agree with the Discharge Plan? Yes   Freedom of Choice list was provided with basic dialogue that supports the individualized plan of care/goals, treatment preferences, and shares the quality data associated with the providers?  Yes     Documentation for Discharge Appeal  Discharge Appealed by     Date notified by [facility]IO of appeal request:     Time notified by QIO of appeal request:     Detailed Notice of Discharge given to:     Date Notice of Discharge given:     Time Notice of Discharge given:     Date records sent to [address] [employer]     Time records sent to [address] [employer]     Date Notified of Outcome     Time Notified of Outcome     Outcome of appeal           Renita Tubbs RN 10/05/22 2:36 PM.

## 2022-10-05 NOTE — PROGRESS NOTES
GI DAILY PROGRESS NOTE    Admit Date:  9/30/2022    Today's Date:  10/5/2022    CC:  Abdominal pain    Subjective:     Patient seen and examined this AM. No acute events overnight. No significant findings on yesterday's EGD    Medications:   Current Facility-Administered Medications   Medication Dose Route Frequency    amLODIPine (NORVASC) tablet 10 mg  10 mg Oral Daily    HYDROcodone-acetaminophen (NORCO) 5-325 MG per tablet 1 tablet  1 tablet Oral Q6H PRN    hydrALAZINE (APRESOLINE) tablet 25 mg  25 mg Oral 3 times per day    [Held by provider] morphine injection 4 mg  4 mg IntraVENous Q4H PRN    losartan (COZAAR) tablet 100 mg  100 mg Oral Daily    polyethylene glycol (GLYCOLAX) packet 17 g  17 g Oral Daily    docusate sodium (COLACE) capsule 100 mg  100 mg Oral Daily    multivitamin 1 tablet  1 tablet Oral Daily    potassium chloride (KLOR-CON M) extended release tablet 20 mEq  20 mEq Oral BID WC    magnesium oxide (MAG-OX) tablet 400 mg  400 mg Oral BID    sodium chloride flush 0.9 % injection 5-40 mL  5-40 mL IntraVENous 2 times per day    sodium chloride flush 0.9 % injection 5-40 mL  5-40 mL IntraVENous PRN    0.9 % sodium chloride infusion   IntraVENous PRN    enoxaparin Sodium (LOVENOX) injection 30 mg  30 mg SubCUTAneous Daily    ondansetron (ZOFRAN-ODT) disintegrating tablet 4 mg  4 mg Oral Q8H PRN    Or    ondansetron (ZOFRAN) injection 4 mg  4 mg IntraVENous Q6H PRN    acetaminophen (TYLENOL) tablet 650 mg  650 mg Oral Q6H PRN    Or    acetaminophen (TYLENOL) suppository 650 mg  650 mg Rectal Q6H PRN    0.9 % sodium chloride infusion   IntraVENous Continuous    hydrALAZINE (APRESOLINE) injection 10 mg  10 mg IntraVENous Q6H PRN    donepezil (ARICEPT) tablet 5 mg  5 mg Oral Nightly    sodium chloride (OCEAN, BABY AYR) 0.65 % nasal spray 2 spray  2 spray Each Nostril Q2H PRN       Review of Systems:  ROS was obtained, with pertinent positives as listed above. No chest pain or SOB.     Diet: Objective:   Vitals:  BP (!) 158/65   Pulse 68   Temp 97.7 °F (36.5 °C) (Oral)   Resp 17   Ht 5' 2\" (1.575 m)   Wt 104 lb (47.2 kg)   SpO2 95%   BMI 19.02 kg/m²   Intake/Output:  No intake/output data recorded. 10/03 1901 - 10/05 0700  In: 3752.5 [P.O.:460; I.V.:3292.5]  Out: 4600 [Urine:4600]  Exam:  General appearance: alert, cooperative, no distress  Lungs: clear to auscultation bilaterally anteriorly  Heart: regular rate and rhythm  Abdomen: soft, non-tender. Bowel sounds normal. No masses, no organomegaly  Extremities: extremities normal, atraumatic, no cyanosis or edema  Neuro:  alert and oriented    Data Review (Labs):    Recent Labs     10/03/22  0701 10/04/22  0754 10/05/22  0449   WBC  --  9.0 9.0   HGB  --  10.1* 9.7*   HCT  --  31.2* 30.8*   PLT  --  336 387   MCV  --  81.7 81.1    143 143   K 3.6 3.6 3.1*   * 116* 114*   CO2 21 19* 20*   BUN 7* 6* 4*   MG  --   --  1.8   ALT  --  12 12   AST  --  14* 13*       Radiology:  [unfilled]    Assessment:     Principal Problem:    Acute pancreatitis, unspecified complication status, unspecified pancreatitis type  Active Problems:    Other acute pancreatitis without infection or necrosis    Hypercholesterolemia    HTN (hypertension)    Hypokalemia  Resolved Problems:    * No resolved hospital problems.  *      Plan:     Advance diet  Pain control  Home soon  Will sign off      Bala Ferris MD  [facility] [place]

## 2022-10-06 ENCOUNTER — CARE COORDINATION (OUTPATIENT)
Dept: OTHER | Facility: CLINIC | Age: 82
End: 2022-10-06

## 2022-10-06 NOTE — CARE COORDINATION
Care Transitions Outreach Attempt    Call within 2 business days of discharge: No   Attempted to reach patient for transitions of care follow up. Unable to reach patient. Patient: Lessie Dakins Patient : 1940 MRN: [id number]    Last Discharge  Street       Date Complaint Diagnosis Description Type Department Provider    22 Abdominal Pain Hypokalemia . .. ED to Hosp-Admission (Discharged) (ADMITTED) Doron Corado MD; Peyman Michele. .. Was this an external facility discharge?  No Discharge Facility: N/A    Noted following upcoming appointments from discharge chart review:   [address] [place] [address]  follow up appointment(s):   Future Appointments   Date Time Provider Bo Jj   10/12/2022 11:30 AM Franco Hansen MD [facility] AMB   10/25/2022 11:20 AM Lam Goodman MD [facility] AMB     Non-[facility] follow up appointment(s):

## 2022-10-07 ENCOUNTER — CARE COORDINATION (OUTPATIENT)
Dept: OTHER | Facility: CLINIC | Age: 82
End: 2022-10-07

## 2022-10-07 NOTE — CARE COORDINATION
Care Transitions Outreach Attempt    Call within 2 business days of discharge: Yes   Attempted to reach patient for transitions of care follow up. Unable to reach patient. Patient: Agustin Waggoner Patient : 1940 MRN: [id number]    Last Discharge  Street       Date Complaint Diagnosis Description Type Department Provider    22 Abdominal Pain Hypokalemia . .. ED to Hosp-Admission (Discharged) (ADMITTED) Bonnie Wilkins MD; Shanda Jarvis. .. Was this an external facility discharge?  No Discharge Facility: N/A    Noted following upcoming appointments from discharge chart review:   [facility] follow up appointment(s):   Future Appointments   Date Time Provider Bo Jj   10/12/2022 11:30 AM Carlos Pugh MD [facility] AMB   10/25/2022 11:20 AM Jeramy Vergara MD [address] [place] [address], [address] [place] [address] AMB     Non-[facility] follow up appointment(s): none noted at this time

## 2022-10-12 ENCOUNTER — OFFICE VISIT (OUTPATIENT)
Dept: FAMILY MEDICINE CLINIC | Facility: CLINIC | Age: 82
End: 2022-10-12
Payer: MEDICARE

## 2022-10-12 ENCOUNTER — TELEPHONE (OUTPATIENT)
Dept: RHEUMATOLOGY | Age: 82
End: 2022-10-12

## 2022-10-12 VITALS
DIASTOLIC BLOOD PRESSURE: 78 MMHG | BODY MASS INDEX: 17.3 KG/M2 | SYSTOLIC BLOOD PRESSURE: 142 MMHG | TEMPERATURE: 97 F | OXYGEN SATURATION: 97 % | HEART RATE: 98 BPM | HEIGHT: 62 IN | WEIGHT: 94 LBS

## 2022-10-12 DIAGNOSIS — R63.4 WEIGHT LOSS: ICD-10-CM

## 2022-10-12 DIAGNOSIS — R91.1 PULMONARY NODULE: ICD-10-CM

## 2022-10-12 DIAGNOSIS — E78.00 PURE HYPERCHOLESTEROLEMIA, UNSPECIFIED: ICD-10-CM

## 2022-10-12 DIAGNOSIS — I10 ESSENTIAL (PRIMARY) HYPERTENSION: ICD-10-CM

## 2022-10-12 DIAGNOSIS — M81.0 AGE-RELATED OSTEOPOROSIS WITHOUT CURRENT PATHOLOGICAL FRACTURE: ICD-10-CM

## 2022-10-12 DIAGNOSIS — M05.79 SEROPOSITIVE RHEUMATOID ARTHRITIS OF MULTIPLE SITES (HCC): Primary | ICD-10-CM

## 2022-10-12 DIAGNOSIS — D64.9 ANEMIA, UNSPECIFIED TYPE: Primary | ICD-10-CM

## 2022-10-12 LAB
ALBUMIN SERPL-MCNC: 3.1 G/DL (ref 3.2–4.6)
ALBUMIN/GLOB SERPL: 1 {RATIO} (ref 0.4–1.6)
ALP SERPL-CCNC: 82 U/L (ref 50–136)
ALT SERPL-CCNC: 20 U/L (ref 12–65)
ANION GAP SERPL CALC-SCNC: 10 MMOL/L (ref 2–11)
AST SERPL-CCNC: 11 U/L (ref 15–37)
BASOPHILS # BLD: 0.1 K/UL (ref 0–0.2)
BASOPHILS NFR BLD: 1 % (ref 0–2)
BILIRUB SERPL-MCNC: 0.3 MG/DL (ref 0.2–1.1)
BUN SERPL-MCNC: 13 MG/DL (ref 8–23)
CALCIUM SERPL-MCNC: 9 MG/DL (ref 8.3–10.4)
CHLORIDE SERPL-SCNC: 105 MMOL/L (ref 101–110)
CHOLEST SERPL-MCNC: 102 MG/DL
CO2 SERPL-SCNC: 24 MMOL/L (ref 21–32)
CREAT SERPL-MCNC: 0.9 MG/DL (ref 0.6–1)
DIFFERENTIAL METHOD BLD: ABNORMAL
EOSINOPHIL # BLD: 0.1 K/UL (ref 0–0.8)
EOSINOPHIL NFR BLD: 1 % (ref 0.5–7.8)
ERYTHROCYTE [DISTWIDTH] IN BLOOD BY AUTOMATED COUNT: 15.5 % (ref 11.9–14.6)
GLOBULIN SER CALC-MCNC: 3.2 G/DL (ref 2.8–4.5)
GLUCOSE SERPL-MCNC: 84 MG/DL (ref 65–100)
HCT VFR BLD AUTO: 37.4 % (ref 35.8–46.3)
HDLC SERPL-MCNC: 46 MG/DL (ref 40–60)
HDLC SERPL: 2.2 {RATIO}
HGB BLD-MCNC: 11.7 G/DL (ref 11.7–15.4)
IMM GRANULOCYTES # BLD AUTO: 0.1 K/UL (ref 0–0.5)
IMM GRANULOCYTES NFR BLD AUTO: 0 % (ref 0–5)
IRON SATN MFR SERPL: 7 %
IRON SERPL-MCNC: 18 UG/DL (ref 35–150)
LDLC SERPL CALC-MCNC: 42.4 MG/DL
LYMPHOCYTES # BLD: 1 K/UL (ref 0.5–4.6)
LYMPHOCYTES NFR BLD: 4 % (ref 13–44)
MCH RBC QN AUTO: 26.2 PG (ref 26.1–32.9)
MCHC RBC AUTO-ENTMCNC: 31.3 G/DL (ref 31.4–35)
MCV RBC AUTO: 83.9 FL (ref 82–102)
MONOCYTES # BLD: 0.6 K/UL (ref 0.1–1.3)
MONOCYTES NFR BLD: 3 % (ref 4–12)
NEUTS SEG # BLD: 20.4 K/UL (ref 1.7–8.2)
NEUTS SEG NFR BLD: 91 % (ref 43–78)
NRBC # BLD: 0 K/UL (ref 0–0.2)
PLATELET # BLD AUTO: 511 K/UL (ref 150–450)
PMV BLD AUTO: 9.7 FL (ref 9.4–12.3)
POTASSIUM SERPL-SCNC: 2.9 MMOL/L (ref 3.5–5.1)
PROT SERPL-MCNC: 6.3 G/DL (ref 6.3–8.2)
RBC # BLD AUTO: 4.46 M/UL (ref 4.05–5.2)
SODIUM SERPL-SCNC: 139 MMOL/L (ref 133–143)
TIBC SERPL-MCNC: 275 UG/DL (ref 250–450)
TRIGL SERPL-MCNC: 68 MG/DL (ref 35–150)
TSH W FREE THYROID IF ABNORMAL: 1.21 UIU/ML (ref 0.36–3.74)
VLDLC SERPL CALC-MCNC: 13.6 MG/DL (ref 6–23)
WBC # BLD AUTO: 22.3 K/UL (ref 4.3–11.1)

## 2022-10-12 PROCEDURE — 3078F DIAST BP <80 MM HG: CPT | Performed by: STUDENT IN AN ORGANIZED HEALTH CARE EDUCATION/TRAINING PROGRAM

## 2022-10-12 PROCEDURE — 1123F ACP DISCUSS/DSCN MKR DOCD: CPT | Performed by: STUDENT IN AN ORGANIZED HEALTH CARE EDUCATION/TRAINING PROGRAM

## 2022-10-12 PROCEDURE — 99214 OFFICE O/P EST MOD 30 MIN: CPT | Performed by: STUDENT IN AN ORGANIZED HEALTH CARE EDUCATION/TRAINING PROGRAM

## 2022-10-12 PROCEDURE — 3074F SYST BP LT 130 MM HG: CPT | Performed by: STUDENT IN AN ORGANIZED HEALTH CARE EDUCATION/TRAINING PROGRAM

## 2022-10-12 RX ORDER — HYDRALAZINE HYDROCHLORIDE 25 MG/1
25 TABLET, FILM COATED ORAL EVERY 8 HOURS SCHEDULED
Qty: 90 TABLET | Refills: 5 | Status: SHIPPED | OUTPATIENT
Start: 2022-10-12

## 2022-10-12 RX ORDER — PREDNISONE 20 MG/1
TABLET ORAL
Qty: 15 TABLET | Refills: 0 | Status: SHIPPED | OUTPATIENT
Start: 2022-10-12

## 2022-10-12 RX ORDER — PRAVASTATIN SODIUM 40 MG
40 TABLET ORAL DAILY
Qty: 30 TABLET | Refills: 5 | Status: SHIPPED | OUTPATIENT
Start: 2022-10-12

## 2022-10-12 RX ORDER — DONEPEZIL HYDROCHLORIDE 5 MG/1
5 TABLET, FILM COATED ORAL NIGHTLY
Qty: 30 TABLET | Refills: 5 | Status: SHIPPED | OUTPATIENT
Start: 2022-10-12

## 2022-10-12 RX ORDER — LOSARTAN POTASSIUM 100 MG/1
100 TABLET ORAL DAILY
Qty: 30 TABLET | Refills: 5 | Status: SHIPPED | OUTPATIENT
Start: 2022-10-12

## 2022-10-12 RX ORDER — ALENDRONATE SODIUM 70 MG/1
70 TABLET ORAL
Qty: 4 TABLET | Refills: 5 | Status: SHIPPED | OUTPATIENT
Start: 2022-10-12

## 2022-10-12 RX ORDER — AMLODIPINE BESYLATE 10 MG/1
10 TABLET ORAL DAILY
Qty: 30 TABLET | Refills: 5 | Status: SHIPPED | OUTPATIENT
Start: 2022-10-12

## 2022-10-12 ASSESSMENT — ANXIETY QUESTIONNAIRES
4. TROUBLE RELAXING: 0
3. WORRYING TOO MUCH ABOUT DIFFERENT THINGS: 0
IF YOU CHECKED OFF ANY PROBLEMS ON THIS QUESTIONNAIRE, HOW DIFFICULT HAVE THESE PROBLEMS MADE IT FOR YOU TO DO YOUR WORK, TAKE CARE OF THINGS AT HOME, OR GET ALONG WITH OTHER PEOPLE: NOT DIFFICULT AT ALL
GAD7 TOTAL SCORE: 1
6. BECOMING EASILY ANNOYED OR IRRITABLE: 0
5. BEING SO RESTLESS THAT IT IS HARD TO SIT STILL: 0
7. FEELING AFRAID AS IF SOMETHING AWFUL MIGHT HAPPEN: 0
2. NOT BEING ABLE TO STOP OR CONTROL WORRYING: 0
1. FEELING NERVOUS, ANXIOUS, OR ON EDGE: 1

## 2022-10-12 ASSESSMENT — PATIENT HEALTH QUESTIONNAIRE - PHQ9
1. LITTLE INTEREST OR PLEASURE IN DOING THINGS: 1
SUM OF ALL RESPONSES TO PHQ9 QUESTIONS 1 & 2: 1
SUM OF ALL RESPONSES TO PHQ QUESTIONS 1-9: 1
2. FEELING DOWN, DEPRESSED OR HOPELESS: 0
SUM OF ALL RESPONSES TO PHQ QUESTIONS 1-9: 1

## 2022-10-12 ASSESSMENT — ENCOUNTER SYMPTOMS
DIARRHEA: 0
BLOOD IN STOOL: 0

## 2022-10-12 NOTE — TELEPHONE ENCOUNTER
Was in hospital , was doing well prior to going to hospital , now having an extremely hard time walking , driving she in numb from her shoulder to tip of her fingers , and her legs hurt so bad , ask for prednisone

## 2022-10-12 NOTE — TELEPHONE ENCOUNTER
Prescription for burst and taper of prednisone sent to [address] [place] [address] in [facility] [address]. After she completes the prednisone burst and taper if she continues to have pain she would need to be seen in the office prior to me going on vacation. Okay to overbook.

## 2022-10-13 DIAGNOSIS — R63.4 WEIGHT LOSS: ICD-10-CM

## 2022-10-13 DIAGNOSIS — E87.6 HYPOKALEMIA: Primary | ICD-10-CM

## 2022-10-13 DIAGNOSIS — D72.829 LEUKOCYTOSIS, UNSPECIFIED TYPE: ICD-10-CM

## 2022-10-13 DIAGNOSIS — D50.9 IRON DEFICIENCY ANEMIA, UNSPECIFIED IRON DEFICIENCY ANEMIA TYPE: ICD-10-CM

## 2022-10-13 RX ORDER — POTASSIUM CHLORIDE 20 MEQ/1
20 TABLET, EXTENDED RELEASE ORAL 2 TIMES DAILY
Qty: 30 TABLET | Refills: 0 | Status: SHIPPED | OUTPATIENT
Start: 2022-10-13

## 2022-10-13 RX ORDER — FERROUS SULFATE 325(65) MG
325 TABLET ORAL
Qty: 30 TABLET | Refills: 1 | Status: SHIPPED | OUTPATIENT
Start: 2022-10-13

## 2022-10-14 ENCOUNTER — CARE COORDINATION (OUTPATIENT)
Dept: OTHER | Facility: CLINIC | Age: 82
End: 2022-10-14

## 2022-10-14 NOTE — CARE COORDINATION
[facility] Transitions Initial Follow Up Call    Call within 2 business days of discharge: Yes    Care Transition Nurse contacted the patient by telephone to perform post hospital discharge assessment. Verified name and  with patient as identifiers. Provided introduction to self, and explanation of the Care Transition Nurse role. Patient: Marianna Douglas Patient : 1940   MRN: [id number]  Reason for Admission: Hypokalemia  Discharge Date: 10/5/22 RARS: Readmission Risk Score: 14.8      Last Discharge  Street       Date Complaint Diagnosis Description Type Department Provider    22 Abdominal Pain Hypokalemia . .. ED to Hosp-Admission (Discharged) (ADMITTED) Martin Sanders MD; Nirali Aaron... Was this an external facility discharge? No Discharge Facility: N/A    Challenges to be reviewed by the provider   Additional needs identified to be addressed with provider: No  none               Method of communication with provider: none. Care Transition Nurse reviewed discharge instructions, medical action plan, and red flags with patient who verbalized understanding. The patient was given an opportunity to ask questions and does not have any further questions or concerns at this time. Were discharge instructions available to patient? Yes. Reviewed appropriate site of care based on symptoms and resources available to patient including: PCP  Specialist  The patient agrees to contact the PCP office for questions related to their healthcare. Advance Care Planning:   Does patient have an Advance Directive:  no ACP docs . Medication reconciliation was performed with patient, who verbalizes understanding of administration of home medications.  Medications reviewed: yes    Was patient discharged with a pulse oximeter? no    Non-face-to-face services provided:  Obtained and reviewed discharge summary and/or continuity of care documents  Education of patient/family/caregiver/guardian to support self-management-voices understanding of discharge instructions. Assessment and support for treatment adherence and medication management-voices understanding of adhering to discharge instructions including: quit smoking, diet. Offered patient enrollment in the Remote Patient Monitoring (RPM) program for in-home monitoring: NA.    Care Transitions 24 Hour Call    Do you have a copy of your discharge instructions?: Yes  Do you have all of your prescriptions and are they filled?: Yes  Have you been contacted by a [address]?: No  Have you scheduled your follow up appointment?: Yes  How are you going to get to your appointment?: Car - family or friend to transport  Do you feel like you have everything you need to keep you well at home?: Yes  Are you an active caregiver in your home?: No  Care Transitions Interventions         Follow Up  Future Appointments   Date Time Provider Bo Jj   10/21/2022  8:45 AM [facility] AMB   10/25/2022 11:20 AM Kain Kahn  [address] [facility] AMB   10/26/2022  5:45 PM SFD CT [address] SLICE UNIT 1 [facility]   11/15/2022 11:10 AM [facility] DEXA BI GE LUNAR DEXA SFERMAM [facility]   4/5/2023  8:45 AM [facility] LAB [facility] AMB   4/12/2023 10:00 AM Angelo Valero MD [facility] AMB       Care Transition Nurse provided contact information. Plan for follow-up call in 5-7 days based on severity of symptoms and risk factors.   Plan for next call: symptom management-concerns/questions    Mayra Landa RN

## 2022-10-21 ENCOUNTER — NURSE ONLY (OUTPATIENT)
Dept: FAMILY MEDICINE CLINIC | Facility: CLINIC | Age: 82
End: 2022-10-21

## 2022-10-21 ENCOUNTER — CARE COORDINATION (OUTPATIENT)
Dept: OTHER | Facility: CLINIC | Age: 82
End: 2022-10-21

## 2022-10-21 DIAGNOSIS — D72.829 LEUKOCYTOSIS, UNSPECIFIED TYPE: ICD-10-CM

## 2022-10-21 DIAGNOSIS — E87.6 HYPOKALEMIA: ICD-10-CM

## 2022-10-21 LAB
BASOPHILS # BLD: 0 K/UL (ref 0–0.2)
BASOPHILS NFR BLD: 0 % (ref 0–2)
DIFFERENTIAL METHOD BLD: ABNORMAL
EOSINOPHIL # BLD: 0 K/UL (ref 0–0.8)
EOSINOPHIL NFR BLD: 0 % (ref 0.5–7.8)
ERYTHROCYTE [DISTWIDTH] IN BLOOD BY AUTOMATED COUNT: 15.9 % (ref 11.9–14.6)
HCT VFR BLD AUTO: 38.7 % (ref 35.8–46.3)
HGB BLD-MCNC: 11.8 G/DL (ref 11.7–15.4)
IMM GRANULOCYTES # BLD AUTO: 0.1 K/UL (ref 0–0.5)
IMM GRANULOCYTES NFR BLD AUTO: 1 % (ref 0–5)
LYMPHOCYTES # BLD: 1.2 K/UL (ref 0.5–4.6)
LYMPHOCYTES NFR BLD: 7 % (ref 13–44)
MCH RBC QN AUTO: 26 PG (ref 26.1–32.9)
MCHC RBC AUTO-ENTMCNC: 30.5 G/DL (ref 31.4–35)
MCV RBC AUTO: 85.2 FL (ref 82–102)
MONOCYTES # BLD: 0.1 K/UL (ref 0.1–1.3)
MONOCYTES NFR BLD: 1 % (ref 4–12)
NEUTS SEG # BLD: 15.4 K/UL (ref 1.7–8.2)
NEUTS SEG NFR BLD: 91 % (ref 43–78)
NRBC # BLD: 0 K/UL (ref 0–0.2)
PLATELET # BLD AUTO: 588 K/UL (ref 150–450)
PMV BLD AUTO: 9.8 FL (ref 9.4–12.3)
RBC # BLD AUTO: 4.54 M/UL (ref 4.05–5.2)
WBC # BLD AUTO: 16.9 K/UL (ref 4.3–11.1)

## 2022-10-21 NOTE — CARE COORDINATION
and/or continuity of care documents, Education of patient/family/caregiver/guardian to support self-management-voices understanding of self management of chronic disease, and Assessment and support for treatment adherence and medication management-voices understanding of adhering to treatment plan, medical team orders. Offered patient enrollment in the Remote Patient Monitoring (RPM) program for in-home monitoring: NA.     Care Transitions Subsequent and Final Call    Subsequent and Final Calls  Do you have any ongoing symptoms?: Yes  Onset of Patient-reported symptoms: Other  Patient-reported symptoms: Fatigue, Weight Loss  Have your medications changed?: No  Do you have any questions related to your medications?: No  Do you currently have any active services?: No  Do you have any needs or concerns that I can assist you with?: No  Identified Barriers: Other  Care Transitions Interventions  Other Interventions:             Care Transition Nurse provided contact information for future needs. Plan for follow-up call in 7-10 days based on severity of symptoms and risk factors.   Plan for next call: symptom management-pain  self management-meds  follow-up appointment-discuss    Glen Archuleta RN

## 2022-10-23 LAB
ANION GAP SERPL CALC-SCNC: 7 MMOL/L (ref 2–11)
BUN SERPL-MCNC: 17 MG/DL (ref 8–23)
CALCIUM SERPL-MCNC: 9.6 MG/DL (ref 8.3–10.4)
CHLORIDE SERPL-SCNC: 106 MMOL/L (ref 101–110)
CO2 SERPL-SCNC: 24 MMOL/L (ref 21–32)
CREAT SERPL-MCNC: 0.9 MG/DL (ref 0.6–1)
GLUCOSE SERPL-MCNC: 124 MG/DL (ref 65–100)
MAGNESIUM SERPL-MCNC: 2 MG/DL (ref 1.8–2.4)
POTASSIUM SERPL-SCNC: 4.9 MMOL/L (ref 3.5–5.1)
SODIUM SERPL-SCNC: 137 MMOL/L (ref 133–143)

## 2022-10-24 LAB — PATH REV BLD -IMP: NORMAL

## 2022-10-25 ENCOUNTER — OFFICE VISIT (OUTPATIENT)
Dept: RHEUMATOLOGY | Age: 82
End: 2022-10-25
Payer: MEDICARE

## 2022-10-25 ENCOUNTER — TELEPHONE (OUTPATIENT)
Dept: FAMILY MEDICINE CLINIC | Facility: CLINIC | Age: 82
End: 2022-10-25

## 2022-10-25 VITALS
HEART RATE: 76 BPM | WEIGHT: 92.6 LBS | DIASTOLIC BLOOD PRESSURE: 51 MMHG | BODY MASS INDEX: 17.04 KG/M2 | HEIGHT: 62 IN | SYSTOLIC BLOOD PRESSURE: 112 MMHG

## 2022-10-25 DIAGNOSIS — Z79.899 LONG-TERM USE OF HIGH-RISK MEDICATION: ICD-10-CM

## 2022-10-25 DIAGNOSIS — M05.79 SEROPOSITIVE RHEUMATOID ARTHRITIS OF MULTIPLE SITES (HCC): Primary | ICD-10-CM

## 2022-10-25 PROCEDURE — 99214 OFFICE O/P EST MOD 30 MIN: CPT | Performed by: INTERNAL MEDICINE

## 2022-10-25 PROCEDURE — 1123F ACP DISCUSS/DSCN MKR DOCD: CPT | Performed by: INTERNAL MEDICINE

## 2022-10-25 RX ORDER — ETANERCEPT 50 MG/ML
50 SOLUTION SUBCUTANEOUS
Qty: 4 ML | Refills: 3 | Status: SHIPPED | OUTPATIENT
Start: 2022-10-25

## 2022-10-25 ASSESSMENT — ROUTINE ASSESSMENT OF PATIENT INDEX DATA (RAPID3)
ON A SCALE OF ONE TO TEN, HOW DIFFICULT WAS IT FOR YOU TO COMPLETE THE LISTED DAILY PHYSICAL TASKS OVER THE LAST WEEK: 1.0
WHEN YOU AWAKENED IN THE MORNING OVER THE LAST WEEK, PLEASE INDICATE THE AMOUNT OF TIME IT TAKES UNTIL YOU ARE AS LIMBER AS YOU WILL BE FOR THE DAY: > 1 HOUR
ON A SCALE OF ONE TO TEN, HOW MUCH PAIN HAVE YOU HAD BECAUSE OF YOUR CONDITION OVER THE PAST WEEK?: 6
ON A SCALE OF ONE TO TEN, HOW MUCH OF A PROBLEM HAS UNUSUAL FATIGUE OR TIREDNESS BEEN FOR YOU OVER THE PAST WEEK?: 5
ON A SCALE OF ONE TO TEN, CONSIDERING ALL THE WAYS IN WHICH ILLNESS AND HEALTH CONDITIONS MAY AFFECT YOU AT THIS TIME, PLEASE INDICATE BELOW HOW YOU ARE DOING:: 5

## 2022-10-25 ASSESSMENT — JOINT PAIN
TOTAL NUMBER OF SWOLLEN JOINTS: 0
TOTAL NUMBER OF TENDER JOINTS: 4

## 2022-10-25 NOTE — TELEPHONE ENCOUNTER
----- Message from Ingrid Quiroz sent at 10/25/2022  9:14 AM EDT -----  Subject: Results Request    QUESTIONS  Results: CBC with Auto Differential and others that date; Ordered by:   Ngozi Segura Performed: 2022-10-21  ---------------------------------------------------------------------------  --------------  Koki Waddell INFO    [phone]; OK to leave message on voicemail  ---------------------------------------------------------------------------  --------------

## 2022-10-25 NOTE — PROGRESS NOTES
Prasanth Daigle M.D.  [address] [place] [address], [address] [facility]  Office : [phone], Fax: [phone] OFFICE VISIT NOTE  Date of Visit:  10/25/2022 11:45 AM    Patient Information:  Name:  Liz Vizcarra  :  1940  Age:  80 y.o. Gender:  female      Ms. Enoc Flynn is here today for follow-up of seropositive rheumatoid arthritis. Last visit: 22      History of Present Illness: On talking to the patient today she states that she has not had to skip administering the Enbrel for any reason even though she was in hospital for 1 week [[facility]] for pancreatitis, anemia and low potassium. Since discharge from the hospital she has had some abdominal pain that she experienced this last Saturday and  all day. On talking to her further she states that she is scheduled to have a colonoscopy done soon by the gastroenterologist to determine the cause of her unintentional weight loss of 20 pounds since March. She has tolerated the Enbrel with no adverse side effects. Her current joint complaints are as mentioned below. Since the last visit, patient is feeling \"better\". Pain: 6/10  Location:  Has had pain in the hands mainly involving the MCP joints and to some extent the PIP joints. Does have pain with no swelling involving her wrists. Quality: Deep achy in nature. Modifying Factors:   Lifting and grasping things worsens the hand pain. Associated Symptoms:  Intermittent pain with tingling and numbness from the shoulders to the fingertips involving bilateral upper extremities. No pain, tingling and numbness down either of the legs. DMARD/Biologic 10/25/2022   AM Stiffness > 1 hour   Pain 6   Fatigue 5   MDHAQ 1.0   Patient Global Score 5   Medication Name Enbrel     Last TB screen: 2022  TB result: Negative     Current dose of steroids: 10 mg once a day for 11 days.   How long on current dose of steroids: for 10 days.   How long on continuous steroid therapy: for 10 days. Past DMARDs, if applicable (methotrexate, plaquenil/hydroxychloroquine, sulfasalazine, Arava/leflunomide):none     Past biologics, if applicable (enbrel, humira, simponi, cimzia, xeljanz, HASSAN, remicade, simponi aria, actemra, rituximab, otezla, stelara, cosentyx): Currently on Enbrel injection 50 mg every 7 days. Past NSAIDs, if applicable (motrin, aleve, naproxen, advil, ibuprofen, celebrex, voltaren/diclofenac, etc.): Currently off ibuprofen 800 mg twice a day as needed. Last [id number]  Past osteoporosis drugs, if applicable (fosamax, actonel, boniva, reclast, prolia, forteo):fosamax 70 weekly     BMI:16.94  Current exercise regimen, if any:none  Current vitamin D dose:none  Current calcium dose:none  Fractures since last visit, if any: none    The patient otherwise has no significant interval changes in health or medical history to report.      History Reviewed:    Past Medical History  Past Medical History:   Diagnosis Date    EPIFANIO (acute kidney injury) ([address] 12/04/2014    pt denies this dx    Arthritis     RA-abdelrahman hands    Back pain 6/10/2016    Breast cancer ([address] 2018    Breast lump dx 2/2018    left    Bronchitis     Chronic obstructive pulmonary disease ([address]     Discharge from the vagina     Dysuria     Eczema 6/10/2016    Fungal dermatitis     Headache 6/10/2016    Hypercholesterolemia 12/4/2014    Hypertension     not well controlled--per pt    Intractable vomiting 5/20/2018    Menopausal vaginal dryness     Osteoarthritis 6/10/2016    Osteoporosis 6/10/2016    Pancreatitis     2 episodes    PUD (peptic ulcer disease)     last 2003 which a rupture an extensive surgery    Sepsis ([address] 12/4/2014    Thyroid nodule     Tobacco abuse     1ppd x 49 yrs       Past Surgical History  Past Surgical History:   Procedure Laterality Date    APPENDECTOMY  1977    with hysterectomy    BREAST BIOPSY Left 1975    BREAST LUMPECTOMY Left 2/22/2018    LEFT BREAST LUMPECTOMY/ SENTINEL NODE BIOPSY performed by Geremias Herndon MD at [facility] MAIN OR    BREAST SURGERY Left 1975    breast lumpectomy, benign  (left)    CATARACT REMOVAL Bilateral 2018    w/IOL    CHOLECYSTECTOMY      HYSTERECTOMY (CERVIX STATUS UNKNOWN)  1977    OTHER SURGICAL HISTORY      hernicolectomy 2 cm    MD ABDOMEN SURGERY PROC UNLISTED  2003    stomach surgery for ruptured ulcer and extensive rerouting of intestestines per patient     [address]  05/21/2018    empiric dilation    UPPER GASTROINTESTINAL ENDOSCOPY      UPPER GASTROINTESTINAL ENDOSCOPY N/A 10/4/2022    EGD ESOPHAGOGASTRODUODENOSCOPY/  performed by Lucas Chopra MD at [address] [place] [address] LEFT Left 1/31/2018    US BREAST NEEDLE BIOPSY LEFT 1/31/2018 [facility]       Family History  Family History   Problem Relation Age of Onset    Thyroid Cancer Neg Hx     No Known Problems Paternal Grandfather     No Known Problems Paternal Grandmother     No Known Problems Maternal Grandfather     No Known Problems Maternal Grandmother     Hypertension Other         Brother and sister with htn    No Known Problems Brother     Hypertension Mother     Heart Disease Brother     Heart Disease Sister     Breast Cancer Sister 79    Cancer Sister     Heart Disease Father     Heart Attack Father     Cancer Brother         prostate    Diabetes Sister     Heart Disease Sister        Social History  Social History     Socioeconomic History    Marital status:       Spouse name: None    Number of children: None    Years of education: None    Highest education level: None   Tobacco Use    Smoking status: Every Day     Packs/day: 0.10     Types: Cigarettes     Start date: 5/6/1966    Smokeless tobacco: Never   Vaping Use    Vaping Use: Never used   Substance and Sexual Activity    Alcohol use: No    Drug use: No    Sexual activity: Not Currently     Birth control/protection: None               Allergy:  Allergies   Allergen Reactions    Azithromycin Other (See Comments)     pancreatitis    Codeine Nausea And Vomiting         Current Medications:  Outpatient Encounter Medications as of 10/25/2022   Medication Sig Dispense Refill    Etanercept (ENBREL SURECLICK) 50 MG/ML SOAJ Inject 50 mg into the skin every 7 days 4 mL 3    potassium chloride (KLOR-CON M) 20 MEQ extended release tablet Take 1 tablet by mouth 2 times daily 30 tablet 0    ferrous sulfate (IRON 325) 325 (65 Fe) MG tablet Take 1 tablet by mouth daily (with breakfast) 30 tablet 1    donepezil (ARICEPT) 5 MG tablet Take 1 tablet by mouth nightly 30 tablet 5    losartan (COZAAR) 100 MG tablet Take 1 tablet by mouth daily 30 tablet 5    amLODIPine (NORVASC) 10 MG tablet Take 1 tablet by mouth daily 30 tablet 5    hydrALAZINE (APRESOLINE) 25 MG tablet Take 1 tablet by mouth every 8 hours 90 tablet 5    alendronate (FOSAMAX) 70 MG tablet Take 1 tablet by mouth every 7 days 4 tablet 5    pravastatin (PRAVACHOL) 40 MG tablet Take 1 tablet by mouth daily 30 tablet 5    predniSONE (DELTASONE) 20 MG tablet Take 1 pill once a day after breakfast for 10 days then 1/2 a pill once a day after breakfast for 10 days and then stop. 15 tablet 0    ondansetron (ZOFRAN-ODT) 4 MG disintegrating tablet Take 1 tablet by mouth every 8 hours as needed for Nausea or Vomiting 30 tablet 0    [DISCONTINUED] Etanercept (ENBREL SURECLICK) 50 MG/ML SOAJ Inject 50 mg into the skin every 7 days       No facility-administered encounter medications on file as of 10/25/2022.            REVIEW OF SYSTEMS: The following systems were reviewed with patient today and were negative except for the following (depicted with an \"X\"):        \"X\" General  \"X\" Head and Neck  \"X\" Heart and Breathing  \"X\" Gastrointestinal    Fever/chills  x Hair loss   Shortness of breath  x Upset stomach    Falls  x Dry mouth   Coughing   Diarrhea / constipation   x Wt loss Mouth sores   Wheezing   Heartburn    Wt gain   Ringing ears   Chest pain   Dark or bloody stools   x Night sweats   Diff. swallowing  X None of above  x Nausea or vomiting    None of above   None of above      None of above                \"X\" Skin  \"X\" Neurology  \"X\" Urinary/Gyn  \"X\" Other    Easy bruising  x Numbness/ tingling   Female problems  x Depression    Rashes  x Weakness   Problems with urination  x Feeling anxious    Sun sensitivity  x Headaches  X None of above   Problems sleeping   X None of above   None of above      None of above          Physical Exam:  Blood pressure (!) 112/51, pulse 76, height 5' 2\" (1.575 m), weight 92 lb 9.6 oz (42 kg). General:  Patient alert, cooperative and in no apparent distress. HEENT: Pupils equally reactive to light and accommodation, minimal scleral injection noted. Heart: Regular rate and rhythm, normal S1 and S2, no rubs or gallops. Lungs: Clear to auscultation bilaterally. Abdomen: Soft, noted tenderness involving the epigastric area as well as the right upper quadrant with no rebound or guarding. No hepatosplenomegaly. Skin:  No rashes. No nail abnormalities. Neurologic:  Oriented, normal speech and affect. Normal gait. Extremities:  No edema in bilateral lower extremities with no cyanosis or clubbing. Muskoskeletal Exam:     I examined the shoulders, elbows, wrists, MCPs, PIPs, DIPs and knees bilaterally for strength, range of motion, deformity, tenderness, swelling, and synovitis. The findings are:         Physical Exam              Joint Exam 10/25/2022        Right  Left   Glenohumeral   Tender   Tender   Wrist   Tender   Tender   Ankle   Tender        cJADAS 10:- 2.45     Patient otherwise has a normal joint exam without other evidence of joint tenderness, synovitis, warmth, erythema, decreased ROM, weakness or deformities.          Radiology Reports Reviewed (if available):  Last 3 months  [unfilled]    Lab Reports Reviewed (if available): Last 3 months    Nurse Only on 10/21/2022   Component Date Value Ref Range Status    WBC 10/21/2022 16.9 (A)  4.3 - 11.1 K/uL Final    RBC 10/21/2022 4.54  4.05 - 5.2 M/uL Final    Hemoglobin 10/21/2022 11.8  11.7 - 15.4 g/dL Final    Hematocrit 10/21/2022 38.7  35.8 - 46.3 % Final    MCV 10/21/2022 85.2  82 - 102 FL Final    MCH 10/21/2022 26.0 (A)  26.1 - 32.9 PG Final    MCHC 10/21/2022 30.5 (A)  31.4 - 35.0 g/dL Final    RDW 10/21/2022 15.9 (A)  11.9 - 14.6 % Final    Platelets 54/22/5269 588 (A)  150 - 450 K/uL Final    MPV 10/21/2022 9.8  9.4 - 12.3 FL Final    nRBC 10/21/2022 0.00  0.0 - 0.2 K/uL Final    **Note: Absolute NRBC parameter is now reported with Hemogram**    Differential Type 10/21/2022 AUTOMATED    Final    Seg Neutrophils 10/21/2022 91 (A)  43 - 78 % Final    Lymphocytes 10/21/2022 7 (A)  13 - 44 % Final    Monocytes 10/21/2022 1 (A)  4.0 - 12.0 % Final    Eosinophils % 10/21/2022 0 (A)  0.5 - 7.8 % Final    Basophils 10/21/2022 0  0.0 - 2.0 % Final    Immature Granulocytes 10/21/2022 1  0.0 - 5.0 % Final    Segs Absolute 10/21/2022 15.4 (A)  1.7 - 8.2 K/UL Final    Absolute Lymph # 10/21/2022 1.2  0.5 - 4.6 K/UL Final    Absolute Mono # 10/21/2022 0.1  0.1 - 1.3 K/UL Final    Absolute Eos # 10/21/2022 0.0  0.0 - 0.8 K/UL Final    Basophils Absolute 10/21/2022 0.0  0.0 - 0.2 K/UL Final    Absolute Immature Granulocyte 10/21/2022 0.1  0.0 - 0.5 K/UL Final    Pathologist Review 10/21/2022 SMEAR SENT TO PATHOLOGIST    Final    Comment: (NOTE)  RBC: hypochromic  WBC: Absolute neutrophilia, favor leukemoid reaction  PLT: Morphologically unremarkable    Vamshi Keane, PhD      Magnesium 10/21/2022 2.0  1.8 - 2.4 mg/dL Final    Sodium 10/21/2022 137  133 - 143 mmol/L Final    Potassium 10/21/2022 4.9  3.5 - 5.1 mmol/L Final    Chloride 10/21/2022 106  101 - 110 mmol/L Final    CO2 10/21/2022 24  21 - 32 mmol/L Final    Anion Gap 10/21/2022 7  2 - 11 mmol/L Final    Glucose 10/21/2022 124 (A)  65 - 100 mg/dL Final    BUN 10/21/2022 17  8 - 23 MG/DL Final    Creatinine 10/21/2022 0.90  0.6 - 1.0 MG/DL Final    Est, Glom Filt Rate 10/21/2022 >60  >60 ml/min/1.73m2 Final    Comment:   Pediatric calculator link: [employer]. org/professionals/eMoneyUnionoqi/gfr_calculatorped    Effective Oct 3, 2022    These results are not intended for use in patients <25years of age. eGFR results are calculated without a race factor using  the 2021 CKD-EPI equation. Careful clinical correlation is recommended, particularly when comparing to results calculated using previous equations. The CKD-EPI equation is less accurate in patients with extremes of muscle mass, extra-renal metabolism of creatinine, excessive creatine ingestion, or following therapy that affects renal tubular secretion. Calcium 10/21/2022 9.6  8.3 - 10.4 MG/DL Final   Office Visit on 10/12/2022   Component Date Value Ref Range Status    Sodium 10/12/2022 139  133 - 143 mmol/L Final    Potassium 10/12/2022 2.9 (A)  3.5 - 5.1 mmol/L Final    Chloride 10/12/2022 105  101 - 110 mmol/L Final    CO2 10/12/2022 24  21 - 32 mmol/L Final    Anion Gap 10/12/2022 10  2 - 11 mmol/L Final    Glucose 10/12/2022 84  65 - 100 mg/dL Final    BUN 10/12/2022 13  8 - 23 MG/DL Final    Creatinine 10/12/2022 0.90  0.6 - 1.0 MG/DL Final    Est, Glom Filt Rate 10/12/2022 >60  >60 ml/min/1.73m2 Final    Comment:   Pediatric calculator link: [employer]. org/professionals/eMoneyUnionoqi/gfr_calculatorped    Effective Oct 3, 2022    These results are not intended for use in patients <25years of age. eGFR results are calculated without a race factor using  the 2021 CKD-EPI equation. Careful clinical correlation is recommended, particularly when comparing to results calculated using previous equations.   The CKD-EPI equation is less accurate in patients with extremes of muscle mass, extra-renal metabolism of creatinine, excessive creatine ingestion, or following therapy that affects renal tubular secretion.       Calcium 10/12/2022 9.0  8.3 - 10.4 MG/DL Final    Total Bilirubin 10/12/2022 0.3  0.2 - 1.1 MG/DL Final    ALT 10/12/2022 20  12 - 65 U/L Final    AST 10/12/2022 11 (A)  15 - 37 U/L Final    Alk Phosphatase 10/12/2022 82  50 - 136 U/L Final    Total Protein 10/12/2022 6.3  6.3 - 8.2 g/dL Final    Albumin 10/12/2022 3.1 (A)  3.2 - 4.6 g/dL Final    Globulin 10/12/2022 3.2  2.8 - 4.5 g/dL Final    Albumin/Globulin Ratio 10/12/2022 1.0  0.4 - 1.6   Final    WBC 10/12/2022 22.3 (A)  4.3 - 11.1 K/uL Final    RBC 10/12/2022 4.46  4.05 - 5.2 M/uL Final    Hemoglobin 10/12/2022 11.7  11.7 - 15.4 g/dL Final    Hematocrit 10/12/2022 37.4  35.8 - 46.3 % Final    MCV 10/12/2022 83.9  82 - 102 FL Final    MCH 10/12/2022 26.2  26.1 - 32.9 PG Final    MCHC 10/12/2022 31.3 (A)  31.4 - 35.0 g/dL Final    RDW 10/12/2022 15.5 (A)  11.9 - 14.6 % Final    Platelets 04/02/5001 511 (A)  150 - 450 K/uL Final    MPV 10/12/2022 9.7  9.4 - 12.3 FL Final    nRBC 10/12/2022 0.00  0.0 - 0.2 K/uL Final    **Note: Absolute NRBC parameter is now reported with Hemogram**    Differential Type 10/12/2022 AUTOMATED    Final    Seg Neutrophils 10/12/2022 91 (A)  43 - 78 % Final    Lymphocytes 10/12/2022 4 (A)  13 - 44 % Final    Monocytes 10/12/2022 3 (A)  4.0 - 12.0 % Final    Eosinophils % 10/12/2022 1  0.5 - 7.8 % Final    Basophils 10/12/2022 1  0.0 - 2.0 % Final    Immature Granulocytes 10/12/2022 0  0.0 - 5.0 % Final    Segs Absolute 10/12/2022 20.4 (A)  1.7 - 8.2 K/UL Final    Absolute Lymph # 10/12/2022 1.0  0.5 - 4.6 K/UL Final    Absolute Mono # 10/12/2022 0.6  0.1 - 1.3 K/UL Final    Absolute Eos # 10/12/2022 0.1  0.0 - 0.8 K/UL Final    Basophils Absolute 10/12/2022 0.1  0.0 - 0.2 K/UL Final    Absolute Immature Granulocyte 10/12/2022 0.1  0.0 - 0.5 K/UL Final    TSH w Free Thyroid if Abnormal 10/12/2022 1.21  0.358 - 3.740 UIU/ML Final    Iron 10/12/2022 18 (A)  35 - 150 ug/dL Final Comment: Known Interfering Substances section:  \"Iron values may be falsely elevated in  serum samples from patients with  anticoagulants (e.g., hemodialysis patients). \"  Limitations of Procedure section:  \"Turbidity resulting from precipitation of  fibrinogen in the serum of patients treated  with anticoagulants (e.g. hemodialysis  patients) may cause spuriously elevated  iron results. \"      TIBC 10/12/2022 275  250 - 450 ug/dL Final    TRANSFERRIN SATURATION 10/12/2022 7 (A)  >20 % Final    Cholesterol, Total 10/12/2022 102  <200 MG/DL Final    Comment: Borderline High: 200-239 mg/dL  High: Greater than or equal to 240 mg/dL      Triglycerides 10/12/2022 68  35 - 150 MG/DL Final    Comment: Borderline High: 150-199 mg/dL, High: 200-499 mg/dL  Very High: Greater than or equal to 500 mg/dL      HDL 10/12/2022 46  40 - 60 MG/DL Final    LDL Calculated 10/12/2022 42.4  <100 MG/DL Final    Comment: Near Optimal: 100-129 mg/dL  Borderline High: 130-159, High: 160-189 mg/dL  Very High: Greater than or equal to 190 mg/dL      VLDL Cholesterol Calculated 10/12/2022 13.6  6.0 - 23.0 MG/DL Final    Chol/HDL Ratio 10/12/2022 2.2    Final   No results displayed because visit has over 200 results. The results above were reviewed and discussed with patient. Assessment/Plan:   Lessie Dakins is a 80 y.o. female who presents with:     Seropositive rheumatoid arthritis of multiple sites [facility]): I did instruct the patient to continue Enbrel 50 mg 1 shot to be administered to self every week. Patient is aware that if she is sick requiring her to be on antibiotic or antiviral drug she will need to skip administering the Enbrel until she has completed the antibiotic/antiviral course and is over the infection. Once she completes the burst and taper of prednisone she was instructed to stop the medication.   In light of her past history of a gastric ulcer I did instruct her to take prescription strength ibuprofen with caution but instead take Tylenol extra strength 500 mg 2 pills twice a day for pain relief instead of prescription strength or over-the-counter Advil or Aleve. Patient and daughter did verbalize understanding.  -     C-Reactive Protein; Future  -     C-Reactive Protein  -     Etanercept (ENBREL SURECLICK) 50 MG/ML SOAJ; Inject 50 mg into the skin every 7 days    Long-term use of high-risk medication: If there is any noted abnormality I will keep the patient informed but if not I will review her labs with her on follow-up. -     CBC with Auto Differential; Future  -     Comprehensive Metabolic Panel; Future  -     CBC with Auto Differential  -     Comprehensive Metabolic Panel    Disease activity plan:  As stated above. Steroid management plan:  As stated above, if applicable. Pain management plan:  As stated above, if applicable. Weight management plan:  Weight loss through diet and exercise is always encouraged    Disease prognosis: Good    I appreciate the opportunity to continue to participate in the care of this patient. Follow-up and Dispositions    Return in about 4 months (around 2/25/2023). Electronically signed by:  Patrick Taylor MD      This note was dictated using dragon voice recognition software.   It has been proofread, but there may still exist voice recognition errors that the author did not detect.                --------------------------------------------------------------------------------------------------------------------------------------------------------------------------------------------------------------------------------

## 2022-10-26 LAB
ALBUMIN SERPL-MCNC: 3.2 G/DL (ref 3.2–4.6)
ALBUMIN/GLOB SERPL: 1 {RATIO} (ref 0.4–1.6)
ALP SERPL-CCNC: 72 U/L (ref 50–136)
ALT SERPL-CCNC: 17 U/L (ref 12–65)
ANION GAP SERPL CALC-SCNC: 6 MMOL/L (ref 2–11)
AST SERPL-CCNC: 8 U/L (ref 15–37)
BASOPHILS # BLD: 0 K/UL (ref 0–0.2)
BASOPHILS NFR BLD: 0 % (ref 0–2)
BILIRUB SERPL-MCNC: 0.3 MG/DL (ref 0.2–1.1)
BUN SERPL-MCNC: 17 MG/DL (ref 8–23)
CALCIUM SERPL-MCNC: 9.3 MG/DL (ref 8.3–10.4)
CHLORIDE SERPL-SCNC: 109 MMOL/L (ref 101–110)
CO2 SERPL-SCNC: 21 MMOL/L (ref 21–32)
CREAT SERPL-MCNC: 1 MG/DL (ref 0.6–1)
CRP SERPL-MCNC: 1.7 MG/DL (ref 0–0.9)
DIFFERENTIAL METHOD BLD: ABNORMAL
EOSINOPHIL # BLD: 0 K/UL (ref 0–0.8)
EOSINOPHIL NFR BLD: 0 % (ref 0.5–7.8)
ERYTHROCYTE [DISTWIDTH] IN BLOOD BY AUTOMATED COUNT: 16.5 % (ref 11.9–14.6)
GLOBULIN SER CALC-MCNC: 3.3 G/DL (ref 2.8–4.5)
GLUCOSE SERPL-MCNC: 122 MG/DL (ref 65–100)
HCT VFR BLD AUTO: 36.8 % (ref 35.8–46.3)
HGB BLD-MCNC: 11.2 G/DL (ref 11.7–15.4)
IMM GRANULOCYTES # BLD AUTO: 0.1 K/UL (ref 0–0.5)
IMM GRANULOCYTES NFR BLD AUTO: 1 % (ref 0–5)
LYMPHOCYTES # BLD: 1.2 K/UL (ref 0.5–4.6)
LYMPHOCYTES NFR BLD: 7 % (ref 13–44)
MCH RBC QN AUTO: 26.1 PG (ref 26.1–32.9)
MCHC RBC AUTO-ENTMCNC: 30.4 G/DL (ref 31.4–35)
MCV RBC AUTO: 85.8 FL (ref 82–102)
MONOCYTES # BLD: 0.2 K/UL (ref 0.1–1.3)
MONOCYTES NFR BLD: 1 % (ref 4–12)
NEUTS SEG # BLD: 16 K/UL (ref 1.7–8.2)
NEUTS SEG NFR BLD: 91 % (ref 43–78)
NRBC # BLD: 0 K/UL (ref 0–0.2)
PLATELET # BLD AUTO: 520 K/UL (ref 150–450)
PMV BLD AUTO: 9.9 FL (ref 9.4–12.3)
POTASSIUM SERPL-SCNC: 5.8 MMOL/L (ref 3.5–5.1)
PROT SERPL-MCNC: 6.5 G/DL (ref 6.3–8.2)
RBC # BLD AUTO: 4.29 M/UL (ref 4.05–5.2)
SODIUM SERPL-SCNC: 136 MMOL/L (ref 133–143)
WBC # BLD AUTO: 17.6 K/UL (ref 4.3–11.1)

## 2022-11-05 ENCOUNTER — APPOINTMENT (OUTPATIENT)
Dept: CT IMAGING | Age: 82
End: 2022-11-05
Payer: MEDICARE

## 2022-11-05 ENCOUNTER — HOSPITAL ENCOUNTER (EMERGENCY)
Age: 82
Discharge: HOME | End: 2022-11-05
Attending: EMERGENCY MEDICINE
Payer: MEDICARE

## 2022-11-05 VITALS
RESPIRATION RATE: 17 BRPM | TEMPERATURE: 97.9 F | HEIGHT: 62 IN | DIASTOLIC BLOOD PRESSURE: 61 MMHG | HEART RATE: 83 BPM | BODY MASS INDEX: 16.93 KG/M2 | SYSTOLIC BLOOD PRESSURE: 121 MMHG | WEIGHT: 92 LBS | OXYGEN SATURATION: 94 %

## 2022-11-05 DIAGNOSIS — K85.00 IDIOPATHIC ACUTE PANCREATITIS, UNSPECIFIED COMPLICATION STATUS: Primary | ICD-10-CM

## 2022-11-05 LAB
ALBUMIN SERPL-MCNC: 3 G/DL (ref 3.2–4.6)
ALBUMIN/GLOB SERPL: 0.9 {RATIO} (ref 0.4–1.6)
ALP SERPL-CCNC: 85 U/L (ref 50–136)
ALT SERPL-CCNC: 18 U/L (ref 12–65)
ANION GAP SERPL CALC-SCNC: 6 MMOL/L (ref 2–11)
AST SERPL-CCNC: 12 U/L (ref 15–37)
BASOPHILS # BLD: 0.1 K/UL (ref 0–0.2)
BASOPHILS NFR BLD: 1 % (ref 0–2)
BILIRUB SERPL-MCNC: 0.2 MG/DL (ref 0.2–1.1)
BILIRUB UR QL: NEGATIVE
BUN SERPL-MCNC: 14 MG/DL (ref 8–23)
CALCIUM SERPL-MCNC: 8.8 MG/DL (ref 8.3–10.4)
CHLORIDE SERPL-SCNC: 110 MMOL/L (ref 101–110)
CO2 SERPL-SCNC: 24 MMOL/L (ref 21–32)
CREAT SERPL-MCNC: 1.2 MG/DL (ref 0.6–1)
DIFFERENTIAL METHOD BLD: ABNORMAL
EOSINOPHIL # BLD: 0.3 K/UL (ref 0–0.8)
EOSINOPHIL NFR BLD: 3 % (ref 0.5–7.8)
ERYTHROCYTE [DISTWIDTH] IN BLOOD BY AUTOMATED COUNT: 16.5 % (ref 11.9–14.6)
GLOBULIN SER CALC-MCNC: 3.4 G/DL (ref 2.8–4.5)
GLUCOSE SERPL-MCNC: 96 MG/DL (ref 65–100)
GLUCOSE UR QL STRIP.AUTO: NEGATIVE MG/DL
HCT VFR BLD AUTO: 34.9 % (ref 35.8–46.3)
HGB BLD-MCNC: 10.9 G/DL (ref 11.7–15.4)
IMM GRANULOCYTES # BLD AUTO: 0 K/UL (ref 0–0.5)
IMM GRANULOCYTES NFR BLD AUTO: 0 % (ref 0–5)
KETONES UR-MCNC: ABNORMAL MG/DL
LACTATE SERPL-SCNC: 0.8 MMOL/L (ref 0.4–2)
LEUKOCYTE ESTERASE UR QL STRIP: NEGATIVE
LIPASE SERPL-CCNC: 651 U/L (ref 73–393)
LYMPHOCYTES # BLD: 1.6 K/UL (ref 0.5–4.6)
LYMPHOCYTES NFR BLD: 16 % (ref 13–44)
MCH RBC QN AUTO: 26.1 PG (ref 26.1–32.9)
MCHC RBC AUTO-ENTMCNC: 31.2 G/DL (ref 31.4–35)
MCV RBC AUTO: 83.5 FL (ref 82–102)
MONOCYTES # BLD: 0.6 K/UL (ref 0.1–1.3)
MONOCYTES NFR BLD: 7 % (ref 4–12)
NEUTS SEG # BLD: 6.9 K/UL (ref 1.7–8.2)
NEUTS SEG NFR BLD: 73 % (ref 43–78)
NITRITE UR QL: POSITIVE
NRBC # BLD: 0 K/UL (ref 0–0.2)
PH UR: 6 [PH] (ref 5–9)
PLATELET # BLD AUTO: 367 K/UL (ref 150–450)
PMV BLD AUTO: 8.8 FL (ref 9.4–12.3)
POTASSIUM SERPL-SCNC: 4 MMOL/L (ref 3.5–5.1)
PROT SERPL-MCNC: 6.4 G/DL (ref 6.3–8.2)
PROT UR QL: NEGATIVE MG/DL
RBC # BLD AUTO: 4.18 M/UL (ref 4.05–5.2)
RBC # UR STRIP: NEGATIVE /UL
SERVICE CMNT-IMP: ABNORMAL
SODIUM SERPL-SCNC: 140 MMOL/L (ref 133–143)
SP GR UR: 1.02 (ref 1–1.02)
UROBILINOGEN UR QL: 0.2 EU/DL (ref 0.2–1)
WBC # BLD AUTO: 9.4 K/UL (ref 4.3–11.1)

## 2022-11-05 PROCEDURE — 80053 COMPREHEN METABOLIC PANEL: CPT

## 2022-11-05 PROCEDURE — 83690 ASSAY OF LIPASE: CPT

## 2022-11-05 PROCEDURE — 83605 ASSAY OF LACTIC ACID: CPT

## 2022-11-05 PROCEDURE — 96374 THER/PROPH/DIAG INJ IV PUSH: CPT

## 2022-11-05 PROCEDURE — 96375 TX/PRO/DX INJ NEW DRUG ADDON: CPT

## 2022-11-05 PROCEDURE — 99285 EMERGENCY DEPT VISIT HI MDM: CPT

## 2022-11-05 PROCEDURE — 2580000003 HC RX 258: Performed by: EMERGENCY MEDICINE

## 2022-11-05 PROCEDURE — 81003 URINALYSIS AUTO W/O SCOPE: CPT

## 2022-11-05 PROCEDURE — 6360000004 HC RX CONTRAST MEDICATION: Performed by: EMERGENCY MEDICINE

## 2022-11-05 PROCEDURE — 6360000002 HC RX W HCPCS: Performed by: EMERGENCY MEDICINE

## 2022-11-05 PROCEDURE — 74177 CT ABD & PELVIS W/CONTRAST: CPT

## 2022-11-05 PROCEDURE — 85025 COMPLETE CBC W/AUTO DIFF WBC: CPT

## 2022-11-05 RX ORDER — MORPHINE SULFATE 4 MG/ML
4 INJECTION INTRAVENOUS ONCE
Status: COMPLETED | OUTPATIENT
Start: 2022-11-05 | End: 2022-11-05

## 2022-11-05 RX ORDER — ONDANSETRON 2 MG/ML
4 INJECTION INTRAMUSCULAR; INTRAVENOUS
Status: COMPLETED | OUTPATIENT
Start: 2022-11-05 | End: 2022-11-05

## 2022-11-05 RX ORDER — 0.9 % SODIUM CHLORIDE 0.9 %
1000 INTRAVENOUS SOLUTION INTRAVENOUS
Status: COMPLETED | OUTPATIENT
Start: 2022-11-05 | End: 2022-11-05

## 2022-11-05 RX ORDER — 0.9 % SODIUM CHLORIDE 0.9 %
100 INTRAVENOUS SOLUTION INTRAVENOUS
Status: COMPLETED | OUTPATIENT
Start: 2022-11-05 | End: 2022-11-05

## 2022-11-05 RX ORDER — OXYCODONE HYDROCHLORIDE 5 MG/1
5 TABLET ORAL EVERY 6 HOURS PRN
Qty: 13 TABLET | Refills: 0 | Status: SHIPPED | OUTPATIENT
Start: 2022-11-05 | End: 2022-11-08

## 2022-11-05 RX ORDER — SODIUM CHLORIDE 0.9 % (FLUSH) 0.9 %
10 SYRINGE (ML) INJECTION
Status: COMPLETED | OUTPATIENT
Start: 2022-11-05 | End: 2022-11-05

## 2022-11-05 RX ADMIN — SODIUM CHLORIDE 1000 ML: 9 INJECTION, SOLUTION INTRAVENOUS at 20:31

## 2022-11-05 RX ADMIN — DIATRIZOATE MEGLUMINE AND DIATRIZOATE SODIUM 15 ML: 660; 100 LIQUID ORAL; RECTAL at 20:31

## 2022-11-05 RX ADMIN — SODIUM CHLORIDE 100 ML: 9 INJECTION, SOLUTION INTRAVENOUS at 21:30

## 2022-11-05 RX ADMIN — ONDANSETRON 4 MG: 2 INJECTION INTRAMUSCULAR; INTRAVENOUS at 20:30

## 2022-11-05 RX ADMIN — SODIUM CHLORIDE, PRESERVATIVE FREE 10 ML: 5 INJECTION INTRAVENOUS at 21:30

## 2022-11-05 RX ADMIN — IOPAMIDOL 100 ML: 755 INJECTION, SOLUTION INTRAVENOUS at 21:30

## 2022-11-05 RX ADMIN — MORPHINE SULFATE 4 MG: 4 INJECTION INTRAVENOUS at 20:31

## 2022-11-05 ASSESSMENT — ENCOUNTER SYMPTOMS
EYE REDNESS: 0
NAUSEA: 1
DIARRHEA: 0
APNEA: 0
WHEEZING: 0
HEMATEMESIS: 0
HEMATOCHEZIA: 0
TROUBLE SWALLOWING: 0
CHEST TIGHTNESS: 0
VOMITING: 0
VOICE CHANGE: 0
COUGH: 0
SORE THROAT: 0
ABDOMINAL PAIN: 1
FLATUS: 0
PHOTOPHOBIA: 0
COLOR CHANGE: 0

## 2022-11-05 ASSESSMENT — PAIN DESCRIPTION - FREQUENCY: FREQUENCY: CONTINUOUS

## 2022-11-05 ASSESSMENT — PAIN DESCRIPTION - LOCATION: LOCATION: ABDOMEN

## 2022-11-05 ASSESSMENT — PAIN DESCRIPTION - PAIN TYPE: TYPE: ACUTE PAIN

## 2022-11-05 ASSESSMENT — PAIN DESCRIPTION - ORIENTATION: ORIENTATION: RIGHT;UPPER

## 2022-11-05 ASSESSMENT — PAIN - FUNCTIONAL ASSESSMENT: PAIN_FUNCTIONAL_ASSESSMENT: 0-10

## 2022-11-05 ASSESSMENT — PAIN SCALES - GENERAL: PAINLEVEL_OUTOF10: 5

## 2022-11-05 ASSESSMENT — PAIN DESCRIPTION - DESCRIPTORS: DESCRIPTORS: ACHING;TENDER;SORE

## 2022-11-05 NOTE — ED TRIAGE NOTES
Pt c/o pain to RUQ x2 wks s/p  hospital admission about a month ago for a week for pancreatitis  Pt also reports has since been dx with anemia and hypokalemia  (-)emesis, nausea, dysuria, fevers  A&Ox4

## 2022-11-06 NOTE — DISCHARGE INSTRUCTIONS
Take medications as prescribed  Follow-up with gastroenterology  Return to the ER for any new, worsening or life-threatening symptoms

## 2022-11-06 NOTE — ED PROVIDER NOTES
Emergency Department Provider Note                   PCP:                Urvashi Silveira MD               Age: 80 y.o. Sex: female     No diagnosis found. DISPOSITION          MDM  Number of Diagnoses or Management Options  Diagnosis management comments: Concern for recurrence of pancreatitis. Plan for screening labs. We will continue to monitor symptoms. 8:15 PM  Lipase is mildly elevated at 650. LFTs fairly stable. Hemoglobin fairly stable as well. Normal white count. Nevertheless will obtain CT scan of abdomen pelvis    10:31 PM  CT scan does show some mild stranding around the pancreas. Appears to be improving comparison to previous CT scan. Patient voices complete resolution of abdominal pain. Has not had any vomiting. States she would rather go home. Will reach out to GI to ensure close follow-up. Daughter is at the bedside who is agreeable with plan.        Amount and/or Complexity of Data Reviewed  Clinical lab tests: ordered and reviewed  Tests in the radiology section of CPT®: ordered and reviewed    Risk of Complications, Morbidity, and/or Mortality  Presenting problems: moderate  Diagnostic procedures: moderate  Management options: high  General comments: High risk due to use of parenteral narcotic medication               Orders Placed This Encounter   Procedures    CT ABDOMEN PELVIS W IV CONTRAST Additional Contrast? Oral    CBC with Diff    CMP    Lipase    Lactate, Sepsis (Select if patient is over 65 to rule out mesenteric ischemia)    Diet NPO    POCT Urine Dipstick    Saline lock IV        Medications   diatrizoate meglumine-sodium (GASTROGRAFIN) 66-10 % solution 15 mL (has no administration in time range)   morphine injection 4 mg (has no administration in time range)   ondansetron (ZOFRAN) injection 4 mg (has no administration in time range)   0.9 % sodium chloride bolus (has no administration in time range)       New Prescriptions    No medications on file Monica Simmons is a 80 y.o. female who presents to the Emergency Department with chief complaint of    Chief Complaint   Patient presents with    Abdominal Pain      Patient presents ER with complaints of recurrent abdominal pain. Reported history of pancreatitis with recent hospitalization to this. States over the past week she has had some recurrence of worsening pain. States pain is mostly in her right upper quadrant epigastric region. Had an episode of vomiting a couple days ago but none recently. Denies any fevers, hematemesis melena or hematochezia. She is followed by gastroenterology    The history is provided by the patient. Abdominal Pain  Pain location:  Epigastric and RUQ  Pain quality: aching and cramping    Pain radiates to:  Does not radiate  Pain severity:  Moderate  Onset quality:  Gradual  Duration:  7 days  Timing:  Intermittent  Progression:  Waxing and waning  Chronicity:  New  Context: not sick contacts and not suspicious food intake    Relieved by:  Nothing  Worsened by:  Nothing  Associated symptoms: anorexia and nausea    Associated symptoms: no chest pain, no chills, no cough, no diarrhea, no fever, no flatus, no hematemesis, no hematochezia, no sore throat, no vaginal bleeding and no vomiting        Review of Systems   Constitutional:  Negative for chills and fever. HENT:  Negative for congestion, sore throat, trouble swallowing and voice change. Eyes:  Negative for photophobia, redness and visual disturbance. Respiratory:  Negative for apnea, cough, chest tightness and wheezing. Cardiovascular:  Negative for chest pain and leg swelling. Gastrointestinal:  Positive for abdominal pain, anorexia and nausea. Negative for diarrhea, flatus, hematemesis, hematochezia and vomiting. Endocrine: Negative for polydipsia, polyphagia and polyuria. Genitourinary:  Negative for vaginal bleeding. Musculoskeletal:  Negative for joint swelling, neck pain and neck stiffness. ENDOSCOPY N/A 10/4/2022    EGD ESOPHAGOGASTRODUODENOSCOPY/  performed by Emiliano Nava MD at [address] [facility] [address] LEFT Left 1/31/2018    US BREAST NEEDLE BIOPSY LEFT 1/31/2018 [facility]        Family History   Problem Relation Age of Onset    Thyroid Cancer Neg Hx     No Known Problems Paternal Grandfather     No Known Problems Paternal Grandmother     No Known Problems Maternal Grandfather     No Known Problems Maternal Grandmother     Hypertension Other         Brother and sister with htn    No Known Problems Brother     Hypertension Mother     Heart Disease Brother     Heart Disease Sister     Breast Cancer Sister 79    Cancer Sister     Heart Disease Father     Heart Attack Father     Cancer Brother         prostate    Diabetes Sister     Heart Disease Sister         Social History     Socioeconomic History    Marital status:       Spouse name: None    Number of children: None    Years of education: None    Highest education level: None   Tobacco Use    Smoking status: Every Day     Packs/day: 0.10     Types: Cigarettes     Start date: 5/6/1966    Smokeless tobacco: Never   Vaping Use    Vaping Use: Never used   Substance and Sexual Activity    Alcohol use: No    Drug use: No    Sexual activity: Not Currently     Birth control/protection: None         Azithromycin and Codeine     Previous Medications    ALENDRONATE (FOSAMAX) 70 MG TABLET    Take 1 tablet by mouth every 7 days    AMLODIPINE (NORVASC) 10 MG TABLET    Take 1 tablet by mouth daily    DONEPEZIL (ARICEPT) 5 MG TABLET    Take 1 tablet by mouth nightly    ETANERCEPT (ENBREL SURECLICK) 50 MG/ML SOAJ    Inject 50 mg into the skin every 7 days    FERROUS SULFATE (IRON 325) 325 (65 FE) MG TABLET    Take 1 tablet by mouth daily (with breakfast)    HYDRALAZINE (APRESOLINE) 25 MG TABLET    Take 1 tablet by mouth every 8 hours    LOSARTAN (COZAAR) 100 MG TABLET    Take 1 tablet by mouth daily    ONDANSETRON (ZOFRAN-ODT) 4 MG DISINTEGRATING TABLET    Take 1 tablet by mouth every 8 hours as needed for Nausea or Vomiting    POTASSIUM CHLORIDE (KLOR-CON M) 20 MEQ EXTENDED RELEASE TABLET    Take 1 tablet by mouth 2 times daily    PRAVASTATIN (PRAVACHOL) 40 MG TABLET    Take 1 tablet by mouth daily    PREDNISONE (DELTASONE) 20 MG TABLET    Take 1 pill once a day after breakfast for 10 days then 1/2 a pill once a day after breakfast for 10 days and then stop. Vitals signs and nursing note reviewed. Patient Vitals for the past 4 hrs:   Temp Pulse Resp BP SpO2   11/05/22 1740 97.9 °F (36.6 °C) 83 17 138/61 98 %          Physical Exam  Vitals and nursing note reviewed. Constitutional:       General: She is not in acute distress. Appearance: Normal appearance. She is not ill-appearing. HENT:      Head: Normocephalic and atraumatic. Right Ear: External ear normal.      Left Ear: External ear normal.   Eyes:      General:         Right eye: No discharge. Left eye: No discharge. Extraocular Movements: Extraocular movements intact. Pupils: Pupils are equal, round, and reactive to light. Cardiovascular:      Rate and Rhythm: Normal rate and regular rhythm. Pulses: Normal pulses. Heart sounds: Normal heart sounds. Pulmonary:      Effort: Pulmonary effort is normal.      Breath sounds: Normal breath sounds. Abdominal:      General: Abdomen is flat. There is no distension. Palpations: Abdomen is soft. There is no mass. Tenderness: There is abdominal tenderness in the right upper quadrant and epigastric area. Musculoskeletal:         General: No swelling, tenderness, deformity or signs of injury. Normal range of motion. Cervical back: Normal range of motion and neck supple. Skin:     General: Skin is warm. Capillary Refill: Capillary refill takes less than 2 seconds. Coloration: Skin is not jaundiced or pale. Findings: No bruising or erythema.    Neurological: General: No focal deficit present. Mental Status: She is alert and oriented to person, place, and time. Cranial Nerves: No cranial nerve deficit. Sensory: No sensory deficit. Motor: No weakness.    Psychiatric:         Mood and Affect: Mood normal.         Behavior: Behavior normal.        Procedures    Results for orders placed or performed during the hospital encounter of 11/05/22   CBC with Diff   Result Value Ref Range    WBC 9.4 4.3 - 11.1 K/uL    RBC 4.18 4.05 - 5.2 M/uL    Hemoglobin 10.9 (L) 11.7 - 15.4 g/dL    Hematocrit 34.9 (L) 35.8 - 46.3 %    MCV 83.5 82 - 102 FL    MCH 26.1 26.1 - 32.9 PG    MCHC 31.2 (L) 31.4 - 35.0 g/dL    RDW 16.5 (H) 11.9 - 14.6 %    Platelets 321 729 - 719 K/uL    MPV 8.8 (L) 9.4 - 12.3 FL    nRBC 0.00 0.0 - 0.2 K/uL    Differential Type AUTOMATED      Seg Neutrophils 73 43 - 78 %    Lymphocytes 16 13 - 44 %    Monocytes 7 4.0 - 12.0 %    Eosinophils % 3 0.5 - 7.8 %    Basophils 1 0.0 - 2.0 %    Immature Granulocytes 0 0.0 - 5.0 %    Segs Absolute 6.9 1.7 - 8.2 K/UL    Absolute Lymph # 1.6 0.5 - 4.6 K/UL    Absolute Mono # 0.6 0.1 - 1.3 K/UL    Absolute Eos # 0.3 0.0 - 0.8 K/UL    Basophils Absolute 0.1 0.0 - 0.2 K/UL    Absolute Immature Granulocyte 0.0 0.0 - 0.5 K/UL   CMP   Result Value Ref Range    Sodium 140 133 - 143 mmol/L    Potassium 4.0 3.5 - 5.1 mmol/L    Chloride 110 101 - 110 mmol/L    CO2 24 21 - 32 mmol/L    Anion Gap 6 2 - 11 mmol/L    Glucose 96 65 - 100 mg/dL    BUN 14 8 - 23 MG/DL    Creatinine 1.20 (H) 0.6 - 1.0 MG/DL    Est, Glom Filt Rate 46 (L) >60 ml/min/1.73m2    Calcium 8.8 8.3 - 10.4 MG/DL    Total Bilirubin 0.2 0.2 - 1.1 MG/DL    ALT 18 12 - 65 U/L    AST 12 (L) 15 - 37 U/L    Alk Phosphatase 85 50 - 136 U/L    Total Protein 6.4 6.3 - 8.2 g/dL    Albumin 3.0 (L) 3.2 - 4.6 g/dL    Globulin 3.4 2.8 - 4.5 g/dL    Albumin/Globulin Ratio 0.9 0.4 - 1.6     Lipase   Result Value Ref Range    Lipase 651 (H) 73 - 393 U/L   Lactate, Sepsis (Select if patient is over 65 to rule out mesenteric ischemia)   Result Value Ref Range    Lactic Acid, Sepsis 0.8 0.4 - 2.0 MMOL/L        No orders to display                       Voice dictation software was used during the making of this note. This software is not perfect and grammatical and other typographical errors may be present. This note has not been completely proofread for errors.      Emily Portillo MD  11/05/22 2015       Emily Portillo MD  11/05/22 [id number]

## 2022-11-06 NOTE — ED NOTES
I have reviewed discharge instructions with the patient. The patient verbalized understanding. Patient left ED via Discharge Method: ambulatory to Home with daughter. Opportunity for questions and clarification provided. Patient given 1 scripts. To continue your aftercare when you leave the hospital, you may receive an automated call from our care team to check in on how you are doing. This is a free service and part of our promise to provide the best care and service to meet your aftercare needs.  If you have questions, or wish to unsubscribe from this service please call [phone]. Thank you for Choosing our [facility] Emergency Department.         Masood Bal RN  11/05/22 4089

## 2022-11-11 ENCOUNTER — PREP FOR PROCEDURE (OUTPATIENT)
Dept: ADMINISTRATIVE | Age: 82
End: 2022-11-11

## 2022-11-11 ENCOUNTER — CARE COORDINATION (OUTPATIENT)
Dept: OTHER | Facility: CLINIC | Age: 82
End: 2022-11-11

## 2022-11-11 NOTE — CARE COORDINATION
Discharged from [facility] on 10/5/2022 for Acute pancreatitis, unspecified complication status, unspecified pancreatitis type. . Attended hospital follow up appointment with PCP on 10/12/2022. ED visit on 2022. Care Transitions Outreach Attempt    Call within 2 business days of discharge: No   Attempted to reach patient for transitions of care follow up. Unable to reach patient. Patient: Gerardo Eden Patient : 1940 MRN: [id number]    Last Discharge  Street       Date Complaint Diagnosis Description Type Department Provider    22 Abdominal Pain Idiopathic acute pancreatitis, unspecified complication status ED (DISCHARGE) KAROLINA Amaya MD              Was this an external facility discharge?  No Discharge Facility: N/A    Noted following upcoming appointments from discharge chart review:   [facility] follow up appointment(s):   Future Appointments   Date Time Provider Bo Jj   2023 10:30 AM [facility] DEXA BI GE LUNAR DEXA SFERMAM [facility]   3/3/2023 10:40 AM Kamila Quintanilla MD [facility] AMB   2023  8:45 AM [facility] LAB [facility] AMB   2023 10:00 AM Fadumo Meade MD [facility] AMB     Non-BS follow up appointment(s): none noted

## 2022-11-14 ENCOUNTER — CARE COORDINATION (OUTPATIENT)
Dept: OTHER | Facility: CLINIC | Age: 82
End: 2022-11-14

## 2022-11-14 NOTE — CARE COORDINATION
Care Transitions Outreach Attempt    Call within 2 business days of discharge: Yes   Attempted to reach patient for transitions of care follow up. Unable to reach patient. Patient: Trina Mann Patient : 1940 MRN: [id number]    Last Discharge [address]       Date Complaint Diagnosis Description Type Department Provider    22 Abdominal Pain Idiopathic acute pancreatitis, unspecified complication status ED (DISCHARGE) KAROLINA Farley MD              Was this an external facility discharge?  No Discharge Facility: N/A    Noted following upcoming appointments from discharge chart review:   [facility] follow up appointment(s):   Future Appointments   Date Time Provider Bo Jj   2023 10:30 AM [facility] DEXA BI GE LUNAR DEXA SFERMAM [facility]FE   3/3/2023 10:40 AM Linda Denis MD [facility] AMB   2023  8:45 AM [facility] LAB [facility] AMB   2023 10:00 AM Urvashi Silveira MD [facility] AMB     Non-BS follow up appointment(s): none noted

## 2022-11-18 RX ORDER — SODIUM CHLORIDE 0.9 % (FLUSH) 0.9 %
5-40 SYRINGE (ML) INJECTION EVERY 12 HOURS SCHEDULED
Status: CANCELLED | OUTPATIENT
Start: 2022-11-18

## 2022-11-18 RX ORDER — SODIUM CHLORIDE 0.9 % (FLUSH) 0.9 %
5-40 SYRINGE (ML) INJECTION PRN
Status: CANCELLED | OUTPATIENT
Start: 2022-11-18

## 2022-11-18 RX ORDER — SODIUM CHLORIDE 9 MG/ML
INJECTION, SOLUTION INTRAVENOUS PRN
Status: CANCELLED | OUTPATIENT
Start: 2022-11-18

## 2022-11-23 ENCOUNTER — CARE COORDINATION (OUTPATIENT)
Dept: OTHER | Facility: CLINIC | Age: 82
End: 2022-11-23

## 2022-12-06 ENCOUNTER — TELEPHONE (OUTPATIENT)
Dept: RHEUMATOLOGY | Age: 82
End: 2022-12-06

## 2022-12-06 DIAGNOSIS — M05.79 SEROPOSITIVE RHEUMATOID ARTHRITIS OF MULTIPLE SITES (HCC): ICD-10-CM

## 2022-12-06 RX ORDER — PREDNISONE 20 MG/1
TABLET ORAL
Qty: 15 TABLET | Refills: 0 | Status: SHIPPED | OUTPATIENT
Start: 2022-12-06

## 2022-12-06 NOTE — TELEPHONE ENCOUNTER
Patient lvm complaining of joint pain and is requesting prednisone. Please send to local pharmacy on file, [address].

## 2022-12-21 ENCOUNTER — HOSPITAL ENCOUNTER (INPATIENT)
Age: 82
LOS: 9 days | Discharge: HOME | End: 2022-12-30
Attending: EMERGENCY MEDICINE | Admitting: INTERNAL MEDICINE
Payer: MEDICARE

## 2022-12-21 DIAGNOSIS — R91.1 LESION OF RIGHT LUNG: ICD-10-CM

## 2022-12-21 DIAGNOSIS — K85.90 ACUTE PANCREATITIS, UNSPECIFIED COMPLICATION STATUS, UNSPECIFIED PANCREATITIS TYPE: Primary | ICD-10-CM

## 2022-12-21 PROBLEM — K86.1 CHRONIC PANCREATITIS (HCC): Status: ACTIVE | Noted: 2022-12-21

## 2022-12-21 PROBLEM — M06.9 RHEUMATOID ARTHRITIS (HCC): Chronic | Status: ACTIVE | Noted: 2022-12-21

## 2022-12-21 LAB
ALBUMIN SERPL-MCNC: 3.3 G/DL (ref 3.2–4.6)
ALBUMIN/GLOB SERPL: 0.9 {RATIO} (ref 0.4–1.6)
ALP SERPL-CCNC: 64 U/L (ref 50–136)
ALT SERPL-CCNC: 23 U/L (ref 12–65)
ANION GAP SERPL CALC-SCNC: 7 MMOL/L (ref 2–11)
AST SERPL-CCNC: 8 U/L (ref 15–37)
BASOPHILS # BLD: 0 K/UL (ref 0–0.2)
BASOPHILS NFR BLD: 0 % (ref 0–2)
BILIRUB SERPL-MCNC: 0.2 MG/DL (ref 0.2–1.1)
BUN SERPL-MCNC: 18 MG/DL (ref 8–23)
CALCIUM SERPL-MCNC: 8.6 MG/DL (ref 8.3–10.4)
CHLORIDE SERPL-SCNC: 110 MMOL/L (ref 101–110)
CO2 SERPL-SCNC: 22 MMOL/L (ref 21–32)
CREAT SERPL-MCNC: 1 MG/DL (ref 0.6–1)
DIFFERENTIAL METHOD BLD: ABNORMAL
EOSINOPHIL # BLD: 0 K/UL (ref 0–0.8)
EOSINOPHIL NFR BLD: 0 % (ref 0.5–7.8)
ERYTHROCYTE [DISTWIDTH] IN BLOOD BY AUTOMATED COUNT: 19.9 % (ref 11.9–14.6)
GLOBULIN SER CALC-MCNC: 3.6 G/DL (ref 2.8–4.5)
GLUCOSE SERPL-MCNC: 89 MG/DL (ref 65–100)
HCT VFR BLD AUTO: 38.2 % (ref 35.8–46.3)
HGB BLD-MCNC: 12 G/DL (ref 11.7–15.4)
IMM GRANULOCYTES # BLD AUTO: 0.1 K/UL (ref 0–0.5)
IMM GRANULOCYTES NFR BLD AUTO: 1 % (ref 0–5)
LIPASE SERPL-CCNC: 9034 U/L (ref 73–393)
LYMPHOCYTES # BLD: 1.2 K/UL (ref 0.5–4.6)
LYMPHOCYTES NFR BLD: 9 % (ref 13–44)
MCH RBC QN AUTO: 26.4 PG (ref 26.1–32.9)
MCHC RBC AUTO-ENTMCNC: 31.4 G/DL (ref 31.4–35)
MCV RBC AUTO: 84 FL (ref 82–102)
MONOCYTES # BLD: 0.7 K/UL (ref 0.1–1.3)
MONOCYTES NFR BLD: 5 % (ref 4–12)
NEUTS SEG # BLD: 11.1 K/UL (ref 1.7–8.2)
NEUTS SEG NFR BLD: 85 % (ref 43–78)
NRBC # BLD: 0 K/UL (ref 0–0.2)
PLATELET # BLD AUTO: 390 K/UL (ref 150–450)
PMV BLD AUTO: 8.5 FL (ref 9.4–12.3)
POTASSIUM SERPL-SCNC: 3.9 MMOL/L (ref 3.5–5.1)
PROT SERPL-MCNC: 6.9 G/DL (ref 6.3–8.2)
RBC # BLD AUTO: 4.55 M/UL (ref 4.05–5.2)
SODIUM SERPL-SCNC: 139 MMOL/L (ref 133–143)
WBC # BLD AUTO: 13.1 K/UL (ref 4.3–11.1)

## 2022-12-21 PROCEDURE — 6370000000 HC RX 637 (ALT 250 FOR IP): Performed by: INTERNAL MEDICINE

## 2022-12-21 PROCEDURE — 2580000003 HC RX 258: Performed by: INTERNAL MEDICINE

## 2022-12-21 PROCEDURE — 6360000002 HC RX W HCPCS: Performed by: INTERNAL MEDICINE

## 2022-12-21 PROCEDURE — 96375 TX/PRO/DX INJ NEW DRUG ADDON: CPT

## 2022-12-21 PROCEDURE — 80053 COMPREHEN METABOLIC PANEL: CPT

## 2022-12-21 PROCEDURE — 1100000000 HC RM PRIVATE

## 2022-12-21 PROCEDURE — 83690 ASSAY OF LIPASE: CPT

## 2022-12-21 PROCEDURE — 99285 EMERGENCY DEPT VISIT HI MDM: CPT

## 2022-12-21 PROCEDURE — 6360000002 HC RX W HCPCS: Performed by: EMERGENCY MEDICINE

## 2022-12-21 PROCEDURE — 2580000003 HC RX 258: Performed by: EMERGENCY MEDICINE

## 2022-12-21 PROCEDURE — 96374 THER/PROPH/DIAG INJ IV PUSH: CPT

## 2022-12-21 PROCEDURE — 96361 HYDRATE IV INFUSION ADD-ON: CPT

## 2022-12-21 PROCEDURE — 85025 COMPLETE CBC W/AUTO DIFF WBC: CPT

## 2022-12-21 RX ORDER — HEPARIN SODIUM 5000 [USP'U]/ML
5000 INJECTION, SOLUTION INTRAVENOUS; SUBCUTANEOUS 2 TIMES DAILY
Status: COMPLETED | OUTPATIENT
Start: 2022-12-21 | End: 2022-12-25

## 2022-12-21 RX ORDER — HYDROMORPHONE HYDROCHLORIDE 1 MG/ML
0.5 INJECTION, SOLUTION INTRAMUSCULAR; INTRAVENOUS; SUBCUTANEOUS EVERY 4 HOURS PRN
Status: DISCONTINUED | OUTPATIENT
Start: 2022-12-21 | End: 2022-12-21 | Stop reason: SDUPTHER

## 2022-12-21 RX ORDER — AMLODIPINE BESYLATE 10 MG/1
10 TABLET ORAL DAILY
Status: DISCONTINUED | OUTPATIENT
Start: 2022-12-22 | End: 2022-12-30 | Stop reason: HOSPADM

## 2022-12-21 RX ORDER — ACETAMINOPHEN 650 MG/1
650 SUPPOSITORY RECTAL EVERY 6 HOURS PRN
Status: DISCONTINUED | OUTPATIENT
Start: 2022-12-21 | End: 2022-12-30 | Stop reason: HOSPADM

## 2022-12-21 RX ORDER — PRAVASTATIN SODIUM 80 MG/1
40 TABLET ORAL NIGHTLY
Status: DISCONTINUED | OUTPATIENT
Start: 2022-12-22 | End: 2022-12-30 | Stop reason: HOSPADM

## 2022-12-21 RX ORDER — ONDANSETRON 2 MG/ML
4 INJECTION INTRAMUSCULAR; INTRAVENOUS
Status: COMPLETED | OUTPATIENT
Start: 2022-12-21 | End: 2022-12-21

## 2022-12-21 RX ORDER — SODIUM CHLORIDE 9 MG/ML
INJECTION, SOLUTION INTRAVENOUS PRN
Status: DISCONTINUED | OUTPATIENT
Start: 2022-12-21 | End: 2022-12-30 | Stop reason: HOSPADM

## 2022-12-21 RX ORDER — ONDANSETRON 2 MG/ML
4 INJECTION INTRAMUSCULAR; INTRAVENOUS EVERY 6 HOURS PRN
Status: DISCONTINUED | OUTPATIENT
Start: 2022-12-21 | End: 2022-12-30 | Stop reason: HOSPADM

## 2022-12-21 RX ORDER — ONDANSETRON 8 MG/1
4 TABLET, ORALLY DISINTEGRATING ORAL EVERY 8 HOURS PRN
Status: DISCONTINUED | OUTPATIENT
Start: 2022-12-21 | End: 2022-12-30 | Stop reason: HOSPADM

## 2022-12-21 RX ORDER — NICOTINE 21 MG/24HR
1 PATCH, TRANSDERMAL 24 HOURS TRANSDERMAL DAILY
Status: DISCONTINUED | OUTPATIENT
Start: 2022-12-21 | End: 2022-12-30 | Stop reason: HOSPADM

## 2022-12-21 RX ORDER — 0.9 % SODIUM CHLORIDE 0.9 %
1000 INTRAVENOUS SOLUTION INTRAVENOUS ONCE
Status: COMPLETED | OUTPATIENT
Start: 2022-12-21 | End: 2022-12-21

## 2022-12-21 RX ORDER — FERROUS SULFATE 325(65) MG
325 TABLET ORAL
Status: DISCONTINUED | OUTPATIENT
Start: 2022-12-22 | End: 2022-12-30 | Stop reason: HOSPADM

## 2022-12-21 RX ORDER — OXYCODONE HYDROCHLORIDE AND ACETAMINOPHEN 5; 325 MG/1; MG/1
2 TABLET ORAL
Status: DISCONTINUED | OUTPATIENT
Start: 2022-12-21 | End: 2022-12-21

## 2022-12-21 RX ORDER — ACETAMINOPHEN 325 MG/1
650 TABLET ORAL EVERY 6 HOURS PRN
Status: DISCONTINUED | OUTPATIENT
Start: 2022-12-21 | End: 2022-12-30 | Stop reason: HOSPADM

## 2022-12-21 RX ORDER — SODIUM CHLORIDE 0.9 % (FLUSH) 0.9 %
5-40 SYRINGE (ML) INJECTION EVERY 12 HOURS SCHEDULED
Status: DISCONTINUED | OUTPATIENT
Start: 2022-12-21 | End: 2022-12-30 | Stop reason: HOSPADM

## 2022-12-21 RX ORDER — SODIUM CHLORIDE 9 MG/ML
INJECTION, SOLUTION INTRAVENOUS CONTINUOUS
Status: DISCONTINUED | OUTPATIENT
Start: 2022-12-21 | End: 2022-12-24

## 2022-12-21 RX ORDER — HYDROMORPHONE HYDROCHLORIDE 1 MG/ML
1 INJECTION, SOLUTION INTRAMUSCULAR; INTRAVENOUS; SUBCUTANEOUS
Status: COMPLETED | OUTPATIENT
Start: 2022-12-21 | End: 2022-12-21

## 2022-12-21 RX ORDER — DONEPEZIL HYDROCHLORIDE 5 MG/1
5 TABLET, FILM COATED ORAL NIGHTLY
Status: DISCONTINUED | OUTPATIENT
Start: 2022-12-21 | End: 2022-12-30 | Stop reason: HOSPADM

## 2022-12-21 RX ORDER — POLYETHYLENE GLYCOL 3350 17 G/17G
17 POWDER, FOR SOLUTION ORAL DAILY PRN
Status: DISCONTINUED | OUTPATIENT
Start: 2022-12-21 | End: 2022-12-27

## 2022-12-21 RX ORDER — OXYMETAZOLINE HYDROCHLORIDE 0.05 G/100ML
2 SPRAY NASAL 2 TIMES DAILY PRN
Status: DISPENSED | OUTPATIENT
Start: 2022-12-21 | End: 2022-12-23

## 2022-12-21 RX ORDER — LOSARTAN POTASSIUM 50 MG/1
100 TABLET ORAL DAILY
Status: DISCONTINUED | OUTPATIENT
Start: 2022-12-22 | End: 2022-12-30 | Stop reason: HOSPADM

## 2022-12-21 RX ORDER — SODIUM CHLORIDE 0.9 % (FLUSH) 0.9 %
5-40 SYRINGE (ML) INJECTION PRN
Status: DISCONTINUED | OUTPATIENT
Start: 2022-12-21 | End: 2022-12-30 | Stop reason: HOSPADM

## 2022-12-21 RX ORDER — PREDNISONE 10 MG/1
10 TABLET ORAL DAILY
Status: DISCONTINUED | OUTPATIENT
Start: 2022-12-22 | End: 2022-12-30 | Stop reason: HOSPADM

## 2022-12-21 RX ORDER — HYDRALAZINE HYDROCHLORIDE 25 MG/1
25 TABLET, FILM COATED ORAL EVERY 8 HOURS SCHEDULED
Status: DISCONTINUED | OUTPATIENT
Start: 2022-12-21 | End: 2022-12-30 | Stop reason: HOSPADM

## 2022-12-21 RX ADMIN — HYDRALAZINE HYDROCHLORIDE 25 MG: 25 TABLET ORAL at 21:24

## 2022-12-21 RX ADMIN — HYDROMORPHONE HYDROCHLORIDE 1 MG: 1 INJECTION, SOLUTION INTRAMUSCULAR; INTRAVENOUS; SUBCUTANEOUS at 17:51

## 2022-12-21 RX ADMIN — HEPARIN SODIUM 5000 UNITS: 5000 INJECTION INTRAVENOUS; SUBCUTANEOUS at 20:27

## 2022-12-21 RX ADMIN — SODIUM CHLORIDE, PRESERVATIVE FREE 10 ML: 5 INJECTION INTRAVENOUS at 20:33

## 2022-12-21 RX ADMIN — SODIUM CHLORIDE: 9 INJECTION, SOLUTION INTRAVENOUS at 20:28

## 2022-12-21 RX ADMIN — SODIUM CHLORIDE 1000 ML: 9 INJECTION, SOLUTION INTRAVENOUS at 17:50

## 2022-12-21 RX ADMIN — ONDANSETRON 4 MG: 2 INJECTION INTRAMUSCULAR; INTRAVENOUS at 17:51

## 2022-12-21 ASSESSMENT — PAIN SCALES - GENERAL
PAINLEVEL_OUTOF10: 0
PAINLEVEL_OUTOF10: 10
PAINLEVEL_OUTOF10: 8
PAINLEVEL_OUTOF10: 0

## 2022-12-21 ASSESSMENT — ENCOUNTER SYMPTOMS
VOMITING: 0
COLOR CHANGE: 0
SHORTNESS OF BREATH: 0
COUGH: 0
NAUSEA: 0
EYE REDNESS: 0
BACK PAIN: 0
EYE DISCHARGE: 0
RHINORRHEA: 0
ABDOMINAL PAIN: 1
FACIAL SWELLING: 0

## 2022-12-21 ASSESSMENT — PAIN - FUNCTIONAL ASSESSMENT: PAIN_FUNCTIONAL_ASSESSMENT: 0-10

## 2022-12-21 ASSESSMENT — PAIN DESCRIPTION - LOCATION: LOCATION: ABDOMEN

## 2022-12-21 NOTE — ED PROVIDER NOTES
[employer] Emergency Department Provider Note                   PCP:                Blanka El MD               Age: [de-identified] y.o. Sex: female     No diagnosis found. DISPOSITION         New Prescriptions    No medications on file       Orders Placed This Encounter   Procedures    CBC with Auto Differential    Comprehensive Metabolic Panel    Lipase         Prachi Schmitz is a [de-identified] y.o. female who presents to the Emergency Department with chief complaint of    Chief Complaint   Patient presents with    Abdominal Pain      Chief complaint : Acute exacerbation of chronic pancreatitis    HISTORY OF PRESENT ILLNESS :  Location : Epigastric abdominal pain    Quality : Burning and stabbing    Quantity : Constant    Timing : Last night    Severity : 8/10    Context : He has had pancreatitis numerous times in route been hospitalized for    Alleviating / exacerbating factors : Despite having had pancreatitis numerous times she thought grits may make her better this morning, and when in fact it made it worse, so then she tried mashed potatoes in the hopes that it may make her feel better. Patient has had no medicines to try    Associated Symptoms : No nausea, and in fact states she has plenty of nausea medicine at home    -------------------------------    SOCIAL HISTORY : , daily smoker, nondrinker        Review of Systems   Constitutional:  Positive for appetite change and unexpected weight change. Negative for chills and fever. HENT:  Negative for facial swelling and rhinorrhea. Eyes:  Negative for discharge and redness. Respiratory:  Negative for cough and shortness of breath. Cardiovascular:  Negative for chest pain and palpitations. Gastrointestinal:  Positive for abdominal pain. Negative for nausea and vomiting. Endocrine: Negative for polydipsia and polyuria. Genitourinary:  Negative for difficulty urinating and dysuria. Musculoskeletal:  Negative for arthralgias and back pain.    Skin: Negative for color change and pallor. Neurological:  Negative for dizziness and headaches. All other systems reviewed and are negative. All other systems reviewed and are negative.       Past Medical History:   Diagnosis Date    EPIFANIO (acute kidney injury) ([address] 12/04/2014    pt denies this dx    Arthritis     RA-abdelrahman hands    Back pain 6/10/2016    Breast cancer ([address] 2018    Breast lump dx 2/2018    left    Bronchitis     Chronic obstructive pulmonary disease ([address]     Discharge from the vagina     Dysuria     Eczema 6/10/2016    Fungal dermatitis     Headache 6/10/2016    Hypercholesterolemia 12/4/2014    Hypertension     not well controlled--per pt    Intractable vomiting 5/20/2018    Menopausal vaginal dryness     Osteoarthritis 6/10/2016    Osteoporosis 6/10/2016    Pancreatitis     2 episodes    PUD (peptic ulcer disease)     last 2003 which a rupture an extensive surgery    Sepsis ([address] 12/4/2014    Thyroid nodule     Tobacco abuse     1ppd x 49 yrs        Past Surgical History:   Procedure Laterality Date    APPENDECTOMY  1977    with hysterectomy    BREAST BIOPSY Left 1975    BREAST LUMPECTOMY Left 2/22/2018    LEFT BREAST LUMPECTOMY/ SENTINEL NODE BIOPSY performed by Phoenix Johnson MD at [facility] MAIN OR    BREAST SURGERY Left 1975    breast lumpectomy, benign  (left)    CATARACT REMOVAL Bilateral 2018    w/IOL    CHOLECYSTECTOMY      HYSTERECTOMY (CERVIX STATUS UNKNOWN)  1977    OTHER SURGICAL HISTORY      hernicolectomy 2 cm    NV ABDOMEN SURGERY PROC UNLISTED  2003    stomach surgery for ruptured ulcer and extensive rerouting of intestestines per patient     [address]  05/21/2018    empiric dilation    UPPER GASTROINTESTINAL ENDOSCOPY      UPPER GASTROINTESTINAL ENDOSCOPY N/A 10/4/2022    EGD ESOPHAGOGASTRODUODENOSCOPY/  performed by Ben Benoit MD at [facility] Left 1/31/2018    US BREAST NEEDLE BIOPSY LEFT 1/31/2018 [facility] RADIOLOGY [facility]MO        Family History   Problem Relation Age of Onset    Thyroid Cancer Neg Hx     No Known Problems Paternal Grandfather     No Known Problems Paternal Grandmother     No Known Problems Maternal Grandfather     No Known Problems Maternal Grandmother     Hypertension Other         Brother and sister with htn    No Known Problems Brother     Hypertension Mother     Heart Disease Brother     Heart Disease Sister     Breast Cancer Sister 79    Cancer Sister     Heart Disease Father     Heart Attack Father     Cancer Brother         prostate    Diabetes Sister     Heart Disease Sister         Social Connections: Not on file        Allergies   Allergen Reactions    Azithromycin Other (See Comments)     pancreatitis    Codeine Nausea And Vomiting        Vitals signs and nursing note reviewed. Patient Vitals for the past 4 hrs:   Temp Pulse BP SpO2   12/21/22 1417 97.5 °F (36.4 °C) 82 (!) 157/56 99 %          Physical Exam  Vitals and nursing note reviewed. Constitutional:       General: She is in acute distress. Appearance: Normal appearance. She is well-developed and underweight. She is not ill-appearing, toxic-appearing or diaphoretic. HENT:      Head: Normocephalic and atraumatic. Right Ear: External ear normal.      Left Ear: External ear normal.      Nose: No rhinorrhea. Eyes:      General: No scleral icterus. Right eye: No discharge. Left eye: No discharge. Conjunctiva/sclera: Conjunctivae normal.   Cardiovascular:      Rate and Rhythm: Normal rate and regular rhythm. Pulmonary:      Effort: Pulmonary effort is normal. No respiratory distress. Breath sounds: Normal breath sounds. No wheezing, rhonchi or rales. Abdominal:      General: Abdomen is flat and scaphoid. Palpations: Abdomen is soft. There is no fluid wave or pulsatile mass. Tenderness: There is abdominal tenderness in the epigastric area.  There is no right CVA tenderness, left CVA tenderness, guarding or rebound. Musculoskeletal:         General: No deformity or signs of injury. Normal range of motion. Cervical back: Normal range of motion and neck supple. No rigidity. Skin:     General: Skin is warm and dry. Coloration: Skin is not jaundiced or pale. Neurological:      General: No focal deficit present. Mental Status: She is alert and oriented to person, place, and time. Mental status is at baseline. Gait: Gait normal.   Psychiatric:         Mood and Affect: Mood normal.         Behavior: Behavior normal.         Thought Content: Thought content normal.        MDM  Number of Diagnoses or Management Options  Chronic pancreatitis, unspecified pancreatitis type ([facility]  Diagnosis management comments: acute exacerbation of chronic pancreatitis, check labs, reevaluate after fluids and analgesics, will start with some Percocet as she would be in one of the iwr's, where we're not able to give any IV analgesia  5:40 PM, 3 hours later after having seen the patient in triage and ordered p.o. analgesics with fluids and Toradol, she is just now being put into room 32, and fixing to be medicated  5:57 PM patient is in room and her pain has worsened significantly over the intervening 3 hours, I have canceled the Percocet and ordered IV Dilaudid pain was initially not in her back, but is now.     Daughter and patient indicate that this lipase of 9000 currently is the worst it has ever been             Amount and/or Complexity of Data Reviewed  Clinical lab tests: ordered and reviewed  Decide to obtain previous medical records or to obtain history from someone other than the patient: yes  Obtain history from someone other than the patient: yes (Daughters at bedside)  Discuss the patient with other providers: yes    Risk of Complications, Morbidity, and/or Mortality  Presenting problems: moderate  Diagnostic procedures: low  Management options: low    Patient Progress  Patient progress: improved      Procedures    Labs Reviewed   CBC WITH AUTO DIFFERENTIAL   COMPREHENSIVE METABOLIC PANEL   LIPASE        No orders to display                                  Voice dictation software was used during the making of this note. This software is not perfect and grammatical and other typographical errors may be present. This note has not been completely proofread for errors.         Bal Negron MD  12/21/22 1532       Bal Negron MD  12/21/22 [address] MD Zach  12/21/22 206

## 2022-12-21 NOTE — ED TRIAGE NOTES
Patient ambulatory to triage with CO abdominal pain \"that flared up last night\". Reports epigastric pain , denies NV reports hx pancreatitis.

## 2022-12-22 ENCOUNTER — APPOINTMENT (OUTPATIENT)
Dept: CT IMAGING | Age: 82
End: 2022-12-22
Payer: MEDICARE

## 2022-12-22 PROBLEM — E44.0 PROTEIN-CALORIE MALNUTRITION, MODERATE (HCC): Status: ACTIVE | Noted: 2022-12-22

## 2022-12-22 PROBLEM — R63.4 UNINTENTIONAL WEIGHT LOSS: Status: ACTIVE | Noted: 2022-12-22

## 2022-12-22 LAB
ALBUMIN SERPL-MCNC: 2.5 G/DL (ref 3.2–4.6)
ALBUMIN/GLOB SERPL: 0.9 {RATIO} (ref 0.4–1.6)
ALP SERPL-CCNC: 47 U/L (ref 50–136)
ALT SERPL-CCNC: 16 U/L (ref 12–65)
ANION GAP SERPL CALC-SCNC: 7 MMOL/L (ref 2–11)
AST SERPL-CCNC: 9 U/L (ref 15–37)
BASOPHILS # BLD: 0 K/UL (ref 0–0.2)
BASOPHILS # BLD: 0 K/UL (ref 0–0.2)
BASOPHILS NFR BLD: 0 % (ref 0–2)
BASOPHILS NFR BLD: 0 % (ref 0–2)
BILIRUB SERPL-MCNC: 0.3 MG/DL (ref 0.2–1.1)
BUN SERPL-MCNC: 11 MG/DL (ref 8–23)
CALCIUM SERPL-MCNC: 7.3 MG/DL (ref 8.3–10.4)
CHLORIDE SERPL-SCNC: 113 MMOL/L (ref 101–110)
CO2 SERPL-SCNC: 20 MMOL/L (ref 21–32)
CREAT SERPL-MCNC: 0.6 MG/DL (ref 0.6–1)
DIFFERENTIAL METHOD BLD: ABNORMAL
DIFFERENTIAL METHOD BLD: ABNORMAL
EOSINOPHIL # BLD: 0 K/UL (ref 0–0.8)
EOSINOPHIL # BLD: 0 K/UL (ref 0–0.8)
EOSINOPHIL NFR BLD: 0 % (ref 0.5–7.8)
EOSINOPHIL NFR BLD: 0 % (ref 0.5–7.8)
ERYTHROCYTE [DISTWIDTH] IN BLOOD BY AUTOMATED COUNT: 19.7 % (ref 11.9–14.6)
ERYTHROCYTE [DISTWIDTH] IN BLOOD BY AUTOMATED COUNT: 19.9 % (ref 11.9–14.6)
GLOBULIN SER CALC-MCNC: 2.7 G/DL (ref 2.8–4.5)
GLUCOSE SERPL-MCNC: 90 MG/DL (ref 65–100)
HCT VFR BLD AUTO: 30.8 % (ref 35.8–46.3)
HCT VFR BLD AUTO: 31.1 % (ref 35.8–46.3)
HGB BLD-MCNC: 9.6 G/DL (ref 11.7–15.4)
HGB BLD-MCNC: 9.8 G/DL (ref 11.7–15.4)
IMM GRANULOCYTES # BLD AUTO: 0 K/UL (ref 0–0.5)
IMM GRANULOCYTES # BLD AUTO: 0 K/UL (ref 0–0.5)
IMM GRANULOCYTES NFR BLD AUTO: 0 % (ref 0–5)
IMM GRANULOCYTES NFR BLD AUTO: 0 % (ref 0–5)
LYMPHOCYTES # BLD: 0.9 K/UL (ref 0.5–4.6)
LYMPHOCYTES # BLD: 1.4 K/UL (ref 0.5–4.6)
LYMPHOCYTES NFR BLD: 11 % (ref 13–44)
LYMPHOCYTES NFR BLD: 19 % (ref 13–44)
MAGNESIUM SERPL-MCNC: 1.8 MG/DL (ref 1.8–2.4)
MCH RBC QN AUTO: 26.3 PG (ref 26.1–32.9)
MCH RBC QN AUTO: 26.8 PG (ref 26.1–32.9)
MCHC RBC AUTO-ENTMCNC: 31.2 G/DL (ref 31.4–35)
MCHC RBC AUTO-ENTMCNC: 31.5 G/DL (ref 31.4–35)
MCV RBC AUTO: 84.4 FL (ref 82–102)
MCV RBC AUTO: 85 FL (ref 82–102)
MONOCYTES # BLD: 0.2 K/UL (ref 0.1–1.3)
MONOCYTES # BLD: 0.7 K/UL (ref 0.1–1.3)
MONOCYTES NFR BLD: 3 % (ref 4–12)
MONOCYTES NFR BLD: 9 % (ref 4–12)
NEUTS SEG # BLD: 5.2 K/UL (ref 1.7–8.2)
NEUTS SEG # BLD: 6.7 K/UL (ref 1.7–8.2)
NEUTS SEG NFR BLD: 72 % (ref 43–78)
NEUTS SEG NFR BLD: 86 % (ref 43–78)
NRBC # BLD: 0 K/UL (ref 0–0.2)
NRBC # BLD: 0 K/UL (ref 0–0.2)
PLATELET # BLD AUTO: 264 K/UL (ref 150–450)
PLATELET # BLD AUTO: 290 K/UL (ref 150–450)
PMV BLD AUTO: 8.8 FL (ref 9.4–12.3)
PMV BLD AUTO: 8.9 FL (ref 9.4–12.3)
POTASSIUM SERPL-SCNC: 3.5 MMOL/L (ref 3.5–5.1)
PROT SERPL-MCNC: 5.2 G/DL (ref 6.3–8.2)
RBC # BLD AUTO: 3.65 M/UL (ref 4.05–5.2)
RBC # BLD AUTO: 3.66 M/UL (ref 4.05–5.2)
SODIUM SERPL-SCNC: 140 MMOL/L (ref 133–143)
T4 FREE SERPL-MCNC: 1.1 NG/DL (ref 0.78–1.46)
TSH W FREE THYROID IF ABNORMAL: 0.35 UIU/ML (ref 0.36–3.74)
WBC # BLD AUTO: 7.4 K/UL (ref 4.3–11.1)
WBC # BLD AUTO: 7.9 K/UL (ref 4.3–11.1)

## 2022-12-22 PROCEDURE — 84439 ASSAY OF FREE THYROXINE: CPT

## 2022-12-22 PROCEDURE — 6370000000 HC RX 637 (ALT 250 FOR IP): Performed by: INTERNAL MEDICINE

## 2022-12-22 PROCEDURE — 1100000000 HC RM PRIVATE

## 2022-12-22 PROCEDURE — 71250 CT THORAX DX C-: CPT

## 2022-12-22 PROCEDURE — 80053 COMPREHEN METABOLIC PANEL: CPT

## 2022-12-22 PROCEDURE — 97530 THERAPEUTIC ACTIVITIES: CPT

## 2022-12-22 PROCEDURE — 85025 COMPLETE CBC W/AUTO DIFF WBC: CPT

## 2022-12-22 PROCEDURE — 36415 COLL VENOUS BLD VENIPUNCTURE: CPT

## 2022-12-22 PROCEDURE — 83735 ASSAY OF MAGNESIUM: CPT

## 2022-12-22 PROCEDURE — 97161 PT EVAL LOW COMPLEX 20 MIN: CPT

## 2022-12-22 PROCEDURE — 6360000002 HC RX W HCPCS: Performed by: INTERNAL MEDICINE

## 2022-12-22 PROCEDURE — 84443 ASSAY THYROID STIM HORMONE: CPT

## 2022-12-22 PROCEDURE — 97165 OT EVAL LOW COMPLEX 30 MIN: CPT

## 2022-12-22 PROCEDURE — 2580000003 HC RX 258: Performed by: INTERNAL MEDICINE

## 2022-12-22 PROCEDURE — 97116 GAIT TRAINING THERAPY: CPT

## 2022-12-22 RX ADMIN — SODIUM CHLORIDE: 9 INJECTION, SOLUTION INTRAVENOUS at 20:50

## 2022-12-22 RX ADMIN — SODIUM CHLORIDE, PRESERVATIVE FREE 10 ML: 5 INJECTION INTRAVENOUS at 20:57

## 2022-12-22 RX ADMIN — FERROUS SULFATE TAB 325 MG (65 MG ELEMENTAL FE) 325 MG: 325 (65 FE) TAB at 09:26

## 2022-12-22 RX ADMIN — HEPARIN SODIUM 5000 UNITS: 5000 INJECTION INTRAVENOUS; SUBCUTANEOUS at 09:26

## 2022-12-22 RX ADMIN — PRAVASTATIN SODIUM 40 MG: 80 TABLET ORAL at 20:52

## 2022-12-22 RX ADMIN — DONEPEZIL HYDROCHLORIDE 5 MG: 5 TABLET, FILM COATED ORAL at 20:52

## 2022-12-22 RX ADMIN — AMLODIPINE BESYLATE 10 MG: 10 TABLET ORAL at 09:26

## 2022-12-22 RX ADMIN — HYDROMORPHONE HYDROCHLORIDE 0.5 MG: 1 INJECTION, SOLUTION INTRAMUSCULAR; INTRAVENOUS; SUBCUTANEOUS at 20:54

## 2022-12-22 RX ADMIN — HYDRALAZINE HYDROCHLORIDE 25 MG: 25 TABLET ORAL at 20:52

## 2022-12-22 RX ADMIN — HYDROMORPHONE HYDROCHLORIDE 0.5 MG: 1 INJECTION, SOLUTION INTRAMUSCULAR; INTRAVENOUS; SUBCUTANEOUS at 00:29

## 2022-12-22 RX ADMIN — PREDNISONE 10 MG: 10 TABLET ORAL at 09:26

## 2022-12-22 RX ADMIN — HEPARIN SODIUM 5000 UNITS: 5000 INJECTION INTRAVENOUS; SUBCUTANEOUS at 20:54

## 2022-12-22 RX ADMIN — SODIUM CHLORIDE, PRESERVATIVE FREE 10 ML: 5 INJECTION INTRAVENOUS at 09:34

## 2022-12-22 RX ADMIN — ONDANSETRON 4 MG: 8 TABLET, ORALLY DISINTEGRATING ORAL at 21:34

## 2022-12-22 RX ADMIN — SODIUM CHLORIDE: 9 INJECTION, SOLUTION INTRAVENOUS at 05:42

## 2022-12-22 ASSESSMENT — PAIN DESCRIPTION - DESCRIPTORS
DESCRIPTORS: SORE
DESCRIPTORS: ACHING;SHARP

## 2022-12-22 ASSESSMENT — PAIN DESCRIPTION - LOCATION
LOCATION: ABDOMEN
LOCATION: ABDOMEN

## 2022-12-22 ASSESSMENT — PAIN SCALES - GENERAL
PAINLEVEL_OUTOF10: 0
PAINLEVEL_OUTOF10: 0
PAINLEVEL_OUTOF10: 7
PAINLEVEL_OUTOF10: 0
PAINLEVEL_OUTOF10: 7
PAINLEVEL_OUTOF10: 0

## 2022-12-22 ASSESSMENT — PAIN DESCRIPTION - ORIENTATION: ORIENTATION: RIGHT

## 2022-12-22 ASSESSMENT — PAIN - FUNCTIONAL ASSESSMENT: PAIN_FUNCTIONAL_ASSESSMENT: PREVENTS OR INTERFERES SOME ACTIVE ACTIVITIES AND ADLS

## 2022-12-22 NOTE — PROGRESS NOTES
ACUTE OCCUPATIONAL THERAPY GOALS:   (Developed with and agreed upon by patient and/or caregiver.)  Patient will demonstrate ability to complete functional mobility/transfers/observed ADL with independence and good safety awareness. Timeframe: 1 visit     OCCUPATIONAL THERAPY Initial Assessment, Daily Note, Discharge, and AM       OT Visit Days: 1  Acknowledge Orders  Time  OT Charge Capture  Rehab Caseload Tracker      Melissa Guzman is a [de-identified] y.o. female   PRIMARY DIAGNOSIS: Acute recurrent pancreatitis  Acute recurrent pancreatitis [K85.90]  Acute pancreatitis, unspecified complication status, unspecified pancreatitis type [K85.90]       Reason for Referral: Generalized Muscle Weakness (M62.81)  Other lack of cordination (R27.8)  Inpatient: Payor: Marisol Snell / Plan: Gia Marie PPO / Product Type: Medicare /     ASSESSMENT:     REHAB RECOMMENDATIONS:   Recommendation to date pending progress:  Setting:  No further skilled therapy after discharge from hospital    Equipment:    None     ASSESSMENT:  Ms. Catalina Ventura presents to the hospital with acute recurrent pancreatitis. Pt is supine in the bed with daughter at bedside. Pt is alert and quite pleasant. Pt moving well this am and tolerated bed mobility without assistance. Pt denies any pain or major complaints. Pt initially requiring supervision for functional transfers and functional mobility but did progress to independence. Pt did have one slight loss of balance but was able to recover on her own. Pt demonstrated good standing tolerance during session. Pt educated on home safety for decreasing risk for falls with ADL. Pt verbalized safe plan for showering at home. Pt appears to functioning at or close to baseline and has no further OT needs at this time.       [employer] AM-PAC 6 Clicks Daily Activity Inpatient Short Form:    AM-PAC Daily Activity Inpatient   How much help for putting on and taking off regular lower body clothing?: None  How much help for Bathing?: A Little  How much help for Toileting?: None  How much help for putting on and taking off regular upper body clothing?: None  How much help for taking care of personal grooming?: None  How much help for eating meals?: None  AM-PAC Inpatient Daily Activity Raw Score: 23  AM-PAC Inpatient ADL T-Scale Score : 51.12  ADL Inpatient CMS 0-100% Score: 15.86  ADL Inpatient CMS G-Code Modifier : CI           SUBJECTIVE:     Ms. Jori Street states, \"I had one fall last January working out in the yard\"     Social/Functional Lives With: Alone  Type of Home: [facility] [address]: One level  Home Access: Level entry  Entrance Stairs - Number of Steps: 1  Home Equipment: None  Has the patient had two or more falls in the past year or any fall with injury in the past year?: No  Receives Help From: Family  ADL Assistance: [facility]: Independent  Ambulation Assistance: Independent  Transfer Assistance: Independent  Active : Yes  Mode of Transportation: Car  Occupation: Retired    OBJECTIVE:     Kathy Mathew / Marysol Araujo / Talat Tejada: IV    RESTRICTIONS/PRECAUTIONS:       PAIN: Gaylyn Nam / O2:   Pre Treatment: None         Post Treatment: None       Vitals          Oxygen            GROSS EVALUATION: INTACT IMPAIRED   (See Comments)   UE AROM [] []Some  limitations in hands due to RA    UE PROM [x] []   Strength [x]       Posture / Balance [] Sitting - Static: Good  Sitting - Dynamic: Good  Standing - Static: Good  Standing - Dynamic: Fair, +   Sensation [x]     Coordination [x]       Tone [x]       Edema [x]    Activity Tolerance [x]       Hand Dominance R [x] L []      COGNITION/  PERCEPTION: INTACT IMPAIRED   (See Comments)   Orientation [x]     Vision [x]     Hearing [x]     Cognition  [x]     Perception [x]       MOBILITY: I Mod I S SBA CGA Min Mod Max Total  NT x2 Comments:   Bed Mobility    Rolling [x] [] [] [] [] [] [] [] [] [] []    Supine to Sit [x] [] [] [] [] [] [] [] [] [] [] Scooting [x] [] [] [] [] [] [] [] [] [] []    Sit to Supine [] [] [] [] [] [] [] [] [] [x] []    Transfers    Sit to Stand [x] [] [x] [] [] [] [] [] [] [] []    Bed to Chair [x] [] [x] [] [] [] [] [] [] [] []    Stand to Sit [x] [] [x] [] [] [] [] [] [] [] []    Tub/Shower [] [] [] [] [] [] [] [] [] [x] []     Toilet [] [] [] [] [] [] [] [] [] [x] []      [] [] [] [] [] [] [] [] [] [] []    I=Independent, Mod I=Modified Independent, S=Supervision/Setup, SBA=Standby Assistance, CGA=Contact Guard Assistance, Min=Minimal Assistance, Mod=Moderate Assistance, Max=Maximal Assistance, Total=Total Assistance, NT=Not Tested    ACTIVITIES OF DAILY LIVING: I Mod I S SBA CGA Min Mod Max Total NT Comments   BASIC ADLs:              Upper Body Bathing  [] [] [] [] [] [] [] [] [] [x]    Lower Body Bathing [] [] [] [] [] [] [] [] [] [x]    Toileting [] [] [] [] [] [] [] [] [] [x]    Upper Body Dressing [] [] [] [] [] [] [] [] [] [x]    Lower Body Dressing [] [] [] [] [] [] [] [] [] [x]    Feeding [] [] [] [] [] [] [] [] [] [x]    Grooming [] [] [] [] [] [] [] [] [] [x]    Personal Device Care [] [] [] [] [] [] [] [] [] [x]    Functional Mobility [x] [] [x] [] [] [] [] [] [] []  No AD    I=Independent, Mod I=Modified Independent, S=Supervision/Setup, SBA=Standby Assistance, CGA=Contact Guard Assistance, Min=Minimal Assistance, Mod=Moderate Assistance, Max=Maximal Assistance, Total=Total Assistance, NT=Not Tested    PLAN:   FREQUENCY/DURATION   OT Plan of Care:  (discharge) for duration of hospital stay or until stated goals are met, whichever comes first.    PROBLEM LIST:   (Skilled intervention is medically necessary to address:)  Decreased Balance   INTERVENTIONS PLANNED:  (Benefits and precautions of occupational therapy have been discussed with the patient.)  Therapeutic Activity  Education         TREATMENT:     EVALUATION: LOW COMPLEXITY: (Untimed Charge)    TREATMENT:   Therapeutic Activity (8 Minutes): Patient participated in therapeutic activities including functional transfer training, functional mobility of household distances, functional mobility of community distances, and standing tolerance activity with minimal verbal cues and education in order to increase independence, increase safety awareness, and prepare for discharge home.      TREATMENT GRID:  N/A    AFTER TREATMENT PRECAUTIONS: Bed/Chair Locked, Call light within reach, Chair, Needs within reach, and Visitors at bedside    INTERDISCIPLINARY COLLABORATION:  RN/ PCT, PT/ PTA, and OT/ CEDILLO    EDUCATION:  Education Given To: Patient  Education Provided: Role of Therapy;Plan of Care  Education Method: Verbal  Barriers to Learning: None  Education Outcome: Verbalized understanding    TOTAL TREATMENT DURATION AND TIME:  Time In: 1003  Time Out: 2201 Kevin Cardenas  Minutes: 14 [facility]

## 2022-12-22 NOTE — ED NOTES
TRANSFER - OUT REPORT:    Verbal report given to bernie Porras  being transferred to [facility] for routine progression of patient care       Report consisted of patient's Situation, Background, Assessment and   Recommendations(SBAR). Information from the following report(s) ED SBAR was reviewed with the receiving nurse. [place] Assessment: Presents to emergency department  because of falls (Syncope, seizure, or loss of consciousness): No, Age > 79: Yes, Altered Mental Status, Intoxication with alcohol or substance confusion (Disorientation, impaired judgment, poor safety awaremess, or inability to follow instructions): No, Impaired Mobility: Ambulates or transfers with assistive devices or assistance; Unable to ambulate or transer.: No, Nursing Judgement: No  Lines:   Peripheral IV 12/21/22 Right Antecubital (Active)   Site Assessment Clean, dry & intact 12/21/22 1500   Line Status Blood return noted 12/21/22 1500   Phlebitis Assessment No symptoms 12/21/22 1500   Infiltration Assessment 0 12/21/22 1500   Alcohol Cap Used No 12/21/22 1500   Dressing Status Clean, dry & intact 12/21/22 1500   Dressing Type Transparent 12/21/22 1500   Dressing Intervention New 12/21/22 1500        Opportunity for questions and clarification was provided.       Patient transported with:  Claudetta Mcardle, RN  12/21/22 1523

## 2022-12-22 NOTE — PROGRESS NOTES
Hospitalist Progress Note   Admit Date:  2022  5:38 PM   Name:  Rodolfo Casarez   Age:  [de-identified] y.o. Sex:  female  :  1940   MRN:  [id number]   Room:  [facility]/    Presenting Complaint: Abdominal Pain     Reason(s) for Admission: Acute recurrent pancreatitis [K85.90]  Acute pancreatitis, unspecified complication status, unspecified pancreatitis type SPECIALTY [facility] Course:     Please refer to the admission H&P for details of presentation. In summary, Rodolfo Casarez is a [de-identified] y.o. female with medical history significant for  duodenal ulcer complicated by perforation requiring bilroth II,  idiopathic pancreatitis, rheumatoid arthritis on enbrel/ current course of steroids, HTN, ongoing tobacco use who is evaluated with acute onset of mid abdominal pain to her back. Per records, she was discharged on 2022 for recurrent pancreatitis and was seen by GI at that time. Subjective/24 hr Events (22) : Patient is seen and examined at bedside. No acute events reported overnight by nursing staff. Continues to have abdominal pain but controlled with IV pain medication. Patient's daughter is at bedside who reports that patient has been having recurrent pancreatitis -4-5 episodes this year. Patient also unintentional weight loss about 23 pounds since February. Patient denies any decreased appetite and states that she has been eating normally if not more. Also reports night sweats. For that she had a chest x-ray done in the past which showed possible thyroid enlargement but has not had any work-up. Patient denies fever, chills, chest pains, shortness of breath. Review of Systems: 10 point review of systems is otherwise negative with the exception of the elements mentioned above. Assessment & Plan:   Acute recurrent pancreatitis in setting of idiopathic pancreatitis  - NPO for now with NS IVF  - pain control with IV dilaudid PRN  - GI consulted.  Appreciated recommendations    Rheumatoid arthritis  - on prednisone 10mg daily. Unintentional Weight loss  Moderate protein calorie malnutrition  Thin female with 23lb unintentional weight loss  - nutrition consult  - given patient is a smoker, will get CT Chest to r/o lung nodule. Thyroid Nodule  CT chest in 2018 with 1.3cm thyroid nodule  - check TSH    Tobacco Dependence  - nicotine patch    HTN // HLD : continue with home medications    Diet:  ADULT DIET; Clear Liquid  DVT PPx: heparin  Code status: Full Code    Hospital Problems:  Principal Problem:    Acute recurrent pancreatitis  Active Problems:    Chronic pancreatitis (HCC)    Rheumatoid arthritis ([address]    Hypercholesterolemia    Personal history of tobacco use, presenting hazards to health    HTN (hypertension)  Resolved Problems:    * No resolved hospital problems. *      I have personally reviewed and ordered clinical lab tests and independently visualized images, tracing. I spent 40 minutes of time caring for this patient at bedside or nearby, and more than 50 percent of which was spent on coordination of care and/or patient/family counseling regarding the disease process, status , and treatment options/plan of care. Discussed with patient and daughter all the issues that had started since beginning of the year.     Objective:   Patient Vitals for the past 24 hrs:   Temp Pulse Resp BP SpO2   12/22/22 1100 98.8 °F (37.1 °C) 76 18 (!) 129/47 97 %   12/22/22 0926 -- -- -- (!) 143/52 --   12/22/22 0710 98.8 °F (37.1 °C) 73 18 (!) 128/43 93 %   12/22/22 0415 99 °F (37.2 °C) 79 18 (!) 136/55 95 %   12/22/22 0059 -- -- 18 -- --   12/22/22 0029 -- -- 18 -- --   12/21/22 2320 99.1 °F (37.3 °C) 85 18 (!) 141/54 96 %   12/21/22 1944 99 °F (37.2 °C) 89 24 (!) 144/63 97 %   12/21/22 1915 98 °F (36.7 °C) 84 16 (!) 134/54 92 %   12/21/22 1900 -- 88 16 (!) 140/55 95 %   12/21/22 1845 -- 82 18 135/78 96 %   12/21/22 1830 -- 84 16 (!) 136/54 94 %   12/21/22 1815 -- 88 16 (!) 160/63 97 %   12/21/22 1800 -- 82 16 (!) 158/63 97 %       Oxygen Therapy  SpO2: 97 %  O2 Device: None (Room air)    Estimated body mass index is 16.28 kg/m² as calculated from the following:    Height as of this encounter: 5' 2\" (1.575 m). Weight as of this encounter: 89 lb (40.4 kg). Intake/Output Summary (Last 24 hours) at 12/22/2022 1511  Last data filed at 12/22/2022 0539  Gross per 24 hour   Intake 2282.01 ml   Output --   Net 2282.01 ml         Physical Exam:     Blood pressure (!) 129/47, pulse 76, temperature 98.8 °F (37.1 °C), temperature source Oral, resp. rate 18, height 5' 2\" (1.575 m), weight 89 lb (40.4 kg), SpO2 97 %. General:    Thin, chronically ill appearing. Head:  Normocephalic, atraumatic  Eyes:  Sclerae appear normal.  Pupils equally round. ENT:  Nares appear normal, no drainage. Moist oral mucosa  Neck:  No restricted ROM. Trachea midline   CV:   RRR. No m/r/g. No jugular venous distension. Lungs:   CTAB. No wheezing. Symmetric expansion. Abdomen: Bowel sounds present. Soft, +tender, nondistended. Extremities: No cyanosis or clubbing. No edema  Skin:     No rashes and normal coloration. Warm and dry. Neuro:  CN II-XII grossly intact. A&Ox3  Psych:  Normal mood and affect.       I have personally reviewed labs and tests showing:  Recent Labs:  Recent Results (from the past 48 hour(s))   CBC with Auto Differential    Collection Time: 12/21/22  3:00 PM   Result Value Ref Range    WBC 13.1 (H) 4.3 - 11.1 K/uL    RBC 4.55 4.05 - 5.2 M/uL    Hemoglobin 12.0 11.7 - 15.4 g/dL    Hematocrit 38.2 35.8 - 46.3 %    MCV 84.0 82 - 102 FL    MCH 26.4 26.1 - 32.9 PG    MCHC 31.4 31.4 - 35.0 g/dL    RDW 19.9 (H) 11.9 - 14.6 %    Platelets 743 750 - 798 K/uL    MPV 8.5 (L) 9.4 - 12.3 FL    nRBC 0.00 0.0 - 0.2 K/uL    Differential Type AUTOMATED      Seg Neutrophils 85 (H) 43 - 78 %    Lymphocytes 9 (L) 13 - 44 %    Monocytes 5 4.0 - 12.0 %    Eosinophils % 0 (L) 0.5 - 7.8 % Basophils 0 0.0 - 2.0 %    Immature Granulocytes 1 0.0 - 5.0 %    Segs Absolute 11.1 (H) 1.7 - 8.2 K/UL    Absolute Lymph # 1.2 0.5 - 4.6 K/UL    Absolute Mono # 0.7 0.1 - 1.3 K/UL    Absolute Eos # 0.0 0.0 - 0.8 K/UL    Basophils Absolute 0.0 0.0 - 0.2 K/UL    Absolute Immature Granulocyte 0.1 0.0 - 0.5 K/UL   Comprehensive Metabolic Panel    Collection Time: 12/21/22  3:00 PM   Result Value Ref Range    Sodium 139 133 - 143 mmol/L    Potassium 3.9 3.5 - 5.1 mmol/L    Chloride 110 101 - 110 mmol/L    CO2 22 21 - 32 mmol/L    Anion Gap 7 2 - 11 mmol/L    Glucose 89 65 - 100 mg/dL    BUN 18 8 - 23 MG/DL    Creatinine 1.00 0.6 - 1.0 MG/DL    Est, Glom Filt Rate 57 (L) >60 ml/min/1.73m2    Calcium 8.6 8.3 - 10.4 MG/DL    Total Bilirubin 0.2 0.2 - 1.1 MG/DL    ALT 23 12 - 65 U/L    AST 8 (L) 15 - 37 U/L    Alk Phosphatase 64 50 - 136 U/L    Total Protein 6.9 6.3 - 8.2 g/dL    Albumin 3.3 3.2 - 4.6 g/dL    Globulin 3.6 2.8 - 4.5 g/dL    Albumin/Globulin Ratio 0.9 0.4 - 1.6     Lipase    Collection Time: 12/21/22  3:00 PM   Result Value Ref Range    Lipase 9,034 (H) 73 - 393 U/L   Comprehensive Metabolic Panel w/ Reflex to MG    Collection Time: 12/22/22  5:30 AM   Result Value Ref Range    Sodium 140 133 - 143 mmol/L    Potassium 3.5 3.5 - 5.1 mmol/L    Chloride 113 (H) 101 - 110 mmol/L    CO2 20 (L) 21 - 32 mmol/L    Anion Gap 7 2 - 11 mmol/L    Glucose 90 65 - 100 mg/dL    BUN 11 8 - 23 MG/DL    Creatinine 0.60 0.6 - 1.0 MG/DL    Est, Glom Filt Rate >60 >60 ml/min/1.73m2    Calcium 7.3 (L) 8.3 - 10.4 MG/DL    Total Bilirubin 0.3 0.2 - 1.1 MG/DL    ALT 16 12 - 65 U/L    AST 9 (L) 15 - 37 U/L    Alk Phosphatase 47 (L) 50 - 136 U/L    Total Protein 5.2 (L) 6.3 - 8.2 g/dL    Albumin 2.5 (L) 3.2 - 4.6 g/dL    Globulin 2.7 (L) 2.8 - 4.5 g/dL    Albumin/Globulin Ratio 0.9 0.4 - 1.6     CBC with Auto Differential    Collection Time: 12/22/22  5:30 AM   Result Value Ref Range    WBC 7.4 4.3 - 11.1 K/uL    RBC 3.66 (L) 4.05 - 5.2 M/uL    Hemoglobin 9.8 (L) 11.7 - 15.4 g/dL    Hematocrit 31.1 (L) 35.8 - 46.3 %    MCV 85.0 82 - 102 FL    MCH 26.8 26.1 - 32.9 PG    MCHC 31.5 31.4 - 35.0 g/dL    RDW 19.7 (H) 11.9 - 14.6 %    Platelets 802 102 - 393 K/uL    MPV 8.8 (L) 9.4 - 12.3 FL    nRBC 0.00 0.0 - 0.2 K/uL    Differential Type AUTOMATED      Seg Neutrophils 72 43 - 78 %    Lymphocytes 19 13 - 44 %    Monocytes 9 4.0 - 12.0 %    Eosinophils % 0 (L) 0.5 - 7.8 %    Basophils 0 0.0 - 2.0 %    Immature Granulocytes 0 0.0 - 5.0 %    Segs Absolute 5.2 1.7 - 8.2 K/UL    Absolute Lymph # 1.4 0.5 - 4.6 K/UL    Absolute Mono # 0.7 0.1 - 1.3 K/UL    Absolute Eos # 0.0 0.0 - 0.8 K/UL    Basophils Absolute 0.0 0.0 - 0.2 K/UL    Absolute Immature Granulocyte 0.0 0.0 - 0.5 K/UL   Magnesium    Collection Time: 12/22/22  5:30 AM   Result Value Ref Range    Magnesium 1.8 1.8 - 2.4 mg/dL       I have personally reviewed imaging studies showing:   Other Studies:  MRI ABDOMEN W WO CONTRAST    (Results Pending)       Current Meds:  Current Facility-Administered Medications   Medication Dose Route Frequency    amLODIPine (NORVASC) tablet 10 mg  10 mg Oral Daily    donepezil (ARICEPT) tablet 5 mg  5 mg Oral Nightly    ferrous sulfate (IRON 325) tablet 325 mg  325 mg Oral Daily with breakfast    hydrALAZINE (APRESOLINE) tablet 25 mg  25 mg Oral 3 times per day    losartan (COZAAR) tablet 100 mg  100 mg Oral Daily    pravastatin (PRAVACHOL) tablet 40 mg  40 mg Oral Nightly    predniSONE (DELTASONE) tablet 10 mg  10 mg Oral Daily    sodium chloride flush 0.9 % injection 5-40 mL  5-40 mL IntraVENous 2 times per day    sodium chloride flush 0.9 % injection 5-40 mL  5-40 mL IntraVENous PRN    0.9 % sodium chloride infusion   IntraVENous PRN    heparin (porcine) injection 5,000 Units  5,000 Units SubCUTAneous BID    ondansetron (ZOFRAN-ODT) disintegrating tablet 4 mg  4 mg Oral Q8H PRN    Or    ondansetron (ZOFRAN) injection 4 mg  4 mg IntraVENous Q6H PRN    polyethylene glycol (GLYCOLAX) packet 17 g  17 g Oral Daily PRN    acetaminophen (TYLENOL) tablet 650 mg  650 mg Oral Q6H PRN    Or    acetaminophen (TYLENOL) suppository 650 mg  650 mg Rectal Q6H PRN    nicotine (NICODERM CQ) 21 MG/24HR 1 patch  1 patch TransDERmal Daily    0.9 % sodium chloride infusion   IntraVENous Continuous    oxymetazoline (AFRIN) 0.05 % nasal spray 2 spray  2 spray Each Nostril BID PRN    HYDROmorphone (DILAUDID) injection 0.5 mg  0.5 mg IntraVENous Q4H PRN       Signed:  Karen Harrington MD    Part of this note may have been written by using a voice dictation software. The note has been proof read but may still contain some grammatical/other typographical errors.

## 2022-12-22 NOTE — PROGRESS NOTES
PHYSICAL THERAPY Initial Assessment, Daily Note, and Discharge  (Link to Caseload Tracking: PT Visit Days : 1  Acknowledge Orders  Time In/Out  PT Charge Capture  Rehab Caseload Tracker    Radha Ortiz is a [de-identified] y.o. female   PRIMARY DIAGNOSIS: Acute recurrent pancreatitis  Acute recurrent pancreatitis [K85.90]  Acute pancreatitis, unspecified complication status, unspecified pancreatitis type [K85.90]       Reason for Referral:   Other abnormalities of gait and mobility (R26.89)  Inpatient: Payor: Tani Pradhan / Plan: Tammy Yun PPO / Product Type: Medicare /     ASSESSMENT:     REHAB RECOMMENDATIONS:   Recommendation to date pending progress:  Setting:  Outpatient Therapy    Equipment:    None     ASSESSMENT:  Ms. Alex Johnson is a [de-identified] y.o. female with hx of RA admitted with recurring pancreatitis. Upon PT evaluation, pt demonstrates independence with all functional mobility and is a low fall risk per Tinetti. Pt is cleared to return home with assist and would benefit from outpatient PT to minimize fall risk. No further inpatient PT needs. [address] [facility] [address] [address] AM-PAC 6 Clicks Basic Mobility Inpatient Short Form  AM-PAC Mobility Inpatient   How much difficulty turning over in bed?: None  How much difficulty sitting down on / standing up from a chair with arms?: None  How much difficulty moving from lying on back to sitting on side of bed?: None  How much help from another person moving to and from a bed to a chair?: None  How much help from another person needed to walk in hospital room?: None  How much help from another person for climbing 3-5 steps with a railing?: A Little  [facility] Inpatient Mobility Raw Score : 23  AM-PAC Inpatient T-Scale Score : 56.93  Mobility Inpatient CMS 0-100% Score: 11.2  Mobility Inpatient CMS G-Code Modifier : CI    SUBJECTIVE:   Ms. Alex Johnson states, \"I'm ready to go home. \"     Social/Functional Lives With: Alone  Type of Home: Apartment  Home Layout: One level  Home Access: Stairs to enter with rails  Entrance Stairs - Number of Steps: 1  Home Equipment: None  Has the patient had two or more falls in the past year or any fall with injury in the past year?: No  ADL Assistance: Independent  Homemaking Assistance: Independent  Ambulation Assistance: Independent  Transfer Assistance: Independent    OBJECTIVE:     PAIN: Drena Bi / O2: Kady Postin / Remy Im / Tod Egyptian:   Pre Treatment:          Post Treatment: 0 Vitals        Oxygen      IV    RESTRICTIONS/PRECAUTIONS:                    GROSS EVALUATION: Intact Impaired (Comments):   AROM [x]     PROM [x]    Strength [x]     Balance [x] Low fall risk per Tinetti - pt with good awareness   Posture [x]    Sensation [x]     Coordination [x]      Tone [x]     Edema [x]    Activity Tolerance [x]      []      COGNITION/  PERCEPTION: Intact Impaired (Comments):   Orientation [x]     Vision [x]     Hearing [x]     Cognition  [x]         MOBILITY: I Mod I S SBA CGA Min Mod Max Total  NT x2 Comments:   Bed Mobility    Rolling [x] [] [] [] [] [] [] [] [] [] []    Supine to Sit [x] [] [] [] [] [] [] [] [] [] []    Scooting [x] [] [] [] [] [] [] [] [] [] []    Sit to Supine [x] [] [] [] [] [] [] [] [] [] []    Transfers    Sit to Stand [x] [] [] [] [] [] [] [] [] [] []    Bed to Chair [x] [] [] [] [] [] [] [] [] [] []    Stand to Sit [x] [] [] [] [] [] [] [] [] [] []     [] [] [] [] [] [] [] [] [] [] []    I=Independent, Mod I=Modified Independent, S=Supervision, SBA=Standby Assistance, CGA=Contact Guard Assistance,   Min=Minimal Assistance, Mod=Moderate Assistance, Max=Maximal Assistance, Total=Total Assistance, NT=Not Tested    GAIT: I Mod I S SBA CGA Min Mod Max Total  NT x2 Comments:   Level of Assistance [x] [] [] [] [] [] [] [] [] [] []    Distance 250 feet    DME None    Gait Quality Path deviations - occasional due to distraction    Weightbearing Status      Stairs      I=Independent, Mod I=Modified Independent, S=Supervision, SBA=Standby Assistance, CGA=Contact Guard Assistance,   Min=Minimal Assistance, Mod=Moderate Assistance, Max=Maximal Assistance, Total=Total Assistance, NT=Not Tested    Tinetti:  Balance Score: 15  Gait Score: 11  Tinetti Total Score: 26    Interpretation of Tinetti Score:   24 or higher - low fall risk  19-23 - moderate fall risk  18 or less - high fall risk      PLAN:   ACUTE PHYSICAL THERAPY GOALS:   (Developed with and agreed upon by patient and/or caregiver.)  Pt will verbalize understanding of PT education points without cues. - MET    FREQUENCY AND DURATION: Eval/DC. No further PT needs. THERAPY PROGNOSIS: Good    PROBLEM LIST:   (Skilled intervention is medically necessary to address:)  Limited knowledge of inpatient mobility needs. INTERVENTIONS PLANNED:   (Benefits and precautions of physical therapy have been discussed with the patient.)  Education       TREATMENT:   EVALUATION: LOW COMPLEXITY: (Untimed Charge)    TREATMENT:   Gait Training (8 Minutes): Educated pt regarding path deviations and mild balance deficits that may contribute to fall risk. Educated her regarding fall prevention techniques and home environment modification. Also educated regarding role of outpatient PT to work on balance deficits and minimize fall risk. TREATMENT GRID:  N/A    AFTER TREATMENT PRECAUTIONS: Bed/Chair Locked, Call light within reach, Chair, Needs within reach, and RN notified    INTERDISCIPLINARY COLLABORATION:  RN/ PCT, PT/ PTA, and OT/ CEDILLO    EDUCATION: Education Given To: Patient  Education Provided: Role of Therapy;Plan of Care;Home Exercise Program;Precautions; Fall Prevention Strategies  Education Method: Demonstration;Verbal  Barriers to Learning: None  Education Outcome: Verbalized understanding;Demonstrated understanding    TIME IN/OUT:  Time In: 1003  Time Out: [address]  Minutes: [address] [facility], PT

## 2022-12-22 NOTE — CONSULTS
[facility] Consult Note       Primary GI Physician: Dr. Varsha Burgess, most recently seen by Dr. Dominick Covarrubias    Referring Provider:  Dr. Roselyn Saenz Physician: Dr. Kaur Ly Date:  12/22/2022    Admit Date:  12/21/2022    Chief Complaint:  Recurrent idiopathic pancreatitis    Subjective:     History of Present Illness:  Patient is a [de-identified] y.o. female with PMH including but not limited to HTN, ongoing tobacco use, RA on Enbrel/current course of steroids, Duodenal ulcer s/p bilroth II, cholecystectomy, idiopathic pancreatitis, seen in GI consultation at the request of Dr. Lula Rivera for recurrent idiopathic pancreatitis. She was admitted to [facility] 12/21 for acute pancreatitis after she presented to the ED with c/o increased abdominal pain. She reports intermittent RUQ soreness since hospital discharge in 10/2022 but increased RUQ pain that began early morning 12/21/22. She and her daughter are mostly concerned about a 23lb weight loss since 2/2022. She denies N/V. She is having Bms with occasional loose stools but no clear change in bowel patterns or change in stool color. She denies fever. Now her abdominal pain is improved. She says that she last required pain med last night. She continues to have significant bilateral upper abdominal tenderness on palpation. She is NPO. She is receiving NACL 150cc/hr. She denies ETOH. She denies recent change in medication, specifically no antibiotics or new supplements. She had an EGD with EUS 3/18/20 with Dr. Mandy Quiroga showed a small pancreatic head lesion- LN vs neuroendocrine tumor- not convincingly worrisome in appearance. Also- showed mild segmental dilatation of the main pancreatic duct was single visible sidebranch could represent early IPMN. This could account for recurrent episodes of pancreatitis. Pancreatic lesion bx with no intact tissue remaining following processing. Recommended MRCP in 4wks.       MRCP 6/22/20 (see below) showed suboptimal assessment of prior pancreatic changes on this noncontrast study. Noted no evolving contour deforming mass see and no worrisome secondary findings such as pancreatic ductal dilation or atrophy. This study did note an additional 6mm T2 hyperintense lesion in the pancreatic CAD with stable appearance and non enhancing on prior CT study suggesting cystic lesion. Recommended f/u pancreatic protocol MRI in 6mo. She was most recently seen by [facility] at office visit 10/27/22 by Dr. Ivon Powell for hospital follow up. She had been hospitalized earlier that  month for abdominal pain, pancreatitis- then was seen by GI for RUQ clint. She has had prior cholecystectomy. She has history of RA and reportedly had to stop Orencia in the past due to question of it causing a prior episode of pancreatitis. She had an EGD 10/2022. She has denied prior Colonoscopy. At last office visit with Dr. Masha Mendez she was scheduled for a colonoscopy 12/27/22 for weight loss, screening. Then recommended MRCP at next office visit to reevaluate abnormal findings on prior EUS (see above).            PMH:  Past Medical History:   Diagnosis Date    EPIFANIO (acute kidney injury) ([address] 12/04/2014    pt denies this dx    Arthritis     RA-abdelrahman hands    Back pain 6/10/2016    Breast cancer ([address] 2018    Breast lump dx 2/2018    left    Bronchitis     Chronic obstructive pulmonary disease ([address]     Discharge from the vagina     Dysuria     Eczema 6/10/2016    Fungal dermatitis     Headache 6/10/2016    Hypercholesterolemia 12/4/2014    Hypertension     not well controlled--per pt    Intractable vomiting 5/20/2018    Menopausal vaginal dryness     Osteoarthritis 6/10/2016    Osteoporosis 6/10/2016    Pancreatitis     2 episodes    PUD (peptic ulcer disease)     last 2003 which a rupture an extensive surgery    Sepsis ([address] 12/4/2014    Thyroid nodule     Tobacco abuse     1ppd x 49 yrs       PSH:  Past Surgical History:   Procedure Laterality Date    APPENDECTOMY  1977    with hysterectomy    BREAST BIOPSY Left 1975    BREAST LUMPECTOMY Left 2/22/2018    LEFT BREAST LUMPECTOMY/ SENTINEL NODE BIOPSY performed by Za Bhandari MD at [facility] MAIN OR    BREAST SURGERY Left 1975    breast lumpectomy, benign  (left)    CATARACT REMOVAL Bilateral 2018    w/IOL    CHOLECYSTECTOMY      HYSTERECTOMY (CERVIX STATUS UNKNOWN)  1977    OTHER SURGICAL HISTORY      hernicolectomy 2 cm    MS ABDOMEN SURGERY PROC UNLISTED  2003    stomach surgery for ruptured ulcer and extensive rerouting of intestestines per patient     [address] [place] ENDOSCOPY  05/21/2018    empiric dilation    UPPER GASTROINTESTINAL ENDOSCOPY      UPPER GASTROINTESTINAL ENDOSCOPY N/A 10/4/2022    EGD ESOPHAGOGASTRODUODENOSCOPY/  performed by Violetta Page MD at [address] LEFT Left 1/31/2018     BREAST NEEDLE BIOPSY LEFT 1/31/2018 [facility]       Allergies: Allergies   Allergen Reactions    Azithromycin Other (See Comments)     pancreatitis    Codeine Nausea And Vomiting       Home Medications:  Prior to Admission medications    Medication Sig Start Date End Date Taking? Authorizing Provider   predniSONE (DELTASONE) 20 MG tablet Take 1 pill once a day after breakfast for 10 days then 1/2 a pill once a day after breakfast for 10 days and then stop.  12/6/22   Kain Jimenez MD   Etanercept (ENBREL SURECLICK) 50 MG/ML SOAJ Inject 50 mg into the skin every 7 days 10/25/22   Nika Kim MD   potassium chloride (KLOR-CON M) 20 MEQ extended release tablet Take 1 tablet by mouth 2 times daily  Patient not taking: Reported on 12/21/2022 10/13/22   Luiza Watson MD   ferrous sulfate (IRON 325) 325 (65 Fe) MG tablet Take 1 tablet by mouth daily (with breakfast)  Patient not taking: Reported on 12/21/2022 10/13/22   Luiza Watson MD   donepezil (ARICEPT) 5 MG tablet Take 1 tablet by mouth nightly 10/12/22   Kamala Shah MD   losartan (COZAAR) 100 MG tablet Take 1 tablet by mouth daily 10/12/22   Kamala Shah MD   [facility]OD[facility]) 10 MG tablet Take 1 tablet by mouth daily 10/12/22   Kamala Shah MD   hydrALAZINE (APRESOLINE) 25 MG tablet Take 1 tablet by mouth every 8 hours 10/12/22   Kamala Shah MD   alendronate (FOSAMAX) 70 MG tablet Take 1 tablet by mouth every 7 days 10/12/22   Kamala Shah MD   pravastatin (PRAVACHOL) 40 MG tablet Take 1 tablet by mouth daily 10/12/22   Kamala Shah MD   ondansetron (ZOFRAN-ODT) 4 MG disintegrating tablet Take 1 tablet by mouth every 8 hours as needed for Nausea or Vomiting 10/5/22   Marty Ortega MD       [facility] Medications:  Current Facility-Administered Medications   Medication Dose Route Frequency    amLODIPine (NORVASC) tablet 10 mg  10 mg Oral Daily    donepezil (ARICEPT) tablet 5 mg  5 mg Oral Nightly    ferrous sulfate (IRON 325) tablet 325 mg  325 mg Oral Daily with breakfast    hydrALAZINE (APRESOLINE) tablet 25 mg  25 mg Oral 3 times per day    losartan (COZAAR) tablet 100 mg  100 mg Oral Daily    pravastatin (PRAVACHOL) tablet 40 mg  40 mg Oral Nightly    predniSONE (DELTASONE) tablet 10 mg  10 mg Oral Daily    sodium chloride flush 0.9 % injection 5-40 mL  5-40 mL IntraVENous 2 times per day    sodium chloride flush 0.9 % injection 5-40 mL  5-40 mL IntraVENous PRN    0.9 % sodium chloride infusion   IntraVENous PRN    heparin (porcine) injection 5,000 Units  5,000 Units SubCUTAneous BID    ondansetron (ZOFRAN-ODT) disintegrating tablet 4 mg  4 mg Oral Q8H PRN    Or    ondansetron (ZOFRAN) injection 4 mg  4 mg IntraVENous Q6H PRN    polyethylene glycol (GLYCOLAX) packet 17 g  17 g Oral Daily PRN    acetaminophen (TYLENOL) tablet 650 mg  650 mg Oral Q6H PRN    Or    acetaminophen (TYLENOL) suppository 650 mg  650 mg Rectal Q6H PRN    nicotine (NICODERM CQ) 21 MG/24HR 1 patch  1 patch TransDERmal Daily    0.9 % sodium chloride infusion   IntraVENous Continuous    oxymetazoline (AFRIN) 0.05 % nasal spray 2 spray  2 spray Each Nostril BID PRN    HYDROmorphone (DILAUDID) injection 0.5 mg  0.5 mg IntraVENous Q4H PRN       Social History:  Social History     Tobacco Use    Smoking status: Every Day     Packs/day: 0.10     Types: Cigarettes     Start date: 5/6/1966    Smokeless tobacco: Never   Substance Use Topics    Alcohol use: No       Family History:  Family History   Problem Relation Age of Onset    Thyroid Cancer Neg Hx     No Known Problems Paternal Grandfather     No Known Problems Paternal Grandmother     No Known Problems Maternal Grandfather     No Known Problems Maternal Grandmother     Hypertension Other         Brother and sister with htn    No Known Problems Brother     Hypertension Mother     Heart Disease Brother     Heart Disease Sister     Breast Cancer Sister 70    Cancer Sister     Heart Disease Father     Heart Attack Father     Cancer Brother         prostate    Diabetes Sister     Heart Disease Sister        Review of Systems:  A detailed 10 system ROS is obtained, with pertinent positives as listed above.  All others are negative.    Diet:  NPO    Objective:     Physical Exam:  Vitals:  BP (!) 128/43   Pulse 73   Temp 98.8 °F (37.1 °C) (Oral)   Resp 18   Ht 5' 2\" (1.575 m)   Wt 89 lb (40.4 kg)   SpO2 93%   BMI 16.28 kg/m²   Gen:  Pt is alert, cooperative, no acute distress.  Daughter is at bedside.  Skin:  Face reveal no rashes.   HEENT: Sclerae anicteric.    Cardiovascular: Regular rate and rhythm.   Respiratory:  Comfortable breathing with no accessory muscle use. Clear breath sounds anteriorly with no wheezes, rales, or rhonchi.  GI:  Abdomen nondistended, soft.  +Moderate bilateral upper abdominal ttp. Normal active bowel sounds. No masses palpable.  Rectal:  Deferred  Musculoskeletal:  No pitting edema of the lower legs.    Neurological:  Gross memory appears intact.  Patient is alert and  oriented. Psychiatric:  Mood appears appropriate with judgement intact. Laboratory:    Recent Labs     12/21/22  1500 12/22/22  0530   WBC 13.1* 7.4   HGB 12.0 9.8*   HCT 38.2 31.1*    290   MCV 84.0 85.0    140   K 3.9 3.5    113*   CO2 22 20*   BUN 18 11   MG  --  1.8   AST 8* 9*   ALT 23 16      MRCP 6/2020   Findings:    Evaluation of the lung bases is limited by MRI imaging. No obvious pulmonary   abnormalities are seen. No pleural effusion is seen. The gallbladder is not seen. No intrahepatic biliary ductal dilatation is seen. The extrahepatic bile ducts measures up to 7 mm in diameter which is non-dilated   for this patient. No abnormal filling defects are seen within the common bile   duct to suggest a biliary stone. No abnormal focal caliber changes are seen of   the biliary tree. The pancreatic duct is normal in caliber measuring 2-3 mm. The   course of the pancreatic duct is normal without evidence for pancreas divisum. Evaluation of the solid organs is somewahat limited without intravenous   contrast. However, no focal signal abnormalities are definitely seen of the   spleen, or kidneys. No significant drop in signal intensity of the liver is   seen to suggest fatty infiltration. However, there is a 1.3 cm T2 hyperintense   lesion seen in the superior right lobe of the liver best appreciated on axial   T2-weighted image 6. Although incompletely characterized on this examination,   this lesion was present on axial image 10 of the prior contrasted CT scan of the   abdomen demonstrating near diffuse, persistent enhancement during portal phase   imaging at that time. In addition, the patient has a breast MRI dated 2/7/2018   showing a stable lesion on axial STIR image 6 at that time. These features as   well as the lack of significant change for over 2 years would favor a benign   lesion such as a hemangioma.  Previously, abnormal changes were noted in the   pancreatic head by CT. These are suboptimally assessed on this noncontrast   study. However, it can be said that no evolving contour deforming mass is seen   in the pancreatic head. No evolving secondary findings such as evolving   pancreatic ductal dilation or proximal atrophy are seen. A 6 mm T2 hyperintense   lesion is seen in the pancreatic head on axial T2-weighted image 13. This is   felt to correspond to a small nonenhancing cystic appearing lesion seen on the   prior CT scan on image 21 at that time. This has not significantly changed in   the interim although is suboptimally characterized on this noncontrast study. No   evidence of hydronephrosis is seen. Stable small cystic appearing left renal   cortical lesions are seen. The loops of bowel are normal in caliber. No abnormal intra-abdominal fluid   collections are seen. Impression   IMPRESSION:   1. Suboptimal assessment of prior pancreatic changes on this noncontrast study. No evolving contour deforming mass is seen. No worrisome secondary findings such   as pancreatic ductal dilation or atrophy are seen. An additional 6 mm T2   hyperintense lesion is seen in the pancreatic CAD appear stable and nonenhancing   on the prior CT study suggesting a cystic lesion. This is also incompletely   characterized on this exam although no clearly worrisome noncontrast features   are seen. A follow-up pancreatic protocol MRI in 6 months would be recommended   for reassessment of these changes. 2.  1.3 cm T2 hyperintense lesion in the superior right lobe of the liver felt   to represent a benign hemangioma as described above. 3. No acute inflammatory changes appreciated on the current examination. CT a/p w IV contrast 11/5/22 - then Lipase 651  FINDINGS:       Mild dependent subsegmental atelectasis in the visualized lung bases. Abdomen findings:       Gallbladder is surgically absent.  There is biliary ductal dilatation, similar to prior exam and likely sequela of cholecystectomy. There is mild fat stranding around the pancreatic head. There is pancreatic   ductal dilatation. There is no focal liver lesion. The spleen and the right kidney are   unremarkable. There are a few small cysts in the left kidney. There is no   hydronephrosis. Abdominal aorta is normal in course and caliber with prominent atherosclerotic   calcifications. There are postsurgical changes in the area of the distal stomach. Small bowel   loops are normal in caliber. No evidence of small bowel obstruction. There is no   evidence of lymphadenopathy. Pelvic findings:       Urinary bladder is normal in contour. There is sigmoid diverticulosis without   diverticulitis. The colon is normal in course and caliber. Appendix is   surgically absent. There is no free air or free fluid. Moderate to severe compression deformity of   L1, similar to prior exam.               Impression       1. Mild fat stranding around the pancreatic head, nonspecific but may be related   to acute pancreatitis. No peripancreatic fluid or pseudocyst. No evidence of   pancreatic necrosis. 2. Gallbladder is surgically absent. Biliary ductal dilatation may be secondary   to the cholecystectomy. Mild pancreatic duct dilatation, similar to prior exam.       3. No evidence of colitis, diverticulitis or bowel obstruction. No   hydronephrosis. Most recent GI procedures    EGD with EUS 3/18/20 with Dr. Gilbert Best  Findings:    ENDOSCOPIC FINDINGS:    OROPHARYNX: Cords and pyriform recesses appear normal.    ESOPHAGUS: The esophagus is normal. The proximal, mid, and distal portions are normal. The Z-Line is intact. STOMACH: Copious bilous fluid and erythematous gastritis was seen in the body and antrum. Biopsies were obtained from the body and antrum for H pylori.  A large amount of retained food and vegetable debris was seen in the gastric body along the greater  curvature. Evidence of prior gastroduodenal anastomosis. The examined portion of the small bowel was unremarkable. PANCREAS:  The pancreas is well-visualized from head to tail. In the head of the pancreas there is an 8 x 7 mm round, hypoechoic  lesion. This partially compresses the common bile duct. FNA was performed using a 25-gauge Modern Message acquire needle. 2 passes were made and sent for pathology. The main pancreatic duct has a smooth regular cores measuring up to 3 mm in the head,  3 mm in the body and remains mildly dilated at 2 mm and the tail. A single visible sidebranch measuring 3 mm is seen at the junction of the body and tail. BILIARY TREE: The gallbladder is absent. The common bile duct is well-visualized from its insertion in the ampulla to the bifurcation of right and left hepatic ducts. It is mildly dilated measuring up to 10 mm maximally with an incomplete taper  down to the ampulla. There are no intraductal stones, sludge or debris. The ampulla appears normal endosonographically. OTHER ORGANS:  Views of the left lobe of the liver demonstrate no solid mass lesions. There is no ascites in the upper abdomen. The left adrenal gland appears normal. The major vascular structures including the portal vein, splenic vessels and celiac artery appear unremarkable. There are no pathologically enlarged posterior mediastinal or upper abdominal lymph nodes. Specimen:  Yes      Estimated Blood Loss:  Minimal      Implant:  None             Impression:     1. Bile gastritis. 2. Gastric bezoar. 3. Small pancreatic head lesion. This may represent a lymph node or neuroendocrine tumor. It is not convincingly worrisome in appearance. Biopsies were obtained. 4. Mild segmental dilatation of the main pancreatic duct was single visible sidebranch could represent early IPMN. This could account for recurrent episodes of pancreatitis. Plan:   1.  Follow up results of pathology. 2. Avoid NSAIDs for 48 hours. 3. Further recommendations will be based on pathology results and patient's clinical course. *DIAGNOSIS* * * * * * * * * * * * * * * * * * * * * * * * * * * * * * *          A:  \"GASTRIC BIOPSIES\":  MILD CHRONIC GASTRITIS WITHOUT ACTIVITY; REPARATIVE CHANGES. B:  \"PANCREATIC LESION BIOPSIES\":  NO INTACT TISSUE REMAINING FOLLOWING PROCESSING. EGD 10/4/22 Small hiatal hernia. S/p billroth gastrojejunostomy. Anastomosis is widely patent    No prior Colonoscopy. Assessment:     Principal Problem:    Acute recurrent pancreatitis  Active Problems:    Chronic pancreatitis (HCC)    Rheumatoid arthritis ([address]    Hypercholesterolemia    Personal history of tobacco use, presenting hazards to health    HTN (hypertension)  Resolved Problems:    * No resolved hospital problems. [de-identified] y.o. female with PMH as above- including HTN, tobacco use, RA on Enbrel/current course of steroids, Duodenal ulcer s/p bilroth II, cholecystectomy, recurrent idiopathic pancreatitis, seen in GI consultation at the request of Dr. Lula Rivera for recurrent pancreatitis. Pt admitted 12/21 with increased RUQ pain, 23lb weight loss since 2/2022 with labs that revealed Lipase 9K with normal LFTs, leukocytosis with WBC that has since normalized, normal LA, BUN/Cr. Denies ETOH, recent new meds/supplements. She is s/p cholecystectomy. Has hx recurrent pancreatitis (at least 3-4x per pt daughter) since 2020 of unclear cause. Had EGD with EUS 3/2020 with Dr. Mandy Quiroga (see above) with small pancreatic head lesion- not convincingly worrisome in appearance and suspected IPMN. Subsequent MRCP 6/2020  with no evolving contour deforming mass see and no worrisome secondary findings such as pancreatic ductal dilation or atrophy. Did note findings suggesting cystic lesion. She was to have f/u pancreatic protocol MRI in 6mo.   During admit 10/2022 for pancreatitis, RUQ pain had EGD with small HH, S/p billroth gastrojejunostomy. Anastomosis is widely patent. No prior colo though she is scheduled for colonoscopy 12/27/22 as outpatient.    -Now 12/22 abdominal pain is improved and per pt, last required pain med last night.  +significant bilateral upper abdominal tenderness on palpation. She is NPO. She is receiving NACL 150cc/hr. Plan:     -Supportive care with IVF hydration, anti-emetics prn, analgesics prn.  -May start trial of clear liquids. Thereafter may gradually advance diet as tolerated to low fat diet.  -Will need f/u MRCP to f/u abnormal EUS. -Will keep colonoscopy as scheduled for now given need for screening but also need for evaluation in setting of recent signifcant weight loss. Depending upon clinical course for current admission, may end up needing to reschedule. Cyril Vaughn PA-C  [facility]    Patient is seen and examined in collaboration with Dr. Emeterio Cope.   Assessment and plan as per Dr. Emeterio Cope

## 2022-12-22 NOTE — CARE COORDINATION
Ehsan Cantu, met with patient in room 611 to discuss discharge planning. Patient lives alone in an apartment with level entry. Patient drives and is independent at baseline. Patient has no history of DME, HH, or rehab. CM following. 12/22/22 3346   Service Assessment   Patient Orientation Alert and Oriented   Cognition Alert   History Provided By Patient   Primary [address]   Patient's Healthcare Decision Maker is: Legal Next of Kin   PCP Verified by CM Yes   Last Visit to PCP Within last 6 months   Prior Functional Level Independent in ADLs/IADLs   Current Functional Level Independent in ADLs/IADLs   Can patient return to prior living arrangement Yes   Ability to make needs known: Good   Family able to assist with home care needs: Yes   Would you like for me to discuss the discharge plan with any other family members/significant others, and if so, who? No   Financial Resources Medicare   Community Resources None   Social/Functional History   Lives With Alone   Type of [address] One level   Home Access Level entry   Home Equipment None   Receives Help From Family   ADL Assistance Independent   Ambulation Assistance Independent   Transfer Assistance Independent   Active  Yes   Mode of Transportation Car   Occupation Retired   Discharge Planning   Type of [address] Prior To Admission None   Potential Assistance Needed N/A   DME Ordered?  No   Potential Assistance Purchasing Medications No   Type of Home Care Services None   Patient expects to be discharged to: Apartment   One/Two Story Residence One story   History of falls? 0

## 2022-12-22 NOTE — H&P
Hospitalist History and Physical   Admit Date:  2022  5:38 PM   Name:  Jamar Espinoza   Age:  [de-identified] y.o. Sex:  female  :  1940   MRN:  [id number]   [facility]    Presenting Complaint: Abdominal Pain     Reason(s) for Admission: Acute recurrent pancreatitis [K85.90]     History of Present Illness:       Jamar Espinoza is a [de-identified] y.o. female with medical history of duodenal ulcer s/p bilroth II, cholecystectomy, idiopathic pancreatitis, rheumatoid arthritis on enbrel/ current course of steroids, HTN, ongoing tobacco use who is evaluated with acute onset of mid abdominal pain to her back, was severe in triage but since improved after receiving IV dilaudid. She is a little confused since receiving pain meds and has 2 daughters at bedside. Prior records reviewed and was discharged  after recurrent pancreatitis. Seen by GI that admit s/p EGD. EUS was in . Followed longterm by GI.states has upcoming colonoscopy this week. Does not consume ETOH. No NSAIDS. FULL CODE    Daughter Remberto Lopes [phone]           Review of Systems:  10 systems not possible due to current mentation         Assessment & Plan:     Principal Problem:    Acute recurrent pancreatitis  Plan:    Active Problems:    Chronic pancreatitis ([address]  Plan:   Admit to medical bed  NPO with ice chips   cc/hr  GI consult   Supportive care, as needed IV dilaudid 0.5 mg every 4 hours for pain             Rheumatoid arthritis ([address]  Plan:   Resume current prednisone 10 mg daily- states she is on oral taper           Hypercholesterolemia  Plan:   Resume statin         Personal history of tobacco use, presenting hazards to health  Plan:   Needs cessation  Add nicotine patch           HTN (hypertension)  Plan:   Resume norvasc, hydralazine ad cozaar       Anticipated discharge needs:     Pending     Diet: Diet NPO Exceptions are: Sips of Water with Meds, Ice Chips  VTE ppx: heparin  Code status: Full Code    Hospital Problems:  Principal Problem:    Acute recurrent pancreatitis  Active Problems:    Chronic pancreatitis (HCC)    Rheumatoid arthritis ([address]    Hypercholesterolemia    Personal history of tobacco use, presenting hazards to health    HTN (hypertension)  Resolved Problems:    * No resolved hospital problems.  *       Past History:     Past Medical History:   Diagnosis Date    EPIFANIO (acute kidney injury) ([address] 12/04/2014    pt denies this dx    Arthritis     RA-abdelrahman hands    Back pain 6/10/2016    Breast cancer ([address] 2018    Breast lump dx 2/2018    left    Bronchitis     Chronic obstructive pulmonary disease ([address]     Discharge from the vagina     Dysuria     Eczema 6/10/2016    Fungal dermatitis     Headache 6/10/2016    Hypercholesterolemia 12/4/2014    Hypertension     not well controlled--per pt    Intractable vomiting 5/20/2018    Menopausal vaginal dryness     Osteoarthritis 6/10/2016    Osteoporosis 6/10/2016    Pancreatitis     2 episodes    PUD (peptic ulcer disease)     last 2003 which a rupture an extensive surgery    Sepsis ([address] 12/4/2014    Thyroid nodule     Tobacco abuse     1ppd x 49 yrs       Past Surgical History:   Procedure Laterality Date    APPENDECTOMY  1977    with hysterectomy    BREAST BIOPSY Left 1975    BREAST LUMPECTOMY Left 2/22/2018    LEFT BREAST LUMPECTOMY/ SENTINEL NODE BIOPSY performed by Phoenix Johnson MD at [facility] MAIN OR    BREAST SURGERY Left 1975    breast lumpectomy, benign  (left)    CATARACT REMOVAL Bilateral 2018    w/IOL    CHOLECYSTECTOMY      HYSTERECTOMY (CERVIX STATUS UNKNOWN)  1977    OTHER SURGICAL HISTORY      hernicolectomy 2 cm    UT ABDOMEN SURGERY PROC UNLISTED  2003    stomach surgery for ruptured ulcer and extensive rerouting of intestestines per patient     [address]  05/21/2018    empiric dilation    UPPER GASTROINTESTINAL ENDOSCOPY      UPPER GASTROINTESTINAL ENDOSCOPY N/A 10/4/2022    EGD ESOPHAGOGASTRODUODENOSCOPY/  performed by David Orr MD at [address] LEFT Left 1/31/2018    US BREAST NEEDLE BIOPSY LEFT 1/31/2018 [facility]        Social History     Tobacco Use    Smoking status: Every Day     Packs/day: 0.10     Types: Cigarettes     Start date: 5/6/1966    Smokeless tobacco: Never   Substance Use Topics    Alcohol use: No      Social History     Substance and Sexual Activity   Drug Use No       Family History   Problem Relation Age of Onset    Thyroid Cancer Neg Hx     No Known Problems Paternal Grandfather     No Known Problems Paternal Grandmother     No Known Problems Maternal Grandfather     No Known Problems Maternal Grandmother     Hypertension Other         Brother and sister with htn    No Known Problems Brother     Hypertension Mother     Heart Disease Brother     Heart Disease Sister     Breast Cancer Sister 79    Cancer Sister     Heart Disease Father     Heart Attack Father     Cancer Brother         prostate    Diabetes Sister     Heart Disease Sister         Immunization History   Administered Date(s) Administered    COVID-19, MODERNA BLUE border, Primary or Immunocompromised, (age 12y+), IM, 100 mcg/0.5mL 02/05/2021, 03/06/2021, 10/26/2021    Influenza, FLUAD, (age 72 y+), Adjuvanted, 0.5mL 09/20/2022     Allergies   Allergen Reactions    Azithromycin Other (See Comments)     pancreatitis    Codeine Nausea And Vomiting     Prior to Admit Medications:  Current Outpatient Medications   Medication Instructions    alendronate (FOSAMAX) 70 mg, Oral, EVERY 7 DAYS    amLODIPine (NORVASC) 10 mg, Oral, DAILY    donepezil (ARICEPT) 5 mg, Oral, NIGHTLY    Enbrel SureClick 50 mg, SubCUTAneous, EVERY 7 DAYS    ferrous sulfate (IRON 325) 325 mg, Oral, DAILY WITH BREAKFAST    hydrALAZINE (APRESOLINE) 25 mg, Oral, EVERY 8 HOURS SCHEDULED    losartan (COZAAR) 100 mg, Oral, DAILY    ondansetron (ZOFRAN-ODT) 4 mg, Oral, EVERY 8 HOURS PRN    potassium chloride (KLOR-CON M) 20 MEQ extended release tablet 20 mEq, Oral, 2 TIMES DAILY    pravastatin (PRAVACHOL) 40 mg, Oral, DAILY    predniSONE (DELTASONE) 20 MG tablet Take 1 pill once a day after breakfast for 10 days then 1/2 a pill once a day after breakfast for 10 days and then stop. Objective:   Patient Vitals for the past 24 hrs:   Temp Pulse Resp BP SpO2   12/21/22 1915 98 °F (36.7 °C) 84 16 (!) 134/54 92 %   12/21/22 1900 -- 88 16 (!) 140/55 95 %   12/21/22 1845 -- 82 18 135/78 96 %   12/21/22 1830 -- 84 16 (!) 136/54 94 %   12/21/22 1815 -- 88 16 (!) 160/63 97 %   12/21/22 1800 -- 82 16 (!) 158/63 97 %   12/21/22 1440 -- -- 20 -- --   12/21/22 1417 97.5 °F (36.4 °C) 82 -- (!) 157/56 99 %       Oxygen Therapy  SpO2: 92 %  O2 Device: None (Room air)    Estimated body mass index is 16.28 kg/m² as calculated from the following:    Height as of this encounter: 5' 2\" (1.575 m). Weight as of this encounter: 89 lb (40.4 kg). Intake/Output Summary (Last 24 hours) at 12/21/2022 1935  Last data filed at 12/21/2022 1821  Gross per 24 hour   Intake 1000.01 ml   Output --   Net 1000.01 ml         Physical Exam:    Blood pressure (!) 134/54, pulse 84, temperature 98 °F (36.7 °C), resp. rate 16, height 5' 2\" (1.575 m), weight 89 lb (40.4 kg), SpO2 92 %. General:    Well nourished. Alert, pleasant, no distress   Head:  Normocephalic, atraumatic  Eyes:  Sclerae appear normal.  Pupils equally round. ENT:  Nares appear normal, no drainage. Dry  oral mucosa  Neck:  No restricted ROM. Trachea midline   CV:   RRR. III/VI ELEN LUSB  No jugular venous distension. No edema   Lungs:   CTAB. No wheezing, rhonchi, or rales. Symmetric expansion. Anterior   Abdomen: Bowel sounds present. Soft, nontender, nondistended. Extremities: No cyanosis or clubbing. No edema  Skin:     No rashes and normal coloration. Warm and dry. Neuro:   grossly intact.    Psych:  Very talkative and energetic       I have personally reviewed labs and tests showing:  Recent Labs:  Recent Results (from the past 24 hour(s))   CBC with Auto Differential    Collection Time: 12/21/22  3:00 PM   Result Value Ref Range    WBC 13.1 (H) 4.3 - 11.1 K/uL    RBC 4.55 4.05 - 5.2 M/uL    Hemoglobin 12.0 11.7 - 15.4 g/dL    Hematocrit 38.2 35.8 - 46.3 %    MCV 84.0 82 - 102 FL    MCH 26.4 26.1 - 32.9 PG    MCHC 31.4 31.4 - 35.0 g/dL    RDW 19.9 (H) 11.9 - 14.6 %    Platelets 009 561 - 587 K/uL    MPV 8.5 (L) 9.4 - 12.3 FL    nRBC 0.00 0.0 - 0.2 K/uL    Differential Type AUTOMATED      Seg Neutrophils 85 (H) 43 - 78 %    Lymphocytes 9 (L) 13 - 44 %    Monocytes 5 4.0 - 12.0 %    Eosinophils % 0 (L) 0.5 - 7.8 %    Basophils 0 0.0 - 2.0 %    Immature Granulocytes 1 0.0 - 5.0 %    Segs Absolute 11.1 (H) 1.7 - 8.2 K/UL    Absolute Lymph # 1.2 0.5 - 4.6 K/UL    Absolute Mono # 0.7 0.1 - 1.3 K/UL    Absolute Eos # 0.0 0.0 - 0.8 K/UL    Basophils Absolute 0.0 0.0 - 0.2 K/UL    Absolute Immature Granulocyte 0.1 0.0 - 0.5 K/UL   Comprehensive Metabolic Panel    Collection Time: 12/21/22  3:00 PM   Result Value Ref Range    Sodium 139 133 - 143 mmol/L    Potassium 3.9 3.5 - 5.1 mmol/L    Chloride 110 101 - 110 mmol/L    CO2 22 21 - 32 mmol/L    Anion Gap 7 2 - 11 mmol/L    Glucose 89 65 - 100 mg/dL    BUN 18 8 - 23 MG/DL    Creatinine 1.00 0.6 - 1.0 MG/DL    Est, Glom Filt Rate 57 (L) >60 ml/min/1.73m2    Calcium 8.6 8.3 - 10.4 MG/DL    Total Bilirubin 0.2 0.2 - 1.1 MG/DL    ALT 23 12 - 65 U/L    AST 8 (L) 15 - 37 U/L    Alk Phosphatase 64 50 - 136 U/L    Total Protein 6.9 6.3 - 8.2 g/dL    Albumin 3.3 3.2 - 4.6 g/dL    Globulin 3.6 2.8 - 4.5 g/dL    Albumin/Globulin Ratio 0.9 0.4 - 1.6     Lipase    Collection Time: 12/21/22  3:00 PM   Result Value Ref Range    Lipase 9,034 (H) 73 - 393 U/L       I have personally reviewed imaging studies showing:  No results found. Echocardiogram:  No results found for this or any previous visit.         Orders Placed This Encounter Medications    0.9 % sodium chloride bolus    ondansetron (ZOFRAN) injection 4 mg    DISCONTD: oxyCODONE-acetaminophen (PERCOCET) 5-325 MG per tablet 2 tablet    HYDROmorphone HCl PF (DILAUDID) injection 1 mg    amLODIPine (NORVASC) tablet 10 mg    donepezil (ARICEPT) tablet 5 mg    ferrous sulfate (IRON 325) tablet 325 mg    hydrALAZINE (APRESOLINE) tablet 25 mg    losartan (COZAAR) tablet 100 mg    pravastatin (PRAVACHOL) tablet 40 mg    predniSONE (DELTASONE) tablet 10 mg    sodium chloride flush 0.9 % injection 5-40 mL    sodium chloride flush 0.9 % injection 5-40 mL    0.9 % sodium chloride infusion    heparin (porcine) injection 5,000 Units    OR Linked Order Group     ondansetron (ZOFRAN-ODT) disintegrating tablet 4 mg     ondansetron (ZOFRAN) injection 4 mg    polyethylene glycol (GLYCOLAX) packet 17 g    OR Linked Order Group     acetaminophen (TYLENOL) tablet 650 mg     acetaminophen (TYLENOL) suppository 650 mg    nicotine (NICODERM CQ) 21 MG/24HR 1 patch    0.9 % sodium chloride infusion    HYDROmorphone HCl PF (DILAUDID) injection 0.5 mg    oxymetazoline (AFRIN) 0.05 % nasal spray 2 spray         Signed:  Basia Lopez MD    Part of this note may have been written by using a voice dictation software. The note has been proof read but may still contain some grammatical/other typographical errors.

## 2022-12-23 ENCOUNTER — APPOINTMENT (OUTPATIENT)
Dept: MRI IMAGING | Age: 82
End: 2022-12-23
Payer: MEDICARE

## 2022-12-23 PROBLEM — R91.1 INCIDENTAL LUNG NODULE, GREATER THAN OR EQUAL TO 8MM: Status: ACTIVE | Noted: 2022-12-23

## 2022-12-23 PROBLEM — E04.2 MULTIPLE THYROID NODULES: Status: ACTIVE | Noted: 2018-10-24

## 2022-12-23 LAB
ANION GAP SERPL CALC-SCNC: 7 MMOL/L (ref 2–11)
BUN SERPL-MCNC: 5 MG/DL (ref 8–23)
CALCIUM SERPL-MCNC: 7.4 MG/DL (ref 8.3–10.4)
CHLORIDE SERPL-SCNC: 118 MMOL/L (ref 101–110)
CO2 SERPL-SCNC: 20 MMOL/L (ref 21–32)
CREAT SERPL-MCNC: 0.5 MG/DL (ref 0.6–1)
FERRITIN SERPL-MCNC: 68 NG/ML (ref 8–388)
GLUCOSE SERPL-MCNC: 84 MG/DL (ref 65–100)
IRON SERPL-MCNC: 13 UG/DL (ref 35–150)
LIPASE SERPL-CCNC: 954 U/L (ref 73–393)
MAGNESIUM SERPL-MCNC: 1.9 MG/DL (ref 1.8–2.4)
POTASSIUM SERPL-SCNC: 3.3 MMOL/L (ref 3.5–5.1)
SODIUM SERPL-SCNC: 145 MMOL/L (ref 133–143)

## 2022-12-23 PROCEDURE — A9579 GAD-BASE MR CONTRAST NOS,1ML: HCPCS | Performed by: PHYSICIAN ASSISTANT

## 2022-12-23 PROCEDURE — 84481 FREE ASSAY (FT-3): CPT

## 2022-12-23 PROCEDURE — 83690 ASSAY OF LIPASE: CPT

## 2022-12-23 PROCEDURE — 74183 MRI ABD W/O CNTR FLWD CNTR: CPT

## 2022-12-23 PROCEDURE — 36415 COLL VENOUS BLD VENIPUNCTURE: CPT

## 2022-12-23 PROCEDURE — 83540 ASSAY OF IRON: CPT

## 2022-12-23 PROCEDURE — 82728 ASSAY OF FERRITIN: CPT

## 2022-12-23 PROCEDURE — 2580000003 HC RX 258: Performed by: INTERNAL MEDICINE

## 2022-12-23 PROCEDURE — 80048 BASIC METABOLIC PNL TOTAL CA: CPT

## 2022-12-23 PROCEDURE — 1100000000 HC RM PRIVATE

## 2022-12-23 PROCEDURE — 6360000002 HC RX W HCPCS: Performed by: INTERNAL MEDICINE

## 2022-12-23 PROCEDURE — 6370000000 HC RX 637 (ALT 250 FOR IP): Performed by: INTERNAL MEDICINE

## 2022-12-23 PROCEDURE — 6360000004 HC RX CONTRAST MEDICATION: Performed by: PHYSICIAN ASSISTANT

## 2022-12-23 PROCEDURE — 83735 ASSAY OF MAGNESIUM: CPT

## 2022-12-23 RX ORDER — POTASSIUM CHLORIDE 20 MEQ/1
40 TABLET, EXTENDED RELEASE ORAL 2 TIMES DAILY WITH MEALS
Status: COMPLETED | OUTPATIENT
Start: 2022-12-23 | End: 2022-12-23

## 2022-12-23 RX ADMIN — SODIUM CHLORIDE: 9 INJECTION, SOLUTION INTRAVENOUS at 19:59

## 2022-12-23 RX ADMIN — AMLODIPINE BESYLATE 10 MG: 10 TABLET ORAL at 09:56

## 2022-12-23 RX ADMIN — ACETAMINOPHEN 650 MG: 325 TABLET, FILM COATED ORAL at 18:45

## 2022-12-23 RX ADMIN — PRAVASTATIN SODIUM 40 MG: 80 TABLET ORAL at 19:53

## 2022-12-23 RX ADMIN — DONEPEZIL HYDROCHLORIDE 5 MG: 5 TABLET, FILM COATED ORAL at 19:54

## 2022-12-23 RX ADMIN — HYDRALAZINE HYDROCHLORIDE 25 MG: 25 TABLET ORAL at 05:26

## 2022-12-23 RX ADMIN — FERROUS SULFATE TAB 325 MG (65 MG ELEMENTAL FE) 325 MG: 325 (65 FE) TAB at 09:56

## 2022-12-23 RX ADMIN — SODIUM CHLORIDE, PRESERVATIVE FREE 10 ML: 5 INJECTION INTRAVENOUS at 20:04

## 2022-12-23 RX ADMIN — HYDRALAZINE HYDROCHLORIDE 25 MG: 25 TABLET ORAL at 21:30

## 2022-12-23 RX ADMIN — PREDNISONE 10 MG: 10 TABLET ORAL at 09:56

## 2022-12-23 RX ADMIN — GADOTERIDOL 7 ML: 279.3 INJECTION, SOLUTION INTRAVENOUS at 11:21

## 2022-12-23 RX ADMIN — LOSARTAN POTASSIUM 100 MG: 50 TABLET, FILM COATED ORAL at 09:56

## 2022-12-23 RX ADMIN — SODIUM CHLORIDE, PRESERVATIVE FREE 10 ML: 5 INJECTION INTRAVENOUS at 10:03

## 2022-12-23 RX ADMIN — HYDROMORPHONE HYDROCHLORIDE 0.5 MG: 1 INJECTION, SOLUTION INTRAMUSCULAR; INTRAVENOUS; SUBCUTANEOUS at 19:53

## 2022-12-23 RX ADMIN — HEPARIN SODIUM 5000 UNITS: 5000 INJECTION INTRAVENOUS; SUBCUTANEOUS at 20:30

## 2022-12-23 RX ADMIN — ACETAMINOPHEN 650 MG: 325 TABLET, FILM COATED ORAL at 01:56

## 2022-12-23 RX ADMIN — POTASSIUM CHLORIDE 40 MEQ: 1500 TABLET, EXTENDED RELEASE ORAL at 18:26

## 2022-12-23 RX ADMIN — POTASSIUM CHLORIDE 40 MEQ: 1500 TABLET, EXTENDED RELEASE ORAL at 09:56

## 2022-12-23 RX ADMIN — HEPARIN SODIUM 5000 UNITS: 5000 INJECTION INTRAVENOUS; SUBCUTANEOUS at 09:56

## 2022-12-23 ASSESSMENT — PAIN DESCRIPTION - LOCATION
LOCATION: HEAD
LOCATION: HEAD
LOCATION: ABDOMEN

## 2022-12-23 ASSESSMENT — PAIN SCALES - GENERAL
PAINLEVEL_OUTOF10: 3
PAINLEVEL_OUTOF10: 0
PAINLEVEL_OUTOF10: 0
PAINLEVEL_OUTOF10: 8
PAINLEVEL_OUTOF10: 3
PAINLEVEL_OUTOF10: 1

## 2022-12-23 ASSESSMENT — PAIN DESCRIPTION - ORIENTATION
ORIENTATION: RIGHT
ORIENTATION: MID

## 2022-12-23 ASSESSMENT — PAIN DESCRIPTION - DESCRIPTORS
DESCRIPTORS: ACHING
DESCRIPTORS: ACHING
DESCRIPTORS: SORE

## 2022-12-23 NOTE — PROGRESS NOTES
Hospitalist Progress Note   Admit Date:  2022  5:38 PM   Name:  Saskia Simon   Age:  [de-identified] y.o. Sex:  female  :  1940   MRN:  [id number]   Room:  [facility]/    Presenting Complaint: Abdominal Pain     Reason(s) for Admission: Acute recurrent pancreatitis [K85.90]  Acute pancreatitis, unspecified complication status, unspecified pancreatitis type SPECIALTY [facility] Course:     Please refer to the admission H&P for details of presentation. In summary, Saskia Simon is a [de-identified] y.o. female with medical history significant for  duodenal ulcer complicated by perforation requiring bilroth II,  idiopathic pancreatitis, rheumatoid arthritis on enbrel/ current course of steroids, HTN, ongoing tobacco use who is evaluated with acute onset of mid abdominal pain to her back. Per records, she was discharged on 2022 for recurrent pancreatitis and was seen by GI at that time. Subjective/24 hr Events (22) : Patient is seen and examined at bedside. No acute events reported overnight by nursing staff. Patient frustrated that she is unable to eat as she is n.p.o. until MRI is done. Tolerated clears yesterday without any increase in her pain. Continues to have right upper quadrant tenderness. Denies any nausea vomiting. No BM. Patient denies fever, chills, chest pains, shortness of breath. Review of Systems: 10 point review of systems is otherwise negative with the exception of the elements mentioned above. Assessment & Plan:   Acute recurrent pancreatitis in setting of idiopathic pancreatitis  22: Pending MRI pancreatic protocol per GI.  -Can resume clear liquids after MRI with gradual advance diet as tolerated  -GI planning on keeping scheduled colonoscopy as before. - pain control with IV dilaudid PRN  - GI consulted. Appreciated recommendations    Lung Nodule  [id number] LLL Nodule.     Concerning for malignancy as patient is a daily smoker with symptoms of unintentional weight loss and night sweats.  -Oncology consulted    Rheumatoid arthritis  - on prednisone 10mg daily. Unintentional Weight loss  Moderate protein calorie malnutrition  Thin female with 23lb unintentional weight loss  - nutrition consult  - CT showed lung nodule. Thyroid Nodule  CT chest in 2018 with 1.3cm thyroid nodule  TSH low with normal T4  - check T3    Tobacco Dependence  - nicotine patch    HTN // HLD : continue with home medications    Diet:  ADULT DIET; Clear Liquid  DVT PPx: heparin  Code status: Full Code    Hospital Problems:  Principal Problem:    Acute recurrent pancreatitis  Active Problems:    Chronic pancreatitis (HCC)    Rheumatoid arthritis ([address]    Unintentional weight loss    Protein-calorie malnutrition, moderate (HCC)    Incidental lung nodule, greater than or equal to 8mm    Hypercholesterolemia    Multiple thyroid nodules    Personal history of tobacco use, presenting hazards to health    HTN (hypertension)    Hypokalemia  Resolved Problems:    * No resolved hospital problems. *      I have personally reviewed and ordered clinical lab tests and independently visualized images, tracing. I spent 36 minutes of time caring for this patient at bedside or nearby, and more than 50 percent of which was spent on coordination of care and/or patient/family counseling regarding the disease process, status , and treatment options/plan of care.       Objective:   Patient Vitals for the past 24 hrs:   Temp Pulse Resp BP SpO2   12/23/22 0956 -- -- -- 129/65 --   12/23/22 0803 97.9 °F (36.6 °C) 76 18 129/65 95 %   12/23/22 0358 98.3 °F (36.8 °C) 69 17 (!) 126/58 94 %   12/22/22 2317 97.7 °F (36.5 °C) 74 17 (!) 130/59 95 %   12/22/22 2124 -- -- 17 -- --   12/22/22 2052 -- -- 16 -- --   12/22/22 1933 97.9 °F (36.6 °C) 64 16 (!) 148/53 --   12/22/22 1500 98.6 °F (37 °C) 76 18 (!) 129/48 94 %       Oxygen Therapy  SpO2: 95 %  O2 Device: None (Room air)    Estimated body mass index is 16.28 kg/m² as calculated from the following:    Height as of this encounter: 5' 2\" (1.575 m). Weight as of this encounter: 89 lb (40.4 kg). No intake or output data in the 24 hours ending 12/23/22 1103        Physical Exam:     Blood pressure 129/65, pulse 76, temperature 97.9 °F (36.6 °C), temperature source Oral, resp. rate 18, height 5' 2\" (1.575 m), weight 89 lb (40.4 kg), SpO2 95 %. General:    Thin, chronically ill appearing. Head:  Normocephalic, atraumatic  Eyes:  Sclerae appear normal.  Pupils equally round. ENT:  Nares appear normal, no drainage. Moist oral mucosa  Neck:  No restricted ROM. Trachea midline   CV:   RRR. No m/r/g. No jugular venous distension. Lungs:   CTAB. No wheezing. Symmetric expansion. Abdomen: Bowel sounds present. Soft, +tender, nondistended. Extremities: No cyanosis or clubbing. No edema  Skin:     No rashes and normal coloration. Warm and dry. Neuro:  CN II-XII grossly intact. A&Ox3  Psych:  Normal mood and affect.       I have personally reviewed labs and tests showing:  Recent Labs:  Recent Results (from the past 48 hour(s))   CBC with Auto Differential    Collection Time: 12/21/22  3:00 PM   Result Value Ref Range    WBC 13.1 (H) 4.3 - 11.1 K/uL    RBC 4.55 4.05 - 5.2 M/uL    Hemoglobin 12.0 11.7 - 15.4 g/dL    Hematocrit 38.2 35.8 - 46.3 %    MCV 84.0 82 - 102 FL    MCH 26.4 26.1 - 32.9 PG    MCHC 31.4 31.4 - 35.0 g/dL    RDW 19.9 (H) 11.9 - 14.6 %    Platelets 416 142 - 001 K/uL    MPV 8.5 (L) 9.4 - 12.3 FL    nRBC 0.00 0.0 - 0.2 K/uL    Differential Type AUTOMATED      Seg Neutrophils 85 (H) 43 - 78 %    Lymphocytes 9 (L) 13 - 44 %    Monocytes 5 4.0 - 12.0 %    Eosinophils % 0 (L) 0.5 - 7.8 %    Basophils 0 0.0 - 2.0 %    Immature Granulocytes 1 0.0 - 5.0 %    Segs Absolute 11.1 (H) 1.7 - 8.2 K/UL    Absolute Lymph # 1.2 0.5 - 4.6 K/UL    Absolute Mono # 0.7 0.1 - 1.3 K/UL    Absolute Eos # 0.0 0.0 - 0.8 K/UL    Basophils Absolute 0.0 0.0 - 0.2 K/UL    Absolute Immature Granulocyte 0.1 0.0 - 0.5 K/UL   Comprehensive Metabolic Panel    Collection Time: 12/21/22  3:00 PM   Result Value Ref Range    Sodium 139 133 - 143 mmol/L    Potassium 3.9 3.5 - 5.1 mmol/L    Chloride 110 101 - 110 mmol/L    CO2 22 21 - 32 mmol/L    Anion Gap 7 2 - 11 mmol/L    Glucose 89 65 - 100 mg/dL    BUN 18 8 - 23 MG/DL    Creatinine 1.00 0.6 - 1.0 MG/DL    Est, Glom Filt Rate 57 (L) >60 ml/min/1.73m2    Calcium 8.6 8.3 - 10.4 MG/DL    Total Bilirubin 0.2 0.2 - 1.1 MG/DL    ALT 23 12 - 65 U/L    AST 8 (L) 15 - 37 U/L    Alk Phosphatase 64 50 - 136 U/L    Total Protein 6.9 6.3 - 8.2 g/dL    Albumin 3.3 3.2 - 4.6 g/dL    Globulin 3.6 2.8 - 4.5 g/dL    Albumin/Globulin Ratio 0.9 0.4 - 1.6     Lipase    Collection Time: 12/21/22  3:00 PM   Result Value Ref Range    Lipase 9,034 (H) 73 - 393 U/L   Comprehensive Metabolic Panel w/ Reflex to MG    Collection Time: 12/22/22  5:30 AM   Result Value Ref Range    Sodium 140 133 - 143 mmol/L    Potassium 3.5 3.5 - 5.1 mmol/L    Chloride 113 (H) 101 - 110 mmol/L    CO2 20 (L) 21 - 32 mmol/L    Anion Gap 7 2 - 11 mmol/L    Glucose 90 65 - 100 mg/dL    BUN 11 8 - 23 MG/DL    Creatinine 0.60 0.6 - 1.0 MG/DL    Est, Glom Filt Rate >60 >60 ml/min/1.73m2    Calcium 7.3 (L) 8.3 - 10.4 MG/DL    Total Bilirubin 0.3 0.2 - 1.1 MG/DL    ALT 16 12 - 65 U/L    AST 9 (L) 15 - 37 U/L    Alk Phosphatase 47 (L) 50 - 136 U/L    Total Protein 5.2 (L) 6.3 - 8.2 g/dL    Albumin 2.5 (L) 3.2 - 4.6 g/dL    Globulin 2.7 (L) 2.8 - 4.5 g/dL    Albumin/Globulin Ratio 0.9 0.4 - 1.6     CBC with Auto Differential    Collection Time: 12/22/22  5:30 AM   Result Value Ref Range    WBC 7.4 4.3 - 11.1 K/uL    RBC 3.66 (L) 4.05 - 5.2 M/uL    Hemoglobin 9.8 (L) 11.7 - 15.4 g/dL    Hematocrit 31.1 (L) 35.8 - 46.3 %    MCV 85.0 82 - 102 FL    MCH 26.8 26.1 - 32.9 PG    MCHC 31.5 31.4 - 35.0 g/dL    RDW 19.7 (H) 11.9 - 14.6 %    Platelets 991 167 - 398 K/uL    MPV 8.8 (L) 9.4 - 12.3 FL    nRBC 0.00 0.0 - 0.2 K/uL    Differential Type AUTOMATED      Seg Neutrophils 72 43 - 78 %    Lymphocytes 19 13 - 44 %    Monocytes 9 4.0 - 12.0 %    Eosinophils % 0 (L) 0.5 - 7.8 %    Basophils 0 0.0 - 2.0 %    Immature Granulocytes 0 0.0 - 5.0 %    Segs Absolute 5.2 1.7 - 8.2 K/UL    Absolute Lymph # 1.4 0.5 - 4.6 K/UL    Absolute Mono # 0.7 0.1 - 1.3 K/UL    Absolute Eos # 0.0 0.0 - 0.8 K/UL    Basophils Absolute 0.0 0.0 - 0.2 K/UL    Absolute Immature Granulocyte 0.0 0.0 - 0.5 K/UL   Magnesium    Collection Time: 12/22/22  5:30 AM   Result Value Ref Range    Magnesium 1.8 1.8 - 2.4 mg/dL   TSH with Reflex    Collection Time: 12/22/22  3:50 PM   Result Value Ref Range    TSH w Free Thyroid if Abnormal 0.35 (L) 0.358 - 3.740 UIU/ML   CBC with Auto Differential    Collection Time: 12/22/22  3:50 PM   Result Value Ref Range    WBC 7.9 4.3 - 11.1 K/uL    RBC 3.65 (L) 4.05 - 5.2 M/uL    Hemoglobin 9.6 (L) 11.7 - 15.4 g/dL    Hematocrit 30.8 (L) 35.8 - 46.3 %    MCV 84.4 82 - 102 FL    MCH 26.3 26.1 - 32.9 PG    MCHC 31.2 (L) 31.4 - 35.0 g/dL    RDW 19.9 (H) 11.9 - 14.6 %    Platelets 211 497 - 326 K/uL    MPV 8.9 (L) 9.4 - 12.3 FL    nRBC 0.00 0.0 - 0.2 K/uL    Differential Type AUTOMATED      Seg Neutrophils 86 (H) 43 - 78 %    Lymphocytes 11 (L) 13 - 44 %    Monocytes 3 (L) 4.0 - 12.0 %    Eosinophils % 0 (L) 0.5 - 7.8 %    Basophils 0 0.0 - 2.0 %    Immature Granulocytes 0 0.0 - 5.0 %    Segs Absolute 6.7 1.7 - 8.2 K/UL    Absolute Lymph # 0.9 0.5 - 4.6 K/UL    Absolute Mono # 0.2 0.1 - 1.3 K/UL    Absolute Eos # 0.0 0.0 - 0.8 K/UL    Basophils Absolute 0.0 0.0 - 0.2 K/UL    Absolute Immature Granulocyte 0.0 0.0 - 0.5 K/UL   T4, Free    Collection Time: 12/22/22  3:50 PM   Result Value Ref Range    T4 Free 1.1 0.78 - 1.46 NG/DL   Ferritin    Collection Time: 12/23/22  5:56 AM   Result Value Ref Range    Ferritin 68 8 - 388 NG/ML   Iron    Collection Time: 12/23/22  5:56 AM   Result Value Ref Range Iron 13 (L) 35 - 150 ug/dL   Lipase    Collection Time: 12/23/22  5:56 AM   Result Value Ref Range    Lipase 954 (H) 73 - 393 U/L   Basic Metabolic Panel w/ Reflex to MG    Collection Time: 12/23/22  5:56 AM   Result Value Ref Range    Sodium 145 (H) 133 - 143 mmol/L    Potassium 3.3 (L) 3.5 - 5.1 mmol/L    Chloride 118 (H) 101 - 110 mmol/L    CO2 20 (L) 21 - 32 mmol/L    Anion Gap 7 2 - 11 mmol/L    Glucose 84 65 - 100 mg/dL    BUN 5 (L) 8 - 23 MG/DL    Creatinine 0.50 (L) 0.6 - 1.0 MG/DL    Est, Glom Filt Rate >60 >60 ml/min/1.73m2    Calcium 7.4 (L) 8.3 - 10.4 MG/DL   Magnesium    Collection Time: 12/23/22  5:56 AM   Result Value Ref Range    Magnesium 1.9 1.8 - 2.4 mg/dL       I have personally reviewed imaging studies showing: Other Studies:  CT CHEST WO CONTRAST   Final Result   There is a new 13 x 11 mm nodule in the superior segment left lower   lobe, suspicious for malignancy. PET/CT or tissue sampling recommended.                MRI ABDOMEN W WO CONTRAST    (Results Pending)       Current Meds:  Current Facility-Administered Medications   Medication Dose Route Frequency    potassium chloride (KLOR-CON M) extended release tablet 40 mEq  40 mEq Oral BID WC    amLODIPine (NORVASC) tablet 10 mg  10 mg Oral Daily    donepezil (ARICEPT) tablet 5 mg  5 mg Oral Nightly    ferrous sulfate (IRON 325) tablet 325 mg  325 mg Oral Daily with breakfast    hydrALAZINE (APRESOLINE) tablet 25 mg  25 mg Oral 3 times per day    losartan (COZAAR) tablet 100 mg  100 mg Oral Daily    pravastatin (PRAVACHOL) tablet 40 mg  40 mg Oral Nightly    predniSONE (DELTASONE) tablet 10 mg  10 mg Oral Daily    sodium chloride flush 0.9 % injection 5-40 mL  5-40 mL IntraVENous 2 times per day    sodium chloride flush 0.9 % injection 5-40 mL  5-40 mL IntraVENous PRN    0.9 % sodium chloride infusion   IntraVENous PRN    heparin (porcine) injection 5,000 Units  5,000 Units SubCUTAneous BID    ondansetron (ZOFRAN-ODT) disintegrating tablet 4 mg 4 mg Oral Q8H PRN    Or    ondansetron (ZOFRAN) injection 4 mg  4 mg IntraVENous Q6H PRN    polyethylene glycol (GLYCOLAX) packet 17 g  17 g Oral Daily PRN    acetaminophen (TYLENOL) tablet 650 mg  650 mg Oral Q6H PRN    Or    acetaminophen (TYLENOL) suppository 650 mg  650 mg Rectal Q6H PRN    nicotine (NICODERM CQ) 21 MG/24HR 1 patch  1 patch TransDERmal Daily    0.9 % sodium chloride infusion   IntraVENous Continuous    oxymetazoline (AFRIN) 0.05 % nasal spray 2 spray  2 spray Each Nostril BID PRN    HYDROmorphone (DILAUDID) injection 0.5 mg  0.5 mg IntraVENous Q4H PRN       Signed:  Cecilia Tobin MD    Part of this note may have been written by using a voice dictation software. The note has been proof read but may still contain some grammatical/other typographical errors.

## 2022-12-23 NOTE — CONSULTS
Comprehensive Nutrition Assessment    Type and Reason for Visit: Initial, Consult  Unintentional Weight Loss (Hospitalists)    Nutrition Recommendations/Plan:   Meals and Snacks:  Diet: Continue current order  Progression per MD.   Nutrition Supplement Therapy:  Medical food supplement therapy:  Initiate Ensure Enlive three times per day (this provides 350 kcal and 20 grams protein per bottle)     Malnutrition Assessment:  Malnutrition Status: Moderate malnutrition  Context: Chronic Illness  Findings of clinical characteristics of malnutrition:   Energy Intake:  Unable to assess (pt doesn't provide a quantifiable nutrition hx)  Weight Loss:  Greater than 10% over 6 months (12% loss over 8 months)     Body Fat Loss:  Mild body fat loss Triceps   Muscle Mass Loss:  Mild muscle mass loss Temples (temporalis), Calf (gastrocnemius), Hand (interosseous), Clavicles (pectoralis & deltoids)  Fluid Accumulation:  No significant fluid accumulation     Strength:  Not Performed     Nutrition Assessment:  Nutrition History: Pt doesn't provide a specific nutrition hx, she only fixates on ensure costing \"$9 a can\" and not being allowed to eat here. She reports wt loss > 20#, followed by stating she has been 92.5# for sometime. Wt hx per IM office used to evaluate wt loss. Do You Have Any Cultural, Mandaeism, or Ethnic Food Preferences?: No   Nutrition Background:       Medical history significant for duodenal ulcer complicated by perforation requiring bilroth II,  idiopathic pancreatitis, rheumatoid arthritis on enbrel/ current course of steroids, HTN, ongoing tobacco.  Admitted with acute recurrent pancreatitis in setting of idiopathic pancreatitis, lung nodule, unintentional wt loss, thyroid nodule. Nutrition Interval:  Pt weighed at RD visit. Fixates on being on clears noted to consume 100% of clear tray yesterday afternoon at attempted RD contact and pt was off floor, she reports 100% of clears at lunch today. She fixates on needing something to stick to her bones and questions how she can gain weight with low fat diet as previously recommended. Diet has advanced to fulls per GI since RD visit with pt and will begin tonight. Current Nutrition Therapies:  ADULT DIET; Full Liquid; Low Fat/Low Chol/High Fiber/2 gm Na    Current Intake:   Average Meal Intake:  (clears only thus far)        Anthropometric Measures:  Height: 5' 2\" (157.5 cm)  Current Body Wt: 92 lb 9.5 oz (42 kg) (12/23), Weight source: Standing Scale  BMI: 16.9, Underweight (BMI less than 22) age over 72  Admission Body Weight: 89 lb (40.4 kg) (stated)  Ideal Body Weight (Kg) (Calculated): 50 kg (110 lbs), 84.2 %  Usual Body Wt: 105 lb (47.6 kg) (Wt hx per IM office: 108# 10/6/21, 105# 4/8/22, 94# 10/12/22), Percent weight change: -11.8       Edema:    BMI Category Underweight (BMI less than 22) age over 72  Estimated Daily Nutrient Needs:  Energy (kcal/day): 9917-2023 (30-35 kcal/kg) (Kcal/kg Weight used: 42 kg Current  Protein (g/day): 50-63 (1.2-1.5 g/kg) Weight Used: (Current) 42 kg  Fluid (ml/day):   (1 ml/kcal)    Nutrition Diagnosis:   Inadequate oral intake related to altered GI function as evidenced by NPO or clear liquid status due to medical condition  Moderate malnutrition, In context of chronic illness related to altered GI function as evidenced by Criteria as identified in malnutrition assessment  Nutrition Interventions:   Food and/or Nutrient Delivery: Continue Current Diet, Start Oral Nutrition Supplement     Coordination of Nutrition Care: Continue to monitor while inpatient, Interdisciplinary Rounds       Goals:       Active Goal:  (Tolerate diet progression by next RD assessment)       Nutrition Monitoring and Evaluation:      Food/Nutrient Intake Outcomes: Diet Advancement/Tolerance, Food and Nutrient Intake, Supplement Intake  Physical Signs/Symptoms Outcomes: Biochemical Data, GI Status, Meal Time Behavior, Weight    Discharge Planning:     Too soon to determine    JAY KAN, RD

## 2022-12-23 NOTE — PROGRESS NOTES
[facility] Progress Note         Admit Date:  12/21/2022    Today's Date:  12/23/2022    CC:  Recurrent idiopathic pancreatitis    Subjective:     Patient complaining that she is so hungry but that she is being told that she can't eat as she is to have MRI abdomen sometime this morning. Says she tolerated some clears yesterday with no true increase in pain. She still has some RUQ tenderness. She denies N/V. She has not had a BM since admission. Says passes flatus when she eats something.     Medications:   Current Facility-Administered Medications   Medication Dose Route Frequency    potassium chloride (KLOR-CON M) extended release tablet 40 mEq  40 mEq Oral BID WC    amLODIPine (NORVASC) tablet 10 mg  10 mg Oral Daily    donepezil (ARICEPT) tablet 5 mg  5 mg Oral Nightly    ferrous sulfate (IRON 325) tablet 325 mg  325 mg Oral Daily with breakfast    hydrALAZINE (APRESOLINE) tablet 25 mg  25 mg Oral 3 times per day    losartan (COZAAR) tablet 100 mg  100 mg Oral Daily    pravastatin (PRAVACHOL) tablet 40 mg  40 mg Oral Nightly    predniSONE (DELTASONE) tablet 10 mg  10 mg Oral Daily    sodium chloride flush 0.9 % injection 5-40 mL  5-40 mL IntraVENous 2 times per day    sodium chloride flush 0.9 % injection 5-40 mL  5-40 mL IntraVENous PRN    0.9 % sodium chloride infusion   IntraVENous PRN    heparin (porcine) injection 5,000 Units  5,000 Units SubCUTAneous BID    ondansetron (ZOFRAN-ODT) disintegrating tablet 4 mg  4 mg Oral Q8H PRN    Or    ondansetron (ZOFRAN) injection 4 mg  4 mg IntraVENous Q6H PRN    polyethylene glycol (GLYCOLAX) packet 17 g  17 g Oral Daily PRN    acetaminophen (TYLENOL) tablet 650 mg  650 mg Oral Q6H PRN    Or    acetaminophen (TYLENOL) suppository 650 mg  650 mg Rectal Q6H PRN    nicotine (NICODERM CQ) 21 MG/24HR 1 patch  1 patch TransDERmal Daily    0.9 % sodium chloride infusion   IntraVENous Continuous    oxymetazoline (AFRIN) 0.05 % nasal spray 2 spray  2 spray Each Nostril BID PRN    HYDROmorphone (DILAUDID) injection 0.5 mg  0.5 mg IntraVENous Q4H PRN       Review of Systems:  ROS was obtained, with pertinent positives as listed above. No chest pain or SOB. Diet:  NPO     Objective:   Vitals:  /65   Pulse 76   Temp 97.9 °F (36.6 °C) (Oral)   Resp 18   Ht 5' 2\" (1.575 m)   Wt 89 lb (40.4 kg)   SpO2 95%   BMI 16.28 kg/m²   Intake/Output:  No intake/output data recorded. 12/21 1901 - 12/23 0700  In: 8855 [I.V.:1282]  Out: -   Exam:  General appearance: alert, cooperative, no distress. No family at bedside. Lungs: clear to auscultation bilaterally anteriorly  Heart: regular rate and rhythm  Abdomen: soft, +Epigastric and RUQ ttp. Bowel sounds normal. No masses, no organomegaly  Extremities: extremities normal, atraumatic, no cyanosis or edema  Neuro:  alert and oriented    Data Review (Labs):    Recent Labs     12/21/22  1500 12/22/22  0530 12/22/22  1550 12/23/22  0556   WBC 13.1* 7.4 7.9  --    HGB 12.0 9.8* 9.6*  --    HCT 38.2 31.1* 30.8*  --     290 264  --    MCV 84.0 85.0 84.4  --     140  --  145*   K 3.9 3.5  --  3.3*    113*  --  118*   CO2 22 20*  --  20*   BUN 18 11  --  5*   MG  --  1.8  --  1.9   AST 8* 9*  --   --    ALT 23 16  --   --      Ferritin 12/22/22 68    CT chest 9/2018   IMPRESSION:  Unremarkable lungs. Aortocoronary atherosclerosis. Left thyroid nodule, 13mm. Head/Neck/Thyroid US 9/28/18   IMPRESSION:  1.3 x 2.7 cm nodule left lobe of thyroid. FNA or follow-up in 6-12 months recommended. EGD with EUS 3/18/20 with Dr. Day Mccollum  Findings:    ENDOSCOPIC FINDINGS:    OROPHARYNX: Cords and pyriform recesses appear normal.    ESOPHAGUS: The esophagus is normal. The proximal, mid, and distal portions are normal. The Z-Line is intact. STOMACH: Copious bilous fluid and erythematous gastritis was seen in the body and antrum. Biopsies were obtained from the body and antrum for H pylori.  A large amount of retained food and vegetable debris was seen in the gastric body along the greater  curvature. Evidence of prior gastroduodenal anastomosis. The examined portion of the small bowel was unremarkable. PANCREAS:  The pancreas is well-visualized from head to tail. In the head of the pancreas there is an 8 x 7 mm round, hypoechoic  lesion. This partially compresses the common bile duct. FNA was performed using a 25-gauge "[employer] acquire needle. 2 passes were made and sent for pathology. The main pancreatic duct has a smooth regular cores measuring up to 3 mm in the head,  3 mm in the body and remains mildly dilated at 2 mm and the tail. A single visible sidebranch measuring 3 mm is seen at the junction of the body and tail. BILIARY TREE: The gallbladder is absent. The common bile duct is well-visualized from its insertion in the ampulla to the bifurcation of right and left hepatic ducts. It is mildly dilated measuring up to 10 mm maximally with an incomplete taper  down to the ampulla. There are no intraductal stones, sludge or debris. The ampulla appears normal endosonographically. OTHER ORGANS:  Views of the left lobe of the liver demonstrate no solid mass lesions. There is no ascites in the upper abdomen. The left adrenal gland appears normal. The major vascular structures including the portal vein, splenic vessels and celiac artery appear unremarkable. There are no pathologically enlarged posterior mediastinal or upper abdominal lymph nodes. Specimen:  Yes      Estimated Blood Loss:  Minimal      Implant:  None             Impression:     1. Bile gastritis. 2. Gastric bezoar. 3. Small pancreatic head lesion. This may represent a lymph node or neuroendocrine tumor. It is not convincingly worrisome in appearance. Biopsies were obtained. 4. Mild segmental dilatation of the main pancreatic duct was single visible sidebranch could represent early IPMN.  This could account for recurrent episodes of pancreatitis. Plan:   1. Follow up results of pathology. 2. Avoid NSAIDs for 48 hours. 3. Further recommendations will be based on pathology results and patient's clinical course. *DIAGNOSIS* * * * * * * * * * * * * * * * * * * * * * * * * * * * * * *          A:  \"GASTRIC BIOPSIES\":  MILD CHRONIC GASTRITIS WITHOUT ACTIVITY; REPARATIVE CHANGES. B:  \"PANCREATIC LESION BIOPSIES\":  NO INTACT TISSUE REMAINING FOLLOWING PROCESSING. MRCP 6/2020   IMPRESSION:   1. Suboptimal assessment of prior pancreatic changes on this noncontrast study. No evolving contour deforming mass is seen. No worrisome secondary findings such   as pancreatic ductal dilation or atrophy are seen. An additional 6 mm T2   hyperintense lesion is seen in the pancreatic CAD appear stable and nonenhancing   on the prior CT study suggesting a cystic lesion. This is also incompletely   characterized on this exam although no clearly worrisome noncontrast features   are seen. A follow-up pancreatic protocol MRI in 6 months would be recommended   for reassessment of these changes. 2.  1.3 cm T2 hyperintense lesion in the superior right lobe of the liver felt to represent a benign    hemangioma as described above. 3. No acute inflammatory changes appreciated on the current examination. CT a/p w IV contrast 9/2022  There is ill-defined fluid noted adjacent to the head of the pancreas anteriorly which appears to extend to the anterior abdominal wall along the fissure for the ligamentum teres. (See image number 27 of series 2.) There is also minimal fluid extension into the right paracolic gutter. This may be associated with pancreatitis. Dilated extrahepatic bile ducts and pancreatic duct may be associated with a reservoir effect secondary to cholecystectomy. Bibasilar atelectasis or infiltrate. Stable fracture of the L1 vertebral body. CT a/p w IV contrast 11/2022  1.  Mild fat stranding around the pancreatic head, nonspecific but may be related to acute pancreatitis. No peripancreatic fluid or pseudocyst. No evidence of pancreatic necrosis. 2. Gallbladder is surgically absent. Biliary ductal dilatation may be secondary to the cholecystectomy. Mild pancreatic duct dilatation, similar to prior exam.    3. No evidence of colitis, diverticulitis or bowel obstruction. No hydronephrosis. CT Chest 12/22/22   There is a new 13 x 11 mm nodule in the superior segment left lower lobe, suspicious for malignancy. PET/CT or tissue sampling recommended. MRI pancreatic protocol/MRCP 12/23/22 PENDING    EGD 10/4/22 Small hiatal hernia. S/p billroth gastrojejunostomy. Anastomosis is widely patent     No prior Colonoscopy. Assessment:     Principal Problem:    Acute recurrent pancreatitis  Active Problems:    Chronic pancreatitis (HCC)    Rheumatoid arthritis ([address]    Unintentional weight loss    Protein-calorie malnutrition, moderate (HCC)    Hypercholesterolemia    Personal history of tobacco use, presenting hazards to health    HTN (hypertension)  Resolved Problems:    * No resolved hospital problems. *    [de-identified] y.o. female with PMH as above- including HTN, tobacco use, RA on Enbrel/current course of steroids, Duodenal ulcer s/p bilroth II, cholecystectomy, recurrent idiopathic pancreatitis, seen in GI consultation 12/22 at the request of Dr. Rudolph Silva for recurrent pancreatitis. Pt admitted 12/21 with increased RUQ pain, 23lb weight loss since 2/2022 with labs that revealed Lipase 9K with normal LFTs, leukocytosis with WBC that has since normalized, normal LA, BUN/Cr. Denies ETOH, recent new meds/supplements. She is s/p cholecystectomy. Has hx recurrent pancreatitis since 2020 of unclear cause. EUS 3/2020 with Dr. Kenzie Yousif (see above) with small pancreatic head lesion- not convincingly worrisome in appearance and suspected IPMN.   Subsequent MRCP 6/2020  with no evolving contour deforming mass see and no worrisome secondary findings such as pancreatic ductal dilation or atrophy. Did note findings suggesting cystic lesion. She was to have f/u pancreatic protocol MRI in 6mo but this was not done. During admit 10/2022 for pancreatitis, RUQ pain had EGD with small HH, S/p billroth gastrojejunostomy. Anastomosis is widely patent. No prior colo though she is scheduled for colonoscopy 12/27/22 as outpatient. Also, hx thyroid nodule on CT chest, Thyroid US in 2018. Hospitalist here ordered CT chest 12/22 with finding of new 13 x 11 mm nodule in the superior segment left lower lobe, suspicious for malignancy. -12/22 abdominal pain is improved and per pt, last required pain med last night.  +significant bilateral upper abdominal tenderness on palpation. She is NPO. She is receiving NACL 150cc/hr.      -12/23 hungry! Still with RUQ tenderness. Awaiting MRI pancreatic protocol/MRCP. Plan:     -Supportive care with IVF hydration, anti-emetics prn, analgesics prn.  -May resume clear liquids after completion of MRI this AM.  Thereafter may gradually advance diet as tolerated to low fat diet. -MRI pancreatic protocol/MRCP pending to f/u abnormal EUS. F/U results.  -Will keep colonoscopy as scheduled for now given need for screening but also need for evaluation in setting of recent signifcant weight loss. Depending upon clinical course for current admission, suspect will likely end up needing to reschedule.   -Abnormal CT chest with pulmonary nodule- per hospitalist.       Samia Carlos PABrennaC  [facility]     Patient is seen and examined in collaboration with Dr. Sera Bynum.   Assessment and plan as per Dr. Sera Bynum

## 2022-12-24 DIAGNOSIS — R91.8 LUNG MASS: Primary | ICD-10-CM

## 2022-12-24 LAB
ANION GAP SERPL CALC-SCNC: 4 MMOL/L (ref 2–11)
BUN SERPL-MCNC: 6 MG/DL (ref 8–23)
CALCIUM SERPL-MCNC: 8.3 MG/DL (ref 8.3–10.4)
CHLORIDE SERPL-SCNC: 116 MMOL/L (ref 101–110)
CO2 SERPL-SCNC: 23 MMOL/L (ref 21–32)
CREAT SERPL-MCNC: 0.6 MG/DL (ref 0.6–1)
GLUCOSE SERPL-MCNC: 83 MG/DL (ref 65–100)
LIPASE SERPL-CCNC: 1583 U/L (ref 73–393)
POTASSIUM SERPL-SCNC: 3.8 MMOL/L (ref 3.5–5.1)
SODIUM SERPL-SCNC: 143 MMOL/L (ref 133–143)
T3FREE SERPL-MCNC: 2.3 PG/ML (ref 2–4.4)

## 2022-12-24 PROCEDURE — 36415 COLL VENOUS BLD VENIPUNCTURE: CPT

## 2022-12-24 PROCEDURE — 2580000003 HC RX 258: Performed by: INTERNAL MEDICINE

## 2022-12-24 PROCEDURE — 6370000000 HC RX 637 (ALT 250 FOR IP): Performed by: INTERNAL MEDICINE

## 2022-12-24 PROCEDURE — 6370000000 HC RX 637 (ALT 250 FOR IP): Performed by: HOSPITALIST

## 2022-12-24 PROCEDURE — 99222 1ST HOSP IP/OBS MODERATE 55: CPT | Performed by: INTERNAL MEDICINE

## 2022-12-24 PROCEDURE — 1100000000 HC RM PRIVATE

## 2022-12-24 PROCEDURE — 6360000002 HC RX W HCPCS: Performed by: INTERNAL MEDICINE

## 2022-12-24 PROCEDURE — 80048 BASIC METABOLIC PNL TOTAL CA: CPT

## 2022-12-24 PROCEDURE — 83690 ASSAY OF LIPASE: CPT

## 2022-12-24 RX ORDER — SODIUM CHLORIDE 9 MG/ML
INJECTION, SOLUTION INTRAVENOUS CONTINUOUS
Status: DISCONTINUED | OUTPATIENT
Start: 2022-12-24 | End: 2022-12-29

## 2022-12-24 RX ORDER — PANTOPRAZOLE SODIUM 40 MG/1
40 TABLET, DELAYED RELEASE ORAL 2 TIMES DAILY
Status: DISCONTINUED | OUTPATIENT
Start: 2022-12-24 | End: 2022-12-30 | Stop reason: HOSPADM

## 2022-12-24 RX ADMIN — HEPARIN SODIUM 5000 UNITS: 5000 INJECTION INTRAVENOUS; SUBCUTANEOUS at 09:33

## 2022-12-24 RX ADMIN — LOSARTAN POTASSIUM 100 MG: 50 TABLET, FILM COATED ORAL at 09:32

## 2022-12-24 RX ADMIN — HEPARIN SODIUM 5000 UNITS: 5000 INJECTION INTRAVENOUS; SUBCUTANEOUS at 20:16

## 2022-12-24 RX ADMIN — PREDNISONE 10 MG: 10 TABLET ORAL at 09:32

## 2022-12-24 RX ADMIN — HYDROMORPHONE HYDROCHLORIDE 0.5 MG: 1 INJECTION, SOLUTION INTRAMUSCULAR; INTRAVENOUS; SUBCUTANEOUS at 20:17

## 2022-12-24 RX ADMIN — HYDRALAZINE HYDROCHLORIDE 25 MG: 25 TABLET ORAL at 05:37

## 2022-12-24 RX ADMIN — SODIUM CHLORIDE: 9 INJECTION, SOLUTION INTRAVENOUS at 20:12

## 2022-12-24 RX ADMIN — HYDRALAZINE HYDROCHLORIDE 25 MG: 25 TABLET ORAL at 13:25

## 2022-12-24 RX ADMIN — FERROUS SULFATE TAB 325 MG (65 MG ELEMENTAL FE) 325 MG: 325 (65 FE) TAB at 09:33

## 2022-12-24 RX ADMIN — SODIUM CHLORIDE, PRESERVATIVE FREE 10 ML: 5 INJECTION INTRAVENOUS at 09:33

## 2022-12-24 RX ADMIN — DONEPEZIL HYDROCHLORIDE 5 MG: 5 TABLET, FILM COATED ORAL at 20:16

## 2022-12-24 RX ADMIN — HYDRALAZINE HYDROCHLORIDE 25 MG: 25 TABLET ORAL at 20:15

## 2022-12-24 RX ADMIN — PANTOPRAZOLE SODIUM 40 MG: 40 TABLET, DELAYED RELEASE ORAL at 21:38

## 2022-12-24 RX ADMIN — AMLODIPINE BESYLATE 10 MG: 10 TABLET ORAL at 09:33

## 2022-12-24 RX ADMIN — SODIUM CHLORIDE, PRESERVATIVE FREE 10 ML: 5 INJECTION INTRAVENOUS at 20:18

## 2022-12-24 RX ADMIN — PRAVASTATIN SODIUM 40 MG: 80 TABLET ORAL at 20:16

## 2022-12-24 ASSESSMENT — PAIN DESCRIPTION - LOCATION: LOCATION: ABDOMEN

## 2022-12-24 ASSESSMENT — PAIN SCALES - GENERAL
PAINLEVEL_OUTOF10: 9
PAINLEVEL_OUTOF10: 2

## 2022-12-24 NOTE — PROGRESS NOTES
Hospitalist Progress Note   Admit Date:  2022  5:38 PM   Name:  Jamar Espinoza   Age:  [de-identified] y.o. Sex:  female  :  1940   MRN:  [id number]   Room:  [facility]/    Presenting Complaint: Abdominal Pain     Reason(s) for Admission: Acute recurrent pancreatitis [K85.90]  Acute pancreatitis, unspecified complication status, unspecified pancreatitis type SPECIALTY [facility] Course:     Please refer to the admission H&P for details of presentation. In summary, Jamar Espinoza is a [de-identified] y.o. female with medical history significant for  duodenal ulcer complicated by perforation requiring bilroth II,  idiopathic pancreatitis, rheumatoid arthritis on enbrel/ current course of steroids, HTN, ongoing tobacco use who is evaluated with acute onset of mid abdominal pain to her back. Per records, she was discharged on 2022 for recurrent pancreatitis and was seen by GI at that time. MRCP with mild dilatation of the pancreatic and common bile duct with abrupt tapering at the pancreatic head suspicious for pancreatic head mass. GI was consulted and is planned for outpatient colonoscopy on . Given patient's weight loss and smoking history, CT chest was done which shows a 13 mm x 11 mm left lower lobe nodule. Patient will need to follow-up with oncology for further work-up and biopsy. Subjective/24 hr Events (22) : Patient is seen and examined at bedside. No acute events reported overnight by nursing staff. Patient reports that she is tolerating full liquid diet and is hungry. Wants to advance his diet. He reports no additional pain with eating. Patient frustrated that she is unable to eat as she is n.p.o. until MRI is done. Tolerated clears yesterday without any increase in her pain. Continues to have right upper quadrant tenderness. Denies any nausea vomiting. No BM. Patient denies fever, chills, chest pains, shortness of breath.       Review of Systems: 10 point review of systems is otherwise negative with the exception of the elements mentioned above. Assessment & Plan:   Acute recurrent pancreatitis in setting of idiopathic pancreatitis  12/24/22: pain has improved and is tolerating full liquid diet. Will advance diet to soft as per patient request and see if she can be discharged soon. -MRCP result noted. GI plan for outpatient colonoscopy which she already has appointment on 12/27. Lung Nodule  [id number] L[address] Nodule. Concerning for malignancy as patient is a daily smoker with symptoms of unintentional weight loss and night sweats.  -Oncology consulted. May need to followup appointment as outpatient. Rheumatoid arthritis  - on prednisone 10mg daily. Unintentional Weight loss  Moderate protein calorie malnutrition  Thin female with 23lb unintentional weight loss  - nutrition consult  - CT showed lung nodule. Thyroid Nodule  CT chest in 2018 with 1.3cm thyroid nodule  TSH low with normal T4 and normal T3.  - continue outpatient followup /surveillance. Tobacco Dependence  - nicotine patch    HTN // HLD : continue with home medications    Diet:  ADULT ORAL NUTRITION SUPPLEMENT; Breakfast, Lunch, Dinner; Standard High Calorie/High Protein Oral Supplement  ADULT DIET; Easy to Chew  DVT PPx: heparin  Code status: Full Code    Hospital Problems:  Principal Problem:    Acute recurrent pancreatitis  Active Problems:    Chronic pancreatitis (HCC)    Rheumatoid arthritis ([address]    Unintentional weight loss    Protein-calorie malnutrition, moderate (HCC)    Incidental lung nodule, greater than or equal to 8mm    Hypercholesterolemia    Multiple thyroid nodules    Personal history of tobacco use, presenting hazards to health    HTN (hypertension)    Hypokalemia  Resolved Problems:    * No resolved hospital problems.  *      Objective:   Patient Vitals for the past 24 hrs:   Temp Pulse Resp BP SpO2   12/24/22 0933 -- -- -- (!) 138/51 --   12/24/22 0810 97.7 °F (36.5 °C) 69 18 (!) 138/51 98 %   12/24/22 0530 98.1 °F (36.7 °C) 63 18 (!) 156/58 97 %   12/24/22 0008 98.4 °F (36.9 °C) 65 20 (!) 135/59 99 %   12/23/22 2043 97.6 °F (36.4 °C) 61 18 (!) 138/53 98 %   12/23/22 2023 -- -- 18 -- --   12/23/22 1924 -- -- 18 -- --   12/23/22 1509 98.6 °F (37 °C) 62 18 (!) 122/46 97 %   12/23/22 1450 -- -- -- (!) 122/46 --   12/23/22 1206 98.1 °F (36.7 °C) 65 18 (!) 131/41 98 %       Oxygen Therapy  SpO2: 98 %  O2 Device: None (Room air)    Estimated body mass index is 16.92 kg/m² as calculated from the following:    Height as of this encounter: 5' 2\" (1.575 m). Weight as of this encounter: 92 lb 8 oz (42 kg). No intake or output data in the 24 hours ending 12/24/22 1052        Physical Exam:     Blood pressure (!) 138/51, pulse 69, temperature 97.7 °F (36.5 °C), temperature source Oral, resp. rate 18, height 5' 2\" (1.575 m), weight 92 lb 8 oz (42 kg), SpO2 98 %. General:    Thin, chronically ill appearing. Head:  Normocephalic, atraumatic  Eyes:  Sclerae appear normal.  Pupils equally round. ENT:  Nares appear normal, no drainage. Moist oral mucosa  Neck:  No restricted ROM. Trachea midline   CV:   RRR. No m/r/g. No jugular venous distension. Lungs:   CTAB. No wheezing. Symmetric expansion. Abdomen: Bowel sounds present. Soft, slightly sore on palpation of epigastric region, nondistended. Extremities: No cyanosis or clubbing. No edema  Skin:     No rashes and normal coloration. Warm and dry. Neuro:  CN II-XII grossly intact. A&Ox3  Psych:  Normal mood and affect.       I have personally reviewed labs and tests showing:  Recent Labs:  Recent Results (from the past 48 hour(s))   TSH with Reflex    Collection Time: 12/22/22  3:50 PM   Result Value Ref Range    TSH w Free Thyroid if Abnormal 0.35 (L) 0.358 - 3.740 UIU/ML   CBC with Auto Differential    Collection Time: 12/22/22  3:50 PM   Result Value Ref Range    WBC 7.9 4.3 - 11.1 K/uL    RBC 3.65 (L) 4.05 - 5.2 M/uL    Hemoglobin 9.6 (L) 11.7 - 15.4 g/dL    Hematocrit 30.8 (L) 35.8 - 46.3 %    MCV 84.4 82 - 102 FL    MCH 26.3 26.1 - 32.9 PG    MCHC 31.2 (L) 31.4 - 35.0 g/dL    RDW 19.9 (H) 11.9 - 14.6 %    Platelets 159 345 - 464 K/uL    MPV 8.9 (L) 9.4 - 12.3 FL    nRBC 0.00 0.0 - 0.2 K/uL    Differential Type AUTOMATED      Seg Neutrophils 86 (H) 43 - 78 %    Lymphocytes 11 (L) 13 - 44 %    Monocytes 3 (L) 4.0 - 12.0 %    Eosinophils % 0 (L) 0.5 - 7.8 %    Basophils 0 0.0 - 2.0 %    Immature Granulocytes 0 0.0 - 5.0 %    Segs Absolute 6.7 1.7 - 8.2 K/UL    Absolute Lymph # 0.9 0.5 - 4.6 K/UL    Absolute Mono # 0.2 0.1 - 1.3 K/UL    Absolute Eos # 0.0 0.0 - 0.8 K/UL    Basophils Absolute 0.0 0.0 - 0.2 K/UL    Absolute Immature Granulocyte 0.0 0.0 - 0.5 K/UL   T4, Free    Collection Time: 12/22/22  3:50 PM   Result Value Ref Range    T4 Free 1.1 0.78 - 1.46 NG/DL   Ferritin    Collection Time: 12/23/22  5:56 AM   Result Value Ref Range    Ferritin 68 8 - 388 NG/ML   Iron    Collection Time: 12/23/22  5:56 AM   Result Value Ref Range    Iron 13 (L) 35 - 150 ug/dL   Lipase    Collection Time: 12/23/22  5:56 AM   Result Value Ref Range    Lipase 954 (H) 73 - 393 U/L   Basic Metabolic Panel w/ Reflex to MG    Collection Time: 12/23/22  5:56 AM   Result Value Ref Range    Sodium 145 (H) 133 - 143 mmol/L    Potassium 3.3 (L) 3.5 - 5.1 mmol/L    Chloride 118 (H) 101 - 110 mmol/L    CO2 20 (L) 21 - 32 mmol/L    Anion Gap 7 2 - 11 mmol/L    Glucose 84 65 - 100 mg/dL    BUN 5 (L) 8 - 23 MG/DL    Creatinine 0.50 (L) 0.6 - 1.0 MG/DL    Est, Glom Filt Rate >60 >60 ml/min/1.73m2    Calcium 7.4 (L) 8.3 - 10.4 MG/DL   Magnesium    Collection Time: 12/23/22  5:56 AM   Result Value Ref Range    Magnesium 1.9 1.8 - 2.4 mg/dL   T3, Free    Collection Time: 12/23/22  5:56 AM   Result Value Ref Range    T3, Free 2.3 2.0 - 4.4 pg/mL   Basic Metabolic Panel w/ Reflex to MG    Collection Time: 12/24/22  6:09 AM   Result Value Ref Range Sodium 143 133 - 143 mmol/L    Potassium 3.8 3.5 - 5.1 mmol/L    Chloride 116 (H) 101 - 110 mmol/L    CO2 23 21 - 32 mmol/L    Anion Gap 4 2 - 11 mmol/L    Glucose 83 65 - 100 mg/dL    BUN 6 (L) 8 - 23 MG/DL    Creatinine 0.60 0.6 - 1.0 MG/DL    Est, Glom Filt Rate >60 >60 ml/min/1.73m2    Calcium 8.3 8.3 - 10.4 MG/DL   Lipase    Collection Time: 12/24/22  6:09 AM   Result Value Ref Range    Lipase 1,583 (H) 73 - 393 U/L       I have personally reviewed imaging studies showing: Other Studies:  MRI ABDOMEN W WO CONTRAST   Final Result   1. The study is moderately limited due to motion artifact. 2.  Mild dilation of the pancreatic and common bile ducts with abrupt tapering   at the pancreatic head. Findings are suspicious for a pancreatic head mass,   although no discrete mass is visualized. CT CHEST WO CONTRAST   Final Result   There is a new 13 x 11 mm nodule in the superior segment left lower   lobe, suspicious for malignancy. PET/CT or tissue sampling recommended.                    Current Meds:  Current Facility-Administered Medications   Medication Dose Route Frequency    amLODIPine (NORVASC) tablet 10 mg  10 mg Oral Daily    donepezil (ARICEPT) tablet 5 mg  5 mg Oral Nightly    ferrous sulfate (IRON 325) tablet 325 mg  325 mg Oral Daily with breakfast    hydrALAZINE (APRESOLINE) tablet 25 mg  25 mg Oral 3 times per day    losartan (COZAAR) tablet 100 mg  100 mg Oral Daily    pravastatin (PRAVACHOL) tablet 40 mg  40 mg Oral Nightly    predniSONE (DELTASONE) tablet 10 mg  10 mg Oral Daily    sodium chloride flush 0.9 % injection 5-40 mL  5-40 mL IntraVENous 2 times per day    sodium chloride flush 0.9 % injection 5-40 mL  5-40 mL IntraVENous PRN    0.9 % sodium chloride infusion   IntraVENous PRN    heparin (porcine) injection 5,000 Units  5,000 Units SubCUTAneous BID    ondansetron (ZOFRAN-ODT) disintegrating tablet 4 mg  4 mg Oral Q8H PRN    Or    ondansetron (ZOFRAN) injection 4 mg  4 mg IntraVENous Q6H PRN    polyethylene glycol (GLYCOLAX) packet 17 g  17 g Oral Daily PRN    acetaminophen (TYLENOL) tablet 650 mg  650 mg Oral Q6H PRN    Or    acetaminophen (TYLENOL) suppository 650 mg  650 mg Rectal Q6H PRN    nicotine (NICODERM CQ) 21 MG/24HR 1 patch  1 patch TransDERmal Daily    HYDROmorphone (DILAUDID) injection 0.5 mg  0.5 mg IntraVENous Q4H PRN       Signed:  Francisca Hemphill MD    Part of this note may have been written by using a voice dictation software. The note has been proof read but may still contain some grammatical/other typographical errors.

## 2022-12-24 NOTE — CONSULTS
Inpatient Hematology / Oncology Consult Note    Reason for Consult:  Acute recurrent pancreatitis [K85.90]  Acute pancreatitis, unspecified complication status, unspecified pancreatitis type [K85.90]  Referring Physician:  Aryan Núñez MD    History of Present Illness:  Ms. Kristine Kruger is a [de-identified] y.o. female admitted on 12/21/2022 with a primary diagnosis of The encounter diagnosis was Acute pancreatitis, unspecified complication status, unspecified pancreatitis type. .      Left lower lobe lung nodule was noted      Review of Systems:  Constitutional C/o weight loss and appetite changes   HEENT Denies trauma, blurry vision, hearing loss, ear pain, nosebleeds, sore throat, neck pain and ear discharge. Skin Denies lesions or rashes. Lungs Denies dyspnea, cough, sputum production or hemoptysis. Cardiovascular Denies chest pain, palpitations, or lower extremity edema. Gastrointestinal C/o nausea and abdominal pain.  Denies dysuria, frequency or hesitancy of urination. Neuro Denies headaches, visual changes or ataxia. Denies dizziness, tingling, tremors, sensory change, speech change, focal weakness or headaches. Hematology Denies easy bruising or bleeding, denies gingival bleeding or epistaxis. Endo Denies heat/cold intolerance, denies diabetes or thyroid abnormalities. MSK Denies back pain, arthralgias, myalgias or frequent falls. Psychiatric/Behavioral Denies depression and substance abuse. The patient is not nervous/anxious.          Allergies   Allergen Reactions    Azithromycin Other (See Comments)     pancreatitis    Codeine Nausea And Vomiting     Past Medical History:   Diagnosis Date    EPIFANIO (acute kidney injury) ([address] 12/04/2014    pt denies this dx    Arthritis     RA-abdelrahman hands    Back pain 6/10/2016    Breast cancer ([address] 2018    Breast lump dx 2/2018    left    Bronchitis     Chronic obstructive pulmonary disease ([address]     Discharge from the vagina     Dysuria     Eczema 6/10/2016    Fungal dermatitis     Headache 6/10/2016    Hypercholesterolemia 12/4/2014    Hypertension     not well controlled--per pt    Intractable vomiting 5/20/2018    Menopausal vaginal dryness     Osteoarthritis 6/10/2016    Osteoporosis 6/10/2016    Pancreatitis     2 episodes    PUD (peptic ulcer disease)     last 2003 which a rupture an extensive surgery    Sepsis ([address] 12/4/2014    Thyroid nodule     Tobacco abuse     1ppd x 49 yrs     Past Surgical History:   Procedure Laterality Date    APPENDECTOMY  1977    with hysterectomy    BREAST BIOPSY Left 1975    BREAST LUMPECTOMY Left 2/22/2018    LEFT BREAST LUMPECTOMY/ SENTINEL NODE BIOPSY performed by Porter Rivero MD at [facility] Left 1975    breast lumpectomy, benign  (left)    CATARACT REMOVAL Bilateral 2018    w/IOL    CHOLECYSTECTOMY      HYSTERECTOMY (CERVIX STATUS UNKNOWN)  1977    OTHER SURGICAL HISTORY      hernicolectomy 2 cm    VT ABDOMEN SURGERY PROC UNLISTED  2003    stomach surgery for ruptured ulcer and extensive rerouting of intestestines per patient     [address]  05/21/2018    empiric dilation    UPPER GASTROINTESTINAL ENDOSCOPY      UPPER GASTROINTESTINAL ENDOSCOPY N/A 10/4/2022    EGD ESOPHAGOGASTRODUODENOSCOPY/  performed by Nile Prajapati MD at [address] [place] [address] LEFT Left 1/31/2018     BREAST NEEDLE BIOPSY LEFT 1/31/2018 [facility]     Family History   Problem Relation Age of Onset    Thyroid Cancer Neg Hx     No Known Problems Paternal Grandfather     No Known Problems Paternal Grandmother     No Known Problems Maternal Grandfather     No Known Problems Maternal Grandmother     Hypertension Other         Brother and sister with htn    No Known Problems Brother     Hypertension Mother     Heart Disease Brother     Heart Disease Sister     Breast Cancer Sister 79    Cancer Sister     Heart Disease Father     Heart Attack Father     Cancer Brother prostate    Diabetes Sister     Heart Disease Sister      Social History     Socioeconomic History    Marital status:       Spouse name: Not on file    Number of children: Not on file    Years of education: Not on file    Highest education level: Not on file   Occupational History    Not on file   Tobacco Use    Smoking status: Every Day     Packs/day: 0.10     Types: Cigarettes     Start date: 5/6/1966    Smokeless tobacco: Never   Vaping Use    Vaping Use: Never used   Substance and Sexual Activity    Alcohol use: No    Drug use: No    Sexual activity: Not Currently     Birth control/protection: None   Other Topics Concern    Not on file   Social History Narrative    Not on file     Social Determinants of Health     Financial Resource Strain: Not on file   Food Insecurity: Not on file   Transportation Needs: Not on file   Physical Activity: Not on file   Stress: Not on file   Social Connections: Not on file   Intimate Partner Violence: Not on file   Housing Stability: Not on file     Current Facility-Administered Medications   Medication Dose Route Frequency Provider Last Rate Last Admin    0.9 % sodium chloride infusion   IntraVENous Continuous Alex Dai  mL/hr at 12/24/22 1324 Restarted at 12/24/22 1324    amLODIPine (NORVASC) tablet 10 mg  10 mg Oral Daily Basia Lopez MD   10 mg at 12/24/22 0933    donepezil (ARICEPT) tablet 5 mg  5 mg Oral Nightly Basia Lopez MD   5 mg at 12/23/22 1954    ferrous sulfate (IRON 325) tablet 325 mg  325 mg Oral Daily with breakfast Basia Lopez MD   325 mg at 12/24/22 0933    hydrALAZINE (APRESOLINE) tablet 25 mg  25 mg Oral 3 times per day Basia Lopez MD   25 mg at 12/24/22 1325    losartan (COZAAR) tablet 100 mg  100 mg Oral Daily Basia Lopez MD   100 mg at 12/24/22 0932    pravastatin (PRAVACHOL) tablet 40 mg  40 mg Oral Nightly Basia Lopez MD   40 mg at 12/23/22 1953    predniSONE (DELTASONE) tablet 10 mg 10 mg Oral Daily Marianna Zhao MD   10 mg at 22 0932    sodium chloride flush 0.9 % injection 5-40 mL  5-40 mL IntraVENous 2 times per day Marianna Zhao MD   10 mL at 22 0933    sodium chloride flush 0.9 % injection 5-40 mL  5-40 mL IntraVENous PRN Marianna Zhao MD        0.9 % sodium chloride infusion   IntraVENous PRN Marianna Zhao MD        heparin (porcine) injection 5,000 Units  5,000 Units SubCUTAneous BID Marianna Zhao MD   5,000 Units at 22 0933    ondansetron (ZOFRAN-ODT) disintegrating tablet 4 mg  4 mg Oral Q8H PRN Marianna Zhao MD   4 mg at 22 2134    Or    ondansetron (ZOFRAN) injection 4 mg  4 mg IntraVENous Q6H PRN Marianna Zhao MD        polyethylene glycol (GLYCOLAX) packet 17 g  17 g Oral Daily PRN Marianna Zhao MD        acetaminophen (TYLENOL) tablet 650 mg  650 mg Oral Q6H PRN Marianna Zhao MD   650 mg at 22 1845    Or    acetaminophen (TYLENOL) suppository 650 mg  650 mg Rectal Q6H PRN Marianna Zhao MD        nicotine (NICODERM CQ) 21 MG/24HR 1 patch  1 patch TransDERmal Daily Marianna Zhao MD   1 patch at 22 0934    HYDROmorphone (DILAUDID) injection 0.5 mg  0.5 mg IntraVENous Q4H PRN Marianna Zhao MD   0.5 mg at 22 1953       OBJECTIVE:  Patient Vitals for the past 8 hrs:   BP Temp Temp src Pulse Resp SpO2   22 1625 (!) 150/61 97.9 °F (36.6 °C) Oral 77 17 98 %   22 1325 (!) 143/54 -- -- -- -- --   22 1247 (!) 143/54 97.5 °F (36.4 °C) Oral 66 18 99 %     Temp (24hrs), Av.9 °F (36.6 °C), Min:97.5 °F (36.4 °C), Max:98.4 °F (36.9 °C)    No intake/output data recorded. Physical Exam:  Constitutional: Well developed, well nourished female in no acute distress, sitting comfortably in the hospital bed. HEENT: Normocephalic and atraumatic. Oropharynx is clear, mucous membranes are moist.  Pupils are equal, round, and reactive to light.  Extraocular muscles are intact. Sclerae anicteric. Neck supple without JVD. No thyromegaly present. Lymph node   No palpable submandibular, cervical, supraclavicular, axillary or inguinal lymph nodes. Skin Warm and dry. No bruising and no rash noted. No erythema. No pallor. Respiratory Lungs are clear to auscultation bilaterally without wheezes, rales or rhonchi, normal air exchange without accessory muscle use. CVS Normal rate, regular rhythm and normal S1 and S2. No murmurs, gallops, or rubs. Abdomen Mildly tender. Neuro Grossly nonfocal with no obvious sensory or motor deficits. MSK Normal range of motion in general.  No edema and no tenderness. Psych Appropriate mood and affect. Labs:    Recent Results (from the past 24 hour(s))   Basic Metabolic Panel w/ Reflex to MG    Collection Time: 12/24/22  6:09 AM   Result Value Ref Range    Sodium 143 133 - 143 mmol/L    Potassium 3.8 3.5 - 5.1 mmol/L    Chloride 116 (H) 101 - 110 mmol/L    CO2 23 21 - 32 mmol/L    Anion Gap 4 2 - 11 mmol/L    Glucose 83 65 - 100 mg/dL    BUN 6 (L) 8 - 23 MG/DL    Creatinine 0.60 0.6 - 1.0 MG/DL    Est, Glom Filt Rate >60 >60 ml/min/1.73m2    Calcium 8.3 8.3 - 10.4 MG/DL   Lipase    Collection Time: 12/24/22  6:09 AM   Result Value Ref Range    Lipase 1,583 (H) 73 - 393 U/L       Imaging:  [unfilled]    ASSESSMENT:  Left lower lobe lung nodule      RECOMMENDATIONS:  Out patient PET scan  Biopsy of lung nodule  We will arrange for her to follow-up with Dr. Ang Manriquez in clinic    Lab studies and imaging studies were personally reviewed. Pertinent old records were reviewed from [facility]. Thank you for allowing us to participate in the care of Ms. Home Forrest.               Agapito Goodell, MD  [address] [place] [address]  Office : [phone]  Fax : [phone]

## 2022-12-24 NOTE — PROGRESS NOTES
[facility] Progress Note         Admit Date:  12/21/2022    Today's Date:  12/24/2022    CC:  pancreatitis of unclear etiology    Subjective:     Patient had MRCP yesterday which revealed:   1. The study is moderately limited due to motion artifact. 2.  Mild dilation of the pancreatic and common bile ducts with abrupt tapering   at the pancreatic head. Findings are suspicious for a pancreatic head mass,   although no discrete mass is visualized. Labs pending from today. Pt continues to be anxious to eat regular food. Very chatty about PMH. Is having loose stools but not pathologic sounding    12/23: Patient complaining that she is so hungry but that she is being told that she can't eat as she is to have MRI abdomen sometime this morning. Says she tolerated some clears yesterday with no true increase in pain. She still has some RUQ tenderness. She denies N/V. She has not had a BM since admission. Says passes flatus when she eats something.      Medications:   Current Facility-Administered Medications   Medication Dose Route Frequency    amLODIPine (NORVASC) tablet 10 mg  10 mg Oral Daily    donepezil (ARICEPT) tablet 5 mg  5 mg Oral Nightly    ferrous sulfate (IRON 325) tablet 325 mg  325 mg Oral Daily with breakfast    hydrALAZINE (APRESOLINE) tablet 25 mg  25 mg Oral 3 times per day    losartan (COZAAR) tablet 100 mg  100 mg Oral Daily    pravastatin (PRAVACHOL) tablet 40 mg  40 mg Oral Nightly    predniSONE (DELTASONE) tablet 10 mg  10 mg Oral Daily    sodium chloride flush 0.9 % injection 5-40 mL  5-40 mL IntraVENous 2 times per day    sodium chloride flush 0.9 % injection 5-40 mL  5-40 mL IntraVENous PRN    0.9 % sodium chloride infusion   IntraVENous PRN    heparin (porcine) injection 5,000 Units  5,000 Units SubCUTAneous BID    ondansetron (ZOFRAN-ODT) disintegrating tablet 4 mg  4 mg Oral Q8H PRN    Or    ondansetron (ZOFRAN) injection 4 mg  4 mg IntraVENous Q6H PRN polyethylene glycol (GLYCOLAX) packet 17 g  17 g Oral Daily PRN    acetaminophen (TYLENOL) tablet 650 mg  650 mg Oral Q6H PRN    Or    acetaminophen (TYLENOL) suppository 650 mg  650 mg Rectal Q6H PRN    nicotine (NICODERM CQ) 21 MG/24HR 1 patch  1 patch TransDERmal Daily    HYDROmorphone (DILAUDID) injection 0.5 mg  0.5 mg IntraVENous Q4H PRN       Review of Systems:  ROS was obtained, with pertinent positives as listed above.  No chest pain or SOB.    Diet:  low fat full liquids    Objective:   Vitals:  BP (!) 138/51   Pulse 69   Temp 97.7 °F (36.5 °C) (Oral)   Resp 17   Ht 5' 2\" (1.575 m)   Wt 92 lb 8 oz (42 kg)   SpO2 98%   BMI 16.92 kg/m²   Intake/Output:  No intake/output data recorded.  No intake/output data recorded.  Exam:  General appearance: alert, cooperative, no distress - thin, talkative  Lungs: clear to auscultation bilaterally anteriorly  Heart: regular rate and rhythm  Abdomen: soft, non-tender. Bowel sounds normal. No masses, no organomegaly  Extremities: extremities normal, atraumatic, no cyanosis or edema  Neuro:  alert and oriented    Data Review (Labs):    Recent Labs     12/21/22  1500 12/22/22  0530 12/22/22  1550 12/23/22  0556 12/24/22  0609   WBC 13.1* 7.4 7.9  --   --    HGB 12.0 9.8* 9.6*  --   --    HCT 38.2 31.1* 30.8*  --   --     290 264  --   --    MCV 84.0 85.0 84.4  --   --     140  --  145* 143   K 3.9 3.5  --  3.3* 3.8    113*  --  118* 116*   CO2 22 20*  --  20* 23   BUN 18 11  --  5* 6*   MG  --  1.8  --  1.9  --    AST 8* 9*  --   --   --    ALT 23 16  --   --   --      Ferritin 12/22/22 68     CT chest 9/2018   IMPRESSION:  Unremarkable lungs. Aortocoronary atherosclerosis. Left thyroid nodule, 13mm.       Head/Neck/Thyroid US 9/28/18   IMPRESSION:  1.3 x 2.7 cm nodule left lobe of thyroid. FNA or follow-up in 6-12 months recommended.          EGD with EUS 3/18/20 with Dr. Rafael Macias  Findings:    ENDOSCOPIC FINDINGS:    OROPHARYNX: Cords and  pyriform recesses appear normal.    ESOPHAGUS: The esophagus is normal. The proximal, mid, and distal portions are normal. The Z-Line is intact. STOMACH: Copious bilous fluid and erythematous gastritis was seen in the body and antrum. Biopsies were obtained from the body and antrum for H pylori. A large amount of retained food and vegetable debris was seen in the gastric body along the greater  curvature. Evidence of prior gastroduodenal anastomosis. The examined portion of the small bowel was unremarkable. PANCREAS:  The pancreas is well-visualized from head to tail. In the head of the pancreas there is an 8 x 7 mm round, hypoechoic  lesion. This partially compresses the common bile duct. FNA was performed using a 25-gauge [employer] acquire needle. 2 passes were made and sent for pathology. The main pancreatic duct has a smooth regular cores measuring up to 3 mm in the head,  3 mm in the body and remains mildly dilated at 2 mm and the tail. A single visible sidebranch measuring 3 mm is seen at the junction of the body and tail. BILIARY TREE: The gallbladder is absent. The common bile duct is well-visualized from its insertion in the ampulla to the bifurcation of right and left hepatic ducts. It is mildly dilated measuring up to 10 mm maximally with an incomplete taper  down to the ampulla. There are no intraductal stones, sludge or debris. The ampulla appears normal endosonographically. Impression:     1. Bile gastritis. 2. Gastric bezoar. 3. Small pancreatic head lesion. This may represent a lymph node or neuroendocrine tumor. It is not convincingly worrisome in appearance. Biopsies were obtained. 4. Mild segmental dilatation of the main pancreatic duct was single visible sidebranch could represent early IPMN. This could account for recurrent episodes of pancreatitis. Plan:   1. Follow up results of pathology. 2. Avoid NSAIDs for 48 hours.    3. Further recommendations will be based on pathology results and patient's clinical course. *DIAGNOSIS* * * * * * * * * * * * * * * * * * * * * * * * * * * * * * *          A:  \"GASTRIC BIOPSIES\":  MILD CHRONIC GASTRITIS WITHOUT ACTIVITY; REPARATIVE CHANGES. B:  \"PANCREATIC LESION BIOPSIES\":  NO INTACT TISSUE REMAINING FOLLOWING PROCESSING. MRCP 6/2020   IMPRESSION:   1. Suboptimal assessment of prior pancreatic changes on this noncontrast study. No evolving contour deforming mass is seen. No worrisome secondary findings such   as pancreatic ductal dilation or atrophy are seen. An additional 6 mm T2   hyperintense lesion is seen in the pancreatic CAD appear stable and nonenhancing   on the prior CT study suggesting a cystic lesion. This is also incompletely   characterized on this exam although no clearly worrisome noncontrast features   are seen. A follow-up pancreatic protocol MRI in 6 months would be recommended   for reassessment of these changes. 2.  1.3 cm T2 hyperintense lesion in the superior right lobe of the liver felt to represent a benign    hemangioma as described above. 3. No acute inflammatory changes appreciated on the current examination. CT a/p w IV contrast 9/2022  There is ill-defined fluid noted adjacent to the head of the pancreas anteriorly which appears to extend to the anterior abdominal wall along the fissure for the ligamentum teres. (See image number 27 of series 2.) There is also minimal fluid extension into the right paracolic gutter. This may be associated with pancreatitis. Dilated extrahepatic bile ducts and pancreatic duct may be associated with a reservoir effect secondary to cholecystectomy. Bibasilar atelectasis or infiltrate. Stable fracture of the L1 vertebral body. CT a/p w IV contrast 11/2022  1. Mild fat stranding around the pancreatic head, nonspecific but may be related to acute pancreatitis.  No peripancreatic fluid or pseudocyst. No evidence of pancreatic necrosis. 2. Gallbladder is surgically absent. Biliary ductal dilatation may be secondary to the cholecystectomy. Mild pancreatic duct dilatation, similar to prior exam.     3. No evidence of colitis, diverticulitis or bowel obstruction. No hydronephrosis. CT Chest 12/22/22   There is a new 13 x 11 mm nodule in the superior segment left lower lobe, suspicious for malignancy. PET/CT or tissue sampling recommended. EGD 10/4/22 Small hiatal hernia. S/p billroth gastrojejunostomy. Anastomosis is widely patent     No prior Colonoscopy. Assessment:     Principal Problem:    Acute recurrent pancreatitis  Active Problems:    Chronic pancreatitis (HCC)    Rheumatoid arthritis (HCC)    Unintentional weight loss    Protein-calorie malnutrition, moderate (HCC)    Incidental lung nodule, greater than or equal to 8mm    Hypercholesterolemia    Multiple thyroid nodules    Personal history of tobacco use, presenting hazards to health    HTN (hypertension)    Hypokalemia  Resolved Problems:    * No resolved hospital problems. *    [de-identified] y.o. female with PMH as above- including HTN, tobacco use, RA on Enbrel/current course of steroids, Duodenal ulcer s/p bilroth II, cholecystectomy, recurrent idiopathic pancreatitis, seen in GI consultation 12/22 at the request of Dr. Katja Baumann for recurrent pancreatitis. She has hx recurrent pancreatitis since 2020 of unclear cause and EUS 3/2020 showed small pancreatic head lesion- not convincingly worrisome in appearance and suspected IPMN. Subsequent MRCP 6/2020  with no evolving contour deforming mass see and no worrisome secondary findings such as pancreatic ductal dilation or atrophy. She was to have f/u pancreatic protocol MRI in 6mo but this was not done. During admit 10/2022 for pancreatitis, RUQ pain had EGD with small HH, S/p billroth gastrojejunostomy. Anastomosis is widely patent.   No prior colo though she is scheduled for colonoscopy 12/27/22 as outpatient. Hospitalist here ordered CT chest 12/22 with finding of new 13 x 11 mm nodule in the superior segment left lower lobe, suspicious for malignancy. MRCP here showing pancreatic duct dilation as prior with abrupt tapering at [facility] as prior without clear mass lesion noted     Plan:     Check lipase today to ensure normalization. Given weight loss and possibility of transformation of IPMN to malignancy repeat CA 19-9  Would keep Tuesday appt for colonoscopy given minimal changes on imaging of pancreas  Oncology consulted by primary team given lung lesion and ongoing smoking  Smoking cessation stressed given its a risk factor for her pancreas as well. If she is discharged she should return Tuesday for her oupt colo with Dr Feliberto Newell  We will sign off and please call back if needed.     Jagjit Romero MD  [facility]

## 2022-12-25 PROBLEM — E83.42 HYPOMAGNESEMIA: Status: ACTIVE | Noted: 2018-06-08

## 2022-12-25 LAB
ANION GAP SERPL CALC-SCNC: 7 MMOL/L (ref 2–11)
BUN SERPL-MCNC: 11 MG/DL (ref 8–23)
CALCIUM SERPL-MCNC: 8.5 MG/DL (ref 8.3–10.4)
CHLORIDE SERPL-SCNC: 112 MMOL/L (ref 101–110)
CO2 SERPL-SCNC: 22 MMOL/L (ref 21–32)
CREAT SERPL-MCNC: 0.6 MG/DL (ref 0.6–1)
GLUCOSE SERPL-MCNC: 94 MG/DL (ref 65–100)
LIPASE SERPL-CCNC: 5992 U/L (ref 73–393)
MAGNESIUM SERPL-MCNC: 1.7 MG/DL (ref 1.8–2.4)
POTASSIUM SERPL-SCNC: 3.3 MMOL/L (ref 3.5–5.1)
SODIUM SERPL-SCNC: 141 MMOL/L (ref 133–143)

## 2022-12-25 PROCEDURE — 83690 ASSAY OF LIPASE: CPT

## 2022-12-25 PROCEDURE — 86301 IMMUNOASSAY TUMOR CA 19-9: CPT

## 2022-12-25 PROCEDURE — 6360000002 HC RX W HCPCS: Performed by: INTERNAL MEDICINE

## 2022-12-25 PROCEDURE — 6370000000 HC RX 637 (ALT 250 FOR IP): Performed by: INTERNAL MEDICINE

## 2022-12-25 PROCEDURE — 80048 BASIC METABOLIC PNL TOTAL CA: CPT

## 2022-12-25 PROCEDURE — 1100000000 HC RM PRIVATE

## 2022-12-25 PROCEDURE — 36415 COLL VENOUS BLD VENIPUNCTURE: CPT

## 2022-12-25 PROCEDURE — 2580000003 HC RX 258: Performed by: INTERNAL MEDICINE

## 2022-12-25 PROCEDURE — 6370000000 HC RX 637 (ALT 250 FOR IP): Performed by: HOSPITALIST

## 2022-12-25 PROCEDURE — 83735 ASSAY OF MAGNESIUM: CPT

## 2022-12-25 RX ORDER — MAGNESIUM SULFATE IN WATER 40 MG/ML
2000 INJECTION, SOLUTION INTRAVENOUS ONCE
Status: COMPLETED | OUTPATIENT
Start: 2022-12-25 | End: 2022-12-25

## 2022-12-25 RX ADMIN — HYDRALAZINE HYDROCHLORIDE 25 MG: 25 TABLET ORAL at 05:55

## 2022-12-25 RX ADMIN — HYDRALAZINE HYDROCHLORIDE 25 MG: 25 TABLET ORAL at 13:01

## 2022-12-25 RX ADMIN — SODIUM CHLORIDE, PRESERVATIVE FREE 10 ML: 5 INJECTION INTRAVENOUS at 21:43

## 2022-12-25 RX ADMIN — PANTOPRAZOLE SODIUM 40 MG: 40 TABLET, DELAYED RELEASE ORAL at 09:27

## 2022-12-25 RX ADMIN — MAGNESIUM SULFATE HEPTAHYDRATE 2000 MG: 40 INJECTION, SOLUTION INTRAVENOUS at 09:51

## 2022-12-25 RX ADMIN — HEPARIN SODIUM 5000 UNITS: 5000 INJECTION INTRAVENOUS; SUBCUTANEOUS at 21:30

## 2022-12-25 RX ADMIN — FERROUS SULFATE TAB 325 MG (65 MG ELEMENTAL FE) 325 MG: 325 (65 FE) TAB at 09:29

## 2022-12-25 RX ADMIN — HEPARIN SODIUM 5000 UNITS: 5000 INJECTION INTRAVENOUS; SUBCUTANEOUS at 09:29

## 2022-12-25 RX ADMIN — DONEPEZIL HYDROCHLORIDE 5 MG: 5 TABLET, FILM COATED ORAL at 21:32

## 2022-12-25 RX ADMIN — PRAVASTATIN SODIUM 40 MG: 80 TABLET ORAL at 21:30

## 2022-12-25 RX ADMIN — LOSARTAN POTASSIUM 100 MG: 50 TABLET, FILM COATED ORAL at 09:28

## 2022-12-25 RX ADMIN — SODIUM CHLORIDE: 9 INJECTION, SOLUTION INTRAVENOUS at 05:55

## 2022-12-25 RX ADMIN — HYDROMORPHONE HYDROCHLORIDE 0.5 MG: 1 INJECTION, SOLUTION INTRAMUSCULAR; INTRAVENOUS; SUBCUTANEOUS at 09:40

## 2022-12-25 RX ADMIN — PREDNISONE 10 MG: 10 TABLET ORAL at 09:27

## 2022-12-25 RX ADMIN — SODIUM CHLORIDE, PRESERVATIVE FREE 10 ML: 5 INJECTION INTRAVENOUS at 09:51

## 2022-12-25 RX ADMIN — HYDROMORPHONE HYDROCHLORIDE 0.5 MG: 1 INJECTION, SOLUTION INTRAMUSCULAR; INTRAVENOUS; SUBCUTANEOUS at 17:35

## 2022-12-25 RX ADMIN — HYDRALAZINE HYDROCHLORIDE 25 MG: 25 TABLET ORAL at 21:30

## 2022-12-25 RX ADMIN — AMLODIPINE BESYLATE 10 MG: 10 TABLET ORAL at 09:29

## 2022-12-25 RX ADMIN — SODIUM CHLORIDE: 9 INJECTION, SOLUTION INTRAVENOUS at 21:36

## 2022-12-25 RX ADMIN — POTASSIUM BICARBONATE 40 MEQ: 782 TABLET, EFFERVESCENT ORAL at 09:29

## 2022-12-25 RX ADMIN — PANTOPRAZOLE SODIUM 40 MG: 40 TABLET, DELAYED RELEASE ORAL at 21:30

## 2022-12-25 ASSESSMENT — PAIN SCALES - GENERAL
PAINLEVEL_OUTOF10: 0
PAINLEVEL_OUTOF10: 7
PAINLEVEL_OUTOF10: 7

## 2022-12-25 ASSESSMENT — PAIN DESCRIPTION - LOCATION
LOCATION: ABDOMEN
LOCATION: ABDOMEN

## 2022-12-25 ASSESSMENT — PAIN DESCRIPTION - DESCRIPTORS: DESCRIPTORS: SHARP

## 2022-12-25 NOTE — PROGRESS NOTES
Hospitalist Progress Note   Admit Date:  2022  5:38 PM   Name:  Clay Hackett   Age:  [de-identified] y.o. Sex:  female  :  1940   MRN:  [id number]   Room:  1/    Presenting Complaint: Abdominal Pain     Reason(s) for Admission: Acute recurrent pancreatitis [K85.90]  Acute pancreatitis, unspecified complication status, unspecified pancreatitis type [facility] Course:     Please refer to the admission H&P for details of presentation. In summary, Clay Hackett is a [de-identified] y.o. female with medical history significant for  duodenal ulcer complicated by perforation requiring bilroth II,  idiopathic pancreatitis, rheumatoid arthritis on enbrel/ current course of steroids, HTN, ongoing tobacco use who is evaluated with acute onset of mid abdominal pain to her back. Per records, she was discharged on 2022 for recurrent pancreatitis and was seen by GI at that time. MRCP with mild dilatation of the pancreatic and common bile duct with abrupt tapering at the pancreatic head suspicious for pancreatic head mass. GI was consulted and is planned for outpatient colonoscopy on . Given patient's weight loss and smoking history, CT chest was done which shows a 13 mm x 11 mm left lower lobe nodule. Patient will need to follow-up with oncology for further work-up and biopsy. Subjective/24 hr Events (22) : Patient is seen and examined at bedside. No acute events reported overnight by nursing staff. Reports worsening abdominal pain after eating yesterday. Reports that she had lasagna for dinner. Had watery bowel movements since yesterday lunch. Agreeable to changing diet back to clear liquids. Daughter is at bedside. Discussed with daughter regarding patient's condition and malignancy workup . Patient denies fever, chills, chest pains, shortness of breath.       Review of Systems: 10 point review of systems is otherwise negative with the exception of the elements mentioned above. Assessment & Plan:   Acute recurrent pancreatitis in setting of idiopathic pancreatitis  - MRCP result noted. GI plan for outpatient colonoscopy which she already has appointment on 12/27.  12/25/22: Pain is worse and had diarrhea after Lunch and dinner. No BM this AM.  - change diet back to clear liquids  - IVF  - pain regimen  - check stool studies for diarrhea    Lung Nodule  [id number] LLL Nodule. Concerning for malignancy as patient is a daily smoker with symptoms of unintentional weight loss and night sweats.  -Oncology recs appreciated. - IR Consult for lung biopsy  - will need follow up with  and outpatient PET    Rheumatoid arthritis  - on prednisone 10mg daily. Hypokalemia and hypomagnesemia  - replace with IV mag and PO Kcl      Unintentional Weight loss  Moderate protein calorie malnutrition  Thin female with 23lb unintentional weight loss  - nutrition consult  - CT showed lung nodule. Thyroid Nodule  CT chest in 2018 with 1.3cm thyroid nodule  TSH low with normal T4 and normal T3.  - continue outpatient followup /surveillance. Tobacco Dependence  - nicotine patch    HTN // HLD : continue with home medications    Diet:  ADULT ORAL NUTRITION SUPPLEMENT; Breakfast, Lunch, Dinner; Standard High Calorie/High Protein Oral Supplement  ADULT DIET; Clear Liquid  DVT PPx: heparin  Code status: Full Code      I have personally reviewed and ordered clinical lab tests and independently visualized images, tracing. I spent 40 minutes of time caring for this patient at bedside or nearby, and more than 50 percent of which was spent on coordination of care and/or patient/family counseling regarding the disease process, status , and treatment options/plan of care. Discussed with pt at length regarding her diet and advancement. Also discussed with daughter at bedside.        Hospital Problems:  Principal Problem:    Acute recurrent pancreatitis  Active Problems: Chronic pancreatitis (HCC)    Rheumatoid arthritis ([facility] [address]    Unintentional weight loss    Protein-calorie malnutrition, moderate (HCC)    Lung nodule    Hypercholesterolemia    Multiple thyroid nodules    Hypomagnesemia    Personal history of tobacco use, presenting hazards to health    HTN (hypertension)    Hypokalemia  Resolved Problems:    * No resolved hospital problems. *      Objective:   Patient Vitals for the past 24 hrs:   Temp Pulse Resp BP SpO2   12/25/22 1132 98.1 °F (36.7 °C) 74 17 (!) 153/61 94 %   12/25/22 0940 -- -- 20 -- --   12/25/22 0811 97.9 °F (36.6 °C) 80 17 (!) 147/56 94 %   12/25/22 0452 97.7 °F (36.5 °C) 97 18 (!) 142/71 96 %   12/24/22 2351 97.9 °F (36.6 °C) 78 18 (!) 156/73 98 %   12/24/22 2047 -- -- 17 -- --   12/24/22 2015 -- -- 16 -- --   12/24/22 1919 98.1 °F (36.7 °C) 98 16 (!) 152/73 98 %   12/24/22 1625 97.9 °F (36.6 °C) 77 17 (!) 150/61 98 %   12/24/22 1325 -- -- -- (!) 143/54 --   12/24/22 1247 97.5 °F (36.4 °C) 66 18 (!) 143/54 99 %       Oxygen Therapy  SpO2: 94 %  O2 Device: None (Room air)    Estimated body mass index is 16.92 kg/m² as calculated from the following:    Height as of this encounter: 5' 2\" (1.575 m). Weight as of this encounter: 92 lb 8 oz (42 kg). No intake or output data in the 24 hours ending 12/25/22 1148        Physical Exam:     Blood pressure (!) 153/61, pulse 74, temperature 98.1 °F (36.7 °C), resp. rate 17, height 5' 2\" (1.575 m), weight 92 lb 8 oz (42 kg), SpO2 94 %. General:    Thin, chronically ill appearing. Head:  Normocephalic, atraumatic  Eyes:  Sclerae appear normal.  Pupils equally round. ENT:  Nares appear normal, no drainage. Moist oral mucosa  Neck:  No restricted ROM. Trachea midline   CV:   RRR. No m/r/g. No jugular venous distension. Lungs:   CTAB. No wheezing. Symmetric expansion. Abdomen: Bowel sounds present. Soft, slightly sore on palpation of epigastric region, nondistended. Extremities: No cyanosis or clubbing. No edema  Skin:     No rashes and normal coloration. Warm and dry. Neuro:  CN II-XII grossly intact. A&Ox3  Psych:  Normal mood and affect. I have personally reviewed labs and tests showing:  Recent Labs:  Recent Results (from the past 48 hour(s))   Basic Metabolic Panel w/ Reflex to MG    Collection Time: 12/24/22  6:09 AM   Result Value Ref Range    Sodium 143 133 - 143 mmol/L    Potassium 3.8 3.5 - 5.1 mmol/L    Chloride 116 (H) 101 - 110 mmol/L    CO2 23 21 - 32 mmol/L    Anion Gap 4 2 - 11 mmol/L    Glucose 83 65 - 100 mg/dL    BUN 6 (L) 8 - 23 MG/DL    Creatinine 0.60 0.6 - 1.0 MG/DL    Est, Glom Filt Rate >60 >60 ml/min/1.73m2    Calcium 8.3 8.3 - 10.4 MG/DL   Lipase    Collection Time: 12/24/22  6:09 AM   Result Value Ref Range    Lipase 1,583 (H) 73 - 393 U/L   Basic Metabolic Panel w/ Reflex to MG    Collection Time: 12/25/22  5:13 AM   Result Value Ref Range    Sodium 141 133 - 143 mmol/L    Potassium 3.3 (L) 3.5 - 5.1 mmol/L    Chloride 112 (H) 101 - 110 mmol/L    CO2 22 21 - 32 mmol/L    Anion Gap 7 2 - 11 mmol/L    Glucose 94 65 - 100 mg/dL    BUN 11 8 - 23 MG/DL    Creatinine 0.60 0.6 - 1.0 MG/DL    Est, Glom Filt Rate >60 >60 ml/min/1.73m2    Calcium 8.5 8.3 - 10.4 MG/DL   Magnesium    Collection Time: 12/25/22  5:13 AM   Result Value Ref Range    Magnesium 1.7 (L) 1.8 - 2.4 mg/dL       I have personally reviewed imaging studies showing: Other Studies:  MRI ABDOMEN W WO CONTRAST   Final Result   1. The study is moderately limited due to motion artifact. 2.  Mild dilation of the pancreatic and common bile ducts with abrupt tapering   at the pancreatic head. Findings are suspicious for a pancreatic head mass,   although no discrete mass is visualized. CT CHEST WO CONTRAST   Final Result   There is a new 13 x 11 mm nodule in the superior segment left lower   lobe, suspicious for malignancy. PET/CT or tissue sampling recommended.                    Current Meds:  Current Facility-Administered Medications   Medication Dose Route Frequency    magnesium sulfate 2000 mg in 50 mL IVPB premix  2,000 mg IntraVENous Once    potassium bicarb-citric acid (EFFER-K) effervescent tablet 40 mEq  40 mEq Oral Daily    0.9 % sodium chloride infusion   IntraVENous Continuous    pantoprazole (PROTONIX) tablet 40 mg  40 mg Oral BID    amLODIPine (NORVASC) tablet 10 mg  10 mg Oral Daily    donepezil (ARICEPT) tablet 5 mg  5 mg Oral Nightly    ferrous sulfate (IRON 325) tablet 325 mg  325 mg Oral Daily with breakfast    hydrALAZINE (APRESOLINE) tablet 25 mg  25 mg Oral 3 times per day    losartan (COZAAR) tablet 100 mg  100 mg Oral Daily    pravastatin (PRAVACHOL) tablet 40 mg  40 mg Oral Nightly    predniSONE (DELTASONE) tablet 10 mg  10 mg Oral Daily    sodium chloride flush 0.9 % injection 5-40 mL  5-40 mL IntraVENous 2 times per day    sodium chloride flush 0.9 % injection 5-40 mL  5-40 mL IntraVENous PRN    0.9 % sodium chloride infusion   IntraVENous PRN    heparin (porcine) injection 5,000 Units  5,000 Units SubCUTAneous BID    ondansetron (ZOFRAN-ODT) disintegrating tablet 4 mg  4 mg Oral Q8H PRN    Or    ondansetron (ZOFRAN) injection 4 mg  4 mg IntraVENous Q6H PRN    polyethylene glycol (GLYCOLAX) packet 17 g  17 g Oral Daily PRN    acetaminophen (TYLENOL) tablet 650 mg  650 mg Oral Q6H PRN    Or    acetaminophen (TYLENOL) suppository 650 mg  650 mg Rectal Q6H PRN    nicotine (NICODERM CQ) 21 MG/24HR 1 patch  1 patch TransDERmal Daily    HYDROmorphone (DILAUDID) injection 0.5 mg  0.5 mg IntraVENous Q4H PRN       Signed:  Kenya Morgan MD    Part of this note may have been written by using a voice dictation software. The note has been proof read but may still contain some grammatical/other typographical errors.

## 2022-12-26 LAB
ANION GAP SERPL CALC-SCNC: 5 MMOL/L (ref 2–11)
BUN SERPL-MCNC: 7 MG/DL (ref 8–23)
CALCIUM SERPL-MCNC: 8.1 MG/DL (ref 8.3–10.4)
CANCER AG19-9 SERPL-ACNC: 6.7 U/ML (ref 2–37)
CHLORIDE SERPL-SCNC: 110 MMOL/L (ref 101–110)
CO2 SERPL-SCNC: 25 MMOL/L (ref 21–32)
CREAT SERPL-MCNC: 0.7 MG/DL (ref 0.6–1)
GLUCOSE SERPL-MCNC: 135 MG/DL (ref 65–100)
MAGNESIUM SERPL-MCNC: 2.1 MG/DL (ref 1.8–2.4)
POTASSIUM SERPL-SCNC: 3.3 MMOL/L (ref 3.5–5.1)
SODIUM SERPL-SCNC: 140 MMOL/L (ref 133–143)

## 2022-12-26 PROCEDURE — 2580000003 HC RX 258: Performed by: INTERNAL MEDICINE

## 2022-12-26 PROCEDURE — 36415 COLL VENOUS BLD VENIPUNCTURE: CPT

## 2022-12-26 PROCEDURE — 6360000002 HC RX W HCPCS: Performed by: INTERNAL MEDICINE

## 2022-12-26 PROCEDURE — 6370000000 HC RX 637 (ALT 250 FOR IP): Performed by: HOSPITALIST

## 2022-12-26 PROCEDURE — 80048 BASIC METABOLIC PNL TOTAL CA: CPT

## 2022-12-26 PROCEDURE — 6370000000 HC RX 637 (ALT 250 FOR IP): Performed by: INTERNAL MEDICINE

## 2022-12-26 PROCEDURE — 1100000000 HC RM PRIVATE

## 2022-12-26 PROCEDURE — 83735 ASSAY OF MAGNESIUM: CPT

## 2022-12-26 RX ORDER — DOCUSATE SODIUM 100 MG/1
100 CAPSULE, LIQUID FILLED ORAL DAILY
Status: DISCONTINUED | OUTPATIENT
Start: 2022-12-26 | End: 2022-12-30 | Stop reason: HOSPADM

## 2022-12-26 RX ADMIN — PRAVASTATIN SODIUM 40 MG: 80 TABLET ORAL at 20:18

## 2022-12-26 RX ADMIN — HYDRALAZINE HYDROCHLORIDE 25 MG: 25 TABLET ORAL at 20:18

## 2022-12-26 RX ADMIN — PANTOPRAZOLE SODIUM 40 MG: 40 TABLET, DELAYED RELEASE ORAL at 09:50

## 2022-12-26 RX ADMIN — SODIUM CHLORIDE, PRESERVATIVE FREE 10 ML: 5 INJECTION INTRAVENOUS at 20:20

## 2022-12-26 RX ADMIN — HYDROMORPHONE HYDROCHLORIDE 0.5 MG: 1 INJECTION, SOLUTION INTRAMUSCULAR; INTRAVENOUS; SUBCUTANEOUS at 10:04

## 2022-12-26 RX ADMIN — HYDRALAZINE HYDROCHLORIDE 25 MG: 25 TABLET ORAL at 14:13

## 2022-12-26 RX ADMIN — SODIUM CHLORIDE: 9 INJECTION, SOLUTION INTRAVENOUS at 14:12

## 2022-12-26 RX ADMIN — POTASSIUM BICARBONATE 40 MEQ: 782 TABLET, EFFERVESCENT ORAL at 09:50

## 2022-12-26 RX ADMIN — AMLODIPINE BESYLATE 10 MG: 10 TABLET ORAL at 09:50

## 2022-12-26 RX ADMIN — HYDROMORPHONE HYDROCHLORIDE 0.5 MG: 1 INJECTION, SOLUTION INTRAMUSCULAR; INTRAVENOUS; SUBCUTANEOUS at 20:19

## 2022-12-26 RX ADMIN — SODIUM CHLORIDE: 9 INJECTION, SOLUTION INTRAVENOUS at 23:17

## 2022-12-26 RX ADMIN — DONEPEZIL HYDROCHLORIDE 5 MG: 5 TABLET, FILM COATED ORAL at 20:18

## 2022-12-26 RX ADMIN — FERROUS SULFATE TAB 325 MG (65 MG ELEMENTAL FE) 325 MG: 325 (65 FE) TAB at 09:50

## 2022-12-26 RX ADMIN — PANTOPRAZOLE SODIUM 40 MG: 40 TABLET, DELAYED RELEASE ORAL at 20:18

## 2022-12-26 RX ADMIN — POTASSIUM BICARBONATE 40 MEQ: 782 TABLET, EFFERVESCENT ORAL at 14:15

## 2022-12-26 RX ADMIN — SODIUM CHLORIDE, PRESERVATIVE FREE 10 ML: 5 INJECTION INTRAVENOUS at 09:52

## 2022-12-26 RX ADMIN — PREDNISONE 10 MG: 10 TABLET ORAL at 09:50

## 2022-12-26 RX ADMIN — LOSARTAN POTASSIUM 100 MG: 50 TABLET, FILM COATED ORAL at 10:14

## 2022-12-26 ASSESSMENT — PAIN SCALES - GENERAL
PAINLEVEL_OUTOF10: 0
PAINLEVEL_OUTOF10: 7

## 2022-12-26 ASSESSMENT — PAIN DESCRIPTION - LOCATION: LOCATION: ABDOMEN

## 2022-12-26 NOTE — CONSULTS
[facility] Consult Note       Primary GI Physician: Dr. Neil Pretty    Referring Provider:  Dr. Oriana Garland Date:  12/26/2022    Admit Date:  12/21/2022    Chief Complaint: pancreatitis of unclear etiology    Subjective:     History of Present Illness:  Patient is a [de-identified] y.o. female with PMH including but not limited to, HTN, tobacco usage, RA on Enbrel/steroids, duodenal ulcer s/p Bilroth II, cholecystectomy, idiopathic pancreatitis who is seen in re-consultation at the request of Dr. Marcelo Matos for pancreatitis. she has been followed this admission by Dr. Cody Brennan. She was last evaluated on 12/24. Most recent Lipase 5,992, was down to 1583. Developed diarrhea after eating lasagne 2 days ago. She has not had a BM since. she is back on a clear liquid diet. Her pain has increased now more to the left of her umbilicus. She denies any N&V. Stool studies pending. Has outpatient colon scheduled tomorrow. EUS 3/2020 showed small pancreatic head lesion- not convincingly worrisome in appearance and suspected IPMN. During admit 10/2022 for pancreatitis, RUQ pain had EGD with small HH, S/p billroth gastrojejunostomy. Anastomosis is widely patent. No prior colo though she is scheduled for colonoscopy 12/27/22 as outpatient. Hospitalist here ordered CT chest 12/22 with finding of new 13 x 11 mm nodule in the superior segment left lower lobe, suspicious for malignancy. Oncology has evaluated and plans for biopsy, PET scan and outpatient follow up. MRCP 12/23/2022 here showing pancreatic duct dilation as prior with abrupt tapering at [facility] as prior without clear mass lesion noted. CA 19.9 normal 6/7 on 12/25/22.     PMH:  Past Medical History:   Diagnosis Date    EPIFANIO (acute kidney injury) ([address] 12/04/2014    pt denies this dx    Arthritis     RA-abdelrahman hands    Back pain 6/10/2016    Breast cancer ([address] 2018    Breast lump dx 2/2018    left    Bronchitis     Chronic obstructive pulmonary disease ([address] Discharge from the vagina     Dysuria     Eczema 6/10/2016    Fungal dermatitis     Headache 6/10/2016    Hypercholesterolemia 12/4/2014    Hypertension     not well controlled--per pt    Intractable vomiting 5/20/2018    Menopausal vaginal dryness     Osteoarthritis 6/10/2016    Osteoporosis 6/10/2016    Pancreatitis     2 episodes    PUD (peptic ulcer disease)     last 2003 which a rupture an extensive surgery    Sepsis ([address] 12/4/2014    Thyroid nodule     Tobacco abuse     1ppd x 49 yrs       PSH:  Past Surgical History:   Procedure Laterality Date    APPENDECTOMY  1977    with hysterectomy    BREAST BIOPSY Left 1975    BREAST LUMPECTOMY Left 2/22/2018    LEFT BREAST LUMPECTOMY/ SENTINEL NODE BIOPSY performed by Cielo Shannon MD at [facility] [address] Left 1975    breast lumpectomy, benign  (left)    CATARACT REMOVAL Bilateral 2018    w/IOL    CHOLECYSTECTOMY      HYSTERECTOMY (CERVIX STATUS UNKNOWN)  1977    OTHER SURGICAL HISTORY      hernicolectomy 2 cm    MN ABDOMEN SURGERY PROC UNLISTED  2003    stomach surgery for ruptured ulcer and extensive rerouting of intestestines per patient     [address]  05/21/2018    empiric dilation    UPPER GASTROINTESTINAL ENDOSCOPY      UPPER GASTROINTESTINAL ENDOSCOPY N/A 10/4/2022    EGD ESOPHAGOGASTRODUODENOSCOPY/  performed by Marilynn Mccloud MD at [address] [place] [address] LEFT Left 1/31/2018    US BREAST NEEDLE BIOPSY LEFT 1/31/2018 [facility]       Allergies: Allergies   Allergen Reactions    Azithromycin Other (See Comments)     pancreatitis    Codeine Nausea And Vomiting       Home Medications:  Prior to Admission medications    Medication Sig Start Date End Date Taking? Authorizing Provider   predniSONE (DELTASONE) 20 MG tablet Take 1 pill once a day after breakfast for 10 days then 1/2 a pill once a day after breakfast for 10 days and then stop.  12/6/22   [facility], MD   Etanercept (ENBREL SURECLICK) 50 MG/ML SOAJ Inject 50 mg into the skin every 7 days 10/25/22   Getachew Sanchez MD   potassium chloride (KLOR-CON M) 20 MEQ extended release tablet Take 1 tablet by mouth 2 times daily  Patient not taking: Reported on 12/21/2022 10/13/22   Alida Maurer MD   ferrous sulfate (IRON 325) 325 (65 Fe) MG tablet Take 1 tablet by mouth daily (with breakfast)  Patient not taking: Reported on 12/21/2022 10/13/22   Alida Maurer MD   donepezil (ARICEPT) 5 MG tablet Take 1 tablet by mouth nightly 10/12/22   Alida Maurer MD   losartan (COZAAR) 100 MG tablet Take 1 tablet by mouth daily 10/12/22   Alida Maurer MD   amLODIPine (NORVASC) 10 MG tablet Take 1 tablet by mouth daily 10/12/22   Alida Maurer MD   hydrALAZINE (APRESOLINE) 25 MG tablet Take 1 tablet by mouth every 8 hours 10/12/22   Alida Maurer MD   alendronate (FOSAMAX) 70 MG tablet Take 1 tablet by mouth every 7 days 10/12/22   Alida Maurer MD   pravastatin (PRAVACHOL) 40 MG tablet Take 1 tablet by mouth daily 10/12/22   Alida Maurer MD   ondansetron (ZOFRAN-ODT) 4 MG disintegrating tablet Take 1 tablet by mouth every 8 hours as needed for Nausea or Vomiting 10/5/22   Topher Sotelo MD       Hospital Medications:  Current Facility-Administered Medications   Medication Dose Route Frequency    potassium bicarb-citric acid (EFFER-K) effervescent tablet 40 mEq  40 mEq Oral Daily    0.9 % sodium chloride infusion   IntraVENous Continuous    pantoprazole (PROTONIX) tablet 40 mg  40 mg Oral BID    amLODIPine (NORVASC) tablet 10 mg  10 mg Oral Daily    donepezil (ARICEPT) tablet 5 mg  5 mg Oral Nightly    ferrous sulfate (IRON 325) tablet 325 mg  325 mg Oral Daily with breakfast    hydrALAZINE (APRESOLINE) tablet 25 mg  25 mg Oral 3 times per day    losartan (COZAAR) tablet 100 mg  100 mg Oral Daily    pravastatin (PRAVACHOL) tablet 40 mg  40 mg Oral Nightly    predniSONE (DELTASONE) tablet 10 mg  10 mg Oral Daily    sodium chloride flush 0.9 % injection 5-40 mL  5-40 mL IntraVENous 2 times per day    sodium chloride flush 0.9 % injection 5-40 mL  5-40 mL IntraVENous PRN    0.9 % sodium chloride infusion   IntraVENous PRN    ondansetron (ZOFRAN-ODT) disintegrating tablet 4 mg  4 mg Oral Q8H PRN    Or    ondansetron (ZOFRAN) injection 4 mg  4 mg IntraVENous Q6H PRN    polyethylene glycol (GLYCOLAX) packet 17 g  17 g Oral Daily PRN    acetaminophen (TYLENOL) tablet 650 mg  650 mg Oral Q6H PRN    Or    acetaminophen (TYLENOL) suppository 650 mg  650 mg Rectal Q6H PRN    nicotine (NICODERM CQ) 21 MG/24HR 1 patch  1 patch TransDERmal Daily    HYDROmorphone (DILAUDID) injection 0.5 mg  0.5 mg IntraVENous Q4H PRN       Social History:  Social History     Tobacco Use    Smoking status: Every Day     Packs/day: 0.10     Types: Cigarettes     Start date: 5/6/1966    Smokeless tobacco: Never   Substance Use Topics    Alcohol use: No       Pt denies any history of drug use, blood transfusions, or tattoos. Family History:  Family History   Problem Relation Age of Onset    Thyroid Cancer Neg Hx     No Known Problems Paternal Grandfather     No Known Problems Paternal Grandmother     No Known Problems Maternal Grandfather     No Known Problems Maternal Grandmother     Hypertension Other         Brother and sister with htn    No Known Problems Brother     Hypertension Mother     Heart Disease Brother     Heart Disease Sister     Breast Cancer Sister 79    Cancer Sister     Heart Disease Father     Heart Attack Father     Cancer Brother         prostate    Diabetes Sister     Heart Disease Sister        Review of Systems:  A detailed 10 system ROS is obtained, with pertinent positives as listed above. All others are negative.     Diet:  clear liquid    Objective:     Physical Exam:  Vitals:  BP (!) 147/62   Pulse 87   Temp 97.9 °F (36.6 °C) (Oral)   Resp 19   Ht 5' 2\" (1.575 m)   Wt 87 lb 1.3 oz (39.5 kg)   SpO2 97%   BMI 15.93 kg/m²   Gen:  Pt is alert, cooperative, no acute distress  Skin:  Extremities and face reveal no rashes. HEENT: Sclerae anicteric. Extra-occular muscles are intact. No oral ulcers. No abnormal pigmentation of the lips. The neck is supple. Cardiovascular: Regular rate and rhythm. No murmurs, gallops, or rubs. Respiratory:  Comfortable breathing with no accessory muscle use. Clear breath sounds anteriorly with no wheezes, rales, or rhonchi. GI:  Abdomen nondistended, soft, with periumbilical tenderness. Normal active bowel sounds. No enlargement of the liver or spleen. No masses palpable. Musculoskeletal:  No pitting edema of the lower legs. Neurological:  Gross memory appears intact. Patient is alert and oriented. Psychiatric:  Mood appears appropriate with judgement intact. Lymphatic:  No cervical or supraclavicular adenopathy. Laboratory:    Recent Labs     12/24/22  0609 12/25/22  0513 12/25/22  1335 12/26/22  0835    141  --  140   K 3.8 3.3*  --  3.3*   * 112*  --  110   CO2 23 22  --  25   BUN 6* 11  --  7*   CREATININE 0.60 0.60  --  0.70   CALCIUM 8.3 8.5  --  8.1*   MG  --  1.7*  --  2.1   GLUCOSE 83 94  --  135*   LIPASE 1,583*  --  5,992*  --          Assessment:     Principal Problem:    Acute recurrent pancreatitis  Active Problems:    Chronic pancreatitis (HCC)    Rheumatoid arthritis (HCC)    Unintentional weight loss    Protein-calorie malnutrition, moderate (HCC)    Lung nodule    Hypercholesterolemia    Multiple thyroid nodules    Hypomagnesemia    Personal history of tobacco use, presenting hazards to health    HTN (hypertension)    Hypokalemia  Resolved Problems:    * No resolved hospital problems. *  [de-identified] y.o. female with PMH including but not limited to, HTN, tobacco usage, RA on Enbrel/steroids, duodenal ulcer s/p Bilroth II, cholecystectomy, idiopathic pancreatitis who is seen in re-consultation at the request of Dr. Moses Chambers for pancreatitis.     MRCP 12/23/2022 here showing pancreatic duct dilation as prior with abrupt tapering at [facility] as prior without clear mass lesion noted. CA 19.9 normal 6.7 on 12/25/22. Lipase has increased again with worsening pain after eating. Plan:     -We will cancel outpatient colonoscopy for tomorrow  -Plan for inpatient EUS this week if possible. Will contact the office in the morning.  -Agree with clear liquid diet  -continue IVF  -Repeat lipase tomorrow  -Agree with bid PPI  -Await stool studies, but doubt positive with no BM in 2 days.   -Colace daily    Marlene Galeano. [address]   [facility]   Patient is seen and examined in collaboration with Dr. Cristine Arnett. Assessment and plan as per Dr. Cristine Arnett. ATTENDING NOTE:  I have seen and examined the patient along with my NP/PA. The assessment and plan above is my own. May be helpful to repeat EUS.     Melida Dickson MD

## 2022-12-26 NOTE — CARE COORDINATION
CM following patient's chart. OT evaluated patient and states no additional therapy needed. PT evaluated patient and recommended Outpatient therapy. CM discussed recommendation for outpatient therapy with patient. Patient states, 'I'll worry about that when the time comes\". CM did explain to patient that if she discharged from the hospital without it, we can no longer assist with referral and she would have to contact her PCP  . Patient voiced understanding. CM will confirm again on the day of discharge however, patient appears to not be interested.

## 2022-12-26 NOTE — PROGRESS NOTES
Hospitalist Progress Note   Admit Date:  2022  5:38 PM   Name:  Bandar Flowers   Age:  [de-identified] y.o. Sex:  female  :  1940   MRN:  [id number]   Room:  1/    Presenting Complaint: Abdominal Pain     Reason(s) for Admission: Acute recurrent pancreatitis [K85.90]  Acute pancreatitis, unspecified complication status, unspecified pancreatitis type SPECIALTY [facility] Course:     Please refer to the admission H&P for details of presentation. In summary, Bandar Flowers is a [de-identified] y.o. female with medical history significant for  duodenal ulcer complicated by perforation requiring bilroth II,  idiopathic pancreatitis, rheumatoid arthritis on enbrel/ current course of steroids, HTN, ongoing tobacco use who is evaluated with acute onset of mid abdominal pain to her back. Per records, she was discharged on 2022 for recurrent pancreatitis and was seen by GI at that time. MRCP with mild dilatation of the pancreatic and common bile duct with abrupt tapering at the pancreatic head suspicious for pancreatic head mass. GI was consulted and is planned for outpatient colonoscopy on . Given patient's weight loss and smoking history, CT chest was done which shows a 13 mm x 11 mm left lower lobe nodule. Patient will need to follow-up with oncology for further work-up and biopsy. Subjective/24 hr Events (22) : Patient is seen and examined at bedside. No acute events reported overnight by nursing staff. Reports improvement in pain but still present when eating/drinking. Tolerating CLDs. No diarrhea for 24hrs. Daughter is at bedside. Patient denies fever, chills, chest pains, shortness of breath. Review of Systems: 10 point review of systems is otherwise negative with the exception of the elements mentioned above. Assessment & Plan:   Acute recurrent pancreatitis in setting of idiopathic pancreatitis  - MRCP result noted.   GI plan for outpatient colonoscopy which she already has appointment on 12/27.  12/26/22: Pain persists but improved. - continue with CLDs  - IVF  - pain regimen  - GI reconsulted for planned colonoscopy as patient is still in house. Lung Nodule  [id number] LLL Nodule. Concerning for malignancy as patient is a daily smoker with symptoms of unintentional weight loss and night sweats.  -Oncology recs appreciated. - IR Consult for lung biopsy  - will need follow up with  and outpatient PET    Rheumatoid arthritis  - on prednisone 10mg daily. Hypokalemia and hypomagnesemia  K+ still at 3.3 today  - replace with  PO Kcl x2    Unintentional Weight loss  Moderate protein calorie malnutrition  Thin female with 23lb unintentional weight loss  - nutrition consult  - CT showed lung nodule. Thyroid Nodule  CT chest in 2018 with 1.3cm thyroid nodule  TSH low with normal T4 and normal T3.  - continue outpatient followup /surveillance. Tobacco Dependence  - nicotine patch    HTN // HLD : continue with home medications    Diet:  ADULT ORAL NUTRITION SUPPLEMENT; Breakfast, Lunch, Dinner; Standard High Calorie/High Protein Oral Supplement  ADULT DIET; Clear Liquid  DVT PPx: heparin  Code status: Full Code      I have personally reviewed and ordered clinical lab tests and independently visualized images, tracing. Hospital Problems:  Principal Problem:    Acute recurrent pancreatitis  Active Problems:    Chronic pancreatitis (HCC)    Rheumatoid arthritis ([address]    Unintentional weight loss    Protein-calorie malnutrition, moderate (HCC)    Lung nodule    Hypercholesterolemia    Multiple thyroid nodules    Hypomagnesemia    Personal history of tobacco use, presenting hazards to health    HTN (hypertension)    Hypokalemia  Resolved Problems:    * No resolved hospital problems.  *      Objective:   Patient Vitals for the past 24 hrs:   Temp Pulse Resp BP SpO2   12/26/22 0835 97.9 °F (36.6 °C) 87 19 (!) 147/62 97 %   12/26/22 0532 98.1 °F (36.7 °C) 84 18 (!) 145/67 97 %   12/26/22 0019 97.9 °F (36.6 °C) 70 18 (!) 154/63 96 %   12/25/22 1940 98.1 °F (36.7 °C) 82 18 (!) 144/67 96 %   12/25/22 1805 -- -- 16 -- --   12/25/22 1735 -- -- 19 -- --   12/25/22 1653 98.1 °F (36.7 °C) 85 17 (!) 156/64 94 %       Oxygen Therapy  SpO2: 97 %  O2 Device: None (Room air)    Estimated body mass index is 15.93 kg/m² as calculated from the following:    Height as of this encounter: 5' 2\" (1.575 m). Weight as of this encounter: 87 lb 1.3 oz (39.5 kg). No intake or output data in the 24 hours ending 12/26/22 1132        Physical Exam:     Blood pressure (!) 147/62, pulse 87, temperature 97.9 °F (36.6 °C), temperature source Oral, resp. rate 19, height 5' 2\" (1.575 m), weight 87 lb 1.3 oz (39.5 kg), SpO2 97 %. General:    Thin, chronically ill appearing. Head:  Normocephalic, atraumatic  Eyes:  Sclerae appear normal.  Pupils equally round. ENT:  Nares appear normal, no drainage. Moist oral mucosa  Neck:  No restricted ROM. Trachea midline   CV:   RRR. No m/r/g. No jugular venous distension. Lungs:   CTAB. No wheezing. Symmetric expansion. Abdomen: Bowel sounds present. Soft, slightly sore on palpation of epigastric region, nondistended. Extremities: No cyanosis or clubbing. No edema  Skin:     No rashes and normal coloration. Warm and dry. Neuro:  CN II-XII grossly intact. A&Ox3  Psych:  Normal mood and affect.       I have personally reviewed labs and tests showing:  Recent Labs:  Recent Results (from the past 48 hour(s))   Basic Metabolic Panel w/ Reflex to MG    Collection Time: 12/25/22  5:13 AM   Result Value Ref Range    Sodium 141 133 - 143 mmol/L    Potassium 3.3 (L) 3.5 - 5.1 mmol/L    Chloride 112 (H) 101 - 110 mmol/L    CO2 22 21 - 32 mmol/L    Anion Gap 7 2 - 11 mmol/L    Glucose 94 65 - 100 mg/dL    BUN 11 8 - 23 MG/DL    Creatinine 0.60 0.6 - 1.0 MG/DL    Est, Glom Filt Rate >60 >60 ml/min/1.73m2    Calcium 8.5 8.3 - 10.4 MG/DL Cancer Antigen 19-9    Collection Time: 12/25/22  5:13 AM   Result Value Ref Range    CA 19-9 6.70 2.0 - 37.0 U/mL   Magnesium    Collection Time: 12/25/22  5:13 AM   Result Value Ref Range    Magnesium 1.7 (L) 1.8 - 2.4 mg/dL   Lipase    Collection Time: 12/25/22  1:35 PM   Result Value Ref Range    Lipase 5,992 (H) 73 - 393 U/L   Basic Metabolic Panel w/ Reflex to MG    Collection Time: 12/26/22  8:35 AM   Result Value Ref Range    Sodium 140 133 - 143 mmol/L    Potassium 3.3 (L) 3.5 - 5.1 mmol/L    Chloride 110 101 - 110 mmol/L    CO2 25 21 - 32 mmol/L    Anion Gap 5 2 - 11 mmol/L    Glucose 135 (H) 65 - 100 mg/dL    BUN 7 (L) 8 - 23 MG/DL    Creatinine 0.70 0.6 - 1.0 MG/DL    Est, Glom Filt Rate >60 >60 ml/min/1.73m2    Calcium 8.1 (L) 8.3 - 10.4 MG/DL   Magnesium    Collection Time: 12/26/22  8:35 AM   Result Value Ref Range    Magnesium 2.1 1.8 - 2.4 mg/dL       I have personally reviewed imaging studies showing: Other Studies:  MRI ABDOMEN W WO CONTRAST   Final Result   1. The study is moderately limited due to motion artifact. 2.  Mild dilation of the pancreatic and common bile ducts with abrupt tapering   at the pancreatic head. Findings are suspicious for a pancreatic head mass,   although no discrete mass is visualized. CT CHEST WO CONTRAST   Final Result   There is a new 13 x 11 mm nodule in the superior segment left lower   lobe, suspicious for malignancy. PET/CT or tissue sampling recommended.                    Current Meds:  Current Facility-Administered Medications   Medication Dose Route Frequency    potassium bicarb-citric acid (EFFER-K) effervescent tablet 40 mEq  40 mEq Oral Daily    0.9 % sodium chloride infusion   IntraVENous Continuous    pantoprazole (PROTONIX) tablet 40 mg  40 mg Oral BID    amLODIPine (NORVASC) tablet 10 mg  10 mg Oral Daily    donepezil (ARICEPT) tablet 5 mg  5 mg Oral Nightly    ferrous sulfate (IRON 325) tablet 325 mg  325 mg Oral Daily with breakfast hydrALAZINE (APRESOLINE) tablet 25 mg  25 mg Oral 3 times per day    losartan (COZAAR) tablet 100 mg  100 mg Oral Daily    pravastatin (PRAVACHOL) tablet 40 mg  40 mg Oral Nightly    predniSONE (DELTASONE) tablet 10 mg  10 mg Oral Daily    sodium chloride flush 0.9 % injection 5-40 mL  5-40 mL IntraVENous 2 times per day    sodium chloride flush 0.9 % injection 5-40 mL  5-40 mL IntraVENous PRN    0.9 % sodium chloride infusion   IntraVENous PRN    ondansetron (ZOFRAN-ODT) disintegrating tablet 4 mg  4 mg Oral Q8H PRN    Or    ondansetron (ZOFRAN) injection 4 mg  4 mg IntraVENous Q6H PRN    polyethylene glycol (GLYCOLAX) packet 17 g  17 g Oral Daily PRN    acetaminophen (TYLENOL) tablet 650 mg  650 mg Oral Q6H PRN    Or    acetaminophen (TYLENOL) suppository 650 mg  650 mg Rectal Q6H PRN    nicotine (NICODERM CQ) 21 MG/24HR 1 patch  1 patch TransDERmal Daily    HYDROmorphone (DILAUDID) injection 0.5 mg  0.5 mg IntraVENous Q4H PRN       Signed:  Lexy Sterling MD    Part of this note may have been written by using a voice dictation software. The note has been proof read but may still contain some grammatical/other typographical errors.

## 2022-12-26 NOTE — H&P (VIEW-ONLY)
[facility] Consult Note       Primary GI Physician: Dr. Vanessa Morrsion    Referring Provider:  Dr. Candie Vegas Date:  12/26/2022    Admit Date:  12/21/2022    Chief Complaint: pancreatitis of unclear etiology    Subjective:     History of Present Illness:  Patient is a [de-identified] y.o. female with PMH including but not limited to, HTN, tobacco usage, RA on Enbrel/steroids, duodenal ulcer s/p Bilroth II, cholecystectomy, idiopathic pancreatitis who is seen in re-consultation at the request of Dr. Marge Houston for pancreatitis. she has been followed this admission by Dr. Sanjuana Kellogg. She was last evaluated on 12/24. Most recent Lipase 5,992, was down to 1583. Developed diarrhea after eating lasagne 2 days ago. She has not had a BM since. she is back on a clear liquid diet. Her pain has increased now more to the left of her umbilicus. She denies any N&V. Stool studies pending. Has outpatient colon scheduled tomorrow. EUS 3/2020 showed small pancreatic head lesion- not convincingly worrisome in appearance and suspected IPMN. During admit 10/2022 for pancreatitis, RUQ pain had EGD with small HH, S/p billroth gastrojejunostomy. Anastomosis is widely patent. No prior colo though she is scheduled for colonoscopy 12/27/22 as outpatient. Hospitalist here ordered CT chest 12/22 with finding of new 13 x 11 mm nodule in the superior segment left lower lobe, suspicious for malignancy. Oncology has evaluated and plans for biopsy, PET scan and outpatient follow up. MRCP 12/23/2022 here showing pancreatic duct dilation as prior with abrupt tapering at [facility] as prior without clear mass lesion noted. CA 19.9 normal 6/7 on 12/25/22.     PMH:  Past Medical History:   Diagnosis Date    EPIFANIO (acute kidney injury) ([address] 12/04/2014    pt denies this dx    Arthritis     RA-abdelrahman hands    Back pain 6/10/2016    Breast cancer ([address] 2018    Breast lump dx 2/2018    left    Bronchitis     Chronic obstructive pulmonary disease ([address] Discharge from the vagina     Dysuria     Eczema 6/10/2016    Fungal dermatitis     Headache 6/10/2016    Hypercholesterolemia 12/4/2014    Hypertension     not well controlled--per pt    Intractable vomiting 5/20/2018    Menopausal vaginal dryness     Osteoarthritis 6/10/2016    Osteoporosis 6/10/2016    Pancreatitis     2 episodes    PUD (peptic ulcer disease)     last 2003 which a rupture an extensive surgery    Sepsis (HCC) 12/4/2014    Thyroid nodule     Tobacco abuse     1ppd x 49 yrs       PSH:  Past Surgical History:   Procedure Laterality Date    APPENDECTOMY  1977    with hysterectomy    BREAST BIOPSY Left 1975    BREAST LUMPECTOMY Left 2/22/2018    LEFT BREAST LUMPECTOMY/ SENTINEL NODE BIOPSY performed by Julio Noland MD at [facility] MAIN OR    BREAST SURGERY Left 1975    breast lumpectomy, benign  (left)    CATARACT REMOVAL Bilateral 2018    w/IOL    CHOLECYSTECTOMY      HYSTERECTOMY (CERVIX STATUS UNKNOWN)  1977    OTHER SURGICAL HISTORY      hernicolectomy 2 cm    NC ABDOMEN SURGERY PROC UNLISTED  2003    stomach surgery for ruptured ulcer and extensive rerouting of intestestines per patient     TUBAL LIGATION  1975    UPPER GASTROINTESTINAL ENDOSCOPY  05/21/2018    empiric dilation    UPPER GASTROINTESTINAL ENDOSCOPY      UPPER GASTROINTESTINAL ENDOSCOPY N/A 10/4/2022    EGD ESOPHAGOGASTRODUODENOSCOPY/  performed by Kingston Montgomery MD at [facility] ENDOSCOPY    US BREAST NEEDLE BIOPSY LEFT Left 1/31/2018    US BREAST NEEDLE BIOPSY LEFT 1/31/2018 [facility]IOLOGY [facility]AMMO       Allergies:  Allergies   Allergen Reactions    Azithromycin Other (See Comments)     pancreatitis    Codeine Nausea And Vomiting       Home Medications:  Prior to Admission medications    Medication Sig Start Date End Date Taking? Authorizing Provider   predniSONE (DELTASONE) 20 MG tablet Take 1 pill once a day after breakfast for 10 days then 1/2 a pill once a day after breakfast for 10 days and then stop. 12/6/22   Kain Cannon,  MD   Etanercept (ENBREL SURECLICK) 50 MG/ML SOAJ Inject 50 mg into the skin every 7 days 10/25/22   Deric De La Paz MD   potassium chloride (KLOR-CON M) 20 MEQ extended release tablet Take 1 tablet by mouth 2 times daily  Patient not taking: Reported on 12/21/2022 10/13/22   Tiago Geiger MD   ferrous sulfate (IRON 325) 325 (65 Fe) MG tablet Take 1 tablet by mouth daily (with breakfast)  Patient not taking: Reported on 12/21/2022 10/13/22   Tiago Geiger MD   donepezil (ARICEPT) 5 MG tablet Take 1 tablet by mouth nightly 10/12/22   Tiago Geiger MD   losartan (COZAAR) 100 MG tablet Take 1 tablet by mouth daily 10/12/22   Tiago Geiger MD   amLODIPine (NORVASC) 10 MG tablet Take 1 tablet by mouth daily 10/12/22   Tiago Geiger MD   hydrALAZINE (APRESOLINE) 25 MG tablet Take 1 tablet by mouth every 8 hours 10/12/22   Tiago Geiger MD   alendronate (FOSAMAX) 70 MG tablet Take 1 tablet by mouth every 7 days 10/12/22   Tiago Geiger MD   pravastatin (PRAVACHOL) 40 MG tablet Take 1 tablet by mouth daily 10/12/22   Tiago Geiger MD   ondansetron (ZOFRAN-ODT) 4 MG disintegrating tablet Take 1 tablet by mouth every 8 hours as needed for Nausea or Vomiting 10/5/22   Lara Corey MD       Hospital Medications:  Current Facility-Administered Medications   Medication Dose Route Frequency    potassium bicarb-citric acid (EFFER-K) effervescent tablet 40 mEq  40 mEq Oral Daily    0.9 % sodium chloride infusion   IntraVENous Continuous    pantoprazole (PROTONIX) tablet 40 mg  40 mg Oral BID    amLODIPine (NORVASC) tablet 10 mg  10 mg Oral Daily    donepezil (ARICEPT) tablet 5 mg  5 mg Oral Nightly    ferrous sulfate (IRON 325) tablet 325 mg  325 mg Oral Daily with breakfast    hydrALAZINE (APRESOLINE) tablet 25 mg  25 mg Oral 3 times per day    losartan (COZAAR) tablet 100 mg  100 mg Oral Daily    pravastatin (PRAVACHOL) tablet 40 mg  40 mg Oral Nightly    predniSONE (DELTASONE) tablet 10 mg  10 mg Oral Daily    sodium chloride flush 0.9 % injection 5-40 mL  5-40 mL IntraVENous 2 times per day    sodium chloride flush 0.9 % injection 5-40 mL  5-40 mL IntraVENous PRN    0.9 % sodium chloride infusion   IntraVENous PRN    ondansetron (ZOFRAN-ODT) disintegrating tablet 4 mg  4 mg Oral Q8H PRN    Or    ondansetron (ZOFRAN) injection 4 mg  4 mg IntraVENous Q6H PRN    polyethylene glycol (GLYCOLAX) packet 17 g  17 g Oral Daily PRN    acetaminophen (TYLENOL) tablet 650 mg  650 mg Oral Q6H PRN    Or    acetaminophen (TYLENOL) suppository 650 mg  650 mg Rectal Q6H PRN    nicotine (NICODERM CQ) 21 MG/24HR 1 patch  1 patch TransDERmal Daily    HYDROmorphone (DILAUDID) injection 0.5 mg  0.5 mg IntraVENous Q4H PRN       Social History:  Social History     Tobacco Use    Smoking status: Every Day     Packs/day: 0.10     Types: Cigarettes     Start date: 5/6/1966    Smokeless tobacco: Never   Substance Use Topics    Alcohol use: No       Pt denies any history of drug use, blood transfusions, or tattoos. Family History:  Family History   Problem Relation Age of Onset    Thyroid Cancer Neg Hx     No Known Problems Paternal Grandfather     No Known Problems Paternal Grandmother     No Known Problems Maternal Grandfather     No Known Problems Maternal Grandmother     Hypertension Other         Brother and sister with htn    No Known Problems Brother     Hypertension Mother     Heart Disease Brother     Heart Disease Sister     Breast Cancer Sister 79    Cancer Sister     Heart Disease Father     Heart Attack Father     Cancer Brother         prostate    Diabetes Sister     Heart Disease Sister        Review of Systems:  A detailed 10 system ROS is obtained, with pertinent positives as listed above. All others are negative.     Diet:  clear liquid    Objective:     Physical Exam:  Vitals:  BP (!) 147/62   Pulse 87   Temp 97.9 °F (36.6 °C) (Oral)   Resp 19   Ht 5' 2\" (1.575 m)   Wt 87 lb 1.3 oz (39.5 kg)   SpO2 97%   BMI 15.93 kg/m²   Gen:  Pt is alert, cooperative, no acute distress  Skin:  Extremities and face reveal no rashes. HEENT: Sclerae anicteric. Extra-occular muscles are intact. No oral ulcers. No abnormal pigmentation of the lips. The neck is supple. Cardiovascular: Regular rate and rhythm. No murmurs, gallops, or rubs. Respiratory:  Comfortable breathing with no accessory muscle use. Clear breath sounds anteriorly with no wheezes, rales, or rhonchi. GI:  Abdomen nondistended, soft, with periumbilical tenderness. Normal active bowel sounds. No enlargement of the liver or spleen. No masses palpable. Musculoskeletal:  No pitting edema of the lower legs. Neurological:  Gross memory appears intact. Patient is alert and oriented. Psychiatric:  Mood appears appropriate with judgement intact. Lymphatic:  No cervical or supraclavicular adenopathy. Laboratory:    Recent Labs     12/24/22  0609 12/25/22  0513 12/25/22  1335 12/26/22  0835    141  --  140   K 3.8 3.3*  --  3.3*   * 112*  --  110   CO2 23 22  --  25   BUN 6* 11  --  7*   CREATININE 0.60 0.60  --  0.70   CALCIUM 8.3 8.5  --  8.1*   MG  --  1.7*  --  2.1   GLUCOSE 83 94  --  135*   LIPASE 1,583*  --  5,992*  --          Assessment:     Principal Problem:    Acute recurrent pancreatitis  Active Problems:    Chronic pancreatitis (HCC)    Rheumatoid arthritis (HCC)    Unintentional weight loss    Protein-calorie malnutrition, moderate (HCC)    Lung nodule    Hypercholesterolemia    Multiple thyroid nodules    Hypomagnesemia    Personal history of tobacco use, presenting hazards to health    HTN (hypertension)    Hypokalemia  Resolved Problems:    * No resolved hospital problems. *  [de-identified] y.o. female with PMH including but not limited to, HTN, tobacco usage, RA on Enbrel/steroids, duodenal ulcer s/p Bilroth II, cholecystectomy, idiopathic pancreatitis who is seen in re-consultation at the request of Dr. Rod Clements for pancreatitis.     MRCP 12/23/2022 here showing pancreatic duct dilation as prior with abrupt tapering at [facility] as prior without clear mass lesion noted. CA 19.9 normal 6.7 on 12/25/22. Lipase has increased again with worsening pain after eating. Plan:     -We will cancel outpatient colonoscopy for tomorrow  -Plan for inpatient EUS this week if possible. Will contact the office in the morning.  -Agree with clear liquid diet  -continue IVF  -Repeat lipase tomorrow  -Agree with bid PPI  -Await stool studies, but doubt positive with no BM in 2 days.   -Colace daily    Ladarius Anderson. Neida Eddy, [address]   [facility]   Patient is seen and examined in collaboration with Dr. Krysten Roberson. Assessment and plan as per Dr. Krysten Roberson. ATTENDING NOTE:  I have seen and examined the patient along with my NP/PA. The assessment and plan above is my own. May be helpful to repeat EUS.     Annalisa Carlin MD

## 2022-12-27 ENCOUNTER — ANESTHESIA EVENT (OUTPATIENT)
Dept: ENDOSCOPY | Age: 82
End: 2022-12-27
Payer: MEDICARE

## 2022-12-27 LAB
ALBUMIN SERPL-MCNC: 2.4 G/DL (ref 3.2–4.6)
ALBUMIN/GLOB SERPL: 0.7 {RATIO} (ref 0.4–1.6)
ALP SERPL-CCNC: 49 U/L (ref 50–136)
ALT SERPL-CCNC: 15 U/L (ref 12–65)
ANION GAP SERPL CALC-SCNC: 4 MMOL/L (ref 2–11)
AST SERPL-CCNC: 14 U/L (ref 15–37)
BASOPHILS # BLD: 0 K/UL (ref 0–0.2)
BASOPHILS NFR BLD: 1 % (ref 0–2)
BILIRUB DIRECT SERPL-MCNC: <0.1 MG/DL
BILIRUB SERPL-MCNC: 0.3 MG/DL (ref 0.2–1.1)
BUN SERPL-MCNC: 7 MG/DL (ref 8–23)
CALCIUM SERPL-MCNC: 8.3 MG/DL (ref 8.3–10.4)
CHLORIDE SERPL-SCNC: 111 MMOL/L (ref 101–110)
CO2 SERPL-SCNC: 27 MMOL/L (ref 21–32)
CREAT SERPL-MCNC: 0.4 MG/DL (ref 0.6–1)
DIFFERENTIAL METHOD BLD: ABNORMAL
EOSINOPHIL # BLD: 0.1 K/UL (ref 0–0.8)
EOSINOPHIL NFR BLD: 2 % (ref 0.5–7.8)
ERYTHROCYTE [DISTWIDTH] IN BLOOD BY AUTOMATED COUNT: 19.9 % (ref 11.9–14.6)
GLOBULIN SER CALC-MCNC: 3.4 G/DL (ref 2.8–4.5)
GLUCOSE SERPL-MCNC: 92 MG/DL (ref 65–100)
HCT VFR BLD AUTO: 33.5 % (ref 35.8–46.3)
HGB BLD-MCNC: 10.5 G/DL (ref 11.7–15.4)
IMM GRANULOCYTES # BLD AUTO: 0 K/UL (ref 0–0.5)
IMM GRANULOCYTES NFR BLD AUTO: 0 % (ref 0–5)
LIPASE SERPL-CCNC: 1484 U/L (ref 73–393)
LYMPHOCYTES # BLD: 1.5 K/UL (ref 0.5–4.6)
LYMPHOCYTES NFR BLD: 25 % (ref 13–44)
MCH RBC QN AUTO: 26.2 PG (ref 26.1–32.9)
MCHC RBC AUTO-ENTMCNC: 31.3 G/DL (ref 31.4–35)
MCV RBC AUTO: 83.5 FL (ref 82–102)
MONOCYTES # BLD: 0.5 K/UL (ref 0.1–1.3)
MONOCYTES NFR BLD: 8 % (ref 4–12)
NEUTS SEG # BLD: 4 K/UL (ref 1.7–8.2)
NEUTS SEG NFR BLD: 64 % (ref 43–78)
NRBC # BLD: 0 K/UL (ref 0–0.2)
PLATELET # BLD AUTO: 274 K/UL (ref 150–450)
PMV BLD AUTO: 9.4 FL (ref 9.4–12.3)
POTASSIUM SERPL-SCNC: 3.6 MMOL/L (ref 3.5–5.1)
PROT SERPL-MCNC: 5.8 G/DL (ref 6.3–8.2)
RBC # BLD AUTO: 4.01 M/UL (ref 4.05–5.2)
SODIUM SERPL-SCNC: 142 MMOL/L (ref 133–143)
WBC # BLD AUTO: 6.1 K/UL (ref 4.3–11.1)

## 2022-12-27 PROCEDURE — 1100000000 HC RM PRIVATE

## 2022-12-27 PROCEDURE — 6370000000 HC RX 637 (ALT 250 FOR IP): Performed by: INTERNAL MEDICINE

## 2022-12-27 PROCEDURE — 87899 AGENT NOS ASSAY W/OPTIC: CPT

## 2022-12-27 PROCEDURE — 80048 BASIC METABOLIC PNL TOTAL CA: CPT

## 2022-12-27 PROCEDURE — 2580000003 HC RX 258: Performed by: INTERNAL MEDICINE

## 2022-12-27 PROCEDURE — 80076 HEPATIC FUNCTION PANEL: CPT

## 2022-12-27 PROCEDURE — 36415 COLL VENOUS BLD VENIPUNCTURE: CPT

## 2022-12-27 PROCEDURE — 83690 ASSAY OF LIPASE: CPT

## 2022-12-27 PROCEDURE — 6370000000 HC RX 637 (ALT 250 FOR IP): Performed by: NURSE PRACTITIONER

## 2022-12-27 PROCEDURE — 85025 COMPLETE CBC W/AUTO DIFF WBC: CPT

## 2022-12-27 PROCEDURE — 6360000002 HC RX W HCPCS: Performed by: INTERNAL MEDICINE

## 2022-12-27 PROCEDURE — 6370000000 HC RX 637 (ALT 250 FOR IP): Performed by: HOSPITALIST

## 2022-12-27 RX ORDER — POLYETHYLENE GLYCOL 3350 17 G/17G
17 POWDER, FOR SOLUTION ORAL DAILY
Status: DISCONTINUED | OUTPATIENT
Start: 2022-12-27 | End: 2022-12-29

## 2022-12-27 RX ADMIN — PREDNISONE 10 MG: 10 TABLET ORAL at 08:10

## 2022-12-27 RX ADMIN — HYDRALAZINE HYDROCHLORIDE 25 MG: 25 TABLET ORAL at 16:28

## 2022-12-27 RX ADMIN — HYOSCYAMINE SULFATE 125 MCG: 0.12 TABLET ORAL; SUBLINGUAL at 11:10

## 2022-12-27 RX ADMIN — PRAVASTATIN SODIUM 40 MG: 80 TABLET ORAL at 20:25

## 2022-12-27 RX ADMIN — PANTOPRAZOLE SODIUM 40 MG: 40 TABLET, DELAYED RELEASE ORAL at 20:25

## 2022-12-27 RX ADMIN — PANTOPRAZOLE SODIUM 40 MG: 40 TABLET, DELAYED RELEASE ORAL at 08:10

## 2022-12-27 RX ADMIN — HYOSCYAMINE SULFATE 125 MCG: 0.12 TABLET ORAL; SUBLINGUAL at 16:28

## 2022-12-27 RX ADMIN — SODIUM CHLORIDE, PRESERVATIVE FREE 5 ML: 5 INJECTION INTRAVENOUS at 08:11

## 2022-12-27 RX ADMIN — HYDRALAZINE HYDROCHLORIDE 25 MG: 25 TABLET ORAL at 05:34

## 2022-12-27 RX ADMIN — AMLODIPINE BESYLATE 10 MG: 10 TABLET ORAL at 08:10

## 2022-12-27 RX ADMIN — HYDROMORPHONE HYDROCHLORIDE 0.5 MG: 1 INJECTION, SOLUTION INTRAMUSCULAR; INTRAVENOUS; SUBCUTANEOUS at 20:36

## 2022-12-27 RX ADMIN — DOCUSATE SODIUM 100 MG: 100 CAPSULE, LIQUID FILLED ORAL at 08:09

## 2022-12-27 RX ADMIN — FERROUS SULFATE TAB 325 MG (65 MG ELEMENTAL FE) 325 MG: 325 (65 FE) TAB at 08:10

## 2022-12-27 RX ADMIN — POTASSIUM BICARBONATE 40 MEQ: 782 TABLET, EFFERVESCENT ORAL at 08:09

## 2022-12-27 RX ADMIN — HYDRALAZINE HYDROCHLORIDE 25 MG: 25 TABLET ORAL at 20:25

## 2022-12-27 RX ADMIN — SODIUM CHLORIDE, PRESERVATIVE FREE 10 ML: 5 INJECTION INTRAVENOUS at 20:26

## 2022-12-27 RX ADMIN — DONEPEZIL HYDROCHLORIDE 5 MG: 5 TABLET, FILM COATED ORAL at 20:25

## 2022-12-27 RX ADMIN — LOSARTAN POTASSIUM 100 MG: 50 TABLET, FILM COATED ORAL at 08:09

## 2022-12-27 ASSESSMENT — PAIN DESCRIPTION - LOCATION: LOCATION: ABDOMEN

## 2022-12-27 ASSESSMENT — PAIN SCALES - GENERAL
PAINLEVEL_OUTOF10: 0
PAINLEVEL_OUTOF10: 0
PAINLEVEL_OUTOF10: 7

## 2022-12-27 NOTE — CONSULTS
Department of Interventional Radiology  [phone]        Consult Note     Patient: Vladimir Hunt MRN: [id number]  SSN: [id number]    YOB: 1940  Age: [de-identified] y.o. Sex: female      Referring Physician: JD Orosco MD    Consult Date: 12/27/2022     Request for biopsy of 13 x 11 mm RLL lung nodule. Pt admitted with acute pancreatitis. Questionable pancreatic head mass on MRI. Hx significant for chronic, recurrent idiopathic pancreatits, s/p Billroth gastrojejunostomym COPD, tobacco abuse, RA on Enbrel and steriods. Lipase now 1400 (> 9000 on admission)    PLAN:   EUS this week with GI, outpt oncology consult and PET. Will schedule outpatient lung nodule biopsy.       Chely Mckinley PA-C

## 2022-12-27 NOTE — PROGRESS NOTES
Hospitalist Progress Note   Admit Date:  2022  5:38 PM   Name:  Zamzam Jorgensen   Age:  [de-identified] y.o. Sex:  female  :  1940   MRN:  [id number]   Room:  [facility]/    Presenting Complaint: Abdominal Pain     Reason(s) for Admission: Acute recurrent pancreatitis [K85.90]  Acute pancreatitis, unspecified complication status, unspecified pancreatitis type [facility] [place] Hospital Course:     Please refer to the admission H&P for details of presentation. In summary, Zamzam Jorgensen is a [de-identified] y.o. female with medical history significant for  duodenal ulcer complicated by perforation requiring bilroth II,  idiopathic pancreatitis, rheumatoid arthritis on enbrel/ current course of steroids, HTN, ongoing tobacco use who is evaluated with acute onset of mid abdominal pain to her back. Per records, she was discharged on 2022 for recurrent pancreatitis and was seen by GI at that time. MRCP with mild dilatation of the pancreatic and common bile duct with abrupt tapering at the pancreatic head suspicious for pancreatic head mass. GI was consulted and is planned for outpatient colonoscopy on . Given patient's weight loss and smoking history, CT chest was done which shows a 13 mm x 11 mm left lower lobe nodule. Patient will need to follow-up with oncology for further work-up and biopsy. Subjective/24 hr Events (22) : Patient is seen and examined at bedside. No acute events reported overnight by nursing staff. Reports having BM this AM after eating and it was watery. Tolerated CLD so far but Reports epigastric/umbilical pain mostly after eating. Patient denies fever, chills, chest pains, shortness of breath. Review of Systems: 10 point review of systems is otherwise negative with the exception of the elements mentioned above.       Assessment & Plan:   Acute recurrent pancreatitis in setting of idiopathic pancreatitis  MRCP result noted  22: pain after eating with diarrhea this morning. Pain is less severe compared to 2 days ago. - GI planned for EUS. Colonoscopy canceled  - continue with CLDs  - IVF  - pain regimen    Lung Nodule  [id number] LLL Nodule. Concerning for malignancy as patient is a daily smoker with symptoms of unintentional weight loss and night sweats.  -Oncology recs appreciated. follow up with  and outpatient PET  - IR Consult for lung biopsy - planned for outpatient      Rheumatoid arthritis  - on prednisone 10mg daily. Hypokalemia and hypomagnesemia: resolved    Unintentional Weight loss  Moderate protein calorie malnutrition  Thin female with 23lb unintentional weight loss  - nutrition consult  - CT showed lung nodule. Thyroid Nodule  CT chest in 2018 with 1.3cm thyroid nodule  TSH low with normal T4 and normal T3.  - continue outpatient followup /surveillance. Tobacco Dependence  - nicotine patch    HTN // HLD : continue with home medications    Diet:  ADULT DIET; Clear Liquid  ADULT ORAL NUTRITION SUPPLEMENT; Breakfast, Lunch, Dinner; Clear Liquid Oral Supplement  Diet NPO Exceptions are: Sips of Water with Meds  DVT PPx: heparin  Code status: Full Code      I have personally reviewed and ordered clinical lab tests and independently visualized images, tracing. Hospital Problems:  Principal Problem:    Acute recurrent pancreatitis  Active Problems:    Chronic pancreatitis (HCC)    Rheumatoid arthritis ([address]    Unintentional weight loss    Protein-calorie malnutrition, moderate (HCC)    Lung nodule    Hypercholesterolemia    Multiple thyroid nodules    Hypomagnesemia    Personal history of tobacco use, presenting hazards to health    HTN (hypertension)    Hypokalemia  Resolved Problems:    * No resolved hospital problems.  *      Objective:   Patient Vitals for the past 24 hrs:   Temp Pulse Resp BP SpO2   12/27/22 1135 98.1 °F (36.7 °C) 80 17 135/61 93 %   12/27/22 0727 98.1 °F (36.7 °C) 77 17 (!) 167/65 95 %   12/27/22 0520 97.5 °F (36.4 °C) 77 16 (!) 166/66 97 %   12/27/22 0040 98 °F (36.7 °C) 76 17 (!) 168/59 96 %   12/26/22 2049 -- -- 16 -- --   12/26/22 2010 97.9 °F (36.6 °C) 69 17 (!) 150/69 97 %   12/26/22 1602 98.8 °F (37.1 °C) 70 16 129/61 94 %       Oxygen Therapy  SpO2: 93 %  O2 Device: None (Room air)    Estimated body mass index is 16.29 kg/m² as calculated from the following:    Height as of this encounter: 5' 2\" (1.575 m). Weight as of this encounter: 89 lb 1.1 oz (40.4 kg). No intake or output data in the 24 hours ending 12/27/22 1230        Physical Exam:     Blood pressure 135/61, pulse 80, temperature 98.1 °F (36.7 °C), temperature source Axillary, resp. rate 17, height 5' 2\" (1.575 m), weight 89 lb 1.1 oz (40.4 kg), SpO2 93 %. General:    Thin, chronically ill appearing. Head:  Normocephalic, atraumatic  Eyes:  Sclerae appear normal.  Pupils equally round. ENT:  Nares appear normal, no drainage. Moist oral mucosa  Neck:  No restricted ROM. Trachea midline   CV:   RRR. No m/r/g. No jugular venous distension. Lungs:   CTAB. No wheezing. Symmetric expansion. Abdomen: Bowel sounds present. Soft, tender on palpation of epigatrick and mid abdomen area  Extremities: No cyanosis or clubbing. No edema  Skin:     No rashes and normal coloration. Warm and dry. Neuro:  CN II-XII grossly intact. A&Ox3  Psych:  Normal mood and affect.       I have personally reviewed labs and tests showing:  Recent Labs:  Recent Results (from the past 48 hour(s))   Lipase    Collection Time: 12/25/22  1:35 PM   Result Value Ref Range    Lipase 5,992 (H) 73 - 393 U/L   Basic Metabolic Panel w/ Reflex to MG    Collection Time: 12/26/22  8:35 AM   Result Value Ref Range    Sodium 140 133 - 143 mmol/L    Potassium 3.3 (L) 3.5 - 5.1 mmol/L    Chloride 110 101 - 110 mmol/L    CO2 25 21 - 32 mmol/L    Anion Gap 5 2 - 11 mmol/L    Glucose 135 (H) 65 - 100 mg/dL    BUN 7 (L) 8 - 23 MG/DL    Creatinine 0.70 0.6 - 1.0 MG/DL    Est, [place] Filt Rate >60 >60 ml/min/1.73m2    Calcium 8.1 (L) 8.3 - 10.4 MG/DL   Magnesium    Collection Time: 12/26/22  8:35 AM   Result Value Ref Range    Magnesium 2.1 1.8 - 2.4 mg/dL   CBC with Auto Differential    Collection Time: 12/27/22  5:58 AM   Result Value Ref Range    WBC 6.1 4.3 - 11.1 K/uL    RBC 4.01 (L) 4.05 - 5.2 M/uL    Hemoglobin 10.5 (L) 11.7 - 15.4 g/dL    Hematocrit 33.5 (L) 35.8 - 46.3 %    MCV 83.5 82 - 102 FL    MCH 26.2 26.1 - 32.9 PG    MCHC 31.3 (L) 31.4 - 35.0 g/dL    RDW 19.9 (H) 11.9 - 14.6 %    Platelets 945 513 - 588 K/uL    MPV 9.4 9.4 - 12.3 FL    nRBC 0.00 0.0 - 0.2 K/uL    Differential Type AUTOMATED      Seg Neutrophils 64 43 - 78 %    Lymphocytes 25 13 - 44 %    Monocytes 8 4.0 - 12.0 %    Eosinophils % 2 0.5 - 7.8 %    Basophils 1 0.0 - 2.0 %    Immature Granulocytes 0 0.0 - 5.0 %    Segs Absolute 4.0 1.7 - 8.2 K/UL    Absolute Lymph # 1.5 0.5 - 4.6 K/UL    Absolute Mono # 0.5 0.1 - 1.3 K/UL    Absolute Eos # 0.1 0.0 - 0.8 K/UL    Basophils Absolute 0.0 0.0 - 0.2 K/UL    Absolute Immature Granulocyte 0.0 0.0 - 0.5 K/UL   Basic Metabolic Panel w/ Reflex to MG    Collection Time: 12/27/22  5:58 AM   Result Value Ref Range    Sodium 142 133 - 143 mmol/L    Potassium 3.6 3.5 - 5.1 mmol/L    Chloride 111 (H) 101 - 110 mmol/L    CO2 27 21 - 32 mmol/L    Anion Gap 4 2 - 11 mmol/L    Glucose 92 65 - 100 mg/dL    BUN 7 (L) 8 - 23 MG/DL    Creatinine 0.40 (L) 0.6 - 1.0 MG/DL    Est, Glom Filt Rate >60 >60 ml/min/1.73m2    Calcium 8.3 8.3 - 10.4 MG/DL   Lipase    Collection Time: 12/27/22  5:58 AM   Result Value Ref Range    Lipase 1,484 (H) 73 - 393 U/L       I have personally reviewed imaging studies showing: Other Studies:  MRI ABDOMEN W WO CONTRAST   Final Result   1. The study is moderately limited due to motion artifact. 2.  Mild dilation of the pancreatic and common bile ducts with abrupt tapering   at the pancreatic head.  Findings are suspicious for a pancreatic head mass,   although no discrete mass is visualized. CT CHEST WO CONTRAST   Final Result   There is a new 13 x 11 mm nodule in the superior segment left lower   lobe, suspicious for malignancy. PET/CT or tissue sampling recommended. Current Meds:  Current Facility-Administered Medications   Medication Dose Route Frequency    polyethylene glycol (GLYCOLAX) packet 17 g  17 g Oral Daily    hyoscyamine (LEVSIN/SL) sublingual tablet 125 mcg  125 mcg SubLINGual TID AC    docusate sodium (COLACE) capsule 100 mg  100 mg Oral Daily    potassium bicarb-citric acid (EFFER-K) effervescent tablet 40 mEq  40 mEq Oral Daily    0.9 % sodium chloride infusion   IntraVENous Continuous    pantoprazole (PROTONIX) tablet 40 mg  40 mg Oral BID    amLODIPine (NORVASC) tablet 10 mg  10 mg Oral Daily    donepezil (ARICEPT) tablet 5 mg  5 mg Oral Nightly    ferrous sulfate (IRON 325) tablet 325 mg  325 mg Oral Daily with breakfast    hydrALAZINE (APRESOLINE) tablet 25 mg  25 mg Oral 3 times per day    losartan (COZAAR) tablet 100 mg  100 mg Oral Daily    pravastatin (PRAVACHOL) tablet 40 mg  40 mg Oral Nightly    predniSONE (DELTASONE) tablet 10 mg  10 mg Oral Daily    sodium chloride flush 0.9 % injection 5-40 mL  5-40 mL IntraVENous 2 times per day    sodium chloride flush 0.9 % injection 5-40 mL  5-40 mL IntraVENous PRN    0.9 % sodium chloride infusion   IntraVENous PRN    ondansetron (ZOFRAN-ODT) disintegrating tablet 4 mg  4 mg Oral Q8H PRN    Or    ondansetron (ZOFRAN) injection 4 mg  4 mg IntraVENous Q6H PRN    acetaminophen (TYLENOL) tablet 650 mg  650 mg Oral Q6H PRN    Or    acetaminophen (TYLENOL) suppository 650 mg  650 mg Rectal Q6H PRN    nicotine (NICODERM CQ) 21 MG/24HR 1 patch  1 patch TransDERmal Daily    HYDROmorphone (DILAUDID) injection 0.5 mg  0.5 mg IntraVENous Q4H PRN       Signed:  Desiree Hernandez MD    Part of this note may have been written by using a voice dictation software.   The note has been proof read but may still contain some grammatical/other typographical errors.

## 2022-12-27 NOTE — PROGRESS NOTES
[facility] Progress Note         Admit Date:  12/21/2022    Today's Date:  12/27/2022    CC:  Pancreatitis of unclear etiology    Subjective:     Patient: Develops \"cramping and gas\" after eating. On clear liquids now. She has not had a BM in 3 days. Denies any N&V. Medications:   Current Facility-Administered Medications   Medication Dose Route Frequency    docusate sodium (COLACE) capsule 100 mg  100 mg Oral Daily    potassium bicarb-citric acid (EFFER-K) effervescent tablet 40 mEq  40 mEq Oral Daily    0.9 % sodium chloride infusion   IntraVENous Continuous    pantoprazole (PROTONIX) tablet 40 mg  40 mg Oral BID    amLODIPine (NORVASC) tablet 10 mg  10 mg Oral Daily    donepezil (ARICEPT) tablet 5 mg  5 mg Oral Nightly    ferrous sulfate (IRON 325) tablet 325 mg  325 mg Oral Daily with breakfast    hydrALAZINE (APRESOLINE) tablet 25 mg  25 mg Oral 3 times per day    losartan (COZAAR) tablet 100 mg  100 mg Oral Daily    pravastatin (PRAVACHOL) tablet 40 mg  40 mg Oral Nightly    predniSONE (DELTASONE) tablet 10 mg  10 mg Oral Daily    sodium chloride flush 0.9 % injection 5-40 mL  5-40 mL IntraVENous 2 times per day    sodium chloride flush 0.9 % injection 5-40 mL  5-40 mL IntraVENous PRN    0.9 % sodium chloride infusion   IntraVENous PRN    ondansetron (ZOFRAN-ODT) disintegrating tablet 4 mg  4 mg Oral Q8H PRN    Or    ondansetron (ZOFRAN) injection 4 mg  4 mg IntraVENous Q6H PRN    polyethylene glycol (GLYCOLAX) packet 17 g  17 g Oral Daily PRN    acetaminophen (TYLENOL) tablet 650 mg  650 mg Oral Q6H PRN    Or    acetaminophen (TYLENOL) suppository 650 mg  650 mg Rectal Q6H PRN    nicotine (NICODERM CQ) 21 MG/24HR 1 patch  1 patch TransDERmal Daily    HYDROmorphone (DILAUDID) injection 0.5 mg  0.5 mg IntraVENous Q4H PRN       Review of Systems:  ROS was obtained, with pertinent positives as listed above. No chest pain or SOB.     Diet:  Clear liquid diet    Objective:   Vitals:  BP (!) 167/65   Pulse 77   Temp 98.1 °F (36.7 °C) (Oral)   Resp 17   Ht 5' 2\" (1.575 m)   Wt 89 lb 1.1 oz (40.4 kg)   SpO2 95%   BMI 16.29 kg/m²   Intake/Output:  No intake/output data recorded. No intake/output data recorded. Exam:  General appearance: alert, cooperative, no distress. Breakfast tray eaten at 100%  Lungs: clear to auscultation bilaterally anteriorly  Heart: regular rate and rhythm  Abdomen: soft, less tender today Bowel sounds hyperactive. No masses, no organomegaly  Extremities: extremities normal, atraumatic, no cyanosis or edema  Neuro:  alert and oriented    Data Review (Labs):    Recent Labs     12/25/22  0513 12/25/22  1335 12/26/22  0835 12/27/22  0558   WBC  --   --   --  6.1   HGB  --   --   --  10.5*   HCT  --   --   --  33.5*   PLT  --   --   --  274   MCV  --   --   --  83.5     --  140 142   K 3.3*  --  3.3* 3.6   *  --  110 111*   CO2 22  --  25 27   BUN 11  --  7* 7*   CREATININE 0.60  --  0.70 0.40*   CALCIUM 8.5  --  8.1* 8.3   MG 1.7*  --  2.1  --    GLUCOSE 94  --  135* 92   LIPASE  --  5,992*  --  1,484*       Assessment:     Principal Problem:    Acute recurrent pancreatitis  Active Problems:    Chronic pancreatitis (HCC)    Rheumatoid arthritis (HCC)    Unintentional weight loss    Protein-calorie malnutrition, moderate (HCC)    Lung nodule    Hypercholesterolemia    Multiple thyroid nodules    Hypomagnesemia    Personal history of tobacco use, presenting hazards to health    HTN (hypertension)    Hypokalemia  Resolved Problems:    * No resolved hospital problems. *    [de-identified] y.o. female with PMH including but not limited to, HTN, tobacco usage, RA on Enbrel/steroids, duodenal ulcer s/p Bilroth II, cholecystectomy, idiopathic pancreatitis who we are following for  persistent idiopathic pancreatitis. MRCP 12/23/2022 showing pancreatic duct dilation as prior with abrupt tapering at [facility] as prior without clear mass lesion noted. CA 19.9 normal 6.7 on 12/25/22.  Lipase increased again on 12/26 with worsening pain after eating. Lipase trending back down today and pain somewhat improved. Plan:     -Awaiting office call back for possible EUS this week  -continue clear liquid diet  -Add Miralax 17 gm daily to bowel regimen  -Hyoscyamine 0.125 mg SL TID PRN abdominal pain  -Continue supportive care to include IVF  Isaias Beard in collaboration with Dr. Kyrie Gomez.  [facility]     Addendum: [id number]:  EUS tomorrow 12/28/2022 with Dr. Tej Wong at [phone]. She will need to be NPO after midnight and consent form signed for the procedure.

## 2022-12-28 ENCOUNTER — ANESTHESIA (OUTPATIENT)
Dept: ENDOSCOPY | Age: 82
End: 2022-12-28
Payer: MEDICARE

## 2022-12-28 LAB
ANION GAP SERPL CALC-SCNC: 7 MMOL/L (ref 2–11)
BASOPHILS # BLD: 0 K/UL (ref 0–0.2)
BASOPHILS NFR BLD: 0 % (ref 0–2)
BUN SERPL-MCNC: 6 MG/DL (ref 8–23)
CALCIUM SERPL-MCNC: 8.6 MG/DL (ref 8.3–10.4)
CHLORIDE SERPL-SCNC: 110 MMOL/L (ref 101–110)
CO2 SERPL-SCNC: 26 MMOL/L (ref 21–32)
CREAT SERPL-MCNC: 0.6 MG/DL (ref 0.6–1)
DIFFERENTIAL METHOD BLD: ABNORMAL
EOSINOPHIL # BLD: 0.1 K/UL (ref 0–0.8)
EOSINOPHIL NFR BLD: 2 % (ref 0.5–7.8)
ERYTHROCYTE [DISTWIDTH] IN BLOOD BY AUTOMATED COUNT: 19.3 % (ref 11.9–14.6)
GLUCOSE SERPL-MCNC: 86 MG/DL (ref 65–100)
HCT VFR BLD AUTO: 31.9 % (ref 35.8–46.3)
HGB BLD-MCNC: 10.1 G/DL (ref 11.7–15.4)
IMM GRANULOCYTES # BLD AUTO: 0 K/UL (ref 0–0.5)
IMM GRANULOCYTES NFR BLD AUTO: 0 % (ref 0–5)
LIPASE SERPL-CCNC: 605 U/L (ref 73–393)
LYMPHOCYTES # BLD: 1.2 K/UL (ref 0.5–4.6)
LYMPHOCYTES NFR BLD: 16 % (ref 13–44)
MAGNESIUM SERPL-MCNC: 1.9 MG/DL (ref 1.8–2.4)
MCH RBC QN AUTO: 26.5 PG (ref 26.1–32.9)
MCHC RBC AUTO-ENTMCNC: 31.7 G/DL (ref 31.4–35)
MCV RBC AUTO: 83.7 FL (ref 82–102)
MONOCYTES # BLD: 0.5 K/UL (ref 0.1–1.3)
MONOCYTES NFR BLD: 7 % (ref 4–12)
NEUTS SEG # BLD: 5.5 K/UL (ref 1.7–8.2)
NEUTS SEG NFR BLD: 75 % (ref 43–78)
NRBC # BLD: 0 K/UL (ref 0–0.2)
PLATELET # BLD AUTO: 290 K/UL (ref 150–450)
PMV BLD AUTO: 9 FL (ref 9.4–12.3)
POTASSIUM SERPL-SCNC: 3.3 MMOL/L (ref 3.5–5.1)
RBC # BLD AUTO: 3.81 M/UL (ref 4.05–5.2)
SODIUM SERPL-SCNC: 143 MMOL/L (ref 133–143)
WBC # BLD AUTO: 7.3 K/UL (ref 4.3–11.1)

## 2022-12-28 PROCEDURE — 83735 ASSAY OF MAGNESIUM: CPT

## 2022-12-28 PROCEDURE — 6370000000 HC RX 637 (ALT 250 FOR IP): Performed by: NURSE PRACTITIONER

## 2022-12-28 PROCEDURE — 2500000003 HC RX 250 WO HCPCS

## 2022-12-28 PROCEDURE — 0DJ08ZZ INSPECTION OF UPPER INTESTINAL TRACT, VIA NATURAL OR ARTIFICIAL OPENING ENDOSCOPIC: ICD-10-PCS | Performed by: INTERNAL MEDICINE

## 2022-12-28 PROCEDURE — 2580000003 HC RX 258: Performed by: ANESTHESIOLOGY

## 2022-12-28 PROCEDURE — 3700000000 HC ANESTHESIA ATTENDED CARE: Performed by: INTERNAL MEDICINE

## 2022-12-28 PROCEDURE — 6360000002 HC RX W HCPCS

## 2022-12-28 PROCEDURE — 7100000010 HC PHASE II RECOVERY - FIRST 15 MIN: Performed by: INTERNAL MEDICINE

## 2022-12-28 PROCEDURE — 83690 ASSAY OF LIPASE: CPT

## 2022-12-28 PROCEDURE — 1100000000 HC RM PRIVATE

## 2022-12-28 PROCEDURE — 2580000003 HC RX 258: Performed by: INTERNAL MEDICINE

## 2022-12-28 PROCEDURE — 2709999900 HC NON-CHARGEABLE SUPPLY: Performed by: INTERNAL MEDICINE

## 2022-12-28 PROCEDURE — 80048 BASIC METABOLIC PNL TOTAL CA: CPT

## 2022-12-28 PROCEDURE — 6370000000 HC RX 637 (ALT 250 FOR IP): Performed by: HOSPITALIST

## 2022-12-28 PROCEDURE — 3609018500 HC EGD US SCOPE W/ADJACENT STRUCTURES: Performed by: INTERNAL MEDICINE

## 2022-12-28 PROCEDURE — 36415 COLL VENOUS BLD VENIPUNCTURE: CPT

## 2022-12-28 PROCEDURE — 6370000000 HC RX 637 (ALT 250 FOR IP): Performed by: INTERNAL MEDICINE

## 2022-12-28 PROCEDURE — 3700000001 HC ADD 15 MINUTES (ANESTHESIA): Performed by: INTERNAL MEDICINE

## 2022-12-28 PROCEDURE — 85025 COMPLETE CBC W/AUTO DIFF WBC: CPT

## 2022-12-28 PROCEDURE — 7100000011 HC PHASE II RECOVERY - ADDTL 15 MIN: Performed by: INTERNAL MEDICINE

## 2022-12-28 RX ORDER — GLYCOPYRROLATE 0.2 MG/ML
INJECTION INTRAMUSCULAR; INTRAVENOUS PRN
Status: DISCONTINUED | OUTPATIENT
Start: 2022-12-28 | End: 2022-12-28 | Stop reason: SDUPTHER

## 2022-12-28 RX ORDER — SODIUM CHLORIDE 0.9 % (FLUSH) 0.9 %
5-40 SYRINGE (ML) INJECTION PRN
Status: DISCONTINUED | OUTPATIENT
Start: 2022-12-28 | End: 2022-12-28 | Stop reason: HOSPADM

## 2022-12-28 RX ORDER — PROPOFOL 10 MG/ML
INJECTION, EMULSION INTRAVENOUS PRN
Status: DISCONTINUED | OUTPATIENT
Start: 2022-12-28 | End: 2022-12-28 | Stop reason: SDUPTHER

## 2022-12-28 RX ORDER — SODIUM CHLORIDE, SODIUM LACTATE, POTASSIUM CHLORIDE, CALCIUM CHLORIDE 600; 310; 30; 20 MG/100ML; MG/100ML; MG/100ML; MG/100ML
INJECTION, SOLUTION INTRAVENOUS CONTINUOUS
Status: DISCONTINUED | OUTPATIENT
Start: 2022-12-28 | End: 2022-12-29

## 2022-12-28 RX ORDER — POTASSIUM CHLORIDE 20 MEQ/1
40 TABLET, EXTENDED RELEASE ORAL 3 TIMES DAILY
Status: COMPLETED | OUTPATIENT
Start: 2022-12-28 | End: 2022-12-28

## 2022-12-28 RX ORDER — ACETAMINOPHEN 500 MG
1000 TABLET ORAL ONCE
Status: DISCONTINUED | OUTPATIENT
Start: 2022-12-28 | End: 2022-12-28 | Stop reason: HOSPADM

## 2022-12-28 RX ORDER — METOCLOPRAMIDE HYDROCHLORIDE 5 MG/ML
10 INJECTION INTRAMUSCULAR; INTRAVENOUS
Status: CANCELLED | OUTPATIENT
Start: 2022-12-28 | End: 2022-12-29

## 2022-12-28 RX ORDER — SODIUM CHLORIDE 0.9 % (FLUSH) 0.9 %
5-40 SYRINGE (ML) INJECTION EVERY 12 HOURS SCHEDULED
Status: DISCONTINUED | OUTPATIENT
Start: 2022-12-28 | End: 2022-12-28 | Stop reason: HOSPADM

## 2022-12-28 RX ORDER — SCOLOPAMINE TRANSDERMAL SYSTEM 1 MG/1
1 PATCH, EXTENDED RELEASE TRANSDERMAL
Status: DISCONTINUED | OUTPATIENT
Start: 2022-12-28 | End: 2022-12-28 | Stop reason: HOSPADM

## 2022-12-28 RX ORDER — FENTANYL CITRATE 50 UG/ML
25 INJECTION, SOLUTION INTRAMUSCULAR; INTRAVENOUS EVERY 5 MIN PRN
Status: CANCELLED | OUTPATIENT
Start: 2022-12-28

## 2022-12-28 RX ORDER — DIPHENHYDRAMINE HYDROCHLORIDE 50 MG/ML
12.5 INJECTION INTRAMUSCULAR; INTRAVENOUS
Status: CANCELLED | OUTPATIENT
Start: 2022-12-28 | End: 2022-12-29

## 2022-12-28 RX ORDER — ONDANSETRON 2 MG/ML
4 INJECTION INTRAMUSCULAR; INTRAVENOUS
Status: CANCELLED | OUTPATIENT
Start: 2022-12-28 | End: 2022-12-29

## 2022-12-28 RX ORDER — HYDROMORPHONE HCL 110MG/55ML
0.5 PATIENT CONTROLLED ANALGESIA SYRINGE INTRAVENOUS EVERY 10 MIN PRN
Status: CANCELLED | OUTPATIENT
Start: 2022-12-28

## 2022-12-28 RX ORDER — MIDAZOLAM HYDROCHLORIDE 2 MG/2ML
2 INJECTION, SOLUTION INTRAMUSCULAR; INTRAVENOUS
Status: DISCONTINUED | OUTPATIENT
Start: 2022-12-28 | End: 2022-12-28 | Stop reason: HOSPADM

## 2022-12-28 RX ORDER — ONDANSETRON 2 MG/ML
INJECTION INTRAMUSCULAR; INTRAVENOUS PRN
Status: DISCONTINUED | OUTPATIENT
Start: 2022-12-28 | End: 2022-12-28 | Stop reason: SDUPTHER

## 2022-12-28 RX ORDER — SODIUM CHLORIDE 9 MG/ML
INJECTION, SOLUTION INTRAVENOUS PRN
Status: DISCONTINUED | OUTPATIENT
Start: 2022-12-28 | End: 2022-12-28 | Stop reason: HOSPADM

## 2022-12-28 RX ORDER — LIDOCAINE HYDROCHLORIDE 20 MG/ML
INJECTION, SOLUTION EPIDURAL; INFILTRATION; INTRACAUDAL; PERINEURAL PRN
Status: DISCONTINUED | OUTPATIENT
Start: 2022-12-28 | End: 2022-12-28 | Stop reason: SDUPTHER

## 2022-12-28 RX ORDER — FENTANYL CITRATE 50 UG/ML
100 INJECTION, SOLUTION INTRAMUSCULAR; INTRAVENOUS
Status: DISCONTINUED | OUTPATIENT
Start: 2022-12-28 | End: 2022-12-28 | Stop reason: HOSPADM

## 2022-12-28 RX ORDER — PROPOFOL 10 MG/ML
INJECTION, EMULSION INTRAVENOUS CONTINUOUS PRN
Status: DISCONTINUED | OUTPATIENT
Start: 2022-12-28 | End: 2022-12-28 | Stop reason: SDUPTHER

## 2022-12-28 RX ADMIN — POTASSIUM CHLORIDE 40 MEQ: 1500 TABLET, EXTENDED RELEASE ORAL at 15:18

## 2022-12-28 RX ADMIN — HYOSCYAMINE SULFATE 125 MCG: 0.12 TABLET ORAL; SUBLINGUAL at 05:18

## 2022-12-28 RX ADMIN — PROPOFOL 120 MCG/KG/MIN: 10 INJECTION, EMULSION INTRAVENOUS at 11:41

## 2022-12-28 RX ADMIN — HYOSCYAMINE SULFATE 125 MCG: 0.12 TABLET ORAL; SUBLINGUAL at 15:18

## 2022-12-28 RX ADMIN — DONEPEZIL HYDROCHLORIDE 5 MG: 5 TABLET, FILM COATED ORAL at 20:48

## 2022-12-28 RX ADMIN — HYDRALAZINE HYDROCHLORIDE 25 MG: 25 TABLET ORAL at 15:18

## 2022-12-28 RX ADMIN — PANTOPRAZOLE SODIUM 40 MG: 40 TABLET, DELAYED RELEASE ORAL at 09:40

## 2022-12-28 RX ADMIN — SODIUM CHLORIDE: 9 INJECTION, SOLUTION INTRAVENOUS at 03:33

## 2022-12-28 RX ADMIN — SODIUM CHLORIDE, PRESERVATIVE FREE 10 ML: 5 INJECTION INTRAVENOUS at 09:44

## 2022-12-28 RX ADMIN — DOCUSATE SODIUM 100 MG: 100 CAPSULE, LIQUID FILLED ORAL at 09:40

## 2022-12-28 RX ADMIN — FERROUS SULFATE TAB 325 MG (65 MG ELEMENTAL FE) 325 MG: 325 (65 FE) TAB at 09:39

## 2022-12-28 RX ADMIN — PROPOFOL 40 MG: 10 INJECTION, EMULSION INTRAVENOUS at 11:40

## 2022-12-28 RX ADMIN — POTASSIUM CHLORIDE 40 MEQ: 1500 TABLET, EXTENDED RELEASE ORAL at 20:48

## 2022-12-28 RX ADMIN — HYDRALAZINE HYDROCHLORIDE 25 MG: 25 TABLET ORAL at 20:47

## 2022-12-28 RX ADMIN — LIDOCAINE HYDROCHLORIDE 50 MG: 20 INJECTION, SOLUTION EPIDURAL; INFILTRATION; INTRACAUDAL; PERINEURAL at 11:40

## 2022-12-28 RX ADMIN — LOSARTAN POTASSIUM 100 MG: 50 TABLET, FILM COATED ORAL at 09:39

## 2022-12-28 RX ADMIN — GLYCOPYRROLATE 0.1 MG: 0.2 INJECTION, SOLUTION INTRAMUSCULAR; INTRAVENOUS at 11:40

## 2022-12-28 RX ADMIN — AMLODIPINE BESYLATE 10 MG: 10 TABLET ORAL at 09:39

## 2022-12-28 RX ADMIN — PANTOPRAZOLE SODIUM 40 MG: 40 TABLET, DELAYED RELEASE ORAL at 20:48

## 2022-12-28 RX ADMIN — PROPOFOL 20 MG: 10 INJECTION, EMULSION INTRAVENOUS at 11:41

## 2022-12-28 RX ADMIN — SODIUM CHLORIDE, POTASSIUM CHLORIDE, SODIUM LACTATE AND CALCIUM CHLORIDE: 600; 310; 30; 20 INJECTION, SOLUTION INTRAVENOUS at 10:48

## 2022-12-28 RX ADMIN — SODIUM CHLORIDE, PRESERVATIVE FREE 10 ML: 5 INJECTION INTRAVENOUS at 20:48

## 2022-12-28 RX ADMIN — PRAVASTATIN SODIUM 40 MG: 80 TABLET ORAL at 20:47

## 2022-12-28 RX ADMIN — HYDRALAZINE HYDROCHLORIDE 25 MG: 25 TABLET ORAL at 05:18

## 2022-12-28 RX ADMIN — ACETAMINOPHEN 650 MG: 325 TABLET, FILM COATED ORAL at 05:35

## 2022-12-28 RX ADMIN — ONDANSETRON 4 MG: 2 INJECTION INTRAMUSCULAR; INTRAVENOUS at 11:40

## 2022-12-28 RX ADMIN — PREDNISONE 10 MG: 10 TABLET ORAL at 09:39

## 2022-12-28 ASSESSMENT — PAIN SCALES - GENERAL
PAINLEVEL_OUTOF10: 0
PAINLEVEL_OUTOF10: 0
PAINLEVEL_OUTOF10: 3

## 2022-12-28 ASSESSMENT — PAIN DESCRIPTION - DESCRIPTORS: DESCRIPTORS: SHARP;SHOOTING

## 2022-12-28 ASSESSMENT — PAIN - FUNCTIONAL ASSESSMENT: PAIN_FUNCTIONAL_ASSESSMENT: ACTIVITIES ARE NOT PREVENTED

## 2022-12-28 ASSESSMENT — PAIN DESCRIPTION - LOCATION: LOCATION: HEAD

## 2022-12-28 NOTE — ANESTHESIA PRE PROCEDURE
Department of Anesthesiology  Preprocedure Note       Name:  Tano Larios   Age:  [de-identified] y.o.  :  1940                                          MRN:  [id number]         Date:  2022      Surgeon: Sherine Fried):  Eladia Hollis MD    Procedure: Procedure(s):  ENDOSCOPIC ULTRASOUND W/ EGD/ /    Medications prior to admission:   Prior to Admission medications    Medication Sig Start Date End Date Taking? Authorizing Provider   predniSONE (DELTASONE) 20 MG tablet Take 1 pill once a day after breakfast for 10 days then 1/2 a pill once a day after breakfast for 10 days and then stop.  22   Irina Dawson MD   Etanercept (ENBREL SURECLICK) 50 MG/ML SOAJ Inject 50 mg into the skin every 7 days 10/25/22   Irina Dawson MD   potassium chloride (KLOR-CON M) 20 MEQ extended release tablet Take 1 tablet by mouth 2 times daily  Patient not taking: Reported on 2022 10/13/22   Scot Johnson MD   ferrous sulfate (IRON 325) 325 (65 Fe) MG tablet Take 1 tablet by mouth daily (with breakfast)  Patient not taking: Reported on 2022 10/13/22   Scot Johnson MD   donepezil (ARICEPT) 5 MG tablet Take 1 tablet by mouth nightly 10/12/22   Scot Johnson MD   losartan (COZAAR) 100 MG tablet Take 1 tablet by mouth daily 10/12/22   Scot Johnson MD   amLODIPine (NORVASC) 10 MG tablet Take 1 tablet by mouth daily 10/12/22   Scot Johnson MD   hydrALAZINE (APRESOLINE) 25 MG tablet Take 1 tablet by mouth every 8 hours 10/12/22   Scot oJhnson MD   alendronate (FOSAMAX) 70 MG tablet Take 1 tablet by mouth every 7 days 10/12/22   Scot Johnson MD   pravastatin (PRAVACHOL) 40 MG tablet Take 1 tablet by mouth daily 10/12/22   Scot Johnson MD   ondansetron (ZOFRAN-ODT) 4 MG disintegrating tablet Take 1 tablet by mouth every 8 hours as needed for Nausea or Vomiting 10/5/22   Steven Petit MD       Current medications:    Current Facility-Administered Medications Medication Dose Route Frequency Provider Last Rate Last Admin    acetaminophen (TYLENOL) tablet 1,000 mg  1,000 mg Oral Once Steve Houston MD        fentaNYL (SUBLIMAZE) injection 100 mcg  100 mcg IntraVENous Once PRN Steve Houston MD        scopolamine (TRANSDERM-SCOP) transdermal patch 1 patch  1 patch TransDERmal Q72H Steve Houston MD        sodium chloride flush 0.9 % injection 5-40 mL  5-40 mL IntraVENous 2 times per day Steve Houston MD        sodium chloride flush 0.9 % injection 5-40 mL  5-40 mL IntraVENous PRN Steve Houston MD        0.9 % sodium chloride infusion   IntraVENous PRN Steve Houston MD        midazolam PF (VERSED) injection 2 mg  2 mg IntraVENous Once PRN Steve Houston MD        lactated ringers infusion   IntraVENous Continuous Steve Houston  mL/hr at 12/28/22 1048 New Bag at 12/28/22 1048    polyethylene glycol (GLYCOLAX) packet 17 g  17 g Oral Daily Elester Nayana, APRN - CNP        hyoscyamine (LEVSIN/SL) sublingual tablet 125 mcg  125 mcg SubLINGual TID AC Elester Marios, APRN - CNP   125 mcg at 12/28/22 0518    docusate sodium (COLACE) capsule 100 mg  100 mg Oral Daily Elester Nayana, APRN - CNP   100 mg at 12/28/22 0940    potassium bicarb-citric acid (EFFER-K) effervescent tablet 40 mEq  40 mEq Oral Daily Emanuel Lai MD   40 mEq at 12/27/22 0809    0.9 % sodium chloride infusion   IntraVENous Continuous Emanuel Lai  mL/hr at 12/28/22 0333 New Bag at 12/28/22 0333    pantoprazole (PROTONIX) tablet 40 mg  40 mg Oral BID Stephon Hamilton MD   40 mg at 12/28/22 0940    amLODIPine (NORVASC) tablet 10 mg  10 mg Oral Daily Tammie Trujillo MD   10 mg at 12/28/22 8000    donepezil (ARICEPT) tablet 5 mg  5 mg Oral Nightly Tammie Trujillo MD   5 mg at 12/27/22 2025    ferrous sulfate (IRON 325) tablet 325 mg  325 mg Oral Daily with breakfast Tammie Trujillo MD   325 mg at 12/28/22 0939    hydrALAZINE (APRESOLINE) tablet 25 mg  25 mg Oral 3 times per day Zuleyma Collins MD   25 mg at 12/28/22 0518    losartan (COZAAR) tablet 100 mg  100 mg Oral Daily Zuleyma Collins MD   100 mg at 12/28/22 0939    pravastatin (PRAVACHOL) tablet 40 mg  40 mg Oral Nightly Zuleyma Collins MD   40 mg at 12/27/22 2025    predniSONE (DELTASONE) tablet 10 mg  10 mg Oral Daily Zuleyma Collins MD   10 mg at 12/28/22 0939    sodium chloride flush 0.9 % injection 5-40 mL  5-40 mL IntraVENous 2 times per day Zuleyma Collins MD   10 mL at 12/28/22 0944    sodium chloride flush 0.9 % injection 5-40 mL  5-40 mL IntraVENous PRN Zuleyma Collins MD        0.9 % sodium chloride infusion   IntraVENous PRN Zuleyma Collins MD        ondansetron (ZOFRAN-ODT) disintegrating tablet 4 mg  4 mg Oral Q8H PRN Zuleyma Collins MD   4 mg at 12/22/22 2134    Or    ondansetron (ZOFRAN) injection 4 mg  4 mg IntraVENous Q6H PRN Zuleyma Collins MD        acetaminophen (TYLENOL) tablet 650 mg  650 mg Oral Q6H PRN Zuleyma Collins MD   650 mg at 12/28/22 0535    Or    acetaminophen (TYLENOL) suppository 650 mg  650 mg Rectal Q6H PRN Zuleyma Collins MD        nicotine (NICODERM CQ) 21 MG/24HR 1 patch  1 patch TransDERmal Daily Zuleyma Collins MD   1 patch at 12/28/22 0940    HYDROmorphone (DILAUDID) injection 0.5 mg  0.5 mg IntraVENous Q4H PRN Zuleyma Collins MD   0.5 mg at 12/27/22 2036       Allergies:     Allergies   Allergen Reactions    Azithromycin Other (See Comments)     pancreatitis    Codeine Nausea And Vomiting       Problem List:    Patient Active Problem List   Diagnosis Code    PAD (peripheral artery disease) (HCC) I73.9    Hypercholesterolemia E78.00    Back pain M54.9    Malignant neoplasm of upper-outer quadrant of left breast in female, estrogen receptor positive ([facility] [address] C50.412, Z17.0    History of peptic ulcer Z87.11    Multiple thyroid nodules E04.2    Sinusitis J32.9    Intractable vomiting R11.10    History of acute pancreatitis Z87.19    Compression fracture of L1 lumbar vertebra ([facility]) S32.010A    Hypomagnesemia E83.42    Personal history of tobacco use, presenting hazards to health Z87.891    HTN (hypertension) I10    Age-related osteoporosis with current pathological fracture M80.00XA    Chronic obstructive pulmonary disease (HCC) J44.9    Eczema L30.9    Osteoarthritis M19.90    Paraseptal emphysema ([facility]) J43.8    Hypokalemia E87.6    Osteoporosis M81.0    S/P lumpectomy, left breast Z98.890    Acute pancreatitis K85.90    EPIFANIO (acute kidney injury) ([address] N17.9    Other acute pancreatitis without infection or necrosis K85.80    Chronic pancreatitis ([facility]) K86.1    Acute recurrent pancreatitis K85.90    Rheumatoid arthritis ([facility]) M06.9    Unintentional weight loss R63.4    Protein-calorie malnutrition, moderate ([facility]) E44.0    Lung nodule R91.1       Past Medical History:        Diagnosis Date    EPIFANIO (acute kidney injury) ([address] 12/04/2014    pt denies this dx    Arthritis     RA-abdelrahman hands    Back pain 6/10/2016    Breast cancer ([address] 2018    Breast lump dx 2/2018    left    Bronchitis     Chronic obstructive pulmonary disease ([address]     Discharge from the vagina     Dysuria     Eczema 6/10/2016    Fungal dermatitis     Headache 6/10/2016    Hypercholesterolemia 12/4/2014    Hypertension     not well controlled--per pt    Intractable vomiting 5/20/2018    Menopausal vaginal dryness     Osteoarthritis 6/10/2016    Osteoporosis 6/10/2016    Pancreatitis     2 episodes    PUD (peptic ulcer disease)     last 2003 which a rupture an extensive surgery    Sepsis ([address] 12/4/2014    Thyroid nodule     Tobacco abuse     1ppd x 49 yrs       Past Surgical History:        Procedure Laterality Date    APPENDECTOMY  1977    with hysterectomy    BREAST BIOPSY Left 1975    BREAST LUMPECTOMY Left 2/22/2018    LEFT BREAST LUMPECTOMY/ SENTINEL NODE BIOPSY performed by Gabriela Hartmann (40.4 kg)   11/05/22 92 lb (41.7 kg)     Body mass index is 16.21 kg/m². CBC:   Lab Results   Component Value Date/Time    WBC 7.3 12/28/2022 08:33 AM    RBC 3.81 12/28/2022 08:33 AM    HGB 10.1 12/28/2022 08:33 AM    HCT 31.9 12/28/2022 08:33 AM    MCV 83.7 12/28/2022 08:33 AM    RDW 19.3 12/28/2022 08:33 AM     12/28/2022 08:33 AM       CMP:   Lab Results   Component Value Date/Time     12/28/2022 08:33 AM    K 3.3 12/28/2022 08:33 AM     12/28/2022 08:33 AM    CO2 26 12/28/2022 08:33 AM    BUN 6 12/28/2022 08:33 AM    CREATININE 0.60 12/28/2022 08:33 AM    GFRAA >60 10/03/2022 07:01 AM    AGRATIO 1.5 03/30/2022 09:25 AM    LABGLOM >60 12/28/2022 08:33 AM    GLUCOSE 86 12/28/2022 08:33 AM    PROT 5.8 12/27/2022 05:58 AM    CALCIUM 8.6 12/28/2022 08:33 AM    BILITOT 0.3 12/27/2022 05:58 AM    ALKPHOS 49 12/27/2022 05:58 AM    ALKPHOS 78 03/30/2022 09:25 AM    AST 14 12/27/2022 05:58 AM    ALT 15 12/27/2022 05:58 AM       POC Tests: No results for input(s): POCGLU, POCNA, POCK, POCCL, POCBUN, POCHEMO, POCHCT in the last 72 hours.     Coags: No results found for: PROTIME, INR, APTT    HCG (If Applicable): No results found for: PREGTESTUR, PREGSERUM, HCG, HCGQUANT     ABGs: No results found for: PHART, PO2ART, FSA8CEH, VYQ2AVR, BEART, S2NHZQCJ     Type & Screen (If Applicable):  No results found for: LABABO, LABRH    Drug/Infectious Status (If Applicable):  No results found for: HIV, HEPCAB    COVID-19 Screening (If Applicable): No results found for: COVID19        Anesthesia Evaluation  Patient summary reviewed and Nursing notes reviewed  Airway: Mallampati: II  TM distance: >3 FB   Neck ROM: full     Dental:          Pulmonary:Negative Pulmonary ROS and normal exam    (+) COPD:                             Cardiovascular:  Exercise tolerance: good (>4 METS),   (+) hypertension:,         Rhythm: regular  Rate: normal                    Neuro/Psych:   Negative Neuro/Psych ROS GI/Hepatic/Renal: Neg GI/Hepatic/Renal ROS            Endo/Other: Negative Endo/Other ROS             Pt had no PAT visit       Abdominal:             Vascular: negative vascular ROS. Other Findings:           Anesthesia Plan      TIVA     ASA 2       Induction: intravenous. MIPS: Postoperative opioids intended and Prophylactic antiemetics administered. Anesthetic plan and risks discussed with patient.       Plan discussed with surgical team.                    Laura Santo MD   12/28/2022

## 2022-12-28 NOTE — PROGRESS NOTES
TRANSFER - IN REPORT:    Verbal report received from [facility] on [facility]  being received from [phone] for ordered procedure      Report consisted of patient's Situation, Background, Assessment and   Recommendations(SBAR). Information from the following report(s) Nurse Handoff Report was reviewed with the receiving nurse. Opportunity for questions and clarification was provided. Assessment completed upon patient's arrival to unit and care assumed.

## 2022-12-28 NOTE — PROGRESS NOTES
TRANSFER - IN REPORT:    Verbal report received from Laith Cano on Ivan Womack  being received from GI for routine post-op      Report consisted of patient's Situation, Background, Assessment and   Recommendations(SBAR). Information from the following report(s) Nurse Handoff Report was reviewed with the receiving nurse. Opportunity for questions and clarification was provided. Assessment completed upon patient's arrival to unit and care assumed.

## 2022-12-28 NOTE — INTERVAL H&P NOTE
Update History & Physical    The Patient's History and Physical attached below was reviewed with the patient and I examined the patient. There was no change in the plan, or pertinent findings. The surgical site was confirmed with the patient. Plan:  The risk, benefits, expected outcome, and alternative to the recommended procedure have been discussed with the patient. Patient understands and wants to proceed with the procedure.     Electronically signed by Juancho Mitchell MD

## 2022-12-28 NOTE — PROGRESS NOTES
SBAR given to primary RN [facility] MELISSA on this pt s/p EUS. Opportunities for questions and clarification given. Transport submitted for pt.

## 2022-12-28 NOTE — PROGRESS NOTES
TRANSFER - OUT REPORT:    Verbal report given to Bernadette Durand on Kristen Albrecht  being transferred to [facility] lab for ordered procedure       Report consisted of patient's Situation, Background, Assessment and   Recommendations(SBAR). Information from the following report(s) Nurse Handoff Report was reviewed with the receiving nurse. [place] Assessment: No data recorded  Lines:   Peripheral IV 60/39/41 Right Cephalic (Active)   Site Assessment Clean, dry & intact 12/28/22 0730   Line Status Infusing 12/28/22 0730   Line Care Connections checked and tightened 12/28/22 0730   Phlebitis Assessment No symptoms 12/28/22 0730   Infiltration Assessment 0 12/28/22 0730   Alcohol Cap Used Yes 12/28/22 0730   Dressing Status Clean, dry & intact 12/28/22 0730   Dressing Type Transparent 12/28/22 0730        Opportunity for questions and clarification was provided.       Patient to be transported with: transporter

## 2022-12-28 NOTE — ANESTHESIA POSTPROCEDURE EVALUATION
Department of Anesthesiology  Postprocedure Note    Patient: Gerardo Eden  MRN: [id number]  YOB: 1940  Date of evaluation: 12/28/2022      Procedure Summary     Date: 12/28/22 Room / Location: [facility] ENDO 05 / [facility] ENDOSCOPY    Anesthesia Start: 1137 Anesthesia Stop: 1218    Procedure: ENDOSCOPIC ULTRASOUND W/ EGD/ RM 46/ (Upper GI Region) Diagnosis:       Chronic pancreatitis, unspecified pancreatitis type ([address]      Cancer of head of pancreas ([address]      (Chronic pancreatitis, unspecified pancreatitis type ([address] [K86.1])      (Cancer of head of pancreas ([address] [C25.0])    Surgeons: Sohan Desouza MD Responsible Provider: Mey Choi MD    Anesthesia Type: TIVA, MAC ASA Status: 2          Anesthesia Type: TIVA, MAC    Vipin Phase I: Vipin Score: 10    Vipin Phase II: Vipin Score: 6      Anesthesia Post Evaluation    Patient location during evaluation: PACU  Patient participation: complete - patient participated  Level of consciousness: awake and awake and alert  Airway patency: patent  Nausea & Vomiting: no nausea  Complications: no  Cardiovascular status: hemodynamically stable  Respiratory status: acceptable  Hydration status: euvolemic  Multimodal analgesia pain management approach

## 2022-12-28 NOTE — PROGRESS NOTES
Hospitalist Progress Note   Admit Date:  2022  5:38 PM   Name:  Monster Collins   Age:  [de-identified] y.o. Sex:  female  :  1940   MRN:  [id number]   Room:  [facility]/    Presenting Complaint: Abdominal Pain     Reason(s) for Admission: Acute recurrent pancreatitis [K85.90]  Acute pancreatitis, unspecified complication status, unspecified pancreatitis type SPECIALTY [facility] Course:     Please refer to the admission H&P for details of presentation. In summary, Monster Collins is a [de-identified] y.o. female with medical history significant for  duodenal ulcer complicated by perforation requiring bilroth II,  idiopathic pancreatitis, rheumatoid arthritis on enbrel/ current course of steroids, HTN, ongoing tobacco use who is evaluated with acute onset of mid abdominal pain to her back. Per records, she was discharged on 2022 for recurrent pancreatitis and was seen by GI at that time. MRCP with mild dilatation of the pancreatic and common bile duct with abrupt tapering at the pancreatic head suspicious for pancreatic head mass. GI was consulted and is planned for outpatient colonoscopy on . Given patient's weight loss and smoking history, CT chest was done which shows a 13 mm x 11 mm left lower lobe nodule. Patient will need to follow-up with oncology for further work-up and biopsy. Subjective/24 hr Events (22) : Patient is seen and examined at bedside. No acute events reported overnight by nursing staff. Planned for EUS today by GI. Reports pain is improving but had 1 loose BM after food yesterday  Patient denies fever, chills, chest pains, shortness of breath. Review of Systems: 10 point review of systems is otherwise negative with the exception of the elements mentioned above. Assessment & Plan:   Acute recurrent pancreatitis in setting of idiopathic pancreatitis  MRCP result noted  22: Pain improved but still present. Lipase trending down.  Had episode of diarrhea after food yesterday evening.  - GI planned for EUS today. - continue with CLDs post-procedure  - IVF  - pain regimen    Lung Nodule  [id number] LLL Nodule. Concerning for malignancy as patient is a daily smoker with symptoms of unintentional weight loss and night sweats.  -Oncology recs appreciated. follow up with  and outpatient PET  - IR Consult for lung biopsy - planned for outpatient      Rheumatoid arthritis  - on prednisone 10mg daily. Hypokalemia and hypomagnesemia: resolved    Unintentional Weight loss  Moderate protein calorie malnutrition  Thin female with 23lb unintentional weight loss  - nutrition consult  - CT showed lung nodule. Thyroid Nodule  CT chest in 2018 with 1.3cm thyroid nodule  TSH low with normal T4 and normal T3.  - continue outpatient followup /surveillance. Tobacco Dependence  - nicotine patch    HTN // HLD : continue with home medications    Diet:  ADULT DIET; Clear Liquid  DVT PPx: heparin  Code status: Full Code      Hospital Problems:  Principal Problem:    Acute recurrent pancreatitis  Active Problems:    Chronic pancreatitis (HCC)    Rheumatoid arthritis ([address]    Unintentional weight loss    Protein-calorie malnutrition, moderate (HCC)    Lung nodule    Hypercholesterolemia    Multiple thyroid nodules    Hypomagnesemia    Personal history of tobacco use, presenting hazards to health    HTN (hypertension)    Hypokalemia  Resolved Problems:    * No resolved hospital problems.  *      Objective:   Patient Vitals for the past 24 hrs:   Temp Pulse Resp BP SpO2   12/28/22 1300 -- 65 16 124/60 98 %   12/28/22 1250 -- 63 16 130/62 97 %   12/28/22 1240 -- 67 16 113/65 94 %   12/28/22 1231 -- 75 16 (!) 126/58 96 %   12/28/22 1220 -- 75 16 (!) 120/57 95 %   12/28/22 1215 98.6 °F (37 °C) 85 18 (!) 116/56 98 %   12/28/22 1040 97.8 °F (36.6 °C) 72 16 (!) 151/70 98 %   12/28/22 0723 97.9 °F (36.6 °C) 75 18 (!) 149/61 97 %   12/28/22 0519 97.5 °F (36.4 °C) 86 19 (!) 182/83 97 %   12/28/22 0035 97.7 °F (36.5 °C) 78 17 (!) 176/56 97 %   12/27/22 2106 -- -- 16 -- --   12/27/22 2036 -- -- 18 -- --   12/27/22 1954 97.9 °F (36.6 °C) 67 16 (!) 142/70 98 %   12/27/22 1545 98.1 °F (36.7 °C) 82 18 (!) 156/74 96 %       Oxygen Therapy  SpO2: 98 %  Pulse via Oximetry: 66 beats per minute  Pulse Oximeter Device Mode: Continuous  Pulse Oximeter Device Location: Right, Finger  O2 Device: None (Room air)  O2 Flow Rate (L/min): 3 L/min    Estimated body mass index is 16.21 kg/m² as calculated from the following:    Height as of this encounter: 5' 2\" (1.575 m). Weight as of this encounter: 88 lb 10 oz (40.2 kg). Intake/Output Summary (Last 24 hours) at 12/28/2022 1439  Last data filed at 12/28/2022 1218  Gross per 24 hour   Intake 300 ml   Output --   Net 300 ml           Physical Exam:     Blood pressure 124/60, pulse 65, temperature 98.6 °F (37 °C), resp. rate 16, height 5' 2\" (1.575 m), weight 88 lb 10 oz (40.2 kg), SpO2 98 %. General:    Thin, chronically ill appearing. Head:  Normocephalic, atraumatic  Eyes:  Sclerae appear normal.  Pupils equally round. ENT:  Nares appear normal, no drainage. Moist oral mucosa  Neck:  No restricted ROM. Trachea midline   CV:   RRR. No m/r/g. No jugular venous distension. Lungs:   CTAB. No wheezing. Symmetric expansion. Abdomen: Bowel sounds present. Soft, tender on palpation of epigatrick and mid abdomen area  Extremities: No cyanosis or clubbing. No edema  Skin:     No rashes and normal coloration. Warm and dry. Neuro:  CN II-XII grossly intact. A&Ox3  Psych:  Normal mood and affect.       I have personally reviewed labs and tests showing:  Recent Labs:  Recent Results (from the past 48 hour(s))   CBC with Auto Differential    Collection Time: 12/27/22  5:58 AM   Result Value Ref Range    WBC 6.1 4.3 - 11.1 K/uL    RBC 4.01 (L) 4.05 - 5.2 M/uL    Hemoglobin 10.5 (L) 11.7 - 15.4 g/dL    Hematocrit 33.5 (L) 35.8 - 46.3 %    MCV 83.5 82 - 102 FL    MCH 26.2 26.1 - 32.9 PG    MCHC 31.3 (L) 31.4 - 35.0 g/dL    RDW 19.9 (H) 11.9 - 14.6 %    Platelets 559 199 - 727 K/uL    MPV 9.4 9.4 - 12.3 FL    nRBC 0.00 0.0 - 0.2 K/uL    Differential Type AUTOMATED      Seg Neutrophils 64 43 - 78 %    Lymphocytes 25 13 - 44 %    Monocytes 8 4.0 - 12.0 %    Eosinophils % 2 0.5 - 7.8 %    Basophils 1 0.0 - 2.0 %    Immature Granulocytes 0 0.0 - 5.0 %    Segs Absolute 4.0 1.7 - 8.2 K/UL    Absolute Lymph # 1.5 0.5 - 4.6 K/UL    Absolute Mono # 0.5 0.1 - 1.3 K/UL    Absolute Eos # 0.1 0.0 - 0.8 K/UL    Basophils Absolute 0.0 0.0 - 0.2 K/UL    Absolute Immature Granulocyte 0.0 0.0 - 0.5 K/UL   Basic Metabolic Panel w/ Reflex to MG    Collection Time: 12/27/22  5:58 AM   Result Value Ref Range    Sodium 142 133 - 143 mmol/L    Potassium 3.6 3.5 - 5.1 mmol/L    Chloride 111 (H) 101 - 110 mmol/L    CO2 27 21 - 32 mmol/L    Anion Gap 4 2 - 11 mmol/L    Glucose 92 65 - 100 mg/dL    BUN 7 (L) 8 - 23 MG/DL    Creatinine 0.40 (L) 0.6 - 1.0 MG/DL    Est, Glom Filt Rate >60 >60 ml/min/1.73m2    Calcium 8.3 8.3 - 10.4 MG/DL   Lipase    Collection Time: 12/27/22  5:58 AM   Result Value Ref Range    Lipase 1,484 (H) 73 - 393 U/L   Hepatic Function Panel    Collection Time: 12/27/22  5:58 AM   Result Value Ref Range    Total Protein 5.8 (L) 6.3 - 8.2 g/dL    Albumin 2.4 (L) 3.2 - 4.6 g/dL    Globulin 3.4 2.8 - 4.5 g/dL    Albumin/Globulin Ratio 0.7 0.4 - 1.6      Total Bilirubin 0.3 0.2 - 1.1 MG/DL    Bilirubin, Direct <0.1 <0.4 MG/DL    Alk Phosphatase 49 (L) 50 - 136 U/L    AST 14 (L) 15 - 37 U/L    ALT 15 12 - 65 U/L   Culture, Stool    Collection Time: 12/27/22  9:11 AM    Specimen: Stool   Result Value Ref Range    Special Requests NO SPECIAL REQUESTS      Culture        NO GROWTH OF SALMONELLA OR SHIGELLA IS EVIDENT ON FIRST READING, FINAL REPORT TO FOLLOW AFTER FURTHER INCUBATION OF CULTURE   Basic Metabolic Panel w/ Reflex to MG    Collection Time: 12/28/22  8:33 AM Result Value Ref Range    Sodium 143 133 - 143 mmol/L    Potassium 3.3 (L) 3.5 - 5.1 mmol/L    Chloride 110 101 - 110 mmol/L    CO2 26 21 - 32 mmol/L    Anion Gap 7 2 - 11 mmol/L    Glucose 86 65 - 100 mg/dL    BUN 6 (L) 8 - 23 MG/DL    Creatinine 0.60 0.6 - 1.0 MG/DL    Est, Glom Filt Rate >60 >60 ml/min/1.73m2    Calcium 8.6 8.3 - 10.4 MG/DL   CBC with Auto Differential    Collection Time: 12/28/22  8:33 AM   Result Value Ref Range    WBC 7.3 4.3 - 11.1 K/uL    RBC 3.81 (L) 4.05 - 5.2 M/uL    Hemoglobin 10.1 (L) 11.7 - 15.4 g/dL    Hematocrit 31.9 (L) 35.8 - 46.3 %    MCV 83.7 82 - 102 FL    MCH 26.5 26.1 - 32.9 PG    MCHC 31.7 31.4 - 35.0 g/dL    RDW 19.3 (H) 11.9 - 14.6 %    Platelets 346 487 - 414 K/uL    MPV 9.0 (L) 9.4 - 12.3 FL    nRBC 0.00 0.0 - 0.2 K/uL    Differential Type AUTOMATED      Seg Neutrophils 75 43 - 78 %    Lymphocytes 16 13 - 44 %    Monocytes 7 4.0 - 12.0 %    Eosinophils % 2 0.5 - 7.8 %    Basophils 0 0.0 - 2.0 %    Immature Granulocytes 0 0.0 - 5.0 %    Segs Absolute 5.5 1.7 - 8.2 K/UL    Absolute Lymph # 1.2 0.5 - 4.6 K/UL    Absolute Mono # 0.5 0.1 - 1.3 K/UL    Absolute Eos # 0.1 0.0 - 0.8 K/UL    Basophils Absolute 0.0 0.0 - 0.2 K/UL    Absolute Immature Granulocyte 0.0 0.0 - 0.5 K/UL   Lipase    Collection Time: 12/28/22  8:33 AM   Result Value Ref Range    Lipase 605 (H) 73 - 393 U/L   Magnesium    Collection Time: 12/28/22  8:33 AM   Result Value Ref Range    Magnesium 1.9 1.8 - 2.4 mg/dL       I have personally reviewed imaging studies showing: Other Studies:  MRI ABDOMEN W WO CONTRAST   Final Result   1. The study is moderately limited due to motion artifact. 2.  Mild dilation of the pancreatic and common bile ducts with abrupt tapering   at the pancreatic head. Findings are suspicious for a pancreatic head mass,   although no discrete mass is visualized.        CT CHEST WO CONTRAST   Final Result   There is a new 13 x 11 mm nodule in the superior segment left lower   lobe, suspicious for malignancy. PET/CT or tissue sampling recommended. Current Meds:  Current Facility-Administered Medications   Medication Dose Route Frequency    lactated ringers infusion   IntraVENous Continuous    polyethylene glycol (GLYCOLAX) packet 17 g  17 g Oral Daily    hyoscyamine (LEVSIN/SL) sublingual tablet 125 mcg  125 mcg SubLINGual TID AC    docusate sodium (COLACE) capsule 100 mg  100 mg Oral Daily    potassium bicarb-citric acid (EFFER-K) effervescent tablet 40 mEq  40 mEq Oral Daily    0.9 % sodium chloride infusion   IntraVENous Continuous    pantoprazole (PROTONIX) tablet 40 mg  40 mg Oral BID    amLODIPine (NORVASC) tablet 10 mg  10 mg Oral Daily    donepezil (ARICEPT) tablet 5 mg  5 mg Oral Nightly    ferrous sulfate (IRON 325) tablet 325 mg  325 mg Oral Daily with breakfast    hydrALAZINE (APRESOLINE) tablet 25 mg  25 mg Oral 3 times per day    losartan (COZAAR) tablet 100 mg  100 mg Oral Daily    pravastatin (PRAVACHOL) tablet 40 mg  40 mg Oral Nightly    predniSONE (DELTASONE) tablet 10 mg  10 mg Oral Daily    sodium chloride flush 0.9 % injection 5-40 mL  5-40 mL IntraVENous 2 times per day    sodium chloride flush 0.9 % injection 5-40 mL  5-40 mL IntraVENous PRN    0.9 % sodium chloride infusion   IntraVENous PRN    ondansetron (ZOFRAN-ODT) disintegrating tablet 4 mg  4 mg Oral Q8H PRN    Or    ondansetron (ZOFRAN) injection 4 mg  4 mg IntraVENous Q6H PRN    acetaminophen (TYLENOL) tablet 650 mg  650 mg Oral Q6H PRN    Or    acetaminophen (TYLENOL) suppository 650 mg  650 mg Rectal Q6H PRN    nicotine (NICODERM CQ) 21 MG/24HR 1 patch  1 patch TransDERmal Daily    HYDROmorphone (DILAUDID) injection 0.5 mg  0.5 mg IntraVENous Q4H PRN       Signed:  Desiree Hernandez MD    Part of this note may have been written by using a voice dictation software. The note has been proof read but may still contain some grammatical/other typographical errors.

## 2022-12-28 NOTE — CARE COORDINATION
CM still following patient's chart. No needs have been voiced/identified at this time. CM will continue to follow to assist with discharge planning.

## 2022-12-28 NOTE — OP NOTE
ENDOSCOPIC ULTRASOUND PROCEDURE NOTE    DATE OF PROCEDURE: 12/28/2022    PRE-OP DIAGNOSIS:  Pancreatic duct dilation  Recurrent acute pancreatitis  CBD dilation    POST-OP DIAGNOSIS:  Minimal PD dilation  Partial gastrectomy / Jaziel en Y    MEDICATIONS ADMINISTERED: MAC    INSTRUMENT: Michelle [id number]    PROCEDURE:  After obtaining informed consent, the patient was placed in the left lateral position and sedated. The echoendoscope was advanced to the upper GI tract without difficulty. The scope was slowly removed while detailed images of the adjacent organs were obtained. The patient was taken to the recovery area in stable condition. FINDINGS:  ESOPHAGUS: Not well visualized  STOMACH: Limited views with the oblique-viewing echoendoscope were unremarkable. Small gastric pouch. No Gastric ulcers. Small Bowel: Patent small bowel anastomosis. Limited views with the oblique-viewing echoendoscope were unremarkable. The ampulla was not visualized due to post-op anatomy. PANCREAS: The pancreas was well visualized in the body and tail. Views of the head are very limited (normally seen from D1/ D2). In the body and tail, the main pancreatic duct is mildly dilated (4.5mm body, 2mm tail). It has a smooth regular course. Otherwise, there are no changes of acute or chronic pancreatitis. No lobularity, dilated side branches or hyperechoic strands, etc.  No cyst or mass present. Limited views of the head are obtained from the gastric lumen, and no large mass is identified. A small mass at the ampulla would be difficult to exclude. BILIARY SYSTEM: The gallbladder was absent . Limited views of the CBD obtained from the stomach. It measures 9mm in diameter, within normal limits. No stones are seen. OTHER ORGANS: There was no ascites in the upper abdomen. Limited views of the left lobe of the liver demonstrated no solid mass lesions. The celiac axis appears highly calcified, c/w atherosclerosis.   No significant celiac or daly-pancreatic lymphadenopathy.     Estimated blood loss: 0-minimal     PLAN:  FU labs  Advance diet as tolerated  If acute pancreatitis re-occurs, consider ERCP / pancreatic sphincterotomy with surgical access or colonoscope    Serge Barnhart MD  [facility]

## 2022-12-29 ENCOUNTER — APPOINTMENT (OUTPATIENT)
Dept: GENERAL RADIOLOGY | Age: 82
End: 2022-12-29
Payer: MEDICARE

## 2022-12-29 LAB
ANION GAP SERPL CALC-SCNC: 5 MMOL/L (ref 2–11)
BACTERIA SPEC CULT: NORMAL
BUN SERPL-MCNC: 6 MG/DL (ref 8–23)
CALCIUM SERPL-MCNC: 8.6 MG/DL (ref 8.3–10.4)
CHLORIDE SERPL-SCNC: 112 MMOL/L (ref 101–110)
CO2 SERPL-SCNC: 25 MMOL/L (ref 21–32)
CREAT SERPL-MCNC: 0.6 MG/DL (ref 0.6–1)
GLUCOSE SERPL-MCNC: 88 MG/DL (ref 65–100)
POTASSIUM SERPL-SCNC: 4.2 MMOL/L (ref 3.5–5.1)
SERVICE CMNT-IMP: NORMAL
SODIUM SERPL-SCNC: 142 MMOL/L (ref 133–143)

## 2022-12-29 PROCEDURE — 87045 FECES CULTURE AEROBIC BACT: CPT

## 2022-12-29 PROCEDURE — 36415 COLL VENOUS BLD VENIPUNCTURE: CPT

## 2022-12-29 PROCEDURE — 2580000003 HC RX 258: Performed by: INTERNAL MEDICINE

## 2022-12-29 PROCEDURE — 6370000000 HC RX 637 (ALT 250 FOR IP): Performed by: HOSPITALIST

## 2022-12-29 PROCEDURE — 6370000000 HC RX 637 (ALT 250 FOR IP): Performed by: NURSE PRACTITIONER

## 2022-12-29 PROCEDURE — 6370000000 HC RX 637 (ALT 250 FOR IP): Performed by: INTERNAL MEDICINE

## 2022-12-29 PROCEDURE — 1100000000 HC RM PRIVATE

## 2022-12-29 PROCEDURE — 71045 X-RAY EXAM CHEST 1 VIEW: CPT

## 2022-12-29 PROCEDURE — 80048 BASIC METABOLIC PNL TOTAL CA: CPT

## 2022-12-29 RX ORDER — POLYETHYLENE GLYCOL 3350 17 G/17G
17 POWDER, FOR SOLUTION ORAL 2 TIMES DAILY
Status: DISCONTINUED | OUTPATIENT
Start: 2022-12-29 | End: 2022-12-30 | Stop reason: HOSPADM

## 2022-12-29 RX ADMIN — AMLODIPINE BESYLATE 10 MG: 10 TABLET ORAL at 09:58

## 2022-12-29 RX ADMIN — POLYETHYLENE GLYCOL 3350 17 G: 17 POWDER, FOR SOLUTION ORAL at 21:05

## 2022-12-29 RX ADMIN — SODIUM CHLORIDE: 9 INJECTION, SOLUTION INTRAVENOUS at 04:42

## 2022-12-29 RX ADMIN — PANTOPRAZOLE SODIUM 40 MG: 40 TABLET, DELAYED RELEASE ORAL at 21:04

## 2022-12-29 RX ADMIN — DOCUSATE SODIUM 100 MG: 100 CAPSULE, LIQUID FILLED ORAL at 09:58

## 2022-12-29 RX ADMIN — POLYETHYLENE GLYCOL 3350 17 G: 17 POWDER, FOR SOLUTION ORAL at 09:58

## 2022-12-29 RX ADMIN — FERROUS SULFATE TAB 325 MG (65 MG ELEMENTAL FE) 325 MG: 325 (65 FE) TAB at 09:58

## 2022-12-29 RX ADMIN — HYOSCYAMINE SULFATE 125 MCG: 0.12 TABLET ORAL; SUBLINGUAL at 05:30

## 2022-12-29 RX ADMIN — HYDRALAZINE HYDROCHLORIDE 25 MG: 25 TABLET ORAL at 21:05

## 2022-12-29 RX ADMIN — PREDNISONE 10 MG: 10 TABLET ORAL at 09:58

## 2022-12-29 RX ADMIN — LOSARTAN POTASSIUM 100 MG: 50 TABLET, FILM COATED ORAL at 09:58

## 2022-12-29 RX ADMIN — SODIUM CHLORIDE, PRESERVATIVE FREE 5 ML: 5 INJECTION INTRAVENOUS at 21:06

## 2022-12-29 RX ADMIN — HYOSCYAMINE SULFATE 125 MCG: 0.12 TABLET ORAL; SUBLINGUAL at 10:14

## 2022-12-29 RX ADMIN — SODIUM CHLORIDE, PRESERVATIVE FREE 10 ML: 5 INJECTION INTRAVENOUS at 09:59

## 2022-12-29 RX ADMIN — PRAVASTATIN SODIUM 40 MG: 80 TABLET ORAL at 21:05

## 2022-12-29 RX ADMIN — PANTOPRAZOLE SODIUM 40 MG: 40 TABLET, DELAYED RELEASE ORAL at 09:58

## 2022-12-29 RX ADMIN — DONEPEZIL HYDROCHLORIDE 5 MG: 5 TABLET, FILM COATED ORAL at 21:04

## 2022-12-29 RX ADMIN — POTASSIUM BICARBONATE 40 MEQ: 782 TABLET, EFFERVESCENT ORAL at 09:58

## 2022-12-29 RX ADMIN — HYDRALAZINE HYDROCHLORIDE 25 MG: 25 TABLET ORAL at 05:30

## 2022-12-29 RX ADMIN — HYOSCYAMINE SULFATE 125 MCG: 0.12 TABLET ORAL; SUBLINGUAL at 16:21

## 2022-12-29 ASSESSMENT — PAIN SCALES - GENERAL
PAINLEVEL_OUTOF10: 0

## 2022-12-29 NOTE — PROGRESS NOTES
[facility] Progress Note         Admit Date:  12/21/2022    Today's Date:  12/29/2022    CC:  Pancreatitis of unclear etiology    Subjective:     Patient : Patient is tearful this morning \"I'm starving. I'm sick of this stuff (clear liquid diet). She denies any abdominal pain in 2 days since she began the hyoscyamine. She denies any BM in 2 days. She denies any N&V.     ENDOSCOPIC ULTRASOUND PROCEDURE NOTE    DATE OF PROCEDURE: 12/28/2022     PRE-OP DIAGNOSIS:  Pancreatic duct dilation  Recurrent acute pancreatitis  CBD dilation     POST-OP DIAGNOSIS:  Minimal PD dilation  Partial gastrectomy / Jaziel en Y     MEDICATIONS ADMINISTERED: MAC     INSTRUMENT: Your Image by Brooke [id number]     PROCEDURE:  After obtaining informed consent, the patient was placed in the left lateral position and sedated. The echoendoscope was advanced to the upper GI tract without difficulty. The scope was slowly removed while detailed images of the adjacent organs were obtained. The patient was taken to the recovery area in stable condition. FINDINGS:  ESOPHAGUS: Not well visualized  STOMACH: Limited views with the oblique-viewing echoendoscope were unremarkable. Small gastric pouch. No Gastric ulcers. Small Bowel: Patent small bowel anastomosis. Limited views with the oblique-viewing echoendoscope were unremarkable. The ampulla was not visualized due to post-op anatomy. PANCREAS: The pancreas was well visualized in the body and tail. Views of the head are very limited (normally seen from D1/ D2). In the body and tail, the main pancreatic duct is mildly dilated (4.5mm body, 2mm tail). It has a smooth regular course. Otherwise, there are no changes of acute or chronic pancreatitis. No lobularity, dilated side branches or hyperechoic strands, etc.  No cyst or mass present. Limited views of the head are obtained from the gastric lumen, and no large mass is identified. A small mass at the ampulla would be difficult to exclude. BILIARY SYSTEM: The gallbladder was absent . Limited views of the CBD obtained from the stomach. It measures 9mm in diameter, within normal limits. No stones are seen. OTHER ORGANS: There was no ascites in the upper abdomen. Limited views of the left lobe of the liver demonstrated no solid mass lesions. The celiac axis appears highly calcified, c/w atherosclerosis. No significant celiac or daly-pancreatic lymphadenopathy.      Estimated blood loss: 0-minimal      PLAN:  FU labs  Advance diet as tolerated  If acute pancreatitis re-occurs, consider ERCP / pancreatic sphincterotomy with surgical access or colonoscope     Fermín Snyder MD  [facility]    Medications:   Current Facility-Administered Medications   Medication Dose Route Frequency    polyethylene glycol (GLYCOLAX) packet 17 g  17 g Oral Daily    hyoscyamine (LEVSIN/SL) sublingual tablet 125 mcg  125 mcg SubLINGual TID AC    docusate sodium (COLACE) capsule 100 mg  100 mg Oral Daily    potassium bicarb-citric acid (EFFER-K) effervescent tablet 40 mEq  40 mEq Oral Daily    pantoprazole (PROTONIX) tablet 40 mg  40 mg Oral BID    amLODIPine (NORVASC) tablet 10 mg  10 mg Oral Daily    donepezil (ARICEPT) tablet 5 mg  5 mg Oral Nightly    ferrous sulfate (IRON 325) tablet 325 mg  325 mg Oral Daily with breakfast    hydrALAZINE (APRESOLINE) tablet 25 mg  25 mg Oral 3 times per day    losartan (COZAAR) tablet 100 mg  100 mg Oral Daily    pravastatin (PRAVACHOL) tablet 40 mg  40 mg Oral Nightly    predniSONE (DELTASONE) tablet 10 mg  10 mg Oral Daily    sodium chloride flush 0.9 % injection 5-40 mL  5-40 mL IntraVENous 2 times per day    sodium chloride flush 0.9 % injection 5-40 mL  5-40 mL IntraVENous PRN    0.9 % sodium chloride infusion   IntraVENous PRN    ondansetron (ZOFRAN-ODT) disintegrating tablet 4 mg  4 mg Oral Q8H PRN    Or    ondansetron (ZOFRAN) injection 4 mg  4 mg IntraVENous Q6H PRN    acetaminophen (TYLENOL) tablet 650 mg  650 mg Oral Q6H PRN    Or    acetaminophen (TYLENOL) suppository 650 mg  650 mg Rectal Q6H PRN    nicotine (NICODERM CQ) 21 MG/24HR 1 patch  1 patch TransDERmal Daily    HYDROmorphone (DILAUDID) injection 0.5 mg  0.5 mg IntraVENous Q4H PRN       Review of Systems:  ROS was obtained, with pertinent positives as listed above. No chest pain or SOB. Diet:  clear liquid diet    Objective:   Vitals:  /62   Pulse 64   Temp 98.3 °F (36.8 °C) (Oral)   Resp 18   Ht 5' 2\" (1.575 m)   Wt 88 lb 10 oz (40.2 kg)   SpO2 97%   BMI 16.21 kg/m²   Intake/Output:  No intake/output data recorded. 12/27 1901 - 12/29 0700  In: 300 [I.V.:300]  Out: -   Exam:  General appearance: alert, cooperative, no distress Tearful  Lungs: clear to auscultation bilaterally anteriorly  Heart: regular rate and rhythm  Abdomen: soft, non-tender.  Bowel sounds normal. No masses, no organomegaly  Extremities: extremities normal, atraumatic, no cyanosis or edema  Neuro:  alert and oriented    Data Review (Labs):    Recent Labs     12/27/22  0558 12/28/22  0833 12/29/22  0649   WBC 6.1 7.3  --    HGB 10.5* 10.1*  --    HCT 33.5* 31.9*  --     290  --    MCV 83.5 83.7  --     143 142   K 3.6 3.3* 4.2   * 110 112*   CO2 27 26 25   BUN 7* 6* 6*   CREATININE 0.40* 0.60 0.60   CALCIUM 8.3 8.6 8.6   MG  --  1.9  --    GLUCOSE 92 86 88   ALKPHOS 49*  --   --    AST 14*  --   --    ALT 15  --   --    BILITOT 0.3  --   --    BILIDIR <0.1  --   --    ALBUMIN 0.7  --   --    PROT 5.8*  --   --    LIPASE 1,484* 605*  --        Assessment:     Principal Problem:    Acute recurrent pancreatitis  Active Problems:    Chronic pancreatitis (HCC)    Rheumatoid arthritis (HCC)    Unintentional weight loss    Protein-calorie malnutrition, moderate (HCC)    Lung nodule    Hypercholesterolemia    Multiple thyroid nodules    Hypomagnesemia    Personal history of tobacco use, presenting hazards to health    HTN (hypertension) Hypokalemia  Resolved Problems:    * No resolved hospital problems. *  [de-identified] y.o. female with PMH including but not limited to, HTN, tobacco usage, RA on Enbrel/steroids, duodenal ulcer s/p Bilroth II, cholecystectomy, idiopathic pancreatitis who we are following for persistent idiopathic pancreatitis. MRCP 12/23/2022 showing pancreatic duct dilation as prior with abrupt tapering at [facility] as prior without clear mass lesion noted. CA 19.9 normal 6.7 on 12/25/22. Lipase increased again on 12/26 with worsening pain after eating. 12/29/2022 Underwent EUS by Verita Ion on 12/28 revealed no mass, no acute or chronic changes of pancreatitis and mildly dilated pancreatic duct. Her pain has improved with antispasmodics. She may be having some sphincter of Oddi dysfunction or bowel spasm. If pancreatitis recurs, consider ERCP / pancreatic sphincterotomy with surgical access or colonoscope at a tertiary care center    Plan:     -Advance diet to easy chew, GI bland, low fat  -Continue hyoscyamine 0.125 mg SL TID PRN abdominal pain  -Increase Miralax to 17 gm bid  -We will sign off. Please call back for any questions. Ladarius Anderson.  Farrah Mak in collaboration with Dr. Kandy Marques  [facility]

## 2022-12-29 NOTE — PROGRESS NOTES
Patient pain controlled, no pain medications needed this shift. Diet upgraded, to bland diet. tolerating well. Patient denies n/v/d or belly pain . Candice Blooddoyle Stool cultures sent.

## 2022-12-29 NOTE — PROGRESS NOTES
Hospitalist Progress Note   Admit Date:  2022  5:38 PM   Name:  Jeff Hazel   Age:  [de-identified] y.o. Sex:  female  :  1940   MRN:  [id number]   Room:  [facility]/    Presenting Complaint: Abdominal Pain     Reason(s) for Admission: Acute recurrent pancreatitis [K85.90]  Acute pancreatitis, unspecified complication status, unspecified pancreatitis type [facility] Course:     Please refer to the admission H&P for details of presentation. In summary, Jeff Hazel is a [de-identified] y.o. female with medical history significant for  duodenal ulcer complicated by perforation requiring bilroth II,  idiopathic pancreatitis, rheumatoid arthritis on enbrel/ current course of steroids, HTN, ongoing tobacco use who is evaluated with acute onset of mid abdominal pain to her back. Per records, she was discharged on 2022 for recurrent pancreatitis and was seen by GI at that time. MRCP with mild dilatation of the pancreatic and common bile duct with abrupt tapering at the pancreatic head suspicious for pancreatic head mass. GI was consulted and is planned for outpatient colonoscopy on . Given patient's weight loss and smoking history, CT chest was done which shows a 13 mm x 11 mm left lower lobe nodule. Patient will need to follow-up with oncology for further work-up and biopsy. Subjective/24 hr Events (22) : Patient is seen and examined at bedside. No acute events reported overnight by nursing staff. Pain has improved markedly and has not required pain medicine. Unhappy about being still on CLD diet. No BM in 24hrs. Patient denies fever, chills, chest pains, shortness of breath. Review of Systems: 10 point review of systems is otherwise negative with the exception of the elements mentioned above.       Assessment & Plan:   Acute recurrent pancreatitis in setting of idiopathic pancreatitis  MRCP result noted  22: EUS yesterday without mass, acute nor chronic pancreatitis changes but with mildly dilated pancreatic duct. - GI recs appreciated. Advance diet to easy chew/gi blend/low far. - hyoscyamine 0.125 mg SL TID PRN abdominal pain and Increase Miralax to 17 gm bid  - pain regimen    Lung Nodule  [id number] LLL Nodule noted on CT. Concerning for malignancy as patient is a daily smoker with symptoms of unintentional weight loss and night sweats.  - Oncology recs appreciated. Follow up with Cesia Jackson and outpatient PET  - IR Consult for lung biopsy - planned for outpatient on 1/5 @ 8am    Rheumatoid arthritis  - on prednisone 10mg daily. Unintentional Weight loss  Moderate protein calorie malnutrition  Thin female with 23lb unintentional weight loss  - nutrition consult  - CT showed lung nodule. Thyroid Nodule  CT chest in 2018 with 1.3cm thyroid nodule  TSH low with normal T4 and normal T3.  - continue outpatient followup /surveillance. Tobacco Dependence  - nicotine patch    HTN // HLD : continue with home medications    Diet:  ADULT DIET; Easy to Chew; Low Fat (less than or equal to 50 gm/day); Low Fiber; No Caffeine  ADULT ORAL NUTRITION SUPPLEMENT; Breakfast, Lunch, Dinner; Low Calorie/High Protein Oral Supplement  DVT PPx: heparin  Code status: Full Code      I have personally reviewed and ordered clinical lab tests and independently visualized images, tracing. I spent 36 minutes of time caring for this patient at bedside or nearby, and more than 50 percent of which was spent on coordination of care and/or patient/family counseling regarding the disease process, status , and treatment options/plan of care.     I have called and discussed with patient's daughter Namita regarding plan of care and appointment for outpatient follow up    Hospital Problems:  Principal Problem:    Acute recurrent pancreatitis  Active Problems:    Chronic pancreatitis ([facility] [address]    Rheumatoid arthritis ([facility] [address]    Unintentional weight loss    Protein-calorie malnutrition, moderate ([facility] [address]    Lung nodule    Hypercholesterolemia    Multiple thyroid nodules    Hypomagnesemia    Personal history of tobacco use, presenting hazards to health    HTN (hypertension)    Hypokalemia  Resolved Problems:    * No resolved hospital problems. *      Objective:   Patient Vitals for the past 24 hrs:   Temp Pulse Resp BP SpO2   12/29/22 1127 98.4 °F (36.9 °C) 82 18 (!) 130/55 96 %   12/29/22 0726 98.3 °F (36.8 °C) 64 18 122/62 97 %   12/29/22 0516 97.6 °F (36.4 °C) 79 18 (!) 146/57 96 %   12/28/22 2326 98.3 °F (36.8 °C) 72 18 (!) 146/58 97 %   12/28/22 1934 98.5 °F (36.9 °C) 95 18 (!) 142/69 96 %   12/28/22 1509 98.4 °F (36.9 °C) 62 18 (!) 142/57 96 %   12/28/22 1300 -- 65 16 124/60 98 %   12/28/22 1250 -- 63 16 130/62 97 %   12/28/22 1240 -- 67 16 113/65 94 %   12/28/22 1231 -- 75 16 (!) 126/58 96 %   12/28/22 1220 -- 75 16 (!) 120/57 95 %   12/28/22 1215 98.6 °F (37 °C) 85 18 (!) 116/56 98 %       Oxygen Therapy  SpO2: 96 %  Pulse via Oximetry: 66 beats per minute  Pulse Oximeter Device Mode: Continuous  Pulse Oximeter Device Location: Right, Finger  O2 Device: None (Room air)  O2 Flow Rate (L/min): 3 L/min    Estimated body mass index is 16.21 kg/m² as calculated from the following:    Height as of this encounter: 5' 2\" (1.575 m). Weight as of this encounter: 88 lb 10 oz (40.2 kg). Intake/Output Summary (Last 24 hours) at 12/29/2022 1146  Last data filed at 12/29/2022 0857  Gross per 24 hour   Intake 420 ml   Output --   Net 420 ml           Physical Exam:     Blood pressure (!) 130/55, pulse 82, temperature 98.4 °F (36.9 °C), temperature source Oral, resp. rate 18, height 5' 2\" (1.575 m), weight 88 lb 10 oz (40.2 kg), SpO2 96 %. General:    Thin, alert and awake. Cloteal Lozano Head:  Normocephalic, atraumatic  Eyes:  Sclerae appear normal.  Pupils equally round. ENT:  Nares appear normal, no drainage. Moist oral mucosa  Neck:  No restricted ROM. Trachea midline   CV:   RRR. No m/r/g.   No jugular venous distension. Lungs:   CTAB. No wheezing. Symmetric expansion. Abdomen: Bowel sounds present. Soft, non tender, non distended. Extremities: No cyanosis or clubbing. No edema  Skin:     No rashes and normal coloration. Warm and dry. Neuro:  CN II-XII grossly intact. A&Ox3  Psych:  Normal mood and affect.       I have personally reviewed labs and tests showing:  Recent Labs:  Recent Results (from the past 48 hour(s))   Basic Metabolic Panel w/ Reflex to MG    Collection Time: 12/28/22  8:33 AM   Result Value Ref Range    Sodium 143 133 - 143 mmol/L    Potassium 3.3 (L) 3.5 - 5.1 mmol/L    Chloride 110 101 - 110 mmol/L    CO2 26 21 - 32 mmol/L    Anion Gap 7 2 - 11 mmol/L    Glucose 86 65 - 100 mg/dL    BUN 6 (L) 8 - 23 MG/DL    Creatinine 0.60 0.6 - 1.0 MG/DL    Est, Glom Filt Rate >60 >60 ml/min/1.73m2    Calcium 8.6 8.3 - 10.4 MG/DL   CBC with Auto Differential    Collection Time: 12/28/22  8:33 AM   Result Value Ref Range    WBC 7.3 4.3 - 11.1 K/uL    RBC 3.81 (L) 4.05 - 5.2 M/uL    Hemoglobin 10.1 (L) 11.7 - 15.4 g/dL    Hematocrit 31.9 (L) 35.8 - 46.3 %    MCV 83.7 82 - 102 FL    MCH 26.5 26.1 - 32.9 PG    MCHC 31.7 31.4 - 35.0 g/dL    RDW 19.3 (H) 11.9 - 14.6 %    Platelets 449 811 - 219 K/uL    MPV 9.0 (L) 9.4 - 12.3 FL    nRBC 0.00 0.0 - 0.2 K/uL    Differential Type AUTOMATED      Seg Neutrophils 75 43 - 78 %    Lymphocytes 16 13 - 44 %    Monocytes 7 4.0 - 12.0 %    Eosinophils % 2 0.5 - 7.8 %    Basophils 0 0.0 - 2.0 %    Immature Granulocytes 0 0.0 - 5.0 %    Segs Absolute 5.5 1.7 - 8.2 K/UL    Absolute Lymph # 1.2 0.5 - 4.6 K/UL    Absolute Mono # 0.5 0.1 - 1.3 K/UL    Absolute Eos # 0.1 0.0 - 0.8 K/UL    Basophils Absolute 0.0 0.0 - 0.2 K/UL    Absolute Immature Granulocyte 0.0 0.0 - 0.5 K/UL   Lipase    Collection Time: 12/28/22  8:33 AM   Result Value Ref Range    Lipase 605 (H) 73 - 393 U/L   Magnesium    Collection Time: 12/28/22  8:33 AM   Result Value Ref Range    Magnesium 1.9 1.8 - 2.4 mg/dL   Basic Metabolic Panel w/ Reflex to MG    Collection Time: 12/29/22  6:49 AM   Result Value Ref Range    Sodium 142 133 - 143 mmol/L    Potassium 4.2 3.5 - 5.1 mmol/L    Chloride 112 (H) 101 - 110 mmol/L    CO2 25 21 - 32 mmol/L    Anion Gap 5 2 - 11 mmol/L    Glucose 88 65 - 100 mg/dL    BUN 6 (L) 8 - 23 MG/DL    Creatinine 0.60 0.6 - 1.0 MG/DL    Est, Glom Filt Rate >60 >60 ml/min/1.73m2    Calcium 8.6 8.3 - 10.4 MG/DL       I have personally reviewed imaging studies showing: Other Studies:  XR CHEST PORTABLE   Final Result   No evidence of an acute intrathoracic process. The previously described right lower nodular opacity is again seen. It is   partially obscured by the inferior right scapula. MRI ABDOMEN W WO CONTRAST   Final Result   1. The study is moderately limited due to motion artifact. 2.  Mild dilation of the pancreatic and common bile ducts with abrupt tapering   at the pancreatic head. Findings are suspicious for a pancreatic head mass,   although no discrete mass is visualized. CT CHEST WO CONTRAST   Final Result   There is a new 13 x 11 mm nodule in the superior segment left lower   lobe, suspicious for malignancy. PET/CT or tissue sampling recommended.                    Current Meds:  Current Facility-Administered Medications   Medication Dose Route Frequency    polyethylene glycol (GLYCOLAX) packet 17 g  17 g Oral BID    hyoscyamine (LEVSIN/SL) sublingual tablet 125 mcg  125 mcg SubLINGual TID AC    docusate sodium (COLACE) capsule 100 mg  100 mg Oral Daily    potassium bicarb-citric acid (EFFER-K) effervescent tablet 40 mEq  40 mEq Oral Daily    pantoprazole (PROTONIX) tablet 40 mg  40 mg Oral BID    amLODIPine (NORVASC) tablet 10 mg  10 mg Oral Daily    donepezil (ARICEPT) tablet 5 mg  5 mg Oral Nightly    ferrous sulfate (IRON 325) tablet 325 mg  325 mg Oral Daily with breakfast    hydrALAZINE (APRESOLINE) tablet 25 mg  25 mg Oral 3 times per day    losartan (COZAAR) tablet 100 mg  100 mg Oral Daily    pravastatin (PRAVACHOL) tablet 40 mg  40 mg Oral Nightly    predniSONE (DELTASONE) tablet 10 mg  10 mg Oral Daily    sodium chloride flush 0.9 % injection 5-40 mL  5-40 mL IntraVENous 2 times per day    sodium chloride flush 0.9 % injection 5-40 mL  5-40 mL IntraVENous PRN    0.9 % sodium chloride infusion   IntraVENous PRN    ondansetron (ZOFRAN-ODT) disintegrating tablet 4 mg  4 mg Oral Q8H PRN    Or    ondansetron (ZOFRAN) injection 4 mg  4 mg IntraVENous Q6H PRN    acetaminophen (TYLENOL) tablet 650 mg  650 mg Oral Q6H PRN    Or    acetaminophen (TYLENOL) suppository 650 mg  650 mg Rectal Q6H PRN    nicotine (NICODERM CQ) 21 MG/24HR 1 patch  1 patch TransDERmal Daily    HYDROmorphone (DILAUDID) injection 0.5 mg  0.5 mg IntraVENous Q4H PRN       Signed:  Ed Salazar MD    Part of this note may have been written by using a voice dictation software. The note has been proof read but may still contain some grammatical/other typographical errors.

## 2022-12-29 NOTE — CONSULTS
Comprehensive Nutrition Assessment    Type and Reason for Visit: Reassess  Unintentional Weight Loss (Hospitalists)    Nutrition Recommendations/Plan:   Meals and Snacks:  Diet: Continue current order  RD clarified earlier for fat controlled diet vs heart healthy fat restriction. Nutrition Supplement Therapy:  Medical food supplement therapy:  Change Ensure High Protein three times per day (this provides 160 kcal and 16 grams protein per bottle)     Malnutrition Assessment:  Malnutrition Status: Moderate malnutrition  Context: Chronic Illness  Findings of clinical characteristics of malnutrition:   Energy Intake:  Unable to assess (pt doesn't provide a quantifiable nutrition hx)  Weight Loss:  Greater than 10% over 6 months (12% loss over 8 months)     Body Fat Loss:  Mild body fat loss Triceps   Muscle Mass Loss:  Mild muscle mass loss Temples (temporalis), Calf (gastrocnemius), Hand (interosseous), Clavicles (pectoralis & deltoids)  Fluid Accumulation:  No significant fluid accumulation     Strength:  Not Performed     Nutrition Assessment:  Nutrition History: Pt doesn't provide a specific nutrition hx, she only fixates on ensure costing \"$9 a can\" and not being allowed to eat here. She reports wt loss > 20#, followed by stating she has been 92.5# for sometime. Wt hx per IM office used to evaluate wt loss. Do You Have Any Cultural, Buddhism, or Ethnic Food Preferences?: No   Nutrition Background:       Medical history significant for duodenal ulcer complicated by perforation requiring bilroth II,  idiopathic pancreatitis, rheumatoid arthritis on enbrel/ current course of steroids, HTN, ongoing tobacco.  Admitted with acute recurrent pancreatitis in setting of idiopathic pancreatitis, lung nodule, unintentional wt loss, thyroid nodule. Nutrition Interval:  Initially on clears, increased to fulls 12/23 and easy to chew 12/24, diet decreased to clears 12/25 after increased pain with oral intake. Remained on clears, EUS 12/28, diet adv by GI this am after meal service. Pt with a multitude of complaints regarding clear liquid diet, upon discussing diet advance she c/o food options available and indicates she is fearful of diarrhea following oral intake then states she is only eating solid foods from now on. She declines offers of items from pantry at RD visit. Current Nutrition Therapies:  ADULT DIET; Easy to Chew; Low Fat (less than or equal to 50 gm/day); Low Fiber;  No Caffeine    Current Intake:   Average Meal Intake: Unable to assess (diet just advanced c/o prior clears only) Average Supplements Intake:  (current order not allowed on clears)      Anthropometric Measures:  Height: 5' 2\" (157.5 cm)  Current Body Wt: 88 lb 10 oz (40.2 kg) (12/28), Weight source: Bed Scale  BMI: 16.2, Underweight (BMI less than 22) age over 72  Admission Body Weight: 89 lb (40.4 kg) (stated)  Ideal Body Weight (Kg) (Calculated): 50 kg (110 lbs), 84.2 %  Usual Body Wt: 105 lb (47.6 kg) (Wt hx per IM office: 108# 10/6/21, 105# 4/8/22, 94# 10/12/22), Percent weight change: -11.8       BMI Category Underweight (BMI less than 22) age over 72  Estimated Daily Nutrient Needs:  Energy (kcal/day): 5597-8467 (30-35 kcal/kg) (Kcal/kg Weight used: 42 kg Current  Protein (g/day): 50-63 (1.2-1.5 g/kg) Weight Used: (Current) 42 kg  Fluid (ml/day):   (1 ml/kcal)    Nutrition Diagnosis:   Inadequate oral intake related to altered GI function as evidenced by  (diet just advanced s/p eus, previously on clears)  Moderate malnutrition, In context of chronic illness related to altered GI function as evidenced by Criteria as identified in malnutrition assessment  Nutrition Interventions:   Food and/or Nutrient Delivery: Continue Current Diet, Start Oral Nutrition Supplement     Coordination of Nutrition Care: Continue to monitor while inpatient, Interdisciplinary Rounds       Goals:   Previous Goal Met: Goal(s) Achieved  Active Goal: PO intake 50% or greater, by next RD assessment       Nutrition Monitoring and Evaluation:      Food/Nutrient Intake Outcomes: Food and Nutrient Intake, Supplement Intake  Physical Signs/Symptoms Outcomes: Biochemical Data, GI Status, Meal Time Behavior, Weight    Discharge Planning:     Too soon to determine    JAY KAN, RD

## 2022-12-29 NOTE — PROGRESS NOTES
Rounds performed throughout shift. IS initiated and educated on. Pt c/o worsening congestion and stated \" I hope I am not getting pneumonia\" MD notified and CXR ordered. Pt denies needs at this time. Bed in low position, locked and call light/personal items within reach. Will continue to monitor and report to night shift nurse.

## 2022-12-30 VITALS
HEIGHT: 62 IN | RESPIRATION RATE: 18 BRPM | BODY MASS INDEX: 16.31 KG/M2 | OXYGEN SATURATION: 95 % | HEART RATE: 87 BPM | SYSTOLIC BLOOD PRESSURE: 160 MMHG | TEMPERATURE: 97.8 F | DIASTOLIC BLOOD PRESSURE: 66 MMHG | WEIGHT: 88.63 LBS

## 2022-12-30 LAB
ANION GAP SERPL CALC-SCNC: 5 MMOL/L (ref 2–11)
BUN SERPL-MCNC: 13 MG/DL (ref 8–23)
CALCIUM SERPL-MCNC: 9.3 MG/DL (ref 8.3–10.4)
CHLORIDE SERPL-SCNC: 106 MMOL/L (ref 101–110)
CO2 SERPL-SCNC: 29 MMOL/L (ref 21–32)
CREAT SERPL-MCNC: 0.8 MG/DL (ref 0.6–1)
GLUCOSE SERPL-MCNC: 90 MG/DL (ref 65–100)
POTASSIUM SERPL-SCNC: 3.6 MMOL/L (ref 3.5–5.1)
SODIUM SERPL-SCNC: 140 MMOL/L (ref 133–143)

## 2022-12-30 PROCEDURE — 2580000003 HC RX 258: Performed by: INTERNAL MEDICINE

## 2022-12-30 PROCEDURE — 6370000000 HC RX 637 (ALT 250 FOR IP): Performed by: NURSE PRACTITIONER

## 2022-12-30 PROCEDURE — 6370000000 HC RX 637 (ALT 250 FOR IP): Performed by: INTERNAL MEDICINE

## 2022-12-30 PROCEDURE — 36415 COLL VENOUS BLD VENIPUNCTURE: CPT

## 2022-12-30 PROCEDURE — 80048 BASIC METABOLIC PNL TOTAL CA: CPT

## 2022-12-30 RX ORDER — ONDANSETRON 4 MG/1
4 TABLET, ORALLY DISINTEGRATING ORAL EVERY 8 HOURS PRN
Qty: 30 TABLET | Refills: 1 | Status: SHIPPED | OUTPATIENT
Start: 2022-12-30

## 2022-12-30 RX ORDER — PREDNISONE 10 MG/1
10 TABLET ORAL DAILY
Qty: 10 TABLET | Refills: 0 | Status: SHIPPED | OUTPATIENT
Start: 2022-12-30 | End: 2023-01-09

## 2022-12-30 RX ORDER — PANTOPRAZOLE SODIUM 40 MG/1
40 TABLET, DELAYED RELEASE ORAL 2 TIMES DAILY
Qty: 30 TABLET | Refills: 3 | Status: SHIPPED | OUTPATIENT
Start: 2022-12-30

## 2022-12-30 RX ORDER — POLYETHYLENE GLYCOL 3350 17 G/17G
17 POWDER, FOR SOLUTION ORAL 2 TIMES DAILY
Qty: 10 EACH | Refills: 0 | Status: SHIPPED | OUTPATIENT
Start: 2022-12-30 | End: 2023-01-04

## 2022-12-30 RX ORDER — PSEUDOEPHEDRINE HCL 30 MG
100 TABLET ORAL DAILY
Qty: 7 CAPSULE | Refills: 0 | Status: SHIPPED | OUTPATIENT
Start: 2022-12-30 | End: 2023-01-06

## 2022-12-30 RX ADMIN — HYOSCYAMINE SULFATE 125 MCG: 0.12 TABLET ORAL; SUBLINGUAL at 11:22

## 2022-12-30 RX ADMIN — DOCUSATE SODIUM 100 MG: 100 CAPSULE, LIQUID FILLED ORAL at 09:03

## 2022-12-30 RX ADMIN — POLYETHYLENE GLYCOL 3350 17 G: 17 POWDER, FOR SOLUTION ORAL at 09:04

## 2022-12-30 RX ADMIN — POTASSIUM BICARBONATE 40 MEQ: 782 TABLET, EFFERVESCENT ORAL at 09:03

## 2022-12-30 RX ADMIN — SODIUM CHLORIDE, PRESERVATIVE FREE 10 ML: 5 INJECTION INTRAVENOUS at 09:04

## 2022-12-30 RX ADMIN — LOSARTAN POTASSIUM 100 MG: 50 TABLET, FILM COATED ORAL at 09:03

## 2022-12-30 RX ADMIN — FERROUS SULFATE TAB 325 MG (65 MG ELEMENTAL FE) 325 MG: 325 (65 FE) TAB at 09:03

## 2022-12-30 RX ADMIN — HYDRALAZINE HYDROCHLORIDE 25 MG: 25 TABLET ORAL at 05:26

## 2022-12-30 RX ADMIN — PREDNISONE 10 MG: 10 TABLET ORAL at 09:03

## 2022-12-30 RX ADMIN — HYOSCYAMINE SULFATE 125 MCG: 0.12 TABLET ORAL; SUBLINGUAL at 05:26

## 2022-12-30 RX ADMIN — AMLODIPINE BESYLATE 10 MG: 10 TABLET ORAL at 09:03

## 2022-12-30 NOTE — DISCHARGE SUMMARY
Hospitalist Discharge Summary   Admit Date:  2022  5:38 PM   DC Note date: 2022  Name:  Jamar Espinoza   Age:  [de-identified] y.o. Sex:  female  :  1940   MRN:  [id number]   Room:  [facility]  PCP:  Blanka El MD    Presenting Complaint: Abdominal Pain     Initial Admission Diagnosis: Acute recurrent pancreatitis [K85.90]  Acute pancreatitis, unspecified complication status, unspecified pancreatitis type [K85.90]     Problem List for this Hospitalization (present on admission):    Principal Problem:    Acute recurrent pancreatitis  Active Problems:    Chronic pancreatitis ([address]    Rheumatoid arthritis ([address]    Unintentional weight loss    Protein-calorie malnutrition, moderate (HCC)    Lung nodule    Hypercholesterolemia    Multiple thyroid nodules    Hypomagnesemia    Personal history of tobacco use, presenting hazards to health    HTN (hypertension)    Hypokalemia  Resolved Problems:    * No resolved hospital problems. [facility] Course:  Please refer to the admission H&P for details of presentation. In summary, Jamar Espinoza is a [de-identified] y.o. female with medical history significant for  duodenal ulcer complicated by perforation requiring bilroth II,  idiopathic pancreatitis, rheumatoid arthritis on enbrel/ current course of steroids, HTN, ongoing tobacco use who is evaluated with acute onset of mid abdominal pain to her back. Per records, she was discharged on 2022 for recurrent pancreatitis and was seen by GI at that time. MRCP with mild dilatation of the pancreatic and common bile duct with abrupt tapering at the pancreatic head suspicious for pancreatic head mass. GI was consulted and is planned for outpatient colonoscopy on . Given patient's weight loss and smoking history, CT chest was done which shows a 13 mm x 11 mm left lower lobe nodule. Patient will need to follow-up with oncology for further work-up and biopsy.       Acute recurrent pancreatitis in setting of idiopathic pancreatitis  MRCP result noted  EUS yesterday without mass, acute nor chronic pancreatitis changes but with mildly dilated pancreatic duct. - GI recs appreciated. Advanced diet to easy chew/gi blend/low far. - hyoscyamine 0.125 mg SL TID PRN abdominal pain and Increase Miralax to 17 gm bid       Lung Nodule  [id number] LLL Nodule noted on CT. Concerning for malignancy as patient is a daily smoker with symptoms of unintentional weight loss and night sweats.  - Oncology recs appreciated. Follow up with Sydney Matias and outpatient PET  - IR Consult for lung biopsy - planned for outpatient on 1/5 @ 8am     Rheumatoid arthritis  - on prednisone 10mg daily. Unintentional Weight loss  Moderate protein calorie malnutrition  Thin female with 23lb unintentional weight loss  - CT showed lung nodule. Nutritional supplement with each meal on discharge. Thyroid Nodule  CT chest in 2018 with 1.3cm thyroid nodule  TSH low with normal T4 and normal T3.  - continue outpatient followup /surveillance. Tobacco Dependence  -Counseled for tobacco cessation     HTN // HLD : continue with home medications    Disposition: Patient is medically cleared and hemodynamically stable for discharge to home today. Diet: ADULT DIET; Easy to Chew; Low Fat (less than or equal to 50 gm/day); Low Fiber; No Caffeine  ADULT ORAL NUTRITION SUPPLEMENT; Breakfast, Lunch, Dinner; Low Calorie/High Protein Oral Supplement  Code Status: Full Code    Follow Ups:  Follow-up with PCP next 1 to 2 weeks. Follow-up with IR on 1/5/2023 8 AM for lung nodule biopsy. Time spent in patient discharge and coordination 36 minutes. Plan was discussed with patient. All questions answered. Patient was stable at time of discharge. Instructions given to call a physician or return if any concerns.     Current Discharge Medication List        START taking these medications    Details   pantoprazole (PROTONIX) 40 MG tablet Take 1 tablet by mouth in the morning and at bedtime  Qty: 30 tablet, Refills: 3      hyoscyamine (LEVSIN/SL) 125 MCG sublingual tablet Place 1 tablet under the tongue 3 times daily (before meals)  Qty: 90 tablet, Refills: 3      polyethylene glycol (GLYCOLAX) 17 g packet Take 17 g by mouth 2 times daily for 5 days  Qty: 10 each, Refills: 0      docusate sodium (COLACE, DULCOLAX) 100 MG CAPS Take 100 mg by mouth daily for 7 days  Qty: 7 capsule, Refills: 0           CONTINUE these medications which have CHANGED    Details   ondansetron (ZOFRAN-ODT) 4 MG disintegrating tablet Take 1 tablet by mouth every 8 hours as needed for Nausea or Vomiting  Qty: 30 tablet, Refills: 1      predniSONE (DELTASONE) 10 MG tablet Take 1 tablet by mouth daily for 10 days  Qty: 10 tablet, Refills: 0           CONTINUE these medications which have NOT CHANGED    Details   Etanercept (ENBREL SURECLICK) 50 MG/ML SOAJ Inject 50 mg into the skin every 7 days  Qty: 4 mL, Refills: 3    Associated Diagnoses: Seropositive rheumatoid arthritis of multiple sites (HCC)      donepezil (ARICEPT) 5 MG tablet Take 1 tablet by mouth nightly  Qty: 30 tablet, Refills: 5      losartan (COZAAR) 100 MG tablet Take 1 tablet by mouth daily  Qty: 30 tablet, Refills: 5      amLODIPine (NORVASC) 10 MG tablet Take 1 tablet by mouth daily  Qty: 30 tablet, Refills: 5      hydrALAZINE (APRESOLINE) 25 MG tablet Take 1 tablet by mouth every 8 hours  Qty: 90 tablet, Refills: 5      alendronate (FOSAMAX) 70 MG tablet Take 1 tablet by mouth every 7 days  Qty: 4 tablet, Refills: 5      pravastatin (PRAVACHOL) 40 MG tablet Take 1 tablet by mouth daily  Qty: 30 tablet, Refills: 5           STOP taking these medications       potassium chloride (KLOR-CON M) 20 MEQ extended release tablet Comments:   Reason for Stopping:         ferrous sulfate (IRON 325) 325 (65 Fe) MG tablet Comments:   Reason for Stopping:               Procedures done this admission:  Procedure(s):  ENDOSCOPIC ULTRASOUND W/ EGD/ RM 46/    Consults this admission:  IP CONSULT TO GI  IP CONSULT TO DIETITIAN  IP CONSULT TO ONCOLOGY  IP CONSULT TO ONCOLOGY  IP CONSULT TO INTERVENTIONAL RADIOLOGY  IP CONSULT TO GI    Echocardiogram results:  No results found for this or any previous visit. Diagnostic Imaging/Tests:   CT CHEST WO CONTRAST    Result Date: 12/27/2022  There is a new 13 x 11 mm nodule in the superior segment left lower lobe, suspicious for malignancy. PET/CT or tissue sampling recommended. MRI ABDOMEN W WO CONTRAST    Result Date: 12/23/2022  1. The study is moderately limited due to motion artifact. 2.  Mild dilation of the pancreatic and common bile ducts with abrupt tapering at the pancreatic head. Findings are suspicious for a pancreatic head mass, although no discrete mass is visualized. XR CHEST PORTABLE    Result Date: 12/29/2022  No evidence of an acute intrathoracic process. The previously described right lower nodular opacity is again seen. It is partially obscured by the inferior right scapula. Labs: Results:       BMP, Mg, Phos Recent Labs     12/28/22  0833 12/29/22  0649 12/30/22  0645    142 140   K 3.3* 4.2 3.6    112* 106   CO2 26 25 29   ANIONGAP 7 5 5   BUN 6* 6* 13   CREATININE 0.60 0.60 0.80   LABGLOM >60 >60 >60   CALCIUM 8.6 8.6 9.3   GLUCOSE 86 88 90   MG 1.9  --   --       CBC Recent Labs     12/28/22  0833   WBC 7.3   RBC 3.81*   HGB 10.1*   HCT 31.9*   MCV 83.7   MCH 26.5   MCHC 31.7   RDW 19.3*      MPV 9.0*   NRBC 0.00   SEGS 75   LYMPHOPCT 16   EOSRELPCT 2   MONOPCT 7   BASOPCT 0   IMMGRAN 0   SEGSABS 5.5   LYMPHSABS 1.2   EOSABS 0.1   MONOSABS 0.5   BASOSABS 0.0   ABSIMMGRAN 0.0      LFT No results for input(s): BILITOT, BILIDIR, ALKPHOS, AST, ALT, PROT, LABALBU, GLOB in the last 72 hours.    Cardiac  No results found for: NTPROBNP, TROPHS   Coags No results found for: PROTIME, INR, APTT   A1c No results found for: LABA1C, EAG   Lipids Lab Results   Component Value Date/Time    CHOL 102 10/12/2022 11:58 AM    LDLCALC 42.4 10/12/2022 11:58 AM    LABVLDL 13.6 10/12/2022 11:58 AM    HDL 46 10/12/2022 11:58 AM    CHOLHDLRATIO 2.2 10/12/2022 11:58 AM    TRIG 68 10/12/2022 11:58 AM      Thyroid  Lab Results   Component Value Date/Time    TSHELE 0.35 12/22/2022 03:50 PM        Most Recent UA Lab Results   Component Value Date/Time    GLUCOSEU Negative 11/05/2022 08:38 PM    BLOODU Negative 11/05/2022 08:38 PM        Recent Labs     12/29/22  1145 12/27/22  0911   CULTURE ORGANISM IN STUDY No Salmonella, Shigella, or Ecoli 0157 isolated.        All Labs from Last 24 Hrs:  Recent Results (from the past 24 hour(s))   Culture, Stool    Collection Time: 12/29/22 11:45 AM    Specimen: Stool   Result Value Ref Range    Special Requests NO SPECIAL REQUESTS      Culture ORGANISM IN STUDY     Basic Metabolic Panel w/ Reflex to MG    Collection Time: 12/30/22  6:45 AM   Result Value Ref Range    Sodium 140 133 - 143 mmol/L    Potassium 3.6 3.5 - 5.1 mmol/L    Chloride 106 101 - 110 mmol/L    CO2 29 21 - 32 mmol/L    Anion Gap 5 2 - 11 mmol/L    Glucose 90 65 - 100 mg/dL    BUN 13 8 - 23 MG/DL    Creatinine 0.80 0.6 - 1.0 MG/DL    Est, Glom Filt Rate >60 >60 ml/min/1.73m2    Calcium 9.3 8.3 - 10.4 MG/DL       Allergies   Allergen Reactions    Azithromycin Other (See Comments)     pancreatitis    Codeine Nausea And Vomiting     Immunization History   Administered Date(s) Administered    COVID-19, MODERNA BLUE border, Primary or Immunocompromised, (age 12y+), IM, 100 mcg/0.5mL 02/05/2021, 03/06/2021, 10/26/2021    Influenza, FLUAD, (age 72 y+), Adjuvanted, 0.5mL 09/20/2022       Recent Vital Data:  Patient Vitals for the past 24 hrs:   Temp Pulse Resp BP SpO2   12/30/22 0701 97.9 °F (36.6 °C) 72 18 (!) 151/61 97 %   12/30/22 0416 97.2 °F (36.2 °C) 70 20 (!) 148/60 98 %   12/30/22 0113 97.7 °F (36.5 °C) 72 16 (!) 177/61 97 %   12/29/22 2025 97.5 °F (36.4 °C) 61 18 (!) 147/51 97 %   12/29/22 1543 98.6 °F (37 °C) 79 18 127/66 96 %   12/29/22 1127 98.4 °F (36.9 °C) 82 18 (!) 130/55 96 %       Oxygen Therapy  SpO2: 97 %  Pulse via Oximetry: 66 beats per minute  Pulse Oximeter Device Mode: Continuous  Pulse Oximeter Device Location: Right, Finger  O2 Device: None (Room air)  O2 Flow Rate (L/min): 3 L/min    Estimated body mass index is 16.21 kg/m² as calculated from the following:    Height as of this encounter: 5' 2\" (1.575 m). Weight as of this encounter: 88 lb 10 oz (40.2 kg). Intake/Output Summary (Last 24 hours) at 12/30/2022 0827  Last data filed at 12/29/2022 1750  Gross per 24 hour   Intake 360 ml   Output --   Net 360 ml         Physical Exam:    General:    Alert, awake, NAD, on room air, frail  Head:  Normocephalic, atraumatic  Eyes:  Sclerae appear normal.  Pupils equally round. HENT:  Nares appear normal, no drainage. Moist mucous membranes  Neck:  No restricted ROM. Trachea midline  CV:   RRR. No m/r/g. No JVD  Lungs:   CTAB. No wheezing, rhonchi, or rales. Respirations even, unlabored  Abdomen:   Soft, nontender, nondistended. Extremities: Warm and dry. No cyanosis or clubbing. No edema. Skin:     No rashes. Normal coloration  Neuro:  CN II-XII grossly intact. Psych:  Normal mood and affect. Signed:  Debby Drake MD    Part of this note may have been written by using a voice dictation software. The note has been proof read but may still contain some grammatical/other typographical errors.

## 2022-12-30 NOTE — PROGRESS NOTES
Discharge medications reviewed with the patient and appropriate educational materials and side effects teaching were provided. IV taken out of upper right arm with tip intact. All discharge instructions gone over with patient and patient verbalized understanding. Daughter on the way to  patient at this time. No paper RX were given.

## 2022-12-30 NOTE — PROGRESS NOTES
Hourly rounds performed this shift. All needs met at this time. Shift report will be given to oncoming nurse.

## 2022-12-30 NOTE — PLAN OF CARE
Problem: Discharge Planning  Goal: Discharge to home or other facility with appropriate resources  12/30/2022 0818 by Aarti Mittal RN  Outcome: Progressing  12/29/2022 1956 by Felice eWaver RN  Outcome: Progressing     Problem: Pain  Goal: Verbalizes/displays adequate comfort level or baseline comfort level  12/30/2022 0818 by Aarti Mittal RN  Outcome: Progressing  12/29/2022 1956 by Felice Weaver RN  Outcome: Progressing
Problem: Discharge Planning  Goal: Discharge to home or other facility with appropriate resources  Outcome: Progressing     Problem: Pain  Goal: Verbalizes/displays adequate comfort level or baseline comfort level  Outcome: Progressing     Problem: Gastrointestinal - Adult  Goal: Minimal or absence of nausea and vomiting  Outcome: Progressing  Goal: Maintains or returns to baseline bowel function  Outcome: Progressing     Problem: Safety - Adult  Goal: Free from fall injury  Outcome: Progressing
Problem: Discharge Planning  Goal: Discharge to home or other facility with appropriate resources  Outcome: Progressing     Problem: Pain  Goal: Verbalizes/displays adequate comfort level or baseline comfort level  Outcome: Progressing     Problem: Gastrointestinal - Adult  Goal: Minimal or absence of nausea and vomiting  Outcome: Progressing  Goal: Maintains or returns to baseline bowel function  Outcome: Progressing     Problem: Safety - Adult  Goal: Free from fall injury  Outcome: Progressing     Problem: Skin/Tissue Integrity  Goal: Absence of new skin breakdown  Description: 1. Monitor for areas of redness and/or skin breakdown  2. Assess vascular access sites hourly  3. Every 4-6 hours minimum:  Change oxygen saturation probe site  4. Every 4-6 hours:  If on nasal continuous positive airway pressure, respiratory therapy assess nares and determine need for appliance change or resting period.   Outcome: Progressing
Problem: Discharge Planning  Goal: Discharge to home or other facility with appropriate resources  Outcome: Progressing     Problem: Pain  Goal: Verbalizes/displays adequate comfort level or baseline comfort level  Outcome: Progressing     Problem: Gastrointestinal - Adult  Goal: Minimal or absence of nausea and vomiting  Outcome: Progressing  Goal: Maintains or returns to baseline bowel function  Outcome: Progressing     Problem: Safety - Adult  Goal: Free from fall injury  Outcome: Progressing     Problem: Skin/Tissue Integrity  Goal: Absence of new skin breakdown  Description: 1. Monitor for areas of redness and/or skin breakdown  2. Assess vascular access sites hourly  3. Every 4-6 hours minimum:  Change oxygen saturation probe site  4. Every 4-6 hours:  If on nasal continuous positive airway pressure, respiratory therapy assess nares and determine need for appliance change or resting period.   Outcome: Progressing
Problem: Discharge Planning  Goal: Discharge to home or other facility with appropriate resources  Outcome: Progressing  Flowsheets (Taken 12/27/2022 0810 by Herlinda Hinojosa RN)  Discharge to home or other facility with appropriate resources: Identify barriers to discharge with patient and caregiver     Problem: Pain  Goal: Verbalizes/displays adequate comfort level or baseline comfort level  Outcome: Progressing     Problem: Gastrointestinal - Adult  Goal: Minimal or absence of nausea and vomiting  Outcome: Progressing  Flowsheets (Taken 12/27/2022 0810 by Herlinda Hinojosa RN)  Minimal or absence of nausea and vomiting:   Administer IV fluids as ordered to ensure adequate hydration   Administer ordered antiemetic medications as needed  Goal: Maintains or returns to baseline bowel function  Outcome: Progressing  Flowsheets (Taken 12/27/2022 0810 by Herlinda Hinojosa RN)  Maintains or returns to baseline bowel function:   Assess bowel function   Encourage oral fluids to ensure adequate hydration   Encourage mobilization and activity     Problem: Safety - Adult  Goal: Free from fall injury  Outcome: Progressing  Flowsheets (Taken 12/27/2022 0810 by Herlinda Hinojosa RN)  Free From Fall Injury: Instruct family/caregiver on patient safety     Problem: Skin/Tissue Integrity  Goal: Absence of new skin breakdown  Description: 1. Monitor for areas of redness and/or skin breakdown  2. Assess vascular access sites hourly  3. Every 4-6 hours minimum:  Change oxygen saturation probe site  4. Every 4-6 hours:  If on nasal continuous positive airway pressure, respiratory therapy assess nares and determine need for appliance change or resting period.   Outcome: Progressing
Patient tolerated procedure well.

## 2022-12-30 NOTE — DISCHARGE INSTRUCTIONS
Learning About Acute Pancreatitis  What is acute pancreatitis? The pancreas is an organ behind the stomach. It makes hormones like insulin that help control how your body uses sugar. It also makes enzymes that help you digest food. Usually these enzymes flow from the pancreas to the intestines. But if they leak into the pancreas, they can irritate it and cause pain and swelling. When this happens suddenly, it's called acute pancreatitis. What causes it? Most of the time, acute pancreatitis is caused by gallstones or by heavy alcohol use. But several other things can cause it, such as:  High levels of fats in the blood. An injury. A problem after a surgery or a procedure. Certain medicines. What are the symptoms? The main symptom is pain in the upper belly. The pain can be severe. You also may have a fever, nausea, or vomiting. Some people get so sick that they have problems breathing. They also may have low blood pressure. How is it diagnosed? Your doctor will diagnose pancreatitis with an exam and by looking at your lab tests. Your doctor may think that you have this problem based on your symptoms and where you have pain in your belly. You may have blood tests of enzymes called amylase and lipase. In pancreatitis, the level of these enzymes is usually much higher than normal.  You also may have imaging tests of the belly. These may include an ultrasound, a CT scan, or an MRI. A special MRI called MRCP can show images of the bile ducts. This test can be very helpful when gallstones are causing the problem. How is it treated? Treatment of acute pancreatitis usually takes place in the hospital. It focuses on taking care of pain and supporting your body while your pancreas heals. In severe cases, treatment may occur in an intensive care unit to support breathing, treat serious infections, or help raise very low blood pressure.   If a gallstone is causing the problem, the gallstone may need to be removed. This is done during a procedure called ERCP. The doctor puts a scope in your mouth and moves it gently through the stomach and into the ducts from the pancreas and gallbladder. The doctor is then able to see a stone and remove it. Sometimes the gallbladder, which makes gallstones, needs to be removed by surgery. People with pancreatitis often need a lot of fluid to help support their other organs and their blood pressure. They get fluids through a vein (intravenous, or IV). Instead of food by mouth, nutrition is sometimes given through a vein while the pancreas heals. Where can you learn more? Go to http://www.woods.com/ and enter F673 to learn more about \"Learning About Acute Pancreatitis. \"  Current as of: June 6, 2022               Content Version: 13.5  © 2006-2022 [employer]. Care instructions adapted under license by [facility] ([facility]). If you have questions about a medical condition or this instruction, always ask your healthcare professional. [place] [address] any warranty or liability for your use of this information.

## 2022-12-30 NOTE — PROGRESS NOTES
completed initial visit with patient. Patient expressed a positive affect and positive life review. Patient has excellent support from family, Mandaen and .  provided pastoral presence, prayer and empathetic listening.    Signed by  Julius Michele M.Div.

## 2022-12-31 LAB
BACTERIA SPEC CULT: NORMAL
SERVICE CMNT-IMP: NORMAL

## 2023-01-01 ENCOUNTER — PREP FOR PROCEDURE (OUTPATIENT)
Dept: SURGERY | Age: 83
End: 2023-01-01

## 2023-01-01 ENCOUNTER — CARE COORDINATION (OUTPATIENT)
Dept: CARE COORDINATION | Facility: CLINIC | Age: 83
End: 2023-01-01

## 2023-01-03 ENCOUNTER — CARE COORDINATION (OUTPATIENT)
Dept: CARE COORDINATION | Facility: CLINIC | Age: 83
End: 2023-01-03

## 2023-01-03 NOTE — CARE COORDINATION
[facility] Care Transitions Initial Follow Up Call    Call within 2 business days of discharge: Yes    LPN Care Coordinator contacted the patient by telephone to perform post hospital discharge assessment. Verified name and  with patient as identifiers. Provided introduction to self, and explanation of the LPN Care Coordinator role. Patient: Sebastian Bond Patient : 1940   MRN: [id number]  Reason for Admission: abdominal pain  Discharge Date: 22 RARS: Readmission Risk Score: 18.5      Last Discharge  [facility]       Date Complaint Diagnosis Description Type Department Provider    22 Abdominal Pain Acute pancreatitis, unspecified complication status, unspecified pancreatitis type ED to Hosp-Admission (Discharged) (ADMITTED) [id number] Cindi Charles MD; Lucas Gonzalez. .. Was this an external facility discharge? No Discharge Facility: [facility]    Challenges to be reviewed by the provider   Additional needs identified to be addressed with provider: No  none               Method of communication with provider: none. LPN Care Coordinator reviewed discharge instructions, medical action plan, and red flags with patient who verbalized understanding. The patient was given an opportunity to ask questions and does not have any further questions or concerns at this time. Were discharge instructions available to patient? Yes. Reviewed appropriate site of care based on symptoms and resources available to patient including: PCP  Specialist  Urgent care clinics  When to call 911. The patient agrees to contact the PCP office for questions related to their healthcare. Advance Care Planning:   Does patient have an Advance Directive: not on file; education provided. Medication reconciliation was performed with patient, who verbalizes understanding of administration of home medications. Medications reviewed, 1111F entered: N/A    Was patient discharged with a pulse oximeter? no    Non-face-to-face services provided:  Obtained and reviewed discharge summary and/or continuity of care documents  Education of patient/family/caregiver/guardian to support self-management-Dorene Perez LPN  [phone]  All scheduled appointments. Offered patient enrollment in the Remote Patient Monitoring (RPM) program for in-home monitoring: NA.    Care Transitions 24 Hour Call    Do you have a copy of your discharge instructions?: Yes  Do you have all of your prescriptions and are they filled?: Yes  Have you been contacted by a [address]?: No  Have you scheduled your follow up appointment?: Yes  How are you going to get to your appointment?: Car - family or friend to transport  Do you have support at home?: Alone  Do you feel like you have everything you need to keep you well at home?: Yes  Are you an active caregiver in your home?: No  Care Transitions Interventions         Follow Up  Future Appointments   Date Time Provider Bo Jj   1/5/2023  8:00 AM SFD CT UNIT 2 [facility]   1/12/2023  8:50 AM [facility] [address] [place] [address]   1/12/2023  9:45 AM Yamila Welch MD [facility] AMB   1/16/2023 10:30 AM SFE DEXA BI GE LUNAR DEXA [facility]ER[facility]AM S[facility]   3/3/2023 10:40 AM Patrick Dodge  [address], [address] [place] [address] G[facility] AMB   4/5/2023  8:45 AM PFP LAB PFP [facility] AMB   4/12/2023 10:00 AM Irena Pabon MD [facility] AMB       [facility] Care Coordinator provided contact information. Plan for follow-up call in 5-7 days based on severity of symptoms and risk factors. Plan for next call: self management-diet, hydration, exercise, follow up appointments.      Rubén Glover LPN

## 2023-01-05 ENCOUNTER — HOSPITAL ENCOUNTER (OUTPATIENT)
Dept: CT IMAGING | Age: 83
End: 2023-01-05
Payer: MEDICARE

## 2023-01-05 ENCOUNTER — HOSPITAL ENCOUNTER (OUTPATIENT)
Dept: GENERAL RADIOLOGY | Age: 83
End: 2023-01-05
Payer: MEDICARE

## 2023-01-05 VITALS
OXYGEN SATURATION: 99 % | SYSTOLIC BLOOD PRESSURE: 127 MMHG | WEIGHT: 88 LBS | BODY MASS INDEX: 16.2 KG/M2 | RESPIRATION RATE: 16 BRPM | HEART RATE: 71 BPM | TEMPERATURE: 97.9 F | HEIGHT: 62 IN | DIASTOLIC BLOOD PRESSURE: 60 MMHG

## 2023-01-05 DIAGNOSIS — R91.1 NODULE OF RIGHT LUNG: ICD-10-CM

## 2023-01-05 PROCEDURE — 88305 TISSUE EXAM BY PATHOLOGIST: CPT

## 2023-01-05 PROCEDURE — 99153 MOD SED SAME PHYS/QHP EA: CPT

## 2023-01-05 PROCEDURE — 71045 X-RAY EXAM CHEST 1 VIEW: CPT

## 2023-01-05 PROCEDURE — 6360000002 HC RX W HCPCS: Performed by: RADIOLOGY

## 2023-01-05 PROCEDURE — 2500000003 HC RX 250 WO HCPCS: Performed by: RADIOLOGY

## 2023-01-05 RX ORDER — OXYCODONE HYDROCHLORIDE 5 MG/1
10 TABLET ORAL EVERY 4 HOURS PRN
Status: DISCONTINUED | OUTPATIENT
Start: 2023-01-05 | End: 2023-01-09 | Stop reason: HOSPADM

## 2023-01-05 RX ORDER — FENTANYL CITRATE 50 UG/ML
INJECTION, SOLUTION INTRAMUSCULAR; INTRAVENOUS
Status: COMPLETED | OUTPATIENT
Start: 2023-01-05 | End: 2023-01-05

## 2023-01-05 RX ORDER — MIDAZOLAM HYDROCHLORIDE 2 MG/2ML
INJECTION, SOLUTION INTRAMUSCULAR; INTRAVENOUS
Status: COMPLETED | OUTPATIENT
Start: 2023-01-05 | End: 2023-01-05

## 2023-01-05 RX ORDER — OXYCODONE HYDROCHLORIDE 5 MG/1
5 TABLET ORAL EVERY 4 HOURS PRN
Status: DISCONTINUED | OUTPATIENT
Start: 2023-01-05 | End: 2023-01-09 | Stop reason: HOSPADM

## 2023-01-05 RX ORDER — SODIUM CHLORIDE 9 MG/ML
INJECTION, SOLUTION INTRAVENOUS CONTINUOUS
Status: DISCONTINUED | OUTPATIENT
Start: 2023-01-05 | End: 2023-01-09 | Stop reason: HOSPADM

## 2023-01-05 RX ORDER — LIDOCAINE HYDROCHLORIDE 20 MG/ML
INJECTION, SOLUTION INFILTRATION; PERINEURAL
Status: COMPLETED | OUTPATIENT
Start: 2023-01-05 | End: 2023-01-05

## 2023-01-05 RX ORDER — ONDANSETRON 2 MG/ML
4 INJECTION INTRAMUSCULAR; INTRAVENOUS EVERY 8 HOURS PRN
Status: DISCONTINUED | OUTPATIENT
Start: 2023-01-05 | End: 2023-01-09 | Stop reason: HOSPADM

## 2023-01-05 RX ADMIN — FENTANYL CITRATE 25 MCG: 50 INJECTION, SOLUTION INTRAMUSCULAR; INTRAVENOUS at 08:44

## 2023-01-05 RX ADMIN — LIDOCAINE HYDROCHLORIDE 60 MG: 20 INJECTION, SOLUTION INFILTRATION; PERINEURAL at 09:01

## 2023-01-05 RX ADMIN — FENTANYL CITRATE 25 MCG: 50 INJECTION, SOLUTION INTRAMUSCULAR; INTRAVENOUS at 08:58

## 2023-01-05 RX ADMIN — MIDAZOLAM HYDROCHLORIDE 0.5 MG: 1 INJECTION, SOLUTION INTRAMUSCULAR; INTRAVENOUS at 08:44

## 2023-01-05 RX ADMIN — MIDAZOLAM HYDROCHLORIDE 0.5 MG: 1 INJECTION, SOLUTION INTRAMUSCULAR; INTRAVENOUS at 08:58

## 2023-01-05 ASSESSMENT — PAIN - FUNCTIONAL ASSESSMENT: PAIN_FUNCTIONAL_ASSESSMENT: NONE - DENIES PAIN

## 2023-01-05 NOTE — PRE SEDATION
Sedation Pre-Procedure Note    Patient Name: Reed Eugene   YOB: 1940  Room/Bed: Room/bed info not found  Medical Record Number: [id number]  Date: 1/5/2023   Time: 7:46 AM       Indication: Lung nodule    Consent: I have discussed with the patient and/or the patient representative the indication, alternatives, and the possible risks and/or complications of the planned procedure and the anesthesia methods. The patient and/or patient representative appear to understand and agree to proceed. Vital Signs:   Vitals:    01/05/23 0718   BP: (!) 141/63   Pulse: 71   Resp: 16   Temp: 97.9 °F (36.6 °C)   SpO2: 96%       Past Medical History:   has a past medical history of EPIFANIO (acute kidney injury) ([address] Arthritis, Back pain, Breast cancer ([address] Breast lump, Bronchitis, Chronic obstructive pulmonary disease ([address] Discharge from the vagina, Dysuria, Eczema, Fungal dermatitis, Headache, Hypercholesterolemia, Hypertension, Intractable vomiting, Menopausal vaginal dryness, Osteoarthritis, Osteoporosis, Pancreatitis, PUD (peptic ulcer disease), Sepsis ([address] Thyroid nodule, and Tobacco abuse. Past Surgical History:   has a past surgical history that includes Upper gastrointestinal endoscopy (05/21/2018); Breast lumpectomy (Left, 2/22/2018); pr unlisted procedure abdomen peritoneum & omentum (2003); Cholecystectomy; Cataract removal (Bilateral, 2018); Upper gastrointestinal endoscopy; Breast biopsy (Left, 1975); Breast surgery (Left, 1975); Appendectomy (1977); Tubal ligation (1975); Hysterectomy (1977); other surgical history;  BREAST BIOPSY W LOC DEVICE 1ST LESION LEFT (Left, 1/31/2018); Upper gastrointestinal endoscopy (N/A, 10/4/2022); and Upper gastrointestinal endoscopy (N/A, 12/28/2022). Medications:   Scheduled Meds:   Continuous Infusions:   PRN Meds:   Home Meds:   Prior to Admission medications    Medication Sig Start Date End Date Taking?  Authorizing Provider   ondansetron (ZOFRAN-ODT) 4 MG disintegrating tablet Take 1 tablet by mouth every 8 hours as needed for Nausea or Vomiting 12/30/22   Celi Villeda MD   predniSONE (DELTASONE) 10 MG tablet Take 1 tablet by mouth daily for 10 days 12/30/22 1/9/23  Celi Villeda MD   pantoprazole (PROTONIX) 40 MG tablet Take 1 tablet by mouth in the morning and at bedtime 12/30/22   Celi Villeda MD   hyoscyamine (LEVSIN/SL) 125 MCG sublingual tablet Place 1 tablet under the tongue 3 times daily (before meals) 12/30/22   Celi Villeda MD   docusate sodium (COLACE, DULCOLAX) 100 MG CAPS Take 100 mg by mouth daily for 7 days 12/30/22 1/6/23  Celi Villeda MD   Etanercept (ENBREL SURECLICK) 50 MG/ML SOAJ Inject 50 mg into the skin every 7 days 10/25/22   Gabriella Womack MD   donepezil (ARICEPT) 5 MG tablet Take 1 tablet by mouth nightly 10/12/22   Miryam Hill MD   losartan (COZAAR) 100 MG tablet Take 1 tablet by mouth daily 10/12/22   Miryam Hill MD   amLODIPine (NORVASC) 10 MG tablet Take 1 tablet by mouth daily 10/12/22   Miryam Hill MD   hydrALAZINE (APRESOLINE) 25 MG tablet Take 1 tablet by mouth every 8 hours 10/12/22   Miryam Hill MD   alendronate (FOSAMAX) 70 MG tablet Take 1 tablet by mouth every 7 days 10/12/22   Miryam Hill MD   pravastatin (PRAVACHOL) 40 MG tablet Take 1 tablet by mouth daily 10/12/22   Miryam Hill MD          Pre-Sedation Documentation and Exam:   I have personally completed a history, physical exam & review of systems for this patient (see notes). Vital signs have been reviewed (see flow sheet for vitals).     Mallampati Airway Assessment:  normal, dentition not prohibitive, Mallampati Class II - (soft palate, fauces & uvula are visible)  Patient has dentures and a partial    Prior History of Anesthesia Complications:   none    ASA Classification:  Class 2 - A normal healthy patient with mild systemic disease    Sedation/ Anesthesia Plan:   intravenous sedation    Medications Planned:   midazolam (Versed) intravenously and fentanyl intravenously    Patient is an appropriate candidate for plan of sedation: yes    Electronically signed by Lincoln Spatz, PA on 1/5/2023 at 7:46 AM

## 2023-01-05 NOTE — POST SEDATION
TRANSFER - OUT REPORT:           Verbal report given to [address] [place] [address] on Sinai Melgar  being transferred to IR 02 for routine post-op              Report consisted of patients Situation, Background, Assessment and      Recommendations(SBAR). Information from the following report(s) Procedure Summary was reviewed with the receiving nurse. Opportunity for questions and clarification was provided. Conscious Sedation:    50 Mcg of Fentanyl administered   1 Mg of Versed administered           Pt tolerated procedure well.   Chest xray in 3hrs           VITALS:  BP (!) 111/51   Pulse 74   Temp 97.9 °F (36.6 °C) (Temporal)   Resp 17   Ht 5' 2\" (1.575 m)   Wt 88 lb (39.9 kg)   SpO2 100%   BMI 16.10 kg/m²

## 2023-01-05 NOTE — DISCHARGE INSTRUCTIONS
If you have any questions about your procedure, please call the Interventional Radiology department at [phone]. After business hours (5pm) and weekends, call the answering service at [phone] and ask for the Radiologist on call to be paged. Si tiene Preguntas acerca del procedimiento, por favor llame al departamento de Radiología Intervencional al [phone]. Después de horas de oficina (5 pm) y los fines de [place], llamar al Alisson Mcclain al [phone] y pregunte por el Radiologo de [place]. Interventional Radiology General Nurse Discharge    After general anesthesia or intravenous sedation, for 24 hours or while taking prescription Narcotics:  Limit your activities  Do not drive and operate hazardous machinery  Do not make important personal or business decisions  Do  not drink alcoholic beverages  If you have not urinated within 8 hours after discharge, please contact your surgeon on call. * Please give a list of your current medications to your Primary Care Provider. * Please update this list whenever your medications are discontinued, doses are     changed, or new medications (including over-the-counter products) are added. * Please carry medication information at all times in case of emergency situations. These are general instructions for a healthy lifestyle:    No smoking/ No tobacco products/ Avoid exposure to second hand smoke  Surgeon General's Warning:  Quitting smoking now greatly reduces serious risk to your health.     Obesity, smoking, and sedentary lifestyle greatly increases your risk for illness  A healthy diet, regular physical exercise & weight monitoring are important for maintaining a healthy lifestyle    You may be retaining fluid if you have a history of heart failure or if you experience any of the following symptoms:  Weight gain of 3 pounds or more overnight or 5 pounds in a week, increased swelling in our hands or feet or shortness of breath while lying flat in bed. Please call your doctor as soon as you notice any of these symptoms; do not wait until your next office visit. Recognize signs and symptoms of STROKE:  F-face looks uneven    A-arms unable to move or move unevenly    S-speech slurred or non-existent    T-time-call 911 as soon as signs and symptoms begin-DO NOT go       Back to bed or wait to see if you get better-TIME IS BRAIN.

## 2023-01-05 NOTE — H&P
Department of Interventional Radiology  [phone]    History and Physical    Patient:  Mi William MRN:  [id number]  SSN:  [id number]    YOB: 1940  Age:  [de-identified] y.o. Sex:  female      Primary Care Provider:  Beka Lares MD  Referring Physician:  Jessica Rojas MD    Subjective:     Chief Complaint: Lung nodule    History of the Present Illness: The patient is a [de-identified] y.o. female who presents for CT-guided percutaneous lung biopsy. Patient was recently admitted into the hospital for acute pancreatitis. A CT scan was performed on 12- that showed a new 13 x 11 mm nodule in the superior segment of the left lower lobe that is suspicious for malignancy. Patient has a prior history of smoking. She is n.p.o. today and denies taking any blood thinners.         Past Medical History:   Diagnosis Date    EPIFANIO (acute kidney injury) ([address] 12/04/2014    pt denies this dx    Arthritis     RA-abdelrahman hands    Back pain 6/10/2016    Breast cancer ([address] 2018    Breast lump dx 2/2018    left    Bronchitis     Chronic obstructive pulmonary disease ([address]     Discharge from the vagina     Dysuria     Eczema 6/10/2016    Fungal dermatitis     Headache 6/10/2016    Hypercholesterolemia 12/4/2014    Hypertension     not well controlled--per pt    Intractable vomiting 5/20/2018    Menopausal vaginal dryness     Osteoarthritis 6/10/2016    Osteoporosis 6/10/2016    Pancreatitis     2 episodes    PUD (peptic ulcer disease)     last 2003 which a rupture an extensive surgery    Sepsis ([address] 12/4/2014    Thyroid nodule     Tobacco abuse     1ppd x 49 yrs     Past Surgical History:   Procedure Laterality Date    APPENDECTOMY  1977    with hysterectomy    BREAST BIOPSY Left 1975    BREAST LUMPECTOMY Left 2/22/2018    LEFT BREAST LUMPECTOMY/ SENTINEL NODE BIOPSY performed by Torito Flores MD at [facility] MA[facility] OR    BREAST SURGERY Left 1975    breast lumpectomy, benign  (left)    CATARACT REMOVAL Bilateral 2018 w/IOL    CHOLECYSTECTOMY      HYSTERECTOMY (CERVIX STATUS UNKNOWN)  1977    OTHER SURGICAL HISTORY      hernicolectomy 2 cm    WA ABDOMEN SURGERY PROC UNLISTED  2003    stomach surgery for ruptured ulcer and extensive rerouting of intestestines per patient     [facility]  05/21/2018    empiric dilation    UPPER GASTROINTESTINAL ENDOSCOPY      UPPER GASTROINTESTINAL ENDOSCOPY N/A 10/4/2022    EGD ESOPHAGOGASTRODUODENOSCOPY/  performed by Charmayne Milliner, MD at [address] [facility] 12/28/2022    ENDOSCOPIC ULTRASOUND W/ EGD/ / performed by Alexandra Beauchamp MD at [facility] LEFT Left 1/31/2018    US BREAST NEEDLE BIOPSY LEFT 1/31/2018 [facility]        Review of Systems:    Pertinent items are noted in HPI. Prior to Admission medications    Medication Sig Start Date End Date Taking?  Authorizing Provider   ondansetron (ZOFRAN-ODT) 4 MG disintegrating tablet Take 1 tablet by mouth every 8 hours as needed for Nausea or Vomiting 12/30/22   Alfa Fox MD   predniSONE (DELTASONE) 10 MG tablet Take 1 tablet by mouth daily for 10 days 12/30/22 1/9/23  Alfa Fox MD   pantoprazole (PROTONIX) 40 MG tablet Take 1 tablet by mouth in the morning and at bedtime 12/30/22   Alfa Fox MD   hyoscyamine (LEVSIN/SL) 125 MCG sublingual tablet Place 1 tablet under the tongue 3 times daily (before meals) 12/30/22   Alfa Fox MD   docusate sodium (COLACE, DULCOLAX) 100 MG CAPS Take 100 mg by mouth daily for 7 days 12/30/22 1/6/23  Alfa Fox MD   Etanercept (ENBREL SURECLICK) 50 MG/ML SOAJ Inject 50 mg into the skin every 7 days 10/25/22   Rosita Munoz MD   donepezil (ARICEPT) 5 MG tablet Take 1 tablet by mouth nightly 10/12/22   Lizzette Elaine MD   losartan (COZAAR) 100 MG tablet Take 1 tablet by mouth daily 10/12/22   Lizzette Elaine MD   amLODIPine (NORVASC) 10 MG tablet Take 1 tablet by mouth daily 10/12/22   Alex Hoyt MD   hydrALAZINE (APRESOLINE) 25 MG tablet Take 1 tablet by mouth every 8 hours 10/12/22   Alex Hoyt MD   alendronate (FOSAMAX) 70 MG tablet Take 1 tablet by mouth every 7 days 10/12/22   Alex Hoyt MD   pravastatin (PRAVACHOL) 40 MG tablet Take 1 tablet by mouth daily 10/12/22   Alex Hoyt MD        Allergies   Allergen Reactions    Azithromycin Other (See Comments)     pancreatitis    Codeine Nausea And Vomiting       Family History   Problem Relation Age of Onset    Thyroid Cancer Neg Hx     No Known Problems Paternal Grandfather     No Known Problems Paternal Grandmother     No Known Problems Maternal Grandfather     No Known Problems Maternal Grandmother     Hypertension Other         Brother and sister with htn    No Known Problems Brother     Hypertension Mother     Heart Disease Brother     Heart Disease Sister     Breast Cancer Sister 79    Cancer Sister     Heart Disease Father     Heart Attack Father     Cancer Brother         prostate    Diabetes Sister     Heart Disease Sister      Social History     Tobacco Use    Smoking status: Every Day     Packs/day: 0.10     Types: Cigarettes     Start date: 5/6/1966    Smokeless tobacco: Never   Substance Use Topics    Alcohol use: No        Not in a hospital admission.     Objective:       Physical Examination:    Vitals:    01/05/23 0718   BP: (!) 141/63   Pulse: 71   Resp: 16   Temp: 97.9 °F (36.6 °C)   TempSrc: Temporal   SpO2: 96%   Weight: 88 lb (39.9 kg)   Height: 5' 2\" (1.575 m)       Pain Assessment                       0                              HEART: regular rate and rhythm, S1, S2 normal, no murmur, click, rub or gallop  LUNG: clear to auscultation bilaterally  ABDOMEN: soft, non-tender; bowel sounds normal; no masses,  no organomegaly  EXTREMITIES: no pedal edema    Laboratory:     Lab Results   Component Value Date/Time     12/30/2022 06:45 AM     12/29/2022 06:49 AM    K 3.6 12/30/2022 06:45 AM    K 4.2 12/29/2022 06:49 AM     12/30/2022 06:45 AM     12/29/2022 06:49 AM    CO2 29 12/30/2022 06:45 AM    CO2 25 12/29/2022 06:49 AM    BUN 13 12/30/2022 06:45 AM    BUN 6 12/29/2022 06:49 AM    GFRAA >60 10/03/2022 07:01 AM    GFRAA >60 10/02/2022 06:17 AM    MG 1.9 12/28/2022 08:33 AM    MG 2.1 12/26/2022 08:35 AM    GLOB 3.4 12/27/2022 05:58 AM    GLOB 2.7 12/22/2022 05:30 AM    ALT 15 12/27/2022 05:58 AM    ALT 16 12/22/2022 05:30 AM     Lab Results   Component Value Date/Time    WBC 7.3 12/28/2022 08:33 AM    WBC 6.1 12/27/2022 05:58 AM    HGB 10.1 12/28/2022 08:33 AM    HGB 10.5 12/27/2022 05:58 AM    HCT 31.9 12/28/2022 08:33 AM    HCT 33.5 12/27/2022 05:58 AM     12/28/2022 08:33 AM     12/27/2022 05:58 AM     No results found for: APTT, INR    Assessment:     Pleasant 79-year-old female who recently underwent CT imaging that revealed a new 13 x 11 mm left lower lobe lung nodule concerning for malignancy        Plan:     Planned Procedure: CT-guided percutaneous lung biopsy under moderate sedation    Risks, benefits, and alternatives reviewed with patient and she agrees to proceed with the procedure.       Signed By: Adron Eisenmenger, [place]     January 5, 2023

## 2023-01-05 NOTE — BRIEF OP NOTE
Department of Interventional Radiology  [phone]        Interventional Radiology Brief Procedure Note    Patient: Camron Hernandez MRN: [id number]  SSN: [id number]    YOB: 1940  Age: [de-identified] y.o. Sex: female      Date of Procedure: 1/5/2023    Pre-Procedure Diagnosis: Enlarging RLL Lung nodule. Post-Procedure Diagnosis: SAME    Procedure(s):  Image Guided Biopsy    Brief Description of Procedure: as above    Performed By: Jocelyn Cuenca MD     Assistants: None    Anesthesia:Moderate Sedation    Estimated Blood Loss: Less than 10ml    Specimens:  Pathology    Implants:  None    Findings: Small parenchymal hemorrhage. No PTX. Complications: None    Recommendations: 4 hour bedrest. CXR in 3 hours. Follow Up: as above.      Signed By: Jocelyn Cuenca MD     January 5, 2023

## 2023-01-06 ENCOUNTER — TELEPHONE (OUTPATIENT)
Dept: FAMILY MEDICINE CLINIC | Facility: CLINIC | Age: 83
End: 2023-01-06

## 2023-01-06 NOTE — TELEPHONE ENCOUNTER
Pt calling with low blood pressures 118/46,126/50,102/42. Pt is still taking Amlodipine 10 mg,Losartan 100 mg, Hydralazine 25 mg . Advised to hold meds over week end and call office with readings on 1-9-2023.

## 2023-01-09 ENCOUNTER — TELEPHONE (OUTPATIENT)
Dept: FAMILY MEDICINE CLINIC | Facility: CLINIC | Age: 83
End: 2023-01-09

## 2023-01-09 NOTE — TELEPHONE ENCOUNTER
Pt calling with blood pressure readings as follows since holding bp meds per 1-6-2023 phone note. 122/56,124/47,115/45,116/52,121/45,141/64,122/58,130/76. Pt will cont to hold. If any other changes will call office. Pt states that the dizziness is better.

## 2023-01-10 ENCOUNTER — CARE COORDINATION (OUTPATIENT)
Dept: CARE COORDINATION | Facility: CLINIC | Age: 83
End: 2023-01-10

## 2023-01-10 DIAGNOSIS — C34.91 BRONCHOGENIC CANCER OF RIGHT LUNG (HCC): ICD-10-CM

## 2023-01-10 DIAGNOSIS — R91.8 LUNG MASS: Primary | ICD-10-CM

## 2023-01-10 NOTE — CARE COORDINATION
[facility] Care Transitions Follow Up Call    LPN Care Coordinator contacted the patient by telephone to follow up after admission on 2022. Verified name and  with patient as identifiers. Patient: Familia Vela  Patient : 1940   MRN: [id number]  Reason for Admission: abdominal pain. Discharge Date: 22 RARS: Readmission Risk Score: 18.5      Needs to be reviewed by the provider   Additional needs identified to be addressed with provider: No  none             Method of communication with provider: none. Addressed changes since last contact:  none  Discussed follow-up appointments. If no appointment was previously scheduled, appointment scheduling offered: Yes. Is follow up appointment scheduled within 7 days of discharge? No.    Follow Up  Future Appointments   Date Time Provider Bo Jj   2023  8:50 AM Hany [address] [place] [address]   2023  9:45 AM Stephenie Gibson MD [facility] AMB   3/3/2023 10:40 AM Radha Hunt MD [facility] AMB   2023  8:45 AM [facility] AMB   2023 10:00 AM Fish Callaway MD [facility] AMB     Non[facility] follow up appointment(s): ethan    LPN Care Coordinator reviewed discharge instructions, medical action plan, and red flags with patient and discussed any barriers to care and/or understanding of plan of care after discharge. Discussed appropriate site of care based on symptoms and resources available to patient including: PCP  Specialist  Urgent care clinics  When to call 911. The patient agrees to contact the PCP office for questions related to their healthcare. Advance Care Planning:   patient declined education. Patients top risk factors for readmission: medical condition-Pancreatitis, RA, HLD, Unplanned weight loss, Lung Nodule, Multiple Thyroid Nodules, HTN, Tobacco Abuse.     Interventions to address risk factors: Obtained and reviewed discharge summary and/or continuity of care documents, Education of patient/family/caregiver/guardian to support self-management-Dorene Perez LPN [phone], and all scheduled appointments. Offered patient enrollment in the Remote Patient Monitoring (RPM) program for in-home monitoring: NA.     Care Transitions Subsequent and Final Call    Subsequent and Final Calls  Do you have any ongoing symptoms?: No  Have your medications changed?: No  Do you have any questions related to your medications?: No  Do you currently have any active services?: No  Do you have any needs or concerns that I can assist you with?: No  Identified Barriers: None  Care Transitions Interventions  Other Interventions:             LPN Care Coordinator provided contact information for future needs. Plan for follow-up call in 7-10 days based on severity of symptoms and risk factors. Plan for next call: self management-diet, hydration, exercise, follow up appointments.      Michelle Claire LPN

## 2023-01-11 NOTE — PROGRESS NOTES
New Patient Abstract    Reason for Referral: Lung Mass    Referring Provider:  Inpatient    Primary Care Provider: Dr Urvashi Silveira    Family History of Cancer/Hematologic Disorders: Sister with breast cancer and brother with prostate cancer    Presenting Symptoms: lung mass was incidental finding on imaging    Narrative with recent with Results/Procedures/Biopsies and Dates completed:   Ms. Romulo Iglesias is an 82-year-old  female who reports to be an everyday smoker of cigarettes. She denies use of alcohol or drug substances. Her medical history reports as EPIFANIO, arthritis, back pain, breast cancer, bronchitis, COPD, dysuria, eczema, fungal dermatitis, headache, hypercholesterolemia, HTN, menopausal vaginal dryness, OA, osteoporosis, pancreatitis, PUD, sepsis, and thyroid nodule. Her surgical history reports as appendectomy, breast biopsy, left breast lumpectomy, cataract removal, cholecystectomy, lung biopsy, hysterectomy, hemicolectomy, gastric surgery for rupture ulcer, tubal ligation, and multiple EGDs. In October 2022, Ms. Romulo Iglesias was admitted 5 days for pancreatitis. She underwent an EGD at that time which was unremarkable. She improved and was discharged on 10/5/22. She returned to the ED in November 2022 with abdominal pain and she was to follow up with GI. Her 11/5/22 CT of abdomen and pelvis reported mild fat stranding around pancreatitic head, mild pancreatic duct dilatation, no hydronephrosis, and no evidence of colitis, diverticulitis or bowel obstruction. On 12/21/22 she returned to ED for abdominal pain and was admitted for recurrent pancreatitis. On 12/22/22 her CT scan of chest reported a new mass in the right lower lobe that measured 13 x 11mm. Her 12/23/22 MRI of abdomen reported findings suspicious for pancreatic head mass. On 12/28/22 she had an endoscopic ultrasound by Dr Papito Wolf with GI. The findings reported minimal pancreatic duct dilation and partial gastrectomy.  The recommendation was if acute pancreatitis reoccurs to consider ERCP/pancreatic sphincterotomy. She was discharged home to have OP GI follow up and follow up with oncology for lung nodule. On 1/5/23 she underwent a biopsy of her RLL lung nodule through IR. 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 The IHC interpretation was poorly differentiated malignant tumor that demonstrated an immunohistochemical profile that included squamous and urothelial carcinoma in the differential diagnosis. IHC staining positive for GATA3, p40, CK 5/6 and p63 with patchy positive for CK 7. She is here to see oncology for next steps in plan of care for her malignant neoplasm with squamous features. 11/5/22 CT Abdomen and pelvis with contrast  FINDINGS:       Mild dependent subsegmental atelectasis in the visualized lung bases. Abdomen findings:       Gallbladder is surgically absent. There is biliary ductal dilatation, similar to   prior exam and likely sequela of cholecystectomy. There is mild fat stranding around the pancreatic head. There is pancreatic   ductal dilatation. There is no focal liver lesion. The spleen and the right kidney are   unremarkable. There are a few small cysts in the left kidney. There is no   hydronephrosis. Abdominal aorta is normal in course and caliber with prominent atherosclerotic   calcifications. There are postsurgical changes in the area of the distal stomach. Small bowel   loops are normal in caliber. No evidence of small bowel obstruction. There is no   evidence of lymphadenopathy. Pelvic findings:       Urinary bladder is normal in contour. There is sigmoid diverticulosis without   diverticulitis. The colon is normal in course and caliber. Appendix is   surgically absent. There is no free air or free fluid. Moderate to severe compression deformity of   L1, similar to prior exam.               Impression       1.  Mild fat stranding around the pancreatic head, nonspecific but may be related   to acute pancreatitis. No peripancreatic fluid or pseudocyst. No evidence of   pancreatic necrosis. 2. Gallbladder is surgically absent. Biliary ductal dilatation may be secondary   to the cholecystectomy. Mild pancreatic duct dilatation, similar to prior exam.       3. No evidence of colitis, diverticulitis or bowel obstruction. No   hydronephrosis. 12/22/22 CT Chest without contrast  FINDINGS:    LUNGS:   There is a new mass in the right lower lobe peripherally, superior segment which   measures 13 x 11 mm. Linear densities extend to the pleura. Minimal atelectasis   both bases. AIRWAYS: Trachea and proximal bronchi grossly patent. PLEURA: No effusion or thickening or calcifications. LYMPH NODES: No enlarged axillary, hilar or mediastinal lymph nodes. HEART: Normal size. CORONARIES: Positive calcifications. AORTA: Normal caliber with calcifications   UPPER ABDOMEN: Normal size adrenal glands. Postoperative changes upper abdomen   stranding densities around the pancreas. SKELETAL/CHEST WALL: DJD, chronic compression fracture L1.   689  Study marked for call report. Impression   There is a new 13 x 11 mm nodule in the superior segment left lower   lobe, suspicious for malignancy. PET/CT or tissue sampling recommended. 12/23/22 MRI abdomen  Impression   1. The study is moderately limited due to motion artifact. 2.  Mild dilation of the pancreatic and common bile ducts with abrupt tapering   at the pancreatic head. Findings are suspicious for a pancreatic head mass,   although no discrete mass is visualized. 1/5/23 Surgical Pathology  \"RIGHT LUNG\":               MALIGNANT NEOPLASM WITH SQUAMOUS FEATURES. SEE COMMENT. Comment   The poorly differentiated malignant tumor demonstrates an   immunohistochemical profile that includes squamous and urothelial   carcinoma in the differential diagnosis.    Elsa Perez has reviewed this   case in intradepartmental consultation and concurs with the above   interpretation. Procedures/Addenda                     STF-IMMUNOHISTOCHEMISTRY   Interpretation                       Immunohistochemical Stain Panel:          Interpretation:  Immunohistochemical findings consistent with the   above diagnosis.      Antibody/Test               Marker For                                                               Result   CK 7                    Lung, breast, upper GE, serous ovary                           Patchy positive   CK 20                    Colon, mucinous ovary, urothelium                              Negative   GATA3               Urothelial, breast carcinoma; some squamous tumors   Positive   GCDFP                    Breast, salivary gland, skin, adnexa                          Negative   Mammaglobin                Breast                                                                  Negative   p40                    Squamous differentiation                                                 Positive   CK 5/6                    Squamous cell carcinomas and mesotheliomas         Positive   p63                    Squamous and transitional epithelium                              Positive                    Notes from Referring Provider: n/a    Other Pertinent Information: n/a    Presented at Tumor Board: n/a

## 2023-01-11 NOTE — PROGRESS NOTES
[facility]  Darrell Leiva  Phone [phone]  Fax:  [phone]    Rei Srinivasan (:  1940) is a 80 y.o. female here for evaluation of the following chief complaint(s):  Cholesterol Problem and Hypertension (Refills no recent labs/Recent hospitalization)       ASSESSMENT/PLAN:  1. Anemia, unspecified type  -     Transferrin Saturation; Future  -     CBC with Auto Differential; Future  2. Essential (primary) hypertension  -     TSH with Reflex; Future  -     Comprehensive Metabolic Panel; Future  -     Lipid Panel  3. Pure hypercholesterolemia, unspecified  -     Lipid Panel  4. Pulmonary nodule  -     CT CHEST WO CONTRAST; Future  5. Weight loss  -     CT CHEST WO CONTRAST; Future  6. Age-related osteoporosis without current pathological fracture  -     DEXA BONE DENSITY AXIAL SKELETON; Future    Recently hospitalized for pancreatitis, has appt with GI in a few weeks. Symptoms have improved. Has lost weight despite good appetite. Check CMP, CBC, and TSH. Found to be anemic, check iron saturation. History of pulmonary nodule, check CT chest.  Check screening DEXA scan. Patient has never had screening colonoscopy, previously discussed recommendation for screening but patient declined any colon cancer screening. Again recommended colonoscopy but patient declined. Negative mammogram 2022. Return in about 6 months (around 2023) for routine f/u, labs prior.          Subjective   SUBJECTIVE/OBJECTIVE:  HPI  81-year-old female with PMH of breast cancer, pancreatitis, HTN, PAD, thyroid nodules, osteoporosis, tobacco abuse, and RA who presents for regular 6-month follow-up.  -Hospitalized -10/5 for acute pancreatitis, underwent EGD 10/4 which was unremarkable  -Has lost weight, appetite is good  -Abd pain improved, no N/V  -Has cut back on smoking since getting out of the hospital      Review of Systems   Constitutional:  Positive for unexpected weight change. Gastrointestinal:  Negative for blood in stool and diarrhea. Objective     Vitals:    10/12/22 1123   BP: (!) 142/78   Pulse: 98   Temp: 97 °F (36.1 °C)   SpO2: 97%       Physical Exam  Vitals reviewed. Constitutional:       General: She is not in acute distress. Comments: Underweight   HENT:      Head: Normocephalic and atraumatic. Cardiovascular:      Rate and Rhythm: Normal rate and regular rhythm. Pulmonary:      Effort: Pulmonary effort is normal.      Breath sounds: Normal breath sounds. Abdominal:      General: There is no distension. Palpations: Abdomen is soft. Tenderness: There is no abdominal tenderness. Musculoskeletal:      Right lower leg: No edema. Left lower leg: No edema. Skin:     General: Skin is warm and dry. Neurological:      General: No focal deficit present. Mental Status: She is alert and oriented to person, place, and time. An electronic signature was used to authenticate this note.     --Urvashi Silveira MD 3

## 2023-01-12 ENCOUNTER — OFFICE VISIT (OUTPATIENT)
Dept: ONCOLOGY | Age: 83
End: 2023-01-12
Payer: MEDICARE

## 2023-01-12 ENCOUNTER — HOSPITAL ENCOUNTER (OUTPATIENT)
Dept: LAB | Age: 83
Discharge: HOME | End: 2023-01-12
Payer: MEDICARE

## 2023-01-12 VITALS
WEIGHT: 90.6 LBS | DIASTOLIC BLOOD PRESSURE: 72 MMHG | OXYGEN SATURATION: 98 % | TEMPERATURE: 98.9 F | HEIGHT: 61 IN | RESPIRATION RATE: 16 BRPM | BODY MASS INDEX: 17.11 KG/M2 | HEART RATE: 75 BPM | SYSTOLIC BLOOD PRESSURE: 160 MMHG

## 2023-01-12 DIAGNOSIS — R91.8 LUNG MASS: ICD-10-CM

## 2023-01-12 DIAGNOSIS — C34.31 MALIGNANT NEOPLASM OF LOWER LOBE OF RIGHT LUNG (HCC): Primary | ICD-10-CM

## 2023-01-12 DIAGNOSIS — Z85.3 HISTORY OF BREAST CANCER: ICD-10-CM

## 2023-01-12 DIAGNOSIS — Z01.818 PRE-OP TESTING: ICD-10-CM

## 2023-01-12 DIAGNOSIS — R63.4 WEIGHT LOSS: ICD-10-CM

## 2023-01-12 DIAGNOSIS — C34.91 BRONCHOGENIC CANCER OF RIGHT LUNG (HCC): ICD-10-CM

## 2023-01-12 LAB
ALBUMIN SERPL-MCNC: 3.3 G/DL (ref 3.2–4.6)
ALBUMIN/GLOB SERPL: 0.9 (ref 0.4–1.6)
ALP SERPL-CCNC: 57 U/L (ref 50–136)
ALT SERPL-CCNC: 20 U/L (ref 12–65)
ANION GAP SERPL CALC-SCNC: 8 MMOL/L (ref 2–11)
AST SERPL-CCNC: 13 U/L (ref 15–37)
BASOPHILS # BLD: 0.1 K/UL (ref 0–0.2)
BASOPHILS NFR BLD: 1 % (ref 0–2)
BILIRUB SERPL-MCNC: 0.4 MG/DL (ref 0.2–1.1)
BUN SERPL-MCNC: 15 MG/DL (ref 8–23)
CALCIUM SERPL-MCNC: 8.9 MG/DL (ref 8.3–10.4)
CEA SERPL-MCNC: 1.6 NG/ML (ref 0–3)
CHLORIDE SERPL-SCNC: 106 MMOL/L (ref 101–110)
CO2 SERPL-SCNC: 26 MMOL/L (ref 21–32)
CREAT SERPL-MCNC: 0.9 MG/DL (ref 0.6–1)
DIFFERENTIAL METHOD BLD: ABNORMAL
EOSINOPHIL # BLD: 0 K/UL (ref 0–0.8)
EOSINOPHIL NFR BLD: 0 % (ref 0.5–7.8)
ERYTHROCYTE [DISTWIDTH] IN BLOOD BY AUTOMATED COUNT: 18.6 % (ref 11.9–14.6)
GLOBULIN SER CALC-MCNC: 3.5 G/DL (ref 2.8–4.5)
GLUCOSE SERPL-MCNC: 114 MG/DL (ref 65–100)
HCT VFR BLD AUTO: 36.6 % (ref 35.8–46.3)
HGB BLD-MCNC: 11.5 G/DL (ref 11.7–15.4)
IMM GRANULOCYTES # BLD AUTO: 0.1 K/UL (ref 0–0.5)
IMM GRANULOCYTES NFR BLD AUTO: 1 % (ref 0–5)
LYMPHOCYTES # BLD: 1 K/UL (ref 0.5–4.6)
LYMPHOCYTES NFR BLD: 8 % (ref 13–44)
MAGNESIUM SERPL-MCNC: 2.1 MG/DL (ref 1.8–2.4)
MCH RBC QN AUTO: 26.4 PG (ref 26.1–32.9)
MCHC RBC AUTO-ENTMCNC: 31.4 G/DL (ref 31.4–35)
MCV RBC AUTO: 83.9 FL (ref 82–102)
MONOCYTES # BLD: 0.2 K/UL (ref 0.1–1.3)
MONOCYTES NFR BLD: 1 % (ref 4–12)
NEUTS SEG # BLD: 11.5 K/UL (ref 1.7–8.2)
NEUTS SEG NFR BLD: 90 % (ref 43–78)
NRBC # BLD: 0 K/UL (ref 0–0.2)
PLATELET # BLD AUTO: 525 K/UL (ref 150–450)
PMV BLD AUTO: 8.3 FL (ref 9.4–12.3)
POTASSIUM SERPL-SCNC: 4.2 MMOL/L (ref 3.5–5.1)
PROT SERPL-MCNC: 6.8 G/DL (ref 6.3–8.2)
RBC # BLD AUTO: 4.36 M/UL (ref 4.05–5.2)
SODIUM SERPL-SCNC: 140 MMOL/L (ref 133–143)
WBC # BLD AUTO: 12.7 K/UL (ref 4.3–11.1)

## 2023-01-12 PROCEDURE — 3078F DIAST BP <80 MM HG: CPT | Performed by: INTERNAL MEDICINE

## 2023-01-12 PROCEDURE — 83735 ASSAY OF MAGNESIUM: CPT

## 2023-01-12 PROCEDURE — 3077F SYST BP >= 140 MM HG: CPT | Performed by: INTERNAL MEDICINE

## 2023-01-12 PROCEDURE — 36415 COLL VENOUS BLD VENIPUNCTURE: CPT

## 2023-01-12 PROCEDURE — 82378 CARCINOEMBRYONIC ANTIGEN: CPT

## 2023-01-12 PROCEDURE — 99205 OFFICE O/P NEW HI 60 MIN: CPT | Performed by: INTERNAL MEDICINE

## 2023-01-12 PROCEDURE — 85025 COMPLETE CBC W/AUTO DIFF WBC: CPT

## 2023-01-12 PROCEDURE — 1123F ACP DISCUSS/DSCN MKR DOCD: CPT | Performed by: INTERNAL MEDICINE

## 2023-01-12 PROCEDURE — 80053 COMPREHEN METABOLIC PANEL: CPT

## 2023-01-12 ASSESSMENT — PATIENT HEALTH QUESTIONNAIRE - PHQ9
SUM OF ALL RESPONSES TO PHQ QUESTIONS 1-9: 0
SUM OF ALL RESPONSES TO PHQ QUESTIONS 1-9: 0
1. LITTLE INTEREST OR PLEASURE IN DOING THINGS: 0
2. FEELING DOWN, DEPRESSED OR HOPELESS: 0
SUM OF ALL RESPONSES TO PHQ QUESTIONS 1-9: 0
SUM OF ALL RESPONSES TO PHQ9 QUESTIONS 1 & 2: 0
SUM OF ALL RESPONSES TO PHQ QUESTIONS 1-9: 0

## 2023-01-12 NOTE — PROGRESS NOTES
[facility] Hematology & Oncology: Office Visit New Patient H/P    Chief Complaint:    RLL lung SCC    History of Present Illness:  Reason for Referral: Lung Mass     Referring Provider:  Inpatient     Primary Care Provider: Dr Ngozi Horner     Family History of Cancer/Hematologic Disorders: Sister with breast cancer and brother with prostate cancer     Presenting Symptoms: lung mass was incidental finding on imaging     Ms. Lexx Carrera is an [de-identified] y.o.  female who reports to be an everyday smoker of cigarettes. She denies use of alcohol or drug substances. Her medical history reports as EPIFANIO, arthritis, back pain, breast cancer, bronchitis, COPD, dysuria, eczema, fungal dermatitis, headache, hypercholesterolemia, HTN, menopausal vaginal dryness, OA, osteoporosis, pancreatitis, PUD, sepsis, and thyroid nodule. Her surgical history reports as appendectomy, breast biopsy, left breast lumpectomy, cataract removal, cholecystectomy, lung biopsy, hysterectomy, hemicolectomy, gastric surgery for rupture ulcer, tubal ligation, and multiple EGDs. In October 2022, Ms. Lexx Carrera was admitted 5 days for pancreatitis. She underwent an EGD at that time which was unremarkable. She improved and was discharged on 10/5/22. She returned to the ED in November 2022 with abdominal pain and she was to follow up with GI. Her 11/5/22 CT of abdomen and pelvis reported mild fat stranding around pancreatitic head, mild pancreatic duct dilatation, no hydronephrosis, and no evidence of colitis, diverticulitis or bowel obstruction. On 12/21/22 she returned to ED for abdominal pain and was admitted for recurrent pancreatitis. On 12/22/22 her CT scan of chest reported a new mass in the right lower lobe that measured 13 x 11mm. Her 12/23/22 MRI of abdomen reported findings suspicious for pancreatic head mass. On 12/28/22 she had an endoscopic ultrasound by Dr Maryellen Madrigal with GI.  The findings reported minimal pancreatic duct dilation and partial gastrectomy. The recommendation was if acute pancreatitis reoccurs to consider ERCP/pancreatic sphincterotomy. She was discharged home to have OP GI follow up and follow up with oncology for lung nodule. On 1/5/23 she underwent a biopsy of her RLL lung nodule through IR. 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 The IHC interpretation was poorly differentiated malignant tumor that demonstrated an immunohistochemical profile that included squamous and urothelial carcinoma in the differential diagnosis. IHC staining positive for GATA3, p40, CK 5/6 and p63 with patchy positive for CK 7. She is here to see oncology for next steps in plan of care for her malignant neoplasm with squamous features. 11/5/22 CT Abdomen and pelvis with contrast  FINDINGS:       Mild dependent subsegmental atelectasis in the visualized lung bases. Abdomen findings:       Gallbladder is surgically absent. There is biliary ductal dilatation, similar to   prior exam and likely sequela of cholecystectomy. There is mild fat stranding around the pancreatic head. There is pancreatic   ductal dilatation. There is no focal liver lesion. The spleen and the right kidney are   unremarkable. There are a few small cysts in the left kidney. There is no   hydronephrosis. Abdominal aorta is normal in course and caliber with prominent atherosclerotic   calcifications. There are postsurgical changes in the area of the distal stomach. Small bowel   loops are normal in caliber. No evidence of small bowel obstruction. There is no   evidence of lymphadenopathy. Pelvic findings:       Urinary bladder is normal in contour. There is sigmoid diverticulosis without   diverticulitis. The colon is normal in course and caliber. Appendix is   surgically absent. There is no free air or free fluid.  Moderate to severe compression deformity of   L1, similar to prior exam.               Impression 1. Mild fat stranding around the pancreatic head, nonspecific but may be related   to acute pancreatitis. No peripancreatic fluid or pseudocyst. No evidence of   pancreatic necrosis. 2. Gallbladder is surgically absent. Biliary ductal dilatation may be secondary   to the cholecystectomy. Mild pancreatic duct dilatation, similar to prior exam.       3. No evidence of colitis, diverticulitis or bowel obstruction. No   hydronephrosis. 12/22/22 CT Chest without contrast  FINDINGS:    LUNGS:   There is a new mass in the right lower lobe peripherally, superior segment which   measures 13 x 11 mm. Linear densities extend to the pleura. Minimal atelectasis   both bases. AIRWAYS: Trachea and proximal bronchi grossly patent. PLEURA: No effusion or thickening or calcifications. LYMPH NODES: No enlarged axillary, hilar or mediastinal lymph nodes. HEART: Normal size. CORONARIES: Positive calcifications. AORTA: Normal caliber with calcifications   UPPER ABDOMEN: Normal size adrenal glands. Postoperative changes upper abdomen   stranding densities around the pancreas. SKELETAL/CHEST WALL: DJD, chronic compression fracture L1.   689  Study marked for call report. Impression   There is a new 13 x 11 mm nodule in the superior segment left lower   lobe, suspicious for malignancy. PET/CT or tissue sampling recommended. 12/23/22 MRI abdomen  Impression   1. The study is moderately limited due to motion artifact. 2.  Mild dilation of the pancreatic and common bile ducts with abrupt tapering   at the pancreatic head. Findings are suspicious for a pancreatic head mass,   although no discrete mass is visualized. 1/5/23 Surgical Pathology  \"RIGHT LUNG\":               MALIGNANT NEOPLASM WITH SQUAMOUS FEATURES. SEE COMMENT.    Comment   The poorly differentiated malignant tumor demonstrates an   immunohistochemical profile that includes squamous and urothelial   carcinoma in the differential diagnosis. Dr. Claudette Aguila has reviewed this   case in intradepartmental consultation and concurs with the above   interpretation. Procedures/Addenda                     STF-IMMUNOHISTOCHEMISTRY   Interpretation                       Immunohistochemical Stain Panel:          Interpretation:  Immunohistochemical findings consistent with the   above diagnosis. Antibody/Test               Marker For                                                               Result   CK 7                    Lung, breast, upper GE, serous ovary                           Patchy positive   CK 20                    Colon, mucinous ovary, urothelium                              Negative   GATA3               Urothelial, breast carcinoma; some squamous tumors   Positive   GCDFP                    Breast, salivary gland, skin, adnexa                          Negative   Mammaglobin                Breast                                                                  Negative   p40                    Squamous differentiation                                                 Positive   CK 5/6                    Squamous cell carcinomas and mesotheliomas         Positive   p63                    Squamous and transitional epithelium                              Positive                         Review of Systems:  Constitutional Denies fever or chills. Denies weight loss or appetite changes. Denies fatigue. Denies anorexia. HEENT Denies trauma, bluring vision, hearing loss, ear pain, nosebleeds, sore throat, neck pain and ear discharge. Skin Denies lesions or rashes. Lungs Denies shortness of breath, cough, sputum production or hemoptysis. Cardiovascular Denies chest pain, palpitations, orthopnea, claudication and leg swelling. Gastrointestinal Denies nausea, vomiting, bowel changes. Denies bloody or black stools. Denies abdominal pain.     Denies dysuria, frequency or hesitancy of urination   Neuro Denies headaches, visual changes or ataxia. Denies dizziness, tingling, tremors, sensory change, speech change, focal weakness and headaches.     Hematology Denies nasal/gum bleeding, denies easy bruise   Endo Denies heat/cold intolerance, denies diabetes.   MSK Denies back pain, swollen legs, myalgias and falls.     Psychiatric/Behavioral Denies depression and substance abuse. The patient is not nervous/anxious.       Allergies   Allergen Reactions    Azithromycin Other (See Comments)     pancreatitis    Codeine Nausea And Vomiting     Past Medical History:   Diagnosis Date    EPIFANIO (acute kidney injury) ([facility]) 12/04/2014    pt denies this dx    Arthritis     RA-abdelrahman hands    Back pain 6/10/2016    Breast cancer ([facility]) 2018    Breast lump dx 2/2018    left    Bronchitis     Chronic obstructive pulmonary disease ([facility])     Discharge from the vagina     Dysuria     Eczema 6/10/2016    Fungal dermatitis     Headache 6/10/2016    Hypercholesterolemia 12/4/2014    Hypertension     not well controlled--per pt    Intractable vomiting 5/20/2018    Menopausal vaginal dryness     Osteoarthritis 6/10/2016    Osteoporosis 6/10/2016    Pancreatitis     2 episodes    PUD (peptic ulcer disease)     last 2003 which a rupture an extensive surgery    Sepsis ([facility]) 12/4/2014    Thyroid nodule     Tobacco abuse     1ppd x 49 yrs     Past Surgical History:   Procedure Laterality Date    APPENDECTOMY  1977    with hysterectomy    BREAST BIOPSY Left 1975    BREAST LUMPECTOMY Left 2/22/2018    LEFT BREAST LUMPECTOMY/ SENTINEL NODE BIOPSY performed by Julio Noland MD at [facility] MAIN OR    BREAST SURGERY Left 1975    breast lumpectomy, benign  (left)    CATARACT REMOVAL Bilateral 2018    w/IOL    CHOLECYSTECTOMY      CT NEEDLE BIOPSY LUNG PERCUTANEOUS  1/5/2023    CT NEEDLE BIOPSY LUNG PERCUTANEOUS 1/5/2023 [facility] RADIOLOGY CT SCAN    HYSTERECTOMY (CERVIX STATUS UNKNOWN)  1977    OTHER SURGICAL HISTORY      hernicolectomy 2 cm    CO UNLISTED PROCEDURE  ABDOMEN PERITONEUM & OMENTUM  2003    stomach surgery for ruptured ulcer and extensive rerouting of intestestines per patient     [address] ENDOSCOPY  05/21/2018    empiric dilation    UPPER GASTROINTESTINAL ENDOSCOPY      UPPER GASTROINTESTINAL ENDOSCOPY N/A 10/4/2022    EGD ESOPHAGOGASTRODUODENOSCOPY/  203 performed by Leigh Russell MD at [facility] 86 N/A 12/28/2022    ENDOSCOPIC ULTRASOUND W/ EGD/ / performed by Author Corinne MD at [address] [place] [address] LEFT Left 1/31/2018    US BREAST NEEDLE BIOPSY LEFT 1/31/2018 [facility]     Family History   Problem Relation Age of Onset    Thyroid Cancer Neg Hx     No Known Problems Paternal Grandfather     No Known Problems Paternal Grandmother     No Known Problems Maternal Grandfather     No Known Problems Maternal Grandmother     Hypertension Other         Brother and sister with htn    No Known Problems Brother     Hypertension Mother     Heart Disease Brother     Heart Disease Sister     Breast Cancer Sister 79    Cancer Sister     Heart Disease Father     Heart Attack Father     Cancer Brother         prostate    Diabetes Sister     Heart Disease Sister      Social History     Socioeconomic History    Marital status:       Spouse name: Not on file    Number of children: Not on file    Years of education: Not on file    Highest education level: Not on file   Occupational History    Not on file   Tobacco Use    Smoking status: Every Day     Packs/day: 0.10     Types: Cigarettes     Start date: 5/6/1966    Smokeless tobacco: Never   Vaping Use    Vaping Use: Never used   Substance and Sexual Activity    Alcohol use: No    Drug use: No    Sexual activity: Not Currently     Birth control/protection: None   Other Topics Concern    Not on file   Social History Narrative    Not on file     Social Determinants of Health     Financial Resource Strain: Not on file   Food Insecurity: Not on file   Transportation Needs: Not on file   Physical Activity: Not on file   Stress: Not on file   Social Connections: Not on file   Intimate Partner Violence: Not on file   Housing Stability: Not on file     Current Outpatient Medications   Medication Sig Dispense Refill    pravastatin (PRAVACHOL) 40 MG tablet Take 1 tablet by mouth daily 30 tablet 5    ondansetron (ZOFRAN-ODT) 4 MG disintegrating tablet Take 1 tablet by mouth every 8 hours as needed for Nausea or Vomiting (Patient not taking: Reported on 1/12/2023) 30 tablet 1    pantoprazole (PROTONIX) 40 MG tablet Take 1 tablet by mouth in the morning and at bedtime (Patient not taking: Reported on 1/12/2023) 30 tablet 3    hyoscyamine (LEVSIN/SL) 125 MCG sublingual tablet Place 1 tablet under the tongue 3 times daily (before meals) (Patient not taking: Reported on 1/12/2023) 90 tablet 3    Etanercept (ENBREL SURECLICK) 50 MG/ML SOAJ Inject 50 mg into the skin every 7 days (Patient not taking: Reported on 1/12/2023) 4 mL 3    donepezil (ARICEPT) 5 MG tablet Take 1 tablet by mouth nightly (Patient not taking: Reported on 1/12/2023) 30 tablet 5    losartan (COZAAR) 100 MG tablet Take 1 tablet by mouth daily (Patient not taking: Reported on 1/12/2023) 30 tablet 5    amLODIPine (NORVASC) 10 MG tablet Take 1 tablet by mouth daily (Patient not taking: Reported on 1/12/2023) 30 tablet 5    hydrALAZINE (APRESOLINE) 25 MG tablet Take 1 tablet by mouth every 8 hours (Patient not taking: Reported on 1/12/2023) 90 tablet 5    alendronate (FOSAMAX) 70 MG tablet Take 1 tablet by mouth every 7 days (Patient not taking: Reported on 1/12/2023) 4 tablet 5     No current facility-administered medications for this visit.       OBJECTIVE:  BP (!) 160/72   Pulse 75   Temp 98.9 °F (37.2 °C) (Oral)   Resp 16   Ht 5' 1\" (1.549 m)   Wt 90 lb 9.6 oz (41.1 kg)   LMP  (LMP Unknown)   SpO2 98%   BMI 17.12 kg/m²     Physical Exam:  Constitutional:  Oriented to person, place, and time. Well-developed and well-nourished. HEENT: Normocephalic and atraumatic. Oropharynx is clear and moist.   Conjunctivae and EOM are normal. Pupils are equal, round, and reactive to light. No scleral icterus. Neck supple. No JVD present. No tracheal deviation present. No thyromegaly present. Lymph node No palpable submandibular, cervical, supraclavicular, axillary and inguinal lymph nodes. Skin Warm and dry. No bruising and no rash noted. No erythema. No pallor. Respiratory Effort normal and breath sounds normal.  No respiratory distress. No wheezes. No rales. No tenderness. CVS Normal rate, regular rhythm and normal heart sounds. Exam reveals no gallop, no friction and no rub. No murmur heard. Abdomen Soft. Bowel sounds are normal. Exhibits no distension. There is no tenderness. There is no rebound and no guarding. Neuro Normal reflexes. No cranial nerve deficit. Exhibits normal muscle tone, 5 of 5 strength of all extremities. MSK Normal range of motion in general.  No edema and no tenderness.    Psych Normal mood, affect, behavior, judgment and thought content      Labs:  Recent Results (from the past 24 hour(s))   CBC with Auto Differential    Collection Time: 01/12/23  8:44 AM   Result Value Ref Range    WBC 12.7 (H) 4.3 - 11.1 K/uL    RBC 4.36 4.05 - 5.2 M/uL    Hemoglobin 11.5 (L) 11.7 - 15.4 g/dL    Hematocrit 36.6 35.8 - 46.3 %    MCV 83.9 82.0 - 102.0 FL    MCH 26.4 26.1 - 32.9 PG    MCHC 31.4 31.4 - 35.0 g/dL    RDW 18.6 (H) 11.9 - 14.6 %    Platelets 546 (H) 310 - 450 K/uL    MPV 8.3 (L) 9.4 - 12.3 FL    nRBC 0.00 0.0 - 0.2 K/uL    Differential Type AUTOMATED      Seg Neutrophils 90 (H) 43 - 78 %    Lymphocytes 8 (L) 13 - 44 %    Monocytes 1 (L) 4.0 - 12.0 %    Eosinophils % 0 (L) 0.5 - 7.8 %    Basophils 1 0.0 - 2.0 %    Immature Granulocytes 1 0.0 - 5.0 %    Segs Absolute 11.5 (H) 1.7 - 8.2 K/UL    Absolute Lymph # 1.0 0.5 - 4.6 K/UL Absolute Mono # 0.2 0.1 - 1.3 K/UL    Absolute Eos # 0.0 0.0 - 0.8 K/UL    Basophils Absolute 0.1 0.0 - 0.2 K/UL    Absolute Immature Granulocyte 0.1 0.0 - 0.5 K/UL   CEA    Collection Time: 01/12/23  8:44 AM   Result Value Ref Range    CEA 1.6 0.0 - 3.0 ng/mL   Comprehensive Metabolic Panel    Collection Time: 01/12/23  8:44 AM   Result Value Ref Range    Sodium 140 133 - 143 mmol/L    Potassium 4.2 3.5 - 5.1 mmol/L    Chloride 106 101 - 110 mmol/L    CO2 26 21 - 32 mmol/L    Anion Gap 8 2 - 11 mmol/L    Glucose 114 (H) 65 - 100 mg/dL    BUN 15 8 - 23 MG/DL    Creatinine 0.90 0.6 - 1.0 MG/DL    Est, Glom Filt Rate >60 >60 ml/min/1.73m2    Calcium 8.9 8.3 - 10.4 MG/DL    Total Bilirubin 0.4 0.2 - 1.1 MG/DL    ALT 20 12 - 65 U/L    AST 13 (L) 15 - 37 U/L    Alk Phosphatase 57 50 - 136 U/L    Total Protein 6.8 6.3 - 8.2 g/dL    Albumin 3.3 3.2 - 4.6 g/dL    Globulin 3.5 2.8 - 4.5 g/dL    Albumin/Globulin Ratio 0.9 0.4 - 1.6     Magnesium    Collection Time: 01/12/23  8:44 AM   Result Value Ref Range    Magnesium 2.1 1.8 - 2.4 mg/dL       Imaging:  No results found for this or any previous visit. ASSESSMENT/PLAN:   Diagnosis Orders   1. Malignant neoplasm of lower lobe of right lung (HCC)  PET CT SKULL BASE TO MID THIGH    MRI BRAIN Selin Lee MD, General Surgery, [place]    Full PFT Study With Bronchodilator      2. Pre-op testing  Full PFT Study With Bronchodilator      3. History of breast cancer        4. Weight loss          [de-identified] y.o. F consulted for lung cancer presented to [facility] on 1/12/2023.   She was admitted for recurrent pancreatitis with extensive GI work-up and incidentally found right lower lobe lung nodule 1.3 cm, biopsy showed malignancy with squamous cell differentiation, CT chest/abdomen/pelvis showed no evidence of metastasis, significant weight loss most relevant to recurrent pancreatitis and continue to follow GI management, discussed this appeared to be early stage lung cancer and standard approach with curative intent surgery, also discussed SBRT as a reasonable alternative for people with poor lung function or desires to avoid surgery, she is very happy with the idea of surgical resection without adjuvant chemotherapy or radiation, arrange PFT/PET/brain MRI followed by surgical evaluation, it was also discovered that she had stage I breast cancer 2018 saw Dr. Raphael Hess and declined adjuvant AI and did not follow, discussed that the current lung biopsy was RODGER-1 positive and need pathology to compare with previous breast cancer for similarity, patient and daughter are agreeable with the plan and navigator will help to coordinate, tentatively return after surgery to follow result but call as needed. All questions are answered to their satisfaction. They will call for further questions and concerns. ECOG PERFORMANCE STATUS - 0-Fully active, able to carry on all pre-disease performance without restriction. Pain - 0 - No pain/10. None/Minimal pain - not affecting QOL     Fatigue - No flowsheet data found. Distress - No flowsheet data found. Total time independently spent on today's visit was 60min. This time included: face-to-face time evaluating the patient as well as additional non-face-to-face time spent on: Preparing to see the patient by obtaining and reviewing previous test results, records and medical history, Performing a medically appropriate history and exam and documenting relevant clinical information for this visit, Counseling and educating patient and family, Ordering tests, Communicating with other health care professionals and Referring patient to another health care provider. Elements of this note have been dictated via voice recognition software. Text and phrases may be limited by the accuracy and autoconversion of the software. The chart has been reviewed, but errors may still be present. Maury Arriaza M.D.   Amie Waldron [address]  Office : [phone]  Fax : [phone]

## 2023-01-12 NOTE — PATIENT INSTRUCTIONS
Patient Instructions from Today's Visit    Reason for Visit:  New patient consult for lung cancer    Diagnosis Information:  https://www.[employer]/. net/about-us/asco-answers-patient-education-materials/kcsi-vuzqnkg-dfuk-sheets  Patient was educated and given handouts published by [employer] entitled [employer] Answers Fact Sheets about their diagnosis of lung cancer during todays office visit. Plan:  Standard therapy for Stage I Lung cancer is surgical resection and then monitor with CT scans  Radiation is a second option if you are not a candidate for surgery. We will arrange Pulmonary Function Tests with Pulmonologist.  We will arrange a consultation with Surgery, Dr Nirav De Leon. We will schedule a PET scan and a brain MRI. We will ask Pathologist to compare your breast pathology with your lung pathology.     Follow Up:  Office visit with Dr Jojo Solorio Results:  Hospital Outpatient Visit on 01/12/2023   Component Date Value Ref Range Status    WBC 01/12/2023 12.7 (A)  4.3 - 11.1 K/uL Final    RBC 01/12/2023 4.36  4.05 - 5.2 M/uL Final    Hemoglobin 01/12/2023 11.5 (A)  11.7 - 15.4 g/dL Final    Hematocrit 01/12/2023 36.6  35.8 - 46.3 % Final    MCV 01/12/2023 83.9  82.0 - 102.0 FL Final    MCH 01/12/2023 26.4  26.1 - 32.9 PG Final    MCHC 01/12/2023 31.4  31.4 - 35.0 g/dL Final    RDW 01/12/2023 18.6 (A)  11.9 - 14.6 % Final    Platelets 77/15/0519 525 (A)  150 - 450 K/uL Final    MPV 01/12/2023 8.3 (A)  9.4 - 12.3 FL Final    nRBC 01/12/2023 0.00  0.0 - 0.2 K/uL Final    **Note: Absolute NRBC parameter is now reported with Hemogram**    Differential Type 01/12/2023 AUTOMATED    Final    Seg Neutrophils 01/12/2023 90 (A)  43 - 78 % Final    Lymphocytes 01/12/2023 8 (A)  13 - 44 % Final    Monocytes 01/12/2023 1 (A)  4.0 - 12.0 % Final    Eosinophils % 01/12/2023 0 (A)  0.5 - 7.8 % Final    Basophils 01/12/2023 1  0.0 - 2.0 % Final    Immature Granulocytes 01/12/2023 1  0.0 - 5.0 % Final    Segs Absolute 01/12/2023 11.5 (A)  1.7 - 8.2 K/UL Final    Absolute Lymph # 01/12/2023 1.0  0.5 - 4.6 K/UL Final    Absolute Mono # 01/12/2023 0.2  0.1 - 1.3 K/UL Final    Absolute Eos # 01/12/2023 0.0  0.0 - 0.8 K/UL Final    Basophils Absolute 01/12/2023 0.1  0.0 - 0.2 K/UL Final    Absolute Immature Granulocyte 01/12/2023 0.1  0.0 - 0.5 K/UL Final    CEA 01/12/2023 1.6  0.0 - 3.0 ng/mL Final    Comment: Nonsmoker:  <3.0 ng/mL  Smoker:     <5.0 ng/mL  [employer]. Patient's results of tumor marker testing may not be comparable to labs using different manufacturers/methods. Sodium 01/12/2023 140  133 - 143 mmol/L Final    Potassium 01/12/2023 4.2  3.5 - 5.1 mmol/L Final    Chloride 01/12/2023 106  101 - 110 mmol/L Final    CO2 01/12/2023 26  21 - 32 mmol/L Final    Anion Gap 01/12/2023 8  2 - 11 mmol/L Final    Glucose 01/12/2023 114 (A)  65 - 100 mg/dL Final    BUN 01/12/2023 15  8 - 23 MG/DL Final    Creatinine 01/12/2023 0.90  0.6 - 1.0 MG/DL Final    Est, Glom Filt Rate 01/12/2023 >60  >60 ml/min/1.73m2 Final    Comment:      Pediatric calculator link: [facility].at. org/professionals/kdoqi/gfr_calculatorped       These results are not intended for use in patients <25years of age. eGFR results are calculated without a race factor using  the 2021 CKD-EPI equation. Careful clinical correlation is recommended, particularly when comparing to results calculated using previous equations. The CKD-EPI equation is less accurate in patients with extremes of muscle mass, extra-renal metabolism of creatinine, excessive creatine ingestion, or following therapy that affects renal tubular secretion.       Calcium 01/12/2023 8.9  8.3 - 10.4 MG/DL Final    Total Bilirubin 01/12/2023 0.4  0.2 - 1.1 MG/DL Final    ALT 01/12/2023 20  12 - 65 U/L Final    AST 01/12/2023 13 (A)  15 - 37 U/L Final    Alk Phosphatase 01/12/2023 57  50 - 136 U/L Final    Total Protein 01/12/2023 6.8  6.3 - 8.2 g/dL Final    Albumin 01/12/2023 3.3 3.2 - 4.6 g/dL Final    Globulin 01/12/2023 3.5  2.8 - 4.5 g/dL Final    Albumin/Globulin Ratio 01/12/2023 0.9  0.4 - 1.6   Final    Magnesium 01/12/2023 2.1  1.8 - 2.4 mg/dL Final       Treatment Summary has been discussed and given to patient: n/a    -------------------------------------------------------------------------------------------------------------------  Please call our office at [phone] if you have any  of the following symptoms:   Fever of 100.5 or greater  Chills  Shortness of breath  Swelling or pain in one leg    After office hours an answering service is available and will contact a provider for emergencies or if you are experiencing any of the above symptoms. Patient does express an interest in My Chart. My Chart log in information explained on the after visit summary printout at the [facility] [address] desk. Violeta Avelar RN  Nurse Navigator  [phone]  For Urgent Matters (after hours and weekends), please call Triage at [phone]. For Emergencies, please call 911.

## 2023-01-15 ENCOUNTER — APPOINTMENT (OUTPATIENT)
Dept: GENERAL RADIOLOGY | Age: 83
DRG: 439 | End: 2023-01-15
Payer: MEDICARE

## 2023-01-15 ENCOUNTER — HOSPITAL ENCOUNTER (INPATIENT)
Age: 83
LOS: 1 days | Discharge: HOME | DRG: 439 | End: 2023-01-17
Attending: EMERGENCY MEDICINE | Admitting: FAMILY MEDICINE
Payer: MEDICARE

## 2023-01-15 ENCOUNTER — APPOINTMENT (OUTPATIENT)
Dept: CT IMAGING | Age: 83
DRG: 439 | End: 2023-01-15
Payer: MEDICARE

## 2023-01-15 DIAGNOSIS — R11.10 VOMITING IN ADULT: ICD-10-CM

## 2023-01-15 DIAGNOSIS — K85.90 ACUTE PANCREATITIS, UNSPECIFIED COMPLICATION STATUS, UNSPECIFIED PANCREATITIS TYPE: ICD-10-CM

## 2023-01-15 DIAGNOSIS — K85.90 ACUTE RECURRENT PANCREATITIS: Primary | ICD-10-CM

## 2023-01-15 DIAGNOSIS — K86.1 CHRONIC PANCREATITIS, UNSPECIFIED PANCREATITIS TYPE (HCC): ICD-10-CM

## 2023-01-15 LAB
ALBUMIN SERPL-MCNC: 3.1 G/DL (ref 3.2–4.6)
ALBUMIN/GLOB SERPL: 0.8 (ref 0.4–1.6)
ALP SERPL-CCNC: 63 U/L (ref 50–136)
ALT SERPL-CCNC: 14 U/L (ref 12–65)
ANION GAP SERPL CALC-SCNC: 6 MMOL/L (ref 2–11)
AST SERPL-CCNC: 15 U/L (ref 15–37)
BASOPHILS # BLD: 0.1 K/UL (ref 0–0.2)
BASOPHILS NFR BLD: 1 % (ref 0–2)
BILIRUB SERPL-MCNC: 0.3 MG/DL (ref 0.2–1.1)
BUN SERPL-MCNC: 17 MG/DL (ref 8–23)
CALCIUM SERPL-MCNC: 9.1 MG/DL (ref 8.3–10.4)
CHLORIDE SERPL-SCNC: 109 MMOL/L (ref 101–110)
CO2 SERPL-SCNC: 24 MMOL/L (ref 21–32)
CREAT SERPL-MCNC: 1 MG/DL (ref 0.6–1)
DIFFERENTIAL METHOD BLD: ABNORMAL
EOSINOPHIL # BLD: 0.1 K/UL (ref 0–0.8)
EOSINOPHIL NFR BLD: 1 % (ref 0.5–7.8)
ERYTHROCYTE [DISTWIDTH] IN BLOOD BY AUTOMATED COUNT: 18.5 % (ref 11.9–14.6)
GLOBULIN SER CALC-MCNC: 3.9 G/DL (ref 2.8–4.5)
GLUCOSE SERPL-MCNC: 108 MG/DL (ref 65–100)
HCT VFR BLD AUTO: 37.5 % (ref 35.8–46.3)
HGB BLD-MCNC: 11.8 G/DL (ref 11.7–15.4)
IMM GRANULOCYTES # BLD AUTO: 0.1 K/UL (ref 0–0.5)
IMM GRANULOCYTES NFR BLD AUTO: 1 % (ref 0–5)
LIPASE SERPL-CCNC: 9114 U/L (ref 73–393)
LYMPHOCYTES # BLD: 1.6 K/UL (ref 0.5–4.6)
LYMPHOCYTES NFR BLD: 13 % (ref 13–44)
MAGNESIUM SERPL-MCNC: 1.9 MG/DL (ref 1.8–2.4)
MCH RBC QN AUTO: 27.1 PG (ref 26.1–32.9)
MCHC RBC AUTO-ENTMCNC: 31.5 G/DL (ref 31.4–35)
MCV RBC AUTO: 86.2 FL (ref 82–102)
MONOCYTES # BLD: 0.8 K/UL (ref 0.1–1.3)
MONOCYTES NFR BLD: 6 % (ref 4–12)
NEUTS SEG # BLD: 10 K/UL (ref 1.7–8.2)
NEUTS SEG NFR BLD: 78 % (ref 43–78)
NRBC # BLD: 0 K/UL (ref 0–0.2)
PHOSPHATE SERPL-MCNC: 4.8 MG/DL (ref 2.3–3.7)
PLATELET # BLD AUTO: 472 K/UL (ref 150–450)
PMV BLD AUTO: 9 FL (ref 9.4–12.3)
POTASSIUM SERPL-SCNC: 4.3 MMOL/L (ref 3.5–5.1)
PROT SERPL-MCNC: 7 G/DL (ref 6.3–8.2)
RBC # BLD AUTO: 4.35 M/UL (ref 4.05–5.2)
SODIUM SERPL-SCNC: 139 MMOL/L (ref 133–143)
WBC # BLD AUTO: 12.7 K/UL (ref 4.3–11.1)

## 2023-01-15 PROCEDURE — 6360000004 HC RX CONTRAST MEDICATION: Performed by: EMERGENCY MEDICINE

## 2023-01-15 PROCEDURE — 83735 ASSAY OF MAGNESIUM: CPT

## 2023-01-15 PROCEDURE — 71045 X-RAY EXAM CHEST 1 VIEW: CPT

## 2023-01-15 PROCEDURE — 99285 EMERGENCY DEPT VISIT HI MDM: CPT

## 2023-01-15 PROCEDURE — 80053 COMPREHEN METABOLIC PANEL: CPT

## 2023-01-15 PROCEDURE — 84100 ASSAY OF PHOSPHORUS: CPT

## 2023-01-15 PROCEDURE — 96374 THER/PROPH/DIAG INJ IV PUSH: CPT

## 2023-01-15 PROCEDURE — A4216 STERILE WATER/SALINE, 10 ML: HCPCS | Performed by: EMERGENCY MEDICINE

## 2023-01-15 PROCEDURE — 83690 ASSAY OF LIPASE: CPT

## 2023-01-15 PROCEDURE — C9113 INJ PANTOPRAZOLE SODIUM, VIA: HCPCS | Performed by: EMERGENCY MEDICINE

## 2023-01-15 PROCEDURE — 93005 ELECTROCARDIOGRAM TRACING: CPT | Performed by: EMERGENCY MEDICINE

## 2023-01-15 PROCEDURE — 85025 COMPLETE CBC W/AUTO DIFF WBC: CPT

## 2023-01-15 PROCEDURE — 6360000002 HC RX W HCPCS: Performed by: EMERGENCY MEDICINE

## 2023-01-15 PROCEDURE — 2580000003 HC RX 258: Performed by: EMERGENCY MEDICINE

## 2023-01-15 PROCEDURE — 96375 TX/PRO/DX INJ NEW DRUG ADDON: CPT

## 2023-01-15 RX ORDER — ONDANSETRON 2 MG/ML
4 INJECTION INTRAMUSCULAR; INTRAVENOUS
Status: COMPLETED | OUTPATIENT
Start: 2023-01-15 | End: 2023-01-15

## 2023-01-15 RX ORDER — HYDROMORPHONE HYDROCHLORIDE 1 MG/ML
1 INJECTION, SOLUTION INTRAMUSCULAR; INTRAVENOUS; SUBCUTANEOUS
Status: COMPLETED | OUTPATIENT
Start: 2023-01-15 | End: 2023-01-15

## 2023-01-15 RX ORDER — SODIUM CHLORIDE, SODIUM LACTATE, POTASSIUM CHLORIDE, AND CALCIUM CHLORIDE .6; .31; .03; .02 G/100ML; G/100ML; G/100ML; G/100ML
1000 INJECTION, SOLUTION INTRAVENOUS ONCE
Status: COMPLETED | OUTPATIENT
Start: 2023-01-15 | End: 2023-01-16

## 2023-01-15 RX ADMIN — SODIUM CHLORIDE 40 MG: 9 INJECTION, SOLUTION INTRAMUSCULAR; INTRAVENOUS; SUBCUTANEOUS at 22:58

## 2023-01-15 RX ADMIN — DIATRIZOATE MEGLUMINE AND DIATRIZOATE SODIUM 15 ML: 660; 100 LIQUID ORAL; RECTAL at 23:08

## 2023-01-15 RX ADMIN — SODIUM CHLORIDE, POTASSIUM CHLORIDE, SODIUM LACTATE AND CALCIUM CHLORIDE 1000 ML: 600; 310; 30; 20 INJECTION, SOLUTION INTRAVENOUS at 23:01

## 2023-01-15 RX ADMIN — ONDANSETRON 4 MG: 2 INJECTION INTRAMUSCULAR; INTRAVENOUS at 22:58

## 2023-01-15 RX ADMIN — HYDROMORPHONE HYDROCHLORIDE 1 MG: 1 INJECTION, SOLUTION INTRAMUSCULAR; INTRAVENOUS; SUBCUTANEOUS at 22:58

## 2023-01-15 ASSESSMENT — ENCOUNTER SYMPTOMS
COUGH: 0
EYE REDNESS: 0
HEMATEMESIS: 0
WHEEZING: 0
NAUSEA: 1
BACK PAIN: 0
TROUBLE SWALLOWING: 0
EYE PAIN: 0
ABDOMINAL PAIN: 1
VOMITING: 0
SINUS PAIN: 0
EYE ITCHING: 0
CONSTIPATION: 0
SHORTNESS OF BREATH: 0
HEMATOCHEZIA: 0
DIARRHEA: 0

## 2023-01-15 ASSESSMENT — PAIN DESCRIPTION - ORIENTATION
ORIENTATION: RIGHT;UPPER
ORIENTATION: RIGHT;UPPER

## 2023-01-15 ASSESSMENT — PAIN SCALES - GENERAL: PAINLEVEL_OUTOF10: 10

## 2023-01-15 ASSESSMENT — PAIN - FUNCTIONAL ASSESSMENT: PAIN_FUNCTIONAL_ASSESSMENT: 0-10

## 2023-01-15 ASSESSMENT — PAIN DESCRIPTION - LOCATION
LOCATION: ABDOMEN
LOCATION: ABDOMEN

## 2023-01-15 ASSESSMENT — PAIN DESCRIPTION - DESCRIPTORS
DESCRIPTORS: SHARP
DESCRIPTORS: ACHING;SHOOTING

## 2023-01-16 ENCOUNTER — TELEPHONE (OUTPATIENT)
Dept: ONCOLOGY | Age: 83
End: 2023-01-16

## 2023-01-16 ENCOUNTER — APPOINTMENT (OUTPATIENT)
Dept: CT IMAGING | Age: 83
DRG: 439 | End: 2023-01-16
Payer: MEDICARE

## 2023-01-16 ENCOUNTER — TELEPHONE (OUTPATIENT)
Dept: RHEUMATOLOGY | Age: 83
End: 2023-01-16

## 2023-01-16 PROBLEM — C50.412 MALIGNANT NEOPLASM OF UPPER-OUTER QUADRANT OF LEFT BREAST IN FEMALE, ESTROGEN RECEPTOR POSITIVE (HCC): Status: ACTIVE | Noted: 2018-02-13

## 2023-01-16 PROBLEM — J43.8 PARASEPTAL EMPHYSEMA (HCC): Status: ACTIVE | Noted: 2019-05-08

## 2023-01-16 PROBLEM — Z17.0 MALIGNANT NEOPLASM OF UPPER-OUTER QUADRANT OF LEFT BREAST IN FEMALE, ESTROGEN RECEPTOR POSITIVE (HCC): Status: ACTIVE | Noted: 2018-02-13

## 2023-01-16 PROBLEM — J44.9 CHRONIC OBSTRUCTIVE PULMONARY DISEASE (HCC): Status: ACTIVE | Noted: 2020-10-02

## 2023-01-16 LAB
EKG ATRIAL RATE: 82 BPM
EKG DIAGNOSIS: NORMAL
EKG P AXIS: 75 DEGREES
EKG P-R INTERVAL: 180 MS
EKG Q-T INTERVAL: 370 MS
EKG QRS DURATION: 70 MS
EKG QTC CALCULATION (BAZETT): 432 MS
EKG R AXIS: 66 DEGREES
EKG T AXIS: 73 DEGREES
EKG VENTRICULAR RATE: 82 BPM
INR PPP: 1
PROTHROMBIN TIME: 13.2 SEC (ref 12.6–14.3)

## 2023-01-16 PROCEDURE — 6360000004 HC RX CONTRAST MEDICATION: Performed by: EMERGENCY MEDICINE

## 2023-01-16 PROCEDURE — 97530 THERAPEUTIC ACTIVITIES: CPT

## 2023-01-16 PROCEDURE — A4216 STERILE WATER/SALINE, 10 ML: HCPCS | Performed by: FAMILY MEDICINE

## 2023-01-16 PROCEDURE — 6360000002 HC RX W HCPCS: Performed by: FAMILY MEDICINE

## 2023-01-16 PROCEDURE — 6360000002 HC RX W HCPCS: Performed by: EMERGENCY MEDICINE

## 2023-01-16 PROCEDURE — 6370000000 HC RX 637 (ALT 250 FOR IP): Performed by: FAMILY MEDICINE

## 2023-01-16 PROCEDURE — 97161 PT EVAL LOW COMPLEX 20 MIN: CPT

## 2023-01-16 PROCEDURE — 97165 OT EVAL LOW COMPLEX 30 MIN: CPT

## 2023-01-16 PROCEDURE — 36415 COLL VENOUS BLD VENIPUNCTURE: CPT

## 2023-01-16 PROCEDURE — 85610 PROTHROMBIN TIME: CPT

## 2023-01-16 PROCEDURE — C9113 INJ PANTOPRAZOLE SODIUM, VIA: HCPCS | Performed by: FAMILY MEDICINE

## 2023-01-16 PROCEDURE — 74177 CT ABD & PELVIS W/CONTRAST: CPT

## 2023-01-16 PROCEDURE — 1100000000 HC RM PRIVATE

## 2023-01-16 PROCEDURE — 2580000003 HC RX 258: Performed by: FAMILY MEDICINE

## 2023-01-16 RX ORDER — ONDANSETRON 2 MG/ML
4 INJECTION INTRAMUSCULAR; INTRAVENOUS
Status: COMPLETED | OUTPATIENT
Start: 2023-01-16 | End: 2023-01-16

## 2023-01-16 RX ORDER — HYDROMORPHONE HYDROCHLORIDE 1 MG/ML
1 INJECTION, SOLUTION INTRAMUSCULAR; INTRAVENOUS; SUBCUTANEOUS
Status: DISCONTINUED | OUTPATIENT
Start: 2023-01-16 | End: 2023-01-17

## 2023-01-16 RX ORDER — NALOXONE HYDROCHLORIDE 0.4 MG/ML
0.4 INJECTION, SOLUTION INTRAMUSCULAR; INTRAVENOUS; SUBCUTANEOUS PRN
Status: DISCONTINUED | OUTPATIENT
Start: 2023-01-16 | End: 2023-01-17 | Stop reason: HOSPADM

## 2023-01-16 RX ORDER — PREDNISONE 10 MG/1
10 TABLET ORAL DAILY
Status: DISCONTINUED | OUTPATIENT
Start: 2023-01-16 | End: 2023-01-17 | Stop reason: HOSPADM

## 2023-01-16 RX ORDER — SODIUM CHLORIDE 0.9 % (FLUSH) 0.9 %
5-40 SYRINGE (ML) INJECTION EVERY 12 HOURS SCHEDULED
Status: DISCONTINUED | OUTPATIENT
Start: 2023-01-16 | End: 2023-01-17 | Stop reason: HOSPADM

## 2023-01-16 RX ORDER — DIPHENHYDRAMINE HYDROCHLORIDE 50 MG/ML
25 INJECTION INTRAMUSCULAR; INTRAVENOUS ONCE
Status: COMPLETED | OUTPATIENT
Start: 2023-01-16 | End: 2023-01-16

## 2023-01-16 RX ORDER — HEPARIN SODIUM 5000 [USP'U]/ML
5000 INJECTION, SOLUTION INTRAVENOUS; SUBCUTANEOUS 2 TIMES DAILY
Status: DISCONTINUED | OUTPATIENT
Start: 2023-01-16 | End: 2023-01-17 | Stop reason: HOSPADM

## 2023-01-16 RX ORDER — SODIUM CHLORIDE, SODIUM LACTATE, POTASSIUM CHLORIDE, CALCIUM CHLORIDE 600; 310; 30; 20 MG/100ML; MG/100ML; MG/100ML; MG/100ML
INJECTION, SOLUTION INTRAVENOUS CONTINUOUS
Status: DISCONTINUED | OUTPATIENT
Start: 2023-01-16 | End: 2023-01-17 | Stop reason: HOSPADM

## 2023-01-16 RX ORDER — NICOTINE 21 MG/24HR
1 PATCH, TRANSDERMAL 24 HOURS TRANSDERMAL DAILY
Status: DISCONTINUED | OUTPATIENT
Start: 2023-01-16 | End: 2023-01-17 | Stop reason: HOSPADM

## 2023-01-16 RX ORDER — ONDANSETRON 4 MG/1
4 TABLET, ORALLY DISINTEGRATING ORAL EVERY 8 HOURS PRN
Status: DISCONTINUED | OUTPATIENT
Start: 2023-01-16 | End: 2023-01-17 | Stop reason: HOSPADM

## 2023-01-16 RX ORDER — MAGNESIUM SULFATE IN WATER 40 MG/ML
2000 INJECTION, SOLUTION INTRAVENOUS PRN
Status: DISCONTINUED | OUTPATIENT
Start: 2023-01-16 | End: 2023-01-17 | Stop reason: HOSPADM

## 2023-01-16 RX ORDER — ACETAMINOPHEN 325 MG/1
650 TABLET ORAL EVERY 6 HOURS PRN
Status: DISCONTINUED | OUTPATIENT
Start: 2023-01-16 | End: 2023-01-17 | Stop reason: HOSPADM

## 2023-01-16 RX ORDER — SODIUM CHLORIDE 9 MG/ML
INJECTION, SOLUTION INTRAVENOUS PRN
Status: DISCONTINUED | OUTPATIENT
Start: 2023-01-16 | End: 2023-01-17 | Stop reason: HOSPADM

## 2023-01-16 RX ORDER — SODIUM CHLORIDE 0.9 % (FLUSH) 0.9 %
5-40 SYRINGE (ML) INJECTION PRN
Status: DISCONTINUED | OUTPATIENT
Start: 2023-01-16 | End: 2023-01-17 | Stop reason: HOSPADM

## 2023-01-16 RX ORDER — PROCHLORPERAZINE EDISYLATE 5 MG/ML
10 INJECTION INTRAMUSCULAR; INTRAVENOUS ONCE
Status: COMPLETED | OUTPATIENT
Start: 2023-01-16 | End: 2023-01-16

## 2023-01-16 RX ORDER — POTASSIUM CHLORIDE 20 MEQ/1
40 TABLET, EXTENDED RELEASE ORAL PRN
Status: DISCONTINUED | OUTPATIENT
Start: 2023-01-16 | End: 2023-01-17 | Stop reason: HOSPADM

## 2023-01-16 RX ORDER — POTASSIUM CHLORIDE 7.45 MG/ML
10 INJECTION INTRAVENOUS PRN
Status: DISCONTINUED | OUTPATIENT
Start: 2023-01-16 | End: 2023-01-17 | Stop reason: HOSPADM

## 2023-01-16 RX ORDER — OXYCODONE HYDROCHLORIDE 5 MG/1
5 TABLET ORAL
Status: DISCONTINUED | OUTPATIENT
Start: 2023-01-16 | End: 2023-01-17 | Stop reason: HOSPADM

## 2023-01-16 RX ORDER — HYDROMORPHONE HYDROCHLORIDE 1 MG/ML
1 INJECTION, SOLUTION INTRAMUSCULAR; INTRAVENOUS; SUBCUTANEOUS
Status: COMPLETED | OUTPATIENT
Start: 2023-01-16 | End: 2023-01-16

## 2023-01-16 RX ORDER — SODIUM CHLORIDE, SODIUM LACTATE, POTASSIUM CHLORIDE, CALCIUM CHLORIDE 600; 310; 30; 20 MG/100ML; MG/100ML; MG/100ML; MG/100ML
INJECTION, SOLUTION INTRAVENOUS CONTINUOUS
Status: ACTIVE | OUTPATIENT
Start: 2023-01-16 | End: 2023-01-16

## 2023-01-16 RX ORDER — HYDROMORPHONE HYDROCHLORIDE 1 MG/ML
0.5 INJECTION, SOLUTION INTRAMUSCULAR; INTRAVENOUS; SUBCUTANEOUS
Status: DISCONTINUED | OUTPATIENT
Start: 2023-01-16 | End: 2023-01-17

## 2023-01-16 RX ORDER — ONDANSETRON 2 MG/ML
4 INJECTION INTRAMUSCULAR; INTRAVENOUS EVERY 6 HOURS PRN
Status: DISCONTINUED | OUTPATIENT
Start: 2023-01-16 | End: 2023-01-17 | Stop reason: HOSPADM

## 2023-01-16 RX ADMIN — IOPAMIDOL 100 ML: 755 INJECTION, SOLUTION INTRAVENOUS at 00:52

## 2023-01-16 RX ADMIN — OXYCODONE HYDROCHLORIDE 5 MG: 5 TABLET ORAL at 17:42

## 2023-01-16 RX ADMIN — SODIUM CHLORIDE, PRESERVATIVE FREE 10 ML: 5 INJECTION INTRAVENOUS at 21:11

## 2023-01-16 RX ADMIN — PROCHLORPERAZINE EDISYLATE 10 MG: 5 INJECTION INTRAMUSCULAR; INTRAVENOUS at 01:25

## 2023-01-16 RX ADMIN — SODIUM CHLORIDE, SODIUM LACTATE, POTASSIUM CHLORIDE, AND CALCIUM CHLORIDE: 600; 310; 30; 20 INJECTION, SOLUTION INTRAVENOUS at 18:33

## 2023-01-16 RX ADMIN — OXYCODONE HYDROCHLORIDE 5 MG: 5 TABLET ORAL at 11:41

## 2023-01-16 RX ADMIN — SODIUM CHLORIDE, SODIUM LACTATE, POTASSIUM CHLORIDE, AND CALCIUM CHLORIDE: 600; 310; 30; 20 INJECTION, SOLUTION INTRAVENOUS at 01:45

## 2023-01-16 RX ADMIN — SODIUM CHLORIDE, SODIUM LACTATE, POTASSIUM CHLORIDE, AND CALCIUM CHLORIDE: 600; 310; 30; 20 INJECTION, SOLUTION INTRAVENOUS at 14:09

## 2023-01-16 RX ADMIN — HEPARIN SODIUM 5000 UNITS: 5000 INJECTION INTRAVENOUS; SUBCUTANEOUS at 08:18

## 2023-01-16 RX ADMIN — OXYCODONE HYDROCHLORIDE 5 MG: 5 TABLET ORAL at 21:09

## 2023-01-16 RX ADMIN — HYDROMORPHONE HYDROCHLORIDE 1 MG: 1 INJECTION, SOLUTION INTRAMUSCULAR; INTRAVENOUS; SUBCUTANEOUS at 01:04

## 2023-01-16 RX ADMIN — SODIUM CHLORIDE 40 MG: 9 INJECTION, SOLUTION INTRAMUSCULAR; INTRAVENOUS; SUBCUTANEOUS at 08:15

## 2023-01-16 RX ADMIN — PREDNISONE 10 MG: 10 TABLET ORAL at 08:15

## 2023-01-16 RX ADMIN — SODIUM CHLORIDE, SODIUM LACTATE, POTASSIUM CHLORIDE, AND CALCIUM CHLORIDE: 600; 310; 30; 20 INJECTION, SOLUTION INTRAVENOUS at 11:38

## 2023-01-16 RX ADMIN — HEPARIN SODIUM 5000 UNITS: 5000 INJECTION INTRAVENOUS; SUBCUTANEOUS at 21:09

## 2023-01-16 RX ADMIN — ONDANSETRON 4 MG: 2 INJECTION INTRAMUSCULAR; INTRAVENOUS at 02:30

## 2023-01-16 RX ADMIN — ONDANSETRON 4 MG: 2 INJECTION INTRAMUSCULAR; INTRAVENOUS at 01:04

## 2023-01-16 RX ADMIN — DIPHENHYDRAMINE HYDROCHLORIDE 25 MG: 50 INJECTION, SOLUTION INTRAMUSCULAR; INTRAVENOUS at 01:25

## 2023-01-16 RX ADMIN — HYDROMORPHONE HYDROCHLORIDE 0.5 MG: 1 INJECTION, SOLUTION INTRAMUSCULAR; INTRAVENOUS; SUBCUTANEOUS at 08:23

## 2023-01-16 ASSESSMENT — PAIN SCALES - GENERAL
PAINLEVEL_OUTOF10: 4
PAINLEVEL_OUTOF10: 4
PAINLEVEL_OUTOF10: 3
PAINLEVEL_OUTOF10: 3
PAINLEVEL_OUTOF10: 5
PAINLEVEL_OUTOF10: 2
PAINLEVEL_OUTOF10: 4

## 2023-01-16 ASSESSMENT — PAIN DESCRIPTION - DESCRIPTORS: DESCRIPTORS: ACHING;SORE

## 2023-01-16 ASSESSMENT — PAIN DESCRIPTION - ORIENTATION
ORIENTATION: RIGHT;UPPER
ORIENTATION: RIGHT;UPPER

## 2023-01-16 ASSESSMENT — PAIN DESCRIPTION - LOCATION
LOCATION: ABDOMEN
LOCATION: ABDOMEN

## 2023-01-16 NOTE — CONSULTS
[facility] Consult Note       Primary GI Physician: Dr Juju iMchael    Referring Provider:  Josh Tenorio DO    Consult Date:  1/16/2023    Admit Date:  1/15/2023    Chief Complaint:  Recurrent Pancreatitis    Subjective:     History of Present Illness:  Patient is a [de-identified] y.o. female with PMH including but not limited to duodenal ulcer complicated by perforation requiring bilroth II,  idiopathic pancreatitis, rheumatoid arthritis on enbrel/ current course of steroids, HTN, ongoing tobacco use, who is seen in consultation at the request of Dr. Gus Moss for recurrent pancreatitis. She has had multiple prior episodes of pancreatitis. Pain began Sept.  She underwent cholecystectomy. She was on Orencia for RA but this was D/C over concerns this causes pancreatitis episode. 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 During hospitalization in Oct she underwent EGD on 10/4/22 with only gastrojejunostomy and a HH. No prior colonoscopy. Hx of mild anemia. She was last seen in our office 10/27/22 and MRCP was reccommended to evaluate prior abnormalities seen on EUS. Repeat EUS 12/28/22 as below with no chronic pancreatitic changes other than a mildly dilated PD in the body. If acute pancreatitis re-occurs, consider ERCP / pancreatic sphincterotomy with surgical access or colonoscope. CT 12/22 with [id number] LLL Nodule noted on CT. Concerning for malignancy as patient is a daily smoker with symptoms of unintentional weight loss and night sweats. Followed up with  and outpatient PET    Lung bx 1/5/22 with pathology reported malignant neoplasm with sqamous features. The IHC interpretation was poorly differentiated malignant tumor that demonstrated an immunohistochemical profile that included squamous and urothelial carcinoma in the differential diagnosis.  IHC staining positive for GATA3, p40, CK 5/6 and p63 with patchy positive for CK 7. She saw Dr Checo Yates last on 1/12/23     She was admitted 1/15 with increasing bouts of RUQ pain similar to prior episode of pancreatitis. Pain became unbearable tonight prompting ED presentation. CT A/P 1/16 with ancreas again shows dilatation of the tail, stranding of fat in upper abd. Labs 1/15 with WNL Na, K, BUN, Cr, LFT's. Lipase 9114, hgb 11.8, plt 472, WBC elevated at 12.7    She was feeling well until 1/12 when similar pain in right/upper abd began. Also reports some nausea at home. Tolerating clears this am with pain but no nausea this am.  Reports only medical change since last admit with D/C of BP med. Last BM yesterday normal per patient. CT A/P 1/16/22  The pancreas again shows dilatation of the tail throughout its visualized   course. There is stranding of the fat in the upper abdomen, anterior to the pancreas. The appearance was similar previously; although, it has increased to some   degree. These changes could be secondary to pancreatitis and/or postoperative   change. No evidence of bowel obstruction. Hyperdense contrast is seen in the proximal   and distal small bowel loops. Postoperative changes are noted in the distal stomach as described above. No other significant findings. EUS 12/28/22 Dr Tremayne Mota  FINDINGS:  ESOPHAGUS: Not well visualized  STOMACH: Limited views with the oblique-viewing echoendoscope were unremarkable. Small gastric pouch. No Gastric ulcers. Small Bowel: Patent small bowel anastomosis. Limited views with the oblique-viewing echoendoscope were unremarkable. The ampulla was not visualized due to post-op anatomy. PANCREAS: The pancreas was well visualized in the body and tail. Views of the head are very limited (normally seen from D1/ D2). In the body and tail, the main pancreatic duct is mildly dilated (4.5mm body, 2mm tail). It has a smooth regular course.   Otherwise, there are no changes of acute or chronic pancreatitis. No lobularity, dilated side branches or hyperechoic strands, etc.  No cyst or mass present. Limited views of the head are obtained from the gastric lumen, and no large mass is identified. A small mass at the ampulla would be difficult to exclude. BILIARY SYSTEM: The gallbladder was absent . Limited views of the CBD obtained from the stomach. It measures 9mm in diameter, within normal limits. No stones are seen. OTHER ORGANS: There was no ascites in the upper abdomen. Limited views of the left lobe of the liver demonstrated no solid mass lesions. The celiac axis appears highly calcified, c/w atherosclerosis. No significant celiac or daly-pancreatic lymphadenopathy.   PLAN:  FU labs  Advance diet as tolerated  If acute pancreatitis re-occurs, consider ERCP / pancreatic sphincterotomy with surgical access or colonoscope    PMH:  Past Medical History:   Diagnosis Date    EPIFANIO (acute kidney injury) ([address] 12/04/2014    pt denies this dx    Arthritis     RA-abdelrahman hands    Back pain 6/10/2016    Breast cancer ([address] 2018    Breast lump dx 2/2018    left    Bronchitis     Chronic obstructive pulmonary disease ([address]     Discharge from the vagina     Dysuria     Eczema 6/10/2016    Fungal dermatitis     Headache 6/10/2016    Hypercholesterolemia 12/4/2014    Hypertension     not well controlled--per pt    Intractable vomiting 5/20/2018    Menopausal vaginal dryness     Osteoarthritis 6/10/2016    Osteoporosis 6/10/2016    Pancreatitis     2 episodes    PUD (peptic ulcer disease)     last 2003 which a rupture an extensive surgery    Sepsis ([address] 12/4/2014    Thyroid nodule     Tobacco abuse     1ppd x 49 yrs       PSH:  Past Surgical History:   Procedure Laterality Date    APPENDECTOMY  1977    with hysterectomy    BREAST BIOPSY Left 1975    BREAST LUMPECTOMY Left 2/22/2018    LEFT BREAST LUMPECTOMY/ SENTINEL NODE BIOPSY performed by Mary Jauregui MD at [facility] Left 1975    breast lumpectomy, benign  (left)    CATARACT REMOVAL Bilateral 2018    w/IOL    CHOLECYSTECTOMY      CT NEEDLE BIOPSY LUNG PERCUTANEOUS  1/5/2023    CT NEEDLE BIOPSY LUNG PERCUTANEOUS 1/5/2023 [facility] CT SCAN    HYSTERECTOMY (CERVIX STATUS UNKNOWN)  1977    OTHER SURGICAL HISTORY      hernicolectomy 2 cm    MD UNLISTED PROCEDURE ABDOMEN PERITONEUM & OMENTUM  2003    stomach surgery for ruptured ulcer and extensive rerouting of intestestines per patient     [facility] ENDOSCOPY  05/21/2018    empiric dilation    UPPER GASTROINTESTINAL ENDOSCOPY      UPPER GASTROINTESTINAL ENDOSCOPY N/A 10/4/2022    EGD ESOPHAGOGASTRODUODENOSCOPY/  performed by Radha De Souza MD at [facility] 12/28/2022    ENDOSCOPIC ULTRASOUND W/ EGD/ / performed by Daryll Lesches, MD at [address] [place] [address] LEFT Left 1/31/2018    US BREAST NEEDLE BIOPSY LEFT 1/31/2018 [facility]       Allergies: Allergies   Allergen Reactions    Azithromycin Other (See Comments)     pancreatitis    Codeine Nausea And Vomiting       Home Medications:  Prior to Admission medications    Medication Sig Start Date End Date Taking?  Authorizing Provider   pravastatin (PRAVACHOL) 40 MG tablet Take 1 tablet by mouth daily 10/12/22   Franny Bailey MD       [facility] Medications:  Current Facility-Administered Medications   Medication Dose Route Frequency    lactated ringers infusion   IntraVENous Continuous    Followed by    lactated ringers infusion   IntraVENous Continuous    sodium chloride flush 0.9 % injection 5-40 mL  5-40 mL IntraVENous 2 times per day    sodium chloride flush 0.9 % injection 5-40 mL  5-40 mL IntraVENous PRN    0.9 % sodium chloride infusion   IntraVENous PRN    ondansetron (ZOFRAN-ODT) disintegrating tablet 4 mg  4 mg Oral Q8H PRN    Or    ondansetron (ZOFRAN) injection 4 mg  4 mg IntraVENous Q6H PRN    heparin (porcine) injection 5,000 Units  5,000 Units SubCUTAneous BID    HYDROmorphone HCl PF (DILAUDID) injection 0.5 mg  0.5 mg IntraVENous Q3H PRN    Or    HYDROmorphone HCl PF (DILAUDID) injection 1 mg  1 mg IntraVENous Q3H PRN    oxyCODONE (ROXICODONE) immediate release tablet 5 mg  5 mg Oral Q3H PRN    naloxone (NARCAN) injection 0.4 mg  0.4 mg IntraVENous PRN    potassium chloride (KLOR-CON M) extended release tablet 40 mEq  40 mEq Oral PRN    Or    potassium bicarb-citric acid (EFFER-K) effervescent tablet 40 mEq  40 mEq Oral PRN    Or    potassium chloride 10 mEq/100 mL IVPB (Peripheral Line)  10 mEq IntraVENous PRN    magnesium sulfate 2000 mg in 50 mL IVPB premix  2,000 mg IntraVENous PRN    acetaminophen (TYLENOL) tablet 650 mg  650 mg Oral Q6H PRN    pantoprazole (PROTONIX) 40 mg in sodium chloride (PF) 0.9 % 10 mL injection  40 mg IntraVENous Daily    nicotine (NICODERM CQ) 21 MG/24HR 1 patch  1 patch TransDERmal Daily    predniSONE (DELTASONE) tablet 10 mg  10 mg Oral Daily    diatrizoate meglumine-sodium (GASTROGRAFIN) 66-10 % solution 15 mL  15 mL Oral ONCE PRN       Social History:  Social History     Tobacco Use    Smoking status: Every Day     Packs/day: 0.10     Types: Cigarettes     Start date: 5/6/1966    Smokeless tobacco: Never   Substance Use Topics    Alcohol use: No       Pt denies any history of drug use, blood transfusions, or tattoos.     Family History:  Family History   Problem Relation Age of Onset    Thyroid Cancer Neg Hx     No Known Problems Paternal Grandfather     No Known Problems Paternal Grandmother     No Known Problems Maternal Grandfather     No Known Problems Maternal Grandmother     Hypertension Other         Brother and sister with htn    No Known Problems Brother     Hypertension Mother     Heart Disease Brother     Heart Disease Sister     Breast Cancer Sister 79    Cancer Sister     Heart Disease Father     Heart Attack Father     Cancer Brother         prostate    Diabetes Sister     Heart Disease Sister        Review of Systems:  A detailed 10 system ROS is obtained, with pertinent positives as listed above. All others are negative. Diet:  Clear    Objective:     Physical Exam:  Vitals:  BP (!) 164/62   Pulse 74   Temp 97.7 °F (36.5 °C) (Oral)   Resp 16   Ht 5' 1\" (1.549 m)   Wt 90 lb (40.8 kg)   LMP  (LMP Unknown)   SpO2 95%   BMI 17.01 kg/m²   Gen:  Pt is alert, cooperative, no acute distress  Skin:  Extremities and face reveal no rashes. HEENT: Sclerae anicteric. Extra-occular muscles are intact. No oral ulcers. No abnormal pigmentation of the lips. The neck is supple. Cardiovascular: Regular rate and rhythm. No murmurs, gallops, or rubs. Respiratory:  Comfortable breathing with no accessory muscle use. Clear breath sounds anteriorly with no wheezes, rales, or rhonchi. GI:  Abdomen nondistended, soft, TTP upper ABD R>L. Normal active bowel sounds. No enlargement of the liver or spleen. No masses palpable. Rectal:  Deferred  Musculoskeletal:  No pitting edema of the lower legs. Neurological:  Gross memory appears intact. Patient is alert and oriented. Psychiatric:  Mood appears appropriate with judgement intact. Lymphatic:  No cervical or supraclavicular adenopathy. Laboratory:    Recent Labs     01/15/23  2238   WBC 12.7*   HGB 11.8   HCT 37.5   *   MCV 86.2      K 4.3      CO2 24   BUN 17   CREATININE 1.00   CALCIUM 9.1   MG 1.9   PHOS 4.8*   GLUCOSE 108*   ALKPHOS 63   AST 15   ALT 14   BILITOT 0.3   LABALBU 3.1*   PROT 7.0   LIPASE 9,114*     MRCP 12/23/2022 showing pancreatic duct dilation as prior with abrupt tapering at [facility] as prior without clear mass lesion noted. CA 19.9 normal 6.7 on 12/25/22. Lipase increased again on 12/26 with worsening pain after eating. 12/29/2022 Underwent EUS by Gilbert Hinojosa on 12/28 revealed no mass, no acute or chronic changes of pancreatitis and mildly dilated pancreatic duct.  Her pain has improved with antispasmodics. She may be having some sphincter of Oddi dysfunction or bowel spasm. If pancreatitis recurs, consider ERCP / pancreatic sphincterotomy with surgical access or colonoscope at a tertiary care center    Assessment:     Principal Problem:    Acute recurrent pancreatitis  Active Problems:    Chronic pancreatitis (HCC)    Rheumatoid arthritis (HCC)    Protein-calorie malnutrition, moderate (HCC)    PAD (peripheral artery disease) (HCC)    Malignant neoplasm of upper-outer quadrant of left breast in female, estrogen receptor positive (HCC)    Personal history of tobacco use, presenting hazards to health    Chronic obstructive pulmonary disease (HCC)    Paraseptal emphysema (HCC)  Resolved Problems:    * No resolved hospital problems. *    82 y.o. female with PMH including but not limited to duodenal ulcer complicated by perforation requiring bilroth II,  idiopathic pancreatitis, rheumatoid arthritis on enbrel/ current course of steroids, HTN, ongoing tobacco use, recent stage 1 lung cancer dx admitted with recurrent pancreatitis. EUS 12/28/22 as below with no chronic pancreatitic changes other than a mildly dilated PD in the body.  If acute pancreatitis re-occurs, consider ERCP / pancreatic sphincterotomy with surgical access or colonoscope.     Plan:     - Supportive care of pancreatitis - IVF, antiemetics, pain meds  - Will have Dr Sharma review prior EUS and rec  - Continue clears  - Lung cancer - CT head, PET pending, surgical consult out patient    ROBERTO Guerrier - CNP  Patient is seen and examined in collaboration with Dr. Fei Sharma.  Assessment and plan as per Dr. Sharma.

## 2023-01-16 NOTE — ED TRIAGE NOTES
Pt reports increasingly frequent and increasingly severe bouts of RUQ shooting pain associated with pancreatitis. Also has medical Hx of lung CA and had vomiting during a biopsy procedure last Saturday. Has plans for a PET scan next week. Has not changed diet, but pain became unbearable tonight. Does not take pain medication at home for pancreatitis- last time hospitalized for multiple days during a flare.

## 2023-01-16 NOTE — H&P
Hospitalist History and Physical   Admit Date:  1/15/2023 10:19 PM   Name:  Amish Durham   Age:  [de-identified] y.o. Sex:  female  :  1940   MRN:  [id number]   Room:  [facility]/    Presenting Complaint: Abdominal Pain     Reason(s) for Admission: Acute recurrent pancreatitis [K85.90]     History of Present Illness:   Amish Durham is a [de-identified] y.o. female with medical history of duodenal ulcer complicated by perforation requiring bilroth II,  idiopathic pancreatitis, rheumatoid arthritis on enbrel/ current course of steroids, HTN, ongoing tobacco use  who presented with increasing bouts of RUQ pain similar to prior episode of pancreatitis. Pain became unbearable tonight prompting ED presentation. ROS limited due to severe nausea s/p IV dilaudid. Per records, she was discharged on 2022 for recurrent pancreatitis and was seen by GI at that time. MRCP with mild dilatation of the pancreatic and common bile duct with abrupt tapering at the pancreatic head suspicious for pancreatic head mass. Had other admissions for the same with s/p  EGD in October which was unremarakble. She had unintentional weight loss and with her moking history, CT chest was done which shows a 13 mm x 11 mm left lower lobe nodule. She was hospitalized -22 with acute recurrent pancreatitis. Underwent EUS at that time without mass, acute nor chronic pancreatitis changes. the findings reported minimal pancreatic duct dilation and partial gastrectomy. The recommendation was if acute pancreatitis reoccurs to consider ERCP/pancreatic sphincterotomy. She was discharged home       [id number] LLL Nodule noted on CT. Concerning for malignancy as patient is a daily smoker with symptoms of unintentional weight loss and night sweats. Followed up with  and outpatient PET  - s/p IR RLL lung nodule biopsy on 23. IR Consult for lung biopsy - the pathology reported malignant neoplasm with sqamous features.  The IHC interpretation was poorly differentiated malignant tumor that demonstrated an immunohistochemical profile that included squamous and urothelial carcinoma in the differential diagnosis. IHC staining positive for GATA3, p40, CK 5/6 and p63 with patchy positive for CK 7. She saw Dr Agustin Santacruz last on 1/12/23     Assessment & Plan:     Acute recurrent pancreatitis - of unclear etiology. LR IVF. Low fat diet as tolerated to protect gut mucosal barrier. IV pain meds. Avoid probiotics. CT a/p. Smoking cessation. Consult GI. Admin compazine/benadryl IV now. Hx PUD - IV PPI for now     Moderate protein caloric malnutrition - assocated with recurrent pancreatitis and underlying malignancy    Nicotine cigarette dependence - NRT     RA - prednisone 10 mg daily      COPD - stable. Not in acute exacerbation       Anticipated discharge needs:         Diet: low fat   VTE ppx: lovenox   Code status: Full     Hospital Problems:  Principal Problem:    Acute recurrent pancreatitis  Active Problems:    Chronic pancreatitis (HCC)    Rheumatoid arthritis ([address]    Protein-calorie malnutrition, moderate (HCC)    PAD (peripheral artery disease) (HCC)    Malignant neoplasm of upper-outer quadrant of left breast in female, estrogen receptor positive ([address]    Personal history of tobacco use, presenting hazards to health    Chronic obstructive pulmonary disease ([address]    Paraseptal emphysema ([address]  Resolved Problems:    * No resolved hospital problems.  *       Past History:     Past Medical History:   Diagnosis Date    EPIFANIO (acute kidney injury) ([address] 12/04/2014    pt denies this dx    Arthritis     RA-abdelrahman hands    Back pain 6/10/2016    Breast cancer ([address] 2018    Breast lump dx 2/2018    left    Bronchitis     Chronic obstructive pulmonary disease ([address]     Discharge from the vagina     Dysuria     Eczema 6/10/2016    Fungal dermatitis     Headache 6/10/2016    Hypercholesterolemia 12/4/2014    Hypertension     not well controlled--per pt Intractable vomiting 5/20/2018    Menopausal vaginal dryness     Osteoarthritis 6/10/2016    Osteoporosis 6/10/2016    Pancreatitis     2 episodes    PUD (peptic ulcer disease)     last 2003 which a rupture an extensive surgery    Sepsis ([address] 12/4/2014    Thyroid nodule     Tobacco abuse     1ppd x 49 yrs       Past Surgical History:   Procedure Laterality Date    APPENDECTOMY  1977    with hysterectomy    BREAST BIOPSY Left 1975    BREAST LUMPECTOMY Left 2/22/2018    LEFT BREAST LUMPECTOMY/ SENTINEL NODE BIOPSY performed by Kera Morin MD at [facility] MAIN OR    BREAST SURGERY Left 1975    breast lumpectomy, benign  (left)    CATARACT REMOVAL Bilateral 2018    w/IOL    CHOLECYSTECTOMY      CT NEEDLE BIOPSY LUNG PERCUTANEOUS  1/5/2023    CT NEEDLE BIOPSY LUNG PERCUTANEOUS 1/5/2023 [facility]Y CT SCAN    HYSTERECTOMY (CERVIX STATUS UNKNOWN)  1977    OTHER SURGICAL HISTORY      hernicolectomy 2 cm    CA UNLISTED PROCEDURE ABDOMEN PERITONEUM & OMENTUM  2003    stomach surgery for ruptured ulcer and extensive rerouting of intestestines per patient     [address]  05/21/2018    empiric dilation    UPPER GASTROINTESTINAL ENDOSCOPY      UPPER GASTROINTESTINAL ENDOSCOPY N/A 10/4/2022    EGD ESOPHAGOGASTRODUODENOSCOPY/  performed by Maddy Murphy MD at [address] N/A 12/28/2022    ENDOSCOPIC ULTRASOUND W/ EGD/ / performed by Katelyn Zimmerman MD at [address] LEFT Left 1/31/2018    US BREAST NEEDLE BIOPSY LEFT 1/31/2018 [facility]        Social History     Tobacco Use    Smoking status: Every Day     Packs/day: 0.10     Types: Cigarettes     Start date: 5/6/1966    Smokeless tobacco: Never   Substance Use Topics    Alcohol use: No      Social History     Substance and Sexual Activity   Drug Use No       Family History   Problem Relation Age of Onset    Thyroid Cancer Neg Hx     No Known Problems Paternal Grandfather     No Known Problems Paternal Grandmother     No Known Problems Maternal Grandfather     No Known Problems Maternal Grandmother     Hypertension Other         Brother and sister with htn    No Known Problems Brother     Hypertension Mother     Heart Disease Brother     Heart Disease Sister     Breast Cancer Sister 79    Cancer Sister     Heart Disease Father     Heart Attack Father     Cancer Brother         prostate    Diabetes Sister     Heart Disease Sister         Immunization History   Administered Date(s) Administered    COVID-19, MODERNA BLUE border, Primary or Immunocompromised, (age 12y+), IM, 100 mcg/0.5mL 02/05/2021, 03/06/2021, 10/26/2021    Influenza, FLUAD, (age 72 y+), Adjuvanted, 0.5mL 09/20/2022     Allergies   Allergen Reactions    Azithromycin Other (See Comments)     pancreatitis    Codeine Nausea And Vomiting     Prior to Admit Medications:  Current Outpatient Medications   Medication Instructions    pravastatin (PRAVACHOL) 40 mg, Oral, DAILY         Objective:   Patient Vitals for the past 24 hrs:   Temp Pulse Resp BP SpO2   01/16/23 0030 -- 67 11 (!) 165/57 97 %   01/16/23 0000 -- 68 15 (!) 148/56 97 %   01/15/23 2330 -- 63 17 (!) 164/51 97 %   01/15/23 2258 -- -- 19 -- --   01/15/23 2205 97.9 °F (36.6 °C) 94 24 (!) 153/74 99 %       Oxygen Therapy  SpO2: 97 %  Pulse via Oximetry: 66 beats per minute  O2 Device: None (Room air)    Estimated body mass index is 17.01 kg/m² as calculated from the following:    Height as of this encounter: 5' 1\" (1.549 m). Weight as of this encounter: 90 lb (40.8 kg). No intake or output data in the 24 hours ending 01/16/23 0105      Physical Exam:    Blood pressure (!) 165/57, pulse 67, temperature 97.9 °F (36.6 °C), temperature source Oral, resp. rate 11, height 5' 1\" (1.549 m), weight 90 lb (40.8 kg), SpO2 97 %. Physical Exam  Vitals and nursing note reviewed. Constitutional:       Appearance: She is ill-appearing. Comments: Frail, temporal wasting. HENT:      Head: Normocephalic and atraumatic. Nose: Nose normal.      Mouth/Throat:      Mouth: Mucous membranes are dry. Eyes:      Extraocular Movements: Extraocular movements intact. Conjunctiva/sclera: Conjunctivae normal.      Pupils: Pupils are equal, round, and reactive to light. Cardiovascular:      Rate and Rhythm: Normal rate and regular rhythm. Heart sounds: No murmur heard. Pulmonary:      Effort: Pulmonary effort is normal.      Breath sounds: Normal breath sounds. No wheezing, rhonchi or rales. Abdominal:      General: Abdomen is flat. Bowel sounds are normal. There is no distension. Palpations: Abdomen is soft. Tenderness: There is abdominal tenderness. Musculoskeletal:      Cervical back: Neck supple. Right lower leg: No edema. Left lower leg: No edema. Skin:     General: Skin is warm. Capillary Refill: Capillary refill takes less than 2 seconds. Coloration: Skin is pale. Neurological:      General: No focal deficit present. Mental Status: She is alert and oriented to person, place, and time. Mental status is at baseline.    Psychiatric:         Mood and Affect: Mood normal.         Behavior: Behavior normal.         I have personally reviewed labs and tests showing:  Recent Labs:  Recent Results (from the past 24 hour(s))   EKG 12 Lead    Collection Time: 01/15/23 10:27 PM   Result Value Ref Range    Ventricular Rate 82 BPM    Atrial Rate 82 BPM    P-R Interval 180 ms    QRS Duration 70 ms    Q-T Interval 370 ms    QTc Calculation (Bazett) 432 ms    P Axis 75 degrees    R Axis 66 degrees    T Axis 73 degrees    Diagnosis Normal sinus rhythm    CBC with Auto Differential    Collection Time: 01/15/23 10:38 PM   Result Value Ref Range    WBC 12.7 (H) 4.3 - 11.1 K/uL    RBC 4.35 4.05 - 5.2 M/uL    Hemoglobin 11.8 11.7 - 15.4 g/dL    Hematocrit 37.5 35.8 - 46.3 %    MCV 86.2 82 - 102 FL    MCH 27.1 26.1 - 32.9 PG    MCHC 31.5 31.4 - 35.0 g/dL    RDW 18.5 (H) 11.9 - 14.6 %    Platelets 123 (H) 190 - 450 K/uL    MPV 9.0 (L) 9.4 - 12.3 FL    nRBC 0.00 0.0 - 0.2 K/uL    Differential Type AUTOMATED      Seg Neutrophils 78 43 - 78 %    Lymphocytes 13 13 - 44 %    Monocytes 6 4.0 - 12.0 %    Eosinophils % 1 0.5 - 7.8 %    Basophils 1 0.0 - 2.0 %    Immature Granulocytes 1 0.0 - 5.0 %    Segs Absolute 10.0 (H) 1.7 - 8.2 K/UL    Absolute Lymph # 1.6 0.5 - 4.6 K/UL    Absolute Mono # 0.8 0.1 - 1.3 K/UL    Absolute Eos # 0.1 0.0 - 0.8 K/UL    Basophils Absolute 0.1 0.0 - 0.2 K/UL    Absolute Immature Granulocyte 0.1 0.0 - 0.5 K/UL   Comprehensive Metabolic Panel    Collection Time: 01/15/23 10:38 PM   Result Value Ref Range    Sodium 139 133 - 143 mmol/L    Potassium 4.3 3.5 - 5.1 mmol/L    Chloride 109 101 - 110 mmol/L    CO2 24 21 - 32 mmol/L    Anion Gap 6 2 - 11 mmol/L    Glucose 108 (H) 65 - 100 mg/dL    BUN 17 8 - 23 MG/DL    Creatinine 1.00 0.6 - 1.0 MG/DL    Est, Glom Filt Rate 57 (L) >60 ml/min/1.73m2    Calcium 9.1 8.3 - 10.4 MG/DL    Total Bilirubin 0.3 0.2 - 1.1 MG/DL    ALT 14 12 - 65 U/L    AST 15 15 - 37 U/L    Alk Phosphatase 63 50 - 136 U/L    Total Protein 7.0 6.3 - 8.2 g/dL    Albumin 3.1 (L) 3.2 - 4.6 g/dL    Globulin 3.9 2.8 - 4.5 g/dL    Albumin/Globulin Ratio 0.8 0.4 - 1.6     Lipase    Collection Time: 01/15/23 10:38 PM   Result Value Ref Range    Lipase 9,114 (H) 73 - 393 U/L   Magnesium    Collection Time: 01/15/23 10:38 PM   Result Value Ref Range    Magnesium 1.9 1.8 - 2.4 mg/dL   Phosphorus    Collection Time: 01/15/23 10:38 PM   Result Value Ref Range    Phosphorus 4.8 (H) 2.3 - 3.7 MG/DL       I have personally reviewed imaging studies showing:  XR CHEST 1 VIEW    Result Date: 1/15/2023  EXAMINATION: XR CHEST 1 VIEW 1/15/2023 10:28 PM ACCESSION NUMBER: [id number] COMPARISON: Chest radiograph 1/5/2023. Chest CT 12/22/2022.  INDICATION: Chest Pain TECHNIQUE: A single view of the chest was obtained. FINDINGS: Redemonstrated right lower lobe nodular opacity. No focal consolidation. No pulmonary edema. No pneumothorax or sizable pleural effusion. Stable cardiomediastinal silhouette. Redemonstrate a compression deformity of the L1 vertebral body. 1.  No acute airspace disease. 2.  Redemonstrated nodular opacity projecting over the right lower lobe, better evaluated on prior CT. Echocardiogram:  No results found for this or any previous visit. Orders Placed This Encounter   Medications    lactated ringers bolus    ondansetron (ZOFRAN) injection 4 mg    pantoprazole (PROTONIX) 40 mg in sodium chloride (PF) 0.9 % 10 mL injection    HYDROmorphone HCl PF (DILAUDID) injection 1 mg    diatrizoate meglumine-sodium (GASTROGRAFIN) 66-10 % solution 15 mL    iopamidol (ISOVUE-370) 76 % injection 100 mL    HYDROmorphone HCl PF (DILAUDID) injection 1 mg    ondansetron (ZOFRAN) injection 4 mg    pantoprazole (PROTONIX) 40 mg in sodium chloride (PF) 0.9 % 10 mL injection    nicotine (NICODERM CQ) 21 MG/24HR 1 patch    predniSONE (DELTASONE) tablet 10 mg         Signed:  Fuentes Cardona DO, DO    Part of this note may have been written by using a voice dictation software. The note has been proof read but may still contain some grammatical/other typographical errors.

## 2023-01-16 NOTE — PROGRESS NOTES
ACUTE OCCUPATIONAL THERAPY GOALS:   (Developed with and agreed upon by patient and/or caregiver.)  1. Tamie Fess will be independent with functional mobility for ADL in room within 1 visit. Met      OCCUPATIONAL THERAPY Initial Assessment, Daily Note, and Discharge       OT Visit Days: 1  Acknowledge Orders  Time  OT Charge Capture  Rehab Caseload Tracker      Pati Sanders is a [de-identified] y.o. female   PRIMARY DIAGNOSIS: Acute recurrent pancreatitis  Acute recurrent pancreatitis [K85.90]  Vomiting in adult [R11.10]  Acute pancreatitis, unspecified complication status, unspecified pancreatitis type [K85.90]       Reason for Referral: Generalized Muscle Weakness (M62.81)  Inpatient: Payor: Abraham Padilla / Plan: Ollie Oro PPO / Product Type: Medicare /     ASSESSMENT:     REHAB RECOMMENDATIONS:   Recommendation to date pending progress:  Setting:  No further skilled therapy after discharge from hospital    Equipment:    None     ASSESSMENT:  Ms. Jose Martinez was admitted for the above. She relays a 22 lb weight loss in the last few weeks. She was agreeable to OT. She lives alone in an apartment and typically completes ADL independently. Today, she completed bed mobility, sit to stand, and bathroom mobility mobility. She completed functional mobility holding onto IV pole with standby assistance as she needs something to hold onto at this time - see PT for assistive device recommendations. No further need for acute occupational therapy services.       [facility] [address] [id number] AM-PAC 6 Clicks Daily Activity Inpatient Short Form:    AM-PAC Daily Activity Inpatient   How much help for putting on and taking off regular lower body clothing?: None  How much help for Bathing?: None  How much help for Toileting?: None  How much help for putting on and taking off regular upper body clothing?: None  How much help for taking care of personal grooming?: None  How much help for eating meals?: None  AM-PAC Inpatient Daily Activity Raw Score: 24  AM-PAC Inpatient ADL T-Scale Score : 57.54  ADL Inpatient CMS 0-100% Score: 0  ADL Inpatient CMS G-Code Modifier : CH           SUBJECTIVE:     Ms. [facility], \"Do you know my son-in-law Klarissa Garza? \"     Social/Functional Lives With: Alone  Type of Home: Apartment  Home Layout: One level  Home Access: Level entry  Bathroom Toilet: Standard  Home Equipment: None  Receives Help From: Family  ADL Assistance: Independent  Homemaking Assistance: Independent  Ambulation Assistance: Independent  Transfer Assistance: Independent  Active : Yes  Mode of Transportation: Car  Occupation: Retired    OBJECTIVE:     Kimberley Newman / Zakiya Marmolejo / Álvaro Morelander: IV    RESTRICTIONS/PRECAUTIONS:  Restrictions/Precautions: Fall Risk    PAIN: [employer] / O2:   Pre Treatment:   Pain Assessment: 0-10  Pain Level: 4      Post Treatment: same       Vitals          Oxygen            GROSS EVALUATION: INTACT IMPAIRED   (See Comments)   UE AROM [x] []   UE PROM [x] []   Strength [x]       Posture / Balance [] Sitting - Static: Good  Sitting - Dynamic: Good  Standing - Static: Fair  Standing - Dynamic: Fair   Activity Tolerance [] Patient Tolerated treatment well       COGNITION/  PERCEPTION: INTACT IMPAIRED   (See Comments)   Orientation [x]     Vision [x]     Hearing [x]     Cognition  [x]     Perception [x]       MOBILITY: I Mod I S SBA CGA Min Mod Max Total  NT x2 Comments:   Bed Mobility    Rolling [] [] [] [] [] [] [] [] [] [x] []    Supine to Sit [x] [] [] [] [] [] [] [] [] [] []    Scooting [x] [] [] [] [] [] [] [] [] [] []    Sit to Supine [x] [] [] [] [] [] [] [] [] [] []    Transfers    Sit to Stand [x] [] [] [] [] [] [] [] [] [] []    Bed to Chair [] [] [] [] [] [] [] [] [] [x] []    Stand to Sit [x] [] [] [] [] [] [] [] [] [] []    Tub/Shower [] [] [] [] [] [] [] [] [] [x] []     Toilet [x] [] [] [] [] [] [] [] [] [] []      [] [] [] [] [] [] [] [] [] [] []    I=Independent, Mod I=Modified Independent, S=Supervision/Setup, SBA=Standby Assistance, CGA=Contact Guard Assistance, Min=Minimal Assistance, Mod=Moderate Assistance, Max=Maximal Assistance, Total=Total Assistance, NT=Not Tested    ACTIVITIES OF DAILY LIVING: I Mod I S SBA CGA Min Mod Max Total NT Comments   BASIC ADLs:              Upper Body Bathing  [] [] [] [] [] [] [] [] [] [x]    Lower Body Bathing [] [] [] [] [] [] [] [] [] [x]    Toileting [x] [] [] [] [] [] [] [] [] []    Upper Body Dressing [] [] [] [] [] [] [] [] [] [x]    Lower Body Dressing [] [] [] [] [] [] [] [] [] [x]    Feeding [] [] [] [] [] [] [] [] [] [x]    Grooming [x] [] [] [] [] [] [] [] [] []    Personal Device Care [] [] [] [] [] [] [] [] [] [x]    Functional Mobility [] [] [x] [] [] [] [] [] [] []    I=Independent, Mod I=Modified Independent, S=Supervision/Setup, SBA=Standby Assistance, CGA=Contact Guard Assistance, Min=Minimal Assistance, Mod=Moderate Assistance, Max=Maximal Assistance, Total=Total Assistance, NT=Not Tested    PLAN:   FREQUENCY/DURATION   OT Plan of Care:  (today's assessment only) for duration of hospital stay or until stated goals are met, whichever comes first.    PROBLEM LIST:   (Skilled intervention is medically necessary to address:)  Decreased Activity Tolerance  Decreased Balance  Increased Pain   INTERVENTIONS PLANNED:  (Benefits and precautions of occupational therapy have been discussed with the patient.)  Therapeutic Activity  Education         TREATMENT:     EVALUATION: LOW COMPLEXITY: (Untimed Charge)    TREATMENT:   Therapeutic Activity (15 Minutes): Patient participated in therapeutic activities including bed mobility training, functional transfer training, toilet transfer training, functional mobility of household distances, bathroom mobility, sitting tolerance activity, and standing tolerance activity with minimal verbal cues in order to increase independence, increase safety awareness, and increase activity tolerance.      TREATMENT GRID:  N/A    AFTER TREATMENT PRECAUTIONS: Bed, Bed/Chair Locked, Call light within reach, and Needs within reach    INTERDISCIPLINARY COLLABORATION:  OT/ CEDILLO    EDUCATION:  Education Given To: Patient  Education Provided: Role of Therapy;Home Exercise Program;Transfer Training  Education Method: Demonstration;Verbal  Barriers to Learning: None  Education Outcome: Demonstrated understanding;Verbalized understanding    TOTAL TREATMENT DURATION AND TIME:  Time In: 1310  Time Out: 1340  Minutes: 30    Rafael Paul, OT

## 2023-01-16 NOTE — CARE COORDINATION
Patient lives alone in a Apartment  with level entry. Patient drive and is independent with ADL's No history of DME, HH or Rehab. Will continue to follow for discharge planning needs  Please consult  if any new issues arise    Case Management Assessment  Initial Evaluation    Date/Time of Evaluation: 1/16/2023 1:08 PM  Assessment Completed by: Kimberly Liu RN    If patient is discharged prior to next notation, then this note serves as note for discharge by case management. Patient Name: Jean-Pierre Palafox                   YOB: 1940  Diagnosis: Acute recurrent pancreatitis [K85.90]  Vomiting in adult [R11.10]  Acute pancreatitis, unspecified complication status, unspecified pancreatitis type [K85.90]                   Date / Time: 1/15/2023 10:19 PM    Patient Admission Status: Inpatient     Current PCP: Urbano [place] MD  PCP verified by CM? (P) Yes    Chart Reviewed: Yes      History Provided by: (P) Patient, Medical Record  Patient Orientation: (P) Alert and Oriented    Patient Cognition: (P) Alert    Hospitalization in the last 30 days (Readmission):  Yes    If yes, Readmission Assessment in  Navigator will be completed.     Advance Directives:     Code Status: Full Code       Discharge Planning  Patient lives with: (P) Alone Type of Home: (P) Apartment  Primary Care Giver: (P) Self  Patient Support Systems include: (P) Children   Current Financial resources: (P) Medicare  Current community resources: (P) None  Current services prior to admission: None  Type of Home Care services:  None    ADLS  Prior functional level: (P) Independent in ADLs/IADLs  Current functional level: (P) Independent in ADLs/IADLs    PT AM-PAC:   /24  OT AM-PAC:   /24    Family can provide assistance at DC: (P) Yes  Would you like Case Management to discuss the discharge plan with any other family members/significant others, and if so, who? (P) No  Plans to Return to Present Housing: (P) Yes  Other Identified Issues/Barriers to RETURNING to current housing: Patient lives alone. Children live close by. Potential Assistance needed at discharge: N/A  Patient expects to discharge to: (P) [facility] for transportation at discharge: (P) Family    Financial  Payor: Jane Bess / Plan: Ene Price PPO / Product Type: Medicare /     Does insurance require precert for SNF: Yes    Potential assistance Purchasing Medications: (P) No      [id number] - [address], [place] - [address] [place] [address]  [id number] [place] [address]  Phone: [phone] Fax: [phone]    [address] [facility] [phone] - f [phone]  [address] [place] [address]  Phone: [phone] Fax: [phone]      Factors facilitating achievement of predicted outcomes: Family support, Cooperative, and Pleasant    Barriers to discharge: Pain, Limited family support, and Medical complications    Additional Case Management Notes: Discharge plan is undetermined at this time. CM will continue to monitor. The Plan for Transition of Care is related to the following treatment goals of Acute recurrent pancreatitis [K85.90]  Vomiting in adult [R11.10]  Acute pancreatitis, unspecified complication status, unspecified pancreatitis type [D37.73]    IF APPLICABLE: The Patient and/or patient representative Tyrell London and her family were provided with a choice of provider and agrees with the discharge plan. Freedom of choice list with basic dialogue that supports the patient's individualized plan of care/goals and shares the quality data associated with the providers was provided to: (P) Patient   Patient Representative Name:       The Patient and/or Patient Representative Agree with the Discharge Plan?       Boyd Rivera RN  Case Management Department

## 2023-01-16 NOTE — ED NOTES
TRANSFER - OUT REPORT:    Verbal report given to [facility]  on Amy Jackson  being transferred to [facility] [phone] for routine progression of patient care       Report consisted of patient's Situation, Background, Assessment and   Recommendations(SBAR). Information from the following report(s) Nurse Handoff Report, ED Encounter Summary, ED SBAR, Adult Overview, Intake/Output, MAR and Recent Results was reviewed with the receiving nurse. [place] Assessment: Presents to emergency department  because of falls (Syncope, seizure, or loss of consciousness): No, Age > 79: Yes, Altered Mental Status, Intoxication with alcohol or substance confusion (Disorientation, impaired judgment, poor safety awaremess, or inability to follow instructions): No, Impaired Mobility: Ambulates or transfers with assistive devices or assistance; Unable to ambulate or transer.: No, Nursing Judgement: No  Lines:   Peripheral IV 01/15/23 Right Antecubital (Active)   Site Assessment Clean, dry & intact 01/15/23 2250   Line Status Blood return noted 01/15/23 2250   Phlebitis Assessment No symptoms 01/15/23 2250   Infiltration Assessment 0 01/15/23 2250   Alcohol Cap Used No 01/15/23 2250   Dressing Status New dressing applied;Clean, dry & intact 01/15/23 2250   Dressing Type Transparent 01/15/23 2250   Dressing Intervention New 01/15/23 2250        Opportunity for questions and clarification was provided.       Patient transported with:  Germania Lauren RN  01/16/23 [id number]

## 2023-01-16 NOTE — PROGRESS NOTES
Hospitalist Progress Note   Admit Date:  1/15/2023 10:19 PM   Name:  Porfirio Mendez   Age:  [de-identified] y.o. Sex:  female  :  1940   MRN:  [id number]   Room:  [facility]/    Presenting Complaint: Abdominal Pain     Reason(s) for Admission: Acute recurrent pancreatitis [K85.90]  Vomiting in adult [R11.10]  Acute pancreatitis, unspecified complication status, unspecified pancreatitis type [facility] Course: Porfirio Mendez is a [de-identified] y.o. female with medical history of duodenal ulcer complicated by perforation requiring bilroth II,  idiopathic pancreatitis, rheumatoid arthritis on enbrel/ current course of steroids, HTN, ongoing tobacco use  who presented with increasing bouts of RUQ pain similar to prior episode of pancreatitis which resulted in hospitalization -22. Underwent EUS at that time without mass, acute nor chronic pancreatitis changes. the findings reported minimal pancreatic duct dilation and partial gastrectomy. She was also discharged in 2022 for recurrent pancreatitis and was seen by GI at that time. MRCP with mild dilatation of the pancreatic and common bile duct with abrupt tapering at the pancreatic head suspicious for pancreatic head mass. Had other admissions for the same. Previous EGD unremarkable     CT chest was done which shows a 13 mm x 11 mm left lower lobe nodule. Subjective & 24hr Events (23): Reports improved abdominal pain and able to tolerate CLD      Assessment & Plan:     Principal Problem:    Acute recurrent pancreatitis  Plan:   -Unclear etiology  -Continue supportive care  -Triglycerides WNL last read  -Continue Low fat CLD  -GI following; appreciated recs.  Dr Gage Cartagena to review previous EUS with recs pending   -Prn pain meds      Rheumatoid arthritis ([address]  Plan:   -Continue steroids      Protein-calorie malnutrition, moderate ([address]  Plan:   -Will add supplement when able     Hx of PUD  Plan:  -Continue IV PPI        Malignant neoplasm of upper-outer quadrant of left breast in female, estrogen receptor positive ([address]  Lung Nodule  Plan:   -Follows Dr. Meryle Christen   - [id number] LLL Nodule noted on CT. Biopsy 1/5/23. reported malignant neoplasm with sqamous features. The IHC interpretation was poorly differentiated malignant tumor that demonstrated an immunohistochemical profile that included squamous and urothelial carcinoma in the differential diagnosis  -PET OP    Tobacco Use/dependence  Plan:  -Cessation advised   -NRT      Chronic obstructive pulmonary disease ([address]  Plan:   -Patient stable on RA  -continue home meds        Anticipated discharge needs:      1-2 days    Diet:  ADULT DIET; Clear Liquid; Low Fat (less than or equal to 50 gm/day)  DVT PPx: Heparin   Code status: Full Code    Hospital Problems:  Principal Problem:    Acute recurrent pancreatitis  Active Problems:    Chronic pancreatitis (HCC)    Rheumatoid arthritis ([address]    Protein-calorie malnutrition, moderate (HCC)    PAD (peripheral artery disease) (HCC)    Malignant neoplasm of upper-outer quadrant of left breast in female, estrogen receptor positive ([address]    Personal history of tobacco use, presenting hazards to health    Chronic obstructive pulmonary disease ([address]    Paraseptal emphysema ([address]  Resolved Problems:    * No resolved hospital problems.  *      Objective:   Patient Vitals for the past 24 hrs:   Temp Pulse Resp BP SpO2   01/16/23 1141 97.8 °F (36.6 °C) 72 15 (!) 173/52 96 %   01/16/23 0746 97.7 °F (36.5 °C) 74 16 (!) 164/62 95 %   01/16/23 0145 97.3 °F (36.3 °C) 79 15 (!) 167/59 92 %   01/16/23 0116 -- -- 15 -- 96 %   01/16/23 0115 -- 69 -- (!) 160/57 --   01/16/23 0030 -- 67 11 (!) 165/57 97 %   01/16/23 0000 -- 68 15 (!) 148/56 97 %   01/15/23 2330 -- 63 17 (!) 164/51 97 %   01/15/23 2258 -- -- 19 -- --   01/15/23 2205 97.9 °F (36.6 °C) 94 24 (!) 153/74 99 %       Oxygen Therapy  SpO2: 96 %  Pulse via Oximetry: 66 beats per minute  O2 Device: None (Room air)    Estimated body mass index is 17.01 kg/m² as calculated from the following:    Height as of this encounter: 5' 1\" (1.549 m). Weight as of this encounter: 90 lb (40.8 kg). Intake/Output Summary (Last 24 hours) at 1/16/2023 1415  Last data filed at 1/16/2023 0900  Gross per 24 hour   Intake 1383 ml   Output --   Net 1383 ml         Physical Exam:     Blood pressure (!) 173/52, pulse 72, temperature 97.8 °F (36.6 °C), temperature source Oral, resp. rate 15, height 5' 1\" (1.549 m), weight 90 lb (40.8 kg), SpO2 96 %. General:    No overt distress; appears malnourished     Head:  Normocephalic, atraumatic  Eyes:  Sclerae appear normal.  Pupils equally round. ENT:  Nares appear normal.  Moist oral mucosa  Neck:  No restricted ROM. Trachea midline   CV:   RRR. No m/r/g. No jugular venous distension. Lungs:   CTAB. No wheezing, rhonchi, or rales. Symmetric expansion. Abdomen:   Soft, nontender, nondistended. Extremities: No cyanosis or clubbing. No edema  Skin:     No rashes and normal coloration. Warm and dry. Neuro:  CN II-XII grossly intact. Sensation intact. Psych:  Normal mood and affect.       I have personally reviewed labs and tests showing:  Recent Labs:  Recent Results (from the past 48 hour(s))   EKG 12 Lead    Collection Time: 01/15/23 10:27 PM   Result Value Ref Range    Ventricular Rate 82 BPM    Atrial Rate 82 BPM    P-R Interval 180 ms    QRS Duration 70 ms    Q-T Interval 370 ms    QTc Calculation (Bazett) 432 ms    P Axis 75 degrees    R Axis 66 degrees    T Axis 73 degrees    Diagnosis Normal sinus rhythm    CBC with Auto Differential    Collection Time: 01/15/23 10:38 PM   Result Value Ref Range    WBC 12.7 (H) 4.3 - 11.1 K/uL    RBC 4.35 4.05 - 5.2 M/uL    Hemoglobin 11.8 11.7 - 15.4 g/dL    Hematocrit 37.5 35.8 - 46.3 %    MCV 86.2 82 - 102 FL    MCH 27.1 26.1 - 32.9 PG    MCHC 31.5 31.4 - 35.0 g/dL    RDW 18.5 (H) 11.9 - 14.6 %    Platelets 829 (H) 117 - 450 K/uL    MPV 9.0 (L) 9.4 - 12.3 FL    nRBC 0.00 0.0 - 0.2 K/uL    Differential Type AUTOMATED      Seg Neutrophils 78 43 - 78 %    Lymphocytes 13 13 - 44 %    Monocytes 6 4.0 - 12.0 %    Eosinophils % 1 0.5 - 7.8 %    Basophils 1 0.0 - 2.0 %    Immature Granulocytes 1 0.0 - 5.0 %    Segs Absolute 10.0 (H) 1.7 - 8.2 K/UL    Absolute Lymph # 1.6 0.5 - 4.6 K/UL    Absolute Mono # 0.8 0.1 - 1.3 K/UL    Absolute Eos # 0.1 0.0 - 0.8 K/UL    Basophils Absolute 0.1 0.0 - 0.2 K/UL    Absolute Immature Granulocyte 0.1 0.0 - 0.5 K/UL   Comprehensive Metabolic Panel    Collection Time: 01/15/23 10:38 PM   Result Value Ref Range    Sodium 139 133 - 143 mmol/L    Potassium 4.3 3.5 - 5.1 mmol/L    Chloride 109 101 - 110 mmol/L    CO2 24 21 - 32 mmol/L    Anion Gap 6 2 - 11 mmol/L    Glucose 108 (H) 65 - 100 mg/dL    BUN 17 8 - 23 MG/DL    Creatinine 1.00 0.6 - 1.0 MG/DL    Est, Glom Filt Rate 57 (L) >60 ml/min/1.73m2    Calcium 9.1 8.3 - 10.4 MG/DL    Total Bilirubin 0.3 0.2 - 1.1 MG/DL    ALT 14 12 - 65 U/L    AST 15 15 - 37 U/L    Alk Phosphatase 63 50 - 136 U/L    Total Protein 7.0 6.3 - 8.2 g/dL    Albumin 3.1 (L) 3.2 - 4.6 g/dL    Globulin 3.9 2.8 - 4.5 g/dL    Albumin/Globulin Ratio 0.8 0.4 - 1.6     Lipase    Collection Time: 01/15/23 10:38 PM   Result Value Ref Range    Lipase 9,114 (H) 73 - 393 U/L   Magnesium    Collection Time: 01/15/23 10:38 PM   Result Value Ref Range    Magnesium 1.9 1.8 - 2.4 mg/dL   Phosphorus    Collection Time: 01/15/23 10:38 PM   Result Value Ref Range    Phosphorus 4.8 (H) 2.3 - 3.7 MG/DL   Protime-INR    Collection Time: 01/16/23  9:21 AM   Result Value Ref Range    Protime 13.2 12.6 - 14.3 sec    INR 1.0         I have personally reviewed imaging studies showing: Other Studies:  CT ABDOMEN PELVIS W IV CONTRAST Additional Contrast? Radiologist Recommendation   Final Result   The pancreas again shows dilatation of the tail throughout its visualized   course.        There is stranding of the fat in the upper abdomen, anterior to the pancreas. The appearance was similar previously; although, it has increased to some   degree. These changes could be secondary to pancreatitis and/or postoperative   change. No evidence of bowel obstruction. Hyperdense contrast is seen in the proximal   and distal small bowel loops. Postoperative changes are noted in the distal stomach as described above. No other significant findings. XR CHEST 1 VIEW   Final Result      1. No acute airspace disease. 2.  Redemonstrated nodular opacity projecting over the right lower lobe, better   evaluated on prior CT.                 Current Meds:  Current Facility-Administered Medications   Medication Dose Route Frequency    lactated ringers infusion   IntraVENous Continuous    sodium chloride flush 0.9 % injection 5-40 mL  5-40 mL IntraVENous 2 times per day    sodium chloride flush 0.9 % injection 5-40 mL  5-40 mL IntraVENous PRN    0.9 % sodium chloride infusion   IntraVENous PRN    ondansetron (ZOFRAN-ODT) disintegrating tablet 4 mg  4 mg Oral Q8H PRN    Or    ondansetron (ZOFRAN) injection 4 mg  4 mg IntraVENous Q6H PRN    heparin (porcine) injection 5,000 Units  5,000 Units SubCUTAneous BID    HYDROmorphone HCl PF (DILAUDID) injection 0.5 mg  0.5 mg IntraVENous Q3H PRN    Or    HYDROmorphone HCl PF (DILAUDID) injection 1 mg  1 mg IntraVENous Q3H PRN    oxyCODONE (ROXICODONE) immediate release tablet 5 mg  5 mg Oral Q3H PRN    naloxone (NARCAN) injection 0.4 mg  0.4 mg IntraVENous PRN    potassium chloride (KLOR-CON M) extended release tablet 40 mEq  40 mEq Oral PRN    Or    potassium bicarb-citric acid (EFFER-K) effervescent tablet 40 mEq  40 mEq Oral PRN    Or    potassium chloride 10 mEq/100 mL IVPB (Peripheral Line)  10 mEq IntraVENous PRN    magnesium sulfate 2000 mg in 50 mL IVPB premix  2,000 mg IntraVENous PRN    acetaminophen (TYLENOL) tablet 650 mg  650 mg Oral Q6H PRN    pantoprazole (PROTONIX) 40 mg in sodium chloride (PF) 0.9 % 10 mL injection  40 mg IntraVENous Daily    nicotine (NICODERM CQ) 21 MG/24HR 1 patch  1 patch TransDERmal Daily    predniSONE (DELTASONE) tablet 10 mg  10 mg Oral Daily    diatrizoate meglumine-sodium (GASTROGRAFIN) 66-10 % solution 15 mL  15 mL Oral ONCE PRN       Signed:  Rafat Son, APRN - CNP    Part of this note may have been written by using a voice dictation software. The note has been proof read but may still contain some grammatical/other typographical errors.

## 2023-01-16 NOTE — ED PROVIDER NOTES
Emergency Department Provider Note                   PCP:                Bryan Del Rosario MD               Age: [de-identified] y.o. Sex: female       ICD-10-CM    1. Acute pancreatitis, unspecified complication status, unspecified pancreatitis type  K85.90       2. Vomiting in adult  R11.10           DISPOSITION Decision To Admit 01/16/2023 12:27:47 AM       Medical Decision Making  53-year-old female patient presents to the ER with complaints of upper abdominal pain  History of chronic pancreatitis and this episode feels similar  Nauseous but no vomiting   No fever    History obtained from patient's daughter at bedside as well.    11:28 PM  I personally reviewed the patient's chest x-ray and found no evidence of acute cardiopulmonary disease. Prior nodular lesion noted  Confirmed with radiology report    I reviewed the initial lab work. There are no acute findings. White count is minimally elevated  Renal function normal at this time lipase is pending  We are pending CT as well    12:28 AM  Patient reassessed  Pain improved but still present  Nausea does seem to be getting worse  Will redose Dilaudid and Zofran  Consulted hospitalist service, Dr. Janey Wall. Given the patient's lipase over 9000 she will need to be admitted and kept n.p.o.  CT abdomen pelvis pending    Amount and/or Complexity of Data Reviewed  Labs: ordered. Radiology: ordered. ECG/medicine tests: ordered and independent interpretation performed. Risk  Prescription drug management. Decision regarding hospitalization. Complexity of Problem: 2 or more stable chronic illnesses. (4)  Complexity of Problem: 1 acute illness with systemic symptoms. (4)  The patients assessment required an independent historian: I spoke with a family member. I have conducted an independent ordering and review of Labs. I have conducted an independent ordering and review of EKG. I have conducted an independent ordering and review of X-rays.   I have conducted an independent ordering and review of CT Scan. I have reviewed records from an external source: provider visit notes from PCP. Considerations: Hospitalization was considered. Patient was admitted I have communication with admitting physician.          Orders Placed This Encounter   Procedures    CT ABDOMEN PELVIS W IV CONTRAST Additional Contrast? Radiologist Recommendation    XR CHEST 1 VIEW    CBC with Auto Differential    Comprehensive Metabolic Panel    Lipase    Magnesium    Phosphorus    POCT Urine Dipstick    EKG 12 Lead        Medications   diatrizoate meglumine-sodium (GASTROGRAFIN) 66-10 % solution 15 mL (15 mLs Oral Given 1/15/23 2308)   iopamidol (ISOVUE-370) 76 % injection 100 mL (has no administration in time range)   HYDROmorphone HCl PF (DILAUDID) injection 1 mg (has no administration in time range)   ondansetron (ZOFRAN) injection 4 mg (has no administration in time range)   lactated ringers bolus (1,000 mLs IntraVENous New Bag 1/15/23 2301)   ondansetron (ZOFRAN) injection 4 mg (4 mg IntraVENous Given 1/15/23 2258)   pantoprazole (PROTONIX) 40 mg in sodium chloride (PF) 0.9 % 10 mL injection (40 mg IntraVENous Given 1/15/23 2258)   HYDROmorphone HCl PF (DILAUDID) injection 1 mg (1 mg IntraVENous Given 1/15/23 2258)       New Prescriptions    No medications on file        Shaan Burns is a [de-identified] y.o. female who presents to the Emergency Department with chief complaint of    Chief Complaint   Patient presents with    Abdominal Pain      70-year-old female patient presents to the ER with complaints of abdominal pain  History of pancreatitis, Jaziel-en-Y partial gastrectomy in 2020  Has had several admissions related to episodes of pancreatitis  Last admitted about a month ago had endoscopy with ultrasound by Dr. Landon Yip  No acute findings on his scope at that time    Has had recurrent pain for 2 to 3 days much worse during the day today  Nauseous but no vomiting  No diarrhea no fever    Denies any specific trigger for this exacerbation of her pain    The history is provided by the patient and a relative. Abdominal Pain  Pain location:  Epigastric, RUQ and LUQ  Pain quality: aching, sharp, shooting and stabbing    Pain radiates to:  Does not radiate  Pain severity:  Severe  Onset quality:  Gradual  Duration:  3 days  Timing:  Constant  Progression:  Worsening  Chronicity:  Chronic  Context: eating and previous surgery    Context: not alcohol use, not medication withdrawal, not recent travel, not sick contacts and not trauma    Relieved by:  Nothing  Worsened by:  Eating, movement and palpation  Ineffective treatments:  None tried  Associated symptoms: anorexia, fatigue and nausea    Associated symptoms: no chest pain, no chills, no constipation, no cough, no diarrhea, no dysuria, no fever, no hematemesis, no hematochezia, no hematuria, no shortness of breath and no vomiting       Review of Systems   Constitutional:  Positive for fatigue. Negative for chills and fever. HENT:  Negative for ear pain, hearing loss, sinus pain, sneezing and trouble swallowing. Eyes:  Negative for pain, redness and itching. Respiratory:  Negative for cough, shortness of breath and wheezing. Cardiovascular:  Negative for chest pain and palpitations. Gastrointestinal:  Positive for abdominal pain, anorexia and nausea. Negative for constipation, diarrhea, hematemesis, hematochezia and vomiting. Endocrine: Negative for polydipsia, polyphagia and polyuria. Genitourinary:  Negative for dysuria, flank pain, frequency and hematuria. Musculoskeletal:  Negative for arthralgias, back pain and myalgias. Skin:  Negative for rash and wound. Allergic/Immunologic: Negative for food allergies and immunocompromised state. Neurological:  Negative for dizziness, syncope, speech difficulty and light-headedness. Hematological:  Negative for adenopathy. Does not bruise/bleed easily.    Psychiatric/Behavioral:  Negative for dysphoric mood and self-injury. The patient is not nervous/anxious. All other systems reviewed and are negative.     Past Medical History:   Diagnosis Date    EPIFANIO (acute kidney injury) ([address] 12/04/2014    pt denies this dx    Arthritis     RA-abdelrahman hands    Back pain 6/10/2016    Breast cancer ([address] 2018    Breast lump dx 2/2018    left    Bronchitis     Chronic obstructive pulmonary disease ([address]     Discharge from the vagina     Dysuria     Eczema 6/10/2016    Fungal dermatitis     Headache 6/10/2016    Hypercholesterolemia 12/4/2014    Hypertension     not well controlled--per pt    Intractable vomiting 5/20/2018    Menopausal vaginal dryness     Osteoarthritis 6/10/2016    Osteoporosis 6/10/2016    Pancreatitis     2 episodes    PUD (peptic ulcer disease)     last 2003 which a rupture an extensive surgery    Sepsis ([address] 12/4/2014    Thyroid nodule     Tobacco abuse     1ppd x 49 yrs        Past Surgical History:   Procedure Laterality Date    APPENDECTOMY  1977    with hysterectomy    BREAST BIOPSY Left 1975    BREAST LUMPECTOMY Left 2/22/2018    LEFT BREAST LUMPECTOMY/ SENTINEL NODE BIOPSY performed by Sean Ramirez MD at [facility] MAIN OR    BREAST SURGERY Left 1975    breast lumpectomy, benign  (left)    CATARACT REMOVAL Bilateral 2018    w/IOL    CHOLECYSTECTOMY      CT NEEDLE BIOPSY LUNG PERCUTANEOUS  1/5/2023    CT NEEDLE BIOPSY LUNG PERCUTANEOUS 1/5/2023 [facility] CT SCAN    HYSTERECTOMY (CERVIX STATUS UNKNOWN)  1977    OTHER SURGICAL HISTORY      hernicolectomy 2 cm    [place] UNLISTED PROCEDURE ABDOMEN PERITONEUM & OMENTUM  2003    stomach surgery for ruptured ulcer and extensive rerouting of intestestines per patient     [address]  05/21/2018    empiric dilation    UPPER GASTROINTESTINAL ENDOSCOPY      UPPER GASTROINTESTINAL ENDOSCOPY N/A 10/4/2022    EGD ESOPHAGOGASTRODUODENOSCOPY/  performed by Veronica Ellis MD at [address] [place] [address] GASTROINTESTINAL ENDOSCOPY N/A 12/28/2022    ENDOSCOPIC ULTRASOUND W/ EGD/ / performed by Patrick Garza MD at [facility] ENDOSCOPY    US BREAST NEEDLE BIOPSY LEFT Left 1/31/2018    US BREAST NEEDLE BIOPSY LEFT 1/31/2018 [facility]        Family History   Problem Relation Age of Onset    Thyroid Cancer Neg Hx     No Known Problems Paternal Grandfather     No Known Problems Paternal Grandmother     No Known Problems Maternal Grandfather     No Known Problems Maternal Grandmother     Hypertension Other         Brother and sister with htn    No Known Problems Brother     Hypertension Mother     Heart Disease Brother     Heart Disease Sister     Breast Cancer Sister 70    Cancer Sister     Heart Disease Father     Heart Attack Father     Cancer Brother         prostate    Diabetes Sister     Heart Disease Sister         Social History     Socioeconomic History    Marital status:    Tobacco Use    Smoking status: Every Day     Packs/day: 0.10     Types: Cigarettes     Start date: 5/6/1966    Smokeless tobacco: Never   Vaping Use    Vaping Use: Never used   Substance and Sexual Activity    Alcohol use: No    Drug use: No    Sexual activity: Not Currently     Birth control/protection: None        Allergies: Azithromycin and Codeine    Previous Medications    ALENDRONATE (FOSAMAX) 70 MG TABLET    Take 1 tablet by mouth every 7 days    AMLODIPINE (NORVASC) 10 MG TABLET    Take 1 tablet by mouth daily    DONEPEZIL (ARICEPT) 5 MG TABLET    Take 1 tablet by mouth nightly    ETANERCEPT (ENBREL SURECLICK) 50 MG/ML SOAJ    Inject 50 mg into the skin every 7 days    HYDRALAZINE (APRESOLINE) 25 MG TABLET    Take 1 tablet by mouth every 8 hours    HYOSCYAMINE (LEVSIN/SL) 125 MCG SUBLINGUAL TABLET    Place 1 tablet under the tongue 3 times daily (before meals)    LOSARTAN (COZAAR) 100 MG TABLET    Take 1 tablet by mouth daily    ONDANSETRON (ZOFRAN-ODT) 4 MG DISINTEGRATING TABLET    Take 1 tablet by mouth every  8 hours as needed for Nausea or Vomiting    PANTOPRAZOLE (PROTONIX) 40 MG TABLET    Take 1 tablet by mouth in the morning and at bedtime    PRAVASTATIN (PRAVACHOL) 40 MG TABLET    Take 1 tablet by mouth daily        Vitals signs and nursing note reviewed. Patient Vitals for the past 4 hrs:   Temp Pulse Resp BP SpO2   01/15/23 2258 -- -- 19 -- --   01/15/23 2205 97.9 °F (36.6 °C) 94 24 (!) 153/74 99 %          Physical Exam  Vitals and nursing note reviewed. Constitutional:       General: She is in acute distress. Appearance: Normal appearance. She is normal weight. HENT:      Head: Normocephalic and atraumatic. Right Ear: External ear normal.      Left Ear: External ear normal.      Nose: Nose normal.      Mouth/Throat:      Mouth: Mucous membranes are moist.      Pharynx: Oropharynx is clear. Eyes:      General: No scleral icterus. Extraocular Movements: Extraocular movements intact. Conjunctiva/sclera: Conjunctivae normal.      Pupils: Pupils are equal, round, and reactive to light. Cardiovascular:      Rate and Rhythm: Normal rate and regular rhythm. Pulses: Normal pulses. Heart sounds: Normal heart sounds. Pulmonary:      Effort: Pulmonary effort is normal. No respiratory distress. Breath sounds: Normal breath sounds. Abdominal:      General: Abdomen is flat. Bowel sounds are decreased. There is no distension. Palpations: Abdomen is soft. There is no mass. Tenderness: There is abdominal tenderness in the right upper quadrant, epigastric area, periumbilical area and left upper quadrant. There is no right CVA tenderness or left CVA tenderness. Musculoskeletal:         General: No deformity or signs of injury. Normal range of motion. Cervical back: Normal range of motion and neck supple. Skin:     General: Skin is warm and dry. Capillary Refill: Capillary refill takes less than 2 seconds. Neurological:      General: No focal deficit present. Mental Status: She is alert and oriented to person, place, and time. Psychiatric:         Mood and Affect: Mood normal.         Behavior: Behavior normal.         Thought Content: Thought content normal.         Judgment: Judgment normal.        EKG 12 Lead    Date/Time: 1/15/2023 11:26 PM  Performed by: Lurdes Medley MD  Authorized by: Lurdes Medley MD     ECG reviewed by ED Physician in the absence of a cardiologist: yes    Previous ECG:     Previous ECG:  Compared to current    Similarity:  No change  Interpretation:     Interpretation: non-specific    Rate:     ECG rate:  82    ECG rate assessment: normal    Rhythm:     Rhythm: sinus rhythm    Ectopy:     Ectopy: none    QRS:     QRS axis:  Normal    QRS intervals:  Normal    QRS conduction: normal    ST segments:     ST segments:  Normal  T waves:     T waves: normal    Comments:      No acute ischemic changes  No significant interval change from prior tracing    ED EKG Interpretation  EKG was interpreted in the absence of a cardiologist.        Results for orders placed or performed during the hospital encounter of 01/15/23   XR CHEST 1 VIEW    Narrative    EXAMINATION: XR CHEST 1 VIEW 1/15/2023 10:28 PM    ACCESSION NUMBER: [id number]    COMPARISON: Chest radiograph 1/5/2023. Chest CT 12/22/2022. INDICATION: Chest Pain    TECHNIQUE: A single view of the chest was obtained. FINDINGS:   Redemonstrated right lower lobe nodular opacity. No focal consolidation. No pulmonary edema. No pneumothorax or sizable pleural effusion. Stable cardiomediastinal silhouette. Redemonstrate a compression deformity of the L1 vertebral body. Impression    1. No acute airspace disease. 2.  Redemonstrated nodular opacity projecting over the right lower lobe, better  evaluated on prior CT.        CBC with Auto Differential   Result Value Ref Range    WBC 12.7 (H) 4.3 - 11.1 K/uL    RBC 4.35 4.05 - 5.2 M/uL    Hemoglobin 11.8 11.7 - 15.4 g/dL Hematocrit 37.5 35.8 - 46.3 %    MCV 86.2 82 - 102 FL    MCH 27.1 26.1 - 32.9 PG    MCHC 31.5 31.4 - 35.0 g/dL    RDW 18.5 (H) 11.9 - 14.6 %    Platelets 194 (H) 793 - 450 K/uL    MPV 9.0 (L) 9.4 - 12.3 FL    nRBC 0.00 0.0 - 0.2 K/uL    Differential Type AUTOMATED      Seg Neutrophils 78 43 - 78 %    Lymphocytes 13 13 - 44 %    Monocytes 6 4.0 - 12.0 %    Eosinophils % 1 0.5 - 7.8 %    Basophils 1 0.0 - 2.0 %    Immature Granulocytes 1 0.0 - 5.0 %    Segs Absolute 10.0 (H) 1.7 - 8.2 K/UL    Absolute Lymph # 1.6 0.5 - 4.6 K/UL    Absolute Mono # 0.8 0.1 - 1.3 K/UL    Absolute Eos # 0.1 0.0 - 0.8 K/UL    Basophils Absolute 0.1 0.0 - 0.2 K/UL    Absolute Immature Granulocyte 0.1 0.0 - 0.5 K/UL   Comprehensive Metabolic Panel   Result Value Ref Range    Sodium 139 133 - 143 mmol/L    Potassium 4.3 3.5 - 5.1 mmol/L    Chloride 109 101 - 110 mmol/L    CO2 24 21 - 32 mmol/L    Anion Gap 6 2 - 11 mmol/L    Glucose 108 (H) 65 - 100 mg/dL    BUN 17 8 - 23 MG/DL    Creatinine 1.00 0.6 - 1.0 MG/DL    Est, Glom Filt Rate 57 (L) >60 ml/min/1.73m2    Calcium 9.1 8.3 - 10.4 MG/DL    Total Bilirubin 0.3 0.2 - 1.1 MG/DL    ALT 14 12 - 65 U/L    AST 15 15 - 37 U/L    Alk Phosphatase 63 50 - 136 U/L    Total Protein 7.0 6.3 - 8.2 g/dL    Albumin 3.1 (L) 3.2 - 4.6 g/dL    Globulin 3.9 2.8 - 4.5 g/dL    Albumin/Globulin Ratio 0.8 0.4 - 1.6     Lipase   Result Value Ref Range    Lipase 9,114 (H) 73 - 393 U/L   Magnesium   Result Value Ref Range    Magnesium 1.9 1.8 - 2.4 mg/dL   Phosphorus   Result Value Ref Range    Phosphorus 4.8 (H) 2.3 - 3.7 MG/DL   EKG 12 Lead   Result Value Ref Range    Ventricular Rate 82 BPM    Atrial Rate 82 BPM    P-R Interval 180 ms    QRS Duration 70 ms    Q-T Interval 370 ms    QTc Calculation (Bazett) 432 ms    P Axis 75 degrees    R Axis 66 degrees    T Axis 73 degrees    Diagnosis Normal sinus rhythm         XR CHEST 1 VIEW   Final Result      1. No acute airspace disease.    2.  Redemonstrated nodular opacity projecting over the right lower lobe, better   evaluated on prior CT. CT ABDOMEN PELVIS W IV CONTRAST Additional Contrast? Radiologist Recommendation    (Results Pending)                         Voice dictation software was used during the making of this note. This software is not perfect and grammatical and other typographical errors may be present. This note has not been completely proofread for errors.      Jordan Wheeler MD  01/16/23 0030

## 2023-01-16 NOTE — PROGRESS NOTES
ACUTE PHYSICAL THERAPY GOALS:   (Developed with and agreed upon by patient and/or caregiver.)    (1.) Amy Jackson will transfer from bed to chair and chair to bed with INDEPENDENT using the least restrictive device within 7 treatment day(s). (2.) Amy Jackson will ambulate with SUPERVISION for 250 feet with the least restrictive device within 7 treatment day(s). (3.) Amy Jackson will perform standing static and dynamic balance activities x 10 minutes with SUPERVISION to improve safety within 7 treatment day(s). (4.) Amy Jackson will perform bilateral lower extremity exercises x 15 min for HEP with MODIFIED INDEPENDENCE to improve strength, endurance, and functional mobility within 7 treatment day(s). PHYSICAL THERAPY Initial Assessment, Daily Note, and AM  (Link to Caseload Tracking: PT Visit Days : 1  Acknowledge Orders  Time In/Out  PT Charge Capture  Rehab Caseload Tracker    Aletha Helton is a [de-identified] y.o. female   PRIMARY DIAGNOSIS: Acute recurrent pancreatitis  Acute recurrent pancreatitis [K85.90]  Vomiting in adult [R11.10]  Acute pancreatitis, unspecified complication status, unspecified pancreatitis type [K85.90]       Reason for Referral: Generalized Muscle Weakness (M62.81)  Inpatient: Payor: Bertin Border / Plan: [facility] [address] [place] [address] PPO / Product Type: Medicare /     ASSESSMENT:     REHAB RECOMMENDATIONS:   Recommendation to date pending progress:  Setting:  Home Health Therapy    Equipment:    None     ASSESSMENT:  Ms. Yuliana Tolentino is a [de-identified]year old female admitted with pancreatitis. She has a history or recurrent pancreatis as well as lung CA. At baseline she lives alone and performs all mobility independent with no AD. She has family members that live close by and can assist as needed. Today she reports he has been up moving ad-edmund in the room as needed, but does report needing to hold onto furniture.  She performs bed mobility/transfers with SBA and ambulates in hallway for about 100 ft while holding onto IV pole for support. Patient does reports pain in abdominal area that slight limits mobility as well as some increased fatigue compared to her baseline level. Pt presents as functioning below her baseline, with deficits in mobility including transfers, gait, balance, and activity tolerance. Pt will benefit from skilled therapy services to address stated deficits to promote return to highest level of function, independence, and safety. Will continue to follow.       [address] [id number] AM-PAC 6 Clicks Basic Mobility Inpatient Short Form  AM-PAC Mobility Inpatient   How much difficulty turning over in bed?: None  How much difficulty sitting down on / standing up from a chair with arms?: A Little  How much difficulty moving from lying on back to sitting on side of bed?: A Little  How much help from another person moving to and from a bed to a chair?: None  How much help from another person needed to walk in hospital room?: A Little  How much help from another person for climbing 3-5 steps with a railing?: A Little  [facility] Inpatient Mobility Raw Score : 20  AM-PAC Inpatient T-Scale Score : 47.67  Mobility Inpatient CMS 0-100% Score: 35.83  Mobility Inpatient CMS G-Code Modifier : CJ    SUBJECTIVE:   Ms. Melchor High states, \"I hope you have a good day\"     Social/Functional Lives With: Alone  Type of Home: Apartment  Home Layout: One level  Home Access: Level entry  Bathroom Toilet: Standard  Home Equipment: None  Receives Help From: Family  ADL Assistance: Independent  Homemaking Assistance: Independent  Ambulation Assistance: Independent  Transfer Assistance: Independent  Active : Yes  Mode of Transportation: Car  Occupation: Retired    OBJECTIVE:     PAIN: Shonda Pollock / O2: PRECAUTION / Stan Reynolds / Yohannes Robertson:   Pre Treatment: 3/10         Post Treatment: 4/10 Vitals        Oxygen      IV    RESTRICTIONS/PRECAUTIONS:  Restrictions/Precautions: Fall Risk                 GROSS EVALUATION: Intact Impaired (Comments):   AROM [x]     PROM [x]    Strength []  Grossly 4/5 in BLE    Balance [] Sitting - Static: Good  Sitting - Dynamic: Good  Standing - Static: Fair  Standing - Dynamic: Fair   Posture [] Forward Head  Rounded Shoulders   Sensation []     Coordination []      Tone []     Edema []    Activity Tolerance []  Limited     []      COGNITION/  PERCEPTION: Intact Impaired (Comments):   Orientation [x]     Vision [x]     Hearing [x]     Cognition  [x]       MOBILITY: I Mod I S SBA CGA Min Mod Max Total  NT x2 Comments:   Bed Mobility    Rolling [] [x] [] [] [] [] [] [] [] [] []    Supine to Sit [] [x] [] [] [] [] [] [] [] [] []    Scooting [] [x] [] [] [] [] [] [] [] [] []    Sit to Supine [] [x] [] [] [] [] [] [] [] [] []    Transfers    Sit to Stand [] [] [] [x] [] [] [] [] [] [] []    Bed to Chair [] [] [] [x] [] [] [] [] [] [] []    Stand to Sit [] [] [] [x] [] [] [] [] [] [] []     [] [] [] [] [] [] [] [] [] [] []    I=Independent, Mod I=Modified Independent, S=Supervision, SBA=Standby Assistance, CGA=Contact Guard Assistance,   Min=Minimal Assistance, Mod=Moderate Assistance, Max=Maximal Assistance, Total=Total Assistance, NT=Not Tested    GAIT: I Mod I S SBA CGA Min Mod Max Total  NT x2 Comments:   Level of Assistance [] [] [] [x] [x] [] [] [] [] [] [] Slowed speed    Distance 100 feet    DME Holds IV pole    Gait Quality Decreased step clearance, Decreased step length, and Decreased stance    Weightbearing Status Restrictions/Precautions  Restrictions/Precautions: Fall Risk    Stairs      I=Independent, Mod I=Modified Independent, S=Supervision, SBA=Standby Assistance, CGA=Contact Guard Assistance,   Min=Minimal Assistance, Mod=Moderate Assistance, Max=Maximal Assistance, Total=Total Assistance, NT=Not Tested    PLAN:   FREQUENCY AND DURATION: 3 times/week for duration of hospital stay or until stated goals are met, whichever comes first.    THERAPY PROGNOSIS: Good    PROBLEM LIST:   (Skilled intervention is medically necessary to address:)  Decreased Activity Tolerance  Decreased AROM/PROM  Decreased Balance  Decreased Coordination  Decreased Gait Ability  Decreased Safety Awareness  Decreased Strength  Decreased Transfer Abilities  Increased Pain INTERVENTIONS PLANNED:   (Benefits and precautions of physical therapy have been discussed with the patient.)  Therapeutic Activity  Therapeutic Exercise/HEP  Neuromuscular Re-education  Gait Training  Education       TREATMENT:   EVALUATION: LOW COMPLEXITY: (Untimed Charge)    TREATMENT:   Co-Treatment PT/OT necessary due to patient's decreased overall endurance/tolerance levels, as well as need for high level skilled assistance to complete functional transfers/mobility and functional tasks  Therapeutic Activity (25 Minutes): Therapeutic activity included Rolling, Supine to Sit, Sit to Supine, Scooting, Transfer Training, Ambulation on level ground, Sitting balance , and Standing balance to improve functional Activity tolerance, Balance, Coordination, Mobility, and Strength.     TREATMENT GRID:  N/A    AFTER TREATMENT PRECAUTIONS: Bed, Bed/Chair Locked, Call light within reach, Needs within reach, and RN notified    INTERDISCIPLINARY COLLABORATION:  RN/ PCT and PT/ PTA    EDUCATION:      TIME IN/OUT:  Time In: 1100  Time Out: 200  Minutes: [address], [address] [facility]

## 2023-01-16 NOTE — PROGRESS NOTES
Patient has had a very difficult road per notes  Good visit with the patient know her from past admissions  Very pleasant  Hoahaoism rajesh  In great spirits

## 2023-01-16 NOTE — TELEPHONE ENCOUNTER
Pt daughter Yamila Garcia wanted to informed Dr. Vasyl Kaur nurse that pt was admitted to [facility] on 01/15/23 & pt  Lipase Levels were 9000.  Daughter request a call back due to pt has an appt on 01/20/23 & she think pt may still be in hospital.

## 2023-01-17 ENCOUNTER — APPOINTMENT (OUTPATIENT)
Dept: CT IMAGING | Age: 83
DRG: 439 | End: 2023-01-17
Payer: MEDICARE

## 2023-01-17 ENCOUNTER — CARE COORDINATION (OUTPATIENT)
Dept: CARE COORDINATION | Facility: CLINIC | Age: 83
End: 2023-01-17

## 2023-01-17 VITALS
DIASTOLIC BLOOD PRESSURE: 86 MMHG | HEART RATE: 77 BPM | OXYGEN SATURATION: 96 % | WEIGHT: 92.31 LBS | RESPIRATION RATE: 18 BRPM | SYSTOLIC BLOOD PRESSURE: 155 MMHG | BODY MASS INDEX: 17.43 KG/M2 | TEMPERATURE: 98.4 F | HEIGHT: 61 IN

## 2023-01-17 LAB
ALBUMIN SERPL-MCNC: 2.7 G/DL (ref 3.2–4.6)
ALBUMIN/GLOB SERPL: 0.8 (ref 0.4–1.6)
ALP SERPL-CCNC: 60 U/L (ref 50–136)
ALT SERPL-CCNC: 11 U/L (ref 12–65)
ANION GAP SERPL CALC-SCNC: 6 MMOL/L (ref 2–11)
AST SERPL-CCNC: 11 U/L (ref 15–37)
BASOPHILS # BLD: 0 K/UL (ref 0–0.2)
BASOPHILS NFR BLD: 0 % (ref 0–2)
BILIRUB SERPL-MCNC: 0.5 MG/DL (ref 0.2–1.1)
BUN SERPL-MCNC: 6 MG/DL (ref 8–23)
CALCIUM SERPL-MCNC: 8.6 MG/DL (ref 8.3–10.4)
CHLORIDE SERPL-SCNC: 108 MMOL/L (ref 101–110)
CO2 SERPL-SCNC: 26 MMOL/L (ref 21–32)
CREAT SERPL-MCNC: 0.6 MG/DL (ref 0.6–1)
DIFFERENTIAL METHOD BLD: ABNORMAL
EOSINOPHIL # BLD: 0.1 K/UL (ref 0–0.8)
EOSINOPHIL NFR BLD: 1 % (ref 0.5–7.8)
ERYTHROCYTE [DISTWIDTH] IN BLOOD BY AUTOMATED COUNT: 18.3 % (ref 11.9–14.6)
GLOBULIN SER CALC-MCNC: 3.5 G/DL (ref 2.8–4.5)
GLUCOSE SERPL-MCNC: 95 MG/DL (ref 65–100)
HCT VFR BLD AUTO: 32.8 % (ref 35.8–46.3)
HGB BLD-MCNC: 10.5 G/DL (ref 11.7–15.4)
IMM GRANULOCYTES # BLD AUTO: 0 K/UL (ref 0–0.5)
IMM GRANULOCYTES NFR BLD AUTO: 0 % (ref 0–5)
LYMPHOCYTES # BLD: 1.3 K/UL (ref 0.5–4.6)
LYMPHOCYTES NFR BLD: 14 % (ref 13–44)
MAGNESIUM SERPL-MCNC: 1.9 MG/DL (ref 1.8–2.4)
MCH RBC QN AUTO: 27.1 PG (ref 26.1–32.9)
MCHC RBC AUTO-ENTMCNC: 32 G/DL (ref 31.4–35)
MCV RBC AUTO: 84.5 FL (ref 82–102)
MONOCYTES # BLD: 0.6 K/UL (ref 0.1–1.3)
MONOCYTES NFR BLD: 7 % (ref 4–12)
NEUTS SEG # BLD: 7.2 K/UL (ref 1.7–8.2)
NEUTS SEG NFR BLD: 78 % (ref 43–78)
NRBC # BLD: 0 K/UL (ref 0–0.2)
PLATELET # BLD AUTO: 374 K/UL (ref 150–450)
PMV BLD AUTO: 8.9 FL (ref 9.4–12.3)
POTASSIUM SERPL-SCNC: 3.7 MMOL/L (ref 3.5–5.1)
PROT SERPL-MCNC: 6.2 G/DL (ref 6.3–8.2)
RBC # BLD AUTO: 3.88 M/UL (ref 4.05–5.2)
SODIUM SERPL-SCNC: 140 MMOL/L (ref 133–143)
TRIGL SERPL-MCNC: 148 MG/DL (ref 35–150)
WBC # BLD AUTO: 9.1 K/UL (ref 4.3–11.1)

## 2023-01-17 PROCEDURE — 6360000002 HC RX W HCPCS: Performed by: FAMILY MEDICINE

## 2023-01-17 PROCEDURE — 84478 ASSAY OF TRIGLYCERIDES: CPT

## 2023-01-17 PROCEDURE — 36415 COLL VENOUS BLD VENIPUNCTURE: CPT

## 2023-01-17 PROCEDURE — 83735 ASSAY OF MAGNESIUM: CPT

## 2023-01-17 PROCEDURE — 80053 COMPREHEN METABOLIC PANEL: CPT

## 2023-01-17 PROCEDURE — 85025 COMPLETE CBC W/AUTO DIFF WBC: CPT

## 2023-01-17 PROCEDURE — A4216 STERILE WATER/SALINE, 10 ML: HCPCS | Performed by: FAMILY MEDICINE

## 2023-01-17 PROCEDURE — 2580000003 HC RX 258: Performed by: FAMILY MEDICINE

## 2023-01-17 PROCEDURE — C9113 INJ PANTOPRAZOLE SODIUM, VIA: HCPCS | Performed by: FAMILY MEDICINE

## 2023-01-17 PROCEDURE — 6370000000 HC RX 637 (ALT 250 FOR IP): Performed by: FAMILY MEDICINE

## 2023-01-17 PROCEDURE — 97530 THERAPEUTIC ACTIVITIES: CPT

## 2023-01-17 PROCEDURE — 82787 IGG 1 2 3 OR 4 EACH: CPT

## 2023-01-17 RX ORDER — PREDNISONE 10 MG/1
10 TABLET ORAL DAILY
Qty: 30 TABLET | Refills: 0 | Status: SHIPPED | OUTPATIENT
Start: 2023-01-18 | End: 2023-02-17

## 2023-01-17 RX ORDER — OXYCODONE HYDROCHLORIDE AND ACETAMINOPHEN 5; 325 MG/1; MG/1
1 TABLET ORAL EVERY 6 HOURS PRN
Qty: 12 TABLET | Refills: 0 | Status: SHIPPED | OUTPATIENT
Start: 2023-01-17 | End: 2023-01-20

## 2023-01-17 RX ORDER — 0.9 % SODIUM CHLORIDE 0.9 %
100 INTRAVENOUS SOLUTION INTRAVENOUS ONCE
Status: DISCONTINUED | OUTPATIENT
Start: 2023-01-17 | End: 2023-01-17 | Stop reason: HOSPADM

## 2023-01-17 RX ORDER — SODIUM CHLORIDE 0.9 % (FLUSH) 0.9 %
10 SYRINGE (ML) INJECTION
Status: DISCONTINUED | OUTPATIENT
Start: 2023-01-17 | End: 2023-01-17 | Stop reason: HOSPADM

## 2023-01-17 RX ORDER — PREDNISONE 10 MG/1
10 TABLET ORAL DAILY
Qty: 3 TABLET | Refills: 0 | Status: SHIPPED | OUTPATIENT
Start: 2023-01-18 | End: 2023-01-17 | Stop reason: SDUPTHER

## 2023-01-17 RX ADMIN — OXYCODONE HYDROCHLORIDE 5 MG: 5 TABLET ORAL at 12:31

## 2023-01-17 RX ADMIN — SODIUM CHLORIDE, PRESERVATIVE FREE 10 ML: 5 INJECTION INTRAVENOUS at 08:34

## 2023-01-17 RX ADMIN — OXYCODONE HYDROCHLORIDE 5 MG: 5 TABLET ORAL at 09:47

## 2023-01-17 RX ADMIN — ACETAMINOPHEN 650 MG: 325 TABLET ORAL at 05:50

## 2023-01-17 RX ADMIN — SODIUM CHLORIDE 40 MG: 9 INJECTION, SOLUTION INTRAMUSCULAR; INTRAVENOUS; SUBCUTANEOUS at 07:56

## 2023-01-17 RX ADMIN — PREDNISONE 10 MG: 10 TABLET ORAL at 07:56

## 2023-01-17 ASSESSMENT — PAIN DESCRIPTION - LOCATION: LOCATION: HEAD

## 2023-01-17 ASSESSMENT — PAIN SCALES - GENERAL
PAINLEVEL_OUTOF10: 0
PAINLEVEL_OUTOF10: 3

## 2023-01-17 ASSESSMENT — PAIN DESCRIPTION - DESCRIPTORS: DESCRIPTORS: ACHING

## 2023-01-17 NOTE — CARE COORDINATION
Pt is for discharge home today with no needs/supportive care orders received for CM at this time. Pt will have close follow up at the [address] [place] [address].    Milestones met    ASSESSMENT NOTE    Attending Physician: Christy Shah MD  Admit Problem: Acute recurrent pancreatitis [K85.90]  Vomiting in adult [R11.10]  Acute pancreatitis, unspecified complication status, unspecified pancreatitis type [K85.90]  Date/Time of Admission: 1/15/2023 10:19 PM  Problem List:  Patient Active Problem List   Diagnosis    PAD (peripheral artery disease) (HCC)    Hypercholesterolemia    Back pain    Malignant neoplasm of upper-outer quadrant of left breast in female, estrogen receptor positive ([address]    History of peptic ulcer    Multiple thyroid nodules    Intractable vomiting    History of acute pancreatitis    Compression fracture of L1 lumbar vertebra (HCC)    Hypomagnesemia    Personal history of tobacco use, presenting hazards to health    HTN (hypertension)    Age-related osteoporosis with current pathological fracture    Chronic obstructive pulmonary disease (HCC)    Eczema    Osteoarthritis    Paraseptal emphysema ([address]    Hypokalemia    Osteoporosis    S/P lumpectomy, left breast    Acute pancreatitis    EPIFANIO (acute kidney injury) ([address]    Other acute pancreatitis without infection or necrosis    Chronic pancreatitis ([address]    Acute recurrent pancreatitis    Rheumatoid arthritis ([address]    Unintentional weight loss    Protein-calorie malnutrition, moderate ([address]    Lung nodule       Service Assessment  Patient Orientation Alert and Oriented   Cognition Alert   History Provided By Patient, Medical Record   Primary Caregiver Self   Accompanied By/Relationship     [address] is: Legal Next of Kin   PCP Verified by CM Yes   Last Visit to PCP Within last 3 months   Prior Functional Level Independent in ADLs/IADLs   Current Functional Level Independent in ADLs/IADLs   Can patient return to prior living arrangement Yes   Ability to make needs known: Good   Family able to assist with home care needs: Yes   Would you like for me to discuss the discharge plan with any other family members/significant others, and if so, who? No   Financial Resources Medicare   Community Resources None   CM/SW Referral       Social/Functional History  Lives With Alone   Type of Home Apartment   Home Layout One level   Home Access Level entry   Entrance Stairs - Number of Steps     Entrance Stairs - Rails     Bathroom Shower/Tub     Bathroom Toilet Standard   Bathroom Equipment     Bathroom Accessibility     Home Equipment None   Receives Help From Family   ADL Assistance Independent   Bath     Dressing     Grooming     Feeding     Toileting     [place]   Meal Prep     Laundry     [address] Work     Driving     Shopping          Other (Comment)     Homemaking Responsibilities     Meal Prep Responsibility     Laundry Responsibility     Cleaning Responsibility     Bill Paying/Finance Responsibility     [address] Management     Other (Comment)     Ambulation Assistance Independent   Transfer Assistance Independent   Active  Yes   Patient's  Info     Mode of Transportation Car   Education     Occupation Retired   Type of Occupation       Discharge Planning   Type of [address] Prior To Admission (P) None   Potential Assistance Needed (P) N/A   DME     DME     DME Ordered? (P) No   Potential Assistance Purchasing Medications No   Meds-to-Beds: Does the patient want to have any new prescriptions delivered to bedside prior to discharge?      Type of Home Care Services None   Patient expects to be discharged to: Apartment   Follow Up Appointment: Best Day/Time     One/Two Story Residence: One story   # of Interior Steps     Height of Each Step (in)     [employer] Available     History of Falls? No     Services At/After Discharge  Transition of Care Consult (CM Consult): (P) N/A   Internal Home Health     Internal Hospice     Reason Outside Agency [address]     Partner SNF     Reason Why Partner SNF Not Chosen     Internal Comfort Care     Reason Outside [address] Discharge (P) None   [employer] Information Provided? No   Mode of Transport at Discharge [address] Time of Discharge     Confirm Follow Up Transport Family     Condition of Participation: Discharge Planning  The plan for Transition of Care is related to the following treatment goals: Pt to obtain care to become medically stable and to return with a safe transition. The Patient and/or Patient Representative was provided with a Choice of Provider? Patient   Name of the Patient Representative who was provided with the Choice of Provider and agrees with the Discharge Plan? The Patient and/or Patient Representative Agree with the Discharge Plan? (P) Yes   Freedom of Choice list was provided with basic dialogue that supports the individualized plan of care/goals, treatment preferences, and shares the quality data associated with the providers?  (P) Yes             Maciel Aden RN 01/17/23 3:10 PM

## 2023-01-17 NOTE — PROGRESS NOTES
ACUTE PHYSICAL THERAPY GOALS:   (Developed with and agreed upon by patient and/or caregiver.)  (1.) Chari Smith will transfer from bed to chair and chair to bed with INDEPENDENT using the least restrictive device within 7 treatment day(s). (2.) Chari Smith will ambulate with SUPERVISION for 250 feet with the least restrictive device within 7 treatment day(s). (3.) Chari Smith will perform standing static and dynamic balance activities x 10 minutes with SUPERVISION to improve safety within 7 treatment day(s). (4.) Chari Smith will perform bilateral lower extremity exercises x 15 min for HEP with MODIFIED INDEPENDENCE to improve strength, endurance, and functional mobility within 7 treatment day(s). PHYSICAL THERAPY: Daily Note AM   (Link to Caseload Tracking: PT Visit Days : 2  Time In/Out PT Charge Capture  Rehab Caseload Tracker  Orders    Chari Smith is a [de-identified] y.o. female   PRIMARY DIAGNOSIS: Acute recurrent pancreatitis  Acute recurrent pancreatitis [K85.90]  Vomiting in adult [R11.10]  Acute pancreatitis, unspecified complication status, unspecified pancreatitis type [K85.90]       Inpatient: Payor: Emi Petersen / Plan: Idania Givens PPO / Product Type: Medicare /     ASSESSMENT:     REHAB RECOMMENDATIONS:   Recommendation to date pending progress:  Setting:  Home Health Therapy    Equipment:    To Be Determined     ASSESSMENT:  Ms. Brandon Jacques was supine upon contact and agreeable to PT. Patient performed supine to sit with mod I and transfer to standing with supervision. Once standing patient was slightly unsteady and opted to ambulate 375' with HHA. Patient returned to room and to supine with mod I. Overall steady progress. Will continue PT efforts.      SUBJECTIVE:   Ms. Brandon Jacques states, \"I have more modesty than that\"     Social/Functional Lives With: Alone  Type of Home: Apartment  Home Layout: One level  Home Access: Level entry  Bathroom Toilet: Standard  Home Equipment: None  Receives Help From: Family  ADL Assistance: Independent  Homemaking Assistance: Independent  Ambulation Assistance: Independent  Transfer Assistance: Independent  Active : Yes  Mode of Transportation: Car  Occupation: Retired  OBJECTIVE:     PAIN: VITALS / O2: PRECAUTION / [employer] / DRAINS:   Pre Treatment:   Pain Assessment: None - Denies Pain      Post Treatment: 0 Vitals        Oxygen    None    RESTRICTIONS/PRECAUTIONS:  Restrictions/Precautions  Restrictions/Precautions: Fall Risk  Restrictions/Precautions: Fall Risk     MOBILITY: I Mod I S SBA CGA Min Mod Max Total  NT x2 Comments:   Bed Mobility    Rolling [] [] [] [] [] [] [] [] [] [] []    Supine to Sit [] [x] [] [] [] [] [] [] [] [] []    Scooting [] [] [] [] [] [] [] [] [] [] []    Sit to Supine [] [] [] [] [] [] [] [] [] [] []    Transfers    Sit to Stand [] [] [x] [] [] [] [] [] [] [] []    Bed to Chair [] [] [] [] [] [] [] [] [] [] []    Stand to Sit [] [] [x] [] [] [] [] [] [] [] []     [] [] [] [] [] [] [] [] [] [] []    I=Independent, Mod I=Modified Independent, S=Supervision, SBA=Standby Assistance, CGA=Contact Guard Assistance,   Min=Minimal Assistance, Mod=Moderate Assistance, Max=Maximal Assistance, Total=Total Assistance, NT=Not Tested    BALANCE: Good Fair+ Fair Fair- Poor NT Comments   Sitting Static [x] [] [] [] [] []    Sitting Dynamic [x] [] [] [] [] []              Standing Static [x] [] [] [] [] []    Standing Dynamic [] [x] [] [] [] []      GAIT: I Mod I S SBA CGA Min Mod Max Total  NT x2 Comments:   Level of Assistance [] [] [] [] [x] [] [] [] [] [] []    Distance   375 feet    DME HHA    Gait Quality Decreased lamar , Decreased step clearance, Decreased step length, and Path deviations     Weightbearing Status      Stairs      I=Independent, Mod I=Modified Independent, S=Supervision, SBA=Standby Assistance, CGA=Contact Guard Assistance,   Min=Minimal Assistance, Mod=Moderate Assistance, Max=Maximal Assistance, Total=Total Assistance, NT=Not Tested    PLAN:   FREQUENCY AND DURATION: 3 times/week for duration of hospital stay or until stated goals are met, whichever comes first.    TREATMENT:   TREATMENT:   Therapeutic Activity (10 Minutes): Therapeutic activity included Supine to Sit, Sit to Supine, Scooting, Transfer Training, Ambulation on level ground, Sitting balance , and Standing balance to improve functional Activity tolerance, Balance, Mobility, and Strength. TREATMENT GRID:  N/A    AFTER TREATMENT PRECAUTIONS: Alarm Activated, Bed, Bed/Chair Locked, Call light within reach, Needs within reach, RN notified, and Side rails x3    INTERDISCIPLINARY COLLABORATION:  RN/ PCT and PT/ PTA    EDUCATION:      TIME IN/OUT:  Time In: 1113  Time Out: [address]  Minutes: Say 44.  BETTE Harris

## 2023-01-17 NOTE — CARE COORDINATION
Opened chart for [facility] follow up outreach. Patient noted to currently be inpatient. No outreach indicated at this time  patient will be reassigned pending discharge disposition.

## 2023-01-17 NOTE — DISCHARGE SUMMARY
Hospitalist Discharge Summary   Admit Date:  1/15/2023 10:19 PM   DC Note date: 2023  Name:  Nicolas Telles   Age:  [de-identified] y.o. Sex:  female  :  1940   MRN:  [id number]   Room:  [facility]  PCP:  Darline Guillermo MD    Presenting Complaint: Abdominal Pain     Initial Admission Diagnosis: Acute recurrent pancreatitis [K85.90]  Vomiting in adult [R11.10]  Acute pancreatitis, unspecified complication status, unspecified pancreatitis type [K85.90]     Problem List for this Hospitalization (present on admission):    Principal Problem:    Acute recurrent pancreatitis  Active Problems:    Chronic pancreatitis ([address]    Rheumatoid arthritis ([address]    Protein-calorie malnutrition, moderate ([address]    PAD (peripheral artery disease) ([address]    Malignant neoplasm of upper-outer quadrant of left breast in female, estrogen receptor positive ([address]    Personal history of tobacco use, presenting hazards to health    Chronic obstructive pulmonary disease ([address]    Paraseptal emphysema ([address]  Resolved Problems:    * No resolved hospital problems. [facility] Course: Nicolas Telles is a [de-identified] y.o. female with medical history of duodenal ulcer complicated by perforation requiring bilroth II,  idiopathic pancreatitis, rheumatoid arthritis on enbrel/ current course of steroids, HTN, ongoing tobacco use  who presented with increasing bouts of RUQ pain similar to prior episode of pancreatitis which resulted in hospitalization -22. Underwent EUS at that time without mass, acute nor chronic pancreatitis changes. the findings reported minimal pancreatic duct dilation and partial gastrectomy. She was also discharged in 2022 for recurrent pancreatitis and was seen by GI at that time. MRCP with mild dilatation of the pancreatic and common bile duct with abrupt tapering at the pancreatic head suspicious for pancreatic head mass. Had other admissions for the same. Previous EGD unremarkable.  GI consulted with keke for supportive care. Diet eventually advanced to GI soft and patient tolerated well. Patient alert and oriented x 4 d-endorsing some mild abdominal pain but denying N/V. Patient discharged home with orders for GI/PCP follow-up     CT chest was done which shows a 13 mm x 11 mm left lower lobe nodule. Malignant neoplasm of upper-outer quadrant of left breast in female, estrogen receptor positive ([address]  Lung Nodule  Plan:   -Follows Dr. Pepe Limon   - [id number] LLL Nodule noted on CT. Biopsy 1/5/23. reported malignant neoplasm with sqamous features. PET OP previously scheduled    Disposition: Home  Diet: ADULT DIET; Regular; GI Jackson (GERD/Peptic Ulcer); please add ensure chocolate and vanilla prn  Code Status: Full Code    Follow Ups:   Follow-up Information     Cat Chamberlain MD Follow up in 1 week(s). Specialty: Family Medicine  Why: Will need CT Pancreatic protocol prior to GI follow-up  Contact information:  [employer]  [phone]             Hermes Ma MD Follow up in 4 week(s). Specialty: Gastroenterology  Contact information:  [address]  [phone]                       Time spent in patient discharge and coordination 41 minutes. Follow up labs/diagnostics (ultimately defer to outpatient provider):      Plan was discussed with patient. All questions answered. Patient was stable at time of discharge. Instructions given to call a physician or return if any concerns. Current Discharge Medication List        START taking these medications    Details   predniSONE (DELTASONE) 10 MG tablet Take 1 tablet by mouth daily for 3 days  Qty: 3 tablet, Refills: 0      oxyCODONE-acetaminophen (PERCOCET) 5-325 MG per tablet Take 1 tablet by mouth every 6 hours as needed for Pain for up to 3 days. Intended supply: 3 days.  Take lowest dose possible to manage pain Max Daily Amount: 4 tablets  Qty: 12 tablet, Refills: 0    Comments: Reduce doses taken as pain becomes manageable  Associated Diagnoses: Acute recurrent pancreatitis           CONTINUE these medications which have CHANGED    Details   hyoscyamine (LEVSIN/SL) 125 MCG sublingual tablet Place 1 tablet under the tongue every 6 hours as needed for Cramping  Qty: 40 tablet, Refills: 0           CONTINUE these medications which have NOT CHANGED    Details   pravastatin (PRAVACHOL) 40 MG tablet Take 1 tablet by mouth daily  Qty: 30 tablet, Refills: 5           STOP taking these medications       ondansetron (ZOFRAN-ODT) 4 MG disintegrating tablet Comments:   Reason for Stopping:         pantoprazole (PROTONIX) 40 MG tablet Comments:   Reason for Stopping:         Etanercept (ENBREL SURECLICK) 50 MG/ML SOAJ Comments:   Reason for Stopping:         donepezil (ARICEPT) 5 MG tablet Comments:   Reason for Stopping:         losartan (COZAAR) 100 MG tablet Comments:   Reason for Stopping:         amLODIPine (NORVASC) 10 MG tablet Comments:   Reason for Stopping:         hydrALAZINE (APRESOLINE) 25 MG tablet Comments:   Reason for Stopping:         alendronate (FOSAMAX) 70 MG tablet Comments:   Reason for Stopping:               Procedures done this admission:  * No surgery found *    Consults this admission:  IP CONSULT TO GI    Echocardiogram results:  No results found for this or any previous visit. Diagnostic Imaging/Tests:   CT CHEST WO CONTRAST    Result Date: 12/27/2022  There is a new 13 x 11 mm nodule in the superior segment left lower lobe, suspicious for malignancy. PET/CT or tissue sampling recommended. MRI ABDOMEN W WO CONTRAST    Result Date: 12/23/2022  1. The study is moderately limited due to motion artifact. 2.  Mild dilation of the pancreatic and common bile ducts with abrupt tapering at the pancreatic head. Findings are suspicious for a pancreatic head mass, although no discrete mass is visualized.      CT ABDOMEN PELVIS W IV CONTRAST Additional Contrast? Radiologist Recommendation    Result Date: 1/16/2023  The pancreas again shows dilatation of the tail throughout its visualized course. There is stranding of the fat in the upper abdomen, anterior to the pancreas. The appearance was similar previously; although, it has increased to some degree. These changes could be secondary to pancreatitis and/or postoperative change. No evidence of bowel obstruction. Hyperdense contrast is seen in the proximal and distal small bowel loops. Postoperative changes are noted in the distal stomach as described above. No other significant findings. XR CHEST PORTABLE    Result Date: 12/29/2022  No evidence of an acute intrathoracic process. The previously described right lower nodular opacity is again seen. It is partially obscured by the inferior right scapula. XR CHEST 1 VIEW    Result Date: 1/15/2023  1. No acute airspace disease. 2.  Redemonstrated nodular opacity projecting over the right lower lobe, better evaluated on prior CT. XR CHEST 1 VIEW    Result Date: 1/5/2023  1. No pneumothorax noted after right lung biopsy. 2. Nodular density in the peripheral right lower lung. CT NEEDLE BIOPSY LUNG PERCUTANEOUS    Result Date: 1/5/2023  CT-guided Core biopsy of right lower lobe pulmonary nodule. Plan:  Specimen(s) sent to the Pathology department for evaluation. 4 hours bedrest.  Chest x-ray in 3 hours. _______________________________________________________________ Technique: All CT scans at this facility are performed using dose reduction/dose modulation techniques, as appropriate to the performed exam, including the following:  Automated Exposure Control; Adjustment of the MA and/or kF according to patient size (this includes techniques or standardized protocols for targeted exams where dose is matched to indication/reason for exam); and Use of Iterative Reconstruction Technique.  PROCEDURE SUMMARY: - Percutaneous CT-guided Coaxial Core Needle Biopsy - Additional procedure(s): BioSentry needle tract occlusion PROCEDURE DETAILS: Pre-procedure Reference imaging for biopsy target: CT 12/22/2022, 9/6/2018 Consent:  Informed written and oral consent for the procedure was obtained after explanation of risks (including, but not limitted to:  hemorrhage, infection, visceral injury, nondiagnostic biopsy) benefits and alternatives. The patient's questions were answered to their satisfaction. They stated understanding and requested that we proceed. Final verification:  A time-out identifying the patient and planned procedure was performed prior to this procedure. Preparation: (MIPS) Maximal sterile barrier technique (including:  cap, mask, sterile gown, sterile gloves, sterile sheet, hand hygiene, and cutaneous antisepsis) was used. Anesthesia/sedation Level of anesthesia/sedation: Moderate sedation (conscious sedation) Anesthesia/sedation administered by: Independent trained observer under attending supervision with continuous monitoring of the patient?s level of consciousness and physiologic status Total intra-service sedation time (minutes): 34 Imaging prior to biopsy The patient was positioned prone. Initial imaging was performed using noncontrast CT. Biopsy target: - Maximal diameter (cm): 0.6 - Location: Superior segment, right lower lobe, near the major fissure Other findings: Emphysema Biopsy Local anesthesia was administered. Under CT guidance, the coaxial biopsy system was advanced to the target and Core biopsy was performed. Coaxial needle: 19 gauge Core needle biopsy device: InvenSense Core needle size: 20 gauge Number of core specimens: 5 On-site biopsy touch preparation: No  Additional sampling recommendations: Not applicable Preliminary assessment of sample adequacy: Not applicable Needle removal The biopsy needle was removed and a sterile dressing was applied.  Tract embolization: BioSentry Tract Sealant System--self-expanding hydrogel plug Imaging following biopsy Immediate post-biopsy imaging was performed. Imaging modality: noncontrast CT. Post-biopsy imaging findings: No pneumothorax. Usual amount of intraparenchymal hemorrhage. Contrast Contrast agent: None Contrast volume (mL): 0 Radiation Dose CT dose length product (mGy-cm):  1680.02 Additional Details Additional description of procedure: None Equipment details: None Specimens removed: Pathology Estimated blood loss (mL): Less than 10 Standardized report: [email or web address] Attestation Signer name: Geovanna Epps M.D. I attest that I personally performed the entire procedure. I reviewed the stored images and agree with the report as written.         Labs: Results:       BMP, Mg, Phos Recent Labs     01/15/23  2238 01/17/23  0524    140   K 4.3 3.7    108   CO2 24 26   ANIONGAP 6 6   BUN 17 6*   CREATININE 1.00 0.60   LABGLOM 57* >60   CALCIUM 9.1 8.6   GLUCOSE 108* 95   MG 1.9 1.9   PHOS 4.8*  --       CBC Recent Labs     01/15/23  2238 01/17/23  0524   WBC 12.7* 9.1   RBC 4.35 3.88*   HGB 11.8 10.5*   HCT 37.5 32.8*   MCV 86.2 84.5   MCH 27.1 27.1   MCHC 31.5 32.0   RDW 18.5* 18.3*   * 374   MPV 9.0* 8.9*   NRBC 0.00 0.00   SEGS 78 78   LYMPHOPCT 13 14   EOSRELPCT 1 1   MONOPCT 6 7   BASOPCT 1 0   IMMGRAN 1 0   SEGSABS 10.0* 7.2   LYMPHSABS 1.6 1.3   EOSABS 0.1 0.1   MONOSABS 0.8 0.6   BASOSABS 0.1 0.0   ABSIMMGRAN 0.1 0.0      LFT Recent Labs     01/15/23  2238 01/17/23  0524   BILITOT 0.3 0.5   ALKPHOS 63 60   AST 15 11*   ALT 14 11*   PROT 7.0 6.2*   LABALBU 3.1* 2.7*   GLOB 3.9 3.5      Cardiac  No results found for: NTPROBNP, TROPHS   Coags Lab Results   Component Value Date/Time    PROTIME 13.2 01/16/2023 09:21 AM    INR 1.0 01/16/2023 09:21 AM      A1c No results found for: LABA1C, EAG   Lipids Lab Results   Component Value Date/Time    CHOL 102 10/12/2022 11:58 AM    LDLCALC 42.4 10/12/2022 11:58 AM    LABVLDL 13.6 10/12/2022 11:58 AM    HDL 46 10/12/2022 11:58 AM    CHOLHDLRATIO 2.2 10/12/2022 11:58 AM    TRIG 148 01/17/2023 09:24 AM      Thyroid  Lab Results   Component Value Date/Time    TSHELE 0.35 12/22/2022 03:50 PM        Most Recent UA Lab Results   Component Value Date/Time    GLUCOSEU Negative 11/05/2022 08:38 PM    BLOODU Negative 11/05/2022 08:38 PM        Recent Labs     12/29/22  1145 12/27/22  0911   CULTURE No Salmonella, Shigella, or Ecoli 0157 isolated. No Salmonella, Shigella, or Ecoli 0157 isolated.        All Labs from Last 24 Hrs:  Recent Results (from the past 24 hour(s))   CBC with Auto Differential    Collection Time: 01/17/23  5:24 AM   Result Value Ref Range    WBC 9.1 4.3 - 11.1 K/uL    RBC 3.88 (L) 4.05 - 5.2 M/uL    Hemoglobin 10.5 (L) 11.7 - 15.4 g/dL    Hematocrit 32.8 (L) 35.8 - 46.3 %    MCV 84.5 82 - 102 FL    MCH 27.1 26.1 - 32.9 PG    MCHC 32.0 31.4 - 35.0 g/dL    RDW 18.3 (H) 11.9 - 14.6 %    Platelets 512 168 - 105 K/uL    MPV 8.9 (L) 9.4 - 12.3 FL    nRBC 0.00 0.0 - 0.2 K/uL    Differential Type AUTOMATED      Seg Neutrophils 78 43 - 78 %    Lymphocytes 14 13 - 44 %    Monocytes 7 4.0 - 12.0 %    Eosinophils % 1 0.5 - 7.8 %    Basophils 0 0.0 - 2.0 %    Immature Granulocytes 0 0.0 - 5.0 %    Segs Absolute 7.2 1.7 - 8.2 K/UL    Absolute Lymph # 1.3 0.5 - 4.6 K/UL    Absolute Mono # 0.6 0.1 - 1.3 K/UL    Absolute Eos # 0.1 0.0 - 0.8 K/UL    Basophils Absolute 0.0 0.0 - 0.2 K/UL    Absolute Immature Granulocyte 0.0 0.0 - 0.5 K/UL   Magnesium    Collection Time: 01/17/23  5:24 AM   Result Value Ref Range    Magnesium 1.9 1.8 - 2.4 mg/dL   Comprehensive Metabolic Panel    Collection Time: 01/17/23  5:24 AM   Result Value Ref Range    Sodium 140 133 - 143 mmol/L    Potassium 3.7 3.5 - 5.1 mmol/L    Chloride 108 101 - 110 mmol/L    CO2 26 21 - 32 mmol/L    Anion Gap 6 2 - 11 mmol/L    Glucose 95 65 - 100 mg/dL    BUN 6 (L) 8 - 23 MG/DL    Creatinine 0.60 0.6 - 1.0 MG/DL    Est, Glom Filt Rate >60 >60 ml/min/1.73m2    Calcium 8.6 8.3 - 10.4 MG/DL    Total Bilirubin 0.5 0.2 - 1.1 MG/DL    ALT 11 (L) 12 - 65 U/L    AST 11 (L) 15 - 37 U/L    Alk Phosphatase 60 50 - 136 U/L    Total Protein 6.2 (L) 6.3 - 8.2 g/dL    Albumin 2.7 (L) 3.2 - 4.6 g/dL    Globulin 3.5 2.8 - 4.5 g/dL    Albumin/Globulin Ratio 0.8 0.4 - 1.6     Triglyceride    Collection Time: 01/17/23  9:24 AM   Result Value Ref Range    Triglycerides 148 35 - 150 MG/DL       Allergies   Allergen Reactions    Azithromycin Other (See Comments)     pancreatitis    Codeine Nausea And Vomiting     Immunization History   Administered Date(s) Administered    COVID-19, MODERNA BLUE border, Primary or Immunocompromised, (age 12y+), IM, 100 mcg/0.5mL 02/05/2021, 03/06/2021, 10/26/2021    Influenza, FLUAD, (age 65 y+), Adjuvanted, 0.5mL 09/20/2022       Recent Vital Data:  Patient Vitals for the past 24 hrs:   Temp Pulse Resp BP SpO2   01/17/23 1150 98.2 °F (36.8 °C) 68 18 (!) 156/54 96 %   01/17/23 0811 97.7 °F (36.5 °C) 79 20 (!) 142/57 96 %   01/17/23 0249 98 °F (36.7 °C) 70 17 (!) 172/59 97 %   01/16/23 2256 98 °F (36.7 °C) 65 16 (!) 146/61 (!) 7 %   01/16/23 2139 -- -- 15 -- --   01/16/23 2109 -- -- 16 -- --   01/16/23 1937 98 °F (36.7 °C) 72 16 (!) 175/60 97 %   01/16/23 1540 98.1 °F (36.7 °C) 75 16 (!) 161/61 97 %       Oxygen Therapy  SpO2: 96 %  Pulse via Oximetry: 66 beats per minute  O2 Device: None (Room air)    Estimated body mass index is 17.44 kg/m² as calculated from the following:    Height as of this encounter: 5' 1\" (1.549 m).    Weight as of this encounter: 92 lb 5 oz (41.9 kg).    Intake/Output Summary (Last 24 hours) at 1/17/2023 1500  Last data filed at 1/17/2023 1228  Gross per 24 hour   Intake 4988 ml   Output --   Net 4988 ml         Physical Exam:    General:    Well nourished.  No overt distress  Head:  Normocephalic, atraumatic  Eyes:  Sclerae appear normal.  Pupils equally round.    HENT:  Nares appear normal, no drainage.  Moist mucous membranes  Neck:  No restricted ROM.  Trachea midline  CV:   RRR.  No  m/r/g.  No JVD  Lungs:   CTAB. No wheezing, rhonchi, or rales. Respirations even, unlabored  Abdomen:   Soft, nontender, nondistended. Extremities: Warm and dry. No cyanosis or clubbing. No edema. Skin:     No rashes. Normal coloration  Neuro:  CN II-XII grossly intact. Psych:  Normal mood and affect. Signed:  Leyla Hahn, APRN - CNP    Part of this note may have been written by using a voice dictation software. The note has been proof read but may still contain some grammatical/other typographical errors.

## 2023-01-17 NOTE — PROGRESS NOTES
[facility] Progress Note         Admit Date:  1/15/2023    Today's Date:  1/17/2023    CC:  Recurrent Pancreatitis    Subjective:     Patient reports some soreness in right upper abd. Denies nausea, tolerating full liquids.       Medications:   Current Facility-Administered Medications   Medication Dose Route Frequency    iopamidol (ISOVUE-370) 76 % injection 100 mL  100 mL IntraVENous ONCE PRN    sodium chloride flush 0.9 % injection 10 mL  10 mL IntraVENous ONCE PRN    0.9 % sodium chloride bolus  100 mL IntraVENous Once    lactated ringers infusion   IntraVENous Continuous    sodium chloride flush 0.9 % injection 5-40 mL  5-40 mL IntraVENous 2 times per day    sodium chloride flush 0.9 % injection 5-40 mL  5-40 mL IntraVENous PRN    0.9 % sodium chloride infusion   IntraVENous PRN    ondansetron (ZOFRAN-ODT) disintegrating tablet 4 mg  4 mg Oral Q8H PRN    Or    ondansetron (ZOFRAN) injection 4 mg  4 mg IntraVENous Q6H PRN    heparin (porcine) injection 5,000 Units  5,000 Units SubCUTAneous BID    HYDROmorphone HCl PF (DILAUDID) injection 0.5 mg  0.5 mg IntraVENous Q3H PRN    Or    HYDROmorphone HCl PF (DILAUDID) injection 1 mg  1 mg IntraVENous Q3H PRN    oxyCODONE (ROXICODONE) immediate release tablet 5 mg  5 mg Oral Q3H PRN    naloxone (NARCAN) injection 0.4 mg  0.4 mg IntraVENous PRN    potassium chloride (KLOR-CON M) extended release tablet 40 mEq  40 mEq Oral PRN    Or    potassium bicarb-citric acid (EFFER-K) effervescent tablet 40 mEq  40 mEq Oral PRN    Or    potassium chloride 10 mEq/100 mL IVPB (Peripheral Line)  10 mEq IntraVENous PRN    magnesium sulfate 2000 mg in 50 mL IVPB premix  2,000 mg IntraVENous PRN    acetaminophen (TYLENOL) tablet 650 mg  650 mg Oral Q6H PRN    pantoprazole (PROTONIX) 40 mg in sodium chloride (PF) 0.9 % 10 mL injection  40 mg IntraVENous Daily    nicotine (NICODERM CQ) 21 MG/24HR 1 patch  1 patch TransDERmal Daily    predniSONE (DELTASONE) tablet 10 mg  10 mg Oral Daily    diatrizoate meglumine-sodium (GASTROGRAFIN) 66-10 % solution 15 mL  15 mL Oral ONCE PRN       Review of Systems:  ROS was obtained, with pertinent positives as listed above. No chest pain or SOB. Diet:  Full Liquid    Objective:   Vitals:  BP (!) 142/57   Pulse 79   Temp 97.7 °F (36.5 °C)   Resp 20   Ht 5' 1\" (1.549 m)   Wt 92 lb 5 oz (41.9 kg)   LMP  (LMP Unknown)   SpO2 96%   BMI 17.44 kg/m²   Intake/Output:  No intake/output data recorded. 01/15 1901 - 01/17 0700  In: 2083 [P.O.:1050; I.V.:1033]  Out: -   Exam:  General appearance: alert, cooperative, no distress  Lungs: clear to auscultation bilaterally anteriorly  Heart: regular rate and rhythm  Abdomen: soft TTP in RUQ. Bowel sounds normal. No masses, no organomegaly  Extremities: extremities normal, atraumatic, no cyanosis or edema  Neuro:  alert and oriented    Data Review (Labs):    Recent Labs     01/15/23  2238 01/16/23  0921 01/17/23  0524   WBC 12.7*  --  9.1   HGB 11.8  --  10.5*   HCT 37.5  --  32.8*   *  --  374   MCV 86.2  --  84.5     --  140   K 4.3  --  3.7     --  108   CO2 24  --  26   BUN 17  --  6*   CREATININE 1.00  --  0.60   CALCIUM 9.1  --  8.6   MG 1.9  --  1.9   PHOS 4.8*  --   --    GLUCOSE 108*  --  95   ALKPHOS 63  --  60   AST 15  --  11*   ALT 14  --  11*   BILITOT 0.3  --  0.5   LABALBU 3.1*  --  2.7*   PROT 7.0  --  6.2*   LIPASE 9,114*  --   --    INR  --  1.0  --         MRCP 12/23/2022 showing pancreatic duct dilation as prior with abrupt tapering at [facility] as prior without clear mass lesion noted. CA 19.9 normal 6.7 on 12/25/22. Lipase increased again on 12/26 with worsening pain after eating. CT A/P 1/16/22  The pancreas again shows dilatation of the tail throughout its visualized   course. There is stranding of the fat in the upper abdomen, anterior to the pancreas. The appearance was similar previously; although, it has increased to some   degree.  These changes could be secondary to pancreatitis and/or postoperative   change. No evidence of bowel obstruction. Hyperdense contrast is seen in the proximal   and distal small bowel loops. Postoperative changes are noted in the distal stomach as described above. No other significant findings. EUS 12/28/22 Dr Neelam Vanegas  FINDINGS:  ESOPHAGUS: Not well visualized  STOMACH: Limited views with the oblique-viewing echoendoscope were unremarkable. Small gastric pouch. No Gastric ulcers. Small Bowel: Patent small bowel anastomosis. Limited views with the oblique-viewing echoendoscope were unremarkable. The ampulla was not visualized due to post-op anatomy. PANCREAS: The pancreas was well visualized in the body and tail. Views of the head are very limited (normally seen from D1/ D2). In the body and tail, the main pancreatic duct is mildly dilated (4.5mm body, 2mm tail). It has a smooth regular course. Otherwise, there are no changes of acute or chronic pancreatitis. No lobularity, dilated side branches or hyperechoic strands, etc.  No cyst or mass present. Limited views of the head are obtained from the gastric lumen, and no large mass is identified. A small mass at the ampulla would be difficult to exclude. BILIARY SYSTEM: The gallbladder was absent . Limited views of the CBD obtained from the stomach. It measures 9mm in diameter, within normal limits. No stones are seen. OTHER ORGANS: There was no ascites in the upper abdomen. Limited views of the left lobe of the liver demonstrated no solid mass lesions. The celiac axis appears highly calcified, c/w atherosclerosis. No significant celiac or daly-pancreatic lymphadenopathy.   PLAN:  FU labs  Advance diet as tolerated  If acute pancreatitis re-occurs, consider ERCP / pancreatic sphincterotomy with surgical access or colonoscope      Assessment:     Principal Problem:    Acute recurrent pancreatitis  Active Problems:    Chronic pancreatitis (HCC)    Rheumatoid arthritis (HCC)    Protein-calorie malnutrition, moderate (HCC)    PAD (peripheral artery disease) (HCC)    Malignant neoplasm of upper-outer quadrant of left breast in female, estrogen receptor positive ([address]    Personal history of tobacco use, presenting hazards to health    Chronic obstructive pulmonary disease ([address]    Paraseptal emphysema ([address]  Resolved Problems:    * No resolved hospital problems. *    [de-identified] y.o. female with PMH including but not limited to duodenal ulcer complicated by perforation requiring bilroth II,  idiopathic pancreatitis, rheumatoid arthritis on enbrel/ current course of steroids, HTN, ongoing tobacco use, recent stage 1 lung cancer dx admitted with recurrent pancreatitis. EUS 12/28/22 as below with no chronic pancreatitic changes other than a mildly dilated PD in the body. 1/17/23:  WBC normal, hgb 10.5, LFT's wnl    Plan:     - Supportive care of pancreatitis - IVF, antiemetics, pain meds  - Continue clears  - Lung cancer - CT head, PET pending, surgical consult out patient  - Repeat pancreatic imaging in 4-6 weeks - due to motion artifact from last MRCP, would get pancreatic protocol CT  - If there is a pancreatic lesion in the head, this would be indication for surgery  - Stop smoking  - Will ensure she has had an evaluation for autoimmune and hereditary causes of pancreatitis - IGG4 and triglycerides pending     ROBERTO Helton - CNP  Patient is seen and examined in collaboration with Dr. Nicole Barfield. Assessment and plan as per Dr. Guillermina Lazcano.

## 2023-01-18 ENCOUNTER — CARE COORDINATION (OUTPATIENT)
Dept: CARE COORDINATION | Facility: CLINIC | Age: 83
End: 2023-01-18

## 2023-01-18 LAB — IGG4 SER-MCNC: 49 MG/DL (ref 2–96)

## 2023-01-18 NOTE — CARE COORDINATION
[facility] Care Transitions Initial Follow Up Call    Call within 2 business days of discharge: Yes    LPN Care Coordinator contacted the patient by telephone to perform post hospital discharge assessment. Verified name and  with patient as identifiers. Provided introduction to self, and explanation of the LPN Care Coordinator role. Patient: Edinson Bond Patient : 1940   MRN: [id number]  Reason for Admission: pancreatitis  Discharge Date: 23 RARS: Readmission Risk Score: 22.6      Last Discharge  Street       Date Complaint Diagnosis Description Type Department Provider    1/15/23 Abdominal Pain Acute recurrent pancreatitis . .. ED to Hosp-Admission (Discharged) (ADMITTED) [id number] Cesar Mauricio MD; Belen Nguyen,... Was this an external facility discharge? No Discharge Facility: [facility]    Challenges to be reviewed by the provider   Additional needs identified to be addressed with provider: No  none               Method of communication with provider: none. LPN Care Coordinator reviewed discharge instructions, medical action plan, and red flags with patient who verbalized understanding. The patient was given an opportunity to ask questions and does not have any further questions or concerns at this time. Were discharge instructions available to patient? Yes. Reviewed appropriate site of care based on symptoms and resources available to patient including: PCP  Specialist  Urgent care clinics  When to call 911. The patient agrees to contact the PCP office for questions related to their healthcare. Advance Care Planning:   Does patient have an Advance Directive: patient declined education. Medication reconciliation was performed with patient, who verbalizes understanding of administration of home medications.  Medications reviewed, 1111F entered: N/A    Was patient discharged with a pulse oximeter? no    Non-face-to-face services provided:  Obtained and reviewed discharge summary and/or continuity of care documents  Education of patient/family/caregiver/guardian to support self-management-Dorene Perez LPN -[phone]  All scheduled appointments. Offered patient enrollment in the Remote Patient Monitoring (RPM) program for in-home monitoring: NA.    Care Transitions 24 Hour Call    Do you have a copy of your discharge instructions?: Yes  Do you have all of your prescriptions and are they filled?: Yes  Have you been contacted by a [facility] [address]?: No  Have you scheduled your follow up appointment?: Yes  How are you going to get to your appointment?: Car - family or friend to transport  Do you have support at home?: Alone  Do you feel like you have everything you need to keep you well at home?: Yes  Are you an active caregiver in your home?: No  Care Transitions Interventions         Follow Up  Future Appointments   Date Time Provider Bo Jj   1/20/2023 10:00 AM [facility] NM PET/CT INJ RM [address] [facility]   1/20/2023  5:45 PM SFD MRI UNIT 1 SFDRMRI SFD   1/31/2023  2:45 PM Shakila Snyder MD [facility] AMB   3/3/2023 10:40 AM Arben Reid  [address], [address] [place] [address] GVL AMB   3/9/2023 10:30 AM Hany [address] [place] [address]   3/9/2023 11:15 AM Yovany Cardoso MD [facility] AMB   4/5/2023  8:45 AM PFP LAB PFP G[facility] AMB   4/12/2023 10:00 AM Ray Reyes MD [facility] GVL AMB       N Care Coordinator provided contact information. Plan for follow-up call in 5-7 days based on severity of symptoms and risk factors. Plan for next call: self management-diet, hydration, exercise, follow up appointments.      Hector Parry LPN

## 2023-01-20 ENCOUNTER — HOSPITAL ENCOUNTER (OUTPATIENT)
Dept: PET IMAGING | Age: 83
Discharge: HOME | End: 2023-01-20
Payer: MEDICARE

## 2023-01-20 ENCOUNTER — HOSPITAL ENCOUNTER (OUTPATIENT)
Dept: MRI IMAGING | Age: 83
End: 2023-01-20
Payer: MEDICARE

## 2023-01-20 DIAGNOSIS — C34.31 MALIGNANT NEOPLASM OF LOWER LOBE OF RIGHT LUNG (HCC): ICD-10-CM

## 2023-01-20 LAB
GLUCOSE BLD STRIP.AUTO-MCNC: 112 MG/DL (ref 65–100)
SERVICE CMNT-IMP: ABNORMAL

## 2023-01-20 PROCEDURE — 2580000003 HC RX 258: Performed by: INTERNAL MEDICINE

## 2023-01-20 PROCEDURE — 78815 PET IMAGE W/CT SKULL-THIGH: CPT

## 2023-01-20 PROCEDURE — 6360000004 HC RX CONTRAST MEDICATION: Performed by: INTERNAL MEDICINE

## 2023-01-20 PROCEDURE — 3430000000 HC RX DIAGNOSTIC RADIOPHARMACEUTICAL: Performed by: INTERNAL MEDICINE

## 2023-01-20 PROCEDURE — 70553 MRI BRAIN STEM W/O & W/DYE: CPT

## 2023-01-20 PROCEDURE — 82962 GLUCOSE BLOOD TEST: CPT

## 2023-01-20 PROCEDURE — A9552 F18 FDG: HCPCS | Performed by: INTERNAL MEDICINE

## 2023-01-20 PROCEDURE — A9579 GAD-BASE MR CONTRAST NOS,1ML: HCPCS | Performed by: INTERNAL MEDICINE

## 2023-01-20 RX ORDER — SODIUM CHLORIDE 0.9 % (FLUSH) 0.9 %
10 SYRINGE (ML) INJECTION AS NEEDED
Status: DISCONTINUED | OUTPATIENT
Start: 2023-01-20 | End: 2023-01-24 | Stop reason: HOSPADM

## 2023-01-20 RX ORDER — SODIUM CHLORIDE 0.9 % (FLUSH) 0.9 %
10 SYRINGE (ML) INJECTION ONCE AS NEEDED
Status: COMPLETED | OUTPATIENT
Start: 2023-01-20 | End: 2023-01-20

## 2023-01-20 RX ORDER — FLUDEOXYGLUCOSE F 18 200 MCI/ML
12.2 INJECTION, SOLUTION INTRAVENOUS
Status: COMPLETED | OUTPATIENT
Start: 2023-01-20 | End: 2023-01-20

## 2023-01-20 RX ADMIN — GADOTERIDOL 8 ML: 279.3 INJECTION, SOLUTION INTRAVENOUS at 18:39

## 2023-01-20 RX ADMIN — DIATRIZOATE MEGLUMINE AND DIATRIZOATE SODIUM 10 ML: 660; 100 LIQUID ORAL; RECTAL at 09:36

## 2023-01-20 RX ADMIN — FLUDEOXYGLUCOSE F 18 12.2 MILLICURIE: 200 INJECTION, SOLUTION INTRAVENOUS at 09:36

## 2023-01-20 RX ADMIN — SODIUM CHLORIDE, PRESERVATIVE FREE 10 ML: 5 INJECTION INTRAVENOUS at 18:39

## 2023-01-20 RX ADMIN — SODIUM CHLORIDE, PRESERVATIVE FREE 10 ML: 5 INJECTION INTRAVENOUS at 09:36

## 2023-01-23 ENCOUNTER — TRANSCRIBE ORDERS (OUTPATIENT)
Dept: SCHEDULING | Age: 83
End: 2023-01-23

## 2023-01-23 DIAGNOSIS — R63.4 ABNORMAL WEIGHT LOSS: ICD-10-CM

## 2023-01-23 DIAGNOSIS — K85.90 ACUTE PANCREATITIS, UNSPECIFIED COMPLICATION STATUS, UNSPECIFIED PANCREATITIS TYPE: Primary | ICD-10-CM

## 2023-01-23 DIAGNOSIS — K86.9 DISEASE OF PANCREAS, UNSPECIFIED: ICD-10-CM

## 2023-01-25 ENCOUNTER — CARE COORDINATION (OUTPATIENT)
Dept: CARE COORDINATION | Facility: CLINIC | Age: 83
End: 2023-01-25

## 2023-01-25 NOTE — CARE COORDINATION
[facility] Care Transitions Follow Up Call    LPN Care Coordinator contacted the patient by telephone to follow up after admission on 01/15/2023. Verified name and  with patient as identifiers. Patient: Almas Sherman  Patient : 1940   MRN: [id number]  Reason for Admission: recurrent Pancreatitis. Discharge Date: 23 RARS: Readmission Risk Score: 22.6      Needs to be reviewed by the provider   Additional needs identified to be addressed with provider: No  none             Method of communication with provider: none. Addressed changes since last contact:  none  Discussed follow-up appointments. If no appointment was previously scheduled, appointment scheduling offered: Yes. Is follow up appointment scheduled within 7 days of discharge? No.    Follow Up  Future Appointments   Date Time Provider Bo Jj   2023  2:45 PM Raman Foster MD [facility] AMB   2023 88:88 AM SFD CT 64 SLICE UNIT 1 SFDRCT SFD   3/3/2023 10:40 AM Vella Angelucci,  [address], [address] [place] [address] GVL AMB   3/9/2023 10:30 AM [address] [place] [address] [facility] [address] [place] [address]   3/9/2023 11:15 AM Ofelia Noe MD [facility] AMB   2023  8:45 AM PFP LAB PFP GVL AMB   2023 10:00 AM Agustin Juarez MD [facility] AMB     Non-[facility] follow up appointment(s): ethan    LPN Care Coordinator reviewed discharge instructions, medical action plan, and red flags with patient and discussed any barriers to care and/or understanding of plan of care after discharge. Discussed appropriate site of care based on symptoms and resources available to patient including: PCP  Specialist  Urgent care clinics  When to call 911. The patient agrees to contact the PCP office for questions related to their healthcare. Advance Care Planning:   patient declined education. Patients top risk factors for readmission: medical condition-Pancreatitis, RA, HLD, Unplanned Weight loss, Lung Nodule, Multiple Thyroid Nodules, HTN, Tobacco Abuse.    Interventions to address risk factors: Obtained and reviewed discharge summary and/or continuity of care documents, Education of patient/family/caregiver/guardian to support self-management-Dorene Perez LPN CC , and all scheduled appointments. Offered patient enrollment in the Remote Patient Monitoring (RPM) program for in-home monitoring: NA.     Care Transitions Subsequent and Final Call    Subsequent and Final Calls  Do you have any ongoing symptoms?: No  Have your medications changed?: No  Do you have any questions related to your medications?: No  Do you currently have any active services?: No  Do you have any needs or concerns that I can assist you with?: No  Identified Barriers: None  Care Transitions Interventions  Other Interventions:             LPN Care Coordinator provided contact information for future needs. Plan for follow-up call in 7-10 days based on severity of symptoms and risk factors. Plan for next call: self management-Diet, hydration, exercise, follow up appointments.      Braydon Carver LPN

## 2023-01-30 NOTE — PROGRESS NOTES
[facility]  [facility]. [address] [place], [place] [address]ie, [address] [facility]  [phone]     2023  Patient:  Deanna Quiroga  : 1940    HPI  Deanna Quiroga is a [de-identified] y.o. female who presents with a right lung squamous cancer. The patient has a significant history of pancreatitis which she has been following with GI. She had concern for a possible pancreatic mass, however this was worked up by GI. On this workup, she was found to have a 80y22xx mass in the RLL on 2022. The patient had a biopsy of this nodule on 2023 which revealed malignant neoplasm with squamous features. The patient had a PET scan on 2023 which revealed hypermetabolic superior segment in the RLL concerning for malignancy. The patient has not had PFTs performed. The patient was recently discharged with another episode of pancreatitis on 2023. She denies any respiratory symptoms. She denies any shortness of breath, coughing, or wheezing. She does have a significant history of smoking. She has been smoking at least 55 years 1-2ppd. She has been trying to quit since her diagnosis and is smoking 3 cigarettes per day. She denies drinking alcohol. The patient has a significant surgical history of an appendectomy,  cholecystectomy, hysterectomy, hemicolectomy, and repair of a perforated gastric ulcer. She has a past medical history significant for stage I breast cancer treated with lumpectomy in 2018.          Past Medical History:   Diagnosis Date    EPIFANIO (acute kidney injury) ([address] 2014    pt denies this dx    Arthritis     RA-abdelrahman hands    Back pain 6/10/2016    Breast cancer ([address] 2018    Breast lump dx 2018    left    Bronchitis     Chronic obstructive pulmonary disease ([address]     Discharge from the vagina     Dysuria     Eczema 6/10/2016    Fungal dermatitis     Headache 6/10/2016    Hypercholesterolemia 2014    Hypertension     not well controlled--per pt    Intractable vomiting 5/20/2018    Menopausal vaginal dryness     Osteoarthritis 6/10/2016    Osteoporosis 6/10/2016    Pancreatitis     2 episodes    PUD (peptic ulcer disease)     last 2003 which a rupture an extensive surgery    Sepsis ([address] 12/4/2014    Thyroid nodule     Tobacco abuse     1ppd x 49 yrs     Current Outpatient Medications   Medication Sig Dispense Refill    predniSONE (DELTASONE) 10 MG tablet Take 1 tablet by mouth daily 30 tablet 0    pravastatin (PRAVACHOL) 40 MG tablet Take 1 tablet by mouth daily 30 tablet 5    hyoscyamine (LEVSIN/SL) 125 MCG sublingual tablet Place 1 tablet under the tongue every 6 hours as needed for Cramping 40 tablet 0     No current facility-administered medications for this visit.      Allergies   Allergen Reactions    Azithromycin Other (See Comments)     pancreatitis    Codeine Nausea And Vomiting     Past Surgical History:   Procedure Laterality Date    APPENDECTOMY  1977    with hysterectomy    BREAST BIOPSY Left 1975    BREAST LUMPECTOMY Left 2/22/2018    LEFT BREAST LUMPECTOMY/ SENTINEL NODE BIOPSY performed by Laura Rae MD at [facility] MAIN OR    BREAST SURGERY Left 1975    breast lumpectomy, benign  (left)    CATARACT REMOVAL Bilateral 2018    w/IOL    CHOLECYSTECTOMY      CT NEEDLE BIOPSY LUNG PERCUTANEOUS  1/5/2023    CT NEEDLE BIOPSY LUNG PERCUTANEOUS 1/5/2023 [facility] RADIOLOGY CT SCAN    HYSTERECTOMY (CERVIX STATUS UNKNOWN)  1977    OTHER SURGICAL HISTORY      hernicolectomy 2 cm    [place] UNLISTED PROCEDURE ABDOMEN PERITONEUM & OMENTUM  2003    stomach surgery for ruptured ulcer and extensive rerouting of intestestines per patient     [address] [facility] [address] ENDOSCOPY  05/21/2018    empiric dilation    UPPER GASTROINTESTINAL ENDOSCOPY      UPPER GASTROINTESTINAL ENDOSCOPY N/A 10/4/2022    EGD ESOPHAGOGASTRODUODENOSCOPY/  203 performed by Elijah Garnett MD at [address] [facility] 12/28/2022    ENDOSCOPIC ULTRASOUND W/ EGD/ / performed by Geraldo Lopez MD at [facility] [address] [place] LOC DEVICE 1ST LESION LEFT Left 1/31/2018    US BREAST NEEDLE BIOPSY LEFT 1/31/2018 [facility] RADIOLOGY MAMMO     Family History   Problem Relation Age of Onset    Thyroid Cancer Neg Hx     No Known Problems Paternal Grandfather     No Known Problems Paternal Grandmother     No Known Problems Maternal Grandfather     No Known Problems Maternal Grandmother     Hypertension Other         Brother and sister with htn    No Known Problems Brother     Hypertension Mother     Heart Disease Brother     Heart Disease Sister     Breast Cancer Sister 79    Cancer Sister     Heart Disease Father     Heart Attack Father     Cancer Brother         prostate    Diabetes Sister     Heart Disease Sister      Social History     Tobacco Use    Smoking status: Every Day     Packs/day: 0.10     Types: Cigarettes     Start date: 5/6/1966    Smokeless tobacco: Never   Substance Use Topics    Alcohol use: No        Review of Systems   A comprehensive review of systems was negative except for that written in the HPI. Physical Exam  Ht 5' 1\" (1.549 m)   Wt 92 lb (41.7 kg)   LMP  (LMP Unknown)   BMI 17.38 kg/m²  she looks cachetic, has lost over 20 lbs recently, which she blames pancreatitis.      General: Alert, oriented, cooperative, awake patient in no acute distress   Skin:  Warm, moist with good texture   Eyes:   Sclera are clear, extraocular muscles intact  HENT:  Normocephalic; oral mucosa moist, nares patent; neck is supple; trachea midline  Respiratory: Lungs clear to auscultation bilaterally, breathing is non-labored   Chest:  Symmetric throughout one respiratory excursion; no supraclavicular lymphadenopathy  CV:  Regular rate and rhythm, no appreciable murmurs, rubs, gallops  Abdomen: Soft, protuberant but non-distended; bowel sounds are normoactive   Extremities: No cyanosis, clubbing or edema  Neurological: No focal signs      Labs:   Lab Results   Component Value Date/Time    INR 1.0 01/16/2023 09:21 AM      Lab Results   Component Value Date    WBC 9.1 01/17/2023    HGB 10.5 (L) 01/17/2023    HCT 32.8 (L) 01/17/2023    MCV 84.5 01/17/2023     01/17/2023      Lab Results   Component Value Date     01/17/2023    K 3.7 01/17/2023     01/17/2023    CO2 26 01/17/2023    BUN 6 (L) 01/17/2023    CREATININE 0.60 01/17/2023    GLUCOSE 95 01/17/2023    CALCIUM 8.6 01/17/2023    PROT 6.2 (L) 01/17/2023    LABALBU 2.7 (L) 01/17/2023    BILITOT 0.5 01/17/2023    ALKPHOS 60 01/17/2023    AST 11 (L) 01/17/2023    ALT 11 (L) 01/17/2023    LABGLOM >60 01/17/2023    GFRAA >60 10/03/2022    AGRATIO 1.5 03/30/2022    GLOB 3.5 01/17/2023      CT:  CT Result (most recent):  CT ABDOMEN PELVIS W IV CONTRAST 01/16/2023    Narrative  EXAM: CT ABDOMEN PELVIS W IV CONTRAST    HISTORY: Abdominal Pain. TECHNIQUE: Axial images of the abdomen and pelvis were performed following the  administration of intravenous contrast. Images were obtained in the axial plane  and coronal reformatted images were created and reviewed. Dose reduction technique used: Automated exposure control/Adjustment of the mA  and/or kV according to patient size/Use of iterative reconstruction technique. 100 mL of Isovue-370 were utilized. COMPARISON: CT chest dated 12/22/2022. CT abdomen and pelvis dated 11/5/2022. FINDINGS:  The visualized lung bases show mild bibasilar atelectasis. The visualized heart  is unremarkable. The liver, spleen, adrenal glands, and kidneys are within normal limits. The  left kidney again shows multiple small low-density lesions which are too small  to characterize. The bladder appears grossly normal. The gallbladder has been  removed. The pancreas again shows dilatation of the tail throughout its visualized  course. There is stranding of the fat in the upper abdomen, anterior to the  pancreas.  The appearance was similar previously; although, it has increased to  some degree. Distal esophagus appears unremarkable. There appears to be a gastrojejunostomy. Radiopaque contrast is noted within proximal jejunal loops and ileal loops in  the pelvis. The large bowel shows no evidence of obstruction. The transverse colon is  suboptimally seen. Diverticuli are noted in the distal colon with no convincing  evidence of acute diverticulitis. No evidence of significant free fluid or free air. No pathologic adenopathy. No  abdominal aortic aneurysm. Significant atherosclerotic changes are appreciated. Compression fracture of the L1 vertebral body is unchanged. The remaining  osseous structures appear grossly unremarkable. Impression  The pancreas again shows dilatation of the tail throughout its visualized  course. There is stranding of the fat in the upper abdomen, anterior to the pancreas. The appearance was similar previously; although, it has increased to some  degree. These changes could be secondary to pancreatitis and/or postoperative  change. No evidence of bowel obstruction. Hyperdense contrast is seen in the proximal  and distal small bowel loops. Postoperative changes are noted in the distal stomach as described above. No other significant findings. PET:  EXAMINATION: PET CT SKULL BASE TO MID THIGH 1/20/2023 11:04 AM     ACCESSION NUMBER: [id number]     COMPARISON: CT abdomen pelvis 1/16/2023 CT chest 12/22/2022. INDICATION: Initial staging right lower lobe lung nodule. History of left breast  cancer 20 obtained status post left breast lumpectomy. TECHNIQUE:   Radiopharmaceutical: 12.2 mCi F18-FDG IV. Positron emission  tomography (PET) with concurrently acquired computed tomography (CT) for  attenuation correction and anatomical localization imaging. Images obtained from  the skull base to the mid thighs. .     These images do not constitute a diagnostic quality CT exam. The patient  underwent adequate fasting of at least 4 hours. Blood glucose at the time of  tracer administration is 112 mg/dl, which is adequate for imaging. Approximately  60 minutes after administration of the radiopharmaceutical, imaging was  initiated. SUV max is calculated from patient body weight. Noncaloric dilute  oral contrast is given. For this CT scanner at least one of the following techniques is utilized to  decrease patient radiation exposure: Automatic exposure control, modulation of  MA and KVP based on patient weight, and iterative reconstruction. FINDINGS: Normal physiologic uptake is identified within the salivary glands,  myocardium, kidneys, ureters and bladder. Scattered areas of physiologic bowel  uptake are also present. NECK:  Lymph nodes: No pathologic activity. No enlarged cervical lymph nodes. Additional findings: None. CHEST:  Lungs: 1.7 cm superior segment right lower lobe lung nodule on image 90 of  series 3 measures an SUV max 5.1 consistent with malignancy. Correlate with  recent biopsy results. No other lung lesions are appreciated. No pleural  effusions. Lymph nodes: No pathologic activity. No enlarged lymph nodes. Additional findings: None. ABDOMEN/PELVIS:  Hepatobiliary: No pathologic activity. Spleen: No pathologic activity. Pancreas: No pathologic activity. Kidneys: No pathologic activity. Adrenals: No pathologic activity. Reproductive: No pathologic activity. Uterus not identified. Gastrointestinal: No pathologic activity. No obstruction. Lymph nodes: No enlarged retroperitoneal or pelvic lymph nodes. Areas of  activity are noted in the left infrahepatic region and adjacent to the  pancreatic head on PET imaging with only confluent soft tissue in these areas of  the falciform ligament and subhepatic region possibly adenopathy. These areas  demonstrate increased FDG activity with an SUV max 4.6.  No recent history of  abdominal surgery to otherwise account for these findings. Additional findings: No evidence of ascites or free intraperitoneal air. SKELETON:   No pathologic activity. Impression  1. Hypermetabolic superior segment right lower lobe lung nodule concerning for  malignancy. 2. Area of confluent soft tissue in the left subhepatic space described on prior  CT possibly inflammatory. Malignancy not completely ruled out as these areas  demonstrate abnormal increased FDG activity. PATH:  DIAGNOSIS        \"RIGHT LUNG\":               MALIGNANT NEOPLASM WITH SQUAMOUS FEATURES. Assessment/Plan:   Jeferson Craig is a [de-identified] y.o. 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 The options for treating the patients cancer were discussed. We discussed the option of surgical treatment with lobectomy and lymphadenectomy, it was discussed she would need PFTs performed to evaluate if she would be able to tolerate lobectomy. It was also discussed that she would be a reasonable candidate for SBRT as well. It was discussed that surgical intervention would be curative and have the best long term outcome, but carry the most risk. She would be at increased risk due to her being a current smoker and poor nutrition. She also has been dealing with her acute on chronic pancreatitis. She does wish to have surgery though if possible. The patient will undergo her PFTs to see if she is a candidate for surgery and follow up in 2 weeks. If she is a candidate we will then discuss the options and make final surgical plans at that time.      Total time spent with patient including chart review is 60 minutes       Zulay Jain MD

## 2023-01-31 ENCOUNTER — OFFICE VISIT (OUTPATIENT)
Dept: SURGERY | Age: 83
End: 2023-01-31
Payer: MEDICARE

## 2023-01-31 VITALS — WEIGHT: 92 LBS | BODY MASS INDEX: 17.37 KG/M2 | HEIGHT: 61 IN

## 2023-01-31 DIAGNOSIS — C34.91 BRONCHOGENIC CANCER OF RIGHT LUNG (HCC): Primary | ICD-10-CM

## 2023-01-31 DIAGNOSIS — C34.90 MALIGNANT NEOPLASM OF LUNG, UNSPECIFIED LATERALITY, UNSPECIFIED PART OF LUNG (HCC): Primary | ICD-10-CM

## 2023-01-31 PROCEDURE — 99205 OFFICE O/P NEW HI 60 MIN: CPT | Performed by: SURGERY

## 2023-01-31 PROCEDURE — 1123F ACP DISCUSS/DSCN MKR DOCD: CPT | Performed by: SURGERY

## 2023-02-01 ENCOUNTER — CARE COORDINATION (OUTPATIENT)
Dept: CARE COORDINATION | Facility: CLINIC | Age: 83
End: 2023-02-01

## 2023-02-01 NOTE — CARE COORDINATION
[facility] Care Transitions Follow Up Call    LPN Care Coordinator contacted the patient by telephone to follow up after admission on 2022. Verified name and  with patient as identifiers. Patient: Jayro Mon  Patient : 1940   MRN: [id number]  Reason for Admission: pancreatitis  Discharge Date: 23 RARS: Readmission Risk Score: 22.6      Needs to be reviewed by the provider   Additional needs identified to be addressed with provider: No  none             Method of communication with provider: none. Addressed changes since last contact:  none  Discussed follow-up appointments. If no appointment was previously scheduled, appointment scheduling offered: Yes. Is follow up appointment scheduled within 7 days of discharge? Yes. Follow Up  Future Appointments   Date Time Provider Bo Jj   2023 40:43 AM SFD CT 64 SLICE UNIT 1 SFDRCT [facility]   3/3/2023 10:40 AM Brandon Brewer MD [facility] AMB   3/9/2023 10:30 AM [address]   3/9/2023 11:15 AM Marvin White MD [facility] AMB   2023  8:45 AM PFP LAB PFP GVL AMB   2023 10:00 AM Kacey Blackmon MD [facility] AMB     Non-BS follow up appointment(s): ethan    LPN Care Coordinator reviewed discharge instructions, medical action plan, and red flags with patient and discussed any barriers to care and/or understanding of plan of care after discharge. Discussed appropriate site of care based on symptoms and resources available to patient including: PCP  Specialist  Urgent care clinics  When to call 911. The patient agrees to contact the PCP office for questions related to their healthcare. Advance Care Planning:   patient declined education. Patients top risk factors for readmission: medical condition-Pancreatitis, RA, HLD, Unplanned weight loss, Lung Nodule, Multiple Thyroid Nodules, HTN, Tobacco Abuse, Lung CA.    Interventions to address risk factors: Obtained and reviewed discharge summary and/or continuity of care documents, Education of patient/family/caregiver/guardian to support self-management-Dorene Perez LPN -[phone], and all scheduled appointments. Offered patient enrollment in the Remote Patient Monitoring (RPM) program for in-home monitoring: NA.     Care Transitions Subsequent and Final Call    Subsequent and Final Calls  Do you have any ongoing symptoms?: No  Have your medications changed?: No  Do you have any questions related to your medications?: No  Do you currently have any active services?: No  Do you have any needs or concerns that I can assist you with?: No  Identified Barriers: None  Care Transitions Interventions  Other Interventions:             LPN Care Coordinator provided contact information for future needs. Plan for follow-up call in 7-10 days based on severity of symptoms and risk factors. Plan for next call: self management-diet, hydration, exercise, follow up appointments.      Don Wren LPN

## 2023-02-03 ENCOUNTER — CLINICAL DOCUMENTATION (OUTPATIENT)
Dept: VASCULAR SURGERY | Age: 83
End: 2023-02-03

## 2023-02-03 NOTE — PROGRESS NOTES
Patient is having serval procedures and will be reaching out to us for her yearly BLE Arterial follow-up with Nicole Galvin.

## 2023-02-08 ENCOUNTER — CARE COORDINATION (OUTPATIENT)
Dept: CARE COORDINATION | Facility: CLINIC | Age: 83
End: 2023-02-08

## 2023-02-08 NOTE — CARE COORDINATION
[facility] Care Transitions Follow Up Call    Patient Current Location:  Home: [address] [place] [address] Care Coordinator contacted the patient by telephone to follow up after admission on 01/15/2023. Verified name and  with patient as identifiers. Patient: Tereso Miranda  Patient : 1940   MRN: [id number]  Reason for Admission: recurrent pancreatitis. Discharge Date: 23 RARS: Readmission Risk Score: 22.6      Needs to be reviewed by the provider   Additional needs identified to be addressed with provider: No  none             Method of communication with provider: none. Addressed changes since last contact:  none  Discussed follow-up appointments. If no appointment was previously scheduled, appointment scheduling offered: Yes. Is follow up appointment scheduled within 7 days of discharge? No.    Follow Up  Future Appointments   Date Time Provider Bo Jj   2023 10:15 AM PP PFT LAB PPS GVL AMB   2023 93:74 AM SFD CT 64 SLICE UNIT 1 SFDRCT SFD   3/3/2023 10:40 AM Amara Ferris  [address] [facility] GVL AMB   3/9/2023 10:30 AM [address]   3/9/2023 11:15 AM Magalis Burns MD [facility] AMB   2023  8:45 AM PFP LAB PFP GVL AMB   2023 10:00 AM Myrna Terry MD PFP GVL AMB     Non-[facility] follow up appointment(s): ethan    N Care Coordinator reviewed discharge instructions, medical action plan, and red flags with patient and discussed any barriers to care and/or understanding of plan of care after discharge. Discussed appropriate site of care based on symptoms and resources available to patient including: PCP  Specialist  Urgent care clinics  When to call 911. The patient agrees to contact the PCP office for questions related to their healthcare. Advance Care Planning:   patient declined education.      Patients top risk factors for readmission: medical condition-Pancreatitis, RA, HLD, Unplanned weight loss, Lung Nodule, Multiple Thyroid Nolules, HTN, Tobacco Abuse, Lung CA  Interventions to address risk factors: Obtained and reviewed discharge summary and/or continuity of care documents, Education of patient/family/caregiver/guardian to support self-management-Dorene Perez LPN CC , and all scheduled appointments. Offered patient enrollment in the Remote Patient Monitoring (RPM) program for in-home monitoring: NA.     Care Transitions Subsequent and Final Call    Subsequent and Final Calls  Do you have any ongoing symptoms?: No  Have your medications changed?: No  Do you have any questions related to your medications?: No  Do you currently have any active services?: No  Do you have any needs or concerns that I can assist you with?: No  Identified Barriers: None  Care Transitions Interventions  Other Interventions:             LPN Care Coordinator provided contact information for future needs. Plan for follow-up call in 7-10 days based on severity of symptoms and risk factors. Plan for next call: self management-diet, hydration, exercise, follow up appointments.      Srinivasa Patel LPN

## 2023-02-09 ENCOUNTER — NURSE ONLY (OUTPATIENT)
Dept: PULMONOLOGY | Age: 83
End: 2023-02-09

## 2023-02-09 DIAGNOSIS — C34.90 MALIGNANT NEOPLASM OF LUNG, UNSPECIFIED LATERALITY, UNSPECIFIED PART OF LUNG (HCC): Primary | ICD-10-CM

## 2023-02-09 LAB
FEV 1 , POC: 1.81 L
FEV1 % PRED, POC: 107 %
FEV1/FVC, POC: NORMAL
FVC % PRED, POC: 120 %
FVC, POC: NORMAL

## 2023-02-09 ASSESSMENT — PULMONARY FUNCTION TESTS
FEV1_PERCENT_PREDICTED_POC: 107
FVC_PERCENT_PREDICTED_POC: 120

## 2023-02-15 ENCOUNTER — CARE COORDINATION (OUTPATIENT)
Dept: CARE COORDINATION | Facility: CLINIC | Age: 83
End: 2023-02-15

## 2023-02-15 NOTE — CARE COORDINATION
Patient has graduated from the Care Transitions program on 02/15/2023. Patient/family has the ability to self-manage at this time. Patient has no further care management needs, no referral to the [facility] team for further management. Patient has Care Transition Nurse's contact information for any further questions, concerns, or needs. Edgar Collins [address] [facility] [phone].   Patients upcoming visits:    Future Appointments   Date Time Provider Bo Анна   2/21/2023 91:79 AM SFD CT 64 SLICE UN[address] 1 SFDRCT SFD   3/3/2023 10:40 AM Nika Kim  [address] 3 [facility] GVL AMB   3/9/2023 10:30 AM [address]   3/9/2023 11:15 AM Tigre Evans MD [facility] AMB   4/5/2023  8:45 AM PFP LAB PFP GVL AMB   4/12/2023 10:00 AM Luiza Watson MD PFP GVL AMB

## 2023-02-16 ENCOUNTER — PREP FOR PROCEDURE (OUTPATIENT)
Dept: SURGERY | Age: 83
End: 2023-02-16

## 2023-02-18 ENCOUNTER — HOSPITAL ENCOUNTER (INPATIENT)
Age: 83
LOS: 5 days | Discharge: HOME | DRG: 439 | End: 2023-02-23
Attending: GENERAL PRACTICE | Admitting: INTERNAL MEDICINE
Payer: MEDICARE

## 2023-02-18 ENCOUNTER — APPOINTMENT (OUTPATIENT)
Dept: CT IMAGING | Age: 83
DRG: 439 | End: 2023-02-18
Payer: MEDICARE

## 2023-02-18 DIAGNOSIS — K86.89 PANCREATIC DUCT OBSTRUCTION: ICD-10-CM

## 2023-02-18 DIAGNOSIS — K85.90 PANCREATITIS, UNSPECIFIED PANCREATITIS TYPE: ICD-10-CM

## 2023-02-18 DIAGNOSIS — K85.90 ACUTE ON CHRONIC PANCREATITIS (HCC): Primary | ICD-10-CM

## 2023-02-18 DIAGNOSIS — K86.1 ACUTE ON CHRONIC PANCREATITIS (HCC): Primary | ICD-10-CM

## 2023-02-18 LAB
ALBUMIN SERPL-MCNC: 3 G/DL (ref 3.2–4.6)
ALBUMIN/GLOB SERPL: 0.7 (ref 0.4–1.6)
ALP SERPL-CCNC: 73 U/L (ref 50–136)
ALT SERPL-CCNC: 12 U/L (ref 12–65)
ANION GAP SERPL CALC-SCNC: 9 MMOL/L (ref 2–11)
AST SERPL-CCNC: 8 U/L (ref 15–37)
BASOPHILS # BLD: 0 K/UL (ref 0–0.2)
BASOPHILS NFR BLD: 0 % (ref 0–2)
BILIRUB SERPL-MCNC: 0.4 MG/DL (ref 0.2–1.1)
BUN SERPL-MCNC: 13 MG/DL (ref 8–23)
CALCIUM SERPL-MCNC: 8.7 MG/DL (ref 8.3–10.4)
CHLORIDE SERPL-SCNC: 109 MMOL/L (ref 101–110)
CO2 SERPL-SCNC: 20 MMOL/L (ref 21–32)
CREAT SERPL-MCNC: 0.9 MG/DL (ref 0.6–1)
DIFFERENTIAL METHOD BLD: ABNORMAL
EOSINOPHIL # BLD: 0 K/UL (ref 0–0.8)
EOSINOPHIL NFR BLD: 0 % (ref 0.5–7.8)
ERYTHROCYTE [DISTWIDTH] IN BLOOD BY AUTOMATED COUNT: 16.2 % (ref 11.9–14.6)
GLOBULIN SER CALC-MCNC: 4.1 G/DL (ref 2.8–4.5)
GLUCOSE SERPL-MCNC: 124 MG/DL (ref 65–100)
HCT VFR BLD AUTO: 36.8 % (ref 35.8–46.3)
HGB BLD-MCNC: 11.7 G/DL (ref 11.7–15.4)
IMM GRANULOCYTES # BLD AUTO: 0.1 K/UL (ref 0–0.5)
IMM GRANULOCYTES NFR BLD AUTO: 1 % (ref 0–5)
LIPASE SERPL-CCNC: 4669 U/L (ref 73–393)
LYMPHOCYTES # BLD: 0.7 K/UL (ref 0.5–4.6)
LYMPHOCYTES NFR BLD: 6 % (ref 13–44)
MCH RBC QN AUTO: 27.1 PG (ref 26.1–32.9)
MCHC RBC AUTO-ENTMCNC: 31.8 G/DL (ref 31.4–35)
MCV RBC AUTO: 85.4 FL (ref 82–102)
MONOCYTES # BLD: 0.7 K/UL (ref 0.1–1.3)
MONOCYTES NFR BLD: 6 % (ref 4–12)
NEUTS SEG # BLD: 10.9 K/UL (ref 1.7–8.2)
NEUTS SEG NFR BLD: 87 % (ref 43–78)
NRBC # BLD: 0 K/UL (ref 0–0.2)
PLATELET # BLD AUTO: 324 K/UL (ref 150–450)
PMV BLD AUTO: 8.9 FL (ref 9.4–12.3)
POTASSIUM SERPL-SCNC: 3.4 MMOL/L (ref 3.5–5.1)
PROT SERPL-MCNC: 7.1 G/DL (ref 6.3–8.2)
RBC # BLD AUTO: 4.31 M/UL (ref 4.05–5.2)
SODIUM SERPL-SCNC: 138 MMOL/L (ref 133–143)
WBC # BLD AUTO: 12.4 K/UL (ref 4.3–11.1)

## 2023-02-18 PROCEDURE — 80053 COMPREHEN METABOLIC PANEL: CPT

## 2023-02-18 PROCEDURE — 2580000003 HC RX 258: Performed by: INTERNAL MEDICINE

## 2023-02-18 PROCEDURE — 6360000002 HC RX W HCPCS: Performed by: INTERNAL MEDICINE

## 2023-02-18 PROCEDURE — 1100000003 HC PRIVATE W/ TELEMETRY

## 2023-02-18 PROCEDURE — 74177 CT ABD & PELVIS W/CONTRAST: CPT

## 2023-02-18 PROCEDURE — 83690 ASSAY OF LIPASE: CPT

## 2023-02-18 PROCEDURE — 96375 TX/PRO/DX INJ NEW DRUG ADDON: CPT

## 2023-02-18 PROCEDURE — 1100000000 HC RM PRIVATE

## 2023-02-18 PROCEDURE — 6370000000 HC RX 637 (ALT 250 FOR IP): Performed by: INTERNAL MEDICINE

## 2023-02-18 PROCEDURE — 96374 THER/PROPH/DIAG INJ IV PUSH: CPT

## 2023-02-18 PROCEDURE — 6360000002 HC RX W HCPCS: Performed by: GENERAL PRACTICE

## 2023-02-18 PROCEDURE — 99285 EMERGENCY DEPT VISIT HI MDM: CPT

## 2023-02-18 PROCEDURE — 2580000003 HC RX 258: Performed by: GENERAL PRACTICE

## 2023-02-18 PROCEDURE — 6360000004 HC RX CONTRAST MEDICATION

## 2023-02-18 PROCEDURE — 85025 COMPLETE CBC W/AUTO DIFF WBC: CPT

## 2023-02-18 RX ORDER — MORPHINE SULFATE 2 MG/ML
2 INJECTION, SOLUTION INTRAMUSCULAR; INTRAVENOUS EVERY 4 HOURS PRN
Status: DISCONTINUED | OUTPATIENT
Start: 2023-02-18 | End: 2023-02-23 | Stop reason: HOSPADM

## 2023-02-18 RX ORDER — MORPHINE SULFATE 2 MG/ML
2 INJECTION, SOLUTION INTRAMUSCULAR; INTRAVENOUS ONCE
Status: COMPLETED | OUTPATIENT
Start: 2023-02-18 | End: 2023-02-18

## 2023-02-18 RX ORDER — MORPHINE SULFATE 4 MG/ML
4 INJECTION, SOLUTION INTRAMUSCULAR; INTRAVENOUS
Status: COMPLETED | OUTPATIENT
Start: 2023-02-18 | End: 2023-02-18

## 2023-02-18 RX ORDER — 0.9 % SODIUM CHLORIDE 0.9 %
1000 INTRAVENOUS SOLUTION INTRAVENOUS ONCE
Status: COMPLETED | OUTPATIENT
Start: 2023-02-18 | End: 2023-02-18

## 2023-02-18 RX ORDER — ONDANSETRON 4 MG/1
4 TABLET, ORALLY DISINTEGRATING ORAL EVERY 8 HOURS PRN
Status: DISCONTINUED | OUTPATIENT
Start: 2023-02-18 | End: 2023-02-23 | Stop reason: HOSPADM

## 2023-02-18 RX ORDER — SODIUM CHLORIDE 9 MG/ML
INJECTION, SOLUTION INTRAVENOUS PRN
Status: DISCONTINUED | OUTPATIENT
Start: 2023-02-18 | End: 2023-02-23 | Stop reason: HOSPADM

## 2023-02-18 RX ORDER — ACETAMINOPHEN 325 MG/1
650 TABLET ORAL EVERY 6 HOURS PRN
Status: DISCONTINUED | OUTPATIENT
Start: 2023-02-18 | End: 2023-02-23 | Stop reason: HOSPADM

## 2023-02-18 RX ORDER — ONDANSETRON 2 MG/ML
4 INJECTION INTRAMUSCULAR; INTRAVENOUS EVERY 6 HOURS PRN
Status: DISCONTINUED | OUTPATIENT
Start: 2023-02-18 | End: 2023-02-23 | Stop reason: HOSPADM

## 2023-02-18 RX ORDER — POTASSIUM CHLORIDE 7.45 MG/ML
10 INJECTION INTRAVENOUS ONCE
Status: COMPLETED | OUTPATIENT
Start: 2023-02-18 | End: 2023-02-18

## 2023-02-18 RX ORDER — ENOXAPARIN SODIUM 100 MG/ML
30 INJECTION SUBCUTANEOUS DAILY
Status: DISCONTINUED | OUTPATIENT
Start: 2023-02-18 | End: 2023-02-21

## 2023-02-18 RX ORDER — SODIUM CHLORIDE 0.9 % (FLUSH) 0.9 %
5-40 SYRINGE (ML) INJECTION PRN
Status: DISCONTINUED | OUTPATIENT
Start: 2023-02-18 | End: 2023-02-23 | Stop reason: HOSPADM

## 2023-02-18 RX ORDER — ACETAMINOPHEN 650 MG/1
650 SUPPOSITORY RECTAL EVERY 6 HOURS PRN
Status: DISCONTINUED | OUTPATIENT
Start: 2023-02-18 | End: 2023-02-23 | Stop reason: HOSPADM

## 2023-02-18 RX ORDER — POLYETHYLENE GLYCOL 3350 17 G/17G
17 POWDER, FOR SOLUTION ORAL DAILY PRN
Status: DISCONTINUED | OUTPATIENT
Start: 2023-02-18 | End: 2023-02-23 | Stop reason: HOSPADM

## 2023-02-18 RX ORDER — ONDANSETRON 2 MG/ML
4 INJECTION INTRAMUSCULAR; INTRAVENOUS ONCE
Status: COMPLETED | OUTPATIENT
Start: 2023-02-18 | End: 2023-02-18

## 2023-02-18 RX ORDER — SODIUM CHLORIDE, SODIUM LACTATE, POTASSIUM CHLORIDE, CALCIUM CHLORIDE 600; 310; 30; 20 MG/100ML; MG/100ML; MG/100ML; MG/100ML
INJECTION, SOLUTION INTRAVENOUS CONTINUOUS
Status: DISCONTINUED | OUTPATIENT
Start: 2023-02-18 | End: 2023-02-23 | Stop reason: HOSPADM

## 2023-02-18 RX ORDER — SODIUM CHLORIDE 0.9 % (FLUSH) 0.9 %
5-40 SYRINGE (ML) INJECTION EVERY 12 HOURS SCHEDULED
Status: DISCONTINUED | OUTPATIENT
Start: 2023-02-18 | End: 2023-02-23 | Stop reason: HOSPADM

## 2023-02-18 RX ORDER — PRAVASTATIN SODIUM 80 MG/1
40 TABLET ORAL DAILY
Status: CANCELLED | OUTPATIENT
Start: 2023-02-18

## 2023-02-18 RX ORDER — OXYCODONE HYDROCHLORIDE 5 MG/1
5 TABLET ORAL EVERY 4 HOURS PRN
Status: DISCONTINUED | OUTPATIENT
Start: 2023-02-18 | End: 2023-02-23 | Stop reason: HOSPADM

## 2023-02-18 RX ADMIN — SODIUM CHLORIDE, PRESERVATIVE FREE 10 ML: 5 INJECTION INTRAVENOUS at 21:00

## 2023-02-18 RX ADMIN — IOPAMIDOL 100 ML: 755 INJECTION, SOLUTION INTRAVENOUS at 08:10

## 2023-02-18 RX ADMIN — SODIUM CHLORIDE, PRESERVATIVE FREE 10 ML: 5 INJECTION INTRAVENOUS at 14:17

## 2023-02-18 RX ADMIN — SODIUM CHLORIDE, POTASSIUM CHLORIDE, SODIUM LACTATE AND CALCIUM CHLORIDE: 600; 310; 30; 20 INJECTION, SOLUTION INTRAVENOUS at 10:17

## 2023-02-18 RX ADMIN — ENOXAPARIN SODIUM 30 MG: 100 INJECTION SUBCUTANEOUS at 10:12

## 2023-02-18 RX ADMIN — MORPHINE SULFATE 4 MG: 4 INJECTION INTRAVENOUS at 07:12

## 2023-02-18 RX ADMIN — MORPHINE SULFATE 2 MG: 2 INJECTION, SOLUTION INTRAMUSCULAR; INTRAVENOUS at 18:12

## 2023-02-18 RX ADMIN — MORPHINE SULFATE 2 MG: 2 INJECTION, SOLUTION INTRAMUSCULAR; INTRAVENOUS at 14:13

## 2023-02-18 RX ADMIN — MORPHINE SULFATE 2 MG: 2 INJECTION, SOLUTION INTRAMUSCULAR; INTRAVENOUS at 21:57

## 2023-02-18 RX ADMIN — SODIUM CHLORIDE, POTASSIUM CHLORIDE, SODIUM LACTATE AND CALCIUM CHLORIDE: 600; 310; 30; 20 INJECTION, SOLUTION INTRAVENOUS at 19:17

## 2023-02-18 RX ADMIN — OXYCODONE 5 MG: 5 TABLET ORAL at 10:12

## 2023-02-18 RX ADMIN — ONDANSETRON 4 MG: 2 INJECTION INTRAMUSCULAR; INTRAVENOUS at 07:12

## 2023-02-18 RX ADMIN — SODIUM CHLORIDE 1000 ML: 9 INJECTION, SOLUTION INTRAVENOUS at 07:14

## 2023-02-18 RX ADMIN — POTASSIUM CHLORIDE 10 MEQ: 7.46 INJECTION, SOLUTION INTRAVENOUS at 14:26

## 2023-02-18 RX ADMIN — MORPHINE SULFATE 2 MG: 2 INJECTION, SOLUTION INTRAMUSCULAR; INTRAVENOUS at 18:57

## 2023-02-18 ASSESSMENT — PAIN SCALES - GENERAL
PAINLEVEL_OUTOF10: 8
PAINLEVEL_OUTOF10: 3
PAINLEVEL_OUTOF10: 8
PAINLEVEL_OUTOF10: 10
PAINLEVEL_OUTOF10: 10

## 2023-02-18 ASSESSMENT — PAIN DESCRIPTION - LOCATION
LOCATION: ABDOMEN
LOCATION: ABDOMEN

## 2023-02-18 ASSESSMENT — PAIN - FUNCTIONAL ASSESSMENT
PAIN_FUNCTIONAL_ASSESSMENT: 0-10
PAIN_FUNCTIONAL_ASSESSMENT: PREVENTS OR INTERFERES SOME ACTIVE ACTIVITIES AND ADLS
PAIN_FUNCTIONAL_ASSESSMENT: PREVENTS OR INTERFERES WITH MANY ACTIVE NOT PASSIVE ACTIVITIES

## 2023-02-18 ASSESSMENT — PAIN DESCRIPTION - DESCRIPTORS
DESCRIPTORS: ACHING
DESCRIPTORS: ACHING

## 2023-02-18 ASSESSMENT — PAIN DESCRIPTION - ORIENTATION
ORIENTATION: RIGHT
ORIENTATION: RIGHT

## 2023-02-18 ASSESSMENT — PAIN DESCRIPTION - PAIN TYPE
TYPE: ACUTE PAIN
TYPE: ACUTE PAIN

## 2023-02-18 ASSESSMENT — PAIN DESCRIPTION - FREQUENCY: FREQUENCY: CONTINUOUS

## 2023-02-18 ASSESSMENT — PAIN DESCRIPTION - ONSET: ONSET: ON-GOING

## 2023-02-18 NOTE — CONSULTS
[facility] Consult Note       Primary GI Physician: Dr. Jarvis Macias    Referring Provider:  Dr. Zach Hedrick    Consult Date:  2/18/2023    Admit Date:  2/18/2023    Chief Complaint:  Pancreatitis    Subjective:     History of Present Illness:  Patient is a [de-identified] y.o. female with PMH including but not limited to duodenal ulcer complicated by perforation requiring bilroth II, idiopathic pancreatitis, rheumatoid arthritis on enbrel/steroids, HTN, ongoing tobacco use, who is seen in consultation on 2/18/23 at the request of Dr. Zach Hedrick for pancreatitis. Patient presented on 2/18/23 with complaints of right sided abdominal pain X 1 week, worse today with nausea, vomiting. Denies fever or chills. 2/18/23 Lipase 4,669. LFTs unremarkable. WBC 12.4. CT A/P with contrast on 2/18/23  Impression   1. Indistinctness at the pancreatic head/uncinate process with increasing   pancreatic ductal dilatation. While these findings could be inflammatory such as   pancreatitis, neoplasm could also have this appearance. One of the sites of   hypermetabolism is present at this location on PET scanning. 2. Extensive atherosclerosis. Patient reports 1 week ago after eating a frozen meal delivered from her insurance company she developed right sided abdominal pain that radiates to her lower back with several episodes of nausea and vomiting. She denies fever or chills. She reports 20 lb weight loss since January. She reports she is planned to have lung surgery in March. She denies use of NSAIDs and takes Tylenol. She denies use of ETOH. She smokes 3-4 cigarettes daily. Patient has been seen in GI consultation in January and December for recurrent pancreatitis. She has had multiple prior episodes of pancreatitis. Pain began Sept.  She underwent cholecystectomy. She was on Orencia for RA but this was D/C over concerns this causes pancreatitis episode.   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 During hospitalization in Oct she underwent EGD on 10/4/22 with only gastrojejunostomy and a HH. No prior colonoscopy. Hx of mild anemia. She was last seen in our office 10/27/22 and MRCP was reccommended to evaluate prior abnormalities seen on EUS. Repeat EUS 12/28/22 as below with no chronic pancreatitic changes other than a mildly dilated PD in the body. If acute pancreatitis re-occurs, consider ERCP / pancreatic sphincterotomy with surgical access or colonoscope. Per Dr. Tristin Huerta at time of 1/16/23 consult, would not commit to ERCP in this patient, as I do not see any record of LFT elevation during her pancreatitis episodes that would suggest SOD type I or II. EUS has been suboptimal due to her B2 anatomy, but MRCP 6/22/20 suggested possible pancreatic head cyst.     CT 12/22 with [id number] LLL Nodule noted on CT. Concerning for malignancy as patient is a daily smoker with symptoms of unintentional weight loss and night sweats. Followed up with  and outpatient PET. Lung bx 1/5/22 with pathology reported malignant neoplasm with sqamous features. The IHC interpretation was poorly differentiated malignant tumor that demonstrated an immunohistochemical profile that included squamous and urothelial carcinoma in the differential diagnosis. IHC staining positive for GATA3, p40, CK 5/6 and p63 with patchy positive for CK 7. She saw Dr Chula Youssef last on 1/12/23      She was admitted 1/15 with increasing bouts of RUQ pain similar to prior episode of pancreatitis. Pain became unbearable prompting ED presentation. CT A/P 1/16 with pancreas again shows dilatation of the tail, stranding of fat in upper abd.       PMH:  Past Medical History:   Diagnosis Date    EPIFANIO (acute kidney injury) ([address] 12/04/2014    pt denies this dx    Arthritis     RA-abdelrahman hands    Back pain 6/10/2016    Breast cancer ([address] 2018    Breast lump dx 2/2018    left Bronchitis     Chronic obstructive pulmonary disease ([address]     Discharge from the vagina     Dysuria     Eczema 6/10/2016    Fungal dermatitis     Headache 6/10/2016    Hypercholesterolemia 12/4/2014    Hypertension     not well controlled--per pt    Intractable vomiting 5/20/2018    Menopausal vaginal dryness     Osteoarthritis 6/10/2016    Osteoporosis 6/10/2016    Pancreatitis     2 episodes    PUD (peptic ulcer disease)     last 2003 which a rupture an extensive surgery    Sepsis ([address] 12/4/2014    Thyroid nodule     Tobacco abuse     1ppd x 49 yrs       PSH:  Past Surgical History:   Procedure Laterality Date    APPENDECTOMY  1977    with hysterectomy    BREAST BIOPSY Left 1975    BREAST LUMPECTOMY Left 2/22/2018    LEFT BREAST LUMPECTOMY/ SENTINEL NODE BIOPSY performed by Italo Ash MD at [facility] MAIN OR    BREAST SURGERY Left 1975    breast lumpectomy, benign  (left)    CATARACT REMOVAL Bilateral 2018    w/IOL    CHOLECYSTECTOMY      CT NEEDLE BIOPSY LUNG PERCUTANEOUS  1/5/2023    CT NEEDLE BIOPSY LUNG PERCUTANEOUS 1/5/2023 [facility] RADIOLOGY CT SCAN    HYSTERECTOMY (CERVIX STATUS UNKNOWN)  1977    OTHER SURGICAL HISTORY      hernicolectomy 2 cm    WI UNLISTED PROCEDURE ABDOMEN PERITONEUM & OMENTUM  2003    stomach surgery for ruptured ulcer and extensive rerouting of intestestines per patient     [address]  05/21/2018    empiric dilation    UPPER GASTROINTESTINAL ENDOSCOPY      UPPER GASTROINTESTINAL ENDOSCOPY N/A 10/4/2022    EGD ESOPHAGOGASTRODUODENOSCOPY/  performed by Lynnette Vaughan MD at [address] 12/28/2022    ENDOSCOPIC ULTRASOUND W/ EGD/ / [facility] LEFT Left 1/31/2018    US BREAST NEEDLE BIOPSY LEFT 1/31/2018 [facility] RADIOLOGY MAMMO       Allergies:   Allergies   Allergen Reactions    Azithromycin Other (See Comments)     pancreatitis    Codeine Nausea And Vomiting       Home Medications:  Prior to Admission medications    Medication Sig Start Date End Date Taking? Authorizing Provider   hyoscyamine (LEVSIN/SL) 125 MCG sublingual tablet Place 1 tablet under the tongue every 6 hours as needed for Cramping 1/17/23 1/27/23  ROBERTO Hamilton - CNP   pravastatin (PRAVACHOL) 40 MG tablet Take 1 tablet by mouth daily 10/12/22   Fadumo Meade MD       [facility] Medications:  Current Facility-Administered Medications   Medication Dose Route Frequency    sodium chloride flush 0.9 % injection 5-40 mL  5-40 mL IntraVENous 2 times per day    sodium chloride flush 0.9 % injection 5-40 mL  5-40 mL IntraVENous PRN    0.9 % sodium chloride infusion   IntraVENous PRN    enoxaparin Sodium (LOVENOX) injection 30 mg  30 mg SubCUTAneous Daily    ondansetron (ZOFRAN-ODT) disintegrating tablet 4 mg  4 mg Oral Q8H PRN    Or    ondansetron (ZOFRAN) injection 4 mg  4 mg IntraVENous Q6H PRN    polyethylene glycol (GLYCOLAX) packet 17 g  17 g Oral Daily PRN    acetaminophen (TYLENOL) tablet 650 mg  650 mg Oral Q6H PRN    Or    acetaminophen (TYLENOL) suppository 650 mg  650 mg Rectal Q6H PRN    lactated ringers IV soln infusion   IntraVENous Continuous    morphine injection 2 mg  2 mg IntraVENous Q4H PRN    oxyCODONE (ROXICODONE) immediate release tablet 5 mg  5 mg Oral Q4H PRN     Current Outpatient Medications   Medication Sig    hyoscyamine (LEVSIN/SL) 125 MCG sublingual tablet Place 1 tablet under the tongue every 6 hours as needed for Cramping    pravastatin (PRAVACHOL) 40 MG tablet Take 1 tablet by mouth daily       Social History:  Social History     Tobacco Use    Smoking status: Every Day     Packs/day: 0.10     Types: Cigarettes     Start date: 5/6/1966    Smokeless tobacco: Never   Substance Use Topics    Alcohol use: No       Pt denies any history of drug use, blood transfusions, or tattoos.     Family History:  Family History Problem Relation Age of Onset    Thyroid Cancer Neg Hx     No Known Problems Paternal Grandfather     No Known Problems Paternal Grandmother     No Known Problems Maternal Grandfather     No Known Problems Maternal Grandmother     Hypertension Other         Brother and sister with htn    No Known Problems Brother     Hypertension Mother     Heart Disease Brother     Heart Disease Sister     Breast Cancer Sister 79    Cancer Sister     Heart Disease Father     Heart Attack Father     Cancer Brother         prostate    Diabetes Sister     Heart Disease Sister        Review of Systems:  A detailed 10 system ROS is obtained, with pertinent positives as listed above. All others are negative. Diet: NPO      Objective:     Physical Exam:  Vitals:  BP (!) 162/61   Pulse (!) 106   Temp 97.9 °F (36.6 °C) (Oral)   Resp 18   Ht 5' 1\" (1.549 m)   Wt 92 lb (41.7 kg)   LMP  (LMP Unknown)   SpO2 98%   BMI 17.38 kg/m²   Gen:  Pt is alert, cooperative, no acute distress, lying on stretcher, thin female  Skin:  Extremities and face reveal no rashes. HEENT: Sclerae anicteric. Extra-occular muscles are intact. No oral ulcers. No abnormal pigmentation of the lips. The neck is supple. Cardiovascular: Regular rate and rhythm. No murmurs, gallops, or rubs. Respiratory:  Comfortable breathing with no accessory muscle use. Clear breath sounds anteriorly with no wheezes, rales, or rhonchi. GI:  Abdomen nondistended, soft, RUQ and RLQ TTP. Normal active bowel sounds. No enlargement of the liver or spleen. No masses palpable. Rectal:  Deferred  Musculoskeletal:  No pitting edema of the lower legs. Neurological:  Gross memory appears intact. Patient is alert and oriented. Psychiatric:  Mood appears appropriate with judgement intact. Lymphatic:  No cervical or supraclavicular adenopathy.     Laboratory:    Recent Labs     02/18/23  0651   WBC 12.4*   HGB 11.7   HCT 36.8      MCV 85.4      K 3.4*    CO2 20*   BUN 13   AST 8*   ALT 12      CT OF THE ABDOMEN AND PELVIS 2/18/23       INDICATION: Abdominal pain       Multiple axial images were obtained through the abdomen and pelvis. Oral   contrast was not administered. 100mL of Isovue 370 intravenous contrast was   used for better evaluation of solid organs and vascular structures. Radiation   dose reduction techniques were used for this study. All CT scans performed at   this facility use one or all of the following: Automated exposure control,   adjustment of the mA and/or kVp according to patient's size, iterative   reconstruction. COMPARISON: 01/16/2023, PET scan 01/20/2023       FINDINGS:   - Lung Bases: No infiltrates or masses. - Liver: Unremarkable   - Biliary: There is mild intrahepatic biliary ductal dilatation.    - Pancreas: There is also moderate dilatation of the pancreatic duct with ductal   prominence at the pancreatic head appearing more conspicuous. There is   ill-defined decreased attenuation at the level of the pancreatic head and   uncinate process. - Spleen: Not enlarged, No mass. - Adrenals: Normal   - Kidneys/ureters: There is no hydronephrosis. Few subcentimeter low-density   left renal lesions are unchanged. - Bladder: Moderately distended. - Reproductive organs: Hysterectomy. - Bowel: There are postoperative changes of gastrojejunostomy. Again noted are   scattered mildly dilated jejunal loops. No definite transition zone is present. There is a mildly excessive amount of stool within the ascending and transverse   colon. - Lymph nodes: No significant retroperitoneal, mesenteric, or pelvic adenopathy.   - Bones: Vertebral plana configuration of L1 and mild compression deformities of   L3 and L5, unchanged. - Vasculature: Extensive atherosclerosis. - Bones: No aggressive osseous lesion.   - Other: No ascites. Impression   1.  Indistinctness at the pancreatic head/uncinate process with increasing   pancreatic ductal dilatation. While these findings could be inflammatory such as   pancreatitis, neoplasm could also have this appearance. One of the sites of   hypermetabolism is present at this location on PET scanning. 2. Extensive atherosclerosis. CT A/P 1/16/22  The pancreas again shows dilatation of the tail throughout its visualized   course. There is stranding of the fat in the upper abdomen, anterior to the pancreas. The appearance was similar previously; although, it has increased to some   degree. These changes could be secondary to pancreatitis and/or postoperative   change. No evidence of bowel obstruction. Hyperdense contrast is seen in the proximal   and distal small bowel loops. Postoperative changes are noted in the distal stomach as described above. No other significant findings. EUS 12/28/22 Dr Willett Pay  FINDINGS:  ESOPHAGUS: Not well visualized  STOMACH: Limited views with the oblique-viewing echoendoscope were unremarkable. Small gastric pouch. No Gastric ulcers. Small Bowel: Patent small bowel anastomosis. Limited views with the oblique-viewing echoendoscope were unremarkable. The ampulla was not visualized due to post-op anatomy. PANCREAS: The pancreas was well visualized in the body and tail. Views of the head are very limited (normally seen from D1/ D2). In the body and tail, the main pancreatic duct is mildly dilated (4.5mm body, 2mm tail). It has a smooth regular course. Otherwise, there are no changes of acute or chronic pancreatitis. No lobularity, dilated side branches or hyperechoic strands, etc.  No cyst or mass present. Limited views of the head are obtained from the gastric lumen, and no large mass is identified. A small mass at the ampulla would be difficult to exclude. BILIARY SYSTEM: The gallbladder was absent . Limited views of the CBD obtained from the stomach.   It measures 9mm in diameter, within normal limits. No stones are seen. OTHER ORGANS: There was no ascites in the upper abdomen. Limited views of the left lobe of the liver demonstrated no solid mass lesions. The celiac axis appears highly calcified, c/w atherosclerosis. No significant celiac or daly-pancreatic lymphadenopathy. PLAN:  FU labs  Advance diet as tolerated  If acute pancreatitis re-occurs, consider ERCP / pancreatic sphincterotomy with surgical access or colonoscope    MRCP 12/23/2022 showing pancreatic duct dilation as prior with abrupt tapering at [facility] as prior without clear mass lesion noted. CA 19.9 normal 6.7 on 12/25/22. Lipase increased again on 12/26 with worsening pain after eating. Assessment:     Principal Problem:    Pancreatitis, unspecified pancreatitis type  Resolved Problems:    * No resolved hospital problems. *    [de-identified] y.o. female with PMH including but not limited to duodenal ulcer complicated by perforation requiring bilroth II, idiopathic pancreatitis, rheumatoid arthritis on enbrel/steroids, HTN, ongoing tobacco use, who is seen in consultation on 2/18/23 at the request of Dr. Enrique Marino for pancreatitis. Patient has had multiple prior episodes of pancreatitis. Pain began Sept.  She underwent cholecystectomy. She was on Orencia for RA but this was D/C over concerns this causes pancreatitis episode. 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 During hospitalization in Oct she underwent EGD on 10/4/22 with only gastrojejunostomy and a HH. No prior colonoscopy. Hx of mild anemia. She was last seen in our office 10/27/22 and MRCP was reccommended to evaluate prior abnormalities seen on EUS. Repeat EUS 12/28/22 as below with no chronic pancreatitic changes other than a mildly dilated PD in the body. If acute pancreatitis re-occurs, consider ERCP / pancreatic sphincterotomy with surgical access or colonoscope.  Per Dr. Johanna Plascencia at time of 1/16/23 consult, would not commit to ERCP in this patient, as I do not see any record of LFT elevation during her pancreatitis episodes that would suggest SOD type I or II. EUS has been suboptimal due to her B2 anatomy, but MRCP 6/22/20 suggested possible pancreatic head cyst. CT 2/18/23 shows indistinctness at the pancreatic head/ uncinate process with increasing pancreatic ductal dilatation. This could be inflammatory such as pancreatis, but neoplasm could also have this appearance. One of the sites of hypermetabolism is present at this location on PET scanning. Extensive atherosclerosis. Labs 2/18/23: Lipase 4,669. LFTs unremarkable. WBC 12.4. Plan:     - Supportive care, maintain electrolytes, antiemetics, IVF's, pain control per primary   - Keep NPO   - MD to review further and determine plan   - Following       ESTRELLA Richard  [facility]    Patient is seen and examined in collaboration with Dr. Ludwin Craig. Assessment and plan as per Dr. Makayla Gonzales.

## 2023-02-18 NOTE — ED TRIAGE NOTES
Pt c/o right side abd pain x1 week, worse today with n/v, denies fever, chills; reports hx of pancreatitis; pt reports taking pain pill at approx 0500 this morning

## 2023-02-18 NOTE — H&P
Admit date: 2023   Name:  Alex Araiza   Age:  [de-identified] y.o.   :  1940   MRN:  [id number]   PCP:  Irwin Andres MD   Provider:  Pallavi Mcgill MD      ASSESSMENT AND PLAN  HISTORY OF PRESENT ILLNESS:  82-year-old female with past medical history of bipolar anemia, duodenal ulcer complicated with perforation requiring Billroth II, acute hepatic pancreatitis, arthritis, presents in setting of abdominal pain or nausea    1. Acute pancreatitis of unclear etiology  Patient denies alcohol consumption  Calcium within normal levels  Obtain triglyceride levels  N.p.o. for now  Start IV fluids  Pain management  Gastroenterology consult    2. Hyperlipidemia  Hold statin for now    DVT prophylaxis with Lovenox    Full code    Patient aware of plan      CHIEF COMPLAINT:  Abdominal pain, nausea      HISTORY OF PRESENT ILLNESS:  82-year-old female with past medical history of bipolar anemia, duodenal ulcer complicated with perforation requiring Billroth II, acute hepatic pancreatitis, arthritis, presents in setting of abdominal pain or nausea. The patient states that for the past few days she has been presenting with abdominal pain associated with nausea. Her symptoms did not improve so she decided to come to the emergency department she describes her abdominal pain as 10/10 intensity, sharp, radiated to her back. Otherwise she denies any chest pain, no shortness of breath, no cough, no fever or chills. In the emergency department she was found to be hemodynamically stable. Elevated lipase. Radiologic findings concerning of pancreatitis. She was started on IV fluids and pain management. She will be admitted to the medical floors for further management.       Past Medical History:   Diagnosis Date    EPIFANIO (acute kidney injury) ([facility] 2014    pt denies this dx    Arthritis     RA-abdelrahman hands    Back pain 6/10/2016    Breast cancer ([facility] [facility] 2018    Breast lump dx 2018    left    Bronchitis     Chronic obstructive pulmonary disease ([address]     Discharge from the vagina     Dysuria     Eczema 6/10/2016    Fungal dermatitis     Headache 6/10/2016    Hypercholesterolemia 12/4/2014    Hypertension     not well controlled--per pt    Intractable vomiting 5/20/2018    Menopausal vaginal dryness     Osteoarthritis 6/10/2016    Osteoporosis 6/10/2016    Pancreatitis     2 episodes    PUD (peptic ulcer disease)     last 2003 which a rupture an extensive surgery    Sepsis ([address] 12/4/2014    Thyroid nodule     Tobacco abuse     1ppd x 49 yrs       Past Surgical History:   Procedure Laterality Date    APPENDECTOMY  1977    with hysterectomy    BREAST BIOPSY Left 1975    BREAST LUMPECTOMY Left 2/22/2018    LEFT BREAST LUMPECTOMY/ SENTINEL NODE BIOPSY performed by Peace Lakhani MD at [facility] MAIN OR    BREAST SURGERY Left 1975    breast lumpectomy, benign  (left)    CATARACT REMOVAL Bilateral 2018    w/IOL    CHOLECYSTECTOMY      CT NEEDLE BIOPSY LUNG PERCUTANEOUS  1/5/2023    CT NEEDLE BIOPSY LUNG PERCUTANEOUS 1/5/2023 [facility] RADIOLOGY CT SCAN    HYSTERECTOMY (CERVIX STATUS UNKNOWN)  1977    OTHER SURGICAL HISTORY      hernicolectomy 2 cm    [place] UNLISTED PROCEDURE ABDOMEN PERITONEUM & OMENTUM  2003    stomach surgery for ruptured ulcer and extensive rerouting of intestestines per patient     [address]  05/21/2018    empiric dilation    UPPER GASTROINTESTINAL ENDOSCOPY      UPPER GASTROINTESTINAL ENDOSCOPY N/A 10/4/2022    EGD ESOPHAGOGASTRODUODENOSCOPY/  performed by Leena Dee MD at [address] [place] [address] 12/28/2022    ENDOSCOPIC ULTRASOUND W/ EGD/ / performed by Geraldo Lopez MD at [place] [id number] LEFT Left 1/31/2018    US BREAST NEEDLE BIOPSY LEFT 1/31/2018 [facility] RADIOLOGY MAMMO          HOME MEDICATION:  Prior to Admission medications    Medication Sig Start Date End Date Taking? Authorizing Provider   hyoscyamine (LEVSIN/SL) 125 MCG sublingual tablet Place 1 tablet under the tongue every 6 hours as needed for Cramping 1/17/23 1/27/23  ROBERTO Nixon - CNP   pravastatin (PRAVACHOL) 40 MG tablet Take 1 tablet by mouth daily 10/12/22   Ken Raygoza MD         REVIEW OF SYSTEMS:  14 ROS negative except from stated on HPI      Social History     Tobacco Use    Smoking status: Every Day     Packs/day: 0.10     Types: Cigarettes     Start date: 5/6/1966    Smokeless tobacco: Never   Vaping Use    Vaping Use: Never used   Substance Use Topics    Alcohol use: No    Drug use: No         Family History   Problem Relation Age of Onset    Thyroid Cancer Neg Hx     No Known Problems Paternal Grandfather     No Known Problems Paternal Grandmother     No Known Problems Maternal Grandfather     No Known Problems Maternal Grandmother     Hypertension Other         Brother and sister with htn    No Known Problems Brother     Hypertension Mother     Heart Disease Brother     Heart Disease Sister     Breast Cancer Sister 79    Cancer Sister     Heart Disease Father     Heart Attack Father     Cancer Brother         prostate    Diabetes Sister     Heart Disease Sister          Allergies   Allergen Reactions    Azithromycin Other (See Comments)     pancreatitis    Codeine Nausea And Vomiting         Vitals:    02/18/23 0642 02/18/23 0720   BP: (!) 156/91 (!) 162/61   Pulse: (!) 106    Resp: 18    Temp: 97.9 °F (36.6 °C)    TempSrc: Oral    SpO2: 98% 98%   Weight: 92 lb (41.7 kg)    Height: 5' 1\" (1.549 m)          PHYSICAL EXAM:  General: Alert, oriented, NAD  HEENT: NC/AT, EOM are intact  Neck: supple, no JVD  Cardiovascular: RRR, S1, S2, no murmurs  Respiratory: Lungs are clear, no wheezes or rales  Abdomen: Soft, tender, ND  Back: No CVA tenderness, no paraspinal tenderness  Extremities: LE without pedal edema, no erythema  Neuro: A&O, CN are intact, no focal deficits  Skin: no rash or ulcers  Psych: good mood and affect      I have personally reviewed patients laboratory data showing  Lab Results   Component Value Date    WBC 12.4 (H) 02/18/2023    HGB 11.7 02/18/2023    HCT 36.8 02/18/2023    MCV 85.4 02/18/2023     02/18/2023     Lab Results   Component Value Date    TROPONINI <0.02 (L) 12/26/2019     Lab Results   Component Value Date    ANIONGAP 9 02/18/2023    CALCIUM 8.7 02/18/2023     02/18/2023    K 3.4 (L) 02/18/2023    CO2 20 (L) 02/18/2023     02/18/2023    BUN 13 02/18/2023    CREATININE 0.90 02/18/2023         I have personally reviewed patients imaging showing  CT ABDOMEN PELVIS W IV CONTRAST Additional Contrast? None   Final Result   1. Indistinctness at the pancreatic head/uncinate process with increasing   pancreatic ductal dilatation. While these findings could be inflammatory such as   pancreatitis, neoplasm could also have this appearance. One of the sites of   hypermetabolism is present at this location on PET scanning. 2. Extensive atherosclerosis.                Likely length of stay more than 2 midnights

## 2023-02-18 NOTE — H&P
Admit date: 2023   Name:  Shaquille Mederosr   Age:  [de-identified] y.o.   :  1940   MRN:  [id number]   PCP:  Juan Carlos Salazar MD   Provider:  Maria T Reza MD      ASSESSMENT AND PLAN  HISTORY OF PRESENT ILLNESS:  73-year-old female with past medical history of bipolar anemia, duodenal ulcer complicated with perforation requiring Billroth II, acute hepatic pancreatitis, arthritis, presents in setting of abdominal pain or nausea    1. Acute pancreatitis of unclear etiology  Patient denies alcohol consumption  Calcium within normal levels  Obtain triglyceride levels  N.p.o. for now  Start IV fluids  Pain management  Gastroenterology consult    2. Hyperlipidemia  Hold statin for now    3. Hypokalemia  Replete  Monitor potassium levels    DVT prophylaxis with Lovenox    Full code    Patient aware of plan      CHIEF COMPLAINT:  Abdominal pain, nausea      HISTORY OF PRESENT ILLNESS:  73-year-old female with past medical history of bipolar anemia, duodenal ulcer complicated with perforation requiring Billroth II, acute hepatic pancreatitis, arthritis, presents in setting of abdominal pain or nausea. The patient states that for the past few days she has been presenting with abdominal pain associated with nausea. Her symptoms did not improve so she decided to come to the emergency department she describes her abdominal pain as 10/10 intensity, sharp, radiated to her back. Otherwise she denies any chest pain, no shortness of breath, no cough, no fever or chills. In the emergency department she was found to be hemodynamically stable. Elevated lipase. Radiologic findings concerning of pancreatitis. She was started on IV fluids and pain management. She will be admitted to the medical floors for further management.       Past Medical History:   Diagnosis Date    EPIFANIO (acute kidney injury) ([facility] [address] 2014    pt denies this dx    Arthritis     RA-abdelrahman hands    Back pain 6/10/2016    Breast cancer ([facility] [address] 2018    Breast lump dx 2/2018    left    Bronchitis     Chronic obstructive pulmonary disease ([address]     Discharge from the vagina     Dysuria     Eczema 6/10/2016    Fungal dermatitis     Headache 6/10/2016    Hypercholesterolemia 12/4/2014    Hypertension     not well controlled--per pt    Intractable vomiting 5/20/2018    Menopausal vaginal dryness     Osteoarthritis 6/10/2016    Osteoporosis 6/10/2016    Pancreatitis     2 episodes    PUD (peptic ulcer disease)     last 2003 which a rupture an extensive surgery    Sepsis ([address] 12/4/2014    Thyroid nodule     Tobacco abuse     1ppd x 49 yrs       Past Surgical History:   Procedure Laterality Date    APPENDECTOMY  1977    with hysterectomy    BREAST BIOPSY Left 1975    BREAST LUMPECTOMY Left 2/22/2018    LEFT BREAST LUMPECTOMY/ SENTINEL NODE BIOPSY performed by Manish Jeff MD at [facility] MAIN OR    BREAST SURGERY Left 1975    breast lumpectomy, benign  (left)    CATARACT REMOVAL Bilateral 2018    w/IOL    CHOLECYSTECTOMY      CT NEEDLE BIOPSY LUNG PERCUTANEOUS  1/5/2023    CT NEEDLE BIOPSY LUNG PERCUTANEOUS 1/5/2023 [facility] RAD[facility]OLOGY CT SCAN    HYSTERECTOMY (CERVIX STATUS UNKNOWN)  1977    OTHER SURGICAL HISTORY      hernicolectomy 2 cm    [place] UNLISTED PROCEDURE ABDOMEN PERITONEUM & OMENTUM  2003    stomach surgery for ruptured ulcer and extensive rerouting of intestestines per patient     [address] [place] [address]  05/21/2018    empiric dilation    UPPER GASTROINTESTINAL ENDOSCOPY      UPPER GASTROINTESTINAL ENDOSCOPY N/A 10/4/2022    EGD ESOPHAGOGASTRODUODENOSCOPY/  performed by Macarena Hemphill MD at [id number] [place] [address] 12/28/2022    ENDOSCOPIC ULTRASOUND W/ EGD/ / performed by Francisco London MD at [place] [id number] LEFT Left 1/31/2018    US BREAST NEEDLE BIOPSY LEFT 1/31/2018 [facility]Y [facility]AMMO          HOME MEDICATION:  Prior to Admission medications Medication Sig Start Date End Date Taking?  Authorizing Provider   hyoscyamine (LEVSIN/SL) 125 MCG sublingual tablet Place 1 tablet under the tongue every 6 hours as needed for Cramping 1/17/23 1/27/23  Chely Clarke APRN - CNP   pravastatin (PRAVACHOL) 40 MG tablet Take 1 tablet by mouth daily 10/12/22   Juan Carlos Salazar MD         REVIEW OF SYSTEMS:  14 ROS negative except from stated on HPI      Social History     Tobacco Use    Smoking status: Every Day     Packs/day: 0.10     Types: Cigarettes     Start date: 5/6/1966    Smokeless tobacco: Never   Vaping Use    Vaping Use: Never used   Substance Use Topics    Alcohol use: No    Drug use: No         Family History   Problem Relation Age of Onset    Thyroid Cancer Neg Hx     No Known Problems Paternal Grandfather     No Known Problems Paternal Grandmother     No Known Problems Maternal Grandfather     No Known Problems Maternal Grandmother     Hypertension Other         Brother and sister with htn    No Known Problems Brother     Hypertension Mother     Heart Disease Brother     Heart Disease Sister     Breast Cancer Sister 79    Cancer Sister     Heart Disease Father     Heart Attack Father     Cancer Brother         prostate    Diabetes Sister     Heart Disease Sister          Allergies   Allergen Reactions    Azithromycin Other (See Comments)     pancreatitis    Codeine Nausea And Vomiting         Vitals:    02/18/23 0642 02/18/23 0720   BP: (!) 156/91 (!) 162/61   Pulse: (!) 106    Resp: 18    Temp: 97.9 °F (36.6 °C)    TempSrc: Oral    SpO2: 98% 98%   Weight: 92 lb (41.7 kg)    Height: 5' 1\" (1.549 m)          PHYSICAL EXAM:  General: Alert, oriented, NAD  HEENT: NC/AT, EOM are intact  Neck: supple, no JVD  Cardiovascular: RRR, S1, S2, no murmurs  Respiratory: Lungs are clear, no wheezes or rales  Abdomen: Soft, tender, ND  Back: No CVA tenderness, no paraspinal tenderness  Extremities: LE without pedal edema, no erythema  Neuro: A&O, CN are intact, no focal deficits  Skin: no rash or ulcers  Psych: good mood and affect      I have personally reviewed patients laboratory data showing  Lab Results   Component Value Date    WBC 12.4 (H) 02/18/2023    HGB 11.7 02/18/2023    HCT 36.8 02/18/2023    MCV 85.4 02/18/2023     02/18/2023     Lab Results   Component Value Date    TROPONINI <0.02 (L) 12/26/2019     Lab Results   Component Value Date    ANIONGAP 9 02/18/2023    CALCIUM 8.7 02/18/2023     02/18/2023    K 3.4 (L) 02/18/2023    CO2 20 (L) 02/18/2023     02/18/2023    BUN 13 02/18/2023    CREATININE 0.90 02/18/2023         I have personally reviewed patients imaging showing  CT ABDOMEN PELVIS W IV CONTRAST Additional Contrast? None   Final Result   1. Indistinctness at the pancreatic head/uncinate process with increasing   pancreatic ductal dilatation. While these findings could be inflammatory such as   pancreatitis, neoplasm could also have this appearance. One of the sites of   hypermetabolism is present at this location on PET scanning. 2. Extensive atherosclerosis.                Likely length of stay more than 2 midnights

## 2023-02-18 NOTE — PROGRESS NOTES
TRANSFER - IN REPORT:    Verbal report received from TU Caballero on Aida Santa  being received from ED for routine progression of patient care      Report consisted of patient's Situation, Background, Assessment and   Recommendations(SBAR). Information from the following report(s) Index was reviewed with the receiving nurse. Opportunity for questions and clarification was provided. Assessment completed upon patient's arrival to unit and care assumed.

## 2023-02-18 NOTE — ED NOTES
TRANSFER - OUT REPORT:    Verbal report given to Yumi Grimm RN on Churchyessica Marmolejo  being transferred to [address] [place] [address] for routine progression of patient care       Report consisted of patient's Situation, Background, Assessment and   Recommendations(SBAR). Information from the following report(s) Nurse Handoff Report was reviewed with the receiving nurse. [place] Assessment: No data recorded  Lines:   Peripheral IV 02/18/23 Right Antecubital (Active)   Site Assessment Clean, dry & intact 02/18/23 2217   Line Status Blood return noted; Flushed;Normal saline locked 02/18/23 0652   Phlebitis Assessment No symptoms 02/18/23 0652   Infiltration Assessment 0 02/18/23 3142        Opportunity for questions and clarification was provided.       Patient transported with:  Brett Chapa RN  02/18/23 3845

## 2023-02-18 NOTE — ED PROVIDER NOTES
Emergency Department Provider Note                   PCP:                Emilee Salazar MD               Age: [de-identified] y.o. Sex: female     No diagnosis found. DISPOSITION          Medical Decision Making  Patient presents with abdominal pain and vomiting. Patient has a lipase of over 4000 and on CT appears to have a worsening pancreatic duct obstruction. I believe this will continue to worsen likely if not addressed. Patient will need to be admitted for further work-up and management of this problem. Patient has remained hemodynamically stable while in the emergency department. 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 Decision-making details documented in ED Course. Radiology: ordered and independent interpretation performed. Details: I have reviewed the radiology report    Risk  Prescription drug management. Decision regarding hospitalization. Complexity of Problem: 1 or more chronic illnesses with severe exacerbation. (5)  The patients assessment required an independent historian: I spoke with a family member. I have conducted an independent ordering and review of Labs. I have conducted an independent ordering and review of CT Scan. I have reviewed records from an external source: provider visit notes from outside specialist.  Considerations: Shared decision making was utilized in the care of this patient. I discussed study results with another external provider. I discussed disposition with another provider.            Orders Placed This Encounter   Procedures    CBC with Auto Differential    CMP    Lipase        Medications   morphine sulfate (PF) injection 4 mg (has no administration in time range) ondansetron (ZOFRAN) injection 4 mg (has no administration in time range)       New Prescriptions    No medications on file        Roseline Necessary is a [de-identified] y.o. female who presents to the Emergency Department with chief complaint of    Chief Complaint   Patient presents with    Abdominal Pain    Emesis      Patient presents with right upper quadrant and epigastric pain. She states she has had this episode for about a week. Patient states it started after eating some chicken that had some gravy and she has had this pain for about a week. Patient also had vomiting. However, she is not having any vomiting today. Emesis is nonbloody nonbilious. Patient denies any change in the stool. Patient describes the pain as a sharp pain that radiates into the right flank. Patient has a history of chronic pancreatitis and may have a \"cyst \". Patient has had a cholecystectomy as well. Patient denies any fevers or any other symptoms at this time. Review of Systems   All other systems reviewed and are negative.     Past Medical History:   Diagnosis Date    EPIFANIO (acute kidney injury) ([address] 12/04/2014    pt denies this dx    Arthritis     RA-abdelrahman hands    Back pain 6/10/2016    Breast cancer ([address] 2018    Breast lump dx 2/2018    left    Bronchitis     Chronic obstructive pulmonary disease ([address]     Discharge from the vagina     Dysuria     Eczema 6/10/2016    Fungal dermatitis     Headache 6/10/2016    Hypercholesterolemia 12/4/2014    Hypertension     not well controlled--per pt    Intractable vomiting 5/20/2018    Menopausal vaginal dryness     Osteoarthritis 6/10/2016    Osteoporosis 6/10/2016    Pancreatitis     2 episodes    PUD (peptic ulcer disease)     last 2003 which a rupture an extensive surgery    Sepsis ([address] 12/4/2014    Thyroid nodule     Tobacco abuse     1ppd x 49 yrs        Past Surgical History:   Procedure Laterality Date    APPENDECTOMY  1977    with hysterectomy    BREAST BIOPSY Left 1975    BREAST LUMPECTOMY Left 2/22/2018    LEFT BREAST LUMPECTOMY/ SENTINEL NODE BIOPSY performed by Julio Noland MD at [facility] MAIN OR    BREAST SURGERY Left 1975    breast lumpectomy, benign  (left)    CATARACT REMOVAL Bilateral 2018    w/IOL    CHOLECYSTECTOMY      CT NEEDLE BIOPSY LUNG PERCUTANEOUS  1/5/2023    CT NEEDLE BIOPSY LUNG PERCUTANEOUS 1/5/2023 [facility] RADIOLOGY CT SCAN    HYSTERECTOMY (CERVIX STATUS UNKNOWN)  1977    OTHER SURGICAL HISTORY      hernicolectomy 2 cm    [place] UNLISTED PROCEDURE ABDOMEN PERITONEUM & OMENTUM  2003    stomach surgery for ruptured ulcer and extensive rerouting of intestestines per patient     TUBAL LIGATION  1975    UPPER GASTROINTESTINAL ENDOSCOPY  05/21/2018    empiric dilation    UPPER GASTROINTESTINAL ENDOSCOPY      UPPER GASTROINTESTINAL ENDOSCOPY N/A 10/4/2022    EGD ESOPHAGOGASTRODUODENOSCOPY/  performed by Kingston Montgomery MD at [facility] ENDOSCOPY    UPPER GASTROINTESTINAL ENDOSCOPY N/A 12/28/2022    ENDOSCOPIC ULTRASOUND W/ EGD/ / performed by Patrick Garza MD at [facility] ENDOSCOPY    US BREAST BIOPSY W LOC DEVICE 1ST LESION LEFT Left 1/31/2018    US BREAST NEEDLE BIOPSY LEFT 1/31/2018 [facility] RADIOLOGY MAMMO        Family History   Problem Relation Age of Onset    Thyroid Cancer Neg Hx     No Known Problems Paternal Grandfather     No Known Problems Paternal Grandmother     No Known Problems Maternal Grandfather     No Known Problems Maternal Grandmother     Hypertension Other         Brother and sister with htn    No Known Problems Brother     Hypertension Mother     Heart Disease Brother     Heart Disease Sister     Breast Cancer Sister 70    Cancer Sister     Heart Disease Father     Heart Attack Father     Cancer Brother         prostate    Diabetes Sister     Heart Disease Sister         Social History     Socioeconomic History    Marital status:    Tobacco Use    Smoking status: Every Day     Packs/day: 0.10     Types: Cigarettes     Start date: 5/6/1966    Smokeless  tobacco: Never   Vaping Use    Vaping Use: Never used   Substance and Sexual Activity    Alcohol use: No    Drug use: No    Sexual activity: Not Currently     Birth control/protection: None         Azithromycin and Codeine     Previous Medications    HYOSCYAMINE (LEVSIN/SL) 125 MCG SUBLINGUAL TABLET    Place 1 tablet under the tongue every 6 hours as needed for Cramping    PRAVASTATIN (PRAVACHOL) 40 MG TABLET    Take 1 tablet by mouth daily        Vitals signs and nursing note reviewed. Patient Vitals for the past 4 hrs:   Temp Pulse Resp BP SpO2   02/18/23 0642 97.9 °F (36.6 °C) (!) 106 18 (!) 156/91 98 %          Physical Exam  Constitutional:       General: She is not in acute distress. HENT:      Head: Normocephalic and atraumatic. Nose: Nose normal.      Mouth/Throat:      Mouth: Mucous membranes are moist.   Eyes:      Extraocular Movements: Extraocular movements intact. Pupils: Pupils are equal, round, and reactive to light. Cardiovascular:      Rate and Rhythm: Normal rate and regular rhythm. Heart sounds: Normal heart sounds. Pulmonary:      Effort: No respiratory distress. Breath sounds: No wheezing. Abdominal:      General: Abdomen is flat. Palpations: Abdomen is soft. Tenderness: There is abdominal tenderness in the right upper quadrant and epigastric area. Musculoskeletal:         General: No swelling or tenderness. Cervical back: Normal range of motion. Skin:     Findings: No erythema or rash. Neurological:      General: No focal deficit present. Mental Status: She is oriented to person, place, and time. Psychiatric:         Mood and Affect: Mood normal.         Behavior: Behavior normal.        Procedures    No results found for any visits on 02/18/23. No orders to display                       Voice dictation software was used during the making of this note.   This software is not perfect and grammatical and other typographical errors may be present. This note has not been completely proofread for errors.      Xochitl Donnelly,   02/18/23 1985

## 2023-02-18 NOTE — H&P (VIEW-ONLY)
[facility] Consult Note       Primary GI Physician: Dr. Gio Gallego    Referring Provider:  Dr. Sudha Rivera    Consult Date:  2/18/2023    Admit Date:  2/18/2023    Chief Complaint:  Pancreatitis    Subjective:     History of Present Illness:  Patient is a [de-identified] y.o. female with PMH including but not limited to duodenal ulcer complicated by perforation requiring bilroth II, idiopathic pancreatitis, rheumatoid arthritis on enbrel/steroids, HTN, ongoing tobacco use, who is seen in consultation on 2/18/23 at the request of Dr. Sudha Rivera for pancreatitis. Patient presented on 2/18/23 with complaints of right sided abdominal pain X 1 week, worse today with nausea, vomiting. Denies fever or chills. 2/18/23 Lipase 4,669. LFTs unremarkable. WBC 12.4. CT A/P with contrast on 2/18/23  Impression   1. Indistinctness at the pancreatic head/uncinate process with increasing   pancreatic ductal dilatation. While these findings could be inflammatory such as   pancreatitis, neoplasm could also have this appearance. One of the sites of   hypermetabolism is present at this location on PET scanning. 2. Extensive atherosclerosis. Patient reports 1 week ago after eating a frozen meal delivered from her insurance company she developed right sided abdominal pain that radiates to her lower back with several episodes of nausea and vomiting. She denies fever or chills. She reports 20 lb weight loss since January. She reports she is planned to have lung surgery in March. She denies use of NSAIDs and takes Tylenol. She denies use of ETOH. She smokes 3-4 cigarettes daily. Patient has been seen in GI consultation in January and December for recurrent pancreatitis. She has had multiple prior episodes of pancreatitis. Pain began Sept.  She underwent cholecystectomy. She was on Orencia for RA but this was D/C over concerns this causes pancreatitis episode.   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 During hospitalization in Oct she underwent EGD on 10/4/22 with only gastrojejunostomy and a HH. No prior colonoscopy. Hx of mild anemia. She was last seen in our office 10/27/22 and MRCP was reccommended to evaluate prior abnormalities seen on EUS. Repeat EUS 12/28/22 as below with no chronic pancreatitic changes other than a mildly dilated PD in the body. If acute pancreatitis re-occurs, consider ERCP / pancreatic sphincterotomy with surgical access or colonoscope. Per Dr. Arley Warren at time of 1/16/23 consult, would not commit to ERCP in this patient, as I do not see any record of LFT elevation during her pancreatitis episodes that would suggest SOD type I or II. EUS has been suboptimal due to her B2 anatomy, but MRCP 6/22/20 suggested possible pancreatic head cyst.     CT 12/22 with [id number] LLL Nodule noted on CT. Concerning for malignancy as patient is a daily smoker with symptoms of unintentional weight loss and night sweats. Followed up with  and outpatient PET. Lung bx 1/5/22 with pathology reported malignant neoplasm with sqamous features. The IHC interpretation was poorly differentiated malignant tumor that demonstrated an immunohistochemical profile that included squamous and urothelial carcinoma in the differential diagnosis. IHC staining positive for GATA3, p40, CK 5/6 and p63 with patchy positive for CK 7. She saw Dr Brittany Douglas last on 1/12/23      She was admitted 1/15 with increasing bouts of RUQ pain similar to prior episode of pancreatitis. Pain became unbearable prompting ED presentation. CT A/P 1/16 with pancreas again shows dilatation of the tail, stranding of fat in upper abd.       PMH:  Past Medical History:   Diagnosis Date    EPIFANIO (acute kidney injury) ([address] 12/04/2014    pt denies this dx    Arthritis     RA-abdelrahman hands    Back pain 6/10/2016    Breast cancer ([address] 2018    Breast lump dx 2/2018    left Bronchitis     Chronic obstructive pulmonary disease ([address]     Discharge from the vagina     Dysuria     Eczema 6/10/2016    Fungal dermatitis     Headache 6/10/2016    Hypercholesterolemia 12/4/2014    Hypertension     not well controlled--per pt    Intractable vomiting 5/20/2018    Menopausal vaginal dryness     Osteoarthritis 6/10/2016    Osteoporosis 6/10/2016    Pancreatitis     2 episodes    PUD (peptic ulcer disease)     last 2003 which a rupture an extensive surgery    Sepsis ([address] 12/4/2014    Thyroid nodule     Tobacco abuse     1ppd x 49 yrs       PSH:  Past Surgical History:   Procedure Laterality Date    APPENDECTOMY  1977    with hysterectomy    BREAST BIOPSY Left 1975    BREAST LUMPECTOMY Left 2/22/2018    LEFT BREAST LUMPECTOMY/ SENTINEL NODE BIOPSY performed by Diya Hitchcock MD at [facility] MAIN OR    BREAST SURGERY Left 1975    breast lumpectomy, benign  (left)    CATARACT REMOVAL Bilateral 2018    w/IOL    CHOLECYSTECTOMY      CT NEEDLE BIOPSY LUNG PERCUTANEOUS  1/5/2023    CT NEEDLE BIOPSY LUNG PERCUTANEOUS 1/5/2023 [facility] CT SCAN    HYSTERECTOMY (CERVIX STATUS UNKNOWN)  1977    OTHER SURGICAL HISTORY      hernicolectomy 2 cm    RI UNLISTED PROCEDURE ABDOMEN PERITONEUM & OMENTUM  2003    stomach surgery for ruptured ulcer and extensive rerouting of intestestines per patient     [address]  05/21/2018    empiric dilation    UPPER GASTROINTESTINAL ENDOSCOPY      UPPER GASTROINTESTINAL ENDOSCOPY N/A 10/4/2022    EGD ESOPHAGOGASTRODUODENOSCOPY/  performed by Vivian Purcell MD at [address] [place] [address] 12/28/2022    ENDOSCOPIC ULTRASOUND W/ EGD/ / performed by Aure Esparza MD at [place] [address] LEFT Left 1/31/2018    US BREAST NEEDLE BIOPSY LEFT 1/31/2018 [facility]       Allergies:   Allergies   Allergen Reactions    Azithromycin Other (See Comments)     pancreatitis    Codeine Nausea And Vomiting       Home Medications:  Prior to Admission medications    Medication Sig Start Date End Date Taking? Authorizing Provider   hyoscyamine (LEVSIN/SL) 125 MCG sublingual tablet Place 1 tablet under the tongue every 6 hours as needed for Cramping 1/17/23 1/27/23  ROBERTO Hamilton - CNP   pravastatin (PRAVACHOL) 40 MG tablet Take 1 tablet by mouth daily 10/12/22   Fadumo Meade MD       [facility] Medications:  Current Facility-Administered Medications   Medication Dose Route Frequency    sodium chloride flush 0.9 % injection 5-40 mL  5-40 mL IntraVENous 2 times per day    sodium chloride flush 0.9 % injection 5-40 mL  5-40 mL IntraVENous PRN    0.9 % sodium chloride infusion   IntraVENous PRN    enoxaparin Sodium (LOVENOX) injection 30 mg  30 mg SubCUTAneous Daily    ondansetron (ZOFRAN-ODT) disintegrating tablet 4 mg  4 mg Oral Q8H PRN    Or    ondansetron (ZOFRAN) injection 4 mg  4 mg IntraVENous Q6H PRN    polyethylene glycol (GLYCOLAX) packet 17 g  17 g Oral Daily PRN    acetaminophen (TYLENOL) tablet 650 mg  650 mg Oral Q6H PRN    Or    acetaminophen (TYLENOL) suppository 650 mg  650 mg Rectal Q6H PRN    lactated ringers IV soln infusion   IntraVENous Continuous    morphine injection 2 mg  2 mg IntraVENous Q4H PRN    oxyCODONE (ROXICODONE) immediate release tablet 5 mg  5 mg Oral Q4H PRN     Current Outpatient Medications   Medication Sig    hyoscyamine (LEVSIN/SL) 125 MCG sublingual tablet Place 1 tablet under the tongue every 6 hours as needed for Cramping    pravastatin (PRAVACHOL) 40 MG tablet Take 1 tablet by mouth daily       Social History:  Social History     Tobacco Use    Smoking status: Every Day     Packs/day: 0.10     Types: Cigarettes     Start date: 5/6/1966    Smokeless tobacco: Never   Substance Use Topics    Alcohol use: No       Pt denies any history of drug use, blood transfusions, or tattoos.     Family History:  Family History Problem Relation Age of Onset    Thyroid Cancer Neg Hx     No Known Problems Paternal Grandfather     No Known Problems Paternal Grandmother     No Known Problems Maternal Grandfather     No Known Problems Maternal Grandmother     Hypertension Other         Brother and sister with htn    No Known Problems Brother     Hypertension Mother     Heart Disease Brother     Heart Disease Sister     Breast Cancer Sister 79    Cancer Sister     Heart Disease Father     Heart Attack Father     Cancer Brother         prostate    Diabetes Sister     Heart Disease Sister        Review of Systems:  A detailed 10 system ROS is obtained, with pertinent positives as listed above. All others are negative. Diet: NPO      Objective:     Physical Exam:  Vitals:  BP (!) 162/61   Pulse (!) 106   Temp 97.9 °F (36.6 °C) (Oral)   Resp 18   Ht 5' 1\" (1.549 m)   Wt 92 lb (41.7 kg)   LMP  (LMP Unknown)   SpO2 98%   BMI 17.38 kg/m²   Gen:  Pt is alert, cooperative, no acute distress, lying on stretcher, thin female  Skin:  Extremities and face reveal no rashes. HEENT: Sclerae anicteric. Extra-occular muscles are intact. No oral ulcers. No abnormal pigmentation of the lips. The neck is supple. Cardiovascular: Regular rate and rhythm. No murmurs, gallops, or rubs. Respiratory:  Comfortable breathing with no accessory muscle use. Clear breath sounds anteriorly with no wheezes, rales, or rhonchi. GI:  Abdomen nondistended, soft, RUQ and RLQ TTP. Normal active bowel sounds. No enlargement of the liver or spleen. No masses palpable. Rectal:  Deferred  Musculoskeletal:  No pitting edema of the lower legs. Neurological:  Gross memory appears intact. Patient is alert and oriented. Psychiatric:  Mood appears appropriate with judgement intact. Lymphatic:  No cervical or supraclavicular adenopathy.     Laboratory:    Recent Labs     02/18/23  0651   WBC 12.4*   HGB 11.7   HCT 36.8      MCV 85.4      K 3.4*    CO2 20*   BUN 13   AST 8*   ALT 12      CT OF THE ABDOMEN AND PELVIS 2/18/23       INDICATION: Abdominal pain       Multiple axial images were obtained through the abdomen and pelvis. Oral   contrast was not administered. 100mL of Isovue 370 intravenous contrast was   used for better evaluation of solid organs and vascular structures. Radiation   dose reduction techniques were used for this study. All CT scans performed at   this facility use one or all of the following: Automated exposure control,   adjustment of the mA and/or kVp according to patient's size, iterative   reconstruction. COMPARISON: 01/16/2023, PET scan 01/20/2023       FINDINGS:   - Lung Bases: No infiltrates or masses. - Liver: Unremarkable   - Biliary: There is mild intrahepatic biliary ductal dilatation.    - Pancreas: There is also moderate dilatation of the pancreatic duct with ductal   prominence at the pancreatic head appearing more conspicuous. There is   ill-defined decreased attenuation at the level of the pancreatic head and   uncinate process. - Spleen: Not enlarged, No mass. - Adrenals: Normal   - Kidneys/ureters: There is no hydronephrosis. Few subcentimeter low-density   left renal lesions are unchanged. - Bladder: Moderately distended. - Reproductive organs: Hysterectomy. - Bowel: There are postoperative changes of gastrojejunostomy. Again noted are   scattered mildly dilated jejunal loops. No definite transition zone is present. There is a mildly excessive amount of stool within the ascending and transverse   colon. - Lymph nodes: No significant retroperitoneal, mesenteric, or pelvic adenopathy.   - Bones: Vertebral plana configuration of L1 and mild compression deformities of   L3 and L5, unchanged. - Vasculature: Extensive atherosclerosis. - Bones: No aggressive osseous lesion.   - Other: No ascites. Impression   1.  Indistinctness at the pancreatic head/uncinate process with increasing   pancreatic ductal dilatation. While these findings could be inflammatory such as   pancreatitis, neoplasm could also have this appearance. One of the sites of   hypermetabolism is present at this location on PET scanning. 2. Extensive atherosclerosis. CT A/P 1/16/22  The pancreas again shows dilatation of the tail throughout its visualized   course. There is stranding of the fat in the upper abdomen, anterior to the pancreas. The appearance was similar previously; although, it has increased to some   degree. These changes could be secondary to pancreatitis and/or postoperative   change. No evidence of bowel obstruction. Hyperdense contrast is seen in the proximal   and distal small bowel loops. Postoperative changes are noted in the distal stomach as described above. No other significant findings. EUS 12/28/22 Dr Grant Sessions  FINDINGS:  ESOPHAGUS: Not well visualized  STOMACH: Limited views with the oblique-viewing echoendoscope were unremarkable. Small gastric pouch. No Gastric ulcers. Small Bowel: Patent small bowel anastomosis. Limited views with the oblique-viewing echoendoscope were unremarkable. The ampulla was not visualized due to post-op anatomy. PANCREAS: The pancreas was well visualized in the body and tail. Views of the head are very limited (normally seen from D1/ D2). In the body and tail, the main pancreatic duct is mildly dilated (4.5mm body, 2mm tail). It has a smooth regular course. Otherwise, there are no changes of acute or chronic pancreatitis. No lobularity, dilated side branches or hyperechoic strands, etc.  No cyst or mass present. Limited views of the head are obtained from the gastric lumen, and no large mass is identified. A small mass at the ampulla would be difficult to exclude. BILIARY SYSTEM: The gallbladder was absent . Limited views of the CBD obtained from the stomach.   It measures 9mm in diameter, within normal limits. No stones are seen. OTHER ORGANS: There was no ascites in the upper abdomen. Limited views of the left lobe of the liver demonstrated no solid mass lesions. The celiac axis appears highly calcified, c/w atherosclerosis. No significant celiac or daly-pancreatic lymphadenopathy. PLAN:  FU labs  Advance diet as tolerated  If acute pancreatitis re-occurs, consider ERCP / pancreatic sphincterotomy with surgical access or colonoscope    MRCP 12/23/2022 showing pancreatic duct dilation as prior with abrupt tapering at [facility] as prior without clear mass lesion noted. CA 19.9 normal 6.7 on 12/25/22. Lipase increased again on 12/26 with worsening pain after eating. Assessment:     Principal Problem:    Pancreatitis, unspecified pancreatitis type  Resolved Problems:    * No resolved hospital problems. *    [de-identified] y.o. female with PMH including but not limited to duodenal ulcer complicated by perforation requiring bilroth II, idiopathic pancreatitis, rheumatoid arthritis on enbrel/steroids, HTN, ongoing tobacco use, who is seen in consultation on 2/18/23 at the request of Dr. Rubina Whitley for pancreatitis. Patient has had multiple prior episodes of pancreatitis. Pain began Sept.  She underwent cholecystectomy. She was on Orencia for RA but this was D/C over concerns this causes pancreatitis episode. 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 During hospitalization in Oct she underwent EGD on 10/4/22 with only gastrojejunostomy and a HH. No prior colonoscopy. Hx of mild anemia. She was last seen in our office 10/27/22 and MRCP was reccommended to evaluate prior abnormalities seen on EUS. Repeat EUS 12/28/22 as below with no chronic pancreatitic changes other than a mildly dilated PD in the body. If acute pancreatitis re-occurs, consider ERCP / pancreatic sphincterotomy with surgical access or colonoscope.  Per Dr. Andrey Garcia at time of 1/16/23 consult, would not commit to ERCP in this patient, as I do not see any record of LFT elevation during her pancreatitis episodes that would suggest SOD type I or II. EUS has been suboptimal due to her B2 anatomy, but MRCP 6/22/20 suggested possible pancreatic head cyst. CT 2/18/23 shows indistinctness at the pancreatic head/ uncinate process with increasing pancreatic ductal dilatation. This could be inflammatory such as pancreatis, but neoplasm could also have this appearance. One of the sites of hypermetabolism is present at this location on PET scanning. Extensive atherosclerosis. Labs 2/18/23: Lipase 4,669. LFTs unremarkable. WBC 12.4. Plan:     - Supportive care, maintain electrolytes, antiemetics, IVF's, pain control per primary   - Keep NPO   - MD to review further and determine plan   - Following       ESTRELLA Polanco  [facility]    Patient is seen and examined in collaboration with Dr. Allen Thompson. Assessment and plan as per Dr. Kt Linn.

## 2023-02-19 LAB
ALBUMIN SERPL-MCNC: 2.6 G/DL (ref 3.2–4.6)
ALBUMIN/GLOB SERPL: 0.7 (ref 0.4–1.6)
ALP SERPL-CCNC: 64 U/L (ref 50–136)
ALT SERPL-CCNC: 9 U/L (ref 12–65)
ANION GAP SERPL CALC-SCNC: 5 MMOL/L (ref 2–11)
APPEARANCE UR: CLEAR
AST SERPL-CCNC: 14 U/L (ref 15–37)
BACTERIA URNS QL MICRO: ABNORMAL /HPF
BASOPHILS # BLD: 0 K/UL (ref 0–0.2)
BASOPHILS NFR BLD: 1 % (ref 0–2)
BILIRUB SERPL-MCNC: 0.7 MG/DL (ref 0.2–1.1)
BILIRUB UR QL: NEGATIVE
BUN SERPL-MCNC: 7 MG/DL (ref 8–23)
CALCIUM SERPL-MCNC: 8.4 MG/DL (ref 8.3–10.4)
CASTS URNS QL MICRO: 0 /LPF
CHLORIDE SERPL-SCNC: 110 MMOL/L (ref 101–110)
CO2 SERPL-SCNC: 22 MMOL/L (ref 21–32)
COLOR UR: ABNORMAL
CREAT SERPL-MCNC: 0.5 MG/DL (ref 0.6–1)
CRYSTALS URNS QL MICRO: 0 /LPF
DIFFERENTIAL METHOD BLD: ABNORMAL
EOSINOPHIL # BLD: 0.1 K/UL (ref 0–0.8)
EOSINOPHIL NFR BLD: 1 % (ref 0.5–7.8)
EPI CELLS #/AREA URNS HPF: ABNORMAL /HPF
ERYTHROCYTE [DISTWIDTH] IN BLOOD BY AUTOMATED COUNT: 16.1 % (ref 11.9–14.6)
GLOBULIN SER CALC-MCNC: 3.7 G/DL (ref 2.8–4.5)
GLUCOSE SERPL-MCNC: 80 MG/DL (ref 65–100)
GLUCOSE UR STRIP.AUTO-MCNC: NEGATIVE MG/DL
HCT VFR BLD AUTO: 35.3 % (ref 35.8–46.3)
HGB BLD-MCNC: 11 G/DL (ref 11.7–15.4)
HGB UR QL STRIP: NEGATIVE
IMM GRANULOCYTES # BLD AUTO: 0 K/UL (ref 0–0.5)
IMM GRANULOCYTES NFR BLD AUTO: 0 % (ref 0–5)
KETONES UR QL STRIP.AUTO: 15 MG/DL
LEUKOCYTE ESTERASE UR QL STRIP.AUTO: NEGATIVE
LIPASE SERPL-CCNC: 915 U/L (ref 73–393)
LYMPHOCYTES # BLD: 1.3 K/UL (ref 0.5–4.6)
LYMPHOCYTES NFR BLD: 16 % (ref 13–44)
MCH RBC QN AUTO: 27.2 PG (ref 26.1–32.9)
MCHC RBC AUTO-ENTMCNC: 31.2 G/DL (ref 31.4–35)
MCV RBC AUTO: 87.4 FL (ref 82–102)
MONOCYTES # BLD: 0.9 K/UL (ref 0.1–1.3)
MONOCYTES NFR BLD: 10 % (ref 4–12)
MUCOUS THREADS URNS QL MICRO: 0 /LPF
NEUTS SEG # BLD: 6.3 K/UL (ref 1.7–8.2)
NEUTS SEG NFR BLD: 72 % (ref 43–78)
NITRITE UR QL STRIP.AUTO: NEGATIVE
NRBC # BLD: 0 K/UL (ref 0–0.2)
OTHER OBSERVATIONS: ABNORMAL
PH UR STRIP: 7 (ref 5–9)
PLATELET # BLD AUTO: 255 K/UL (ref 150–450)
PMV BLD AUTO: 10.5 FL (ref 9.4–12.3)
POTASSIUM SERPL-SCNC: 3.9 MMOL/L (ref 3.5–5.1)
PROT SERPL-MCNC: 6.3 G/DL (ref 6.3–8.2)
PROT UR STRIP-MCNC: NEGATIVE MG/DL
RBC # BLD AUTO: 4.04 M/UL (ref 4.05–5.2)
RBC #/AREA URNS HPF: 0 /HPF
SODIUM SERPL-SCNC: 137 MMOL/L (ref 133–143)
SP GR UR REFRACTOMETRY: 1.01 (ref 1–1.02)
URINE CULTURE IF INDICATED: ABNORMAL
UROBILINOGEN UR QL STRIP.AUTO: 1 EU/DL (ref 0.2–1)
WBC # BLD AUTO: 8.6 K/UL (ref 4.3–11.1)
WBC URNS QL MICRO: ABNORMAL /HPF

## 2023-02-19 PROCEDURE — 36415 COLL VENOUS BLD VENIPUNCTURE: CPT

## 2023-02-19 PROCEDURE — 2580000003 HC RX 258: Performed by: INTERNAL MEDICINE

## 2023-02-19 PROCEDURE — 81001 URINALYSIS AUTO W/SCOPE: CPT

## 2023-02-19 PROCEDURE — 80053 COMPREHEN METABOLIC PANEL: CPT

## 2023-02-19 PROCEDURE — 6360000002 HC RX W HCPCS: Performed by: FAMILY MEDICINE

## 2023-02-19 PROCEDURE — 6370000000 HC RX 637 (ALT 250 FOR IP): Performed by: FAMILY MEDICINE

## 2023-02-19 PROCEDURE — 6370000000 HC RX 637 (ALT 250 FOR IP): Performed by: INTERNAL MEDICINE

## 2023-02-19 PROCEDURE — 6360000002 HC RX W HCPCS: Performed by: INTERNAL MEDICINE

## 2023-02-19 PROCEDURE — 85025 COMPLETE CBC W/AUTO DIFF WBC: CPT

## 2023-02-19 PROCEDURE — 1100000003 HC PRIVATE W/ TELEMETRY

## 2023-02-19 PROCEDURE — 2500000003 HC RX 250 WO HCPCS: Performed by: FAMILY MEDICINE

## 2023-02-19 PROCEDURE — 83690 ASSAY OF LIPASE: CPT

## 2023-02-19 RX ORDER — CLONIDINE 0.1 MG/24H
1 PATCH, EXTENDED RELEASE TRANSDERMAL WEEKLY
Status: DISCONTINUED | OUTPATIENT
Start: 2023-02-19 | End: 2023-02-23 | Stop reason: HOSPADM

## 2023-02-19 RX ORDER — LABETALOL HYDROCHLORIDE 5 MG/ML
10 INJECTION, SOLUTION INTRAVENOUS EVERY 6 HOURS PRN
Status: DISCONTINUED | OUTPATIENT
Start: 2023-02-19 | End: 2023-02-20

## 2023-02-19 RX ORDER — HYDRALAZINE HYDROCHLORIDE 20 MG/ML
10 INJECTION INTRAMUSCULAR; INTRAVENOUS EVERY 6 HOURS PRN
Status: DISCONTINUED | OUTPATIENT
Start: 2023-02-19 | End: 2023-02-20

## 2023-02-19 RX ADMIN — HYDRALAZINE HYDROCHLORIDE 10 MG: 20 INJECTION INTRAMUSCULAR; INTRAVENOUS at 11:39

## 2023-02-19 RX ADMIN — MORPHINE SULFATE 2 MG: 2 INJECTION, SOLUTION INTRAMUSCULAR; INTRAVENOUS at 03:50

## 2023-02-19 RX ADMIN — OXYCODONE 5 MG: 5 TABLET ORAL at 20:22

## 2023-02-19 RX ADMIN — SODIUM CHLORIDE, POTASSIUM CHLORIDE, SODIUM LACTATE AND CALCIUM CHLORIDE: 600; 310; 30; 20 INJECTION, SOLUTION INTRAVENOUS at 21:17

## 2023-02-19 RX ADMIN — HYDRALAZINE HYDROCHLORIDE 10 MG: 20 INJECTION INTRAMUSCULAR; INTRAVENOUS at 20:23

## 2023-02-19 RX ADMIN — SODIUM CHLORIDE, PRESERVATIVE FREE 10 ML: 5 INJECTION INTRAVENOUS at 09:29

## 2023-02-19 RX ADMIN — MORPHINE SULFATE 2 MG: 2 INJECTION, SOLUTION INTRAMUSCULAR; INTRAVENOUS at 09:28

## 2023-02-19 RX ADMIN — SODIUM CHLORIDE, PRESERVATIVE FREE 10 ML: 5 INJECTION INTRAVENOUS at 20:26

## 2023-02-19 RX ADMIN — SODIUM CHLORIDE, POTASSIUM CHLORIDE, SODIUM LACTATE AND CALCIUM CHLORIDE: 600; 310; 30; 20 INJECTION, SOLUTION INTRAVENOUS at 12:04

## 2023-02-19 RX ADMIN — ONDANSETRON 4 MG: 4 TABLET, ORALLY DISINTEGRATING ORAL at 21:24

## 2023-02-19 RX ADMIN — SODIUM CHLORIDE, POTASSIUM CHLORIDE, SODIUM LACTATE AND CALCIUM CHLORIDE: 600; 310; 30; 20 INJECTION, SOLUTION INTRAVENOUS at 03:47

## 2023-02-19 RX ADMIN — ONDANSETRON 4 MG: 2 INJECTION INTRAMUSCULAR; INTRAVENOUS at 09:36

## 2023-02-19 RX ADMIN — LABETALOL HYDROCHLORIDE 10 MG: 5 INJECTION INTRAVENOUS at 16:49

## 2023-02-19 RX ADMIN — MORPHINE SULFATE 2 MG: 2 INJECTION, SOLUTION INTRAMUSCULAR; INTRAVENOUS at 16:49

## 2023-02-19 RX ADMIN — ONDANSETRON 4 MG: 2 INJECTION INTRAMUSCULAR; INTRAVENOUS at 16:53

## 2023-02-19 RX ADMIN — ENOXAPARIN SODIUM 30 MG: 100 INJECTION SUBCUTANEOUS at 09:28

## 2023-02-19 ASSESSMENT — PAIN SCALES - GENERAL
PAINLEVEL_OUTOF10: 7
PAINLEVEL_OUTOF10: 0
PAINLEVEL_OUTOF10: 4
PAINLEVEL_OUTOF10: 0
PAINLEVEL_OUTOF10: 7
PAINLEVEL_OUTOF10: 0
PAINLEVEL_OUTOF10: 7

## 2023-02-19 ASSESSMENT — PAIN DESCRIPTION - DESCRIPTORS
DESCRIPTORS: ACHING

## 2023-02-19 ASSESSMENT — PAIN DESCRIPTION - ORIENTATION
ORIENTATION: RIGHT
ORIENTATION: RIGHT
ORIENTATION: RIGHT;UPPER

## 2023-02-19 ASSESSMENT — PAIN DESCRIPTION - LOCATION
LOCATION: ABDOMEN

## 2023-02-19 ASSESSMENT — PAIN - FUNCTIONAL ASSESSMENT
PAIN_FUNCTIONAL_ASSESSMENT: ACTIVITIES ARE NOT PREVENTED
PAIN_FUNCTIONAL_ASSESSMENT: PREVENTS OR INTERFERES WITH MANY ACTIVE NOT PASSIVE ACTIVITIES
PAIN_FUNCTIONAL_ASSESSMENT: PREVENTS OR INTERFERES SOME ACTIVE ACTIVITIES AND ADLS

## 2023-02-19 ASSESSMENT — PAIN DESCRIPTION - PAIN TYPE
TYPE: ACUTE PAIN

## 2023-02-19 ASSESSMENT — PAIN DESCRIPTION - FREQUENCY
FREQUENCY: CONTINUOUS
FREQUENCY: CONTINUOUS

## 2023-02-19 ASSESSMENT — PAIN DESCRIPTION - ONSET
ONSET: ON-GOING
ONSET: ON-GOING

## 2023-02-19 NOTE — PROGRESS NOTES
Comprehensive Nutrition Assessment    Type and Reason for Visit:    Malnutrition Screening Tool: Malnutrition Screen  Have you recently lost weight without trying?: 14 to 23 pounds (2 points)  Have you been eating poorly because of a decreased appetite?: No (0 points)  Malnutrition Screening Tool Score: 2    Nutrition History: Patient was very adamant about RD seeing her. She refused that she has been eating poorly or lost weight since her visit over the holidays. She reports gaining 2 lbs since. Per EMR 12/23/22 42 kg (standing scale), 2/18/23- 41.7 kg stated. She reports she is eats daily breakfast and lunch. And depending on size of lunch will eat dinner. She reports she cooks for herself so nothing fancy. She did not allow RD to exam for NFPE. Per MD note it seems daughter made reference to patient continuing to loss weight. Daughter was not present today during visit so unable to get more detail. Patient currently NPO. Once diet advanced could consider nutrition supplement.     Will follow standard protocol for for full assessment    Electronically signed by Jax Austin MS, RD, LD on 2/19/2023 at 1:25 PM.

## 2023-02-19 NOTE — PROGRESS NOTES
Pt having 10/10 pain after Morphine dose. Pt thrashing in bed, in tears, and with HTN. Dr. Hang Rae aware and new order for Morphine x1 dose. Hourly rounding completed on this shift while pt on floor. All needs met. Pt is currently resting in bed. Will give report to oncoming nurse.

## 2023-02-19 NOTE — PROGRESS NOTES
Hospitalist Progress Note   Admit Date:  2023  6:51 AM   Name:  Parag Gutierrez   Age:  [de-identified] y.o. 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, acute hepatic pancreatitis, arthritis, presents in setting of abdominal pain or nausea      Subjective & 24hr Events (23):   C/o abdominal pain mostly rt upper quadrant and mild mid abdomen      Assessment & Plan:     Acute pancreatitis  Npo  Cont ivf  Ct -pancreatitis vs malignancy    Hypokalemia   Resolved    HTN  Added prn hydralazine    Hyperlipidemia    H/o Rheumatoid arthritis      Lovenox  Full code      Hospital Problems:  Principal Problem:    Pancreatitis, unspecified pancreatitis type  Active Problems:    Hypercholesterolemia    Hypokalemia  Resolved Problems:    * No resolved hospital problems. *      Objective:   Patient Vitals for the past 24 hrs:   Temp Pulse Resp BP SpO2   23 0729 98.6 °F (37 °C) 87 16 (!) 175/74 94 %   23 0350 99 °F (37.2 °C) 85 17 (!) 181/68 92 %   23 0005 98.4 °F (36.9 °C) 81 17 (!) 188/68 94 %   23 2157 -- -- 20 -- --   23 98.2 °F (36.8 °C) 73 17 (!) 179/66 95 %   23 1800 -- -- -- (!) 191/72 --   23 1743 98 °F (36.7 °C) 64 17 (!) 205/79 98 %   23 1414 -- 67 -- -- --   23 1315 -- 66 -- (!) 157/86 99 %   23 1130 -- 66 -- -- --       Oxygen Therapy  SpO2: 94 %  O2 Device: None (Room air)    Estimated body mass index is 17.38 kg/m² as calculated from the following:    Height as of this encounter: 5' 1\" (1.549 m).     Weight as of this encounter: 92 lb (41.7 kg).    Intake/Output Summary (Last 24 hours) at 2/19/2023 0917  Last data filed at 2/18/2023 0944  Gross per 24 hour   Intake 1000 ml   Output --   Net 1000 ml         Physical Exam:     Blood pressure (!) 175/74, pulse 87, temperature 98.6 °F (37 °C), temperature source Oral, resp. rate 16, height 5' 1\" (1.549 m), weight 92 lb (41.7 kg), SpO2 94 %. General:    Well nourished. Head:  Normocephalic, atraumatic  Eyes:  Sclerae appear normal.  Pupils equally round. ENT:  Nares appear normal.  Moist oral mucosa  Neck:  No restricted ROM. Trachea midline   CV:   RRR. No m/r/g. No jugular venous distension. Lungs:   CTAB. No wheezing, rhonchi, or rales. Symmetric expansion. Abdomen:   Mild tender rt mid abdomen, moderate tender rt upper and rt  flank region  Extremities: No cyanosis or clubbing. No edema  Skin:     No rashes and normal coloration. Warm and dry. Neuro:  CN II-XII grossly intact. Psych:  Normal mood and affect.       I have personally reviewed labs and tests:  Recent Labs:  Recent Results (from the past 48 hour(s))   CBC with Auto Differential    Collection Time: 02/18/23  6:51 AM   Result Value Ref Range    WBC 12.4 (H) 4.3 - 11.1 K/uL    RBC 4.31 4.05 - 5.2 M/uL    Hemoglobin 11.7 11.7 - 15.4 g/dL    Hematocrit 36.8 35.8 - 46.3 %    MCV 85.4 82 - 102 FL    MCH 27.1 26.1 - 32.9 PG    MCHC 31.8 31.4 - 35.0 g/dL    RDW 16.2 (H) 11.9 - 14.6 %    Platelets 731 811 - 121 K/uL    MPV 8.9 (L) 9.4 - 12.3 FL    nRBC 0.00 0.0 - 0.2 K/uL    Differential Type AUTOMATED      Seg Neutrophils 87 (H) 43 - 78 %    Lymphocytes 6 (L) 13 - 44 %    Monocytes 6 4.0 - 12.0 %    Eosinophils % 0 (L) 0.5 - 7.8 %    Basophils 0 0.0 - 2.0 %    Immature Granulocytes 1 0.0 - 5.0 %    Segs Absolute 10.9 (H) 1.7 - 8.2 K/UL    Absolute Lymph # 0.7 0.5 - 4.6 K/UL    Absolute Mono # 0.7 0.1 - 1.3 K/UL    Absolute Eos # 0.0 0.0 - 0.8 K/UL    Basophils Absolute 0.0 0.0 - 0.2 K/UL    Absolute Immature Granulocyte 0.1 0.0 - 0.5 K/UL   CMP    Collection Time: 02/18/23  6:51 AM   Result Value Ref Range    Sodium 138 133 - 143 mmol/L    Potassium 3.4 (L) 3.5 - 5.1 mmol/L    Chloride 109 101 - 110 mmol/L    CO2 20 (L) 21 - 32 mmol/L    Anion Gap 9 2 - 11 mmol/L    Glucose 124 (H) 65 - 100 mg/dL    BUN 13 8 - 23 MG/DL    Creatinine 0.90 0.6 - 1.0 MG/DL    Est, Glom Filt Rate >60 >60 ml/min/1.73m2    Calcium 8.7 8.3 - 10.4 MG/DL    Total Bilirubin 0.4 0.2 - 1.1 MG/DL    ALT 12 12 - 65 U/L    AST 8 (L) 15 - 37 U/L    Alk Phosphatase 73 50 - 136 U/L    Total Protein 7.1 6.3 - 8.2 g/dL    Albumin 3.0 (L) 3.2 - 4.6 g/dL    Globulin 4.1 2.8 - 4.5 g/dL    Albumin/Globulin Ratio 0.7 0.4 - 1.6     Lipase    Collection Time: 02/18/23  6:51 AM   Result Value Ref Range    Lipase 4,669 (H) 73 - 393 U/L   CBC with Auto Differential    Collection Time: 02/19/23  6:57 AM   Result Value Ref Range    WBC 8.6 4.3 - 11.1 K/uL    RBC 4.04 (L) 4.05 - 5.2 M/uL    Hemoglobin 11.0 (L) 11.7 - 15.4 g/dL    Hematocrit 35.3 (L) 35.8 - 46.3 %    MCV 87.4 82 - 102 FL    MCH 27.2 26.1 - 32.9 PG    MCHC 31.2 (L) 31.4 - 35.0 g/dL    RDW 16.1 (H) 11.9 - 14.6 %    Platelets 036 219 - 248 K/uL    MPV 10.5 9.4 - 12.3 FL    nRBC 0.00 0.0 - 0.2 K/uL    Differential Type AUTOMATED      Seg Neutrophils 72 43 - 78 %    Lymphocytes 16 13 - 44 %    Monocytes 10 4.0 - 12.0 %    Eosinophils % 1 0.5 - 7.8 %    Basophils 1 0.0 - 2.0 %    Immature Granulocytes 0 0.0 - 5.0 %    Segs Absolute 6.3 1.7 - 8.2 K/UL    Absolute Lymph # 1.3 0.5 - 4.6 K/UL    Absolute Mono # 0.9 0.1 - 1.3 K/UL    Absolute Eos # 0.1 0.0 - 0.8 K/UL    Basophils Absolute 0.0 0.0 - 0.2 K/UL    Absolute Immature Granulocyte 0.0 0.0 - 0.5 K/UL   Comprehensive Metabolic Panel    Collection Time: 02/19/23  6:57 AM   Result Value Ref Range    Sodium 137 133 - 143 mmol/L    Potassium 3.9 3.5 - 5.1 mmol/L    Chloride 110 101 - 110 mmol/L    CO2 22 21 - 32 mmol/L    Anion Gap 5 2 - 11 mmol/L    Glucose 80 65 - 100 mg/dL    BUN 7 (L) 8 - 23 MG/DL    Creatinine 0.50 (L) 0.6 - 1.0 MG/DL    Est, Glom Filt Rate >60 >60 ml/min/1.73m2    Calcium 8.4 8.3 - 10.4 MG/DL    Total Bilirubin 0.7 0.2 - 1.1 MG/DL    ALT 9 (L) 12 - 65 U/L    AST 14 (L) 15 - 37 U/L    Alk Phosphatase 64 50 - 136 U/L    Total Protein 6.3 6.3 - 8.2 g/dL    Albumin 2.6 (L) 3.2 - 4.6 g/dL    Globulin 3.7 2.8 - 4.5 g/dL    Albumin/Globulin Ratio 0.7 0.4 - 1.6         I have personally reviewed imaging studies:  Other Studies:  CT ABDOMEN PELVIS W IV CONTRAST Additional Contrast? None   Final Result   1. Indistinctness at the pancreatic head/uncinate process with increasing   pancreatic ductal dilatation. While these findings could be inflammatory such as   pancreatitis, neoplasm could also have this appearance. One of the sites of   hypermetabolism is present at this location on PET scanning. 2. Extensive atherosclerosis. Current Meds:  Current Facility-Administered Medications   Medication Dose Route Frequency    sodium chloride flush 0.9 % injection 5-40 mL  5-40 mL IntraVENous 2 times per day    sodium chloride flush 0.9 % injection 5-40 mL  5-40 mL IntraVENous PRN    0.9 % sodium chloride infusion   IntraVENous PRN    enoxaparin Sodium (LOVENOX) injection 30 mg  30 mg SubCUTAneous Daily    ondansetron (ZOFRAN-ODT) disintegrating tablet 4 mg  4 mg Oral Q8H PRN    Or    ondansetron (ZOFRAN) injection 4 mg  4 mg IntraVENous Q6H PRN    polyethylene glycol (GLYCOLAX) packet 17 g  17 g Oral Daily PRN    acetaminophen (TYLENOL) tablet 650 mg  650 mg Oral Q6H PRN    Or    acetaminophen (TYLENOL) suppository 650 mg  650 mg Rectal Q6H PRN    lactated ringers IV soln infusion   IntraVENous Continuous    morphine injection 2 mg  2 mg IntraVENous Q4H PRN    oxyCODONE (ROXICODONE) immediate release tablet 5 mg  5 mg Oral Q4H PRN       Signed:  Toby Mcgowan MD    Part of this note may have been written by using a voice dictation software.   The note has been proof read but may still contain some grammatical/other typographical errors. No

## 2023-02-19 NOTE — PROGRESS NOTES
Pt having HTN throughout night and now 186/69. Pain now under better control but BP still elevated. Dr. Tor Best aware and new order placed for Hydralazine. Given as ordered. Update 1323: Urine sample sent to lab as ordered. Update 1535: Pt continues to be HTN even with PRN BP mediations. Dr. Tor Best aware. Manual BP complete: 186/82 and recheck 184/74. Dr. Tor Best aware and ordering tele and new medications for tx. Update 1805. Hourly rounding completed on this shift. Pt BP improved to 163/67. PRN pain and nausea medication given this shift. All needs met. Pt is currently resting in bed. Will give report to oncoming nurse.

## 2023-02-19 NOTE — PROGRESS NOTES
Pt requested morphine q 4 hr rated 7 and greater with pain under control. Ambulated with assistance. Hourly rounding performed, call light in reach, bed low and locked.   Will monitor

## 2023-02-19 NOTE — PROGRESS NOTES
.  [facility] Progress Note             Date: 2/19/2023    GI Problem: Acute on chronic pancreatitis    History of Present Illness:  Still with moderate epigastric pain on exam.  Her lipase though has decreased to 915. Of note her IgG 4 was 49 several months ago. CA 19-9 and CEA were negative in December. Daughter feels patient continues to lose weight. She is also wondering about the timing of her mothers lung surgery with Dr Nirav De Leon and the timing of her scheduled CT with pancreatic protocol next week. Apparently there was too much motion artifact when she had an MRI in the recent past.    Hospital Medications:  Current Facility-Administered Medications   Medication Dose Route Frequency    sodium chloride flush 0.9 % injection 5-40 mL  5-40 mL IntraVENous 2 times per day    sodium chloride flush 0.9 % injection 5-40 mL  5-40 mL IntraVENous PRN    0.9 % sodium chloride infusion   IntraVENous PRN    enoxaparin Sodium (LOVENOX) injection 30 mg  30 mg SubCUTAneous Daily    ondansetron (ZOFRAN-ODT) disintegrating tablet 4 mg  4 mg Oral Q8H PRN    Or    ondansetron (ZOFRAN) injection 4 mg  4 mg IntraVENous Q6H PRN    polyethylene glycol (GLYCOLAX) packet 17 g  17 g Oral Daily PRN    acetaminophen (TYLENOL) tablet 650 mg  650 mg Oral Q6H PRN    Or    acetaminophen (TYLENOL) suppository 650 mg  650 mg Rectal Q6H PRN    lactated ringers IV soln infusion   IntraVENous Continuous    morphine injection 2 mg  2 mg IntraVENous Q4H PRN    oxyCODONE (ROXICODONE) immediate release tablet 5 mg  5 mg Oral Q4H PRN       Objective:     Physical Exam:  Vitals:  Visit Vitals  BP (!) 175/74   Pulse 87   Temp 98.6 °F (37 °C) (Oral)   Resp 17   Ht 5' 1\" (1.549 m)   Wt 92 lb (41.7 kg)   SpO2 94%   BMI 17.38 kg/m²       General: No acute distress. Skin:  Extremities and face reveal no rashes. HEENT: Sclerae anicteric. No oral ulcers. No abnormal pigmentation of the lips. The neck is supple.   Cardiovascular: Regular rate and rhythm. No murmurs, gallops, or rubs. Respiratory:  Comfortable breathing  With no accessory muscle use. Clear breath sounds with no wheezes, rales, or rhonchi. GI:  Abdomen non distended. Very tender in the epigastrium and RUQ. Normal active bowel sounds. Musculoskeletal:  No pitting edema of the lower legs. Laboratory:    Recent Labs     02/19/23  0657   WBC 8.6   RBC 4.04*   HGB 11.0*   HCT 35.3*         Recent Labs     02/19/23  0657      K 3.9      CO2 22   BUN 7*       Assessment:       Acute on chronic pancreatitis, ? etiology  PD dilation/Abnormal pancreatic head on imaging  BII for perforated ulcer in the past  Hyperlipidemia  S/P cholecystectomy  Anemia        Plan:       -Pancreatitis treatment per routine. Still with abdominal pain. Continue NPO. -Consider testing for PRSS1, chymotrypsin C, CFTR and SPINK 1 see Dr Bernadette Ferrer' January note. Ig4 was normal.  -May need ERCP with surgical assistance Pancreatic sphincterotomy etc    -Has CT abdomen with pancreatic protocol scheduled for next week. May need to postpone til acute pancreatitis is better. -F/U with Dr Angela Mathew regarding lung surgery.     Signed By: Shiva Mena MD     February 19, 2023

## 2023-02-20 LAB
ANION GAP SERPL CALC-SCNC: 10 MMOL/L (ref 2–11)
BASOPHILS # BLD: 0 K/UL (ref 0–0.2)
BASOPHILS NFR BLD: 0 % (ref 0–2)
BUN SERPL-MCNC: 7 MG/DL (ref 8–23)
CALCIUM SERPL-MCNC: 8.6 MG/DL (ref 8.3–10.4)
CHLORIDE SERPL-SCNC: 106 MMOL/L (ref 101–110)
CO2 SERPL-SCNC: 23 MMOL/L (ref 21–32)
CREAT SERPL-MCNC: 0.4 MG/DL (ref 0.6–1)
DIFFERENTIAL METHOD BLD: ABNORMAL
EOSINOPHIL # BLD: 0 K/UL (ref 0–0.8)
EOSINOPHIL NFR BLD: 0 % (ref 0.5–7.8)
ERYTHROCYTE [DISTWIDTH] IN BLOOD BY AUTOMATED COUNT: 15.9 % (ref 11.9–14.6)
GLUCOSE SERPL-MCNC: 119 MG/DL (ref 65–100)
HCT VFR BLD AUTO: 33 % (ref 35.8–46.3)
HGB BLD-MCNC: 10.7 G/DL (ref 11.7–15.4)
IMM GRANULOCYTES # BLD AUTO: 0 K/UL (ref 0–0.5)
IMM GRANULOCYTES NFR BLD AUTO: 0 % (ref 0–5)
LIPASE SERPL-CCNC: 530 U/L (ref 73–393)
LYMPHOCYTES # BLD: 0.7 K/UL (ref 0.5–4.6)
LYMPHOCYTES NFR BLD: 8 % (ref 13–44)
MAGNESIUM SERPL-MCNC: 1.7 MG/DL (ref 1.8–2.4)
MCH RBC QN AUTO: 26.9 PG (ref 26.1–32.9)
MCHC RBC AUTO-ENTMCNC: 32.4 G/DL (ref 31.4–35)
MCV RBC AUTO: 82.9 FL (ref 82–102)
MONOCYTES # BLD: 0.7 K/UL (ref 0.1–1.3)
MONOCYTES NFR BLD: 8 % (ref 4–12)
NEUTS SEG # BLD: 7 K/UL (ref 1.7–8.2)
NEUTS SEG NFR BLD: 82 % (ref 43–78)
NRBC # BLD: 0 K/UL (ref 0–0.2)
PLATELET # BLD AUTO: 323 K/UL (ref 150–450)
PMV BLD AUTO: 9.3 FL (ref 9.4–12.3)
POTASSIUM SERPL-SCNC: 3.2 MMOL/L (ref 3.5–5.1)
RBC # BLD AUTO: 3.98 M/UL (ref 4.05–5.2)
SODIUM SERPL-SCNC: 139 MMOL/L (ref 133–143)
WBC # BLD AUTO: 8.5 K/UL (ref 4.3–11.1)

## 2023-02-20 PROCEDURE — 83735 ASSAY OF MAGNESIUM: CPT

## 2023-02-20 PROCEDURE — 36415 COLL VENOUS BLD VENIPUNCTURE: CPT

## 2023-02-20 PROCEDURE — 6360000002 HC RX W HCPCS: Performed by: FAMILY MEDICINE

## 2023-02-20 PROCEDURE — 2580000003 HC RX 258: Performed by: INTERNAL MEDICINE

## 2023-02-20 PROCEDURE — 6370000000 HC RX 637 (ALT 250 FOR IP): Performed by: FAMILY MEDICINE

## 2023-02-20 PROCEDURE — 85025 COMPLETE CBC W/AUTO DIFF WBC: CPT

## 2023-02-20 PROCEDURE — 83690 ASSAY OF LIPASE: CPT

## 2023-02-20 PROCEDURE — 6360000002 HC RX W HCPCS: Performed by: INTERNAL MEDICINE

## 2023-02-20 PROCEDURE — 1100000003 HC PRIVATE W/ TELEMETRY

## 2023-02-20 PROCEDURE — 6370000000 HC RX 637 (ALT 250 FOR IP): Performed by: INTERNAL MEDICINE

## 2023-02-20 PROCEDURE — 80048 BASIC METABOLIC PNL TOTAL CA: CPT

## 2023-02-20 PROCEDURE — 2500000003 HC RX 250 WO HCPCS: Performed by: FAMILY MEDICINE

## 2023-02-20 RX ORDER — LABETALOL HYDROCHLORIDE 5 MG/ML
10 INJECTION, SOLUTION INTRAVENOUS EVERY 4 HOURS PRN
Status: DISCONTINUED | OUTPATIENT
Start: 2023-02-20 | End: 2023-02-21 | Stop reason: HOSPADM

## 2023-02-20 RX ORDER — MAGNESIUM SULFATE IN WATER 40 MG/ML
2000 INJECTION, SOLUTION INTRAVENOUS ONCE
Status: COMPLETED | OUTPATIENT
Start: 2023-02-20 | End: 2023-02-20

## 2023-02-20 RX ORDER — POTASSIUM CHLORIDE 7.45 MG/ML
10 INJECTION INTRAVENOUS
Status: COMPLETED | OUTPATIENT
Start: 2023-02-20 | End: 2023-02-20

## 2023-02-20 RX ORDER — LOSARTAN POTASSIUM 50 MG/1
100 TABLET ORAL DAILY
Status: DISCONTINUED | OUTPATIENT
Start: 2023-02-20 | End: 2023-02-23 | Stop reason: HOSPADM

## 2023-02-20 RX ADMIN — OXYCODONE 5 MG: 5 TABLET ORAL at 16:59

## 2023-02-20 RX ADMIN — POTASSIUM CHLORIDE 10 MEQ: 7.46 INJECTION, SOLUTION INTRAVENOUS at 11:44

## 2023-02-20 RX ADMIN — ENOXAPARIN SODIUM 30 MG: 100 INJECTION SUBCUTANEOUS at 08:45

## 2023-02-20 RX ADMIN — MAGNESIUM SULFATE HEPTAHYDRATE 2000 MG: 40 INJECTION, SOLUTION INTRAVENOUS at 09:37

## 2023-02-20 RX ADMIN — SODIUM CHLORIDE, PRESERVATIVE FREE 10 ML: 5 INJECTION INTRAVENOUS at 20:31

## 2023-02-20 RX ADMIN — POTASSIUM CHLORIDE 10 MEQ: 7.46 INJECTION, SOLUTION INTRAVENOUS at 12:37

## 2023-02-20 RX ADMIN — SODIUM CHLORIDE, POTASSIUM CHLORIDE, SODIUM LACTATE AND CALCIUM CHLORIDE: 600; 310; 30; 20 INJECTION, SOLUTION INTRAVENOUS at 13:35

## 2023-02-20 RX ADMIN — MORPHINE SULFATE 2 MG: 2 INJECTION, SOLUTION INTRAMUSCULAR; INTRAVENOUS at 14:46

## 2023-02-20 RX ADMIN — POTASSIUM CHLORIDE 10 MEQ: 7.46 INJECTION, SOLUTION INTRAVENOUS at 10:42

## 2023-02-20 RX ADMIN — ACETAMINOPHEN 650 MG: 325 TABLET ORAL at 05:52

## 2023-02-20 RX ADMIN — SODIUM CHLORIDE, POTASSIUM CHLORIDE, SODIUM LACTATE AND CALCIUM CHLORIDE: 600; 310; 30; 20 INJECTION, SOLUTION INTRAVENOUS at 20:30

## 2023-02-20 RX ADMIN — SODIUM CHLORIDE, PRESERVATIVE FREE 10 ML: 5 INJECTION INTRAVENOUS at 08:45

## 2023-02-20 RX ADMIN — LOSARTAN POTASSIUM 100 MG: 50 TABLET, FILM COATED ORAL at 17:03

## 2023-02-20 RX ADMIN — POTASSIUM BICARBONATE 40 MEQ: 782 TABLET, EFFERVESCENT ORAL at 16:59

## 2023-02-20 RX ADMIN — SODIUM CHLORIDE, POTASSIUM CHLORIDE, SODIUM LACTATE AND CALCIUM CHLORIDE: 600; 310; 30; 20 INJECTION, SOLUTION INTRAVENOUS at 05:49

## 2023-02-20 RX ADMIN — MORPHINE SULFATE 2 MG: 2 INJECTION, SOLUTION INTRAMUSCULAR; INTRAVENOUS at 20:30

## 2023-02-20 RX ADMIN — POTASSIUM CHLORIDE 10 MEQ: 7.46 INJECTION, SOLUTION INTRAVENOUS at 09:41

## 2023-02-20 RX ADMIN — LABETALOL HYDROCHLORIDE 10 MG: 5 INJECTION INTRAVENOUS at 05:46

## 2023-02-20 ASSESSMENT — PAIN - FUNCTIONAL ASSESSMENT
PAIN_FUNCTIONAL_ASSESSMENT: ACTIVITIES ARE NOT PREVENTED
PAIN_FUNCTIONAL_ASSESSMENT: ACTIVITIES ARE NOT PREVENTED

## 2023-02-20 ASSESSMENT — PAIN DESCRIPTION - LOCATION
LOCATION: HEAD
LOCATION: ABDOMEN

## 2023-02-20 ASSESSMENT — PAIN DESCRIPTION - DESCRIPTORS
DESCRIPTORS: ACHING

## 2023-02-20 ASSESSMENT — PAIN SCALES - GENERAL
PAINLEVEL_OUTOF10: 0
PAINLEVEL_OUTOF10: 7
PAINLEVEL_OUTOF10: 3
PAINLEVEL_OUTOF10: 7
PAINLEVEL_OUTOF10: 2
PAINLEVEL_OUTOF10: 4
PAINLEVEL_OUTOF10: 0

## 2023-02-20 ASSESSMENT — PAIN DESCRIPTION - PAIN TYPE
TYPE: ACUTE PAIN
TYPE: ACUTE PAIN

## 2023-02-20 ASSESSMENT — PAIN DESCRIPTION - ORIENTATION
ORIENTATION: RIGHT
ORIENTATION: RIGHT;UPPER

## 2023-02-20 NOTE — PROGRESS NOTES
[facility] Progress Note         Admit Date:  2/18/2023    Today's Date:  2/20/2023    CC:  Pancreatitis    Subjective:     Patient admitted 2/18/23 with acute recurrent idiopathic pancreatitis with multiple episodes of pancreatitis in the past.  Notes onset of pain a week prior to admission. EUS 12/28/22 sub-optimal (as below) with no chronic pancreatitic changes other than a mildly dilated PD in the body. Has hx of Billroth II performed in past secondary to perforated duodenal ulcer. Recently diagnosed with malignant neoplasm lung after biopsy 1/5/23. Reports little to abdominal pain now, only has nausea when gets her pain medications. Wants a diet, becomes tearful as she requests. EUS 12/28/22 Dr Adan Blow  FINDINGS:  ESOPHAGUS: Not well visualized  STOMACH: Limited views with the oblique-viewing echoendoscope were unremarkable. Small gastric pouch. No Gastric ulcers. Small Bowel: Patent small bowel anastomosis. Limited views with the oblique-viewing echoendoscope were unremarkable. The ampulla was not visualized due to post-op anatomy. PANCREAS: The pancreas was well visualized in the body and tail. Views of the head are very limited (normally seen from D1/ D2). In the body and tail, the main pancreatic duct is mildly dilated (4.5mm body, 2mm tail). It has a smooth regular course. Otherwise, there are no changes of acute or chronic pancreatitis. No lobularity, dilated side branches or hyperechoic strands, etc.  No cyst or mass present. Limited views of the head are obtained from the gastric lumen, and no large mass is identified. A small mass at the ampulla would be difficult to exclude. BILIARY SYSTEM: The gallbladder was absent . Limited views of the CBD obtained from the stomach. It measures 9mm in diameter, within normal limits. No stones are seen. OTHER ORGANS: There was no ascites in the upper abdomen.   Limited views of the left lobe of the liver demonstrated no solid mass lesions. The celiac axis appears highly calcified, c/w atherosclerosis. No significant celiac or daly-pancreatic lymphadenopathy. PLAN:  FU labs  Advance diet as tolerated  If acute pancreatitis re-occurs, consider ERCP / pancreatic sphincterotomy with surgical access or colonoscope      Medications:   Current Facility-Administered Medications   Medication Dose Route Frequency    potassium chloride 10 mEq/100 mL IVPB (Peripheral Line)  10 mEq IntraVENous Q1H    labetalol (NORMODYNE;TRANDATE) injection 10 mg  10 mg IntraVENous Q4H PRN    magnesium sulfate 2000 mg in 50 mL IVPB premix  2,000 mg IntraVENous Once    cloNIDine (CATAPRES) 0.1 MG/24HR 1 patch  1 patch TransDERmal Weekly    sodium chloride flush 0.9 % injection 5-40 mL  5-40 mL IntraVENous 2 times per day    sodium chloride flush 0.9 % injection 5-40 mL  5-40 mL IntraVENous PRN    0.9 % sodium chloride infusion   IntraVENous PRN    enoxaparin Sodium (LOVENOX) injection 30 mg  30 mg SubCUTAneous Daily    ondansetron (ZOFRAN-ODT) disintegrating tablet 4 mg  4 mg Oral Q8H PRN    Or    ondansetron (ZOFRAN) injection 4 mg  4 mg IntraVENous Q6H PRN    polyethylene glycol (GLYCOLAX) packet 17 g  17 g Oral Daily PRN    acetaminophen (TYLENOL) tablet 650 mg  650 mg Oral Q6H PRN    Or    acetaminophen (TYLENOL) suppository 650 mg  650 mg Rectal Q6H PRN    lactated ringers IV soln infusion   IntraVENous Continuous    morphine injection 2 mg  2 mg IntraVENous Q4H PRN    oxyCODONE (ROXICODONE) immediate release tablet 5 mg  5 mg Oral Q4H PRN       Review of Systems:  ROS was obtained, with pertinent positives as listed above. No chest pain or SOB. Diet:  NPO    Objective:   Vitals:  BP (!) 173/68   Pulse 80   Temp 99 °F (37.2 °C) (Oral)   Resp 17   Ht 5' 1\" (1.549 m)   Wt 92 lb (41.7 kg)   LMP  (LMP Unknown)   SpO2 95%   BMI 17.38 kg/m²   Intake/Output:  No intake/output data recorded.   02/18 1901 - 02/20 0700  In: 4987.8 [I.V.:4987.8]  Out: 1 [Urine:1]  Exam:  General appearance: alert, cooperative, no distress  Lungs: clear to auscultation bilaterally anteriorly  Heart: regular rate and rhythm  Abdomen: soft, mild epigastric tenderness. Active bowel sounds. non-tender. No masses, no organomegaly  Extremities: extremities normal, atraumatic, no cyanosis or edema  Neuro:  alert and oriented    Data Review (Labs):    Recent Labs     02/18/23  0651 02/19/23  0657 02/20/23  0642   WBC 12.4* 8.6 8.5   HGB 11.7 11.0* 10.7*   HCT 36.8 35.3* 33.0*    255 323   MCV 85.4 87.4 82.9    137 139   K 3.4* 3.9 3.2*    110 106   CO2 20* 22 23   BUN 13 7* 7*   MG  --   --  1.7*   AST 8* 14*  --    ALT 12 9*  --      CT A/P with contrast on 2/18/23  Impression   1. Indistinctness at the pancreatic head/uncinate process with increasing   pancreatic ductal dilatation. While these findings could be inflammatory such as   pancreatitis, neoplasm could also have this appearance. One of the sites of   hypermetabolism is present at this location on PET scanning. 2. Extensive atherosclerosis. MRCP 12/23/2022 showing pancreatic duct dilation as prior with abrupt tapering at [facility] as prior without clear mass lesion noted. CA 19.9 normal 6.7 on 12/25/22. Lipase increased again on 12/26 with worsening pain after eating    Assessment:     Principal Problem:    Pancreatitis, unspecified pancreatitis type  Active Problems:    Hypercholesterolemia    Hypomagnesemia    Hypokalemia  Resolved Problems:    * No resolved hospital problems. *      Plan:     [de-identified] yo female admitted 2/18/23 with acute recurrent pancreatitis of unknown etiology. 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 During hospitalization in October, she underwent EGD on 10/4/22 with only gastrojejunostomy and a HH.   Repeat EUS 12/28/22 with no chronic pancreatitic changes other than a mildly dilated PD in the body; exam sub-optimal due to her anatomy. If acute pancreatitis re-occurred, the thought was to consider ERCP / pancreatic sphincterotomy with surgical access or colonoscope. Evaluation for autoimmune and hereditary causes of pancreatitis with negative IGG subclass 4. Feeling improved today - wants diet. Clear liquid low fat diet   Formulating plan for ERCP with surgical assistance which may require transfer to [address]    Patient is seen and examined in collaboration with Dr. Samara Brewer. Assessment and plan as per Dr. Samara Brewer.   Edna Mina NP

## 2023-02-20 NOTE — PROGRESS NOTES
Reviewed notes for new spiritual concerns      Will continue to assess how we can best serve this family        [place].       Per notes:       Denominational        LIVES IN [place]    DAUGHTER -  PASHA    FULL CODE    NO ACP    MODERATE RISK FALLS    LOS  2  DAYS

## 2023-02-20 NOTE — CARE COORDINATION
Patient admitted 2.18.2023 with acute recurrent idiopathic pancreatitis. Confirmed demographics. Patient stated she lives alone, independently. Patient drives. PCP confirmed - Dr. Yariel Schulz ; pharmacy confirmed: [address] on [address]. No dme reported - no active home health services reported - patient plans to go home @ d/c. CM will follow patient's plan of care and assist with supportive care referrals pending patient's clinical progress. Please consult case management if specific needs arise. 02/20/23 1305   Service Assessment   Patient Orientation Alert and Oriented   Cognition Alert   History Provided By Patient   Primary [address] [facility]  Family Members   Patient's Healthcare Decision Maker is: Legal Next of Emily 71   PCP Verified by CM Yes   Last Visit to PCP Within last 6 months   Prior Functional Level Independent in ADLs/IADLs   Current Functional Level Independent in ADLs/IADLs   Can patient return to prior living arrangement Yes   Ability to make needs known: Good   Family able to assist with home care needs: Yes   Would you like for me to discuss the discharge plan with any other family members/significant others, and if so, who? No   Social/Functional History   Lives With Family   ADL Assistance Independent   Active  Yes   Discharge Planning   Type of Residence House   Patient expects to be discharged to: [facility] Discharge   Mode of Transport at Discharge Self   Confirm Follow Up Transport Family   Condition of Participation: Discharge Planning   The Patient and/or Patient Representative was provided with a Choice of Provider? Patient   The Patient and/Or Patient Representative agree with the Discharge Plan? Yes   Freedom of Choice list was provided with basic dialogue that supports the patient's individualized plan of care/goals, treatment preferences, and shares the quality data associated with the providers?   Yes

## 2023-02-20 NOTE — PROGRESS NOTES
Hospitalist Progress Note   Admit Date:  2023  6:51 AM   Name:  Smith Gerard   Age:  [de-identified] y.o. 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, acute hepatic pancreatitis, arthritis, presents in setting of abdominal pain or nausea      Subjective & 24hr Events (23):     C/o abdominal pain mostly rt upper quadrant and mild mid abdomen      Says on and off abdominal pain  Blood pressure still elevated  Later on GI started pt clear liquids      Assessment & Plan:     Acute pancreatitis  Npo  Cont ivf  Ct -pancreatitis vs malignancy  - GI started  on  clear  fluids    Hypokalemia   Resolved    HTN  Added prn hydralazine  - clonidine patch and prn  labetelol  Added losartan    Hyperlipidemia    H/o Rheumatoid arthritis      Lovenox  Full code      Hospital Problems:  Principal Problem:    Pancreatitis, unspecified pancreatitis type  Active Problems:    Hypercholesterolemia    Hypokalemia  Resolved Problems:    * No resolved hospital problems.  *      Objective:   Patient Vitals for the past 24 hrs:   Temp Pulse Resp BP SpO2   23 0806 99 °F (37.2 °C) 80 17 (!) 173/68 95 %   23 0710 -- 77 -- -- --   23 0627 -- 78 -- (!) 174/69 --   23 0546 -- 84 -- (!) 191/71 --   23 0358 99.1 °F (37.3 °C) 93 17 (!) 184/71 95 %   23 0007 99 °F (37.2 °C) 93 17 (!) 156/70 96 %   23 2122 -- 92 -- (!) 150/67 --   23 1939 98.6 °F (37 °C) 80 17 (!) 177/63 96 %   23 1745 -- -- -- (!) 163/67 --   23 1649 -- -- 18 (!) 177/78 --   23 1602 -- (!) 103 -- -- -- 02/19/23 1528 -- -- -- (!) 184/74 --   02/19/23 1522 -- -- -- (!) 186/82 --   02/19/23 1516 98.6 °F (37 °C) 92 22 (!) 185/80 95 %   02/19/23 1230 -- -- -- (!) 175/65 --   02/19/23 1054 99.1 °F (37.3 °C) 79 18 (!) 186/69 94 %   02/19/23 0928 -- -- 17 -- --       Oxygen Therapy  SpO2: 95 %  O2 Device: None (Room air)    Estimated body mass index is 17.38 kg/m² as calculated from the following:    Height as of this encounter: 5' 1\" (1.549 m). Weight as of this encounter: 92 lb (41.7 kg). Intake/Output Summary (Last 24 hours) at 2/20/2023 0902  Last data filed at 2/20/2023 0550  Gross per 24 hour   Intake 4987.76 ml   Output 1 ml   Net 4986.76 ml         Physical Exam:     Blood pressure (!) 173/68, pulse 80, temperature 99 °F (37.2 °C), temperature source Oral, resp. rate 17, height 5' 1\" (1.549 m), weight 92 lb (41.7 kg), SpO2 95 %. General:    Well nourished. Head:  Normocephalic, atraumatic  Eyes:  Sclerae appear normal.  Pupils equally round. ENT:  Nares appear normal.  Moist oral mucosa  Neck:  No restricted ROM. Trachea midline   CV:   RRR. No m/r/g. No jugular venous distension. Lungs:   CTAB. No wheezing, rhonchi, or rales. Symmetric expansion. Abdomen:   Mild rt upper quadrant tenderness,  minimal  mid  abdominal tenderness  Extremities: No cyanosis or clubbing. No edema  Skin:     No rashes and normal coloration. Warm and dry. Neuro:  CN II-XII grossly intact. Psych:  Normal mood and affect.       I have personally reviewed labs and tests:  Recent Labs:  Recent Results (from the past 48 hour(s))   CBC with Auto Differential    Collection Time: 02/19/23  6:57 AM   Result Value Ref Range    WBC 8.6 4.3 - 11.1 K/uL    RBC 4.04 (L) 4.05 - 5.2 M/uL    Hemoglobin 11.0 (L) 11.7 - 15.4 g/dL    Hematocrit 35.3 (L) 35.8 - 46.3 %    MCV 87.4 82 - 102 FL    MCH 27.2 26.1 - 32.9 PG    MCHC 31.2 (L) 31.4 - 35.0 g/dL    RDW 16.1 (H) 11.9 - 14.6 %    Platelets 811 532 - 206 K/uL    MPV 10.5 9.4 - 12.3 FL    nRBC 0.00 0.0 - 0.2 K/uL    Differential Type AUTOMATED      Seg Neutrophils 72 43 - 78 %    Lymphocytes 16 13 - 44 %    Monocytes 10 4.0 - 12.0 %    Eosinophils % 1 0.5 - 7.8 %    Basophils 1 0.0 - 2.0 %    Immature Granulocytes 0 0.0 - 5.0 %    Segs Absolute 6.3 1.7 - 8.2 K/UL    Absolute Lymph # 1.3 0.5 - 4.6 K/UL    Absolute Mono # 0.9 0.1 - 1.3 K/UL    Absolute Eos # 0.1 0.0 - 0.8 K/UL    Basophils Absolute 0.0 0.0 - 0.2 K/UL    Absolute Immature Granulocyte 0.0 0.0 - 0.5 K/UL   Comprehensive Metabolic Panel    Collection Time: 02/19/23  6:57 AM   Result Value Ref Range    Sodium 137 133 - 143 mmol/L    Potassium 3.9 3.5 - 5.1 mmol/L    Chloride 110 101 - 110 mmol/L    CO2 22 21 - 32 mmol/L    Anion Gap 5 2 - 11 mmol/L    Glucose 80 65 - 100 mg/dL    BUN 7 (L) 8 - 23 MG/DL    Creatinine 0.50 (L) 0.6 - 1.0 MG/DL    Est, Glom Filt Rate >60 >60 ml/min/1.73m2    Calcium 8.4 8.3 - 10.4 MG/DL    Total Bilirubin 0.7 0.2 - 1.1 MG/DL    ALT 9 (L) 12 - 65 U/L    AST 14 (L) 15 - 37 U/L    Alk Phosphatase 64 50 - 136 U/L    Total Protein 6.3 6.3 - 8.2 g/dL    Albumin 2.6 (L) 3.2 - 4.6 g/dL    Globulin 3.7 2.8 - 4.5 g/dL    Albumin/Globulin Ratio 0.7 0.4 - 1.6     Lipase    Collection Time: 02/19/23  6:57 AM   Result Value Ref Range    Lipase 915 (H) 73 - 393 U/L   Urinalysis with Reflex to Culture    Collection Time: 02/19/23  1:20 PM    Specimen: Urine   Result Value Ref Range    Color, UA YELLOW/STRAW      Appearance CLEAR      Specific Gravity, UA 1.008 1.001 - 1.023      pH, Urine 7.0 5.0 - 9.0      Protein, UA Negative NEG mg/dL    Glucose, UA Negative mg/dL    Ketones, Urine 15 (A) NEG mg/dL    Bilirubin Urine Negative NEG      Blood, Urine Negative NEG      Urobilinogen, Urine 1.0 0.2 - 1.0 EU/dL    Nitrite, Urine Negative NEG      Leukocyte Esterase, Urine Negative NEG      WBC, UA 0-3 0 /hpf    RBC, UA 0 0 /hpf    BACTERIA, URINE 4+ (H) 0 /hpf    Urine Culture if Indicated CULTURE NOT INDICATED BY UA RESULT      Epithelial Cells UA 0-3 0 /hpf    Casts 0 0 /lpf    Crystals 0 0 /LPF    Mucus, UA 0 0 /lpf    Other observations RESULTS VERIFIED MANUALLY     CBC with Auto Differential    Collection Time: 02/20/23  6:42 AM   Result Value Ref Range    WBC 8.5 4.3 - 11.1 K/uL    RBC 3.98 (L) 4.05 - 5.2 M/uL    Hemoglobin 10.7 (L) 11.7 - 15.4 g/dL    Hematocrit 33.0 (L) 35.8 - 46.3 %    MCV 82.9 82 - 102 FL    MCH 26.9 26.1 - 32.9 PG    MCHC 32.4 31.4 - 35.0 g/dL    RDW 15.9 (H) 11.9 - 14.6 %    Platelets 760 959 - 847 K/uL    MPV 9.3 (L) 9.4 - 12.3 FL    nRBC 0.00 0.0 - 0.2 K/uL    Differential Type AUTOMATED      Seg Neutrophils 82 (H) 43 - 78 %    Lymphocytes 8 (L) 13 - 44 %    Monocytes 8 4.0 - 12.0 %    Eosinophils % 0 (L) 0.5 - 7.8 %    Basophils 0 0.0 - 2.0 %    Immature Granulocytes 0 0.0 - 5.0 %    Segs Absolute 7.0 1.7 - 8.2 K/UL    Absolute Lymph # 0.7 0.5 - 4.6 K/UL    Absolute Mono # 0.7 0.1 - 1.3 K/UL    Absolute Eos # 0.0 0.0 - 0.8 K/UL    Basophils Absolute 0.0 0.0 - 0.2 K/UL    Absolute Immature Granulocyte 0.0 0.0 - 0.5 K/UL   Basic Metabolic Panel w/ Reflex to MG    Collection Time: 02/20/23  6:42 AM   Result Value Ref Range    Sodium 139 133 - 143 mmol/L    Potassium 3.2 (L) 3.5 - 5.1 mmol/L    Chloride 106 101 - 110 mmol/L    CO2 23 21 - 32 mmol/L    Anion Gap 10 2 - 11 mmol/L    Glucose 119 (H) 65 - 100 mg/dL    BUN 7 (L) 8 - 23 MG/DL    Creatinine 0.40 (L) 0.6 - 1.0 MG/DL    Est, Glom Filt Rate >60 >60 ml/min/1.73m2    Calcium 8.6 8.3 - 10.4 MG/DL   Magnesium    Collection Time: 02/20/23  6:42 AM   Result Value Ref Range    Magnesium 1.7 (L) 1.8 - 2.4 mg/dL       I have personally reviewed imaging studies:  Other Studies:  CT ABDOMEN PELVIS W IV CONTRAST Additional Contrast? None   Final Result   1. Indistinctness at the pancreatic head/uncinate process with increasing   pancreatic ductal dilatation.  While these findings could be inflammatory such as   pancreatitis, neoplasm could also have this appearance. One of the sites of   hypermetabolism is present at this location on PET scanning. 2. Extensive atherosclerosis.              Current Meds:  Current Facility-Administered Medications   Medication Dose Route Frequency    potassium chloride 10 mEq/100 mL IVPB (Peripheral Line)  10 mEq IntraVENous Q1H    hydrALAZINE (APRESOLINE) injection 10 mg  10 mg IntraVENous Q6H PRN    cloNIDine (CATAPRES) 0.1 MG/24HR 1 patch  1 patch TransDERmal Weekly    labetalol (NORMODYNE;TRANDATE) injection 10 mg  10 mg IntraVENous Q6H PRN    sodium chloride flush 0.9 % injection 5-40 mL  5-40 mL IntraVENous 2 times per day    sodium chloride flush 0.9 % injection 5-40 mL  5-40 mL IntraVENous PRN    0.9 % sodium chloride infusion   IntraVENous PRN    enoxaparin Sodium (LOVENOX) injection 30 mg  30 mg SubCUTAneous Daily    ondansetron (ZOFRAN-ODT) disintegrating tablet 4 mg  4 mg Oral Q8H PRN    Or    ondansetron (ZOFRAN) injection 4 mg  4 mg IntraVENous Q6H PRN    polyethylene glycol (GLYCOLAX) packet 17 g  17 g Oral Daily PRN    acetaminophen (TYLENOL) tablet 650 mg  650 mg Oral Q6H PRN    Or    acetaminophen (TYLENOL) suppository 650 mg  650 mg Rectal Q6H PRN    lactated ringers IV soln infusion   IntraVENous Continuous    morphine injection 2 mg  2 mg IntraVENous Q4H PRN    oxyCODONE (ROXICODONE) immediate release tablet 5 mg  5 mg Oral Q4H PRN       Signed:  Penelope Adams MD

## 2023-02-20 NOTE — PROGRESS NOTES
Physician Progress Note      PATIENT:               Hermelindo Villanueva  CSN #:                  [id number]  :                       1940  ADMIT DATE:       2023 6:51 AM  DISCH DATE:  RESPONDING  PROVIDER #:        Hamilton De Santiago MD        QUERY TEXT:    Type of Anemia: Please provide further specificity, if known. Clinical indicators include: rbc, hgb, hct, anemia  Options provided:  -- Anemia due to acute blood loss  -- Anemia due to chronic blood loss  -- Anemia due to iron deficiency  -- Anemia due to postoperative blood loss  -- Anemia due to chronic disease  -- Other - I will add my own diagnosis  -- Disagree - Not applicable / Not valid  -- Disagree - Clinically Unable to determine / Unknown        PROVIDER RESPONSE TEXT:    The patient has anemia due to chronic disease.       Electronically signed by:  Hamilton De Santiago MD 2023 5:36 PM

## 2023-02-21 ENCOUNTER — ANESTHESIA (OUTPATIENT)
Dept: ENDOSCOPY | Age: 83
DRG: 439 | End: 2023-02-21
Payer: MEDICARE

## 2023-02-21 ENCOUNTER — APPOINTMENT (OUTPATIENT)
Dept: GENERAL RADIOLOGY | Age: 83
DRG: 439 | End: 2023-02-21
Payer: MEDICARE

## 2023-02-21 ENCOUNTER — ANESTHESIA EVENT (OUTPATIENT)
Dept: ENDOSCOPY | Age: 83
DRG: 439 | End: 2023-02-21
Payer: MEDICARE

## 2023-02-21 LAB
ALBUMIN SERPL-MCNC: 2.3 G/DL (ref 3.2–4.6)
ALBUMIN/GLOB SERPL: 0.7 (ref 0.4–1.6)
ALP SERPL-CCNC: 54 U/L (ref 50–136)
ALT SERPL-CCNC: <6 U/L (ref 12–65)
ANION GAP SERPL CALC-SCNC: 5 MMOL/L (ref 2–11)
AST SERPL-CCNC: 16 U/L (ref 15–37)
BASOPHILS # BLD: 0 K/UL (ref 0–0.2)
BASOPHILS NFR BLD: 0 % (ref 0–2)
BILIRUB SERPL-MCNC: 0.5 MG/DL (ref 0.2–1.1)
BUN SERPL-MCNC: 4 MG/DL (ref 8–23)
CALCIUM SERPL-MCNC: 8 MG/DL (ref 8.3–10.4)
CHLORIDE SERPL-SCNC: 109 MMOL/L (ref 101–110)
CO2 SERPL-SCNC: 27 MMOL/L (ref 21–32)
CREAT SERPL-MCNC: 0.5 MG/DL (ref 0.6–1)
DIFFERENTIAL METHOD BLD: ABNORMAL
EOSINOPHIL # BLD: 0.1 K/UL (ref 0–0.8)
EOSINOPHIL NFR BLD: 1 % (ref 0.5–7.8)
ERYTHROCYTE [DISTWIDTH] IN BLOOD BY AUTOMATED COUNT: 15.9 % (ref 11.9–14.6)
GLOBULIN SER CALC-MCNC: 3.1 G/DL (ref 2.8–4.5)
GLUCOSE SERPL-MCNC: 110 MG/DL (ref 65–100)
HCT VFR BLD AUTO: 29.8 % (ref 35.8–46.3)
HGB BLD-MCNC: 9.5 G/DL (ref 11.7–15.4)
IMM GRANULOCYTES # BLD AUTO: 0 K/UL (ref 0–0.5)
IMM GRANULOCYTES NFR BLD AUTO: 0 % (ref 0–5)
LYMPHOCYTES # BLD: 0.8 K/UL (ref 0.5–4.6)
LYMPHOCYTES NFR BLD: 10 % (ref 13–44)
MAGNESIUM SERPL-MCNC: 1.8 MG/DL (ref 1.8–2.4)
MCH RBC QN AUTO: 26.2 PG (ref 26.1–32.9)
MCHC RBC AUTO-ENTMCNC: 31.9 G/DL (ref 31.4–35)
MCV RBC AUTO: 82.3 FL (ref 82–102)
MONOCYTES # BLD: 0.7 K/UL (ref 0.1–1.3)
MONOCYTES NFR BLD: 9 % (ref 4–12)
NEUTS SEG # BLD: 6.3 K/UL (ref 1.7–8.2)
NEUTS SEG NFR BLD: 80 % (ref 43–78)
NRBC # BLD: 0 K/UL (ref 0–0.2)
PLATELET # BLD AUTO: 307 K/UL (ref 150–450)
PMV BLD AUTO: 8.9 FL (ref 9.4–12.3)
POTASSIUM SERPL-SCNC: 3.4 MMOL/L (ref 3.5–5.1)
PROT SERPL-MCNC: 5.4 G/DL (ref 6.3–8.2)
RBC # BLD AUTO: 3.62 M/UL (ref 4.05–5.2)
SODIUM SERPL-SCNC: 141 MMOL/L (ref 133–143)
WBC # BLD AUTO: 8.1 K/UL (ref 4.3–11.1)

## 2023-02-21 PROCEDURE — 6360000002 HC RX W HCPCS: Performed by: FAMILY MEDICINE

## 2023-02-21 PROCEDURE — 2709999900 HC NON-CHARGEABLE SUPPLY: Performed by: INTERNAL MEDICINE

## 2023-02-21 PROCEDURE — 6360000002 HC RX W HCPCS: Performed by: REGISTERED NURSE

## 2023-02-21 PROCEDURE — 7100000001 HC PACU RECOVERY - ADDTL 15 MIN: Performed by: INTERNAL MEDICINE

## 2023-02-21 PROCEDURE — 0FJD8ZZ INSPECTION OF PANCREATIC DUCT, VIA NATURAL OR ARTIFICIAL OPENING ENDOSCOPIC: ICD-10-PCS | Performed by: INTERNAL MEDICINE

## 2023-02-21 PROCEDURE — 80053 COMPREHEN METABOLIC PANEL: CPT

## 2023-02-21 PROCEDURE — 83735 ASSAY OF MAGNESIUM: CPT

## 2023-02-21 PROCEDURE — 2580000003 HC RX 258: Performed by: REGISTERED NURSE

## 2023-02-21 PROCEDURE — 2500000003 HC RX 250 WO HCPCS: Performed by: FAMILY MEDICINE

## 2023-02-21 PROCEDURE — 7100000000 HC PACU RECOVERY - FIRST 15 MIN: Performed by: INTERNAL MEDICINE

## 2023-02-21 PROCEDURE — 36415 COLL VENOUS BLD VENIPUNCTURE: CPT

## 2023-02-21 PROCEDURE — 3609018800 HC ERCP DX COLLECTION SPECIMEN BRUSHING/WASHING: Performed by: INTERNAL MEDICINE

## 2023-02-21 PROCEDURE — 1100000003 HC PRIVATE W/ TELEMETRY

## 2023-02-21 PROCEDURE — 6360000002 HC RX W HCPCS: Performed by: NURSE ANESTHETIST, CERTIFIED REGISTERED

## 2023-02-21 PROCEDURE — 3700000001 HC ADD 15 MINUTES (ANESTHESIA): Performed by: INTERNAL MEDICINE

## 2023-02-21 PROCEDURE — 2500000003 HC RX 250 WO HCPCS: Performed by: REGISTERED NURSE

## 2023-02-21 PROCEDURE — 2580000003 HC RX 258: Performed by: INTERNAL MEDICINE

## 2023-02-21 PROCEDURE — C1769 GUIDE WIRE: HCPCS | Performed by: INTERNAL MEDICINE

## 2023-02-21 PROCEDURE — 85025 COMPLETE CBC W/AUTO DIFF WBC: CPT

## 2023-02-21 PROCEDURE — 6370000000 HC RX 637 (ALT 250 FOR IP): Performed by: FAMILY MEDICINE

## 2023-02-21 PROCEDURE — 6370000000 HC RX 637 (ALT 250 FOR IP): Performed by: NURSE ANESTHETIST, CERTIFIED REGISTERED

## 2023-02-21 PROCEDURE — 3700000000 HC ANESTHESIA ATTENDED CARE: Performed by: INTERNAL MEDICINE

## 2023-02-21 PROCEDURE — 6360000002 HC RX W HCPCS: Performed by: INTERNAL MEDICINE

## 2023-02-21 PROCEDURE — 2500000003 HC RX 250 WO HCPCS: Performed by: NURSE ANESTHETIST, CERTIFIED REGISTERED

## 2023-02-21 RX ORDER — HYDROMORPHONE HCL 110MG/55ML
0.25 PATIENT CONTROLLED ANALGESIA SYRINGE INTRAVENOUS EVERY 5 MIN PRN
Status: DISCONTINUED | OUTPATIENT
Start: 2023-02-21 | End: 2023-02-21 | Stop reason: HOSPADM

## 2023-02-21 RX ORDER — PROPOFOL 10 MG/ML
INJECTION, EMULSION INTRAVENOUS PRN
Status: DISCONTINUED | OUTPATIENT
Start: 2023-02-21 | End: 2023-02-21 | Stop reason: SDUPTHER

## 2023-02-21 RX ORDER — SODIUM CHLORIDE 0.9 % (FLUSH) 0.9 %
5-40 SYRINGE (ML) INJECTION EVERY 12 HOURS SCHEDULED
Status: DISCONTINUED | OUTPATIENT
Start: 2023-02-21 | End: 2023-02-21 | Stop reason: HOSPADM

## 2023-02-21 RX ORDER — ONDANSETRON 2 MG/ML
4 INJECTION INTRAMUSCULAR; INTRAVENOUS
Status: DISCONTINUED | OUTPATIENT
Start: 2023-02-21 | End: 2023-02-21 | Stop reason: HOSPADM

## 2023-02-21 RX ORDER — LIDOCAINE HYDROCHLORIDE 20 MG/ML
INJECTION, SOLUTION EPIDURAL; INFILTRATION; INTRACAUDAL; PERINEURAL PRN
Status: DISCONTINUED | OUTPATIENT
Start: 2023-02-21 | End: 2023-02-21 | Stop reason: SDUPTHER

## 2023-02-21 RX ORDER — FENTANYL CITRATE 50 UG/ML
INJECTION, SOLUTION INTRAMUSCULAR; INTRAVENOUS PRN
Status: DISCONTINUED | OUTPATIENT
Start: 2023-02-21 | End: 2023-02-21 | Stop reason: SDUPTHER

## 2023-02-21 RX ORDER — ROCURONIUM BROMIDE 10 MG/ML
INJECTION, SOLUTION INTRAVENOUS PRN
Status: DISCONTINUED | OUTPATIENT
Start: 2023-02-21 | End: 2023-02-21 | Stop reason: SDUPTHER

## 2023-02-21 RX ORDER — OXYCODONE HYDROCHLORIDE 5 MG/1
10 TABLET ORAL PRN
Status: DISCONTINUED | OUTPATIENT
Start: 2023-02-21 | End: 2023-02-21 | Stop reason: HOSPADM

## 2023-02-21 RX ORDER — MAGNESIUM SULFATE 1 G/100ML
1000 INJECTION INTRAVENOUS ONCE
Status: COMPLETED | OUTPATIENT
Start: 2023-02-21 | End: 2023-02-21

## 2023-02-21 RX ORDER — SODIUM CHLORIDE 9 MG/ML
INJECTION, SOLUTION INTRAVENOUS PRN
Status: DISCONTINUED | OUTPATIENT
Start: 2023-02-21 | End: 2023-02-21 | Stop reason: HOSPADM

## 2023-02-21 RX ORDER — ALBUTEROL SULFATE 90 UG/1
AEROSOL, METERED RESPIRATORY (INHALATION) PRN
Status: DISCONTINUED | OUTPATIENT
Start: 2023-02-21 | End: 2023-02-21 | Stop reason: SDUPTHER

## 2023-02-21 RX ORDER — SODIUM CHLORIDE 0.9 % (FLUSH) 0.9 %
5-40 SYRINGE (ML) INJECTION PRN
Status: DISCONTINUED | OUTPATIENT
Start: 2023-02-21 | End: 2023-02-21 | Stop reason: HOSPADM

## 2023-02-21 RX ORDER — HYDROMORPHONE HCL 110MG/55ML
0.5 PATIENT CONTROLLED ANALGESIA SYRINGE INTRAVENOUS EVERY 5 MIN PRN
Status: DISCONTINUED | OUTPATIENT
Start: 2023-02-21 | End: 2023-02-21 | Stop reason: HOSPADM

## 2023-02-21 RX ORDER — ONDANSETRON 2 MG/ML
INJECTION INTRAMUSCULAR; INTRAVENOUS PRN
Status: DISCONTINUED | OUTPATIENT
Start: 2023-02-21 | End: 2023-02-21 | Stop reason: SDUPTHER

## 2023-02-21 RX ORDER — EPHEDRINE SULFATE/0.9% NACL/PF 50 MG/5 ML
SYRINGE (ML) INTRAVENOUS PRN
Status: DISCONTINUED | OUTPATIENT
Start: 2023-02-21 | End: 2023-02-21 | Stop reason: SDUPTHER

## 2023-02-21 RX ORDER — OXYCODONE HYDROCHLORIDE 5 MG/1
5 TABLET ORAL PRN
Status: DISCONTINUED | OUTPATIENT
Start: 2023-02-21 | End: 2023-02-21 | Stop reason: HOSPADM

## 2023-02-21 RX ORDER — SUCCINYLCHOLINE CHLORIDE 20 MG/ML
INJECTION INTRAMUSCULAR; INTRAVENOUS PRN
Status: DISCONTINUED | OUTPATIENT
Start: 2023-02-21 | End: 2023-02-21 | Stop reason: SDUPTHER

## 2023-02-21 RX ORDER — DIPHENHYDRAMINE HYDROCHLORIDE 50 MG/ML
6.25 INJECTION INTRAMUSCULAR; INTRAVENOUS
Status: DISCONTINUED | OUTPATIENT
Start: 2023-02-21 | End: 2023-02-21 | Stop reason: HOSPADM

## 2023-02-21 RX ORDER — DEXAMETHASONE SODIUM PHOSPHATE 4 MG/ML
INJECTION, SOLUTION INTRA-ARTICULAR; INTRALESIONAL; INTRAMUSCULAR; INTRAVENOUS; SOFT TISSUE PRN
Status: DISCONTINUED | OUTPATIENT
Start: 2023-02-21 | End: 2023-02-21 | Stop reason: SDUPTHER

## 2023-02-21 RX ADMIN — ALBUTEROL SULFATE 4 PUFF: 90 AEROSOL, METERED RESPIRATORY (INHALATION) at 17:18

## 2023-02-21 RX ADMIN — PHENYLEPHRINE HYDROCHLORIDE 100 MCG: 10 INJECTION INTRAVENOUS at 16:08

## 2023-02-21 RX ADMIN — DEXAMETHASONE SODIUM PHOSPHATE 4 MG: 4 INJECTION, SOLUTION INTRAMUSCULAR; INTRAVENOUS at 16:00

## 2023-02-21 RX ADMIN — LOSARTAN POTASSIUM 100 MG: 50 TABLET, FILM COATED ORAL at 08:16

## 2023-02-21 RX ADMIN — POTASSIUM BICARBONATE 40 MEQ: 782 TABLET, EFFERVESCENT ORAL at 08:46

## 2023-02-21 RX ADMIN — SODIUM CHLORIDE, POTASSIUM CHLORIDE, SODIUM LACTATE AND CALCIUM CHLORIDE: 600; 310; 30; 20 INJECTION, SOLUTION INTRAVENOUS at 04:36

## 2023-02-21 RX ADMIN — PROPOFOL 130 MG: 10 INJECTION, EMULSION INTRAVENOUS at 15:49

## 2023-02-21 RX ADMIN — PROPOFOL 70 MG: 10 INJECTION, EMULSION INTRAVENOUS at 16:24

## 2023-02-21 RX ADMIN — SODIUM CHLORIDE, PRESERVATIVE FREE 10 ML: 5 INJECTION INTRAVENOUS at 20:39

## 2023-02-21 RX ADMIN — LIDOCAINE HYDROCHLORIDE 100 MG: 20 INJECTION, SOLUTION EPIDURAL; INFILTRATION; INTRACAUDAL; PERINEURAL at 15:49

## 2023-02-21 RX ADMIN — MORPHINE SULFATE 2 MG: 2 INJECTION, SOLUTION INTRAMUSCULAR; INTRAVENOUS at 04:02

## 2023-02-21 RX ADMIN — LABETALOL HYDROCHLORIDE 10 MG: 5 INJECTION INTRAVENOUS at 03:42

## 2023-02-21 RX ADMIN — MAGNESIUM SULFATE HEPTAHYDRATE 1000 MG: 1 INJECTION, SOLUTION INTRAVENOUS at 09:23

## 2023-02-21 RX ADMIN — ONDANSETRON 4 MG: 2 INJECTION INTRAMUSCULAR; INTRAVENOUS at 16:00

## 2023-02-21 RX ADMIN — SODIUM CHLORIDE, POTASSIUM CHLORIDE, SODIUM LACTATE AND CALCIUM CHLORIDE: 600; 310; 30; 20 INJECTION, SOLUTION INTRAVENOUS at 18:26

## 2023-02-21 RX ADMIN — ROCURONIUM BROMIDE 5 MG: 50 INJECTION, SOLUTION INTRAVENOUS at 15:49

## 2023-02-21 RX ADMIN — MORPHINE SULFATE 2 MG: 2 INJECTION, SOLUTION INTRAMUSCULAR; INTRAVENOUS at 10:18

## 2023-02-21 RX ADMIN — SODIUM CHLORIDE, PRESERVATIVE FREE 10 ML: 5 INJECTION INTRAVENOUS at 08:16

## 2023-02-21 RX ADMIN — Medication 120 MG: at 15:50

## 2023-02-21 RX ADMIN — FENTANYL CITRATE 25 MCG: 50 INJECTION, SOLUTION INTRAMUSCULAR; INTRAVENOUS at 16:49

## 2023-02-21 RX ADMIN — Medication 10 MG: at 16:35

## 2023-02-21 ASSESSMENT — PAIN DESCRIPTION - ORIENTATION
ORIENTATION: RIGHT
ORIENTATION: RIGHT

## 2023-02-21 ASSESSMENT — PAIN SCALES - GENERAL
PAINLEVEL_OUTOF10: 7
PAINLEVEL_OUTOF10: 0
PAINLEVEL_OUTOF10: 7
PAINLEVEL_OUTOF10: 0

## 2023-02-21 ASSESSMENT — PAIN DESCRIPTION - PAIN TYPE: TYPE: ACUTE PAIN

## 2023-02-21 ASSESSMENT — PAIN - FUNCTIONAL ASSESSMENT: PAIN_FUNCTIONAL_ASSESSMENT: ACTIVITIES ARE NOT PREVENTED

## 2023-02-21 ASSESSMENT — PAIN DESCRIPTION - LOCATION
LOCATION: ABDOMEN;FLANK
LOCATION: ABDOMEN

## 2023-02-21 ASSESSMENT — PAIN DESCRIPTION - DESCRIPTORS
DESCRIPTORS: ACHING
DESCRIPTORS: ACHING

## 2023-02-21 NOTE — PROGRESS NOTES
Hospitalist Progress Note   Admit Date:  2023  6:51 AM   Name:  Aida Santa   Age:  [de-identified] y.o. 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, acute hepatic pancreatitis, arthritis, presents in setting of abdominal pain or nausea      Subjective & 24hr Events (23):     C/o abdominal pain mostly rt upper quadrant and mild mid abdomen      Says on and off abdominal pain  Blood pressure still elevated  Later on GI started pt clear liquids      Pt say still has pain  For ERCP this am  Hypokalemia  Still elevated blood pressure      Assessment & Plan:     Acute pancreatitis  Npo  Cont ivf  Ct -pancreatitis vs malignancy  - GI started  on  clear  fluids  - pt for ERCP today    Hypokalemia   Resolved  - replace    HTN  Added prn hydralazine  - clonidine patch and prn  labetelol  Added losartan    Hyperlipidemia    H/o Rheumatoid arthritis      Lovenox  Full code      Hospital Problems:  Principal Problem:    Pancreatitis, unspecified pancreatitis type  Active Problems:    Hypercholesterolemia    Hypomagnesemia    Hypokalemia  Resolved Problems:    * No resolved hospital problems.  *      Objective:   Patient Vitals for the past 24 hrs:   Temp Pulse Resp BP SpO2   23 0814 98.2 °F (36.8 °C) 71 -- (!) 172/67 98 %   23 0443 98.2 °F (36.8 °C) 66 20 (!) 159/63 98 %   23 0335 97.9 °F (36.6 °C) 79 18 (!) 191/69 96 %   23 2323 98.6 °F (37 °C) 79 18 (!) 166/71 96 %   23 -- 75 -- -- --   23 1921 98.8 °F (37.1 °C) (!) 43 19 (!) 143/98 95 %   23 3479 -- -- 16 -- --   02/20/23 1630 98.6 °F (37 °C) 75 16 (!) 163/68 95 %   02/20/23 1446 -- -- 16 -- --       Oxygen Therapy  SpO2: 98 %  O2 Device: None (Room air)    Estimated body mass index is 17.38 kg/m² as calculated from the following:    Height as of this encounter: 5' 1\" (1.549 m). Weight as of this encounter: 92 lb (41.7 kg). Intake/Output Summary (Last 24 hours) at 2/21/2023 0838  Last data filed at 2/21/2023 0518  Gross per 24 hour   Intake 3441.53 ml   Output --   Net 3441.53 ml         Physical Exam:     Blood pressure (!) 172/67, pulse 71, temperature 98.2 °F (36.8 °C), resp. rate 20, height 5' 1\" (1.549 m), weight 92 lb (41.7 kg), SpO2 98 %. General:    Well nourished. Head:  Normocephalic, atraumatic  Eyes:  Sclerae appear normal.  Pupils equally round. ENT:  Nares appear normal.  Moist oral mucosa  Neck:  No restricted ROM. Trachea midline   CV:   RRR. No m/r/g. No jugular venous distension. Lungs:   CTAB. No wheezing, rhonchi, or rales. Symmetric expansion. Abdomen:   Mild rt upper quadrant tenderness,  minimal  mid  abdominal tenderness  Extremities: No cyanosis or clubbing. No edema  Skin:     No rashes and normal coloration. Warm and dry. Neuro:  CN II-XII grossly intact. Psych:  Normal mood and affect.       I have personally reviewed labs and tests:  Recent Labs:  Recent Results (from the past 48 hour(s))   Urinalysis with Reflex to Culture    Collection Time: 02/19/23  1:20 PM    Specimen: Urine   Result Value Ref Range    Color, UA YELLOW/STRAW      Appearance CLEAR      Specific Gravity, UA 1.008 1.001 - 1.023      pH, Urine 7.0 5.0 - 9.0      Protein, UA Negative NEG mg/dL    Glucose, UA Negative mg/dL    Ketones, Urine 15 (A) NEG mg/dL    Bilirubin Urine Negative NEG      Blood, Urine Negative NEG      Urobilinogen, Urine 1.0 0.2 - 1.0 EU/dL    Nitrite, Urine Negative NEG      Leukocyte Esterase, Urine Negative NEG      WBC, UA 0-3 0 /hpf    RBC, UA 0 0 /hpf BACTERIA, URINE 4+ (H) 0 /hpf    Urine Culture if Indicated CULTURE NOT INDICATED BY UA RESULT      Epithelial Cells UA 0-3 0 /hpf    Casts 0 0 /lpf    Crystals 0 0 /LPF    Mucus, UA 0 0 /lpf    Other observations RESULTS VERIFIED MANUALLY     CBC with Auto Differential    Collection Time: 02/20/23  6:42 AM   Result Value Ref Range    WBC 8.5 4.3 - 11.1 K/uL    RBC 3.98 (L) 4.05 - 5.2 M/uL    Hemoglobin 10.7 (L) 11.7 - 15.4 g/dL    Hematocrit 33.0 (L) 35.8 - 46.3 %    MCV 82.9 82 - 102 FL    MCH 26.9 26.1 - 32.9 PG    MCHC 32.4 31.4 - 35.0 g/dL    RDW 15.9 (H) 11.9 - 14.6 %    Platelets 394 986 - 128 K/uL    MPV 9.3 (L) 9.4 - 12.3 FL    nRBC 0.00 0.0 - 0.2 K/uL    Differential Type AUTOMATED      Seg Neutrophils 82 (H) 43 - 78 %    Lymphocytes 8 (L) 13 - 44 %    Monocytes 8 4.0 - 12.0 %    Eosinophils % 0 (L) 0.5 - 7.8 %    Basophils 0 0.0 - 2.0 %    Immature Granulocytes 0 0.0 - 5.0 %    Segs Absolute 7.0 1.7 - 8.2 K/UL    Absolute Lymph # 0.7 0.5 - 4.6 K/UL    Absolute Mono # 0.7 0.1 - 1.3 K/UL    Absolute Eos # 0.0 0.0 - 0.8 K/UL    Basophils Absolute 0.0 0.0 - 0.2 K/UL    Absolute Immature Granulocyte 0.0 0.0 - 0.5 K/UL   Basic Metabolic Panel w/ Reflex to MG    Collection Time: 02/20/23  6:42 AM   Result Value Ref Range    Sodium 139 133 - 143 mmol/L    Potassium 3.2 (L) 3.5 - 5.1 mmol/L    Chloride 106 101 - 110 mmol/L    CO2 23 21 - 32 mmol/L    Anion Gap 10 2 - 11 mmol/L    Glucose 119 (H) 65 - 100 mg/dL    BUN 7 (L) 8 - 23 MG/DL    Creatinine 0.40 (L) 0.6 - 1.0 MG/DL    Est, Glom Filt Rate >60 >60 ml/min/1.73m2    Calcium 8.6 8.3 - 10.4 MG/DL   Lipase    Collection Time: 02/20/23  6:42 AM   Result Value Ref Range    Lipase 530 (H) 73 - 393 U/L   Magnesium    Collection Time: 02/20/23  6:42 AM   Result Value Ref Range    Magnesium 1.7 (L) 1.8 - 2.4 mg/dL   CBC with Auto Differential    Collection Time: 02/21/23  5:28 AM   Result Value Ref Range    WBC 8.1 4.3 - 11.1 K/uL    RBC 3.62 (L) 4.05 - 5.2 M/uL Hemoglobin 9.5 (L) 11.7 - 15.4 g/dL    Hematocrit 29.8 (L) 35.8 - 46.3 %    MCV 82.3 82 - 102 FL    MCH 26.2 26.1 - 32.9 PG    MCHC 31.9 31.4 - 35.0 g/dL    RDW 15.9 (H) 11.9 - 14.6 %    Platelets 810 821 - 057 K/uL    MPV 8.9 (L) 9.4 - 12.3 FL    nRBC 0.00 0.0 - 0.2 K/uL    Differential Type AUTOMATED      Seg Neutrophils 80 (H) 43 - 78 %    Lymphocytes 10 (L) 13 - 44 %    Monocytes 9 4.0 - 12.0 %    Eosinophils % 1 0.5 - 7.8 %    Basophils 0 0.0 - 2.0 %    Immature Granulocytes 0 0.0 - 5.0 %    Segs Absolute 6.3 1.7 - 8.2 K/UL    Absolute Lymph # 0.8 0.5 - 4.6 K/UL    Absolute Mono # 0.7 0.1 - 1.3 K/UL    Absolute Eos # 0.1 0.0 - 0.8 K/UL    Basophils Absolute 0.0 0.0 - 0.2 K/UL    Absolute Immature Granulocyte 0.0 0.0 - 0.5 K/UL   Comprehensive Metabolic Panel w/ Reflex to MG    Collection Time: 02/21/23  5:28 AM   Result Value Ref Range    Sodium 141 133 - 143 mmol/L    Potassium 3.4 (L) 3.5 - 5.1 mmol/L    Chloride 109 101 - 110 mmol/L    CO2 27 21 - 32 mmol/L    Anion Gap 5 2 - 11 mmol/L    Glucose 110 (H) 65 - 100 mg/dL    BUN 4 (L) 8 - 23 MG/DL    Creatinine 0.50 (L) 0.6 - 1.0 MG/DL    Est, Glom Filt Rate >60 >60 ml/min/1.73m2    Calcium 8.0 (L) 8.3 - 10.4 MG/DL    Total Bilirubin 0.5 0.2 - 1.1 MG/DL    ALT <6 (L) 12 - 65 U/L    AST 16 15 - 37 U/L    Alk Phosphatase 54 50 - 136 U/L    Total Protein 5.4 (L) 6.3 - 8.2 g/dL    Albumin 2.3 (L) 3.2 - 4.6 g/dL    Globulin 3.1 2.8 - 4.5 g/dL    Albumin/Globulin Ratio 0.7 0.4 - 1.6     Magnesium    Collection Time: 02/21/23  5:28 AM   Result Value Ref Range    Magnesium 1.8 1.8 - 2.4 mg/dL       I have personally reviewed imaging studies:  Other Studies:  CT ABDOMEN PELVIS W IV CONTRAST Additional Contrast? None   Final Result   1. Indistinctness at the pancreatic head/uncinate process with increasing   pancreatic ductal dilatation. While these findings could be inflammatory such as   pancreatitis, neoplasm could also have this appearance.  One of the sites of hypermetabolism is present at this location on PET scanning. 2. Extensive atherosclerosis.              Current Meds:  Current Facility-Administered Medications   Medication Dose Route Frequency    potassium bicarb-citric acid (EFFER-K) effervescent tablet 40 mEq  40 mEq Oral Q4H    magnesium sulfate 1000 mg in dextrose 5% 100 mL IVPB  1,000 mg IntraVENous Once    labetalol (NORMODYNE;TRANDATE) injection 10 mg  10 mg IntraVENous Q4H PRN    losartan (COZAAR) tablet 100 mg  100 mg Oral Daily    cloNIDine (CATAPRES) 0.1 MG/24HR 1 patch  1 patch TransDERmal Weekly    sodium chloride flush 0.9 % injection 5-40 mL  5-40 mL IntraVENous 2 times per day    sodium chloride flush 0.9 % injection 5-40 mL  5-40 mL IntraVENous PRN    0.9 % sodium chloride infusion   IntraVENous PRN    ondansetron (ZOFRAN-ODT) disintegrating tablet 4 mg  4 mg Oral Q8H PRN    Or    ondansetron (ZOFRAN) injection 4 mg  4 mg IntraVENous Q6H PRN    polyethylene glycol (GLYCOLAX) packet 17 g  17 g Oral Daily PRN    acetaminophen (TYLENOL) tablet 650 mg  650 mg Oral Q6H PRN    Or    acetaminophen (TYLENOL) suppository 650 mg  650 mg Rectal Q6H PRN    lactated ringers IV soln infusion   IntraVENous Continuous    morphine injection 2 mg  2 mg IntraVENous Q4H PRN    oxyCODONE (ROXICODONE) immediate release tablet 5 mg  5 mg Oral Q4H PRN       Signed:  Mai Sawyer MD

## 2023-02-21 NOTE — INTERVAL H&P NOTE
Update History & Physical    The patient's History and Physical of February 18, 2023 was reviewed with the patient and I examined the patient. There was no change. The surgical site was confirmed by the patient and me. Plan: The risks, benefits, expected outcome, and alternative to the recommended procedure have been discussed with the patient. Patient understands and wants to proceed with the procedure.      Electronically signed by Joanne Martinez MD on 2/21/2023 at 3:47 PM

## 2023-02-21 NOTE — PROGRESS NOTES
Gastroenterology     Received a message pt and her daughter had questions regarding the ERCP today. I spoke with pt and her daughter, Elaine Martin, and discussed the ERCP which Dr. Albertina Fernandez is attempting today and possible need for surgical access if he is unable to reach the ampulla given her surgical hx. They stated understanding and agree to proceed. Pham Velasquez  [facility]

## 2023-02-21 NOTE — PERIOP NOTE
TRANSFER - OUT REPORT:    Verbal report given to TU Rose on Trung Marie  being transferred to [address] [place] [address] for routine post-op       Report consisted of patients Situation, Background, Assessment and   Recommendations(SBAR). Information from the following report(s) Nurse Handoff Report, Surgery Report, and MAR was reviewed with the receiving nurse. Opportunity for questions and clarification was provided.       Patient transported with:   [employer]

## 2023-02-21 NOTE — PROGRESS NOTES
TRANSFER - IN REPORT:    Verbal report received from Rose MAE on Samantha Gambino  being received from [phone] for ordered procedure      Report consisted of patient's Situation, Background, Assessment and   Recommendations(SBAR). Information from the following report(s) Nurse Handoff Report was reviewed with the receiving nurse. Opportunity for questions and clarification was provided. Assessment completed upon patient's arrival to unit and care assumed.   ed.

## 2023-02-21 NOTE — ANESTHESIA PROCEDURE NOTES
Airway  Date/Time: 2/21/2023 3:51 PM  Urgency: elective    Airway not difficult    General Information and Staff    Patient location during procedure: OR  Resident/CRNA: ROBERTO Moctezuma - CRNA  Performed: resident/CRNA     Indications and Patient Condition  Indications for airway management: anesthesia  Spontaneous Ventilation: absent  Sedation level: deep  Preoxygenated: yes  Patient position: sniffing  Mask difficulty assessment: vent by bag mask    Final Airway Details  Final airway type: endotracheal airway      Successful airway: ETT  Cuffed: yes   Successful intubation technique: direct laryngoscopy  Facilitating devices/methods: intubating stylet  Endotracheal tube insertion site: oral  Blade: Isidro  Blade size: #3  ETT size (mm): 7.0  Cormack-Lehane Classification: grade I - full view of glottis  Placement verified by: chest auscultation and capnometry   Measured from: lips  ETT to lips (cm): 21  Number of attempts at approach: 1  Ventilation between attempts: bag mask  Number of other approaches attempted: 0    Additional Comments  PreO2 > 5 minutes. Standard IV induction by MDA. Atraumatic insertion. Positive equal and bilateral breath sounds noted with positive ETCO2 present. Lips and dentition unchanged from pre-op.     no

## 2023-02-21 NOTE — ANESTHESIA PRE PROCEDURE
Department of Anesthesiology  Preprocedure Note       Name:  Rei Srinivasan   Age:  [de-identified] y.o.  :  1940                                          MRN:  [id number]         Date:  2023      Surgeon: Maricruz Burgos):  Roma Daniels MD    Procedure: Procedure(s):  ERCP ENDOSCOPIC RETROGRADE CHOLANGIOPANCREATOGRAPHY Rm 622    Medications prior to admission:   Prior to Admission medications    Medication Sig Start Date End Date Taking? Authorizing Provider   hyoscyamine (LEVSIN/SL) 125 MCG sublingual tablet Place 1 tablet under the tongue every 6 hours as needed for Cramping 23  Renay Mcarthur APRN - CNP   pravastatin (PRAVACHOL) 40 MG tablet Take 1 tablet by mouth daily 10/12/22   Angelo Valero MD       Current medications:    No current facility-administered medications for this visit. No current outpatient medications on file.      Facility-Administered Medications Ordered in Other Visits   Medication Dose Route Frequency Provider Last Rate Last Admin    potassium bicarb-citric acid (EFFER-K) effervescent tablet 40 mEq  40 mEq Oral Q4H Rebel Lopez MD   40 mEq at 23 0846    labetalol (NORMODYNE;TRANDATE) injection 10 mg  10 mg IntraVENous Q4H PRN Rebel Lopez MD   10 mg at 23 0342    losartan (COZAAR) tablet 100 mg  100 mg Oral Daily Rebel Lopez MD   100 mg at 23 0816    cloNIDine (CATAPRES) 0.1 MG/24HR 1 patch  1 patch TransDERmal Weekly Rebel Lopez MD   1 patch at 23 1649    sodium chloride flush 0.9 % injection 5-40 mL  5-40 mL IntraVENous 2 times per day Tigre Grissom MD   10 mL at 23 0816    sodium chloride flush 0.9 % injection 5-40 mL  5-40 mL IntraVENous PRN Tigre Grissom MD        0.9 % sodium chloride infusion   IntraVENous PRN Tigre Grissom MD        ondansetron (ZOFRAN-ODT) disintegrating tablet 4 mg  4 mg Oral Q8H PRN Tigre Grissom MD   4 mg at 23 2124    Or    ondansetron [facility] injection 4 mg  4 mg IntraVENous Q6H PRN Ladan Burnett MD   4 mg at 02/19/23 1653    polyethylene glycol (GLYCOLAX) packet 17 g  17 g Oral Daily PRN Ladan Burnett MD        acetaminophen (TYLENOL) tablet 650 mg  650 mg Oral Q6H PRN Ladan Burnett MD   650 mg at 02/20/23 0032    Or    acetaminophen (TYLENOL) suppository 650 mg  650 mg Rectal Q6H PRN Ladan Burnett MD        lactated ringers IV soln infusion   IntraVENous Continuous Ladan Burnett  mL/hr at 02/21/23 0436 New Bag at 02/21/23 0436    morphine injection 2 mg  2 mg IntraVENous Q4H PRN Ladan Burnett MD   2 mg at 02/21/23 1018    oxyCODONE (ROXICODONE) immediate release tablet 5 mg  5 mg Oral Q4H PRN Ladan Burnett MD   5 mg at 02/20/23 1659       Allergies:     Allergies   Allergen Reactions    Azithromycin Other (See Comments)     pancreatitis    Codeine Nausea And Vomiting       Problem List:    Patient Active Problem List   Diagnosis Code    PAD (peripheral artery disease) ([facility]) I73.9    Hypercholesterolemia E78.00    Back pain M54.9    Malignant neoplasm of upper-outer quadrant of left breast in female, estrogen receptor positive ([facility] [address] C50.412, Z17.0    History of peptic ulcer Z87.11    Multiple thyroid nodules E04.2    Intractable vomiting R11.10    History of acute pancreatitis Z87.19    Compression fracture of L1 lumbar vertebra ([facility]) S32.010A    Hypomagnesemia E83.42    Personal history of tobacco use, presenting hazards to health Z87.891    HTN (hypertension) I10    Age-related osteoporosis with current pathological fracture M80.00XA    Chronic obstructive pulmonary disease (HCC) J44.9    Eczema L30.9    Osteoarthritis M19.90    Paraseptal emphysema ([facility] [address] J43.8    Hypokalemia E87.6    Osteoporosis M81.0    S/P lumpectomy, left breast Z98.890    Acute pancreatitis K85.90    EPIFANIO (acute kidney injury) ([facility] [address] N17.9    Other acute pancreatitis without infection or necrosis K85.80  Chronic pancreatitis (HCC) K86.1    Acute recurrent pancreatitis K85.90    Rheumatoid arthritis ([address] M06.9    Unintentional weight loss R63.4    Protein-calorie malnutrition, moderate (HCC) E44.0    Lung nodule R91.1    Bronchogenic cancer of right lung (HCC) C34.91    Pancreatitis, unspecified pancreatitis type K85.90       Past Medical History:        Diagnosis Date    EPIFANIO (acute kidney injury) ([address] 12/04/2014    pt denies this dx    Arthritis     RA-abdelrahman hands    Back pain 6/10/2016    Breast cancer ([address] 2018    Breast lump dx 2/2018    left    Bronchitis     Chronic obstructive pulmonary disease ([address]     Discharge from the vagina     Dysuria     Eczema 6/10/2016    Fungal dermatitis     Headache 6/10/2016    Hypercholesterolemia 12/4/2014    Hypertension     not well controlled--per pt    Intractable vomiting 5/20/2018    Menopausal vaginal dryness     Osteoarthritis 6/10/2016    Osteoporosis 6/10/2016    Pancreatitis     2 episodes    PUD (peptic ulcer disease)     last 2003 which a rupture an extensive surgery    Sepsis ([address] 12/4/2014    Thyroid nodule     Tobacco abuse     1ppd x 49 yrs       Past Surgical History:        Procedure Laterality Date    APPENDECTOMY  1977    with hysterectomy    BREAST BIOPSY Left 1975    BREAST LUMPECTOMY Left 2/22/2018    LEFT BREAST LUMPECTOMY/ SENTINEL NODE BIOPSY performed by Bakari Marc MD at [address] [facility] Left 1975    breast lumpectomy, benign  (left)    CATARACT REMOVAL Bilateral 2018    w/IOL    CHOLECYSTECTOMY      CT NEEDLE BIOPSY LUNG PERCUTANEOUS  1/5/2023    CT NEEDLE BIOPSY LUNG PERCUTANEOUS 1/5/2023 [facility] CT SCAN    HYSTERECTOMY (CERVIX STATUS UNKNOWN)  1977    OTHER SURGICAL HISTORY      hernicolectomy 2 cm    CA UNLISTED PROCEDURE ABDOMEN PERITONEUM & OMENTUM  2003    stomach surgery for ruptured ulcer and extensive rerouting of intestestines per patient    [address] UPPER GASTROINTESTINAL ENDOSCOPY  05/21/2018    empiric dilation    UPPER GASTROINTESTINAL ENDOSCOPY      UPPER GASTROINTESTINAL ENDOSCOPY N/A 10/4/2022    EGD ESOPHAGOGASTRODUODENOSCOPY/  203 performed by Syeda Davenport MD at [facility] N/A 12/28/2022    ENDOSCOPIC ULTRASOUND W/ EGD/  611/ performed by Srikanth Dubon MD at [address] LEFT Left 1/31/2018    US BREAST NEEDLE BIOPSY LEFT 1/31/2018 [facility]       Social History:    Social History     Tobacco Use    Smoking status: Every Day     Packs/day: 0.10     Types: Cigarettes     Start date: 5/6/1966    Smokeless tobacco: Never   Substance Use Topics    Alcohol use: No                                Ready to quit: Not Answered  Counseling given: Not Answered      Vital Signs (Current): There were no vitals filed for this visit.                                            BP Readings from Last 3 Encounters:   02/21/23 (!) 156/75   01/17/23 (!) 155/86   01/12/23 (!) 160/72       NPO Status:                                                                                 BMI:   Wt Readings from Last 3 Encounters:   02/18/23 92 lb (41.7 kg)   01/31/23 92 lb (41.7 kg)   01/16/23 92 lb 5 oz (41.9 kg)     There is no height or weight on file to calculate BMI.    CBC:   Lab Results   Component Value Date/Time    WBC 8.1 02/21/2023 05:28 AM    RBC 3.62 02/21/2023 05:28 AM    HGB 9.5 02/21/2023 05:28 AM    HCT 29.8 02/21/2023 05:28 AM    MCV 82.3 02/21/2023 05:28 AM    RDW 15.9 02/21/2023 05:28 AM     02/21/2023 05:28 AM       CMP:   Lab Results   Component Value Date/Time     02/21/2023 05:28 AM    K 3.4 02/21/2023 05:28 AM     02/21/2023 05:28 AM    CO2 27 02/21/2023 05:28 AM    BUN 4 02/21/2023 05:28 AM    CREATININE 0.50 02/21/2023 05:28 AM    GFRAA >60 10/03/2022 07:01 AM    AGRATIO 1.5 03/30/2022 09:25 AM    LABGLOM >60 02/21/2023 05:28 AM    LABGLOM 74 03/30/2022 09:25 AM    GLUCOSE 110 02/21/2023 05:28 AM    PROT 5.4 02/21/2023 05:28 AM    CALCIUM 8.0 02/21/2023 05:28 AM    BILITOT 0.5 02/21/2023 05:28 AM    ALKPHOS 54 02/21/2023 05:28 AM    ALKPHOS 78 03/30/2022 09:25 AM    AST 16 02/21/2023 05:28 AM    ALT <6 02/21/2023 05:28 AM       POC Tests: No results for input(s): POCGLU, POCNA, POCK, POCCL, POCBUN, POCHEMO, POCHCT in the last 72 hours. Coags:   Lab Results   Component Value Date/Time    PROTIME 13.2 01/16/2023 09:21 AM    INR 1.0 01/16/2023 09:21 AM       HCG (If Applicable): No results found for: PREGTESTUR, PREGSERUM, HCG, HCGQUANT     ABGs: No results found for: PHART, PO2ART, CTN0HQQ, PIE0NZI, BEART, B4VITQOB     Type & Screen (If Applicable):  No results found for: LABABO, LABRH    Drug/Infectious Status (If Applicable):  No results found for: HIV, HEPCAB    COVID-19 Screening (If Applicable): No results found for: COVID19        Anesthesia Evaluation  Patient summary reviewed and Nursing notes reviewed  Airway: Mallampati: II  TM distance: >3 FB   Neck ROM: full  Mouth opening: > = 3 FB   Dental:    (+) upper dentures and lower dentures      Pulmonary:Negative Pulmonary ROS and normal exam    (+) COPD:                             Cardiovascular:  Exercise tolerance: good (>4 METS),   (+) hypertension:,         Rhythm: regular  Rate: normal                    Neuro/Psych:   Negative Neuro/Psych ROS              GI/Hepatic/Renal: Neg GI/Hepatic/Renal ROS  (+) renal disease (EPIFANIO):,          ROS comment: pancreatitis. Endo/Other: Negative Endo/Other ROS   (+) malignancy/cancer (lung cancer new diagnosis, has not had surgery/chemo yet). Pt had no PAT visit       Abdominal:             Vascular: negative vascular ROS. Other Findings:             Anesthesia Plan      general     ASA 3       Induction: intravenous. Anesthetic plan and risks discussed with patient.                         Soha Hart MD 2/21/2023

## 2023-02-21 NOTE — OP NOTE
ENDOSCOPIC  RETROGRADE CHOLANGIOPANCREATOGRAPHY    DATE of PROCEDURE: 2/21/2023    PT NAME: Gerardo Eden     [id number]    PRE OP - recurrent pancreatitis    MEDICATION: general;    INSTRUMENT:  [id number] and [id number]    SPECIAL PROCEDURE:none  BLOOD LOSS- 0 to min. SPEC- no  IMPLANT- none    PROCEDURE: After informed consent, the patient was placed under anesthesia in the semi-prone position. The duodenoscope was passed without difficulty to the area of the ampulla. A standard cannulation and ERCP was performed with the findings as outlined and described below. Patient tolerated the procedure well. ASSESSMENT:  Confirmed BII anatomy and the afferent limb with great difficulty. Unable to reach ampulla with the TJF scope. Very small ampulla was identified with bile flow seen and pancreatic juice seen. The orientation was not conducive to cannulation despite attempts with RX 44 and handle rotation. Even if I had been successful I would have had a difficult time with therapeutics and not sure it would help.     PLAN:  Inpt f/u  Doubt PTC would help  Not sure [facility] would consider an ERCP    Vince Mathur MD

## 2023-02-22 PROCEDURE — 2580000003 HC RX 258: Performed by: INTERNAL MEDICINE

## 2023-02-22 PROCEDURE — 1100000003 HC PRIVATE W/ TELEMETRY

## 2023-02-22 PROCEDURE — 6370000000 HC RX 637 (ALT 250 FOR IP): Performed by: FAMILY MEDICINE

## 2023-02-22 PROCEDURE — 6360000002 HC RX W HCPCS: Performed by: HOSPITALIST

## 2023-02-22 PROCEDURE — 6370000000 HC RX 637 (ALT 250 FOR IP): Performed by: INTERNAL MEDICINE

## 2023-02-22 PROCEDURE — 1100000000 HC RM PRIVATE

## 2023-02-22 PROCEDURE — 6370000000 HC RX 637 (ALT 250 FOR IP): Performed by: HOSPITALIST

## 2023-02-22 RX ORDER — MAGNESIUM SULFATE IN WATER 40 MG/ML
2000 INJECTION, SOLUTION INTRAVENOUS ONCE
Status: COMPLETED | OUTPATIENT
Start: 2023-02-22 | End: 2023-02-22

## 2023-02-22 RX ADMIN — SODIUM CHLORIDE, PRESERVATIVE FREE 10 ML: 5 INJECTION INTRAVENOUS at 20:17

## 2023-02-22 RX ADMIN — POTASSIUM BICARBONATE 40 MEQ: 782 TABLET, EFFERVESCENT ORAL at 20:16

## 2023-02-22 RX ADMIN — MAGNESIUM SULFATE HEPTAHYDRATE 2000 MG: 40 INJECTION, SOLUTION INTRAVENOUS at 15:33

## 2023-02-22 RX ADMIN — LOSARTAN POTASSIUM 100 MG: 50 TABLET, FILM COATED ORAL at 09:27

## 2023-02-22 RX ADMIN — SODIUM CHLORIDE, PRESERVATIVE FREE 10 ML: 5 INJECTION INTRAVENOUS at 09:39

## 2023-02-22 RX ADMIN — ACETAMINOPHEN 650 MG: 325 TABLET ORAL at 17:55

## 2023-02-22 RX ADMIN — ACETAMINOPHEN 650 MG: 325 TABLET ORAL at 09:26

## 2023-02-22 RX ADMIN — SODIUM CHLORIDE, POTASSIUM CHLORIDE, SODIUM LACTATE AND CALCIUM CHLORIDE: 600; 310; 30; 20 INJECTION, SOLUTION INTRAVENOUS at 07:01

## 2023-02-22 ASSESSMENT — PAIN SCALES - GENERAL
PAINLEVEL_OUTOF10: 0
PAINLEVEL_OUTOF10: 4
PAINLEVEL_OUTOF10: 6

## 2023-02-22 ASSESSMENT — PAIN DESCRIPTION - LOCATION
LOCATION: HEAD
LOCATION: HEAD

## 2023-02-22 ASSESSMENT — PAIN DESCRIPTION - DESCRIPTORS
DESCRIPTORS: ACHING
DESCRIPTORS: ACHING

## 2023-02-22 NOTE — ANESTHESIA POSTPROCEDURE EVALUATION
Department of Anesthesiology  Postprocedure Note    Patient: Lessie Dakins  MRN: [id number]  YOB: 1940  Date of evaluation: 2/21/2023      Procedure Summary     Date: 02/21/23 Room / Location:  ENDO FLOURO 1 /  ENDOSCOPY    Anesthesia Start: 1545 Anesthesia Stop: 3232    Procedure: ERCP ENDOSCOPIC RETROGRADE CHOLANGIOPANCREATOGRAPHY Rm 622 (Upper GI Region) Diagnosis:       Acute pancreatitis, unspecified complication status, unspecified pancreatitis type      (Acute pancreatitis, unspecified complication status, unspecified pancreatitis type [K85.90])    Surgeons: Lluu Best MD Responsible Provider: Tammy Proctor MD    Anesthesia Type: general ASA Status: 3          Anesthesia Type: No value filed.     Vipin Phase I: Vipin Score: 9    Vipin Phase II:        Anesthesia Post Evaluation    Patient location during evaluation: PACU  Patient participation: complete - patient participated  Level of consciousness: awake  Airway patency: patent  Nausea & Vomiting: no nausea  Complications: no  Cardiovascular status: blood pressure returned to baseline and hemodynamically stable  Respiratory status: acceptable  Hydration status: stable  Multimodal analgesia pain management approach

## 2023-02-22 NOTE — PROGRESS NOTES
Hospitalist Progress Note   Admit Date:  2023  6:51 AM   Name:  Deanna Quiroga   Age:  [de-identified] y.o. Sex:  female  :  1940   MRN:  [id number]   Room:  2/    Presenting Complaint: Abdominal Pain and Emesis     Reason(s) for Admission: Pancreatic duct obstruction [K86.89]  Acute on chronic pancreatitis ([address] [K85.90, K86.1]  Pancreatitis, unspecified pancreatitis type [K85.90]     Hospital Course:   51-year-old female with past medical history of bipolar anemia, duodenal ulcer complicated with perforation requiring Billroth II, acute hepatic pancreatitis, arthritis, presents in setting of abdominal pain or nausea      Subjective & 24hr Events (23): Patient seen at bedside, resting in bed comfortably. She is alert and awake, reports significant resolution of her abdominal pain. Denies any nausea, vomiting, chest pain, palpitations, nausea vomiting or diarrhea. ROS:  10 point review of system is negative except for what mentioned above. Assessment & Plan:     Acute pancreatitis  -  Status post ERCP on  which was unsuccessful due to Billroth II anatomy. Starting patient on clear liquids this morning, will monitor for another 24 hours. We will advance diet to full liquids for dinner. Hypokalemia  -  Potassium 3.4. Continue KCl 40 mEq p.o. twice daily x4 doses. Magnesium 1.8, ordered for 2 g magnesium sulfate x1. HTN  -  BP is optimally controlled with clonidine patch 0.1 mg 3 times daily along with Cozaar 100 mg p.o. daily. Hyperlipidemia    H/o Rheumatoid arthritis      Lovenox  Full code    Disposition:  Potential discharge home in next 24 to 48 hours if continues to be clinically stable. Hospital Problems:  Principal Problem:    Pancreatitis, unspecified pancreatitis type  Active Problems:    Hypercholesterolemia    Hypomagnesemia    Hypokalemia  Resolved Problems:    * No resolved hospital problems.  *      Objective:   Patient Vitals for the past 24 hrs:   Temp Pulse Resp BP SpO2   02/22/23 0738 98.4 °F (36.9 °C) 75 16 (!) 168/67 97 %   02/22/23 0715 -- 76 -- -- --   02/22/23 0509 98.1 °F (36.7 °C) 76 18 (!) 167/74 97 %   02/22/23 0016 97.9 °F (36.6 °C) 81 18 (!) 163/74 97 %   02/21/23 1941 97.3 °F (36.3 °C) 81 17 (!) 152/67 95 %   02/21/23 1845 -- -- -- (!) 163/69 --   02/21/23 1750 -- 85 -- (!) 181/76 99 %   02/21/23 1745 -- 79 -- (!) 186/74 100 %   02/21/23 1740 -- 74 -- (!) 183/72 100 %   02/21/23 1735 -- 84 -- (!) 195/75 99 %   02/21/23 1730 -- 82 -- (!) 140/65 99 %   02/21/23 1728 97.1 °F (36.2 °C) 82 15 (!) 174/72 100 %   02/21/23 1725 -- 78 -- (!) 195/79 99 %   02/21/23 1309 99 °F (37.2 °C) 70 15 (!) 156/75 99 %   02/21/23 1122 99.1 °F (37.3 °C) 65 20 (!) 165/62 96 %         Oxygen Therapy  SpO2: 97 %  Pulse via Oximetry: 77 beats per minute  Pulse Oximeter Device Mode: Continuous  Pulse Oximeter Device Location: Left, Finger  O2 Device: None (Room air)  Skin Assessment: Clean, dry, & intact  O2 Flow Rate (L/min): 2 L/min    Estimated body mass index is 17.38 kg/m² as calculated from the following:    Height as of this encounter: 5' 1\" (1.549 m). Weight as of this encounter: 92 lb (41.7 kg). Intake/Output Summary (Last 24 hours) at 2/22/2023 0902  Last data filed at 2/21/2023 1721  Gross per 24 hour   Intake 600 ml   Output 0 ml   Net 600 ml           Physical Exam:     Blood pressure (!) 168/67, pulse 75, temperature 98.4 °F (36.9 °C), temperature source Oral, resp. rate 16, height 5' 1\" (1.549 m), weight 92 lb (41.7 kg), SpO2 97 %. General:    Frail, cachectic, NAD, on room air  Head:  Normocephalic, atraumatic  Eyes:  Sclerae appear normal.  Pupils equally round. ENT:  Nares appear normal.  Moist oral mucosa  Neck:  No restricted ROM. Trachea midline   CV:   RRR. No m/r/g. No jugular venous distension. Lungs:   CTAB. No wheezing, rhonchi, or rales. Symmetric expansion.   Abdomen:   Mild right lower quadrant tenderness on palpation, no guarding or rigidity, bowel sounds are normoactive  Extremities: No cyanosis or clubbing. No edema  Skin:     No rashes and normal coloration. Warm and dry. Neuro:  CN II-XII grossly intact. Psych:  Normal mood and affect.       I have personally reviewed labs and tests:  Recent Labs:  Recent Results (from the past 48 hour(s))   CBC with Auto Differential    Collection Time: 02/21/23  5:28 AM   Result Value Ref Range    WBC 8.1 4.3 - 11.1 K/uL    RBC 3.62 (L) 4.05 - 5.2 M/uL    Hemoglobin 9.5 (L) 11.7 - 15.4 g/dL    Hematocrit 29.8 (L) 35.8 - 46.3 %    MCV 82.3 82 - 102 FL    MCH 26.2 26.1 - 32.9 PG    MCHC 31.9 31.4 - 35.0 g/dL    RDW 15.9 (H) 11.9 - 14.6 %    Platelets 229 570 - 064 K/uL    MPV 8.9 (L) 9.4 - 12.3 FL    nRBC 0.00 0.0 - 0.2 K/uL    Differential Type AUTOMATED      Seg Neutrophils 80 (H) 43 - 78 %    Lymphocytes 10 (L) 13 - 44 %    Monocytes 9 4.0 - 12.0 %    Eosinophils % 1 0.5 - 7.8 %    Basophils 0 0.0 - 2.0 %    Immature Granulocytes 0 0.0 - 5.0 %    Segs Absolute 6.3 1.7 - 8.2 K/UL    Absolute Lymph # 0.8 0.5 - 4.6 K/UL    Absolute Mono # 0.7 0.1 - 1.3 K/UL    Absolute Eos # 0.1 0.0 - 0.8 K/UL    Basophils Absolute 0.0 0.0 - 0.2 K/UL    Absolute Immature Granulocyte 0.0 0.0 - 0.5 K/UL   Comprehensive Metabolic Panel w/ Reflex to MG    Collection Time: 02/21/23  5:28 AM   Result Value Ref Range    Sodium 141 133 - 143 mmol/L    Potassium 3.4 (L) 3.5 - 5.1 mmol/L    Chloride 109 101 - 110 mmol/L    CO2 27 21 - 32 mmol/L    Anion Gap 5 2 - 11 mmol/L    Glucose 110 (H) 65 - 100 mg/dL    BUN 4 (L) 8 - 23 MG/DL    Creatinine 0.50 (L) 0.6 - 1.0 MG/DL    Est, Glom Filt Rate >60 >60 ml/min/1.73m2    Calcium 8.0 (L) 8.3 - 10.4 MG/DL    Total Bilirubin 0.5 0.2 - 1.1 MG/DL    ALT <6 (L) 12 - 65 U/L    AST 16 15 - 37 U/L    Alk Phosphatase 54 50 - 136 U/L    Total Protein 5.4 (L) 6.3 - 8.2 g/dL    Albumin 2.3 (L) 3.2 - 4.6 g/dL    Globulin 3.1 2.8 - 4.5 g/dL    Albumin/Globulin Ratio 0.7 0.4 - 1.6     Magnesium    Collection Time: 02/21/23  5:28 AM   Result Value Ref Range    Magnesium 1.8 1.8 - 2.4 mg/dL       I have personally reviewed imaging studies:  Other Studies:  NC XR TECHNOLOGIST SERVICE   Final Result      CT ABDOMEN PELVIS W IV CONTRAST Additional Contrast? None   Final Result   1. Indistinctness at the pancreatic head/uncinate process with increasing   pancreatic ductal dilatation. While these findings could be inflammatory such as   pancreatitis, neoplasm could also have this appearance. One of the sites of   hypermetabolism is present at this location on PET scanning. 2. Extensive atherosclerosis.              Current Meds:  Current Facility-Administered Medications   Medication Dose Route Frequency    indomethacin (INDOCIN) 50 MG suppository 100 mg  100 mg Rectal Once    losartan (COZAAR) tablet 100 mg  100 mg Oral Daily    cloNIDine (CATAPRES) 0.1 MG/24HR 1 patch  1 patch TransDERmal Weekly    sodium chloride flush 0.9 % injection 5-40 mL  5-40 mL IntraVENous 2 times per day    sodium chloride flush 0.9 % injection 5-40 mL  5-40 mL IntraVENous PRN    0.9 % sodium chloride infusion   IntraVENous PRN    ondansetron (ZOFRAN-ODT) disintegrating tablet 4 mg  4 mg Oral Q8H PRN    Or    ondansetron (ZOFRAN) injection 4 mg  4 mg IntraVENous Q6H PRN    polyethylene glycol (GLYCOLAX) packet 17 g  17 g Oral Daily PRN    acetaminophen (TYLENOL) tablet 650 mg  650 mg Oral Q6H PRN    Or    acetaminophen (TYLENOL) suppository 650 mg  650 mg Rectal Q6H PRN    lactated ringers IV soln infusion   IntraVENous Continuous    morphine injection 2 mg  2 mg IntraVENous Q4H PRN    oxyCODONE (ROXICODONE) immediate release tablet 5 mg  5 mg Oral Q4H PRN       Signed:  Rosette Rhoades MD

## 2023-02-22 NOTE — ANESTHESIA POSTPROCEDURE EVALUATION
Department of Anesthesiology  Postprocedure Note    Patient: Indira Keller  MRN: [id number]  YOB: 1940  Date of evaluation: 2/21/2023      Procedure Summary     Date: 02/21/23 Room / Location: [facility] ENDO FLOUR[facility] 1 / [facility] ENDOSCOPY    Anesthesia Start: 1545 Anesthesia Stop: 5621    Procedure: ERCP ENDOSCOPIC RETROGRADE CHOLANGIOPANCREATOGRAPHY Rm 622 (Upper GI Region) Diagnosis:       Acute pancreatitis, unspecified complication status, unspecified pancreatitis type      (Acute pancreatitis, unspecified complication status, unspecified pancreatitis type [K85.90])    Surgeons: Susan Manriquez MD Responsible Provider: Rohan Mello MD    Anesthesia Type: general ASA Status: 3          Anesthesia Type: No value filed.     Vipin Phase I: Vipin Score: 9    Vipin Phase II:        Anesthesia Post Evaluation    Patient location during evaluation: PACU  Patient participation: complete - patient participated  Level of consciousness: awake  Airway patency: patent  Nausea & Vomiting: no nausea  Complications: no  Cardiovascular status: blood pressure returned to baseline and hemodynamically stable  Respiratory status: acceptable  Hydration status: stable  Multimodal analgesia pain management approach

## 2023-02-22 NOTE — PROGRESS NOTES
C/O headache and sore throat this morning. Pain alleviated with analgesics. Tolerated liquids well. Denies N/V and pain. All known needs addressed at this time. Bed low, locked, side rails x2, call light in reach, door open. Will report to night nurse.

## 2023-02-22 NOTE — PROGRESS NOTES
[facility]   DAILY PROGRESS NOTE    Admit Date:  2/18/2023    CC:  Recurrent Acute Pancreatitis    Problem List:  Principal Problem:    Pancreatitis, unspecified pancreatitis type  Active Problems:    Hypercholesterolemia    Hypomagnesemia    Hypokalemia  Resolved Problems:    * No resolved hospital problems. *    Mrs. Javier Hernandez [de-identified] yo female admitted 2/18/23 with acute recurrent pancreatitis of unknown etiology. 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 During hospitalization in October, she underwent EGD on 10/4/22 with only gastrojejunostomy and a HH. Repeat EUS 12/28/22 with no chronic pancreatitic changes other than a mildly dilated PD in the body; exam sub-optimal due to her anatomy. If acute pancreatitis re-occurred, the thought was to consider ERCP / pancreatic sphincterotomy with surgical access or colonoscope. Evaluation for autoimmune and hereditary causes of pancreatitis with negative IGG subclass 4. ERCP attempted by Dr. Nneka Sawyer on 2/22/23 but it was unsuccessful. She is feeling better today. Advanced diet to Full Liquid. Recurrent Acute Pancreatitis  Lung Cancer    - Unclear etiology for the pancreatitis at this point  - Supportive care  - Advance diet as tolerates  - I am okay with her being discharged home when she is feeling well/ tolerating PO intake. - She will need an outpatient referral to [facility] for further evaluation of her pancreatitis  - I spoke with her daughter, Dania Lewis, over the phone and she also agreed with the [address] referral.   Our office is placing the order for this. - She has follow up with Dr. Paulie Aviles to take care of her lung cancer.  - Will sign off. Please call us with any further questions or concerns. Inna Quiroga MD   [facility]      Subjective:     Patient feels great. She denies any abdominal pain.   She is tolerating Medications:   Current Facility-Administered Medications   Medication Dose Route Frequency Provider Last Rate Last Admin    phenol 1.4 % mouth spray 1 spray  1 spray Mouth/Throat Q2H PRN Sharlene Mendez RN        potassium bicarb-citric acid (EFFER-K) effervescent tablet 40 mEq  40 mEq Oral BID Candelario Bravo MD        magnesium sulfate 2000 mg in 50 mL IVPB premix  2,000 mg IntraVENous Once Candelario Bravo MD 25 mL/hr at 02/22/23 1533 2,000 mg at 02/22/23 1533    hyoscyamine (LEVSIN/SL) sublingual tablet 125 mcg  125 mcg SubLINGual Q4H PRN Candelario Bravo MD        indomethacin (INDOCIN) 50 MG suppository 100 mg  100 mg Rectal Once ANGELO Olivera MD        losartan (COZAAR) tablet 100 mg  100 mg Oral Daily Celine Sanchez MD   100 mg at 02/22/23 8637    cloNIDine (CATAPRES) 0.1 MG/24HR 1 patch  1 patch TransDERmal Weekly Celine Sanchez MD   1 patch at 02/19/23 1649    sodium chloride flush 0.9 % injection 5-40 mL  5-40 mL IntraVENous 2 times per day Emperatriz Snow MD   10 mL at 02/22/23 0939    sodium chloride flush 0.9 % injection 5-40 mL  5-40 mL IntraVENous PRN Emperatriz Snow MD        0.9 % sodium chloride infusion   IntraVENous PRN Emperatriz Snow MD        ondansetron (ZOFRAN-ODT) disintegrating tablet 4 mg  4 mg Oral Q8H PRN Emperatriz Snow MD   4 mg at 02/19/23 2124    Or    ondansetron (ZOFRAN) injection 4 mg  4 mg IntraVENous Q6H PRN Emperatriz Snow MD   4 mg at 02/19/23 1653    polyethylene glycol (GLYCOLAX) packet 17 g  17 g Oral Daily PRN Emperatriz Snow MD        acetaminophen (TYLENOL) tablet 650 mg  650 mg Oral Q6H PRN Emperatriz nSow MD   650 mg at 02/22/23 8719    Or    acetaminophen (TYLENOL) suppository 650 mg  650 mg Rectal Q6H PRN Emperatriz Snow MD        lactated ringers IV soln infusion   IntraVENous Continuous Candelario Bravo MD 75 mL/hr at 02/22/23 0926 Rate Change at 02/22/23 0926    morphine injection 2 mg  2 mg IntraVENous Q4H PRN [facility] Sharon Payton MD   2 mg at 02/21/23 1018    oxyCODONE (ROXICODONE) immediate release tablet 5 mg  5 mg Oral Q4H PRN Robert Wolff MD   5 mg at 02/20/23 1659       Review of Systems:  ROS was obtained, with pertinent positives as listed above. No chest pain or SOB. Objective:   Vitals:  BP (!) 160/64   Pulse 53   Temp 97.5 °F (36.4 °C) (Oral)   Resp 18   Ht 5' 1\" (1.549 m)   Wt 92 lb (41.7 kg)   LMP  (LMP Unknown)   SpO2 98%   BMI 17.38 kg/m²   Intake/Output:  02/22 0701 - 02/22 1900  In: 200 [P.O.:200]  Out: -   02/20 1901 - 02/22 0700  In: 4041.5 [I.V.:4041.5]  Out: 0   Exam:  General appearance: alert, cooperative, no distress. Thin. Appears chronically ill. Lungs: clear to auscultation bilaterally anteriorly  Heart: regular rate and rhythm  Abdomen: soft, non-tender. Bowel sounds normal.  Prior surgical scars noted.   Extremities: extremities normal, atraumatic, no cyanosis or edema  Neuro:  alert and oriented    Data Review (Labs):    Recent Labs     02/20/23  0642 02/21/23  0528   WBC 8.5 8.1   HGB 10.7* 9.5*   HCT 33.0* 29.8*    307   MCV 82.9 82.3    141   K 3.2* 3.4*    109   CO2 23 27   BUN 7* 4*   CREATININE 0.40* 0.50*   CALCIUM 8.6 8.0*   MG 1.7* 1.8   GLUCOSE 119* 110*   AST  --  16   ALT  --  <6*   BILITOT  --  0.5   ALBUMIN  --  0.7   PROT  --  5.4*   LIPASE 530*  --          Chandler Suarez MD  [facility]

## 2023-02-23 VITALS
RESPIRATION RATE: 16 BRPM | SYSTOLIC BLOOD PRESSURE: 187 MMHG | WEIGHT: 92 LBS | TEMPERATURE: 98.4 F | HEART RATE: 54 BPM | BODY MASS INDEX: 17.37 KG/M2 | HEIGHT: 61 IN | DIASTOLIC BLOOD PRESSURE: 72 MMHG | OXYGEN SATURATION: 99 %

## 2023-02-23 PROCEDURE — 6370000000 HC RX 637 (ALT 250 FOR IP): Performed by: FAMILY MEDICINE

## 2023-02-23 PROCEDURE — 2580000003 HC RX 258: Performed by: HOSPITALIST

## 2023-02-23 PROCEDURE — 6370000000 HC RX 637 (ALT 250 FOR IP): Performed by: HOSPITALIST

## 2023-02-23 PROCEDURE — 2580000003 HC RX 258: Performed by: INTERNAL MEDICINE

## 2023-02-23 RX ORDER — POLYETHYLENE GLYCOL 3350 17 G/17G
17 POWDER, FOR SOLUTION ORAL DAILY PRN
Qty: 14 EACH | Refills: 0 | Status: SHIPPED | OUTPATIENT
Start: 2023-02-23 | End: 2023-03-09

## 2023-02-23 RX ORDER — LOSARTAN POTASSIUM 100 MG/1
100 TABLET ORAL DAILY
Qty: 30 TABLET | Refills: 3 | Status: SHIPPED | OUTPATIENT
Start: 2023-02-24

## 2023-02-23 RX ORDER — AMLODIPINE BESYLATE 5 MG/1
5 TABLET ORAL DAILY
Status: DISCONTINUED | OUTPATIENT
Start: 2023-02-23 | End: 2023-02-23 | Stop reason: HOSPADM

## 2023-02-23 RX ORDER — MORPHINE SULFATE 15 MG/1
15 TABLET, EXTENDED RELEASE ORAL DAILY PRN
Qty: 10 TABLET | Refills: 0 | Status: SHIPPED | OUTPATIENT
Start: 2023-02-23 | End: 2023-03-05

## 2023-02-23 RX ORDER — OXYCODONE HYDROCHLORIDE 5 MG/1
5 TABLET ORAL EVERY 8 HOURS PRN
Qty: 15 TABLET | Refills: 0 | Status: SHIPPED | OUTPATIENT
Start: 2023-02-23 | End: 2023-02-28

## 2023-02-23 RX ORDER — AMLODIPINE BESYLATE 5 MG/1
5 TABLET ORAL DAILY
Qty: 30 TABLET | Refills: 3 | Status: SHIPPED | OUTPATIENT
Start: 2023-02-24

## 2023-02-23 RX ORDER — POTASSIUM CHLORIDE 20 MEQ/1
40 TABLET, EXTENDED RELEASE ORAL 2 TIMES DAILY WITH MEALS
Status: DISCONTINUED | OUTPATIENT
Start: 2023-02-23 | End: 2023-02-23 | Stop reason: HOSPADM

## 2023-02-23 RX ORDER — POTASSIUM CHLORIDE 20 MEQ/1
40 TABLET, EXTENDED RELEASE ORAL 2 TIMES DAILY WITH MEALS
Qty: 12 TABLET | Refills: 0 | Status: SHIPPED | OUTPATIENT
Start: 2023-02-23 | End: 2023-02-26

## 2023-02-23 RX ADMIN — AMLODIPINE BESYLATE 5 MG: 5 TABLET ORAL at 08:26

## 2023-02-23 RX ADMIN — POTASSIUM CHLORIDE 40 MEQ: 1500 TABLET, EXTENDED RELEASE ORAL at 11:38

## 2023-02-23 RX ADMIN — SODIUM CHLORIDE, POTASSIUM CHLORIDE, SODIUM LACTATE AND CALCIUM CHLORIDE: 600; 310; 30; 20 INJECTION, SOLUTION INTRAVENOUS at 05:28

## 2023-02-23 RX ADMIN — LOSARTAN POTASSIUM 100 MG: 50 TABLET, FILM COATED ORAL at 08:26

## 2023-02-23 RX ADMIN — SODIUM CHLORIDE, PRESERVATIVE FREE 10 ML: 5 INJECTION INTRAVENOUS at 08:28

## 2023-02-23 NOTE — DISCHARGE SUMMARY
Hospitalist Discharge Summary   Admit Date:  2023  6:51 AM   DC Note date: 2023  Name:  Gerardo Eden   Age:  [de-identified] y.o. Sex:  female  :  1940   MRN:  [id number]   Room:  [facility]  PCP:  Fadumo Meade MD    Presenting Complaint: Abdominal Pain and Emesis     Initial Admission Diagnosis: Pancreatic duct obstruction [K86.89]  Acute on chronic pancreatitis ([address] [K85.90, K86.1]  Pancreatitis, unspecified pancreatitis type [K85.90]     Problem List for this Hospitalization (present on admission):    Principal Problem:    Pancreatitis, unspecified pancreatitis type  Active Problems:    Hypercholesterolemia    Hypomagnesemia    Hypokalemia  Resolved Problems:    * No resolved hospital problems. 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 Patient has tolerated full liquid diet well over the past 24 hours. Patient is encouraged to continue liquid diet for the next 4 to 5 days after discharge and with gradual transition to soft to regular diet as per dietary instruction given on discharge. Hypokalemia  resolved  Potassium 3.4. HTN  -  Cozaar and amlodipine on discharge. Hyperlipidemia  Continue statin     H/o Rheumatoid arthritis          Disposition: Home  Diet: ADULT DIET; Full Liquid  Code Status: Full Code    Follow Ups:  Follow-up with [facility] associates as outpatient. Plan for outpatient referral to [facility]. Follow-up with PCP next 1 to 2 weeks. Time spent in patient discharge and coordination 36 minutes. Plan was discussed with patient. All questions answered. Patient was stable at time of discharge.   Instructions given to call a physician or return if any concerns. Current Discharge Medication List        START taking these medications    Details   Morphine Sulfate ER (MORPHABOND ER) 15 MG T12A Take 15 mg by mouth daily as needed (abdominal pain) for up to 10 days. Intended supply: 30 days Max Daily Amount: 15 mg  Qty: 10 tablet, Refills: 0    Comments: Reduce doses taken as pain becomes manageable  Associated Diagnoses: Acute on chronic pancreatitis ([facility] [address] [facility] Pancreatitis, unspecified pancreatitis type      oxyCODONE (ROXICODONE) 5 MG immediate release tablet Take 1 tablet by mouth every 8 hours as needed for Pain for up to 5 days. Max Daily Amount: 15 mg  Qty: 15 tablet, Refills: 0    Comments: Reduce doses taken as pain becomes manageable  Associated Diagnoses: Acute on chronic pancreatitis ([facility] [address]  Pancreatitis, unspecified pancreatitis type      losartan (COZAAR) 100 MG tablet Take 1 tablet by mouth daily  Qty: 30 tablet, Refills: 3      amLODIPine (NORVASC) 5 MG tablet Take 1 tablet by mouth daily  Qty: 30 tablet, Refills: 3      polyethylene glycol (GLYCOLAX) 17 g packet Take 17 g by mouth daily as needed for Constipation  Qty: 14 each, Refills: 0      phenol 1.4 % LIQD mouth spray Take 1 spray by mouth every 2 hours as needed for Sore Throat  Qty: 1 mL, Refills: 0      potassium chloride (KLOR-CON M) 20 MEQ extended release tablet Take 2 tablets by mouth 2 times daily (with meals) for 3 days  Qty: 12 tablet, Refills: 0           CONTINUE these medications which have CHANGED    Details   hyoscyamine (LEVSIN/SL) 125 MCG sublingual tablet Place 1 tablet under the tongue every 6 hours as needed for Cramping  Qty: 40 tablet, Refills: 0           CONTINUE these medications which have NOT CHANGED    Details   pravastatin (PRAVACHOL) 40 MG tablet Take 1 tablet by mouth daily  Qty: 30 tablet, Refills: 5             Some medications may have been reported old/obsolete and marked \"stop taking\" by the system; in reality pt was already off these meds; defer to outpatient or prescribing providers. Procedures done this admission:  Procedure(s):  ERCP ENDOSCOPIC RETROGRADE CHOLANGIOPANCREATOGRAPHY Rm 622    Consults this admission:  IP CONSULT TO GI    Echocardiogram results:  No results found for this or any previous visit. Diagnostic Imaging/Tests:   CT ABDOMEN PELVIS W IV CONTRAST Additional Contrast? None    Result Date: 2/18/2023  1. Indistinctness at the pancreatic head/uncinate process with increasing pancreatic ductal dilatation. While these findings could be inflammatory such as pancreatitis, neoplasm could also have this appearance. One of the sites of hypermetabolism is present at this location on PET scanning. 2. Extensive atherosclerosis.         Labs: Results:       BMP, Mg, Phos Recent Labs     02/21/23  0528      K 3.4*      CO2 27   ANIONGAP 5   BUN 4*   CREATININE 0.50*   LABGLOM >60   CALCIUM 8.0*   GLUCOSE 110*   MG 1.8      CBC Recent Labs     02/21/23  0528   WBC 8.1   RBC 3.62*   HGB 9.5*   HCT 29.8*   MCV 82.3   MCH 26.2   MCHC 31.9   RDW 15.9*      MPV 8.9*   NRBC 0.00   SEGS 80*   LYMPHOPCT 10*   EOSRELPCT 1   MONOPCT 9   BASOPCT 0   IMMGRAN 0   SEGSABS 6.3   LYMPHSABS 0.8   EOSABS 0.1   MONOSABS 0.7   BASOSABS 0.0   ABSIMMGRAN 0.0      LFT Recent Labs     02/21/23  0528   BILITOT 0.5   ALKPHOS 54   AST 16   ALT <6*   PROT 5.4*   LABALBU 2.3*   GLOB 3.1      Cardiac  No results found for: NTPROBNP, TROPHS   Coags Lab Results   Component Value Date/Time    PROTIME 13.2 01/16/2023 09:21 AM    INR 1.0 01/16/2023 09:21 AM      A1c No results found for: LABA1C, EAG   Lipids Lab Results   Component Value Date/Time    CHOL 102 10/12/2022 11:58 AM    LDLCALC 42.4 10/12/2022 11:58 AM    LABVLDL 13.6 10/12/2022 11:58 AM    HDL 46 10/12/2022 11:58 AM    CHOLHDLRATIO 2.2 10/12/2022 11:58 AM    TRIG 148 01/17/2023 09:24 AM      Thyroid  Lab Results   Component Value Date/Time    TSHELE 0.35 12/22/2022 03:50 PM        Most Recent UA Lab Results Component Value Date/Time    COLORU YELLOW/STRAW 02/19/2023 01:20 PM    APPEARANCE CLEAR 02/19/2023 01:20 PM    SPECGRAV 1.008 02/19/2023 01:20 PM    LABPH 7.0 02/19/2023 01:20 PM    PROTEINU Negative 02/19/2023 01:20 PM    GLUCOSEU Negative 02/19/2023 01:20 PM    KETUA 15 02/19/2023 01:20 PM    BILIRUBINUR Negative 02/19/2023 01:20 PM    BLOODU Negative 02/19/2023 01:20 PM    UROBILINOGEN 1.0 02/19/2023 01:20 PM    NITRU Negative 02/19/2023 01:20 PM    LEUKOCYTESUR Negative 02/19/2023 01:20 PM    WBCUA 0-3 02/19/2023 01:20 PM    RBCUA 0 02/19/2023 01:20 PM    EPITHUA 0-3 02/19/2023 01:20 PM    BACTERIA 4+ 02/19/2023 01:20 PM    LABCAST 0 02/19/2023 01:20 PM    MUCUS 0 02/19/2023 01:20 PM        No results for input(s): CULTURE in the last 720 hours. All Labs from Last 24 Hrs:  No results found for this or any previous visit (from the past 24 hour(s)).     Allergies   Allergen Reactions    Azithromycin Other (See Comments)     pancreatitis    Codeine Nausea And Vomiting     Immunization History   Administered Date(s) Administered    COVID-19, MODERNA BLUE border, Primary or Immunocompromised, (age 12y+), IM, 100 mcg/0.5mL 02/05/2021, 03/06/2021, 10/26/2021    Influenza, FLUAD, (age 72 y+), Adjuvanted, 0.5mL 09/20/2022       Recent Vital Data:  Patient Vitals for the past 24 hrs:   Temp Pulse Resp BP SpO2   02/23/23 0732 98.4 °F (36.9 °C) 54 16 (!) 187/72 99 %   02/23/23 0330 97.9 °F (36.6 °C) 62 22 (!) 162/77 97 %   02/23/23 0115 -- 53 -- (!) 160/65 --   02/23/23 0015 97.9 °F (36.6 °C) 52 16 (!) 178/58 97 %   02/22/23 1934 97.7 °F (36.5 °C) 62 22 (!) 165/66 98 %   02/22/23 1546 97.5 °F (36.4 °C) 53 18 (!) 160/64 98 %       Oxygen Therapy  SpO2: 99 %  Pulse via Oximetry: 77 beats per minute  Pulse Oximeter Device Mode: Continuous  Pulse Oximeter Device Location: Left, Finger  O2 Device: None (Room air)  Skin Assessment: Clean, dry, & intact  O2 Flow Rate (L/min): 2 L/min    Estimated body mass index is 17.38 kg/m² as calculated from the following:    Height as of this encounter: 5' 1\" (1.549 m).    Weight as of this encounter: 92 lb (41.7 kg).    Intake/Output Summary (Last 24 hours) at 2/23/2023 1255  Last data filed at 2/22/2023 1258  Gross per 24 hour   Intake 200 ml   Output --   Net 200 ml         Physical Exam:    General:    Frail, alert, elderly, NAD, on room air  Head:  Normocephalic, atraumatic  Eyes:  Sclerae appear normal.  Pupils equally round.    HENT:  Nares appear normal, no drainage.  Moist mucous membranes  Neck:  No restricted ROM.  Trachea midline  CV:   RRR.  No m/r/g.  No JVD  Lungs:   CTAB.  No wheezing, rhonchi, or rales.  Respirations even, unlabored  Abdomen:   Soft, nontender, nondistended.    Extremities: Warm and dry.  No cyanosis or clubbing.  No edema.    Skin:     No rashes.  Normal coloration  Neuro:  CN II-XII grossly intact.  Psych:  Normal mood and affect.    Signed:  Ken Huff MD    Part of this note may have been written by using a voice dictation software.  The note has been proof read but may still contain some grammatical/other typographical errors.

## 2023-02-23 NOTE — CARE COORDINATION
Pt is for discharge home today with family and no needs/supportive care orders received for CM at this time. Family to transport. 02/20/23 5725   Service Assessment   Patient Orientation Alert and Oriented   Cognition Alert   History Provided By Patient   Primary [facility]  Family Members   Patient's Healthcare Decision Maker is: Legal Next of Emily Acosta   PCP Verified by CM Yes   Last Visit to PCP Within last 6 months   Prior Functional Level Independent in ADLs/IADLs   Current Functional Level Independent in ADLs/IADLs   Can patient return to prior living arrangement Yes   Ability to make needs known: Good   Family able to assist with home care needs: Yes   Would you like for me to discuss the discharge plan with any other family members/significant others, and if so, who? No   Social/Functional History   Lives With Family   ADL Assistance Independent   Active  Yes   Discharge Planning   Type of Residence House   [facility]   Mode of Transport at Discharge Self   Confirm Follow Up Transport Family   Condition of Participation: Discharge Planning   The Patient and/or Patient Representative was provided with a Choice of Provider? Patient   The Patient and/Or Patient Representative agree with the Discharge Plan? Yes   Freedom of Choice list was provided with basic dialogue that supports the patient's individualized plan of care/goals, treatment preferences, and shares the quality data associated with the providers?   Yes

## 2023-02-24 ENCOUNTER — CARE COORDINATION (OUTPATIENT)
Dept: CARE COORDINATION | Facility: CLINIC | Age: 83
End: 2023-02-24

## 2023-02-24 ENCOUNTER — TELEPHONE (OUTPATIENT)
Dept: FAMILY MEDICINE CLINIC | Facility: CLINIC | Age: 83
End: 2023-02-24

## 2023-02-24 NOTE — CARE COORDINATION
[address] Care Transitions Initial Follow Up Call    Call within 2 business days of discharge: Yes    Patient Current Location:  Home: [address] [place] [address]    Care Transition Nurse contacted the patient by telephone to perform post hospital discharge assessment. Verified name and  with patient as identifiers. Provided introduction to self, and explanation of the Care Transition Nurse role. Patient: Britta Pruett Patient : 1940   MRN: [id number]  Reason for Admission: Pancreatitis  Discharge Date: 23 RARS: Readmission Risk Score: 25      Last Discharge  Street       Date Complaint Diagnosis Description Type Department Provider    23 Abdominal Pain; Emesis Acute on chronic pancreatitis ([facility] [address] . .. ED to Hosp-Admission (Discharged) (ADMITTED) [id number] Yvette Reyes MD; Nicky Russo. .. Was this an external facility discharge? No Discharge Facility: [facility]    Challenges to be reviewed by the provider   Additional needs identified to be addressed with provider: No  none               Method of communication with provider: none. Care Transition Nurse reviewed discharge instructions with patient who verbalized understanding. The patient was given an opportunity to ask questions and does not have any further questions or concerns at this time. Were discharge instructions available to patient? Yes. Reviewed appropriate site of care based on symptoms and resources available to patient including: PCP  Specialist  Urgent care clinics. The patient agrees to contact the PCP office for questions related to their healthcare. Advance Care Planning:   Does patient have an Advance Directive: decision maker updated. Medication reconciliation was performed with patient, who verbalizes understanding of administration of home medications.  Medications reviewed, 1111F entered: N/A    Was patient discharged with a pulse oximeter? no    Non-face-to-face services provided:  Contacted PCP office for hospital follow up appointment. Nurse to call patient with a work in appointment. Patient informed to be expecting a call from PCP office  Obtained and reviewed discharge summary and/or continuity of care documents  CTN [address] regarding patient's morphine prescription. Brandin Swanson with pharmacy reports Prior Authorization was required and has now been approved and medication ready for pickup. Patent informed and will have her daughter pick it up. Patient understands she is to continue liquid diet for another 4-5 days with a gradual transition to soft to regular diet. Verbalized understanding. Goals Addressed                   This Visit's Progress     Returns to baseline activity level. Patient/Family able to obtain medicine after d/c     Patient/Family able to verbalize medicine changes     Patient/Family aware and attends follow up appointments s/p d/c. Patient/Family agrees to notify provider of any barriers to plan of care. Patient/Family agrees to notify provider of any symptoms that indicate a worsening of condition                Offered patient enrollment in the Remote Patient Monitoring (RPM) program for in-home monitoring: NA.    Care Transitions 24 Hour Call    Do you have a copy of your discharge instructions?: Yes  Do you have all of your prescriptions and are they filled?: No  Have you been contacted by a [facility] Pharmacist?: No  Have you scheduled your follow up appointment?: No (Comment: office to call patient for work in)  How are you going to get to your appointment?: Car - family or friend to transport  Do you have support at home?: Alone  Are you an active caregiver in your home?: No  Care Transitions Interventions         Discussed follow-up appointments. If no appointment was previously scheduled, appointment scheduling offered: Yes. PCP  nurse to call patient with a work in hospital follow up appointment.   Is follow up appointment scheduled within 7 days of discharge? See above. Follow Up  Future Appointments   Date Time Provider Bo Jj   3/2/2023  8:45 AM SFD ASSESSMENT RM 01 SFDORPA SFD   3/3/2023 10:40 AM Kamila Quintanilla  [address], [address] [place] [address] GVL AMB   3/9/2023 10:30 AM Hany [address] [facility]   3/9/2023 11:15 AM Frederick Padgett MD [facility] GVL AMB   4/5/2023  8:45 AM PFP LAB PFP GVL AMB   4/12/2023 10:00 AM Fadumo Meade MD PF GVL AMB       Care Transition Nurse provided contact information. Plan for follow-up call in 5-7 days based on severity of symptoms and risk factors.   Plan for next call: symptom management    Anirudh Artis, RN

## 2023-02-27 ENCOUNTER — PREP FOR PROCEDURE (OUTPATIENT)
Dept: SURGERY | Age: 83
End: 2023-02-27

## 2023-02-27 RX ORDER — SODIUM CHLORIDE 0.9 % (FLUSH) 0.9 %
5-40 SYRINGE (ML) INJECTION EVERY 12 HOURS SCHEDULED
Status: CANCELLED | OUTPATIENT
Start: 2023-02-27

## 2023-02-27 RX ORDER — SODIUM CHLORIDE 9 MG/ML
INJECTION, SOLUTION INTRAVENOUS PRN
Status: CANCELLED | OUTPATIENT
Start: 2023-02-27

## 2023-02-27 RX ORDER — SODIUM CHLORIDE 0.9 % (FLUSH) 0.9 %
5-40 SYRINGE (ML) INJECTION PRN
Status: CANCELLED | OUTPATIENT
Start: 2023-02-27

## 2023-02-27 NOTE — TELEPHONE ENCOUNTER
Care Transitions Initial Follow Up Call    Call within 2 business days of discharge: Yes     Patient: Sana Posada Patient : 1940 MRN: [id number]    [unfilled]    RARS: Readmission Risk Score: 25       Spoke with: pt     Discharge department/facility: [facility]    Non-face-to-face services provided:  [facility]    Follow Up  Future Appointments   Date Time Provider Bo Jj   3/2/2023  8:45 AM SFD ASSESSMENT RM 01 SFDORPA SFD   3/3/2023 10:40 AM Gloria Manriquez  [address], [address] [place] [address] GVL AMB   3/6/2023  4:30 PM Erasto Chavez MD PFP GVL AMB   3/9/2023 10:30 AM [address] [place] [address] [employer] [address] [place] [address]   3/9/2023 11:15 AM Aaron Wolfe MD [facility] GVL AMB   2023  8:45 AM PFP LAB PFP GVL AMB   2023 10:00 AM Erasto Chavez MD PFP GVL AMB       Hamilton Beckham RN

## 2023-03-02 ENCOUNTER — HOSPITAL ENCOUNTER (OUTPATIENT)
Dept: PREADMISSION TESTING | Age: 83
Discharge: HOME | End: 2023-03-02
Payer: MEDICARE

## 2023-03-02 ENCOUNTER — CARE COORDINATION (OUTPATIENT)
Dept: CARE COORDINATION | Facility: CLINIC | Age: 83
End: 2023-03-02

## 2023-03-02 VITALS
HEIGHT: 61 IN | HEART RATE: 75 BPM | DIASTOLIC BLOOD PRESSURE: 46 MMHG | SYSTOLIC BLOOD PRESSURE: 143 MMHG | WEIGHT: 86.8 LBS | TEMPERATURE: 97.7 F | BODY MASS INDEX: 16.39 KG/M2 | OXYGEN SATURATION: 99 %

## 2023-03-02 DIAGNOSIS — Z01.818 PRE-OP TESTING: Primary | ICD-10-CM

## 2023-03-02 LAB
ALBUMIN SERPL-MCNC: 3.3 G/DL (ref 3.2–4.6)
ALBUMIN/GLOB SERPL: 0.8 (ref 0.4–1.6)
ALP SERPL-CCNC: 80 U/L (ref 50–130)
ALT SERPL-CCNC: 14 U/L (ref 12–65)
ANION GAP SERPL CALC-SCNC: 6 MMOL/L (ref 2–11)
APTT PPP: 37.7 SEC (ref 24.5–34.2)
AST SERPL-CCNC: 16 U/L (ref 15–37)
BILIRUB SERPL-MCNC: 0.3 MG/DL (ref 0.2–1.1)
BUN SERPL-MCNC: 15 MG/DL (ref 8–23)
CALCIUM SERPL-MCNC: 9.4 MG/DL (ref 8.3–10.4)
CHLORIDE SERPL-SCNC: 109 MMOL/L (ref 101–110)
CO2 SERPL-SCNC: 24 MMOL/L (ref 21–32)
CREAT SERPL-MCNC: 0.86 MG/DL (ref 0.6–1)
ERYTHROCYTE [DISTWIDTH] IN BLOOD BY AUTOMATED COUNT: 15.6 % (ref 11.9–14.6)
GLOBULIN SER CALC-MCNC: 4 G/DL (ref 2.8–4.5)
GLUCOSE SERPL-MCNC: 99 MG/DL (ref 65–100)
HCT VFR BLD AUTO: 39.8 % (ref 35.8–46.3)
HGB BLD-MCNC: 12.4 G/DL (ref 11.7–15.4)
INR PPP: 1
MCH RBC QN AUTO: 26 PG (ref 26.1–32.9)
MCHC RBC AUTO-ENTMCNC: 31.2 G/DL (ref 31.4–35)
MCV RBC AUTO: 83.4 FL (ref 82–102)
NRBC # BLD: 0 K/UL (ref 0–0.2)
PLATELET # BLD AUTO: 532 K/UL (ref 150–450)
PMV BLD AUTO: 9.4 FL (ref 9.4–12.3)
POTASSIUM SERPL-SCNC: 4.3 MMOL/L (ref 3.5–5.1)
PROT SERPL-MCNC: 7.3 G/DL (ref 6.3–8.2)
PROTHROMBIN TIME: 12.9 SEC (ref 12.6–14.3)
RBC # BLD AUTO: 4.77 M/UL (ref 4.05–5.2)
SODIUM SERPL-SCNC: 139 MMOL/L (ref 133–143)
WBC # BLD AUTO: 7.3 K/UL (ref 4.3–11.1)

## 2023-03-02 PROCEDURE — 80053 COMPREHEN METABOLIC PANEL: CPT

## 2023-03-02 PROCEDURE — 85610 PROTHROMBIN TIME: CPT

## 2023-03-02 PROCEDURE — 85730 THROMBOPLASTIN TIME PARTIAL: CPT

## 2023-03-02 PROCEDURE — 85027 COMPLETE CBC AUTOMATED: CPT

## 2023-03-02 RX ORDER — DONEPEZIL HYDROCHLORIDE 5 MG/1
5 TABLET, FILM COATED ORAL NIGHTLY
COMMUNITY
End: 2023-03-06

## 2023-03-02 RX ORDER — OXYCODONE HYDROCHLORIDE AND ACETAMINOPHEN 5; 325 MG/1; MG/1
1 TABLET ORAL EVERY 4 HOURS PRN
COMMUNITY

## 2023-03-02 RX ORDER — OXYCODONE HYDROCHLORIDE 5 MG/1
5 CAPSULE ORAL EVERY 4 HOURS PRN
COMMUNITY

## 2023-03-02 NOTE — PERIOP NOTE
PLEASE CONTINUE TAKING ALL PRESCRIPTION MEDICATIONS UP TO THE DAY OF SURGERY UNLESS OTHERWISE DIRECTED BELOW. Take ONLY the following medications on the day of surgery:    TAKE: Amlodipine               If needed may take EITHER morphine or oxycodone        DO NOT TAKE LOSARTAN ON DAY OF SURGERY      Hold the following medications as specified:   Donepezil- hold immediately   DISCONTINUE all vitamins and supplements 7 days prior to surgery. DISCONTINUE Non-Steriodal Anti-Inflammatory (NSAIDS) such as Advil and Aleve 5 days prior to surgery. Comments       On the day before surgery (3/12/23)  please take Tylenol (Acetaminophen) 1000mg in the morning and then again before bed OR you may substitute for Tylenol 650 mg morning, noon, and evening. Please do not bring home medications with you on the day of surgery unless otherwise directed by your nurse. If you are instructed to bring home medications, please give them to your nurse as they will be administered by the nursing staff. If you have any questions, please call [facility] [phone] or [address] [place] [address] [phone]. A copy of this note was provided to the patient for reference.

## 2023-03-02 NOTE — PERIOP NOTE
Patient confirms name and . Order to obtain consent found in EHR and matches case posting. Pt is here with her daughter Ramiro Marc.    Type 3 surgery,  assessment complete. Labs per surgeon: none  Labs per anesthesia protocol: cbc, cmp/albumin, pt/inr, ptt today and T&S on DOS  EK/15/23, 1/10/22 and Echo 21 reviewed with Dr Jean-Claude Huertas. See note below      Pt scheduled for R Thorcoscopy, R Lower Lobectomy, mediastinal lymphandectomy with Dr Ba Mast 3/13/23. On exam to day heart murmur is noted as well as bilateral carotid bruit. Pt denies any hx of CVA or recent symptoms. Last Echo 21, last Carotid Duplex 3/13/18. Reviewed this information with Dr Jean-Claude Huertas as well as recent hospitalization for pancreatitis, which is now chronic, and continued weight loss with BMI 16.40. Telephone orders received from Dr Jean-Claude Huertas include Echo and nutrition consult- read back and confirmed. Per Dr Jean-Claude Huertas, echo is to be reviewed by anesthesiologist once completed and Dr Jean-Claude Huertas will discuss carotid bruit with vascular surgeon Dr JOSÉ MIGUEL Martinez. Echo appointment confirmed with Ysabel Dutta for 3/6/23 at [address] [place] [address],[place] [id number]. Pt and daughter verbalizes understanding to arrive at 1 am and to register at admissions office. Request for pre-op nutrition consultation sent via EHR to Dr Ba Mast per Dr Jean-Claude Huertas, and email sent to [facility]" Kathleen, dietician. Increased calories and protein discussed with patient and daughter. Ensure Immunonutrition shakes provided to the patient to determine if tolerable to the patient and suggested as an option. Medication list updated today. Medication bottles visualized. Hibiclens/Dynahex antiseptic wash and instructions given per hospital policy. Patient provided with and instructed on educational handouts including Guide to Surgery, Pain Management, Hand Hygiene, Blood Transfusion Education, and [facility] Brochure.     Patient answered medical/surgical history questions at their best of ability. All prior to admission medications documented in [facility]. Patient instructed on the following:  Arrive at MAIN Entrance, time of arrival to be called the day before by 1700  NPO after midnight including gum, mints, and ice chips. Responsible adult must drive patient to the hospital, stay during surgery, and patient will require supervision 24 hours after anesthesia. Use antiseptic wash provided in shower the night before surgery and on the morning of surgery. Leave all valuables (money and jewelry) at home but bring insurance card and ID on DOS. Do not wear make-up, nail polish, lotions, cologne, perfumes, powders, or oil on skin. You may be required to pay a deductible or co-pay on the day of your procedure. You can pre-pay by calling [phone] if your surgery is at the [facility] or [phone] if your surgery is at the [facility]. You will received a call from the pre-op nurse by 5 pm on the business day prior to the scheduled procedure. If you have not spoken with a nurse, please check your voicemail. If you have not received an arrival time by 5 pm, please call [phone]. Patient teach back successful and patient demonstrates knowledge of instruction.

## 2023-03-02 NOTE — CARE COORDINATION
[facility] Care Transitions Follow Up Call    Patient Current Location:  Home: [address] [facility] Transition Nurse contacted the patient by telephone to follow up after admission on 2023. Verified name and  with patient as identifiers. Patient: Radha Chamberlain  Patient : 1940   MRN: [id number]  Reason for Admission: Pancreatitis  Discharge Date: 23 RARS: Readmission Risk Score: 25      Needs to be reviewed by the provider   Additional needs identified to be addressed with provider: No  none             Method of communication with provider: none. Addressed changes since last contact:   Patient reports doing well with no issues at time of call. Attended pre assessment this am for surgery scheduled 3/13/2023 for right thoracoscopy, right lower lobectomy, mediastinal lymphadenectomy. Reminded patient of upcoming appointment with Rheumatology-3/3/2023, Cardiology [facility] at 9:00 am for ECHO 3/6/2023 and PCP 3/6/2023 at 4:30 pm. Patient reports daughter to transport. Discussed follow-up appointments. If no appointment was previously scheduled, appointment scheduling offered: Scheduled. Is follow up appointment scheduled within 7 days of discharge? Yes. Follow Up  Future Appointments   Date Time Provider Bo Jj   3/3/2023 10:40 AM Anahi Mendoza  [address], [address] [place] [address] [facility] AMB   3/6/2023  9:00 AM [facility] DT ECHO [facility]   3/6/2023  4:30 PM Nia Han MD [facility] AMB   3/9/2023 10:30 AM [facility]   3/9/2023 11:15 AM Leigha Holliday MD [facility] AMB   2023  8:45 AM [facility] LAB [facility] AMB   2023 10:00 AM Nia Han MD [facility] AMB     [facility] follow up appointment(s): [facility]     Care Transition Nurse reviewed medical action plan and red flags with patient and discussed any barriers to care and/or understanding of plan of care after discharge.  Discussed appropriate site of care based on symptoms and resources available to patient including: PCP  Specialist  Urgent care clinics. The patient agrees to contact the PCP office for questions related to their healthcare. Advance Care Planning:   decision maker updated. Patients top risk factors for readmission: Chronic Pancreatitis, COPD, Bronchiogenic cancer of right lung  Interventions to address risk factors:   Continue taking all medications as ordered  Continue with adequate nutritional and fluid intake  Reviewed all follow up appointments   Offered patient enrollment in the Remote Patient Monitoring (RPM) program for in-home monitoring: NA.     Care Transitions Subsequent and Final Call    Schedule Follow Up Appointment with PCP: Completed  Subsequent and Final Calls  Do you have any ongoing symptoms?: No  Have your medications changed?: No  Do you have any questions related to your medications?: No  Do you currently have any active services?: No  Do you have any needs or concerns that I can assist you with?: No        Care Transition Nurse provided contact information for future needs. Plan for follow-up call in 5-7 days based on severity of symptoms and risk factors.   Plan for next call: symptom management    Chepe Byrd RN

## 2023-03-03 ENCOUNTER — OFFICE VISIT (OUTPATIENT)
Dept: RHEUMATOLOGY | Age: 83
End: 2023-03-03
Payer: MEDICARE

## 2023-03-03 VITALS
HEIGHT: 61 IN | DIASTOLIC BLOOD PRESSURE: 52 MMHG | HEART RATE: 80 BPM | WEIGHT: 87.2 LBS | BODY MASS INDEX: 16.46 KG/M2 | SYSTOLIC BLOOD PRESSURE: 117 MMHG

## 2023-03-03 DIAGNOSIS — C34.91 BRONCHOGENIC CANCER OF RIGHT LUNG (HCC): ICD-10-CM

## 2023-03-03 DIAGNOSIS — K85.90 ACUTE RECURRENT PANCREATITIS: Primary | ICD-10-CM

## 2023-03-03 DIAGNOSIS — M05.79 SEROPOSITIVE RHEUMATOID ARTHRITIS OF MULTIPLE SITES (HCC): Primary | ICD-10-CM

## 2023-03-03 DIAGNOSIS — Z79.899 LONG-TERM USE OF HIGH-RISK MEDICATION: ICD-10-CM

## 2023-03-03 PROCEDURE — 1123F ACP DISCUSS/DSCN MKR DOCD: CPT | Performed by: INTERNAL MEDICINE

## 2023-03-03 PROCEDURE — 3078F DIAST BP <80 MM HG: CPT | Performed by: INTERNAL MEDICINE

## 2023-03-03 PROCEDURE — 99214 OFFICE O/P EST MOD 30 MIN: CPT | Performed by: INTERNAL MEDICINE

## 2023-03-03 PROCEDURE — 3074F SYST BP LT 130 MM HG: CPT | Performed by: INTERNAL MEDICINE

## 2023-03-03 ASSESSMENT — ROUTINE ASSESSMENT OF PATIENT INDEX DATA (RAPID3)
ON A SCALE OF ONE TO TEN, HOW MUCH OF A PROBLEM HAS UNUSUAL FATIGUE OR TIREDNESS BEEN FOR YOU OVER THE PAST WEEK?: 5
ON A SCALE OF ONE TO TEN, CONSIDERING ALL THE WAYS IN WHICH ILLNESS AND HEALTH CONDITIONS MAY AFFECT YOU AT THIS TIME, PLEASE INDICATE BELOW HOW YOU ARE DOING:: 8
ON A SCALE OF ONE TO TEN, HOW MUCH PAIN HAVE YOU HAD BECAUSE OF YOUR CONDITION OVER THE PAST WEEK?: 0
ON A SCALE OF ONE TO TEN, HOW DIFFICULT WAS IT FOR YOU TO COMPLETE THE LISTED DAILY PHYSICAL TASKS OVER THE LAST WEEK: 0.6
WHEN YOU AWAKENED IN THE MORNING OVER THE LAST WEEK, PLEASE INDICATE THE AMOUNT OF TIME IT TAKES UNTIL YOU ARE AS LIMBER AS YOU WILL BE FOR THE DAY: < 10 MIN

## 2023-03-03 NOTE — PERIOP NOTE
Received safemail from [facility] stating that the patient will be seen by oncology nutritionist Samia Fernando 3/9/23.

## 2023-03-03 NOTE — PROGRESS NOTES
Meliza Powell M.D.  [facility] [address] [facility], [facility] Louise Kohli  Office : [phone], Fax: [phone] OFFICE VISIT NOTE  Date of Visit:  3/5/2023 9:35 PM    Patient Information:  Name:  Yuri Welch  :  1940  Age:  [de-identified] y.o. Gender:  female      Ms. Boogie Kee is here today for follow-up of seropositive rheumatoid arthritis. Last visit: 10/25/2022      History of Present Illness: On talking to the patient today she states that she has been having pancreatitis and is going to [address] on 2023 for consultation since she was supposed to have an ERCP but secondary to scar tissue they could not do the procedure and wanted the patient to go to [address] since she will need surgery to explore the pancreatic and bile duct. She has been diagnosed with stage 1 lung cancer and has tumor removal surgery on . She has also had a CT and MRI of her brain and will see the oncologist to review the results of the findings. Has some abdominal pain with no nausea. She states that she had to skip administering the Enbrel twice since she was in the hospital.  Her current joint complaints are as mentioned below. Since the last visit, patient is feeling \"good\". Pain: 0/10  Location:  Some stiffness with no pain in the hands. No warmth and redness. Occasional headaches. Some mid back pain with shoulder blade pain. Some right flank pain. Quality:  Soreness. Modifying Factors:  Constant stiffness. Associated Symptoms:  Has a weak  with no difficulty opening jars with no difficulty buttoning and unbuttoning. She denies any tingling, numbness or pain down the arms or legs but has had constant tingling and numbness of the fingertips in both her hands and the toes of only the right foot.     DMARD/Biologic 3/3/2023   AM Stiffness < 10 min   Pain 0   Fatigue 5   MDHAQ 0.6   Patient Global Score 8   Medication Name Enbrel     Last TB screen: 2022  TB result: Negative     Current dose of steroids: Prednisone 10 mg 1 pill a day  How long on current dose of steroids: 30 days. How long on continuous steroid therapy: Daily for 30 days. Past DMARDs, if applicable (methotrexate, plaquenil/hydroxychloroquine, sulfasalazine, Arava/leflunomide):none     Past biologics, if applicable (enbrel, humira, simponi, cimzia, xeljanz, HASSAN, remicade, simponi aria, actemra, rituximab, otezla, stelara, cosentyx): Currently on Enbrel injection 50 mg every 7 days. Past NSAIDs, if applicable (motrin, aleve, naproxen, advil, ibuprofen, celebrex, voltaren/diclofenac, etc.): Currently off ibuprofen 800 mg twice a day as needed. Last MN  Past osteoporosis drugs, if applicable (fosamax, actonel, boniva, reclast, prolia, forteo):fosamax 70 weekly     BMI:16.48  Current exercise regimen, if any:none  Current vitamin D dose:none  Current calcium dose:none  Fractures since last visit, if any: none    The patient otherwise has no significant interval changes in health or medical history to report. History Reviewed:    Past Medical History  Past Medical History:   Diagnosis Date    Acute pancreatitis     EPIFANIO (acute kidney injury) ([facility] 2014    Back pain 6/10/2016    Breast cancer ([facility] 2018    Breast lump dx 2018    left    Bronchitis     Chronic pancreatitis (HCC)     COPD (chronic obstructive pulmonary disease) ([facility] 2023    no meds or supplemental O2. Spiromery: Impression: Mild obstruction, supranormal FVC and moderately reduced diffusion capacity suggestive of COPD.     Discharge from the vagina     Dysuria     Eczema 6/10/2016    Fungal dermatitis     Headache 6/10/2016    History of bleeding peptic ulcer     Ruptured ulcer/gastrectomy- 5 units blood transfused    History of blood transfusion     ruptured peptic ulcer- 5 units blood    History of left breast cancer     lumpectomy and lymphnodes removed- no further treatment required    Hypercholesterolemia 12/4/2014    Hypertension     not well controlled--per pt    Intractable vomiting 5/20/2018    Lung cancer ([facility] 02/2023    Dr Joselito Edmonds -oncologist    Lung cancer [facility]) 01/2023    Malnutrition ([address]     BMI 16.4-- pancreatitis and continued weightloss> 20lbs since 1/2023    Menopausal vaginal dryness     Osteoarthritis 6/10/2016    Osteoporosis 6/10/2016    Pancreatic lesion 03/18/2020    Small pancreatic head lesion. This may represent a lymph node or neuroendocrine tumor. It is not convincingly worrisome in appearance. Biopsies were obtained.     PUD (peptic ulcer disease)     last 2003 which a rupture an extensive surgery    PVD (peripheral vascular disease) with claudication ([facility] 12/08/2020    Rheumatoid arthritis ([facility]     Rheumatologist: Juni Solo MD    Sepsis ([facility] 12/4/2014    Thyroid nodule     Tobacco abuse     1-2 ppd since 1967--       Past Surgical History  Past Surgical History:   Procedure Laterality Date    APPENDECTOMY  1977    with hysterectomy    BREAST BIOPSY Left 1975    BREAST LUMPECTOMY Left 2/22/2018    LEFT BREAST LUMPECTOMY/ SENTINEL NODE BIOPSY performed by Dayna Preston MD at [facility] Bilateral     with IOL    CATARACT REMOVAL Bilateral 2018    w/IOL    CHOLECYSTECTOMY      CT NEEDLE BIOPSY LUNG PERCUTANEOUS  01/05/2023    CT NEEDLE BIOPSY LUNG PERCUTANEOUS 1/5/2023 [facility] RADIOLOGY CT SCAN    ENDOSCOPIC ULTRASOUND (LOWER)  12/28/2022    ERCP N/A 02/21/2023    ERCP ENDOSCOPIC RETROGRADE CHOLANGIOPANCREATOGRAPHY Rm 622 performed by Livier Carr MD at [address] [facility] ([facility])  [address]  2003    stomach surgery for ruptured ulcer and extensive rerouting of intestestines per patient     STOMACH SURGERY      ruptured ulcer    [address] ENDOSCOPY  05/21/2018    empiric dilation    UPPER GASTROINTESTINAL ENDOSCOPY      UPPER GASTROINTESTINAL ENDOSCOPY N/A 10/04/2022    EGD ESOPHAGOGASTRODUODENOSCOPY/ RM 0 performed by Babak Sarabia MD at [address] N/A 12/28/2022    ENDOSCOPIC ULTRASOUND W/ EGD/ / performed by Whitney Zimmerman MD at [address] [facility] LOC DEVICE 1ST LESION LEFT Left 01/31/2018    US BREAST NEEDLE BIOPSY LEFT 1/31/2018 [facility] RADIOLOGY MAMMO       Family History  Family History   Problem Relation Age of Onset    Thyroid Cancer Neg Hx     No Known Problems Paternal Grandfather     No Known Problems Paternal Grandmother     No Known Problems Maternal Grandfather     No Known Problems Maternal Grandmother     Hypertension Other         Brother and sister with htn    No Known Problems Brother     Hypertension Mother     Heart Disease Brother     Heart Disease Sister     Breast Cancer Sister 79    Cancer Sister     Heart Disease Father     Heart Attack Father     Cancer Brother         prostate    Diabetes Sister     Heart Disease Sister        Social History  Social History     Socioeconomic History    Marital status:       Spouse name: None    Number of children: None    Years of education: None    Highest education level: None   Tobacco Use    Smoking status: Every Day     Packs/day: 0.10     Types: Cigarettes     Start date: 5/6/1966    Smokeless tobacco: Never    Tobacco comments:     1966 started- 3/2/23- states 5-6 cigs daily   Vaping Use    Vaping Use: Never used   Substance and Sexual Activity    Alcohol use: Not Currently    Drug use: No    Sexual activity: Not Currently     Birth control/protection: None               Allergy:  Allergies   Allergen Reactions    Azithromycin Other (See Comments)     pancreatitis    Codeine Nausea And Vomiting         Current Medications:  Outpatient Encounter Medications as of 3/3/2023   Medication Sig Dispense Refill    etanercept (ENBREL) 50 MG/ML injection Administer Enbrel 1 shot every 7 days. 12 mL 1    hyoscyamine (LEVSIN/SL) 125 MCG sublingual tablet Place 1 tablet under the tongue every 6 hours as needed for Cramping 40 tablet 0    losartan (COZAAR) 100 MG tablet Take 1 tablet by mouth daily 30 tablet 3    amLODIPine (NORVASC) 5 MG tablet Take 1 tablet by mouth daily 30 tablet 3    polyethylene glycol (GLYCOLAX) 17 g packet Take 17 g by mouth daily as needed for Constipation 14 each 0    phenol 1.4 % LIQD mouth spray Take 1 spray by mouth every 2 hours as needed for Sore Throat 1 mL 0    pravastatin (PRAVACHOL) 40 MG tablet Take 1 tablet by mouth daily (Patient taking differently: Take 40 mg by mouth every evening) 30 tablet 5    donepezil (ARICEPT) 5 MG tablet Take 5 mg by mouth nightly (Patient not taking: Reported on 3/3/2023)      oxyCODONE 5 MG capsule Take 5 mg by mouth every 4 hours as needed for Pain. (Patient not taking: Reported on 3/3/2023)      oxyCODONE-acetaminophen (PERCOCET) 5-325 MG per tablet Take 1 tablet by mouth every 4 hours as needed for Pain. [DISCONTINUED] etanercept (ENBREL) 50 MG/ML injection Inject 25 mg into the skin once a week      Morphine Sulfate ER (MORPHABOND ER) 15 MG T12A Take 15 mg by mouth daily as needed (abdominal pain) for up to 10 days. Intended supply: 30 days Max Daily Amount: 15 mg (Patient not taking: No sig reported) 10 tablet 0     No facility-administered encounter medications on file as of 3/3/2023.            REVIEW OF SYSTEMS: The following systems were reviewed with patient today and were negative except for the following (depicted with an \"X\"):        \"X\" General  \"X\" Head and Neck  \"X\" Heart and Breathing  \"X\" Gastrointestinal    Fever/chills  x Hair loss   Shortness of breath   Upset stomach    Falls   Dry mouth   Coughing   Diarrhea / constipation   x Wt loss   Mouth sores   Wheezing   Heartburn    Wt gain   Ringing ears   Chest pain   Dark or bloody stools    Night sweats   Diff. swallowing  X None of above Nausea or vomiting    None of above   None of above     X None of above                \"X\" Skin  \"X\" Neurology  \"X\" Urinary/Gyn  \"X\" Other    Easy bruising   Numbness/ tingling   Female problems   Depression    Rashes   Weakness   Problems with urination   Feeling anxious    Sun sensitivity  x Headaches  X None of above   Problems sleeping   X None of above   None of above     X None of above          Physical Exam:  Blood pressure (!) 117/52, pulse 80, height 5' 1\" (1.549 m), weight 87 lb 3.2 oz (39.6 kg). General:  Patient alert, cooperative and in no apparent distress. HEENT: Pupils equally reactive to light and accommodation, no scleral injection noted. Heart: Regular rate and rhythm, normal S1 and S2, no rubs or gallops. Lungs: Clear to auscultation bilaterally. Abdomen: Soft, nontender, no hepatosplenomegaly. Skin:  No rashes. No nail abnormalities. Neurologic:  Oriented, normal speech and affect. Normal gait. Extremities:  No edema in bilateral lower extremities with no cyanosis or clubbing. Muskoskeletal Exam:     I examined the shoulders, elbows, wrists, MCPs, PIPs, DIPs and knees bilaterally for strength, range of motion, deformity, tenderness, swelling, and synovitis. The findings are:       Physical Exam              Joint Exam 03/03/2023        Right  Left   Glenohumeral   Tender   Tender   PIP 2   Tender   Tender   PIP 3   Tender   Tender   PIP 4   Tender   Tender   PIP 5   Tender   Tender   Cervical Spine   Tender      Ankle   Tender   Tender     cJADAS 10:- 3.09     Patient otherwise has a normal joint exam without other evidence of joint tenderness, synovitis, warmth, erythema, decreased ROM, weakness or deformities.          Radiology Reports Reviewed (if available):  Last 3 months  [unfilled]    Lab Reports Reviewed (if available): Last 3 months    Hospital Outpatient Visit on 03/02/2023   Component Date Value Ref Range Status    WBC 03/02/2023 7.3  4.3 - 11.1 K/uL Final    RBC 03/02/2023 4.77  4.05 - 5.2 M/uL Final    Hemoglobin 03/02/2023 12.4  11.7 - 15.4 g/dL Final    Hematocrit 03/02/2023 39.8  35.8 - 46.3 % Final    MCV 03/02/2023 83.4  82.0 - 102.0 FL Final    MCH 03/02/2023 26.0 (A)  26.1 - 32.9 PG Final    MCHC 03/02/2023 31.2 (A)  31.4 - 35.0 g/dL Final    RDW 03/02/2023 15.6 (A)  11.9 - 14.6 % Final    Platelets 63/44/4372 532 (A)  150 - 450 K/uL Final    MPV 03/02/2023 9.4  9.4 - 12.3 FL Final    nRBC 03/02/2023 0.00  0.0 - 0.2 K/uL Final    **Note: Absolute NRBC parameter is now reported with Hemogram**    Sodium 03/02/2023 139  133 - 143 mmol/L Final    Potassium 03/02/2023 4.3  3.5 - 5.1 mmol/L Final    Chloride 03/02/2023 109  101 - 110 mmol/L Final    CO2 03/02/2023 24  21 - 32 mmol/L Final    Anion Gap 03/02/2023 6  2 - 11 mmol/L Final    Glucose 03/02/2023 99  65 - 100 mg/dL Final    BUN 03/02/2023 15  8 - 23 MG/DL Final    Creatinine 03/02/2023 0.86  0.6 - 1.0 MG/DL Final    Est, Glom Filt Rate 03/02/2023 >60  >60 ml/min/1.73m2 Final    Comment:      Pediatric calculator link: [employer].at. org/professionals/kdoqi/gfr_calculatorped       These results are not intended for use in patients <25years of age. eGFR results are calculated without a race factor using  the 2021 CKD-EPI equation. Careful clinical correlation is recommended, particularly when comparing to results calculated using previous equations. The CKD-EPI equation is less accurate in patients with extremes of muscle mass, extra-renal metabolism of creatinine, excessive creatine ingestion, or following therapy that affects renal tubular secretion.       Calcium 03/02/2023 9.4  8.3 - 10.4 MG/DL Final    Total Bilirubin 03/02/2023 0.3  0.2 - 1.1 MG/DL Final    ALT 03/02/2023 14  12 - 65 U/L Final    AST 03/02/2023 16  15 - 37 U/L Final    Alk Phosphatase 03/02/2023 80  50 - 130 U/L Final    Total Protein 03/02/2023 7.3  6.3 - 8.2 g/dL Final    Albumin 03/02/2023 3.3  3.2 - 4.6 g/dL Final    Globulin 03/02/2023 4.0  2.8 - 4.5 g/dL Final    Albumin/Globulin Ratio 03/02/2023 0.8  0.4 - 1.6   Final    PTT 03/02/2023 37.7 (A)  24.5 - 34.2 SEC Final    Comment: Heparin Therapeutic Range = 74 - 123 seconds  In addition to factor deficiency, monitoring heparin therapy, etc., evaluation of a prolonged aPTT result should include consideration of preanalytic variables such as heparin flush contamination, specimen integrity issues, etc.      Protime 03/02/2023 12.9  12.6 - 14.3 sec Final    INR 03/02/2023 1.0    Final    Comment: Suggested therapeutic INR range:  Venous thrombosis and embolus  2.0-3.0  Prosthetic heart valve         2.5-3.5  ** Note new reference range and method **     Admission on 02/18/2023, Discharged on 02/23/2023   Component Date Value Ref Range Status    WBC 02/18/2023 12.4 (A)  4.3 - 11.1 K/uL Final    RBC 02/18/2023 4.31  4.05 - 5.2 M/uL Final    Hemoglobin 02/18/2023 11.7  11.7 - 15.4 g/dL Final    Hematocrit 02/18/2023 36.8  35.8 - 46.3 % Final    MCV 02/18/2023 85.4  82 - 102 FL Final    MCH 02/18/2023 27.1  26.1 - 32.9 PG Final    MCHC 02/18/2023 31.8  31.4 - 35.0 g/dL Final    RDW 02/18/2023 16.2 (A)  11.9 - 14.6 % Final    Platelets 69/04/4154 324  150 - 450 K/uL Final    MPV 02/18/2023 8.9 (A)  9.4 - 12.3 FL Final    nRBC 02/18/2023 0.00  0.0 - 0.2 K/uL Final    **Note: Absolute NRBC parameter is now reported with Hemogram**    Differential Type 02/18/2023 AUTOMATED    Final    Seg Neutrophils 02/18/2023 87 (A)  43 - 78 % Final    Lymphocytes 02/18/2023 6 (A)  13 - 44 % Final    Monocytes 02/18/2023 6  4.0 - 12.0 % Final    Eosinophils % 02/18/2023 0 (A)  0.5 - 7.8 % Final    Basophils 02/18/2023 0  0.0 - 2.0 % Final    Immature Granulocytes 02/18/2023 1  0.0 - 5.0 % Final    Segs Absolute 02/18/2023 10.9 (A)  1.7 - 8.2 K/UL Final    Absolute Lymph # 02/18/2023 0.7  0.5 - 4.6 K/UL Final    Absolute Mono # 02/18/2023 0.7  0.1 - 1.3 K/UL Final    Absolute Eos # 02/18/2023 0.0  0.0 - 0.8 K/UL Final    Basophils Absolute 02/18/2023 0.0  0.0 - 0.2 K/UL Final    Absolute Immature Granulocyte 02/18/2023 0.1  0.0 - 0.5 K/UL Final    Sodium 02/18/2023 138  133 - 143 mmol/L Final    Potassium 02/18/2023 3.4 (A)  3.5 - 5.1 mmol/L Final    Chloride 02/18/2023 109  101 - 110 mmol/L Final    CO2 02/18/2023 20 (A)  21 - 32 mmol/L Final    Anion Gap 02/18/2023 9  2 - 11 mmol/L Final    Glucose 02/18/2023 124 (A)  65 - 100 mg/dL Final    BUN 02/18/2023 13  8 - 23 MG/DL Final    Creatinine 02/18/2023 0.90  0.6 - 1.0 MG/DL Final    Est, Glom Filt Rate 02/18/2023 >60  >60 ml/min/1.73m2 Final    Comment:      Pediatric calculator link: [employer].at. org/professionals/kdoqi/gfr_calculatorped       These results are not intended for use in patients <25years of age. eGFR results are calculated without a race factor using  the 2021 CKD-EPI equation. Careful clinical correlation is recommended, particularly when comparing to results calculated using previous equations. The CKD-EPI equation is less accurate in patients with extremes of muscle mass, extra-renal metabolism of creatinine, excessive creatine ingestion, or following therapy that affects renal tubular secretion.       Calcium 02/18/2023 8.7  8.3 - 10.4 MG/DL Final    Total Bilirubin 02/18/2023 0.4  0.2 - 1.1 MG/DL Final    ALT 02/18/2023 12  12 - 65 U/L Final    AST 02/18/2023 8 (A)  15 - 37 U/L Final    Alk Phosphatase 02/18/2023 73  50 - 136 U/L Final    Total Protein 02/18/2023 7.1  6.3 - 8.2 g/dL Final    Albumin 02/18/2023 3.0 (A)  3.2 - 4.6 g/dL Final    Globulin 02/18/2023 4.1  2.8 - 4.5 g/dL Final    Albumin/Globulin Ratio 02/18/2023 0.7  0.4 - 1.6   Final    Lipase 02/18/2023 4,669 (A)  73 - 393 U/L Final    WBC 02/19/2023 8.6  4.3 - 11.1 K/uL Final    RBC 02/19/2023 4.04 (A)  4.05 - 5.2 M/uL Final    Hemoglobin 02/19/2023 11.0 (A)  11.7 - 15.4 g/dL Final    Hematocrit 02/19/2023 35.3 (A)  35.8 - 46.3 % Final    MCV 02/19/2023 87.4  82 - 102 FL Final    MCH 02/19/2023 27.2  26.1 - 32.9 PG Final    MCHC 02/19/2023 31.2 (A)  31.4 - 35.0 g/dL Final    RDW 02/19/2023 16.1 (A)  11.9 - 14.6 % Final    Platelets 72/82/5722 255  150 - 450 K/uL Final    MPV 02/19/2023 10.5  9.4 - 12.3 FL Final    nRBC 02/19/2023 0.00  0.0 - 0.2 K/uL Final    **Note: Absolute NRBC parameter is now reported with Hemogram**    Differential Type 02/19/2023 AUTOMATED    Final    Seg Neutrophils 02/19/2023 72  43 - 78 % Final    Lymphocytes 02/19/2023 16  13 - 44 % Final    Monocytes 02/19/2023 10  4.0 - 12.0 % Final    Eosinophils % 02/19/2023 1  0.5 - 7.8 % Final    Basophils 02/19/2023 1  0.0 - 2.0 % Final    Immature Granulocytes 02/19/2023 0  0.0 - 5.0 % Final    Segs Absolute 02/19/2023 6.3  1.7 - 8.2 K/UL Final    Absolute Lymph # 02/19/2023 1.3  0.5 - 4.6 K/UL Final    Absolute Mono # 02/19/2023 0.9  0.1 - 1.3 K/UL Final    Absolute Eos # 02/19/2023 0.1  0.0 - 0.8 K/UL Final    Basophils Absolute 02/19/2023 0.0  0.0 - 0.2 K/UL Final    Absolute Immature Granulocyte 02/19/2023 0.0  0.0 - 0.5 K/UL Final    Sodium 02/19/2023 137  133 - 143 mmol/L Final    Potassium 02/19/2023 3.9  3.5 - 5.1 mmol/L Final    Chloride 02/19/2023 110  101 - 110 mmol/L Final    CO2 02/19/2023 22  21 - 32 mmol/L Final    Anion Gap 02/19/2023 5  2 - 11 mmol/L Final    Glucose 02/19/2023 80  65 - 100 mg/dL Final    BUN 02/19/2023 7 (A)  8 - 23 MG/DL Final    Creatinine 02/19/2023 0.50 (A)  0.6 - 1.0 MG/DL Final    Est, Glom Filt Rate 02/19/2023 >60  >60 ml/min/1.73m2 Final    Comment:      Pediatric calculator link: Efraín.at. org/professionals/kdoqi/gfr_calculatorped       These results are not intended for use in patients <25years of age. eGFR results are calculated without a race factor using  the 2021 CKD-EPI equation. Careful clinical correlation is recommended, particularly when comparing to results calculated using previous equations.   The CKD-EPI equation is less accurate in patients with extremes of muscle mass, extra-renal metabolism of creatinine, excessive creatine ingestion, or following therapy that affects renal tubular secretion.       Calcium 02/19/2023 8.4  8.3 - 10.4 MG/DL Final    Total Bilirubin 02/19/2023 0.7  0.2 - 1.1 MG/DL Final    ALT 02/19/2023 9 (A)  12 - 65 U/L Final    AST 02/19/2023 14 (A)  15 - 37 U/L Final    Alk Phosphatase 02/19/2023 64  50 - 136 U/L Final    Total Protein 02/19/2023 6.3  6.3 - 8.2 g/dL Final    Albumin 02/19/2023 2.6 (A)  3.2 - 4.6 g/dL Final    Globulin 02/19/2023 3.7  2.8 - 4.5 g/dL Final    Albumin/Globulin Ratio 02/19/2023 0.7  0.4 - 1.6   Final    Lipase 02/19/2023 915 (A)  73 - 393 U/L Final    Color, UA 02/19/2023 YELLOW/STRAW    Final    Color Reference Range: Straw, Yellow or Dark Yellow    Appearance 02/19/2023 CLEAR    Final    Specific Gravity, UA 02/19/2023 1.008  1.001 - 1.023   Final    pH, Urine 02/19/2023 7.0  5.0 - 9.0   Final    Protein, UA 02/19/2023 Negative  NEG mg/dL Final    Glucose, UA 02/19/2023 Negative  mg/dL Final    Ketones, Urine 02/19/2023 15 (A)  NEG mg/dL Final    Bilirubin Urine 02/19/2023 Negative  NEG   Final    Blood, Urine 02/19/2023 Negative  NEG   Final    Urobilinogen, Urine 02/19/2023 1.0  0.2 - 1.0 EU/dL Final    Nitrite, Urine 02/19/2023 Negative  NEG   Final    Leukocyte Esterase, Urine 02/19/2023 Negative  NEG   Final    WBC, UA 02/19/2023 0-3  0 /hpf Final    RBC, UA 02/19/2023 0  0 /hpf Final    BACTERIA, URINE 02/19/2023 4+ (A)  0 /hpf Final    Urine Culture if Indicated 02/19/2023 CULTURE NOT INDICATED BY UA RESULT    Final    Epithelial Cells UA 02/19/2023 0-3  0 /hpf Final    Casts 02/19/2023 0  0 /lpf Final    Crystals 02/19/2023 0  0 /LPF Final    Mucus, UA 02/19/2023 0  0 /lpf Final    Other observations 02/19/2023 RESULTS VERIFIED MANUALLY    Final    WBC 02/20/2023 8.5  4.3 - 11.1 K/uL Final    RBC 02/20/2023 3.98 (A)  4.05 - 5.2 M/uL Final    Hemoglobin 02/20/2023 10.7 (A)  11.7 - 15.4 g/dL Final    Hematocrit 02/20/2023 33.0 (A)  35.8 - 46.3 % Final    MCV 02/20/2023 82.9  82 - 102 FL Final    MCH 02/20/2023 26.9  26.1 - 32.9 PG Final    MCHC 02/20/2023 32.4  31.4 - 35.0 g/dL Final    RDW 02/20/2023 15.9 (A)  11.9 - 14.6 % Final    Platelets 09/32/7715 323  150 - 450 K/uL Final    MPV 02/20/2023 9.3 (A)  9.4 - 12.3 FL Final    nRBC 02/20/2023 0.00  0.0 - 0.2 K/uL Final    **Note: Absolute NRBC parameter is now reported with Hemogram**    Differential Type 02/20/2023 AUTOMATED    Final    Seg Neutrophils 02/20/2023 82 (A)  43 - 78 % Final    Lymphocytes 02/20/2023 8 (A)  13 - 44 % Final    Monocytes 02/20/2023 8  4.0 - 12.0 % Final    Eosinophils % 02/20/2023 0 (A)  0.5 - 7.8 % Final    Basophils 02/20/2023 0  0.0 - 2.0 % Final    Immature Granulocytes 02/20/2023 0  0.0 - 5.0 % Final    Segs Absolute 02/20/2023 7.0  1.7 - 8.2 K/UL Final    Absolute Lymph # 02/20/2023 0.7  0.5 - 4.6 K/UL Final    Absolute Mono # 02/20/2023 0.7  0.1 - 1.3 K/UL Final    Absolute Eos # 02/20/2023 0.0  0.0 - 0.8 K/UL Final    Basophils Absolute 02/20/2023 0.0  0.0 - 0.2 K/UL Final    Absolute Immature Granulocyte 02/20/2023 0.0  0.0 - 0.5 K/UL Final    Sodium 02/20/2023 139  133 - 143 mmol/L Final    Potassium 02/20/2023 3.2 (A)  3.5 - 5.1 mmol/L Final    Chloride 02/20/2023 106  101 - 110 mmol/L Final    CO2 02/20/2023 23  21 - 32 mmol/L Final    Anion Gap 02/20/2023 10  2 - 11 mmol/L Final    Glucose 02/20/2023 119 (A)  65 - 100 mg/dL Final    BUN 02/20/2023 7 (A)  8 - 23 MG/DL Final    Creatinine 02/20/2023 0.40 (A)  0.6 - 1.0 MG/DL Final    Est, Glom Filt Rate 02/20/2023 >60  >60 ml/min/1.73m2 Final    Comment:      Pediatric calculator link: [employer].at. org/professionals/kdoqi/gfr_calculatorped       These results are not intended for use in patients <25years of age. eGFR results are calculated without a race factor using  the 2021 CKD-EPI equation.  Careful clinical correlation is recommended, particularly when comparing to results calculated using previous equations. The CKD-EPI equation is less accurate in patients with extremes of muscle mass, extra-renal metabolism of creatinine, excessive creatine ingestion, or following therapy that affects renal tubular secretion.       Calcium 02/20/2023 8.6  8.3 - 10.4 MG/DL Final    Lipase 02/20/2023 530 (A)  73 - 393 U/L Final    Magnesium 02/20/2023 1.7 (A)  1.8 - 2.4 mg/dL Final    WBC 02/21/2023 8.1  4.3 - 11.1 K/uL Final    RBC 02/21/2023 3.62 (A)  4.05 - 5.2 M/uL Final    Hemoglobin 02/21/2023 9.5 (A)  11.7 - 15.4 g/dL Final    Hematocrit 02/21/2023 29.8 (A)  35.8 - 46.3 % Final    MCV 02/21/2023 82.3  82 - 102 FL Final    MCH 02/21/2023 26.2  26.1 - 32.9 PG Final    MCHC 02/21/2023 31.9  31.4 - 35.0 g/dL Final    RDW 02/21/2023 15.9 (A)  11.9 - 14.6 % Final    Platelets 02/39/9743 307  150 - 450 K/uL Final    MPV 02/21/2023 8.9 (A)  9.4 - 12.3 FL Final    nRBC 02/21/2023 0.00  0.0 - 0.2 K/uL Final    **Note: Absolute NRBC parameter is now reported with Hemogram**    Differential Type 02/21/2023 AUTOMATED    Final    Seg Neutrophils 02/21/2023 80 (A)  43 - 78 % Final    Lymphocytes 02/21/2023 10 (A)  13 - 44 % Final    Monocytes 02/21/2023 9  4.0 - 12.0 % Final    Eosinophils % 02/21/2023 1  0.5 - 7.8 % Final    Basophils 02/21/2023 0  0.0 - 2.0 % Final    Immature Granulocytes 02/21/2023 0  0.0 - 5.0 % Final    Segs Absolute 02/21/2023 6.3  1.7 - 8.2 K/UL Final    Absolute Lymph # 02/21/2023 0.8  0.5 - 4.6 K/UL Final    Absolute Mono # 02/21/2023 0.7  0.1 - 1.3 K/UL Final    Absolute Eos # 02/21/2023 0.1  0.0 - 0.8 K/UL Final    Basophils Absolute 02/21/2023 0.0  0.0 - 0.2 K/UL Final    Absolute Immature Granulocyte 02/21/2023 0.0  0.0 - 0.5 K/UL Final    Sodium 02/21/2023 141  133 - 143 mmol/L Final    Potassium 02/21/2023 3.4 (A)  3.5 - 5.1 mmol/L Final    Chloride 02/21/2023 109  101 - 110 mmol/L Final    CO2 02/21/2023 27  21 - 32 mmol/L Final Anion Gap 02/21/2023 5  2 - 11 mmol/L Final    Glucose 02/21/2023 110 (A)  65 - 100 mg/dL Final    BUN 02/21/2023 4 (A)  8 - 23 MG/DL Final    Creatinine 02/21/2023 0.50 (A)  0.6 - 1.0 MG/DL Final    Est, Glom Filt Rate 02/21/2023 >60  >60 ml/min/1.73m2 Final    Comment:      Pediatric calculator link: Efraín.at. org/professionals/kdoqi/gfr_calculatorped       These results are not intended for use in patients <25years of age. eGFR results are calculated without a race factor using  the 2021 CKD-EPI equation. Careful clinical correlation is recommended, particularly when comparing to results calculated using previous equations. The CKD-EPI equation is less accurate in patients with extremes of muscle mass, extra-renal metabolism of creatinine, excessive creatine ingestion, or following therapy that affects renal tubular secretion. Calcium 02/21/2023 8.0 (A)  8.3 - 10.4 MG/DL Final    Total Bilirubin 02/21/2023 0.5  0.2 - 1.1 MG/DL Final    ALT 02/21/2023 <6 (A)  12 - 65 U/L Final    AST 02/21/2023 16  15 - 37 U/L Final    Alk Phosphatase 02/21/2023 54  50 - 136 U/L Final    Total Protein 02/21/2023 5.4 (A)  6.3 - 8.2 g/dL Final    Albumin 02/21/2023 2.3 (A)  3.2 - 4.6 g/dL Final    Globulin 02/21/2023 3.1  2.8 - 4.5 g/dL Final    Albumin/Globulin Ratio 02/21/2023 0.7  0.4 - 1.6   Final    Magnesium 02/21/2023 1.8  1.8 - 2.4 mg/dL Final   Nurse Only on 02/09/2023   Component Date Value Ref Range Status    FEV 1 , POC 02/09/2023 1.81  L Final    FEV1 % Pred POC 02/09/2023 107  % Final    FVC, POC 02/09/2023 2.75 L   Final    FVC % Pred POC 02/09/2023 120  % Final    FEV1/FVC, POC 02/09/2023 66%   Final   Hospital Outpatient Visit on 01/20/2023   Component Date Value Ref Range Status    POC Glucose 01/20/2023 112 (A)  65 - 100 mg/dL Final    Comment: 47 - 60 mg/dl Consistent with, but not fully diagnostic of hypoglycemia.   101 - 125 mg/dl Impaired fasting glucose/consistent with pre-diabetes mellitus  > 126 mg/dl Fasting glucose consistent with overt diabetes mellitus      Performed by: 01/20/2023 [facility]Emma[facility]ace[facility]uc[facility]/Pet   Final   Admission on 01/15/2023, Discharged on 01/17/2023   Component Date Value Ref Range Status    WBC 01/15/2023 12.7 (A)  4.3 - 11.1 K/uL Final    RBC 01/15/2023 4.35  4.05 - 5.2 M/uL Final    Hemoglobin 01/15/2023 11.8  11.7 - 15.4 g/dL Final    Hematocrit 01/15/2023 37.5  35.8 - 46.3 % Final    MCV 01/15/2023 86.2  82 - 102 FL Final    MCH 01/15/2023 27.1  26.1 - 32.9 PG Final    MCHC 01/15/2023 31.5  31.4 - 35.0 g/dL Final    RDW 01/15/2023 18.5 (A)  11.9 - 14.6 % Final    Platelets 49/18/8590 472 (A)  150 - 450 K/uL Final    MPV 01/15/2023 9.0 (A)  9.4 - 12.3 FL Final    nRBC 01/15/2023 0.00  0.0 - 0.2 K/uL Final    **Note: Absolute NRBC parameter is now reported with Hemogram**    Differential Type 01/15/2023 AUTOMATED    Final    Seg Neutrophils 01/15/2023 78  43 - 78 % Final    Lymphocytes 01/15/2023 13  13 - 44 % Final    Monocytes 01/15/2023 6  4.0 - 12.0 % Final    Eosinophils % 01/15/2023 1  0.5 - 7.8 % Final    Basophils 01/15/2023 1  0.0 - 2.0 % Final    Immature Granulocytes 01/15/2023 1  0.0 - 5.0 % Final    Segs Absolute 01/15/2023 10.0 (A)  1.7 - 8.2 K/UL Final    Absolute Lymph # 01/15/2023 1.6  0.5 - 4.6 K/UL Final    Absolute Mono # 01/15/2023 0.8  0.1 - 1.3 K/UL Final    Absolute Eos # 01/15/2023 0.1  0.0 - 0.8 K/UL Final    Basophils Absolute 01/15/2023 0.1  0.0 - 0.2 K/UL Final    Absolute Immature Granulocyte 01/15/2023 0.1  0.0 - 0.5 K/UL Final    Sodium 01/15/2023 139  133 - 143 mmol/L Final    Potassium 01/15/2023 4.3  3.5 - 5.1 mmol/L Final    Chloride 01/15/2023 109  101 - 110 mmol/L Final    CO2 01/15/2023 24  21 - 32 mmol/L Final    Anion Gap 01/15/2023 6  2 - 11 mmol/L Final    Glucose 01/15/2023 108 (A)  65 - 100 mg/dL Final    BUN 01/15/2023 17  8 - 23 MG/DL Final    Creatinine 01/15/2023 1.00  0.6 - 1.0 MG/DL Final    Est, Glom Filt Rate 01/15/2023 57 (A)  >60 ml/min/1.73m2 Final    Comment:      Pediatric calculator link: Efraín.at. org/professionals/kdoqi/gfr_calculatorped       These results are not intended for use in patients <25years of age. eGFR results are calculated without a race factor using  the 2021 CKD-EPI equation. Careful clinical correlation is recommended, particularly when comparing to results calculated using previous equations. The CKD-EPI equation is less accurate in patients with extremes of muscle mass, extra-renal metabolism of creatinine, excessive creatine ingestion, or following therapy that affects renal tubular secretion.       Calcium 01/15/2023 9.1  8.3 - 10.4 MG/DL Final    Total Bilirubin 01/15/2023 0.3  0.2 - 1.1 MG/DL Final    ALT 01/15/2023 14  12 - 65 U/L Final    AST 01/15/2023 15  15 - 37 U/L Final    Alk Phosphatase 01/15/2023 63  50 - 136 U/L Final    Total Protein 01/15/2023 7.0  6.3 - 8.2 g/dL Final    Albumin 01/15/2023 3.1 (A)  3.2 - 4.6 g/dL Final    Globulin 01/15/2023 3.9  2.8 - 4.5 g/dL Final    Albumin/Globulin Ratio 01/15/2023 0.8  0.4 - 1.6   Final    Lipase 01/15/2023 9,114 (A)  73 - 393 U/L Final    Magnesium 01/15/2023 1.9  1.8 - 2.4 mg/dL Final    Phosphorus 01/15/2023 4.8 (A)  2.3 - 3.7 MG/DL Final    Ventricular Rate 01/15/2023 82  BPM Final    Atrial Rate 01/15/2023 82  BPM Final    P-R Interval 01/15/2023 180  ms Final    QRS Duration 01/15/2023 70  ms Final    Q-T Interval 01/15/2023 370  ms Final    QTc Calculation (Bazett) 01/15/2023 432  ms Final    P Axis 01/15/2023 75  degrees Final    R Axis 01/15/2023 66  degrees Final    T Axis 01/15/2023 73  degrees Final    Diagnosis 01/15/2023 Normal sinus rhythm   Final    Protime 01/16/2023 13.2  12.6 - 14.3 sec Final    INR 01/16/2023 1.0    Final    Comment: Suggested therapeutic INR range:  Venous thrombosis and embolus  2.0-3.0  Prosthetic heart valve         2.5-3.5  ** Note new reference range and method **      WBC 01/17/2023 9.1  4.3 - 11.1 K/uL Final    RBC 01/17/2023 3.88 (A)  4.05 - 5.2 M/uL Final    Hemoglobin 01/17/2023 10.5 (A)  11.7 - 15.4 g/dL Final    Hematocrit 01/17/2023 32.8 (A)  35.8 - 46.3 % Final    MCV 01/17/2023 84.5  82 - 102 FL Final    MCH 01/17/2023 27.1  26.1 - 32.9 PG Final    MCHC 01/17/2023 32.0  31.4 - 35.0 g/dL Final    RDW 01/17/2023 18.3 (A)  11.9 - 14.6 % Final    Platelets 34/57/9384 374  150 - 450 K/uL Final    MPV 01/17/2023 8.9 (A)  9.4 - 12.3 FL Final    nRBC 01/17/2023 0.00  0.0 - 0.2 K/uL Final    **Note: Absolute NRBC parameter is now reported with Hemogram**    Differential Type 01/17/2023 AUTOMATED    Final    Seg Neutrophils 01/17/2023 78  43 - 78 % Final    Lymphocytes 01/17/2023 14  13 - 44 % Final    Monocytes 01/17/2023 7  4.0 - 12.0 % Final    Eosinophils % 01/17/2023 1  0.5 - 7.8 % Final    Basophils 01/17/2023 0  0.0 - 2.0 % Final    Immature Granulocytes 01/17/2023 0  0.0 - 5.0 % Final    Segs Absolute 01/17/2023 7.2  1.7 - 8.2 K/UL Final    Absolute Lymph # 01/17/2023 1.3  0.5 - 4.6 K/UL Final    Absolute Mono # 01/17/2023 0.6  0.1 - 1.3 K/UL Final    Absolute Eos # 01/17/2023 0.1  0.0 - 0.8 K/UL Final    Basophils Absolute 01/17/2023 0.0  0.0 - 0.2 K/UL Final    Absolute Immature Granulocyte 01/17/2023 0.0  0.0 - 0.5 K/UL Final    Magnesium 01/17/2023 1.9  1.8 - 2.4 mg/dL Final    Sodium 01/17/2023 140  133 - 143 mmol/L Final    Potassium 01/17/2023 3.7  3.5 - 5.1 mmol/L Final    Chloride 01/17/2023 108  101 - 110 mmol/L Final    CO2 01/17/2023 26  21 - 32 mmol/L Final    Anion Gap 01/17/2023 6  2 - 11 mmol/L Final    Glucose 01/17/2023 95  65 - 100 mg/dL Final    BUN 01/17/2023 6 (A)  8 - 23 MG/DL Final    Creatinine 01/17/2023 0.60  0.6 - 1.0 MG/DL Final    Est, Glom Filt Rate 01/17/2023 >60  >60 ml/min/1.73m2 Final    Comment:      Pediatric calculator link: Efraín.at. org/professionals/kdoqi/gfr_calculatorped       These results are not intended for use in patients <25years of age. eGFR results are calculated without a race factor using  the 2021 CKD-EPI equation. Careful clinical correlation is recommended, particularly when comparing to results calculated using previous equations. The CKD-EPI equation is less accurate in patients with extremes of muscle mass, extra-renal metabolism of creatinine, excessive creatine ingestion, or following therapy that affects renal tubular secretion.       Calcium 01/17/2023 8.6  8.3 - 10.4 MG/DL Final    Total Bilirubin 01/17/2023 0.5  0.2 - 1.1 MG/DL Final    ALT 01/17/2023 11 (A)  12 - 65 U/L Final    AST 01/17/2023 11 (A)  15 - 37 U/L Final    Alk Phosphatase 01/17/2023 60  50 - 136 U/L Final    Total Protein 01/17/2023 6.2 (A)  6.3 - 8.2 g/dL Final    Albumin 01/17/2023 2.7 (A)  3.2 - 4.6 g/dL Final    Globulin 01/17/2023 3.5  2.8 - 4.5 g/dL Final    Albumin/Globulin Ratio 01/17/2023 0.8  0.4 - 1.6   Final    IgG 4 01/17/2023 49  2 - 96 mg/dL Final    Comment: (NOTE)  Performed At: [facility] & [address] [id number]  Martínez Ordaz MD OR:[id number]      Triglycerides 01/17/2023 148  35 - 150 MG/DL Final    Comment: Borderline High: 150-199 mg/dL, High: 200-499 mg/dL  Very High: Greater than or equal to 500 mg/dL     Hospital Outpatient Visit on 01/12/2023   Component Date Value Ref Range Status    WBC 01/12/2023 12.7 (A)  4.3 - 11.1 K/uL Final    RBC 01/12/2023 4.36  4.05 - 5.2 M/uL Final    Hemoglobin 01/12/2023 11.5 (A)  11.7 - 15.4 g/dL Final    Hematocrit 01/12/2023 36.6  35.8 - 46.3 % Final    MCV 01/12/2023 83.9  82.0 - 102.0 FL Final    MCH 01/12/2023 26.4  26.1 - 32.9 PG Final    MCHC 01/12/2023 31.4  31.4 - 35.0 g/dL Final    RDW 01/12/2023 18.6 (A)  11.9 - 14.6 % Final    Platelets 56/79/0930 525 (A)  150 - 450 K/uL Final    MPV 01/12/2023 8.3 (A)  9.4 - 12.3 FL Final    nRBC 01/12/2023 0.00  0.0 - 0.2 K/uL Final    **Note: Absolute NRBC parameter is now reported with Hemogram** Differential Type 01/12/2023 AUTOMATED    Final    Seg Neutrophils 01/12/2023 90 (A)  43 - 78 % Final    Lymphocytes 01/12/2023 8 (A)  13 - 44 % Final    Monocytes 01/12/2023 1 (A)  4.0 - 12.0 % Final    Eosinophils % 01/12/2023 0 (A)  0.5 - 7.8 % Final    Basophils 01/12/2023 1  0.0 - 2.0 % Final    Immature Granulocytes 01/12/2023 1  0.0 - 5.0 % Final    Segs Absolute 01/12/2023 11.5 (A)  1.7 - 8.2 K/UL Final    Absolute Lymph # 01/12/2023 1.0  0.5 - 4.6 K/UL Final    Absolute Mono # 01/12/2023 0.2  0.1 - 1.3 K/UL Final    Absolute Eos # 01/12/2023 0.0  0.0 - 0.8 K/UL Final    Basophils Absolute 01/12/2023 0.1  0.0 - 0.2 K/UL Final    Absolute Immature Granulocyte 01/12/2023 0.1  0.0 - 0.5 K/UL Final    CEA 01/12/2023 1.6  0.0 - 3.0 ng/mL Final    Comment: Nonsmoker:  <3.0 ng/mL  Smoker:     <5.0 ng/mL  [employer] LOCI technology. Patient's results of tumor marker testing may not be comparable to labs using different manufacturers/methods.      Sodium 01/12/2023 140  133 - 143 mmol/L Final    Potassium 01/12/2023 4.2  3.5 - 5.1 mmol/L Final    Chloride 01/12/2023 106  101 - 110 mmol/L Final    CO2 01/12/2023 26  21 - 32 mmol/L Final    Anion Gap 01/12/2023 8  2 - 11 mmol/L Final    Glucose 01/12/2023 114 (A)  65 - 100 mg/dL Final    BUN 01/12/2023 15  8 - 23 MG/DL Final    Creatinine 01/12/2023 0.90  0.6 - 1.0 MG/DL Final    Est, Glom Filt Rate 01/12/2023 >60  >60 ml/min/1.73m2 Final    Comment:      Pediatric calculator link: https://www.kidney.org/professionals/kdoqi/gfr_calculatorped       These results are not intended for use in patients <18 years of age.       eGFR results are calculated without a race factor using  the 2021 CKD-EPI equation. Careful clinical correlation is recommended, particularly when comparing to results calculated using previous equations.  The CKD-EPI equation is less accurate in patients with extremes of muscle mass, extra-renal metabolism of creatinine, excessive creatine  ingestion, or following therapy that affects renal tubular secretion. Calcium 01/12/2023 8.9  8.3 - 10.4 MG/DL Final    Total Bilirubin 01/12/2023 0.4  0.2 - 1.1 MG/DL Final    ALT 01/12/2023 20  12 - 65 U/L Final    AST 01/12/2023 13 (A)  15 - 37 U/L Final    Alk Phosphatase 01/12/2023 57  50 - 136 U/L Final    Total Protein 01/12/2023 6.8  6.3 - 8.2 g/dL Final    Albumin 01/12/2023 3.3  3.2 - 4.6 g/dL Final    Globulin 01/12/2023 3.5  2.8 - 4.5 g/dL Final    Albumin/Globulin Ratio 01/12/2023 0.9  0.4 - 1.6   Final    Magnesium 01/12/2023 2.1  1.8 - 2.4 mg/dL Final   No results displayed because visit has over 200 results. The results above were reviewed and discussed with patient. Assessment/Plan:   Anthony Davis is a [de-identified] y.o. female who presents with:     Seropositive rheumatoid arthritis of multiple sites [facility]): Patient was instructed to remain on Enbrel 50 mg 1 shot to be administered to self every week. She is aware that if she is sick requiring her to be on antibiotic or antiviral drug she will need to skip administering the Enbrel until she has completed the antibiotic/antiviral course and is over the infection. -     etanercept (ENBREL) 50 MG/ML injection; Administer Enbrel 1 shot every 7 days. Long-term use of high-risk medication: Since patient did have a CBC and a CMP done 3/2/2023 which was reviewed by me with the patient I did not feel the need to repeat the CBC and CMP today. Disease activity plan:  As stated above. Steroid management plan:  As stated above, if applicable. Pain management plan:  As stated above, if applicable. Weight management plan:  Weight loss through diet and exercise is always encouraged    Disease prognosis: Good    I appreciate the opportunity to continue to participate in the care of this patient. Follow-up and Dispositions    Return in about 4 months (around 7/3/2023).        Electronically signed by:  Margarette Closs, MD      This note was dictated using dragon voice recognition software.   It has been proofread, but there may still exist voice recognition errors that the author did not detect.                --------------------------------------------------------------------------------------------------------------------------------------------------------------------------------------------------------------------------------

## 2023-03-05 ASSESSMENT — JOINT PAIN
TOTAL NUMBER OF TENDER JOINTS: 10
TOTAL NUMBER OF SWOLLEN JOINTS: 0

## 2023-03-06 ENCOUNTER — OFFICE VISIT (OUTPATIENT)
Dept: FAMILY MEDICINE CLINIC | Facility: CLINIC | Age: 83
End: 2023-03-06

## 2023-03-06 ENCOUNTER — HOSPITAL ENCOUNTER (OUTPATIENT)
Dept: NON INVASIVE DIAGNOSTICS | Age: 83
Discharge: HOME | End: 2023-03-08
Payer: MEDICARE

## 2023-03-06 VITALS
TEMPERATURE: 97 F | SYSTOLIC BLOOD PRESSURE: 152 MMHG | BODY MASS INDEX: 16.24 KG/M2 | HEIGHT: 61 IN | OXYGEN SATURATION: 98 % | DIASTOLIC BLOOD PRESSURE: 82 MMHG | WEIGHT: 86 LBS | HEART RATE: 86 BPM

## 2023-03-06 DIAGNOSIS — K86.1 CHRONIC PANCREATITIS, UNSPECIFIED PANCREATITIS TYPE (HCC): ICD-10-CM

## 2023-03-06 DIAGNOSIS — Z09 HOSPITAL DISCHARGE FOLLOW-UP: Primary | ICD-10-CM

## 2023-03-06 DIAGNOSIS — I10 HYPERTENSION: ICD-10-CM

## 2023-03-06 DIAGNOSIS — R01.1 HEART MURMUR: ICD-10-CM

## 2023-03-06 DIAGNOSIS — Z01.818 PRE-OP TESTING: ICD-10-CM

## 2023-03-06 DIAGNOSIS — I10 ESSENTIAL (PRIMARY) HYPERTENSION: ICD-10-CM

## 2023-03-06 LAB
ECHO AO ROOT DIAM: 2.8 CM
ECHO AV AREA PEAK VELOCITY: 1.3 CM2
ECHO AV AREA VTI: 1.3 CM2
ECHO AV MEAN GRADIENT: 8 MMHG
ECHO AV MEAN VELOCITY: 1.4 M/S
ECHO AV PEAK GRADIENT: 16 MMHG
ECHO AV PEAK VELOCITY: 2 M/S
ECHO AV VELOCITY RATIO: 0.45
ECHO AV VTI: 36.4 CM
ECHO EST RA PRESSURE: 3 MMHG
ECHO IVC PROX: 1.5 CM
ECHO LA AREA 2C: 14.4 CM2
ECHO LA AREA 4C: 9.8 CM2
ECHO LA DIAMETER: 3.6 CM
ECHO LA MAJOR AXIS: 3.8 CM
ECHO LA MINOR AXIS: 4.4 CM
ECHO LA TO AORTIC ROOT RATIO: 1.29
ECHO LA VOL 2C: 39 ML (ref 22–52)
ECHO LA VOL 4C: 20 ML (ref 22–52)
ECHO LA VOL BP: 29 ML (ref 22–52)
ECHO LV E' LATERAL VELOCITY: 10 CM/S
ECHO LV E' SEPTAL VELOCITY: 7 CM/S
ECHO LV EDV A2C: 60 ML
ECHO LV EDV A4C: 60 ML
ECHO LV EJECTION FRACTION A2C: 62 %
ECHO LV EJECTION FRACTION A4C: 65 %
ECHO LV EJECTION FRACTION BIPLANE: 64 % (ref 55–100)
ECHO LV ESV A2C: 22 ML
ECHO LV ESV A4C: 21 ML
ECHO LV FRACTIONAL SHORTENING: 26 % (ref 28–44)
ECHO LV INTERNAL DIMENSION DIASTOLIC: 3.5 CM (ref 3.9–5.3)
ECHO LV INTERNAL DIMENSION SYSTOLIC: 2.6 CM
ECHO LV IVSD: 1.1 CM (ref 0.6–0.9)
ECHO LV MASS 2D: 111 G (ref 67–162)
ECHO LV POSTERIOR WALL DIASTOLIC: 1 CM (ref 0.6–0.9)
ECHO LV RELATIVE WALL THICKNESS RATIO: 0.57
ECHO LVOT AREA: 2.8 CM2
ECHO LVOT AV VTI INDEX: 0.44
ECHO LVOT DIAM: 1.9 CM
ECHO LVOT MEAN GRADIENT: 2 MMHG
ECHO LVOT PEAK GRADIENT: 3 MMHG
ECHO LVOT PEAK VELOCITY: 0.9 M/S
ECHO LVOT SV: 45.6 ML
ECHO LVOT VTI: 16.1 CM
ECHO MV A VELOCITY: 0.97 M/S
ECHO MV E DECELERATION TIME (DT): 217 MS
ECHO MV E VELOCITY: 0.81 M/S
ECHO MV E/A RATIO: 0.84
ECHO MV E/E' LATERAL: 8.1
ECHO MV E/E' RATIO (AVERAGED): 9.84
ECHO MV E/E' SEPTAL: 11.57
ECHO PV ACCELERATION TIME (AT): 90 MS
ECHO PV MAX VELOCITY: 1 M/S
ECHO PV PEAK GRADIENT: 4 MMHG
ECHO RV BASAL DIMENSION: 2.5 CM
ECHO RV FREE WALL PEAK S': 12 CM/S
ECHO RV TAPSE: 1.6 CM (ref 1.7–?)
LV EF: 63 %
LVEF MODALITY: ABNORMAL

## 2023-03-06 PROCEDURE — 93306 TTE W/DOPPLER COMPLETE: CPT | Performed by: INTERNAL MEDICINE

## 2023-03-06 PROCEDURE — 93306 TTE W/DOPPLER COMPLETE: CPT

## 2023-03-06 RX ORDER — MORPHINE SULFATE 15 MG/1
15 TABLET, FILM COATED, EXTENDED RELEASE ORAL 2 TIMES DAILY
COMMUNITY
End: 2023-03-26

## 2023-03-06 SDOH — ECONOMIC STABILITY: INCOME INSECURITY: HOW HARD IS IT FOR YOU TO PAY FOR THE VERY BASICS LIKE FOOD, HOUSING, MEDICAL CARE, AND HEATING?: NOT HARD AT ALL

## 2023-03-06 SDOH — ECONOMIC STABILITY: FOOD INSECURITY: WITHIN THE PAST 12 MONTHS, THE FOOD YOU BOUGHT JUST DIDN'T LAST AND YOU DIDN'T HAVE MONEY TO GET MORE.: NEVER TRUE

## 2023-03-06 SDOH — ECONOMIC STABILITY: HOUSING INSECURITY
IN THE LAST 12 MONTHS, WAS THERE A TIME WHEN YOU DID NOT HAVE A STEADY PLACE TO SLEEP OR SLEPT IN A SHELTER (INCLUDING NOW)?: NO

## 2023-03-06 SDOH — ECONOMIC STABILITY: FOOD INSECURITY: WITHIN THE PAST 12 MONTHS, YOU WORRIED THAT YOUR FOOD WOULD RUN OUT BEFORE YOU GOT MONEY TO BUY MORE.: NEVER TRUE

## 2023-03-06 ASSESSMENT — PATIENT HEALTH QUESTIONNAIRE - PHQ9
SUM OF ALL RESPONSES TO PHQ9 QUESTIONS 1 & 2: 0
SUM OF ALL RESPONSES TO PHQ QUESTIONS 1-9: 0
1. LITTLE INTEREST OR PLEASURE IN DOING THINGS: 0
SUM OF ALL RESPONSES TO PHQ QUESTIONS 1-9: 0
SUM OF ALL RESPONSES TO PHQ QUESTIONS 1-9: 0
2. FEELING DOWN, DEPRESSED OR HOPELESS: 0
SUM OF ALL RESPONSES TO PHQ QUESTIONS 1-9: 0

## 2023-03-06 NOTE — PROGRESS NOTES
Post-Discharge Transitional Care  Follow Up      Kathy Bedoya   YOB: 1940    Date of Office Visit:  3/6/2023  Date of Hospital Admission: 2/18/23  Date of Hospital Discharge: 2/23/23  Risk of hospital readmission (high >=14%. Medium >=10%) :Readmission Risk Score: 25      Care management risk score Rising risk (score 2-5) and Complex Care (Scores >=6): No Risk Score On File     Non face to face  following discharge, date last encounter closed (first attempt may have been earlier): 02/24/2023    Call initiated 2 business days of discharge: Yes    ASSESSMENT/PLAN:   Hospital discharge follow-up  -     OK DISCHARGE MEDS RECONCILED W/ CURRENT OUTPATIENT MED LIST  Essential (primary) hypertension  Chronic pancreatitis, unspecified pancreatitis type ([facility] [address]    CMP 3/2 showed normal potassium, liver enzymes, and kidney function. CBC showed improved hemoglobin that is now normal range. Blood pressure elevated today but patient reports it has been normal at home, continue amlodipine and losartan. Has appointment with [address] gastroenterology in April. Followed by oncology for lung cancer and history of breast cancer. Plans for right lower lobectomy 3/13. Followed by rheumatology for rheumatoid arthritis. Followed by GI for chronic pancreatitis. Underwent echo today which showed normal LV systolic function but abnormal diastolic function. Medical Decision Making: moderate complexity  Return if symptoms worsen or fail to improve. Subjective:   HPI:  Follow up of Hospital problems/diagnosis(es): Acute on chronic pancreatitis, pancreatic duct obstruction    Inpatient course: Discharge summary reviewed- see chart. 51-year-old female with PMH of breast cancer, lung cancer, chronic pancreatitis, HTN, PAD, thyroid nodules, PUD with rupture in 2003, osteoporosis, tobacco abuse, and RA who presents for hospital follow-up. Hospitalized with acute pancreatitis.   Underwent ERCP on 2/21

## 2023-03-07 DIAGNOSIS — K85.90 ACUTE PANCREATITIS, UNSPECIFIED COMPLICATION STATUS, UNSPECIFIED PANCREATITIS TYPE: Primary | ICD-10-CM

## 2023-03-07 DIAGNOSIS — C34.31 MALIGNANT NEOPLASM OF LOWER LOBE OF RIGHT LUNG (HCC): ICD-10-CM

## 2023-03-09 ENCOUNTER — CARE COORDINATION (OUTPATIENT)
Dept: CARE COORDINATION | Facility: CLINIC | Age: 83
End: 2023-03-09

## 2023-03-09 ENCOUNTER — OFFICE VISIT (OUTPATIENT)
Dept: ONCOLOGY | Age: 83
End: 2023-03-09

## 2023-03-09 ENCOUNTER — HOSPITAL ENCOUNTER (OUTPATIENT)
Dept: LAB | Age: 83
Discharge: HOME | End: 2023-03-09
Payer: MEDICARE

## 2023-03-09 VITALS
HEART RATE: 71 BPM | BODY MASS INDEX: 16.56 KG/M2 | OXYGEN SATURATION: 98 % | HEIGHT: 61 IN | DIASTOLIC BLOOD PRESSURE: 65 MMHG | RESPIRATION RATE: 16 BRPM | WEIGHT: 87.7 LBS | TEMPERATURE: 97.8 F | SYSTOLIC BLOOD PRESSURE: 124 MMHG

## 2023-03-09 DIAGNOSIS — R63.4 WEIGHT LOSS: ICD-10-CM

## 2023-03-09 DIAGNOSIS — C34.31 MALIGNANT NEOPLASM OF LOWER LOBE OF RIGHT LUNG (HCC): Primary | ICD-10-CM

## 2023-03-09 DIAGNOSIS — K86.1 CHRONIC PANCREATITIS, UNSPECIFIED PANCREATITIS TYPE (HCC): ICD-10-CM

## 2023-03-09 DIAGNOSIS — Z00.8 NUTRITIONAL ASSESSMENT: Primary | ICD-10-CM

## 2023-03-09 DIAGNOSIS — G93.9 BRAIN LESION: ICD-10-CM

## 2023-03-09 DIAGNOSIS — C34.31 MALIGNANT NEOPLASM OF LOWER LOBE OF RIGHT LUNG (HCC): ICD-10-CM

## 2023-03-09 LAB
ALBUMIN SERPL-MCNC: 3.3 G/DL (ref 3.2–4.6)
ALBUMIN/GLOB SERPL: 0.9 (ref 0.4–1.6)
ALP SERPL-CCNC: 89 U/L (ref 50–136)
ALT SERPL-CCNC: 15 U/L (ref 12–65)
ANION GAP SERPL CALC-SCNC: 3 MMOL/L (ref 2–11)
AST SERPL-CCNC: 12 U/L (ref 15–37)
BASOPHILS # BLD: 0.1 K/UL (ref 0–0.2)
BASOPHILS NFR BLD: 1 % (ref 0–2)
BILIRUB SERPL-MCNC: 0.3 MG/DL (ref 0.2–1.1)
BUN SERPL-MCNC: 15 MG/DL (ref 8–23)
CALCIUM SERPL-MCNC: 9.1 MG/DL (ref 8.3–10.4)
CEA SERPL-MCNC: 2.4 NG/ML (ref 0–3)
CHLORIDE SERPL-SCNC: 112 MMOL/L (ref 101–110)
CO2 SERPL-SCNC: 24 MMOL/L (ref 21–32)
CREAT SERPL-MCNC: 0.9 MG/DL (ref 0.6–1)
DIFFERENTIAL METHOD BLD: ABNORMAL
EOSINOPHIL # BLD: 0.2 K/UL (ref 0–0.8)
EOSINOPHIL NFR BLD: 1 % (ref 0.5–7.8)
ERYTHROCYTE [DISTWIDTH] IN BLOOD BY AUTOMATED COUNT: 15.9 % (ref 11.9–14.6)
GLOBULIN SER CALC-MCNC: 3.7 G/DL (ref 2.8–4.5)
GLUCOSE SERPL-MCNC: 108 MG/DL (ref 65–100)
HCT VFR BLD AUTO: 39.5 % (ref 35.8–46.3)
HGB BLD-MCNC: 12.1 G/DL (ref 11.7–15.4)
IMM GRANULOCYTES # BLD AUTO: 0.1 K/UL (ref 0–0.5)
IMM GRANULOCYTES NFR BLD AUTO: 0 % (ref 0–5)
LYMPHOCYTES # BLD: 1.4 K/UL (ref 0.5–4.6)
LYMPHOCYTES NFR BLD: 9 % (ref 13–44)
MCH RBC QN AUTO: 25.5 PG (ref 26.1–32.9)
MCHC RBC AUTO-ENTMCNC: 30.6 G/DL (ref 31.4–35)
MCV RBC AUTO: 83.2 FL (ref 82–102)
MONOCYTES # BLD: 0.9 K/UL (ref 0.1–1.3)
MONOCYTES NFR BLD: 6 % (ref 4–12)
NEUTS SEG # BLD: 13.1 K/UL (ref 1.7–8.2)
NEUTS SEG NFR BLD: 83 % (ref 43–78)
NRBC # BLD: 0 K/UL (ref 0–0.2)
PLATELET # BLD AUTO: 425 K/UL (ref 150–450)
PMV BLD AUTO: 8.6 FL (ref 9.4–12.3)
POTASSIUM SERPL-SCNC: 4 MMOL/L (ref 3.5–5.1)
PROT SERPL-MCNC: 7 G/DL (ref 6.3–8.2)
RBC # BLD AUTO: 4.75 M/UL (ref 4.05–5.2)
SODIUM SERPL-SCNC: 139 MMOL/L (ref 133–143)
WBC # BLD AUTO: 15.7 K/UL (ref 4.3–11.1)

## 2023-03-09 PROCEDURE — 82378 CARCINOEMBRYONIC ANTIGEN: CPT

## 2023-03-09 PROCEDURE — 85025 COMPLETE CBC W/AUTO DIFF WBC: CPT

## 2023-03-09 PROCEDURE — 80053 COMPREHEN METABOLIC PANEL: CPT

## 2023-03-09 PROCEDURE — 36415 COLL VENOUS BLD VENIPUNCTURE: CPT

## 2023-03-09 ASSESSMENT — PATIENT HEALTH QUESTIONNAIRE - PHQ9
SUM OF ALL RESPONSES TO PHQ QUESTIONS 1-9: 0
2. FEELING DOWN, DEPRESSED OR HOPELESS: 0

## 2023-03-09 NOTE — PROGRESS NOTES
[facility] Hematology & Oncology: Office Visit Progress Note    Chief Complaint:    RLL lung SCC    History of Present Illness:  Reason for Referral: Lung Mass     Referring Provider:  Inpatient     Primary Care Provider: Dr Beka Lares     Family History of Cancer/Hematologic Disorders: Sister with breast cancer and brother with prostate cancer     Presenting Symptoms: lung mass was incidental finding on imaging     Ms. David Harkins is an [de-identified] y.o.  female who reports to be an everyday smoker of cigarettes. She denies use of alcohol or drug substances. Her medical history reports as EPIFANIO, arthritis, back pain, breast cancer, bronchitis, COPD, dysuria, eczema, fungal dermatitis, headache, hypercholesterolemia, HTN, menopausal vaginal dryness, OA, osteoporosis, pancreatitis, PUD, sepsis, and thyroid nodule. Her surgical history reports as appendectomy, breast biopsy, left breast lumpectomy, cataract removal, cholecystectomy, lung biopsy, hysterectomy, hemicolectomy, gastric surgery for rupture ulcer, tubal ligation, and multiple EGDs. In October 2022, Ms. David Harkins was admitted 5 days for pancreatitis. She underwent an EGD at that time which was unremarkable. She improved and was discharged on 10/5/22. She returned to the ED in November 2022 with abdominal pain and she was to follow up with GI. Her 11/5/22 CT of abdomen and pelvis reported mild fat stranding around pancreatitic head, mild pancreatic duct dilatation, no hydronephrosis, and no evidence of colitis, diverticulitis or bowel obstruction. On 12/21/22 she returned to ED for abdominal pain and was admitted for recurrent pancreatitis. On 12/22/22 her CT scan of chest reported a new mass in the right lower lobe that measured 13 x 11mm. Her 12/23/22 MRI of abdomen reported findings suspicious for pancreatic head mass. On 12/28/22 she had an endoscopic ultrasound by Dr Vasquez Levine with GI.  The findings reported minimal pancreatic duct dilation and partial gastrectomy. The recommendation was if acute pancreatitis reoccurs to consider ERCP/pancreatic sphincterotomy. She was discharged home to have OP GI follow up and follow up with oncology for lung nodule. On 1/5/23 she underwent a biopsy of her RLL lung nodule through IR. 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 The IHC interpretation was poorly differentiated malignant tumor that demonstrated an immunohistochemical profile that included squamous and urothelial carcinoma in the differential diagnosis. IHC staining positive for GATA3, p40, CK 5/6 and p63 with patchy positive for CK 7. She is here to see oncology for next steps in plan of care for her malignant neoplasm with squamous features. 11/5/22 CT Abdomen and pelvis with contrast  FINDINGS:       Mild dependent subsegmental atelectasis in the visualized lung bases. Abdomen findings:       Gallbladder is surgically absent. There is biliary ductal dilatation, similar to   prior exam and likely sequela of cholecystectomy. There is mild fat stranding around the pancreatic head. There is pancreatic   ductal dilatation. There is no focal liver lesion. The spleen and the right kidney are   unremarkable. There are a few small cysts in the left kidney. There is no   hydronephrosis. Abdominal aorta is normal in course and caliber with prominent atherosclerotic   calcifications. There are postsurgical changes in the area of the distal stomach. Small bowel   loops are normal in caliber. No evidence of small bowel obstruction. There is no   evidence of lymphadenopathy. Pelvic findings:       Urinary bladder is normal in contour. There is sigmoid diverticulosis without   diverticulitis. The colon is normal in course and caliber. Appendix is   surgically absent. There is no free air or free fluid.  Moderate to severe compression deformity of   L1, similar to prior exam.               Impression 1. Mild fat stranding around the pancreatic head, nonspecific but may be related   to acute pancreatitis. No peripancreatic fluid or pseudocyst. No evidence of   pancreatic necrosis. 2. Gallbladder is surgically absent. Biliary ductal dilatation may be secondary   to the cholecystectomy. Mild pancreatic duct dilatation, similar to prior exam.       3. No evidence of colitis, diverticulitis or bowel obstruction. No   hydronephrosis. 12/22/22 CT Chest without contrast  FINDINGS:    LUNGS:   There is a new mass in the right lower lobe peripherally, superior segment which   measures 13 x 11 mm. Linear densities extend to the pleura. Minimal atelectasis   both bases. AIRWAYS: Trachea and proximal bronchi grossly patent. PLEURA: No effusion or thickening or calcifications. LYMPH NODES: No enlarged axillary, hilar or mediastinal lymph nodes. HEART: Normal size. CORONARIES: Positive calcifications. AORTA: Normal caliber with calcifications   UPPER ABDOMEN: Normal size adrenal glands. Postoperative changes upper abdomen   stranding densities around the pancreas. SKELETAL/CHEST WALL: DJD, chronic compression fracture L1.   689  Study marked for call report. Impression   There is a new 13 x 11 mm nodule in the superior segment left lower   lobe, suspicious for malignancy. PET/CT or tissue sampling recommended. 12/23/22 MRI abdomen  Impression   1. The study is moderately limited due to motion artifact. 2.  Mild dilation of the pancreatic and common bile ducts with abrupt tapering   at the pancreatic head. Findings are suspicious for a pancreatic head mass,   although no discrete mass is visualized. 1/5/23 Surgical Pathology  \"RIGHT LUNG\":               MALIGNANT NEOPLASM WITH SQUAMOUS FEATURES. SEE COMMENT.    Comment   The poorly differentiated malignant tumor demonstrates an   immunohistochemical profile that includes squamous and urothelial   carcinoma in the differential diagnosis. Dr. Samira Padgett has reviewed this   case in intradepartmental consultation and concurs with the above   interpretation. Procedures/Addenda                     STF-IMMUNOHISTOCHEMISTRY   Interpretation                       Immunohistochemical Stain Panel:          Interpretation:  Immunohistochemical findings consistent with the   above diagnosis. Antibody/Test               Marker For                                                               Result   CK 7                    Lung, breast, upper GE, serous ovary                           Patchy positive   CK 20                    Colon, mucinous ovary, urothelium                              Negative   GATA3               Urothelial, breast carcinoma; some squamous tumors   Positive   GCDFP                    Breast, salivary gland, skin, adnexa                          Negative   Mammaglobin                Breast                                                                  Negative   p40                    Squamous differentiation                                                 Positive   CK 5/6                    Squamous cell carcinomas and mesotheliomas         Positive   p63                    Squamous and transitional epithelium                              Positive                         Review of Systems:  Constitutional Denies fever or chills. Denies weight loss or appetite changes. Denies fatigue. Denies anorexia. HEENT Denies trauma, bluring vision, hearing loss, ear pain, nosebleeds, sore throat, neck pain and ear discharge. Skin Denies lesions or rashes. Lungs Denies shortness of breath, cough, sputum production or hemoptysis. Cardiovascular Denies chest pain, palpitations, orthopnea, claudication and leg swelling. Gastrointestinal Denies nausea, vomiting, bowel changes. Denies bloody or black stools. Denies abdominal pain.     Denies dysuria, frequency or hesitancy of urination   Neuro Denies headaches, visual changes or ataxia. Denies dizziness, tingling, tremors, sensory change, speech change, focal weakness and headaches. Hematology Denies nasal/gum bleeding, denies easy bruise   Endo Denies heat/cold intolerance, denies diabetes. MSK Denies back pain, swollen legs, myalgias and falls. Psychiatric/Behavioral Denies depression and substance abuse. The patient is not nervous/anxious. Allergies   Allergen Reactions    Azithromycin Other (See Comments)     pancreatitis    Codeine Nausea And Vomiting     Past Medical History:   Diagnosis Date    Acute pancreatitis     EPIFANIO (acute kidney injury) ([address] 12/04/2014    Back pain 6/10/2016    Breast cancer ([address] 2018    Breast lump dx 2/2018    left    Bronchitis     Chronic pancreatitis ([address]     COPD (chronic obstructive pulmonary disease) ([facility] 02/09/2023    no meds or supplemental O2. Spiromery: Impression: Mild obstruction, supranormal FVC and moderately reduced diffusion capacity suggestive of COPD. Discharge from the vagina     Dysuria     Eczema 6/10/2016    Fungal dermatitis     Headache 6/10/2016    History of bleeding peptic ulcer     Ruptured ulcer/gastrectomy- 5 units blood transfused    History of blood transfusion 2003    ruptured peptic ulcer- 5 units blood    History of left breast cancer 2018    lumpectomy and lymphnodes removed- no further treatment required    Hypercholesterolemia 12/4/2014    Hypertension     not well controlled--per pt    Intractable vomiting 5/20/2018    Lung cancer ([facility] 02/2023    Dr Darrius Cardona -oncologist    Lung cancer [facility]) 01/2023    Malnutrition ([address]     BMI 16.4-- pancreatitis and continued weightloss> 20lbs since 1/2023    Menopausal vaginal dryness     Osteoarthritis 6/10/2016    Osteoporosis 6/10/2016    Pancreatic lesion 03/18/2020    Small pancreatic head lesion. This may represent a lymph node or neuroendocrine tumor. It is not convincingly worrisome in appearance. Biopsies were obtained.     PUD (peptic ulcer disease)     last 2003 which a rupture an extensive surgery    PVD (peripheral vascular disease) with claudication ([facility] 12/08/2020    Rheumatoid arthritis ([facility]     Rheumatologist: Raysa Peraza MD    Sepsis ([facility] 12/4/2014    Thyroid nodule     Tobacco abuse     1-2 ppd since 1967--     Past Surgical History:   Procedure Laterality Date    APPENDECTOMY  1977    with hysterectomy    BREAST BIOPSY Left 1975    BREAST LUMPECTOMY Left 2/22/2018    LEFT BREAST LUMPECTOMY/ SENTINEL NODE BIOPSY performed by Melida Ortega MD at [facility] Bilateral     with IOL    CATARACT REMOVAL Bilateral 2018    w/IOL    CHOLECYSTECTOMY      CT NEEDLE BIOPSY LUNG PERCUTANEOUS  01/05/2023    CT NEEDLE BIOPSY LUNG PERCUTANEOUS 1/5/2023 [facility] RADIOLOGY CT SCAN    ENDOSCOPIC ULTRASOUND (LOWER)  12/28/2022    ERCP N/A 02/21/2023    ERCP ENDOSCOPIC RETROGRADE CHOLANGIOPANCREATOGRAPHY Rm 622 performed by Izabella Mclaughlin MD at [facility] ([facility])  [address]  2003    stomach surgery for ruptured ulcer and extensive rerouting of intestestines per patient     STOMACH SURGERY      ruptured ulcer    TUBAL LIGATION  1975    UPPER GASTROINTESTINAL ENDOSCOPY  05/21/2018    empiric dilation    UPPER GASTROINTESTINAL ENDOSCOPY      UPPER GASTROINTESTINAL ENDOSCOPY N/A 10/04/2022    EGD ESOPHAGOGASTRODUODENOSCOPY/  performed by Linda Morris MD at [facility] N/A 12/28/2022    ENDOSCOPIC ULTRASOUND W/ EGD/ / performed by Kenyetta Danielle MD at [facility] LEFT Left 01/31/2018    US BREAST NEEDLE BIOPSY LEFT 1/31/2018 [facility] RAD[facility]Y MAMMO     Family History   Problem Relation Age of Onset    Thyroid Cancer Neg Hx     No Known Problems Paternal Grandfather     No Known Problems Paternal Grandmother     No Known Problems Maternal Grandfather     No Known Problems Maternal Grandmother     Hypertension Other         Brother and sister with htn    No Known Problems Brother     Hypertension Mother     Heart Disease Brother     Heart Disease Sister     Breast Cancer Sister 79    Cancer Sister     Heart Disease Father     Heart Attack Father     Cancer Brother         prostate    Diabetes Sister     Heart Disease Sister      Social History     Socioeconomic History    Marital status:      Spouse name: Not on file    Number of children: Not on file    Years of education: Not on file    Highest education level: Not on file   Occupational History    Not on file   Tobacco Use    Smoking status: Every Day     Packs/day: 0.10     Types: Cigarettes     Start date: 5/6/1966    Smokeless tobacco: Never    Tobacco comments:     1966 started- 3/2/23- states 5-6 cigs daily   Vaping Use    Vaping Use: Never used   Substance and Sexual Activity    Alcohol use: Not Currently    Drug use: No    Sexual activity: Not Currently     Birth control/protection: None   Other Topics Concern    Not on file   Social History Narrative    Not on file     Social Determinants of Health     Financial Resource Strain: Low Risk     Difficulty of Paying Living Expenses: Not hard at all   Food Insecurity: No Food Insecurity    Worried About [address] in the Last Year: Never true    [facility] in the Last Year: Never true   Transportation Needs: Unknown    Lack of Transportation (Medical): Not on file    Lack of Transportation (Non-Medical):  No   Physical Activity: Not on file   Stress: Not on file   Social Connections: Not on file   Intimate Partner Violence: Not on file   Housing Stability: Unknown    Unable to Pay for Housing in the Last Year: Not on file    Number of Places Lived in the Last Year: Not on file    Unstable Housing in the Last Year: No     Current Outpatient Medications   Medication Sig Dispense Refill    morphine (MS CONTIN) 15 MG extended release tablet Take 15 mg by mouth 2 times daily. etanercept (ENBREL) 50 MG/ML injection Administer Enbrel 1 shot every 7 days. 12 mL 1    oxyCODONE-acetaminophen (PERCOCET) 5-325 MG per tablet Take 1 tablet by mouth every 4 hours as needed for Pain.      losartan (COZAAR) 100 MG tablet Take 1 tablet by mouth daily 30 tablet 3    amLODIPine (NORVASC) 5 MG tablet Take 1 tablet by mouth daily 30 tablet 3    polyethylene glycol (GLYCOLAX) 17 g packet Take 17 g by mouth daily as needed for Constipation 14 each 0    phenol 1.4 % LIQD mouth spray Take 1 spray by mouth every 2 hours as needed for Sore Throat 1 mL 0    pravastatin (PRAVACHOL) 40 MG tablet Take 1 tablet by mouth daily (Patient taking differently: Take 40 mg by mouth every evening) 30 tablet 5    oxyCODONE 5 MG capsule Take 5 mg by mouth every 4 hours as needed for Pain. (Patient not taking: Reported on 3/9/2023)      hyoscyamine (LEVSIN/SL) 125 MCG sublingual tablet Place 1 tablet under the tongue every 6 hours as needed for Cramping 40 tablet 0     No current facility-administered medications for this visit. OBJECTIVE:  /65 (Site: Right Upper Arm, Position: Standing, Cuff Size: Child)   Pulse 71   Temp 97.8 °F (36.6 °C)   Resp 16   Ht 5' 1\" (1.549 m)   Wt 87 lb 11.2 oz (39.8 kg)   LMP  (LMP Unknown)   SpO2 98%   BMI 16.57 kg/m²     Physical Exam:  Constitutional: Oriented to person, place, and time. Well-developed and very thin. HEENT: Normocephalic and atraumatic. Oropharynx is clear and moist.   Conjunctivae and EOM are normal. Pupils are equal, round, and reactive to light. No scleral icterus. Neck supple. No JVD present. No tracheal deviation present. No thyromegaly present. Lymph node No palpable submandibular, cervical, supraclavicular, axillary and inguinal lymph nodes. Skin Warm and dry. No bruising and no rash noted. No erythema. No pallor.     Respiratory Effort normal and breath sounds normal.  No respiratory distress. No wheezes. No rales. No tenderness. CVS Normal rate, regular rhythm and normal heart sounds. Exam reveals no gallop, no friction and no rub. No murmur heard. Abdomen Soft. Bowel sounds are normal. Exhibits no distension. There is no tenderness. There is no rebound and no guarding. Neuro Normal reflexes. No cranial nerve deficit. Exhibits normal muscle tone, 5 of 5 strength of all extremities. MSK Normal range of motion in general.  No edema and no tenderness.    Psych Emotional.  Normal behavior, judgment and thought content      Labs:  Recent Results (from the past 24 hour(s))   CBC with Auto Differential    Collection Time: 03/09/23 10:12 AM   Result Value Ref Range    WBC 15.7 (H) 4.3 - 11.1 K/uL    RBC 4.75 4.05 - 5.2 M/uL    Hemoglobin 12.1 11.7 - 15.4 g/dL    Hematocrit 39.5 35.8 - 46.3 %    MCV 83.2 82.0 - 102.0 FL    MCH 25.5 (L) 26.1 - 32.9 PG    MCHC 30.6 (L) 31.4 - 35.0 g/dL    RDW 15.9 (H) 11.9 - 14.6 %    Platelets 102 756 - 231 K/uL    MPV 8.6 (L) 9.4 - 12.3 FL    nRBC 0.00 0.0 - 0.2 K/uL    Seg Neutrophils 83 (H) 43 - 78 %    Lymphocytes 9 (L) 13 - 44 %    Monocytes 6 4.0 - 12.0 %    Eosinophils % 1 0.5 - 7.8 %    Basophils 1 0.0 - 2.0 %    Immature Granulocytes 0 0.0 - 5.0 %    Segs Absolute 13.1 (H) 1.7 - 8.2 K/UL    Absolute Lymph # 1.4 0.5 - 4.6 K/UL    Absolute Mono # 0.9 0.1 - 1.3 K/UL    Absolute Eos # 0.2 0.0 - 0.8 K/UL    Basophils Absolute 0.1 0.0 - 0.2 K/UL    Absolute Immature Granulocyte 0.1 0.0 - 0.5 K/UL    Differential Type AUTOMATED     Comprehensive Metabolic Panel    Collection Time: 03/09/23 10:12 AM   Result Value Ref Range    Sodium 139 133 - 143 mmol/L    Potassium 4.0 3.5 - 5.1 mmol/L    Chloride 112 (H) 101 - 110 mmol/L    CO2 24 21 - 32 mmol/L    Anion Gap 3 2 - 11 mmol/L    Glucose 108 (H) 65 - 100 mg/dL    BUN 15 8 - 23 MG/DL    Creatinine 0.90 0.6 - 1.0 MG/DL    Est, Glom Filt Rate >60 >60 ml/min/1.73m2    Calcium 9.1 8.3 - 10.4 MG/DL Total Bilirubin 0.3 0.2 - 1.1 MG/DL    ALT 15 12 - 65 U/L    AST 12 (L) 15 - 37 U/L    Alk Phosphatase 89 50 - 136 U/L    Total Protein 7.0 6.3 - 8.2 g/dL    Albumin 3.3 3.2 - 4.6 g/dL    Globulin 3.7 2.8 - 4.5 g/dL    Albumin/Globulin Ratio 0.9 0.4 - 1.6     CEA    Collection Time: 03/09/23 10:12 AM   Result Value Ref Range    CEA 2.4 0.0 - 3.0 ng/mL       Imaging:  No results found for this or any previous visit. ASSESSMENT/PLAN:   Diagnosis Orders   1. Malignant neoplasm of lower lobe of right lung [facility])  MRI BRAIN W WO CONTRAST      2. Chronic pancreatitis, unspecified pancreatitis type ([address]        3. Weight loss        4. Brain lesion            [de-identified] y.o. F consulted for lung cancer presented to [facility] on 1/12/2023. She was admitted for recurrent pancreatitis with extensive GI work-up and incidentally found right lower lobe lung nodule 1.3 cm, biopsy showed malignancy with squamous cell differentiation, CT chest/abdomen/pelvis showed no evidence of metastasis, significant weight loss most relevant to recurrent pancreatitis and continue to follow GI management, discussed this appeared to be early stage lung cancer and standard approach with curative intent surgery, also discussed SBRT as a reasonable alternative for people with poor lung function or desires to avoid surgery, she is very happy with the idea of surgical resection without adjuvant chemotherapy or radiation, arrange PFT/PET/brain MRI followed by surgical evaluation, it was also discovered that she had stage I breast cancer 2018 saw Dr. Riaz Wilson and declined adjuvant AI and did not follow, discussed that the current lung biopsy was RODGER-1 positive and need pathology to compare with previous breast cancer for similarity, patient and daughter are agreeable with the plan and navigator will help to coordinate, tentatively return after surgery to follow result but call as needed.     She met Dr. Mars Tena on 1/31/2023 and arranged PFT to plan for surgery, however admitted from 2/18 to 2/23 for pancreatitis again, return on 3/9/2023, reports surgery scheduled for 3/13 and tearful for all the preop requirements, discussed brain MRI showed 4 mm cerebellar enhancement which is too small to characterize and agree to arrange for repeat MRI in 3 months to follow, she also wants to finish ERCP in [address] in April, then return to follow surgical outcome and repeat MRI result, discussed potential use of Creon to improve nutrition and help postoperative recovery, but she would not want to consider, call as needed. All questions are answered to their satisfaction. They will call for further questions and concerns. ECOG PERFORMANCE STATUS - 0-Fully active, able to carry on all pre-disease performance without restriction. Pain - 9/10. None/Minimal pain - not affecting QOL     Fatigue - No flowsheet data found. Distress - No flowsheet data found. Total time independently spent on today's visit was 60min. This time included: face-to-face time evaluating the patient as well as additional non-face-to-face time spent on: Preparing to see the patient by obtaining and reviewing previous test results, records and medical history, Performing a medically appropriate history and exam and documenting relevant clinical information for this visit, Counseling and educating patient and family, Ordering tests, Communicating with other health care professionals and Referring patient to another health care provider. Elements of this note have been dictated via voice recognition software. Text and phrases may be limited by the accuracy and autoconversion of the software. The chart has been reviewed, but errors may still be present. Augusta Rosen M.D.   [address]  Office : [phone]  Fax : [phone]

## 2023-03-09 NOTE — PATIENT INSTRUCTIONS
Patient Instructions from Today's Visit    Reason for Visit:  Follow-up visit    Diagnosis Information:  [email or web address]://www.[email or web address]/. net/about-us/asco-answers-patient-education-materials/gnlo-tfqnoht-tvvx-sheets    Plan: Your surgery is scheduled for 3/13 for a right thoraoscopy. Reviewed MRI results. We will repeat a brain MRI in 3 months which will be mid-April. Return to clinic in May, after your her office visit and possible pancreatitis procedure in [place]. Follow Up:   In May    Recent Lab Results:  Hospital Outpatient Visit on 03/09/2023   Component Date Value Ref Range Status    WBC 03/09/2023 15.7 (A)  4.3 - 11.1 K/uL Final    RBC 03/09/2023 4.75  4.05 - 5.2 M/uL Final    Hemoglobin 03/09/2023 12.1  11.7 - 15.4 g/dL Final    Hematocrit 03/09/2023 39.5  35.8 - 46.3 % Final    MCV 03/09/2023 83.2  82.0 - 102.0 FL Final    MCH 03/09/2023 25.5 (A)  26.1 - 32.9 PG Final    MCHC 03/09/2023 30.6 (A)  31.4 - 35.0 g/dL Final    RDW 03/09/2023 15.9 (A)  11.9 - 14.6 % Final    Platelets 44/88/5353 425  150 - 450 K/uL Final    MPV 03/09/2023 8.6 (A)  9.4 - 12.3 FL Final    nRBC 03/09/2023 0.00  0.0 - 0.2 K/uL Final    **Note: Absolute NRBC parameter is now reported with Hemogram**    Seg Neutrophils 03/09/2023 83 (A)  43 - 78 % Final    Lymphocytes 03/09/2023 9 (A)  13 - 44 % Final    Monocytes 03/09/2023 6  4.0 - 12.0 % Final    Eosinophils % 03/09/2023 1  0.5 - 7.8 % Final    Basophils 03/09/2023 1  0.0 - 2.0 % Final    Immature Granulocytes 03/09/2023 0  0.0 - 5.0 % Final    Segs Absolute 03/09/2023 13.1 (A)  1.7 - 8.2 K/UL Final    Absolute Lymph # 03/09/2023 1.4  0.5 - 4.6 K/UL Final    Absolute Mono # 03/09/2023 0.9  0.1 - 1.3 K/UL Final    Absolute Eos # 03/09/2023 0.2  0.0 - 0.8 K/UL Final    Basophils Absolute 03/09/2023 0.1  0.0 - 0.2 K/UL Final    Absolute Immature Granulocyte 03/09/2023 0.1  0.0 - 0.5 K/UL Final    Differential Type 03/09/2023 AUTOMATED    Final    Sodium 03/09/2023 139  133 - 143 mmol/L Final    Potassium 03/09/2023 4.0  3.5 - 5.1 mmol/L Final    Chloride 03/09/2023 112 (A)  101 - 110 mmol/L Final    CO2 03/09/2023 24  21 - 32 mmol/L Final    Anion Gap 03/09/2023 3  2 - 11 mmol/L Final    Glucose 03/09/2023 108 (A)  65 - 100 mg/dL Final    BUN 03/09/2023 15  8 - 23 MG/DL Final    Creatinine 03/09/2023 0.90  0.6 - 1.0 MG/DL Final    Est, Glom Filt Rate 03/09/2023 >60  >60 ml/min/1.73m2 Final    Comment:      Pediatric calculator link: [employer].at. org/professionals/kdoqi/gfr_calculatorped       These results are not intended for use in patients <25years of age. eGFR results are calculated without a race factor using  the 2021 CKD-EPI equation. Careful clinical correlation is recommended, particularly when comparing to results calculated using previous equations. The CKD-EPI equation is less accurate in patients with extremes of muscle mass, extra-renal metabolism of creatinine, excessive creatine ingestion, or following therapy that affects renal tubular secretion. Calcium 03/09/2023 9.1  8.3 - 10.4 MG/DL Final    Total Bilirubin 03/09/2023 0.3  0.2 - 1.1 MG/DL Final    ALT 03/09/2023 15  12 - 65 U/L Final    AST 03/09/2023 12 (A)  15 - 37 U/L Final    Alk Phosphatase 03/09/2023 89  50 - 136 U/L Final    Total Protein 03/09/2023 7.0  6.3 - 8.2 g/dL Final    Albumin 03/09/2023 3.3  3.2 - 4.6 g/dL Final    Globulin 03/09/2023 3.7  2.8 - 4.5 g/dL Final    Albumin/Globulin Ratio 03/09/2023 0.9  0.4 - 1.6   Final    CEA 03/09/2023 2.4  0.0 - 3.0 ng/mL Final    Comment: Nonsmoker:  <3.0 ng/mL  Smoker:     <5.0 ng/mL  [employer]. Patient's results of tumor marker testing may not be comparable to labs using different manufacturers/methods.      Hospital Outpatient Visit on 03/06/2023   Component Date Value Ref Range Status    Est. RA Pressure 03/06/2023 3  mmHg Final    LV EDV A2C 03/06/2023 60  mL Final    LV EDV A4C 03/06/2023 60  mL Final    LV ESV A2C 03/06/2023 22 mL Final    LV ESV A4C 03/06/2023 21  mL Final    IVSd 03/06/2023 1.1 (A)  0.6 - 0.9 cm Final    LVIDd 03/06/2023 3.5 (A)  3.9 - 5.3 cm Final    LVIDs 03/06/2023 2.6  cm Final    LVOT Diameter 03/06/2023 1.9  cm Final    LVOT Mean Gradient 03/06/2023 2  mmHg Final    LVOT VTI 03/06/2023 16.1  cm Final    LVOT Peak Velocity 03/06/2023 0.9  m/s Final    LVOT Peak Gradient 03/06/2023 3  mmHg Final    LVPWd 03/06/2023 1.0 (A)  0.6 - 0.9 cm Final    LV E' Lateral Velocity 03/06/2023 10  cm/s Final    LV E' Septal Velocity 03/06/2023 7  cm/s Final    LV Ejection Fraction A2C 03/06/2023 62  % Final    LV Ejection Fraction A4C 03/06/2023 65  % Final    EF BP 03/06/2023 64  55 - 100 % Final    LVOT Area 03/06/2023 2.8  cm2 Final    LVOT SV 03/06/2023 45.6  ml Final    LA Minor Axis 03/06/2023 4.4  cm Final    LA Major Axis 03/06/2023 3.8  cm Final    LA Area 2C 03/06/2023 14.4  cm2 Final    LA Area 4C 03/06/2023 9.8  cm2 Final    LA Volume 2C 03/06/2023 39  22 - 52 mL Final    LA Volume 4C 03/06/2023 20 (A)  22 - 52 mL Final    LA Volume BP 03/06/2023 29  22 - 52 mL Final    LA Diameter 03/06/2023 3.6  cm Final    AV Mean Velocity 03/06/2023 1.4  m/s Final    AV Mean Gradient 03/06/2023 8  mmHg Final    AV VTI 03/06/2023 36.4  cm Final    AV Peak Velocity 03/06/2023 2.0  m/s Final    AV Peak Gradient 03/06/2023 16  mmHg Final    AV Area by VTI 03/06/2023 1.3  cm2 Final    AV Area by Peak Velocity 03/06/2023 1.3  cm2 Final    Aortic Root 03/06/2023 2.8  cm Final    IVC Proxmal 03/06/2023 1.5  cm Final    MV E Wave Deceleration Time 03/06/2023 217.0  ms Final    MV A Velocity 03/06/2023 0.97  m/s Final    MV E Velocity 03/06/2023 0.81  m/s Final    PV AT 03/06/2023 90.0  ms Final    PV Max Velocity 03/06/2023 1.0  m/s Final    PV Peak Gradient 03/06/2023 4  mmHg Final    RV Basal Dimension 03/06/2023 2.5  cm Final    RV Free Wall Peak S' 03/06/2023 12  cm/s Final    TAPSE 03/06/2023 1.6 (A)  1.7 cm Final    Fractional Shortening 2D 03/06/2023 26  28 - 44 % Final    LV RWT Ratio 03/06/2023 0.57   Final    LV Mass 2D 03/06/2023 111.0  67 - 162 g Final    MV E/A 03/06/2023 0.84   Final    E/E' Ratio (Averaged) 03/06/2023 9.84   Final    E/E' Lateral 03/06/2023 8.10   Final    E/E' Septal 03/06/2023 11.57   Final    LA/AO Root Ratio 03/06/2023 1.29   Final    AV Velocity Ratio 03/06/2023 0.45   Final    LVOT:AV VTI Index 03/06/2023 0.44   Final    Left Ventricular Ejection Fraction 03/06/2023 63   Final-Edited    LVEF MODALITY 03/06/2023 ECHO   Final-Edited     Treatment Summary has been discussed and given to patient: no  -------------------------------------------------------------------------------------------------------------------  Please call our office at [phone] if you have any  of the following symptoms:   Fever of 100.5 or greater  Chills  Shortness of breath  Swelling or pain in one leg    After office hours an answering service is available and will contact a provider for emergencies or if you are experiencing any of the above symptoms. Patient does express an interest in My Chart. My Chart log in information explained on the after visit summary printout at the [facility] [address] desk. Violeta White, RN  Nurse Navigator  [phone]  For Urgent Matters (after hours and weekends), please call Triage at [phone]. For Emergencies, please call 911.

## 2023-03-09 NOTE — CARE COORDINATION
[facility] Care Transitions Follow Up Call  CTN attempted outreach to patient for LEONOR follow up call. Unable to reach patient. Message left with contact information requesting a return call. Patient is scheduled with Oncology appointment this am and surgery for right lower lobectomy 3/13/2023. CTN will monitor chart and follow up after surgery if no return call received.  Patient Current Location:  [place]        Patient: Ofe Honeycutt  Patient : 1940   MRN: [id number]  Reason for Admission: Pancreatitis  Discharge Date: 23 RARS: Readmission Risk Score: 25        Follow Up  Future Appointments   Date Time Provider Department Center   3/9/2023 10:30 AM [facility]   3/9/2023 11:15 AM Kem Bennett MD [facility]   3/9/2023 11:15 AM Laurel Leyva RD [facility]   2023 10:00 AM Andres Oropeza MD [facility] AM[facility]   2023  8:40 AM Kain Cannon MD [facility] AM[facility]         Loretta Beatty, RN

## 2023-03-10 NOTE — PROGRESS NOTES
Nutrition:  Assessment based on referral for Nutrition Services per [facility], pt of Dr. David Whalen. Food/Nutrition and Pertinent Medical History:  Diet: Regular  Pt with bronchogenic cancer of right lung, plan for right LL lobectomy on (3/13), multiple hospital admissions for chronic pancreatitis - has been referred to GI at [address] and surgery scheduled at the end of April per pt's daughter. Pt reports eating 2 meals/day most days, drinking homemade protein shakes/smoothies per pt's daughter. Attempted to discuss trial of Creon as pt reports light/pale (grey or yellow) colored stools, alternating constipation and diarrhea. Pt became very agitated and started crying and declines any new medications at this time. 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 Anthropometrics:  Ht: 61 inches, Current BW: 87#, 82% IBW, 75% UBW (116#, 25% wt loss x 1 year c/w severe wt loss), BMI = 16.4 (underweight)    Estimated Nutrition Needs:  EER: 6338-3320 kcal (30-35 kcal/kg BW)   EPR: 45-60 gm protein (1.2-1.5 gm/kg BW)   H2O: 1 ml/kcal or per MD    Nutrition Diagnosis:  Underweight R/T chronic pancreatitis, lung cancer as evidenced by BMI = 16.4, abnormal bowel function (pale/floating stools, alternating diarrhea and constipation), 30# wt loss x 1 year. Intervention:  1. Continue with Regular diet, recommending small meals/snacks, continue w/ homemade high calorie/protein shakes/smoothies. 2. Recommending trial of Creon (24,000 lipase units) - 1 tabs prior to meals/snacks. Pt adamantly declined. Monitoring/Evaluation:  1. Pt is not open to nutrition interventions at this time, provided pt's daughter with RD contact information and encouraged to contact w/ further nutrition related questions/concerns.      [facility], [address], [address] [facility]

## 2023-03-12 ENCOUNTER — ANESTHESIA EVENT (OUTPATIENT)
Dept: SURGERY | Age: 83
DRG: 164 | End: 2023-03-12
Payer: MEDICARE

## 2023-03-13 ENCOUNTER — ANESTHESIA (OUTPATIENT)
Dept: SURGERY | Age: 83
DRG: 164 | End: 2023-03-13
Payer: MEDICARE

## 2023-03-13 ENCOUNTER — APPOINTMENT (OUTPATIENT)
Dept: GENERAL RADIOLOGY | Age: 83
DRG: 164 | End: 2023-03-13
Attending: SURGERY
Payer: MEDICARE

## 2023-03-13 ENCOUNTER — HOSPITAL ENCOUNTER (INPATIENT)
Age: 83
LOS: 4 days | Discharge: HOME HEALTH | DRG: 164 | End: 2023-03-17
Attending: SURGERY | Admitting: SURGERY
Payer: MEDICARE

## 2023-03-13 PROBLEM — R91.8 RIGHT LOWER LOBE LUNG MASS: Status: ACTIVE | Noted: 2023-03-13

## 2023-03-13 LAB
ABO + RH BLD: NORMAL
ARTERIAL PATENCY WRIST A: ABNORMAL
BASE DEFICIT BLD-SCNC: 4.6 MMOL/L
BDY SITE: ABNORMAL
BLOOD GROUP ANTIBODIES SERPL: NORMAL
CO2 BLD-SCNC: 20 MMOL/L (ref 13–23)
GAS FLOW.O2 O2 DELIVERY SYS: ABNORMAL
HCO3 BLD-SCNC: 20.9 MMOL/L (ref 22–26)
MAGNESIUM SERPL-MCNC: 1.7 MG/DL (ref 1.8–2.4)
PCO2 BLD: 39.3 MMHG (ref 35–45)
PH BLD: 7.34 (ref 7.35–7.45)
PO2 BLD: 112 MMHG (ref 75–100)
POC FIO2: 3
SAO2 % BLD: 98.1 % (ref 95–98)
SERVICE CMNT-IMP: ABNORMAL
SPECIMEN EXP DATE BLD: NORMAL
SPECIMEN TYPE: ABNORMAL

## 2023-03-13 PROCEDURE — 6360000002 HC RX W HCPCS: Performed by: SURGERY

## 2023-03-13 PROCEDURE — 82803 BLOOD GASES ANY COMBINATION: CPT

## 2023-03-13 PROCEDURE — C1729 CATH, DRAINAGE: HCPCS | Performed by: SURGERY

## 2023-03-13 PROCEDURE — 3700000001 HC ADD 15 MINUTES (ANESTHESIA): Performed by: SURGERY

## 2023-03-13 PROCEDURE — 7100000001 HC PACU RECOVERY - ADDTL 15 MIN: Performed by: SURGERY

## 2023-03-13 PROCEDURE — 88305 TISSUE EXAM BY PATHOLOGIST: CPT

## 2023-03-13 PROCEDURE — 6360000002 HC RX W HCPCS: Performed by: PHYSICIAN ASSISTANT

## 2023-03-13 PROCEDURE — 71045 X-RAY EXAM CHEST 1 VIEW: CPT

## 2023-03-13 PROCEDURE — C1751 CATH, INF, PER/CENT/MIDLINE: HCPCS | Performed by: SURGERY

## 2023-03-13 PROCEDURE — 94640 AIRWAY INHALATION TREATMENT: CPT

## 2023-03-13 PROCEDURE — 2580000003 HC RX 258: Performed by: ANESTHESIOLOGY

## 2023-03-13 PROCEDURE — 2580000003 HC RX 258: Performed by: PHYSICIAN ASSISTANT

## 2023-03-13 PROCEDURE — 6360000002 HC RX W HCPCS: Performed by: NURSE ANESTHETIST, CERTIFIED REGISTERED

## 2023-03-13 PROCEDURE — 6370000000 HC RX 637 (ALT 250 FOR IP): Performed by: ANESTHESIOLOGY

## 2023-03-13 PROCEDURE — 01584ZZ DESTRUCTION OF THORACIC NERVE, PERCUTANEOUS ENDOSCOPIC APPROACH: ICD-10-PCS | Performed by: SURGERY

## 2023-03-13 PROCEDURE — 0BTF4ZZ RESECTION OF RIGHT LOWER LUNG LOBE, PERCUTANEOUS ENDOSCOPIC APPROACH: ICD-10-PCS | Performed by: SURGERY

## 2023-03-13 PROCEDURE — 94760 N-INVAS EAR/PLS OXIMETRY 1: CPT

## 2023-03-13 PROCEDURE — 2709999900 HC NON-CHARGEABLE SUPPLY: Performed by: SURGERY

## 2023-03-13 PROCEDURE — 3600000004 HC SURGERY LEVEL 4 BASE: Performed by: SURGERY

## 2023-03-13 PROCEDURE — 6360000002 HC RX W HCPCS: Performed by: ANESTHESIOLOGY

## 2023-03-13 PROCEDURE — 32674 THORACOSCOPY LYMPH NODE EXC: CPT | Performed by: SURGERY

## 2023-03-13 PROCEDURE — 6370000000 HC RX 637 (ALT 250 FOR IP): Performed by: SURGERY

## 2023-03-13 PROCEDURE — 2720000010 HC SURG SUPPLY STERILE: Performed by: SURGERY

## 2023-03-13 PROCEDURE — 7100000000 HC PACU RECOVERY - FIRST 15 MIN: Performed by: SURGERY

## 2023-03-13 PROCEDURE — 94664 DEMO&/EVAL PT USE INHALER: CPT

## 2023-03-13 PROCEDURE — 2500000003 HC RX 250 WO HCPCS: Performed by: NURSE ANESTHETIST, CERTIFIED REGISTERED

## 2023-03-13 PROCEDURE — 32663 THORACOSCOPY W/LOBECTOMY: CPT | Performed by: SURGERY

## 2023-03-13 PROCEDURE — C1713 ANCHOR/SCREW BN/BN,TIS/BN: HCPCS | Performed by: SURGERY

## 2023-03-13 PROCEDURE — 88309 TISSUE EXAM BY PATHOLOGIST: CPT

## 2023-03-13 PROCEDURE — 83735 ASSAY OF MAGNESIUM: CPT

## 2023-03-13 PROCEDURE — 2580000003 HC RX 258: Performed by: NURSE ANESTHETIST, CERTIFIED REGISTERED

## 2023-03-13 PROCEDURE — 2700000000 HC OXYGEN THERAPY PER DAY

## 2023-03-13 PROCEDURE — C2618 PROBE/NEEDLE, CRYO: HCPCS | Performed by: SURGERY

## 2023-03-13 PROCEDURE — 2060000000 HC ICU INTERMEDIATE R&B

## 2023-03-13 PROCEDURE — 86850 RBC ANTIBODY SCREEN: CPT

## 2023-03-13 PROCEDURE — 3600000014 HC SURGERY LEVEL 4 ADDTL 15MIN: Performed by: SURGERY

## 2023-03-13 PROCEDURE — 3700000000 HC ANESTHESIA ATTENDED CARE: Performed by: SURGERY

## 2023-03-13 PROCEDURE — 6370000000 HC RX 637 (ALT 250 FOR IP): Performed by: PHYSICIAN ASSISTANT

## 2023-03-13 PROCEDURE — 07B74ZX EXCISION OF THORAX LYMPHATIC, PERCUTANEOUS ENDOSCOPIC APPROACH, DIAGNOSTIC: ICD-10-PCS | Performed by: SURGERY

## 2023-03-13 RX ORDER — GABAPENTIN 300 MG/1
300 CAPSULE ORAL 3 TIMES DAILY
Status: DISCONTINUED | OUTPATIENT
Start: 2023-03-13 | End: 2023-03-17 | Stop reason: HOSPADM

## 2023-03-13 RX ORDER — LOSARTAN POTASSIUM 50 MG/1
100 TABLET ORAL DAILY
Status: DISCONTINUED | OUTPATIENT
Start: 2023-03-13 | End: 2023-03-17 | Stop reason: HOSPADM

## 2023-03-13 RX ORDER — LIDOCAINE HYDROCHLORIDE 20 MG/ML
INJECTION, SOLUTION EPIDURAL; INFILTRATION; INTRACAUDAL; PERINEURAL PRN
Status: DISCONTINUED | OUTPATIENT
Start: 2023-03-13 | End: 2023-03-13 | Stop reason: SDUPTHER

## 2023-03-13 RX ORDER — ACETAMINOPHEN 325 MG/1
650 TABLET ORAL EVERY 4 HOURS PRN
Status: DISCONTINUED | OUTPATIENT
Start: 2023-03-13 | End: 2023-03-17 | Stop reason: HOSPADM

## 2023-03-13 RX ORDER — EPHEDRINE SULFATE/0.9% NACL/PF 50 MG/5 ML
SYRINGE (ML) INTRAVENOUS PRN
Status: DISCONTINUED | OUTPATIENT
Start: 2023-03-13 | End: 2023-03-13 | Stop reason: SDUPTHER

## 2023-03-13 RX ORDER — OXYCODONE HYDROCHLORIDE 5 MG/1
10 TABLET ORAL EVERY 4 HOURS PRN
Status: DISCONTINUED | OUTPATIENT
Start: 2023-03-13 | End: 2023-03-17 | Stop reason: HOSPADM

## 2023-03-13 RX ORDER — SODIUM CHLORIDE 0.9 % (FLUSH) 0.9 %
5-40 SYRINGE (ML) INJECTION EVERY 12 HOURS SCHEDULED
Status: DISCONTINUED | OUTPATIENT
Start: 2023-03-13 | End: 2023-03-13 | Stop reason: HOSPADM

## 2023-03-13 RX ORDER — IPRATROPIUM BROMIDE AND ALBUTEROL SULFATE 2.5; .5 MG/3ML; MG/3ML
1 SOLUTION RESPIRATORY (INHALATION) EVERY 6 HOURS
Status: DISCONTINUED | OUTPATIENT
Start: 2023-03-13 | End: 2023-03-13

## 2023-03-13 RX ORDER — MIDAZOLAM HYDROCHLORIDE 2 MG/2ML
1 INJECTION, SOLUTION INTRAMUSCULAR; INTRAVENOUS ONCE
Status: COMPLETED | OUTPATIENT
Start: 2023-03-13 | End: 2023-03-13

## 2023-03-13 RX ORDER — SODIUM CHLORIDE 0.9 % (FLUSH) 0.9 %
5-40 SYRINGE (ML) INJECTION PRN
Status: DISCONTINUED | OUTPATIENT
Start: 2023-03-13 | End: 2023-03-17 | Stop reason: HOSPADM

## 2023-03-13 RX ORDER — PROPOFOL 10 MG/ML
INJECTION, EMULSION INTRAVENOUS PRN
Status: DISCONTINUED | OUTPATIENT
Start: 2023-03-13 | End: 2023-03-13 | Stop reason: SDUPTHER

## 2023-03-13 RX ORDER — SENNA AND DOCUSATE SODIUM 50; 8.6 MG/1; MG/1
2 TABLET, FILM COATED ORAL 2 TIMES DAILY
Status: DISCONTINUED | OUTPATIENT
Start: 2023-03-13 | End: 2023-03-17 | Stop reason: HOSPADM

## 2023-03-13 RX ORDER — SODIUM CHLORIDE, SODIUM LACTATE, POTASSIUM CHLORIDE, CALCIUM CHLORIDE 600; 310; 30; 20 MG/100ML; MG/100ML; MG/100ML; MG/100ML
INJECTION, SOLUTION INTRAVENOUS CONTINUOUS
Status: DISCONTINUED | OUTPATIENT
Start: 2023-03-13 | End: 2023-03-13 | Stop reason: HOSPADM

## 2023-03-13 RX ORDER — HALOPERIDOL 5 MG/ML
1 INJECTION INTRAMUSCULAR
Status: DISCONTINUED | OUTPATIENT
Start: 2023-03-13 | End: 2023-03-13 | Stop reason: HOSPADM

## 2023-03-13 RX ORDER — SODIUM CHLORIDE 9 MG/ML
INJECTION, SOLUTION INTRAVENOUS PRN
Status: DISCONTINUED | OUTPATIENT
Start: 2023-03-13 | End: 2023-03-13 | Stop reason: HOSPADM

## 2023-03-13 RX ORDER — SODIUM CHLORIDE 0.9 % (FLUSH) 0.9 %
5-40 SYRINGE (ML) INJECTION PRN
Status: DISCONTINUED | OUTPATIENT
Start: 2023-03-13 | End: 2023-03-13 | Stop reason: HOSPADM

## 2023-03-13 RX ORDER — IPRATROPIUM BROMIDE AND ALBUTEROL SULFATE 2.5; .5 MG/3ML; MG/3ML
1 SOLUTION RESPIRATORY (INHALATION)
Status: DISCONTINUED | OUTPATIENT
Start: 2023-03-13 | End: 2023-03-15

## 2023-03-13 RX ORDER — MAGNESIUM SULFATE IN WATER 40 MG/ML
2000 INJECTION, SOLUTION INTRAVENOUS ONCE
Status: COMPLETED | OUTPATIENT
Start: 2023-03-13 | End: 2023-03-13

## 2023-03-13 RX ORDER — ROCURONIUM BROMIDE 10 MG/ML
INJECTION, SOLUTION INTRAVENOUS PRN
Status: DISCONTINUED | OUTPATIENT
Start: 2023-03-13 | End: 2023-03-13 | Stop reason: SDUPTHER

## 2023-03-13 RX ORDER — KETAMINE HYDROCHLORIDE 50 MG/ML
INJECTION, SOLUTION, CONCENTRATE INTRAMUSCULAR; INTRAVENOUS PRN
Status: DISCONTINUED | OUTPATIENT
Start: 2023-03-13 | End: 2023-03-13 | Stop reason: SDUPTHER

## 2023-03-13 RX ORDER — SODIUM CHLORIDE, SODIUM LACTATE, POTASSIUM CHLORIDE, CALCIUM CHLORIDE 600; 310; 30; 20 MG/100ML; MG/100ML; MG/100ML; MG/100ML
INJECTION, SOLUTION INTRAVENOUS CONTINUOUS
Status: DISCONTINUED | OUTPATIENT
Start: 2023-03-13 | End: 2023-03-15

## 2023-03-13 RX ORDER — OXYCODONE HYDROCHLORIDE 5 MG/1
5 TABLET ORAL EVERY 4 HOURS PRN
Status: DISCONTINUED | OUTPATIENT
Start: 2023-03-13 | End: 2023-03-17 | Stop reason: HOSPADM

## 2023-03-13 RX ORDER — FENTANYL CITRATE 50 UG/ML
100 INJECTION, SOLUTION INTRAMUSCULAR; INTRAVENOUS
Status: DISCONTINUED | OUTPATIENT
Start: 2023-03-13 | End: 2023-03-13 | Stop reason: HOSPADM

## 2023-03-13 RX ORDER — ACETAMINOPHEN 500 MG
1000 TABLET ORAL ONCE
Status: COMPLETED | OUTPATIENT
Start: 2023-03-13 | End: 2023-03-13

## 2023-03-13 RX ORDER — SODIUM CHLORIDE 9 MG/ML
INJECTION, SOLUTION INTRAVENOUS PRN
Status: DISCONTINUED | OUTPATIENT
Start: 2023-03-13 | End: 2023-03-17 | Stop reason: HOSPADM

## 2023-03-13 RX ORDER — PROCHLORPERAZINE EDISYLATE 5 MG/ML
5 INJECTION INTRAMUSCULAR; INTRAVENOUS
Status: COMPLETED | OUTPATIENT
Start: 2023-03-13 | End: 2023-03-13

## 2023-03-13 RX ORDER — GUAIFENESIN 200 MG/10ML
200 LIQUID ORAL EVERY 8 HOURS
Status: DISCONTINUED | OUTPATIENT
Start: 2023-03-13 | End: 2023-03-17 | Stop reason: HOSPADM

## 2023-03-13 RX ORDER — OXYCODONE HYDROCHLORIDE 5 MG/1
5 TABLET ORAL
Status: DISCONTINUED | OUTPATIENT
Start: 2023-03-13 | End: 2023-03-13 | Stop reason: HOSPADM

## 2023-03-13 RX ORDER — PROCHLORPERAZINE EDISYLATE 5 MG/ML
10 INJECTION INTRAMUSCULAR; INTRAVENOUS EVERY 6 HOURS PRN
Status: DISCONTINUED | OUTPATIENT
Start: 2023-03-13 | End: 2023-03-17 | Stop reason: HOSPADM

## 2023-03-13 RX ORDER — DEXAMETHASONE SODIUM PHOSPHATE 10 MG/ML
INJECTION INTRAMUSCULAR; INTRAVENOUS PRN
Status: DISCONTINUED | OUTPATIENT
Start: 2023-03-13 | End: 2023-03-13 | Stop reason: SDUPTHER

## 2023-03-13 RX ORDER — ENOXAPARIN SODIUM 100 MG/ML
40 INJECTION SUBCUTANEOUS DAILY
Status: DISCONTINUED | OUTPATIENT
Start: 2023-03-13 | End: 2023-03-13

## 2023-03-13 RX ORDER — AMLODIPINE BESYLATE 5 MG/1
5 TABLET ORAL DAILY
Status: DISCONTINUED | OUTPATIENT
Start: 2023-03-14 | End: 2023-03-17 | Stop reason: HOSPADM

## 2023-03-13 RX ORDER — ENOXAPARIN SODIUM 100 MG/ML
30 INJECTION SUBCUTANEOUS DAILY
Status: DISCONTINUED | OUTPATIENT
Start: 2023-03-14 | End: 2023-03-17 | Stop reason: HOSPADM

## 2023-03-13 RX ORDER — ONDANSETRON 2 MG/ML
INJECTION INTRAMUSCULAR; INTRAVENOUS PRN
Status: DISCONTINUED | OUTPATIENT
Start: 2023-03-13 | End: 2023-03-13 | Stop reason: SDUPTHER

## 2023-03-13 RX ORDER — MIDAZOLAM HYDROCHLORIDE 2 MG/2ML
2 INJECTION, SOLUTION INTRAMUSCULAR; INTRAVENOUS
Status: DISCONTINUED | OUTPATIENT
Start: 2023-03-13 | End: 2023-03-13 | Stop reason: HOSPADM

## 2023-03-13 RX ORDER — SODIUM CHLORIDE 0.9 % (FLUSH) 0.9 %
5-40 SYRINGE (ML) INJECTION EVERY 12 HOURS SCHEDULED
Status: DISCONTINUED | OUTPATIENT
Start: 2023-03-13 | End: 2023-03-17 | Stop reason: HOSPADM

## 2023-03-13 RX ORDER — IPRATROPIUM BROMIDE AND ALBUTEROL SULFATE 2.5; .5 MG/3ML; MG/3ML
1 SOLUTION RESPIRATORY (INHALATION)
Status: DISCONTINUED | OUTPATIENT
Start: 2023-03-13 | End: 2023-03-13 | Stop reason: HOSPADM

## 2023-03-13 RX ORDER — HYDROMORPHONE HYDROCHLORIDE 1 MG/ML
0.5 INJECTION, SOLUTION INTRAMUSCULAR; INTRAVENOUS; SUBCUTANEOUS EVERY 4 HOURS PRN
Status: DISCONTINUED | OUTPATIENT
Start: 2023-03-13 | End: 2023-03-17 | Stop reason: HOSPADM

## 2023-03-13 RX ORDER — LIDOCAINE HYDROCHLORIDE 10 MG/ML
1 INJECTION, SOLUTION INFILTRATION; PERINEURAL
Status: DISCONTINUED | OUTPATIENT
Start: 2023-03-13 | End: 2023-03-13 | Stop reason: HOSPADM

## 2023-03-13 RX ORDER — PRAVASTATIN SODIUM 20 MG
40 TABLET ORAL EVERY EVENING
Status: DISCONTINUED | OUTPATIENT
Start: 2023-03-13 | End: 2023-03-17 | Stop reason: HOSPADM

## 2023-03-13 RX ORDER — ONDANSETRON 2 MG/ML
4 INJECTION INTRAMUSCULAR; INTRAVENOUS EVERY 6 HOURS PRN
Status: DISCONTINUED | OUTPATIENT
Start: 2023-03-13 | End: 2023-03-17 | Stop reason: HOSPADM

## 2023-03-13 RX ORDER — FENTANYL CITRATE 50 UG/ML
INJECTION, SOLUTION INTRAMUSCULAR; INTRAVENOUS PRN
Status: DISCONTINUED | OUTPATIENT
Start: 2023-03-13 | End: 2023-03-13 | Stop reason: SDUPTHER

## 2023-03-13 RX ORDER — HYDROMORPHONE HYDROCHLORIDE 2 MG/ML
0.5 INJECTION, SOLUTION INTRAMUSCULAR; INTRAVENOUS; SUBCUTANEOUS EVERY 5 MIN PRN
Status: DISCONTINUED | OUTPATIENT
Start: 2023-03-13 | End: 2023-03-13 | Stop reason: HOSPADM

## 2023-03-13 RX ORDER — MAGNESIUM SULFATE IN WATER 40 MG/ML
2000 INJECTION, SOLUTION INTRAVENOUS PRN
Status: DISCONTINUED | OUTPATIENT
Start: 2023-03-13 | End: 2023-03-17 | Stop reason: HOSPADM

## 2023-03-13 RX ORDER — ONDANSETRON 4 MG/1
4 TABLET, ORALLY DISINTEGRATING ORAL EVERY 8 HOURS PRN
Status: DISCONTINUED | OUTPATIENT
Start: 2023-03-13 | End: 2023-03-17 | Stop reason: HOSPADM

## 2023-03-13 RX ADMIN — PHENYLEPHRINE HYDROCHLORIDE 50 MCG: 10 INJECTION INTRAVENOUS at 13:50

## 2023-03-13 RX ADMIN — SODIUM CHLORIDE, SODIUM LACTATE, POTASSIUM CHLORIDE, AND CALCIUM CHLORIDE: 600; 310; 30; 20 INJECTION, SOLUTION INTRAVENOUS at 19:09

## 2023-03-13 RX ADMIN — OXYCODONE HYDROCHLORIDE 10 MG: 5 TABLET ORAL at 21:35

## 2023-03-13 RX ADMIN — DEXAMETHASONE SODIUM PHOSPHATE 4 MG: 10 INJECTION INTRAMUSCULAR; INTRAVENOUS at 13:40

## 2023-03-13 RX ADMIN — GABAPENTIN 300 MG: 300 CAPSULE ORAL at 20:40

## 2023-03-13 RX ADMIN — FENTANYL CITRATE 100 MCG: 50 INJECTION, SOLUTION INTRAMUSCULAR; INTRAVENOUS at 12:59

## 2023-03-13 RX ADMIN — ONDANSETRON 4 MG: 2 INJECTION INTRAMUSCULAR; INTRAVENOUS at 13:40

## 2023-03-13 RX ADMIN — KETAMINE HYDROCHLORIDE 20 MG: 50 INJECTION, SOLUTION INTRAMUSCULAR; INTRAVENOUS at 13:03

## 2023-03-13 RX ADMIN — SENNOSIDES AND DOCUSATE SODIUM 2 TABLET: 8.6; 5 TABLET ORAL at 20:40

## 2023-03-13 RX ADMIN — SODIUM CHLORIDE, SODIUM LACTATE, POTASSIUM CHLORIDE, AND CALCIUM CHLORIDE: 600; 310; 30; 20 INJECTION, SOLUTION INTRAVENOUS at 06:11

## 2023-03-13 RX ADMIN — CEFAZOLIN SODIUM 2000 MG: 100 INJECTION, POWDER, LYOPHILIZED, FOR SOLUTION INTRAVENOUS at 20:40

## 2023-03-13 RX ADMIN — PROPOFOL 30 MG: 10 INJECTION, EMULSION INTRAVENOUS at 15:35

## 2023-03-13 RX ADMIN — PROPOFOL 30 MG: 10 INJECTION, EMULSION INTRAVENOUS at 14:36

## 2023-03-13 RX ADMIN — IPRATROPIUM BROMIDE AND ALBUTEROL SULFATE 1 AMPULE: .5; 3 SOLUTION RESPIRATORY (INHALATION) at 19:46

## 2023-03-13 RX ADMIN — LIDOCAINE HYDROCHLORIDE 40 MG: 20 INJECTION, SOLUTION EPIDURAL; INFILTRATION; INTRACAUDAL; PERINEURAL at 13:03

## 2023-03-13 RX ADMIN — PROCHLORPERAZINE EDISYLATE 5 MG: 5 INJECTION INTRAMUSCULAR; INTRAVENOUS at 16:42

## 2023-03-13 RX ADMIN — MAGNESIUM SULFATE HEPTAHYDRATE 2000 MG: 40 INJECTION, SOLUTION INTRAVENOUS at 20:38

## 2023-03-13 RX ADMIN — Medication 2000 MG: at 13:27

## 2023-03-13 RX ADMIN — PHENYLEPHRINE HYDROCHLORIDE 50 MCG: 10 INJECTION INTRAVENOUS at 14:00

## 2023-03-13 RX ADMIN — ACETAMINOPHEN 1000 MG: 500 TABLET, FILM COATED ORAL at 06:10

## 2023-03-13 RX ADMIN — KETAMINE HYDROCHLORIDE 20 MG: 50 INJECTION, SOLUTION INTRAMUSCULAR; INTRAVENOUS at 14:10

## 2023-03-13 RX ADMIN — SUGAMMADEX 200 MG: 100 INJECTION, SOLUTION INTRAVENOUS at 16:00

## 2023-03-13 RX ADMIN — PHENYLEPHRINE HYDROCHLORIDE 20 MCG/MIN: 10 INJECTION INTRAVENOUS at 14:08

## 2023-03-13 RX ADMIN — HYDROMORPHONE HYDROCHLORIDE 0.5 MG: 1 INJECTION, SOLUTION INTRAMUSCULAR; INTRAVENOUS; SUBCUTANEOUS at 19:12

## 2023-03-13 RX ADMIN — PROPOFOL 30 MG: 10 INJECTION, EMULSION INTRAVENOUS at 13:34

## 2023-03-13 RX ADMIN — SODIUM CHLORIDE, SODIUM LACTATE, POTASSIUM CHLORIDE, AND CALCIUM CHLORIDE: 600; 310; 30; 20 INJECTION, SOLUTION INTRAVENOUS at 12:55

## 2023-03-13 RX ADMIN — PHENYLEPHRINE HYDROCHLORIDE 100 MCG: 10 INJECTION INTRAVENOUS at 13:17

## 2023-03-13 RX ADMIN — HYDROMORPHONE HYDROCHLORIDE 0.5 MG: 2 INJECTION, SOLUTION INTRAMUSCULAR; INTRAVENOUS; SUBCUTANEOUS at 16:25

## 2023-03-13 RX ADMIN — Medication 5 MG: at 14:27

## 2023-03-13 RX ADMIN — MIDAZOLAM 1 MG: 1 INJECTION INTRAMUSCULAR; INTRAVENOUS at 09:11

## 2023-03-13 RX ADMIN — SODIUM CHLORIDE, PRESERVATIVE FREE 10 ML: 5 INJECTION INTRAVENOUS at 20:41

## 2023-03-13 RX ADMIN — PROPOFOL 100 MG: 10 INJECTION, EMULSION INTRAVENOUS at 13:03

## 2023-03-13 RX ADMIN — PHENYLEPHRINE HYDROCHLORIDE 100 MCG: 10 INJECTION INTRAVENOUS at 14:03

## 2023-03-13 RX ADMIN — ROCURONIUM BROMIDE 40 MG: 50 INJECTION, SOLUTION INTRAVENOUS at 13:04

## 2023-03-13 ASSESSMENT — PAIN DESCRIPTION - ORIENTATION
ORIENTATION: RIGHT
ORIENTATION: RIGHT
ORIENTATION: RIGHT;OUTER

## 2023-03-13 ASSESSMENT — PAIN - FUNCTIONAL ASSESSMENT
PAIN_FUNCTIONAL_ASSESSMENT: ACTIVITIES ARE NOT PREVENTED
PAIN_FUNCTIONAL_ASSESSMENT: 0-10

## 2023-03-13 ASSESSMENT — PAIN DESCRIPTION - DESCRIPTORS: DESCRIPTORS: ACHING;SORE

## 2023-03-13 ASSESSMENT — PAIN SCALES - GENERAL
PAINLEVEL_OUTOF10: 8
PAINLEVEL_OUTOF10: 0
PAINLEVEL_OUTOF10: 9
PAINLEVEL_OUTOF10: 7

## 2023-03-13 ASSESSMENT — PAIN DESCRIPTION - PAIN TYPE: TYPE: SURGICAL PAIN

## 2023-03-13 ASSESSMENT — PAIN DESCRIPTION - LOCATION
LOCATION: FLANK
LOCATION: CHEST
LOCATION: CHEST;BACK;FLANK

## 2023-03-13 ASSESSMENT — LIFESTYLE VARIABLES: SMOKING_STATUS: 1

## 2023-03-13 ASSESSMENT — COPD QUESTIONNAIRES: CAT_SEVERITY: MODERATE

## 2023-03-13 NOTE — PERIOP NOTE
TRANSFER - OUT REPORT:    Verbal report given to TU Tejada on Ofe Honeycutt  being transferred to [facility] for routine post-op       Report consisted of patient’s Situation, Background, Assessment and   Recommendations(SBAR).     Information from the following report(s) Nurse Handoff Report, Adult Overview, Surgery Report, MAR, Recent Results, and Cardiac Rhythm NSR  was reviewed with the receiving nurse.    Lines:   Peripheral IV 03/13/23 Right;Posterior Hand (Active)   Site Assessment Clean, dry & intact 03/13/23 1620   Line Status Blood return noted;Infusing 03/13/23 1620   Line Care Connections checked and tightened 03/13/23 1620   Phlebitis Assessment No symptoms 03/13/23 1620   Infiltration Assessment 0 03/13/23 1620   Alcohol Cap Used No 03/13/23 1620   Dressing Status Clean, dry & intact 03/13/23 1620   Dressing Type Transparent 03/13/23 1620   Dressing Intervention New 03/13/23 0604        Opportunity for questions and clarification was provided.      Patient transported with:   O2 @ 2 liters    VTE prophylaxis orders have been written for Ofe Honeycutt.    Patient and family given floor number and nurses name.  Family updated re: pt status after security code verified.

## 2023-03-13 NOTE — BRIEF OP NOTE
Brief Postoperative Note      Patient: Gerardo Eden  YOB: 1940  MRN: [id number]    Date of Procedure: 3/13/2023    Pre-Op Diagnosis: Squamous cell carcinoma of lung, stage II, right ([facility] [address] [C34.91]    Post-Op Diagnosis: Same       Procedure(s):  RIGHT THORACOSCOPY, RIGHT LOWER LOBECTOMY, MEDIASTINAL LYMPHADENECTOMY  Intercostal nerve Cryoablation    Surgeon(s):  Yeyo Snider MD    Assistant:  Surgical Assistant: Earl CASEY    Anesthesia: General    Estimated Blood Loss (mL): less than 50     Complications: None    Specimens:   ID Type Source Tests Collected by Time Destination   A : #8 lymph node x3 Tissue Chest SURGICAL PATHOLOGY Yeyo Snider MD 3/13/2023 1346    B : #9 lymph node x7 Tissue Chest SURGICAL PATHOLOGY Yeyo Snider MD 3/13/2023 1348    C : perihilar lymph node  Tissue Chest SURGICAL PATHOLOGY Yeyo Snider MD 3/13/2023 1404    D : right lower lobe Tissue Chest SURGICAL PATHOLOGY Yeyo Snider MD 3/13/2023 1527    E : #7 lymph node  Tissue Chest SURGICAL PATHOLOGY Yeyo Snider MD 3/13/2023 1528        Implants:  * No implants in log *      Drains:   Chest Tube Right 1 (Active)   Dressing Status Clean, dry & intact 03/13/23 1602   Chest Tube Dressing Petroleum Jelly 03/13/23 1602       Chest Tube Left 2 (Active)   Dressing Status Clean, dry & intact 03/13/23 1602   Chest Tube Dressing Petroleum Jelly 03/13/23 1602       Findings: cancer visible at visceral pleura with a piece of mucin attached    Electronically signed by Yeyo Snider MD on 3/13/2023 at 4:08 PM

## 2023-03-13 NOTE — ANESTHESIA PRE PROCEDURE
Department of Anesthesiology  Preprocedure Note       Name:  Shaquille Mederosr   Age:  [de-identified] y.o.  :  1940                                          MRN:  [id number]         Date:  3/13/2023      Surgeon: Savana Wren):  Tracy Govea MD    Procedure: Procedure(s):  RIGHT THORACOSCOPY, RIGHT LOWER LOBECTOMY, MEDIASTINAL LYMPHADENECTOMY    Medications prior to admission:   Prior to Admission medications    Medication Sig Start Date End Date Taking? Authorizing Provider   morphine (MS CONTIN) 15 MG extended release tablet Take 15 mg by mouth 2 times daily. Patient not taking: Reported on 3/13/2023    Historical Provider, MD   etanercept (ENBREL) 50 MG/ML injection Administer Enbrel 1 shot every 7 days. 3/3/23   Genia Benítez MD   oxyCODONE 5 MG capsule Take 5 mg by mouth every 4 hours as needed for Pain. Patient not taking: No sig reported    Historical Provider, MD   oxyCODONE-acetaminophen (PERCOCET) 5-325 MG per tablet Take 1 tablet by mouth every 4 hours as needed for Pain.     Historical Provider, MD   hyoscyamine (LEVSIN/SL) 125 MCG sublingual tablet Place 1 tablet under the tongue every 6 hours as needed for Cramping 2/23/23 3/6/23  Candelario Bravo MD   losartan (COZAAR) 100 MG tablet Take 1 tablet by mouth daily 23   Candelario Bravo MD   amLODIPine (NORVASC) 5 MG tablet Take 1 tablet by mouth daily 23   Candelario Bravo MD   phenol 1.4 % LIQD mouth spray Take 1 spray by mouth every 2 hours as needed for Sore Throat 23   Candelario Bravo MD   pravastatin (PRAVACHOL) 40 MG tablet Take 1 tablet by mouth daily  Patient taking differently: Take 40 mg by mouth every evening 10/12/22   Juan Carlos Salazar MD       Current medications:    Current Facility-Administered Medications   Medication Dose Route Frequency Provider Last Rate Last Admin    lidocaine 1 % injection 1 mL  1 mL IntraDERmal Once PRN Edinson Case MD        fentaNYL (SUBLIMAZE) injection 100 mcg  100 mcg IntraVENous Once PRN Dorrine Blizzard, MD        lactated ringers IV soln infusion   IntraVENous Continuous Dorrine Blizzard,  mL/hr at 03/13/23 0611 New Bag at 03/13/23 0611    sodium chloride flush 0.9 % injection 5-40 mL  5-40 mL IntraVENous 2 times per day Dorrine Blizzard, MD        sodium chloride flush 0.9 % injection 5-40 mL  5-40 mL IntraVENous PRN Dorrine Blizzard, MD        0.9 % sodium chloride infusion   IntraVENous PRN Dorrine Blizzard, MD        midazolam PF (VERSED) injection 2 mg  2 mg IntraVENous Once PRN Dorrine Blizzard, MD        sodium chloride flush 0.9 % injection 5-40 mL  5-40 mL IntraVENous 2 times per day Franco Bowers MD        sodium chloride flush 0.9 % injection 5-40 mL  5-40 mL IntraVENous PRN Franco Bowers MD        0.9 % sodium chloride infusion   IntraVENous PRN Franco Bowers MD        ceFAZolin (ANCEF) 2000 mg in sterile water 20 mL IV syringe  2,000 mg IntraVENous Once Franco Bowers MD           Allergies:     Allergies   Allergen Reactions    Azithromycin Other (See Comments)     pancreatitis    Codeine Nausea And Vomiting       Problem List:    Patient Active Problem List   Diagnosis Code    PAD [facility]peripheral artery disease) ([facility]) I73.9    Hypercholesterolemia E78.00    Back pain M54.9    Malignant neoplasm of upper-outer quadrant of left breast in female, estrogen receptor positive ([facility] C50.412, Z17.0    History of peptic ulcer Z87.11    Multiple thyroid nodules E04.2    Intractable vomiting R11.10    History of acute pancreatitis Z87.19    Compression fracture of L1 lumbar vertebra (HCC) S32.010A    Hypomagnesemia E83.42    Personal history of tobacco use, presenting hazards to health Z87.891    HTN (hypertension) I10    Age-related osteoporosis with current pathological fracture M80.00XA    Chronic obstructive pulmonary disease (HCC) J44.9    Eczema L30.9    Osteoarthritis M19.90    Paraseptal emphysema (HCC) J43.8    Hypokalemia E87.6    Osteoporosis M81.0    S/P lumpectomy, left breast Z98.890  Acute pancreatitis K85.90    EPIFANIO (acute kidney injury) ([facility] N17.9    Other acute pancreatitis without infection or necrosis K85.80    Chronic pancreatitis (HCC) K86.1    Acute recurrent pancreatitis K85.90    Rheumatoid arthritis ([facility] M06.9    Unintentional weight loss R63.4    Protein-calorie malnutrition, moderate (HCC) E44.0    Lung nodule R91.1    Bronchogenic cancer of right lung (HCC) C34.91    Pancreatitis, unspecified pancreatitis type K85.90       Past Medical History:        Diagnosis Date    Acute pancreatitis     EPIFANIO (acute kidney injury) ([facility] 12/04/2014    Back pain 6/10/2016    Breast cancer ([facility] 2018    Breast lump dx 2/2018    left    Bronchitis     Chronic pancreatitis ([facility]     COPD (chronic obstructive pulmonary disease) ([facility] 02/09/2023    no meds or supplemental O2. Spiromery: Impression: Mild obstruction, supranormal FVC and moderately reduced diffusion capacity suggestive of COPD.  Discharge from the vagina     Dysuria     Eczema 6/10/2016    Fungal dermatitis     Headache 6/10/2016    History of bleeding peptic ulcer     Ruptured ulcer/gastrectomy- 5 units blood transfused    History of blood transfusion 2003    ruptured peptic ulcer- 5 units blood    History of left breast cancer 2018    lumpectomy and lymphnodes removed- no further treatment required    Hypercholesterolemia 12/4/2014    Hypertension     not well controlled--per pt    Intractable vomiting 5/20/2018    Lung cancer ([facility] 02/2023    Dr Darshana Onofre -oncologist    Lung cancer ([facility] 01/2023    Malnutrition ([facility]     BMI 16.4-- pancreatitis and continued weightloss> 20lbs since 1/2023    Menopausal vaginal dryness     Osteoarthritis 6/10/2016    Osteoporosis 6/10/2016    Pancreatic lesion 03/18/2020    Small pancreatic head lesion. This may represent a lymph node or neuroendocrine tumor. It is not convincingly worrisome in appearance. Biopsies were obtained.     PUD (peptic ulcer disease) last 2003 which a rupture an extensive surgery    PVD (peripheral vascular disease) with claudication ([address] 12/08/2020    Rheumatoid arthritis ([facility]     Rheumatologist: Yusuf Norman MD    Sepsis ([facility] 12/4/2014    Thyroid nodule     Tobacco abuse     1-2 ppd since 1967--       Past Surgical History:        Procedure Laterality Date    APPENDECTOMY  1977    with hysterectomy    BREAST BIOPSY Left 1975    BREAST LUMPECTOMY Left 2/22/2018    LEFT BREAST LUMPECTOMY/ SENTINEL NODE BIOPSY performed by Italo Ash MD at [address] [facility] Bilateral     with IOL    CATARACT REMOVAL Bilateral 2018    w/IOL    CHOLECYSTECTOMY      CT NEEDLE BIOPSY LUNG PERCUTANEOUS  01/05/2023    CT NEEDLE BIOPSY LUNG PERCUTANEOUS 1/5/2023 [facility] CT SCAN    ENDOSCOPIC ULTRASOUND (LOWER)  12/28/2022    ERCP N/A 02/21/2023    ERCP ENDOSCOPIC RETROGRADE CHOLANGIOPANCREATOGRAPHY Rm 622 performed by Oly San MD at [address] [facility] ([facility])  [address]  2003    stomach surgery for ruptured ulcer and extensive rerouting of intestestines per patient     STOMACH SURGERY      ruptured ulcer    [address] ENDOSCOPY  05/21/2018    empiric dilation    UPPER GASTROINTESTINAL ENDOSCOPY      UPPER GASTROINTESTINAL ENDOSCOPY N/A 10/04/2022    EGD ESOPHAGOGASTRODUODENOSCOPY/  performed by Lynnette Vaughan MD at [place] [address] N/A 12/28/2022    ENDOSCOPIC ULTRASOUND W/ EGD/ / performed by Sanjay Jenkins MD at [address] LEFT Left 01/31/2018    US BREAST NEEDLE BIOPSY LEFT 1/31/2018 [facility]       Social History:    Social History     Tobacco Use    Smoking status: Every Day     Packs/day: 0.10     Types: Cigarettes     Start date: 5/6/1966    Smokeless tobacco: Never  Tobacco comments:     1966 started- 3/2/23- states 5-6 cigs daily   Substance Use Topics    Alcohol use: Not Currently                                Ready to quit: Not Answered  Counseling given: Not Answered  Tobacco comments: 1966 started- 3/2/23- states 5-6 cigs daily      Vital Signs (Current):   Vitals:    03/13/23 0535   BP: 126/60   Pulse: 95   Resp: 16   Temp: 97.6 °F (36.4 °C)   TempSrc: Tympanic   SpO2: 99%   Weight: 86 lb (39 kg)   Height: 5' 1\" (1.549 m)                                              BP Readings from Last 3 Encounters:   03/13/23 126/60   03/09/23 124/65   03/06/23 (!) 152/82       NPO Status: Time of last liquid consumption: 1800                        Time of last solid consumption: 1800                        Date of last liquid consumption: 03/12/23                        Date of last solid food consumption: 03/12/23    BMI:   Wt Readings from Last 3 Encounters:   03/13/23 86 lb (39 kg)   03/09/23 87 lb 11.2 oz (39.8 kg)   03/06/23 86 lb (39 kg)     Body mass index is 16.25 kg/m².     CBC:   Lab Results   Component Value Date/Time    WBC 15.7 03/09/2023 10:12 AM    RBC 4.75 03/09/2023 10:12 AM    HGB 12.1 03/09/2023 10:12 AM    HCT 39.5 03/09/2023 10:12 AM    MCV 83.2 03/09/2023 10:12 AM    RDW 15.9 03/09/2023 10:12 AM     03/09/2023 10:12 AM       CMP:   Lab Results   Component Value Date/Time     03/09/2023 10:12 AM    K 4.0 03/09/2023 10:12 AM     03/09/2023 10:12 AM    CO2 24 03/09/2023 10:12 AM    BUN 15 03/09/2023 10:12 AM    CREATININE 0.90 03/09/2023 10:12 AM    GFRAA >60 10/03/2022 07:01 AM    AGRATIO 1.5 03/30/2022 09:25 AM    LABGLOM >60 03/09/2023 10:12 AM    LABGLOM 74 03/30/2022 09:25 AM    GLUCOSE 108 03/09/2023 10:12 AM    PROT 7.0 03/09/2023 10:12 AM    CALCIUM 9.1 03/09/2023 10:12 AM    BILITOT 0.3 03/09/2023 10:12 AM    ALKPHOS 89 03/09/2023 10:12 AM    ALKPHOS 78 03/30/2022 09:25 AM    AST 12 03/09/2023 10:12 AM    ALT 15 03/09/2023 10:12 AM POC Tests: No results for input(s): POCGLU, POCNA, POCK, POCCL, POCBUN, POCHEMO, POCHCT in the last 72 hours. Coags:   Lab Results   Component Value Date/Time    PROTIME 12.9 03/02/2023 10:14 AM    INR 1.0 03/02/2023 10:14 AM    APTT 37.7 03/02/2023 10:14 AM       HCG (If Applicable): No results found for: PREGTESTUR, PREGSERUM, HCG, HCGQUANT     ABGs: No results found for: PHART, PO2ART, PIW1XES, NFH1ZGR, BEART, M5HMVMHY     Type & Screen (If Applicable):  No results found for: LABABO, LABRH    Drug/Infectious Status (If Applicable):  No results found for: HIV, HEPCAB    COVID-19 Screening (If Applicable): No results found for: COVID19        Anesthesia Evaluation  Patient summary reviewed  Airway: Mallampati: II  TM distance: <3 FB   Neck ROM: full  Mouth opening: < 3 FB   Dental:    (+) upper dentures and lower dentures      Pulmonary:   (+) COPD: moderate,  decreased breath sounds: bilateral current smoker          Patient smoked on day of surgery. Cardiovascular:  Exercise tolerance: good (>4 METS),   (+) hypertension:, hyperlipidemia        Rhythm: regular  Rate: normal                 ROS comment: Echo 3/6/2023      Left Ventricle: Normal left ventricular systolic function with a visually estimated EF of 60 - 65%. Left ventricle size is normal. Normal wall thickness. Normal wall motion. Abnormal diastolic function.   Aortic Valve: Mildly calcified cusp. Mild regurgitation. No stenosis. AV mean gradient is 8 mmHg. AV peak gradient is 16 mmHg. LVOT:AV VTI Index is 0.44.    Mitral Valve: Mild regurgitation.   Left Atrium: Left atrium is mildly dilated.   Technical qualifiers: Technically difficult study due to low parasternal window, color flow Doppler was performed and pulse wave and/or continuous wave Doppler was performed. Neuro/Psych:   (+) headaches:,             GI/Hepatic/Renal:   (+) PUD,          ROS comment: Many bouts of pancreatitis.    Endo/Other:    (+) : arthritis: OA., malignancy/cancer. Abdominal:             Vascular:   + PVD, aortic or cerebral, . Other Findings:           Anesthesia Plan      general     ASA 3     (R/b/i of GETA w PENNIE and arterial line discussed at length with pt. Also discussed potential erector spinae nerve block for help with post operative pain mgmt if procedure converted to open thoracotomy. Agreeable to proceed. Questions answered)  Induction: intravenous. MIPS: Postoperative opioids intended and Prophylactic antiemetics administered. Anesthetic plan and risks discussed with patient and child/children.                         Germain Zelaya DO   3/13/2023

## 2023-03-13 NOTE — PROGRESS NOTES
TRANSFER - IN REPORT:    Verbal report received from Mario Mitchell RN on Lessie Dakins  being received from PACU for routine progression of patient care      Report consisted of patient's Situation, Background, Assessment and   Recommendations(SBAR). Information from the following report(s) Nurse Handoff Report was reviewed with the receiving nurse. Opportunity for questions and clarification was provided. Assessment completed upon patient's arrival to unit and care assumed.

## 2023-03-13 NOTE — ANESTHESIA PROCEDURE NOTES
Arterial Line:    An arterial line was placed using surface landmarks, in the OR for the following indication(s): continuous blood pressure monitoring and blood sampling needed. A 20 gauge (size) (length) (type) catheter was placed, into the right radial artery, secured by tape and Tegaderm. Anesthesia type: General    Events:  patient tolerated procedure well with no complications. 3/13/2023 1:07 PM3/13/2023 1:10 PM  Resident/CRNA: ROBERTO Chacon CRNA  Preanesthetic Checklist  Completed: patient identified, IV checked, site marked, risks and benefits discussed, surgical/procedural consents, equipment checked, pre-op evaluation, timeout performed, anesthesia consent given, oxygen available, monitors applied/VS acknowledged, fire risk safety assessment completed and verbalized and blood product R/B/A discussed and consented

## 2023-03-13 NOTE — ANESTHESIA POSTPROCEDURE EVALUATION
Department of Anesthesiology  Postprocedure Note    Patient: Aida Santa  MRN: [id number]  YOB: 1940  Date of evaluation: 3/13/2023      Procedure Summary     Date: 03/13/23 Room / Location: [facility] MAIN OR 02 / [facility] MAIN OR    Anesthesia Start: 1248 Anesthesia Stop: 9439    Procedure: RIGHT THORACOSCOPY, RIGHT LOWER LOBECTOMY, MEDIASTINAL LYMPHADENECTOMY (Right: Chest) Diagnosis:       Squamous cell carcinoma of lung, stage II, right (HCC)      (Squamous cell carcinoma of lung, stage II, right (HCC) [C34.91])    Providers: Shannen Ayala MD Responsible Provider: Ramírez Covington MD    Anesthesia Type: general ASA Status: 3          Anesthesia Type: No value filed.     Vipin Phase I: Vipin Score: 8    Vipin Phase II:        Anesthesia Post Evaluation    Patient location during evaluation: PACU  Patient participation: complete - patient participated  Level of consciousness: awake  Airway patency: patent  Nausea & Vomiting: no nausea  Complications: no  Cardiovascular status: hemodynamically stable  Respiratory status: acceptable and nonlabored ventilation  Hydration status: stable  Multimodal analgesia pain management approach

## 2023-03-13 NOTE — PROGRESS NOTES
Spiritual Care visit. Initial Visit, Pre Surgery Consult. Visit and prayer before patient goes to surgery. Wait time was running longer than expected. Pt. Relations requested chaplains to provide meal vouchers for patient's daughters.  brought the vouchers to them, listened as they spoke of their mother the patient, and prayed for her to have an excellent surgery.     Visit by Josue Lima M.Ed., Th.B. ,Staff

## 2023-03-14 ENCOUNTER — CARE COORDINATION (OUTPATIENT)
Dept: CARE COORDINATION | Facility: CLINIC | Age: 83
End: 2023-03-14

## 2023-03-14 ENCOUNTER — APPOINTMENT (OUTPATIENT)
Dept: GENERAL RADIOLOGY | Age: 83
DRG: 164 | End: 2023-03-14
Attending: SURGERY
Payer: MEDICARE

## 2023-03-14 LAB
ANION GAP SERPL CALC-SCNC: 7 MMOL/L (ref 2–11)
BASOPHILS # BLD: 0 K/UL (ref 0–0.2)
BASOPHILS NFR BLD: 0 % (ref 0–2)
BUN SERPL-MCNC: 8 MG/DL (ref 8–23)
CALCIUM SERPL-MCNC: 8.7 MG/DL (ref 8.3–10.4)
CHLORIDE SERPL-SCNC: 108 MMOL/L (ref 101–110)
CO2 SERPL-SCNC: 23 MMOL/L (ref 21–32)
CREAT SERPL-MCNC: 0.7 MG/DL (ref 0.6–1)
DIFFERENTIAL METHOD BLD: ABNORMAL
EOSINOPHIL # BLD: 0 K/UL (ref 0–0.8)
EOSINOPHIL NFR BLD: 0 % (ref 0.5–7.8)
ERYTHROCYTE [DISTWIDTH] IN BLOOD BY AUTOMATED COUNT: 15.8 % (ref 11.9–14.6)
GLUCOSE SERPL-MCNC: 116 MG/DL (ref 65–100)
HCT VFR BLD AUTO: 38.3 % (ref 35.8–46.3)
HGB BLD-MCNC: 12 G/DL (ref 11.7–15.4)
IMM GRANULOCYTES # BLD AUTO: 0 K/UL (ref 0–0.5)
IMM GRANULOCYTES NFR BLD AUTO: 0 % (ref 0–5)
LYMPHOCYTES # BLD: 1 K/UL (ref 0.5–4.6)
LYMPHOCYTES NFR BLD: 7 % (ref 13–44)
MAGNESIUM SERPL-MCNC: 2.2 MG/DL (ref 1.8–2.4)
MCH RBC QN AUTO: 26.1 PG (ref 26.1–32.9)
MCHC RBC AUTO-ENTMCNC: 31.3 G/DL (ref 31.4–35)
MCV RBC AUTO: 83.4 FL (ref 82–102)
MONOCYTES # BLD: 0.6 K/UL (ref 0.1–1.3)
MONOCYTES NFR BLD: 5 % (ref 4–12)
NEUTS SEG # BLD: 11.2 K/UL (ref 1.7–8.2)
NEUTS SEG NFR BLD: 88 % (ref 43–78)
NRBC # BLD: 0 K/UL (ref 0–0.2)
PHOSPHATE SERPL-MCNC: 3.9 MG/DL (ref 2.3–3.7)
PLATELET # BLD AUTO: 366 K/UL (ref 150–450)
PMV BLD AUTO: 8.9 FL (ref 9.4–12.3)
POTASSIUM SERPL-SCNC: 4.1 MMOL/L (ref 3.5–5.1)
RBC # BLD AUTO: 4.59 M/UL (ref 4.05–5.2)
SODIUM SERPL-SCNC: 138 MMOL/L (ref 133–143)
WBC # BLD AUTO: 12.8 K/UL (ref 4.3–11.1)

## 2023-03-14 PROCEDURE — 83735 ASSAY OF MAGNESIUM: CPT

## 2023-03-14 PROCEDURE — 94760 N-INVAS EAR/PLS OXIMETRY 1: CPT

## 2023-03-14 PROCEDURE — 97165 OT EVAL LOW COMPLEX 30 MIN: CPT

## 2023-03-14 PROCEDURE — 6370000000 HC RX 637 (ALT 250 FOR IP): Performed by: SURGERY

## 2023-03-14 PROCEDURE — 2060000000 HC ICU INTERMEDIATE R&B

## 2023-03-14 PROCEDURE — 6370000000 HC RX 637 (ALT 250 FOR IP): Performed by: PHYSICIAN ASSISTANT

## 2023-03-14 PROCEDURE — 84100 ASSAY OF PHOSPHORUS: CPT

## 2023-03-14 PROCEDURE — 6360000002 HC RX W HCPCS: Performed by: PHYSICIAN ASSISTANT

## 2023-03-14 PROCEDURE — 94640 AIRWAY INHALATION TREATMENT: CPT

## 2023-03-14 PROCEDURE — 2500000003 HC RX 250 WO HCPCS: Performed by: PHYSICIAN ASSISTANT

## 2023-03-14 PROCEDURE — 36415 COLL VENOUS BLD VENIPUNCTURE: CPT

## 2023-03-14 PROCEDURE — 2580000003 HC RX 258: Performed by: SURGERY

## 2023-03-14 PROCEDURE — A4216 STERILE WATER/SALINE, 10 ML: HCPCS | Performed by: PHYSICIAN ASSISTANT

## 2023-03-14 PROCEDURE — 71045 X-RAY EXAM CHEST 1 VIEW: CPT

## 2023-03-14 PROCEDURE — 97535 SELF CARE MNGMENT TRAINING: CPT

## 2023-03-14 PROCEDURE — 85025 COMPLETE CBC W/AUTO DIFF WBC: CPT

## 2023-03-14 PROCEDURE — 2580000003 HC RX 258: Performed by: PHYSICIAN ASSISTANT

## 2023-03-14 PROCEDURE — 80048 BASIC METABOLIC PNL TOTAL CA: CPT

## 2023-03-14 RX ORDER — FAMOTIDINE 20 MG/1
20 TABLET, FILM COATED ORAL DAILY
Status: DISCONTINUED | OUTPATIENT
Start: 2023-03-15 | End: 2023-03-17 | Stop reason: HOSPADM

## 2023-03-14 RX ADMIN — SODIUM CHLORIDE, PRESERVATIVE FREE 10 ML: 5 INJECTION INTRAVENOUS at 20:19

## 2023-03-14 RX ADMIN — ENOXAPARIN SODIUM 30 MG: 100 INJECTION SUBCUTANEOUS at 10:00

## 2023-03-14 RX ADMIN — FAMOTIDINE 20 MG: 10 INJECTION INTRAVENOUS at 10:56

## 2023-03-14 RX ADMIN — HYDROMORPHONE HYDROCHLORIDE 0.5 MG: 1 INJECTION, SOLUTION INTRAMUSCULAR; INTRAVENOUS; SUBCUTANEOUS at 12:50

## 2023-03-14 RX ADMIN — OXYCODONE 5 MG: 5 TABLET ORAL at 17:51

## 2023-03-14 RX ADMIN — SENNOSIDES AND DOCUSATE SODIUM 2 TABLET: 8.6; 5 TABLET ORAL at 20:18

## 2023-03-14 RX ADMIN — SODIUM CHLORIDE, SODIUM LACTATE, POTASSIUM CHLORIDE, AND CALCIUM CHLORIDE: 600; 310; 30; 20 INJECTION, SOLUTION INTRAVENOUS at 10:43

## 2023-03-14 RX ADMIN — CEFAZOLIN SODIUM 2000 MG: 100 INJECTION, POWDER, LYOPHILIZED, FOR SOLUTION INTRAVENOUS at 04:55

## 2023-03-14 RX ADMIN — ACETAMINOPHEN 650 MG: 325 TABLET ORAL at 22:52

## 2023-03-14 RX ADMIN — HYDROMORPHONE HYDROCHLORIDE 0.5 MG: 1 INJECTION, SOLUTION INTRAMUSCULAR; INTRAVENOUS; SUBCUTANEOUS at 07:55

## 2023-03-14 RX ADMIN — GUAIFENESIN 200 MG: 200 SOLUTION ORAL at 17:51

## 2023-03-14 RX ADMIN — GUAIFENESIN 200 MG: 200 SOLUTION ORAL at 10:00

## 2023-03-14 RX ADMIN — IPRATROPIUM BROMIDE AND ALBUTEROL SULFATE 1 AMPULE: .5; 3 SOLUTION RESPIRATORY (INHALATION) at 19:23

## 2023-03-14 RX ADMIN — OXYCODONE 5 MG: 5 TABLET ORAL at 22:53

## 2023-03-14 RX ADMIN — HYDROMORPHONE HYDROCHLORIDE 0.5 MG: 1 INJECTION, SOLUTION INTRAMUSCULAR; INTRAVENOUS; SUBCUTANEOUS at 02:52

## 2023-03-14 RX ADMIN — GABAPENTIN 300 MG: 300 CAPSULE ORAL at 14:06

## 2023-03-14 RX ADMIN — OXYCODONE 5 MG: 5 TABLET ORAL at 04:55

## 2023-03-14 RX ADMIN — PRAVASTATIN SODIUM 40 MG: 20 TABLET ORAL at 17:51

## 2023-03-14 RX ADMIN — IPRATROPIUM BROMIDE AND ALBUTEROL SULFATE 1 AMPULE: .5; 3 SOLUTION RESPIRATORY (INHALATION) at 14:48

## 2023-03-14 RX ADMIN — AMLODIPINE BESYLATE 5 MG: 5 TABLET ORAL at 09:59

## 2023-03-14 RX ADMIN — GABAPENTIN 300 MG: 300 CAPSULE ORAL at 20:18

## 2023-03-14 RX ADMIN — IPRATROPIUM BROMIDE AND ALBUTEROL SULFATE 1 AMPULE: .5; 3 SOLUTION RESPIRATORY (INHALATION) at 07:19

## 2023-03-14 RX ADMIN — GABAPENTIN 300 MG: 300 CAPSULE ORAL at 09:58

## 2023-03-14 RX ADMIN — LOSARTAN POTASSIUM 100 MG: 50 TABLET, FILM COATED ORAL at 09:59

## 2023-03-14 ASSESSMENT — PAIN DESCRIPTION - ORIENTATION
ORIENTATION: RIGHT

## 2023-03-14 ASSESSMENT — PAIN DESCRIPTION - LOCATION
LOCATION: BACK;FLANK
LOCATION: FLANK;BACK;CHEST
LOCATION: BACK
LOCATION: BACK;FLANK

## 2023-03-14 ASSESSMENT — PAIN - FUNCTIONAL ASSESSMENT
PAIN_FUNCTIONAL_ASSESSMENT: ACTIVITIES ARE NOT PREVENTED

## 2023-03-14 ASSESSMENT — PAIN SCALES - GENERAL
PAINLEVEL_OUTOF10: 4
PAINLEVEL_OUTOF10: 8
PAINLEVEL_OUTOF10: 8
PAINLEVEL_OUTOF10: 6
PAINLEVEL_OUTOF10: 8
PAINLEVEL_OUTOF10: 0
PAINLEVEL_OUTOF10: 10
PAINLEVEL_OUTOF10: 3
PAINLEVEL_OUTOF10: 5
PAINLEVEL_OUTOF10: 6

## 2023-03-14 ASSESSMENT — PAIN DESCRIPTION - DESCRIPTORS
DESCRIPTORS: DISCOMFORT
DESCRIPTORS: DISCOMFORT
DESCRIPTORS: PRESSURE;SORE;TENDER
DESCRIPTORS: SHARP;SORE;TENDER
DESCRIPTORS: SHARP

## 2023-03-14 ASSESSMENT — PAIN SCALES - WONG BAKER: WONGBAKER_NUMERICALRESPONSE: 0

## 2023-03-14 ASSESSMENT — PAIN DESCRIPTION - PAIN TYPE
TYPE: SURGICAL PAIN

## 2023-03-14 NOTE — PLAN OF CARE
Problem: Pain  Goal: Verbalizes/displays adequate comfort level or baseline comfort level  Outcome: Progressing  Flowsheets (Taken 3/13/2023 2135)  Verbalizes/displays adequate comfort level or baseline comfort level:   Encourage patient to monitor pain and request assistance   Assess pain using appropriate pain scale   Administer analgesics based on type and severity of pain and evaluate response   Implement non-pharmacological measures as appropriate and evaluate response     Problem: Discharge Planning  Goal: Discharge to home or other facility with appropriate resources  Outcome: Progressing  Flowsheets (Taken 3/13/2023 1935)  Discharge to home or other facility with appropriate resources:   Identify discharge learning needs (meds, wound care, etc)   Refer to discharge planning if patient needs post-hospital services based on physician order or complex needs related to functional status, cognitive ability or social support system     Problem: Safety - Adult  Goal: Free from fall injury  Outcome: Progressing

## 2023-03-14 NOTE — PROGRESS NOTES
END OF SHIFT NOTE:    INTAKE/OUTPUT  03/13 0701 - 03/14 0700  In: 2367.4 [I.V.:2367.4]  Out: 2053 [Urine:1876]  Voiding: Yes  Catheter: No  Drain:   Chest Tube Right 1 (Active)   Chest Tube Airleak No 03/14/23 1758   Status Gravity 03/14/23 1758   Suction To water seal 03/14/23 1758   Y Connector Used Yes 03/14/23 0755   Drainage Description Bright red 03/14/23 1758   Dressing Status Clean, dry & intact 03/14/23 1758   Chest Tube Dressing Petroleum Jelly 03/14/23 1758   Site Assessment Clean, dry & intact 03/14/23 1758   Surrounding Skin Clean, dry & intact 03/14/23 1758   Output (ml) 9 ml 03/14/23 1758       Chest Tube Left 2 (Active)   Chest Tube Airleak Yes 03/14/23 1756   Status Gravity 03/14/23 1756   Suction To water seal 03/14/23 1756   Y Connector Used Yes 03/14/23 0755   Drainage Description Bright red 03/14/23 1756   Dressing Status Clean, dry & intact 03/14/23 1756   Chest Tube Dressing Petroleum Jelly 03/14/23 1756   Site Assessment Clean, dry & intact 03/14/23 1756   Surrounding Skin Clean, dry & intact 03/14/23 1756   Output (ml) 106 ml 03/14/23 1756               Flatus: Patient does have flatus present. Stool:  occurrences. Characteristics:           Stool Assessment  Last BM (including prior to admit): 03/08/23    Emesis:  occurrences. Characteristics:        VITAL SIGNS  Patient Vitals for the past 12 hrs:   Temp Pulse Resp BP SpO2   03/14/23 1751 -- -- 18 -- --   03/14/23 1448 -- 77 15 -- 97 %   03/14/23 1438 97.7 °F (36.5 °C) 88 16 127/67 97 %   03/14/23 1320 -- -- 17 -- --   03/14/23 1250 -- -- 16 -- --   03/14/23 1105 97.7 °F (36.5 °C) 91 16 (!) 142/61 95 %   03/14/23 0825 -- -- 18 -- --   03/14/23 0730 98.2 °F (36.8 °C) (!) 104 18 (!) 154/57 --   03/14/23 0719 -- 89 16 -- 90 %       Pain Assessment  Pain Level: 5 (03/14/23 3808)  Pain Location: Back  Patient's Stated Pain Goal: 2    Ambulating  Yes    Shift report given to oncoming nurse at the bedside.     Edita Tripathi RN

## 2023-03-14 NOTE — PLAN OF CARE
Problem: Safety - Adult  Goal: Free from fall injury  3/14/2023 1205 by Blessing Braun RN  Outcome: Progressing  3/13/2023 2230 by Anastasia Campos RN  Outcome: Progressing     Problem: Pain  Goal: Verbalizes/displays adequate comfort level or baseline comfort level  3/14/2023 1205 by Blessing Braun RN  Outcome: Progressing  Flowsheets  Taken 3/14/2023 0455 by Anastasia Campos RN  Verbalizes/displays adequate comfort level or baseline comfort level:   Encourage patient to monitor pain and request assistance   Assess pain using appropriate pain scale   Administer analgesics based on type and severity of pain and evaluate response   Implement non-pharmacological measures as appropriate and evaluate response  Taken 3/14/2023 0322 by Anastasia Campos RN  Verbalizes/displays adequate comfort level or baseline comfort level:   Encourage patient to monitor pain and request assistance   Assess pain using appropriate pain scale   Implement non-pharmacological measures as appropriate and evaluate response  Taken 3/14/2023 0252 by Anastasia Campos RN  Verbalizes/displays adequate comfort level or baseline comfort level:   Encourage patient to monitor pain and request assistance   Assess pain using appropriate pain scale   Administer analgesics based on type and severity of pain and evaluate response   Implement non-pharmacological measures as appropriate and evaluate response  Taken 3/14/2023 0015 by Anastasia Campos RN  Verbalizes/displays adequate comfort level or baseline comfort level:   Encourage patient to monitor pain and request assistance   Assess pain using appropriate pain scale   Implement non-pharmacological measures as appropriate and evaluate response  3/13/2023 2230 by Anastasia Campos RN  Outcome: Progressing  Flowsheets (Taken 3/13/2023 2135)  Verbalizes/displays adequate comfort level or baseline comfort level:   Encourage patient to monitor pain and request assistance   Assess pain using appropriate pain scale   Administer analgesics based on type and severity of pain and evaluate response   Implement non-pharmacological measures as appropriate and evaluate response

## 2023-03-14 NOTE — ACP (ADVANCE CARE PLANNING)
Advance Care Planning     General Advance Care Planning (ACP) Conversation    Date of Conversation: 2/16/2023  Conducted with: Patient with Decision Making Capacity    Healthcare Decision Maker:    Primary Decision Maker: Dilip Lorenz - Child - [phone]    Secondary Decision Maker: Yue Grey - Child - [phone]  Click here to complete Healthcare Decision Makers including selection of the Healthcare Decision Maker Relationship (ie \"Primary\"). Today we documented Decision Maker(s) consistent with Legal Next of Kin hierarchy.     Content/Action Overview:    Reviewed DNR/DNI and patient elects Full Code (Attempt Resuscitation)  ACP documentation      Length of Voluntary ACP Conversation in minutes:  21 minutes    Jayden Haddad RN

## 2023-03-14 NOTE — PROGRESS NOTES
ACUTE OCCUPATIONAL THERAPY GOALS:   (Developed with and agreed upon by patient and/or caregiver.)  1. Pt will toilet with SBA   2. Pt will complete functional mobility for ADLs with SBA using AD as needed  3. Pt will complete lower body dressing with SBA using AE as needed  4. Pt will complete grooming and hygiene at sink with SBA  5. Pt will demonstrate independence with HEP to promote increased BUE strength and functional use for ADLs  6. Pt will tolerate 23 minutes functional activity with min or fewer rest breaks to promote increased endurance for ADLs  7. Pt will independently demonstrate/ verbalize 2+ energy conservation techniques to promote increased endurance for ADLs      Timeframe: 7 days      OCCUPATIONAL THERAPY Initial Assessment and Daily Note       OT Visit Days: 1  Acknowledge Orders  Time  OT Charge Capture  Rehab Caseload Tracker      Atul West is a [de-identified] y.o. female   PRIMARY DIAGNOSIS: Bronchogenic cancer of right lung ([facility] [address]  Squamous cell carcinoma of lung, stage II, right (HCC) [C34.91]  Right lower lobe lung mass [R91.8]  Procedure(s) (LRB):  RIGHT THORACOSCOPY, RIGHT LOWER LOBECTOMY, MEDIASTINAL LYMPHADENECTOMY (Right)  1 Day Post-Op  Reason for Referral: Generalized Muscle Weakness (M62.81)  Inpatient: Payor: Diana Contreras / Plan: [facility] PPO / Product Type: Medicare /     ASSESSMENT:     REHAB RECOMMENDATIONS:   Recommendation to date pending progress:  Setting:  Home Health Therapy    Equipment:    3 in 1 Bedside Commode  Rolling Walker     ASSESSMENT:  Ms. [facility] [place] was admitted w/ lung cancer, post R thoracoscopy and lobectomy. Pt presented with deficits in activity tolerance, mobility, and balance impacting ADLs. Pt demonstrated ADLs and mobility for ADLs with CGA using RW. Pt endorsed significant fatigue with activity, also limited by pain. Pt is below her functional baseline and would benefit from skilled OT services to address deficits.       [employer] AM-PAC 6 Clicks Daily Activity Inpatient Short Form:    AM-PAC Daily Activity - Inpatient   How much help is needed for putting on and taking off regular lower body clothing?: A Little  How much help is needed for bathing (which includes washing, rinsing, drying)?: A Little  How much help is needed for toileting (which includes using toilet, bedpan, or urinal)?: A Little  How much help is needed for putting on and taking off regular upper body clothing?: A Little  How much help is needed for taking care of personal grooming?: None  How much help for eating meals?: None  [facility] Inpatient Daily Activity Raw Score: 20  AM-PAC Inpatient ADL T-Scale Score : 42.03  ADL Inpatient CMS 0-100% Score: 38.32  ADL Inpatient CMS G-Code Modifier : CJ           SUBJECTIVE:     Ms. [facility] [place] states, \"I'll be fine in a few days. \"     Social/Functional Additional Comments: Lives alone and is independent at baseline. Plans to d/c home with daughter who lives in a tri level house with a walk in shower. No AD, no DME.     OBJECTIVE:     Tellis Merlin / Frieda Cristina / Lauren Law: Chest Tube, Akhtar Catheter, and IV    RESTRICTIONS/PRECAUTIONS:       PAIN: VITALS / O2:   Pre Treatment:   4, chest tube site, pre-medicated         Post Treatment: 4       Vitals          Oxygen  Room air            GROSS EVALUATION: INTACT IMPAIRED   (See Comments)   UE AROM [] []Slightly decreased R shoulder d/t pain at incision   UE PROM [] []   Strength []  Generally decreased, generalized weakness     Posture / Balance []  CGA w/ RW   Sensation []     Coordination [x]       Tone []       Edema []    Activity Tolerance []  Decreased d/t pain and fatigue     Hand Dominance R [] L []      COGNITION/  PERCEPTION: INTACT IMPAIRED   (See Comments)   Orientation [x]     Vision [x]     Hearing [x]     Cognition  [x]     Perception [x]       MOBILITY: I Mod I S SBA CGA Min Mod Max Total  NT x2 Comments:   Bed Mobility    Rolling [] [] [] [] [] [] [] [] [] [] []    Supine to Sit [] [] [] [] [] [x] [] [] [] [] []    Scooting [] [] [] [] [] [] [] [] [] [] []    Sit to Supine [] [] [] [] [] [] [] [] [] [] []    Transfers    Sit to Stand [] [] [] [] [x] [] [] [] [] [] []    Bed to Chair [] [] [] [] [] [x] [] [] [] [] []    Stand to Sit [] [] [] [] [x] [] [] [] [] [] []    Tub/Shower [] [] [] [] [] [] [] [] [] [] []     Toilet [] [] [] [] [] [] [] [] [] [] []      [] [] [] [] [] [] [] [] [] [] []    I=Independent, Mod I=Modified Independent, S=Supervision/Setup, SBA=Standby Assistance, CGA=Contact Guard Assistance, Min=Minimal Assistance, Mod=Moderate Assistance, Max=Maximal Assistance, Total=Total Assistance, NT=Not Tested    ACTIVITIES OF DAILY LIVING: I Mod I S SBA CGA Min Mod Max Total NT Comments   BASIC ADLs:              Upper Body Bathing  [] [] [] [] [] [] [] [] [] []    Lower Body Bathing [] [] [] [] [] [] [] [] [] []    Toileting [] [] [] [] [] [] [] [] [] []    Upper Body Dressing [] [] [] [] [] [x] [] [] [] []    Lower Body Dressing [] [] [] [] [x] [] [] [] [] []    Feeding [] [] [] [] [] [] [] [] [] []    Grooming [] [] [] [] [x] [] [] [] [] []    Personal Device Care [] [] [] [] [] [] [] [] [] []    Functional Mobility [] [] [] [] [] [x] [] [] [] []    I=Independent, Mod I=Modified Independent, S=Supervision/Setup, SBA=Standby Assistance, CGA=Contact Guard Assistance, Min=Minimal Assistance, Mod=Moderate Assistance, Max=Maximal Assistance, Total=Total Assistance, NT=Not Tested    PLAN:   FREQUENCY/DURATION   OT Plan of Care: 3 times/week for duration of hospital stay or until stated goals are met, whichever comes first.    PROBLEM LIST:   (Skilled intervention is medically necessary to address:)  Decreased ADL/Functional Activities  Decreased Activity Tolerance  Decreased AROM/PROM  Decreased Balance  Decreased Strength  Decreased Transfer Abilities  Increased Pain   INTERVENTIONS PLANNED:  (Benefits and precautions of occupational therapy have been discussed with the patient.)  Self Care Training  Therapeutic Activity  Therapeutic Exercise/HEP  Neuromuscular Re-education  Manual Therapy  Education         TREATMENT:     EVALUATION: MODERATE COMPLEXITY: (Untimed Charge)    TREATMENT:   Self Care (8 minutes): Patient participated in upper body dressing, lower body dressing, and grooming ADLs in unsupported sitting and standing with minimal verbal cueing to increase independence, decrease assistance required, and increase activity tolerance. Patient also participated in functional mobility and energy conservation training to increase independence, decrease assistance required, and increase activity tolerance. TREATMENT GRID:  N/A    AFTER TREATMENT PRECAUTIONS: Call light within reach, Chair, Needs within reach, RN notified, and Visitors at bedside    INTERDISCIPLINARY COLLABORATION:  RN/ PCT    EDUCATION:  Education Given To: Patient; Family  Education Provided: Role of Therapy;Plan of Care;Energy Conservation    TOTAL TREATMENT DURATION AND TIME:  Time In: 1030  Time Out: [address]  Minutes: [facility]

## 2023-03-14 NOTE — CARE COORDINATION
Patient remains inpatient after scheduled surgery. Will continue to monitor chart regarding progress and discharge plan for LEONOR follow up.

## 2023-03-14 NOTE — PROGRESS NOTES
Admit Date: 3/13/2023    POD 1 Day Post-Op    Procedure:  Procedure(s):  RIGHT THORACOSCOPY, RIGHT LOWER LOBECTOMY, MEDIASTINAL LYMPHADENECTOMY    Subjective: The patient is laying comfortably in bed. She states she is having some incisional pain. She has been tolerating a diet. She denies any nausea or emesis. Objective:       Vitals:    23 0455 23 0525 23 0719 23 0730   BP:    (!) 154/57   Pulse:   89 (!) 104   Resp: 20 16 16 18   Temp:    98.2 °F (36.8 °C)   TempSrc:    Temporal   SpO2:   90%    Weight:       Height:           Temp (24hrs), Av.8 °F (36.6 °C), Min:97.5 °F (36.4 °C), Max:98.2 °F (36.8 °C)  . I&O reviewed as documented. Constitutional: Alert, oriented, cooperative patient in no acute distress; appears stated age    CV: RRR. Normal perfusion  Resp: No JVD. Breathing is  non-labored; no audible wheezing. Chest tube x2 intact with no evidence of air leak. SS drainage.    GI: soft and non-distended       Labs:   Recent Results (from the past 24 hour(s))   Magnesium    Collection Time: 23  4:49 PM   Result Value Ref Range    Magnesium 1.7 (L) 1.8 - 2.4 mg/dL   Arterial Blood Gas, POC    Collection Time: 23  4:54 PM   Result Value Ref Range    DEVICE NASAL CANNULA      pH, Arterial, POC 7.34 (L) 7.35 - 7.45      pCO2, Arterial, POC 39.3 35 - 45 MMHG    pO2, Arterial,  (H) 75 - 100 MMHG    HCO3, Mixed 20.9 (L) 22 - 26 MMOL/L    SO2c, Arterial, POC 98.1 (H) 95 - 98 %    BASE DEFICIT (POC) 4.6 mmol/L    POC David's Test NOT APPLICABLE      Site DRAWN FROM ARTERIAL LINE      Specimen type: ARTERIAL      Performed by: Rg     POC TCO2 20 13 - 23 MMOL/L    FIO2 3     CBC with Auto Differential    Collection Time: 23  5:18 AM   Result Value Ref Range    WBC 12.8 (H) 4.3 - 11.1 K/uL    RBC 4.59 4.05 - 5.2 M/uL    Hemoglobin 12.0 11.7 - 15.4 g/dL    Hematocrit 38.3 35.8 - 46.3 %    MCV 83.4 82 - 102 FL    MCH 26.1 26.1 - 32.9 PG MCHC 31.3 (L) 31.4 - 35.0 g/dL    RDW 15.8 (H) 11.9 - 14.6 %    Platelets 398 011 - 906 K/uL    MPV 8.9 (L) 9.4 - 12.3 FL    nRBC 0.00 0.0 - 0.2 K/uL    Differential Type AUTOMATED      Seg Neutrophils 88 (H) 43 - 78 %    Lymphocytes 7 (L) 13 - 44 %    Monocytes 5 4.0 - 12.0 %    Eosinophils % 0 (L) 0.5 - 7.8 %    Basophils 0 0.0 - 2.0 %    Immature Granulocytes 0 0.0 - 5.0 %    Segs Absolute 11.2 (H) 1.7 - 8.2 K/UL    Absolute Lymph # 1.0 0.5 - 4.6 K/UL    Absolute Mono # 0.6 0.1 - 1.3 K/UL    Absolute Eos # 0.0 0.0 - 0.8 K/UL    Basophils Absolute 0.0 0.0 - 0.2 K/UL    Absolute Immature Granulocyte 0.0 0.0 - 0.5 K/UL   Basic Metabolic Panel    Collection Time: 03/14/23  5:18 AM   Result Value Ref Range    Sodium 138 133 - 143 mmol/L    Potassium 4.1 3.5 - 5.1 mmol/L    Chloride 108 101 - 110 mmol/L    CO2 23 21 - 32 mmol/L    Anion Gap 7 2 - 11 mmol/L    Glucose 116 (H) 65 - 100 mg/dL    BUN 8 8 - 23 MG/DL    Creatinine 0.70 0.6 - 1.0 MG/DL    Est, Glom Filt Rate >60 >60 ml/min/1.73m2    Calcium 8.7 8.3 - 10.4 MG/DL   Phosphorus    Collection Time: 03/14/23  5:18 AM   Result Value Ref Range    Phosphorus 3.9 (H) 2.3 - 3.7 MG/DL   Magnesium    Collection Time: 03/14/23  5:18 AM   Result Value Ref Range    Magnesium 2.2 1.8 - 2.4 mg/dL     Xray Result (most recent):  XR CHEST PORTABLE 03/14/2023    Narrative  Portable chest xray    COMPARISON: Yesterday    INDICATION: Post VATS    FINDINGS:    Stable right-sided chest tubes. No visible pneumothorax. Bibasilar opacities,  likely atelectasis. No pulmonary edema or large pleural effusion. There is small  right chest wall emphysema. Cardiac mediastinal silhouette and the surrounding  bones are stable. Impression  1. Stable right-sided chest tubes. No visible pneumothorax. 2. Stable mild bibasilar atelectasis.     3. No significant change compared to prior exam.        Assessment:     Principal Problem:    Bronchogenic cancer of right lung [facility])  Active Problems: Right lower lobe lung mass  Resolved Problems:    * No resolved hospital problems.  *        Plan/Recommendations/Medical Decision Making:     Chest tubes to water seal  Regular diet  Encouraged increased level of activity, PT/OT consulted  IS  Daily CXR  DVT prophylaxis, GI prophylaxis    Den Segundo PA-C

## 2023-03-14 NOTE — PROGRESS NOTES
Physical Therapy Note:    Attempted to see patient this AM for physical therapy evaluation session. Patient working with Shimon Beltrán.. Will follow and re-attempt as schedule permits/patient available.  Thank you,    ARASELI Monroy, PT     Rehab Caseload Tracker

## 2023-03-14 NOTE — PLAN OF CARE
Problem: Respiratory - Adult  Goal: Achieves optimal ventilation and oxygenation  Outcome: Progressing  Flowsheets (Taken 3/14/2023 0720)  Achieves optimal ventilation and oxygenation:   Assess for changes in respiratory status   Position to facilitate oxygenation and minimize respiratory effort   Respiratory therapy support as indicated   Assess for changes in mentation and behavior   Oxygen supplementation based on oxygen saturation or arterial blood gases   Encourage broncho-pulmonary hygiene including cough, deep breathe, incentive spirometry   Assess and instruct to report shortness of breath or any respiratory difficulty

## 2023-03-14 NOTE — PROGRESS NOTES
END OF SHIFT NOTE:    INTAKE/OUTPUT  03/13 0701 - 03/14 0700  In: 2367.4 [I.V.:2367.4]  Out: 2053 [Urine:1876]  Voiding: No  Catheter: Yes  Drain:     Chest tube#1: atrium marked @ 38mL  Chest tube #2: atrium marked @ 114mL     Chest Tube Right 1 (Active)   Chest Tube Airleak No 03/13/23 1935   Status Continuous Suction 03/13/23 1935   Suction -20 cm H2O 03/13/23 1935   Y Connector Used Yes 03/13/23 1935   Drainage Description Bright red 03/14/23 0015   Dressing Status Clean, dry & intact 03/13/23 1935   Chest Tube Dressing Petroleum Jelly 03/13/23 1935   Site Assessment Clean, dry & intact 03/13/23 1935   Surrounding Skin Clean, dry & intact 03/13/23 1935   Output (ml) 24 ml 03/14/23 0508       Chest Tube Left 2 (Active)   Chest Tube Airleak Yes 03/13/23 1935   Status Continuous Suction 03/13/23 1935   Suction -20 cm H2O 03/13/23 1935   Y Connector Used Yes 03/13/23 1935   Drainage Description Bright red 03/14/23 0015   Dressing Status Clean, dry & intact 03/13/23 1935   Chest Tube Dressing Petroleum Jelly 03/13/23 1935   Site Assessment Clean, dry & intact 03/13/23 1935   Surrounding Skin Clean, dry & intact 03/13/23 1935   Output (ml) 66 ml 03/14/23 0508       Urinary Catheter 03/13/23 2 Way (Active)   $ Urethral catheter insertion Inserted for procedure 03/14/23 0015   Catheter Indications Perioperative use for selected surgical procedures 03/14/23 0015   Site Assessment No urethral drainage 03/14/23 0015   Urine Color Yellow 03/14/23 0015   Urine Appearance Hazy 03/14/23 0015   Urine Odor Malodorous 03/14/23 0015   Collection Container Standard 03/14/23 0015   Securement Method Securing device (Describe) 03/14/23 0015   Catheter Best Practices  Drainage tube clipped to bed;Catheter secured to thigh; Tamper seal intact; Bag below bladder;Bag not on floor; Lack of dependent loop in tubing;Drainage bag less than half full 03/14/23 0015   Status Draining;Patent 03/14/23 0015   Output (mL) 400 mL 03/14/23 0500 Flatus: Patient does not have flatus present. Stool: 0 occurrences. Characteristics:           Stool Assessment  Last BM (including prior to admit): 03/08/23 (per pt)    Emesis: 0 occurrences. Characteristics:        VITAL SIGNS  Patient Vitals for the past 12 hrs:   Temp Pulse Resp BP SpO2   03/14/23 0525 -- -- 16 -- --   03/14/23 0455 -- -- 20 -- --   03/14/23 0414 98.2 °F (36.8 °C) 94 16 (!) 132/54 99 %   03/14/23 0322 -- -- 18 -- --   03/14/23 0252 -- -- 20 -- --   03/14/23 0015 -- 82 18 -- --   03/13/23 2343 97.7 °F (36.5 °C) 84 -- (!) 123/54 100 %   03/13/23 2205 -- -- 18 -- --   03/13/23 2135 -- -- 20 -- --   03/13/23 1955 97.5 °F (36.4 °C) 89 18 (!) 123/52 100 %   03/13/23 1947 -- 94 16 -- 100 %   03/13/23 1935 -- 86 18 -- --   03/13/23 1912 -- -- 18 -- --   03/13/23 1827 97.5 °F (36.4 °C) 92 16 (!) 126/50 100 %   03/13/23 1805 -- 95 -- 126/60 96 %       Pain Assessment  Pain Level: 3 (03/14/23 0525)  Pain Location: Back, Flank  Patient's Stated Pain Goal: 0 - No pain    Ambulating  No    Shift report given to oncoming nurse at the bedside.     Vicenta Graham RN

## 2023-03-14 NOTE — OP NOTE
[facility]  OPERATIVE REPORT    Name:  Jeremias Lerma  MR#:  [id number]  :  1940  ACCOUNT #:  [de-identified]  DATE OF SERVICE:  2023    PREOPERATIVE DIAGNOSIS:  Right lung cancer. POSTOPERATIVE DIAGNOSIS:  Right lung cancer. PROCEDURES PERFORMED:  1. Right thoracoscopy with right lower lobectomy. 2.  Mediastinal lymphadenectomy. 3.  Intercostal nerve cryoablation. SURGEON:  Krystyna Maria MD    ASSISTANT:  Yeyo CASEY    ANESTHESIA:  general.    COMPLICATIONS:  none. SPECIMENS REMOVED:  as above. IMPLANTS:  CT x 2. ESTIMATED BLOOD LOSS:  Less than 50 mL. INDICATION:  This is a 72-year-old female presented with incidental finding of right lower lobe lung cancer. Biopsy confirmed her lung function was reasonable although long-term smoking history and we felt she is a candidate for surgical resections. She understood risks and benefits, agreed to proceed. FINDING:  The cancer is visible at visceral pleura with a piece of mucin material attached. No other evidence of pleural disease. PROCEDURE:  After informed consent obtained, the patient brought into the operating room. General anesthesia was administered and double-lumen tube placed per Anesthesia. The patient then positioned to the left decubitus position with right side up and her right chest was prepped and draped in routine fashion. The first trocar placed at the seventh intercostal space anterior axillary line, the second trocar was placed at the posterior axillary line about ninth intercostal space and then, I placed a mini utility thoracotomy incision at the fourth intercostal space mid axillary line and the findings as described above and then, there are some bulla disease in the upper lobe with attachment to the apex. This was carefully taken down. The bulla was not disrupted and then, I started mobilize the lower lobe.   The inferior pulmonary ligament was first dissected and during the dissections, #8 lymph node around the esophagus was collected, there was three of them and then, the multiple #9 lymph nodes around the inferior pulmonary vein were collected, there was about 8 of them and then continued dissection along the anterior posterior aspect of hilum and to release the lower lobe and the inferior pulmonary vein was first dissected and then, a vascular stapler was brought in and the vein was divided and then, the lower lobe was further retracted up and I was able to divide the major fissure. The lymph nodes around the hilum was collected and then dissection to identify the pulmonary trunk to the lower lobe. This was circumferentially isolated and then, a vascular stapler was used to divide the pulmonary trunk to the lower lobe and then further dissection recently revealed the lower lobe bronchus and this was dissected and then isolated circumferentially and the endovascular stapler with black load was used to divide the lower lobe bronchus. Before we fired the stapler, the middle and upper lobe was able to be inflated nicely and lastly, the major fissure was dissected and another load of stapler with blue cartridge was used to divide the major fissure and the entire lower lobe was freed. This was placed in an Endobag, retrieved from the mini thoracotomy incision. The specimen was identified well within the specimen. Then, further lymphadenectomy performed. There were a couple #7 lymph nodes in the subcarinal area were collected and sent separately and then surgical field inspected. Copious irrigation was used. Return fluid was clear and we did perform intercostal nerve cryoablation to cover the mini thoracotomy and chest tube site and then, two chest tubes were placed and both [place], one to the apex, one along the diaphragm, the minithoracotomy incision closed on the anterior fascia with running 2-0 Vicryl and skin closed with staples.   the patient tolerated the procedure well and transferred to recovery room in stable condition. All the instrument cout and lap count were correct. Estimated blood loss was less than 50 mL.       Dina Vasquez MD      [id number]  D:  03/13/2023 16:16  T:  03/14/2023 0:04  JOB #:  [id number]

## 2023-03-14 NOTE — CARE COORDINATION
RNCM met with patient in room 207 to discuss discharge planning. Patient lives alone in 1st floor apartment with level entry. Patient is independent at baseline and drives. Patient has no history of DME, HH, or rehab. Demographics and PCP verified. Patient uses [address] [facility] [employer]. 03/14/23 1451   Service Assessment   Patient Orientation Alert and Oriented   Cognition Alert   History Provided By Patient   Primary [address]   Patient's Healthcare Decision Maker is: Legal Next of Kin   PCP Verified by CM Yes   Last Visit to PCP Within last 3 months   Prior Functional Level Independent in ADLs/IADLs   Current Functional Level Independent in ADLs/IADLs   Can patient return to prior living arrangement Yes   Ability to make needs known: Good   Family able to assist with home care needs: Yes   Would you like for me to discuss the discharge plan with any other family members/significant others, and if so, who? No   Financial Resources Medicare   Social/Functional History   Lives With Alone   Type of [address] [facility] One level   Home Access Level entry   [address] Help From Family   ADL Assistance Independent   Active  Yes   Occupation Retired   Discharge Planning   Type of [address] Prior To Admission None   Potential Assistance Needed N/A   DME Ordered?  No   Potential Assistance Purchasing Medications No   Type of Home Care Services None   Patient expects to be discharged to: Apartment   One/Two Story Residence One story   History of falls? 0

## 2023-03-15 ENCOUNTER — APPOINTMENT (OUTPATIENT)
Dept: GENERAL RADIOLOGY | Age: 83
DRG: 164 | End: 2023-03-15
Attending: SURGERY
Payer: MEDICARE

## 2023-03-15 LAB
ANION GAP SERPL CALC-SCNC: 1 MMOL/L (ref 2–11)
BASOPHILS # BLD: 0.1 K/UL (ref 0–0.2)
BASOPHILS NFR BLD: 1 % (ref 0–2)
BUN SERPL-MCNC: 8 MG/DL (ref 8–23)
CALCIUM SERPL-MCNC: 8.2 MG/DL (ref 8.3–10.4)
CHLORIDE SERPL-SCNC: 112 MMOL/L (ref 101–110)
CO2 SERPL-SCNC: 27 MMOL/L (ref 21–32)
CREAT SERPL-MCNC: 0.5 MG/DL (ref 0.6–1)
DIFFERENTIAL METHOD BLD: ABNORMAL
EOSINOPHIL # BLD: 0.1 K/UL (ref 0–0.8)
EOSINOPHIL NFR BLD: 1 % (ref 0.5–7.8)
ERYTHROCYTE [DISTWIDTH] IN BLOOD BY AUTOMATED COUNT: 15.8 % (ref 11.9–14.6)
GLUCOSE SERPL-MCNC: 104 MG/DL (ref 65–100)
HCT VFR BLD AUTO: 33.6 % (ref 35.8–46.3)
HGB BLD-MCNC: 10.4 G/DL (ref 11.7–15.4)
IMM GRANULOCYTES # BLD AUTO: 0 K/UL (ref 0–0.5)
IMM GRANULOCYTES NFR BLD AUTO: 0 % (ref 0–5)
LYMPHOCYTES # BLD: 0.7 K/UL (ref 0.5–4.6)
LYMPHOCYTES NFR BLD: 7 % (ref 13–44)
MAGNESIUM SERPL-MCNC: 2.1 MG/DL (ref 1.8–2.4)
MCH RBC QN AUTO: 25.8 PG (ref 26.1–32.9)
MCHC RBC AUTO-ENTMCNC: 31 G/DL (ref 31.4–35)
MCV RBC AUTO: 83.4 FL (ref 82–102)
MONOCYTES # BLD: 0.9 K/UL (ref 0.1–1.3)
MONOCYTES NFR BLD: 9 % (ref 4–12)
NEUTS SEG # BLD: 8.2 K/UL (ref 1.7–8.2)
NEUTS SEG NFR BLD: 82 % (ref 43–78)
NRBC # BLD: 0 K/UL (ref 0–0.2)
PHOSPHATE SERPL-MCNC: 2.7 MG/DL (ref 2.3–3.7)
PLATELET # BLD AUTO: 327 K/UL (ref 150–450)
PMV BLD AUTO: 8.9 FL (ref 9.4–12.3)
POTASSIUM SERPL-SCNC: 3.8 MMOL/L (ref 3.5–5.1)
RBC # BLD AUTO: 4.03 M/UL (ref 4.05–5.2)
SODIUM SERPL-SCNC: 140 MMOL/L (ref 133–143)
WBC # BLD AUTO: 10 K/UL (ref 4.3–11.1)

## 2023-03-15 PROCEDURE — 6370000000 HC RX 637 (ALT 250 FOR IP): Performed by: SURGERY

## 2023-03-15 PROCEDURE — 97162 PT EVAL MOD COMPLEX 30 MIN: CPT

## 2023-03-15 PROCEDURE — 97530 THERAPEUTIC ACTIVITIES: CPT

## 2023-03-15 PROCEDURE — 6370000000 HC RX 637 (ALT 250 FOR IP): Performed by: PHYSICIAN ASSISTANT

## 2023-03-15 PROCEDURE — 2060000000 HC ICU INTERMEDIATE R&B

## 2023-03-15 PROCEDURE — 85025 COMPLETE CBC W/AUTO DIFF WBC: CPT

## 2023-03-15 PROCEDURE — 36415 COLL VENOUS BLD VENIPUNCTURE: CPT

## 2023-03-15 PROCEDURE — 2580000003 HC RX 258: Performed by: SURGERY

## 2023-03-15 PROCEDURE — 84100 ASSAY OF PHOSPHORUS: CPT

## 2023-03-15 PROCEDURE — 80048 BASIC METABOLIC PNL TOTAL CA: CPT

## 2023-03-15 PROCEDURE — 2500000003 HC RX 250 WO HCPCS: Performed by: PHYSICIAN ASSISTANT

## 2023-03-15 PROCEDURE — 71045 X-RAY EXAM CHEST 1 VIEW: CPT

## 2023-03-15 PROCEDURE — 6360000002 HC RX W HCPCS: Performed by: PHYSICIAN ASSISTANT

## 2023-03-15 PROCEDURE — 2580000003 HC RX 258: Performed by: PHYSICIAN ASSISTANT

## 2023-03-15 PROCEDURE — 83735 ASSAY OF MAGNESIUM: CPT

## 2023-03-15 RX ORDER — IPRATROPIUM BROMIDE AND ALBUTEROL SULFATE 2.5; .5 MG/3ML; MG/3ML
1 SOLUTION RESPIRATORY (INHALATION) EVERY 4 HOURS PRN
Status: DISCONTINUED | OUTPATIENT
Start: 2023-03-15 | End: 2023-03-17 | Stop reason: HOSPADM

## 2023-03-15 RX ADMIN — PRAVASTATIN SODIUM 40 MG: 20 TABLET ORAL at 16:01

## 2023-03-15 RX ADMIN — GUAIFENESIN 200 MG: 200 SOLUTION ORAL at 03:31

## 2023-03-15 RX ADMIN — GABAPENTIN 300 MG: 300 CAPSULE ORAL at 20:21

## 2023-03-15 RX ADMIN — HYDROMORPHONE HYDROCHLORIDE 0.5 MG: 1 INJECTION, SOLUTION INTRAMUSCULAR; INTRAVENOUS; SUBCUTANEOUS at 17:28

## 2023-03-15 RX ADMIN — SENNOSIDES AND DOCUSATE SODIUM 2 TABLET: 8.6; 5 TABLET ORAL at 08:08

## 2023-03-15 RX ADMIN — SENNOSIDES AND DOCUSATE SODIUM 2 TABLET: 8.6; 5 TABLET ORAL at 20:21

## 2023-03-15 RX ADMIN — GUAIFENESIN 200 MG: 200 SOLUTION ORAL at 17:02

## 2023-03-15 RX ADMIN — MAGNESIUM HYDROXIDE 45 ML: 400 SUSPENSION ORAL at 20:20

## 2023-03-15 RX ADMIN — LOSARTAN POTASSIUM 100 MG: 50 TABLET, FILM COATED ORAL at 08:08

## 2023-03-15 RX ADMIN — FAMOTIDINE 20 MG: 20 TABLET ORAL at 08:09

## 2023-03-15 RX ADMIN — GUAIFENESIN 200 MG: 200 SOLUTION ORAL at 10:35

## 2023-03-15 RX ADMIN — GABAPENTIN 300 MG: 300 CAPSULE ORAL at 14:08

## 2023-03-15 RX ADMIN — OXYCODONE 5 MG: 5 TABLET ORAL at 20:21

## 2023-03-15 RX ADMIN — OXYCODONE HYDROCHLORIDE 10 MG: 5 TABLET ORAL at 03:31

## 2023-03-15 RX ADMIN — AMLODIPINE BESYLATE 5 MG: 5 TABLET ORAL at 08:19

## 2023-03-15 RX ADMIN — SODIUM CHLORIDE, PRESERVATIVE FREE 10 ML: 5 INJECTION INTRAVENOUS at 20:22

## 2023-03-15 RX ADMIN — OXYCODONE HYDROCHLORIDE 10 MG: 5 TABLET ORAL at 12:52

## 2023-03-15 RX ADMIN — ENOXAPARIN SODIUM 30 MG: 100 INJECTION SUBCUTANEOUS at 08:09

## 2023-03-15 RX ADMIN — HYDROMORPHONE HYDROCHLORIDE 0.5 MG: 1 INJECTION, SOLUTION INTRAMUSCULAR; INTRAVENOUS; SUBCUTANEOUS at 08:05

## 2023-03-15 RX ADMIN — GABAPENTIN 300 MG: 300 CAPSULE ORAL at 08:08

## 2023-03-15 RX ADMIN — SODIUM CHLORIDE, SODIUM LACTATE, POTASSIUM CHLORIDE, AND CALCIUM CHLORIDE: 600; 310; 30; 20 INJECTION, SOLUTION INTRAVENOUS at 12:53

## 2023-03-15 ASSESSMENT — PAIN SCALES - GENERAL
PAINLEVEL_OUTOF10: 8
PAINLEVEL_OUTOF10: 8
PAINLEVEL_OUTOF10: 3
PAINLEVEL_OUTOF10: 7
PAINLEVEL_OUTOF10: 0
PAINLEVEL_OUTOF10: 7
PAINLEVEL_OUTOF10: 3
PAINLEVEL_OUTOF10: 6
PAINLEVEL_OUTOF10: 1

## 2023-03-15 ASSESSMENT — PAIN DESCRIPTION - LOCATION
LOCATION: BACK
LOCATION: BACK;FLANK
LOCATION: BACK
LOCATION: BACK

## 2023-03-15 ASSESSMENT — PAIN DESCRIPTION - ONSET
ONSET: ON-GOING

## 2023-03-15 ASSESSMENT — PAIN - FUNCTIONAL ASSESSMENT
PAIN_FUNCTIONAL_ASSESSMENT: ACTIVITIES ARE NOT PREVENTED

## 2023-03-15 ASSESSMENT — PAIN DESCRIPTION - ORIENTATION
ORIENTATION: RIGHT

## 2023-03-15 ASSESSMENT — PAIN DESCRIPTION - FREQUENCY
FREQUENCY: CONTINUOUS
FREQUENCY: INTERMITTENT
FREQUENCY: INTERMITTENT

## 2023-03-15 ASSESSMENT — PAIN DESCRIPTION - DESCRIPTORS
DESCRIPTORS: SHARP
DESCRIPTORS: SHARP
DESCRIPTORS: PRESSURE;SORE;SHARP
DESCRIPTORS: SHARP

## 2023-03-15 ASSESSMENT — PAIN DESCRIPTION - PAIN TYPE
TYPE: SURGICAL PAIN

## 2023-03-15 NOTE — PLAN OF CARE
Problem: Pain  Goal: Verbalizes/displays adequate comfort level or baseline comfort level  Outcome: Progressing     Problem: Discharge Planning  Goal: Discharge to home or other facility with appropriate resources  Outcome: Progressing     Problem: Safety - Adult  Goal: Free from fall injury  Outcome: Progressing     Problem: Respiratory - Adult  Goal: Achieves optimal ventilation and oxygenation  Outcome: Progressing

## 2023-03-15 NOTE — PROGRESS NOTES
Physician Progress Note      PATIENT:               Salvador Hunt  CSN #:                  [id number]  :                       1940  ADMIT DATE:       3/13/2023 5:21 AM  DISCH DATE:  RESPONDING  PROVIDER #:        Patrick Melgar MD          QUERY TEXT:    Pt admitted for lung cancer procedure. Noted documentation of acute on   chronic pancreatitis in H&P. If possible, please document in progress notes   and discharge summary the clinical indicators to support this diagnosis on   current admission or clarify current status of pancreatitis. The medical record reflects the following:  Risk Factors: smoking at least 55 years 1-2ppd  Clinical Indicators: H&P: She would be at increased risk due to her being a   current smoker and poor nutrition. She also has been dealing with her acute on   chronic pancreatitis. She does wish to have surgery though if possible. Treatment: regular diet, serial labs  Options provided:  -- Acute on chronic pancreatitis currently being treated/evaluated as   evidenced by, Please document supportive evidence. -- No clinical evidence of acute pancreatitis currently, chronic pancreatitis   only  -- Acute on chronic pancreatitis is PMH only  -- Other - I will add my own diagnosis  -- Disagree - Not applicable / Not valid  -- Disagree - Clinically unable to determine / Unknown  -- Refer to Clinical Documentation Reviewer    PROVIDER RESPONSE TEXT:    This patient does not have clinical evidence of acute pancreatitis currently.    Patient has chronic pancreatitis only    Query created by: Rob Willis on 3/14/2023 11:32 AM      Electronically signed by:  Patrick Melgar MD 3/14/2023 8:45 PM

## 2023-03-15 NOTE — PROGRESS NOTES
Physical Therapy Note:    Attempted to see patient 2x this AM for physical therapy evaluation session. Patient adamantly refusing with multiple excuses. Will follow and re-attempt as schedule permits/patient available.  Thank you,    A Alisa Zayas, PT     Rehab Caseload Tracker

## 2023-03-15 NOTE — PROGRESS NOTES
ACUTE PHYSICAL THERAPY GOALS:   (Developed with and agreed upon by patient and/or caregiver. )     LTG:  (1.)Ms. Vega Shaver  will move from supine to sit and sit to supine via logrolling  to side in flat bed without siderails with independence within 7 day(s). (2.)Ms. Vega Shaver  will transfer from bed to chair and chair to bed with modified independence using the least restrictive/no device within 7 day(s). (3.)Ms. Vega Shaver  will ambulate with supervision/set-up for 150+ feet with the least restrictive/no device within 7 day(s). (4.)Ms. Vega Shaver  will ambulate up/down 3 steps with bilateral railing with CGA with no device within  7  day(s). PHYSICAL THERAPY Initial Assessment and Daily Note  (Link to Caseload Tracking: PT Visit Days : 1  Acknowledge Orders  Time In/Out  PT Charge Capture  Rehab Caseload Tracker    Izabella Huerta is a [de-identified] y.o. female   PRIMARY DIAGNOSIS: Bronchogenic cancer of right lung ([facility] [address]  Squamous cell carcinoma of lung, stage II, right (HCC) [C34.91]  Right lower lobe lung mass [R91.8]  Procedure(s) (LRB):  RIGHT THORACOSCOPY, RIGHT LOWER LOBECTOMY, MEDIASTINAL LYMPHADENECTOMY (Right)  2 Days Post-Op  Reason for Referral: Other abnormalities of gait and mobility (R26.89)  Inpatient: Payor: Nina Langley / Plan: Samy Nowak PPO / Product Type: Medicare /     ASSESSMENT:     REHAB RECOMMENDATIONS:   Recommendation to date pending progress:  Setting:  Home Health Therapy    Equipment:    Rolling Walker  To Be Determined     ASSESSMENT:  Ms. Vega Shaver asking to use the bathroom, so willing to get out of bed. She stood with min A and cueing, was able to ambulate 4' with RW. Patient reported feeling lightheaded, so obtain BSC. Patient urinated, then ambulated 12' more to recliner. This time had no complaints of feeling lightheaded. Patient has declined in functional mobility.   Ms. Vega Shaver would benefit from skilled physical therapy while in acute care (medically necessary) to address her deficits and maximize her function. She reports that she will go home with her daughter. .     [address] [facility] [address] [address] [facility] 6 Clicks Basic Mobility Inpatient Short Form  AM-PAC Basic Mobility - Inpatient   How much help is needed turning from your back to your side while in a flat bed without using bedrails?: A Little  How much help is needed moving from lying on your back to sitting on the side of a flat bed without using bedrails?: A Little  How much help is needed moving to and from a bed to a chair?: A Little  How much help is needed standing up from a chair using your arms?: A Little  How much help is needed walking in hospital room?: A Little  How much help is needed climbing 3-5 steps with a railing?: A Little  AM-[facility] Inpatient Mobility Raw Score : 18  AM-PAC Inpatient T-Scale Score : 43.63  Mobility Inpatient CMS 0-100% Score: 46.58  Mobility Inpatient CMS G-Code Modifier : CK    SUBJECTIVE:   Ms. Sherryle Greenland states, \"I'm only going to do what I feel like doing! \"     Social/Functional Additional Comments: Lives alone and is independent at baseline. Plans to d/c home with daughter who lives in a tri level house with a walk in shower. No AD, no DME.     OBJECTIVE:     PAIN: Leim Paris / O2: PRECAUTION / Josie Marcelo / DRAINS:   Pre Treatment:   Pain Assessment: Beasley-Baker FACES  Pain Level: 3      Post Treatment: 3 Vitals        Oxygen      Chest Tube and IV    RESTRICTIONS/PRECAUTIONS:                    GROSS EVALUATION: Intact Impaired (Comments):   AROM [x]     PROM []    Strength [x]     Balance [] Sitting - Static: Good  Standing - Static: Poor  Standing - Dynamic: Poor   Posture [] Forward Head  Rounded Shoulders   Sensation []     Coordination [x]      Tone [x]     Edema []    Activity Tolerance [] Patient limited by pain    []      COGNITION/  PERCEPTION: Intact Impaired (Comments):   Orientation []     Vision []     Hearing [x]     Cognition  []       MOBILITY: I Mod I S SBA CGA Min Mod Max Total  NT x2 Comments:   Bed Mobility    Rolling [] [] [] [] [] [] [] [] [] [] []    Supine to Sit [] [] [] [] [] [] [] [] [] [] []    Scooting [] [] [] [] [] [] [] [] [] [] []    Sit to Supine [] [] [] [] [] [] [] [] [] [] []    Transfers    Sit to Stand [] [] [] [] [] [x] [] [] [] [] []    Bed to Chair [] [] [] [] [] [x] [] [] [] [] []    Stand to Sit [] [] [] [] [] [x] [] [] [] [] []     [] [] [] [] [] [] [] [] [] [] []    I=Independent, Mod I=Modified Independent, S=Supervision, SBA=Standby Assistance, CGA=Contact Guard Assistance,   Min=Minimal Assistance, Mod=Moderate Assistance, Max=Maximal Assistance, Total=Total Assistance, NT=Not Tested    GAIT: I Mod I S SBA CGA Min Mod Max Total  NT x2 Comments:   Level of Assistance [] [] [] [] [x] [x] [] [] [] [] []    Distance 4,12 feet    DME Rolling Walker    Gait Quality Decreased lamar , Decreased step clearance, Decreased step length, and Trunk flexion    Weightbearing Status      Stairs      I=Independent, Mod I=Modified Independent, S=Supervision, SBA=Standby Assistance, CGA=Contact Guard Assistance,   Min=Minimal Assistance, Mod=Moderate Assistance, Max=Maximal Assistance, Total=Total Assistance, NT=Not Tested    PLAN:   FREQUENCY AND DURATION: 3 times/week for duration of hospital stay or until stated goals are met, whichever comes first.    THERAPY PROGNOSIS: Good    PROBLEM LIST:   (Skilled intervention is medically necessary to address:)  Decreased ADL/Functional Activities  Decreased Activity Tolerance  Decreased Balance  Decreased Gait Ability  Decreased Strength  Decreased Transfer Abilities  Increased Pain INTERVENTIONS PLANNED:   (Benefits and precautions of physical therapy have been discussed with the patient.)  Self Care Training  Therapeutic Activity  Therapeutic Exercise/HEP  Gait Training  Education       TREATMENT:   EVALUATION: MODERATE COMPLEXITY: (Untimed Charge)    TREATMENT:   Therapeutic Activity (23 Minutes):  Therapeutic activity included Transfer Training, Ambulation on level ground, Standing balance, and education regarding importance of increasing mobility and being out of bed to improve functional Activity tolerance, Balance, Mobility, and Strength.     TREATMENT GRID:  N/A    AFTER TREATMENT PRECAUTIONS: Bed/Chair Locked, Call light within reach, Needs within reach, and RN notified    INTERDISCIPLINARY COLLABORATION:  RN/ PCT and PT/ PTA    EDUCATION: Education Given To: Patient  Education Provided: Role of Therapy;Plan of Care;Transfer Training  Education Outcome: Continued education needed    TIME IN/OUT:  Time In: 1135  Time Out: 1200  Minutes: [address], PT

## 2023-03-15 NOTE — PROGRESS NOTES
END OF SHIFT NOTE:    INTAKE/OUTPUT  03/14 0701 - 03/15 0700  In: 720 [P.O.:720]  Out: 6762 [Urine:1350]  Voiding: Yes  Catheter: No  Drain:   Chest Tube Right 1 (Active)   Chest Tube Airleak Yes 03/15/23 1409   Status Gravity 03/15/23 1409   Suction To water seal 03/15/23 1409   Y Connector Used No 03/15/23 1409   Drainage Description Bright red 03/15/23 1409   Dressing Status New dressing applied 03/15/23 1409   Chest Tube Dressing Petroleum Jelly 03/15/23 1409   Site Assessment Clean, dry & intact 03/15/23 1409   Surrounding Skin Clean, dry & intact 03/15/23 1409   Output (ml) 27 ml 03/15/23 1409               Flatus: Patient does not have flatus present. Stool: 0 occurrences. Characteristics:           Stool Assessment  Last BM (including prior to admit): 03/08/23    Emesis: 0 occurrences. Characteristics:        VITAL SIGNS  Patient Vitals for the past 12 hrs:   Temp Pulse Resp BP SpO2   03/15/23 1508 98.6 °F (37 °C) 80 18 (!) 121/56 94 %   03/15/23 1150 98.1 °F (36.7 °C) 91 18 134/74 97 %   03/15/23 0716 98.2 °F (36.8 °C) 84 18 (!) 145/73 92 %       Pain Assessment  Pain Level: 7 (03/15/23 1728)  Pain Location: Back  Patient's Stated Pain Goal: 2    Ambulating  Yes    Shift report given to oncoming nurse at the bedside.     Jai Denis RN

## 2023-03-15 NOTE — CARE COORDINATION
Chart reviewed by [facility]. Patient admitted 3/13 with Bronchogenic cancer of right lung. Patient s/p thoracoscopy, right lower lobectomy, mediastinal lymphadenectomy on 3/13. Posterior chest tube removed, anterior chest tube to water seal. Patient is currently on room air. OT recommending [facility] at discharge. Patient refusing PT.  CM following

## 2023-03-15 NOTE — PROGRESS NOTES
Admit Date: 3/13/2023    POD 2 Days Post-Op    Procedure:  Procedure(s):  RIGHT THORACOSCOPY, RIGHT LOWER LOBECTOMY, MEDIASTINAL LYMPHADENECTOMY    Subjective: The patient is laying in bed. Continues to complain of incisional pain. She is tolerating a diet. She denies any nausea or emesis. She denies shortness of breath. Objective:       Vitals:    23 2324 03/15/23 0324 03/15/23 0716   BP: (!) 123/46 (!) 126/57 (!) 173/63 (!) 145/73   Pulse: 86 88 88 84   Resp: 16 18 18 18   Temp: 98.4 °F (36.9 °C) 97.9 °F (36.6 °C) 97.7 °F (36.5 °C) 98.2 °F (36.8 °C)   TempSrc: Oral  Oral Oral   SpO2: 95% 97% 93% 92%   Weight:       Height:           Temp (24hrs), Av.9 °F (36.6 °C), Min:97.7 °F (36.5 °C), Max:98.4 °F (36.9 °C)  . I&O reviewed as documented. Constitutional: Alert, oriented, cooperative patient in no acute distress; appears stated age    CV: RRR. Normal perfusion  Resp: No JVD. Breathing is  non-labored; no audible wheezing. Chest tube x2 intact with no evidence of air leak. SS drainage.    GI: soft and non-distended       Labs:   Recent Results (from the past 24 hour(s))   CBC with Auto Differential    Collection Time: 03/15/23  6:04 AM   Result Value Ref Range    WBC 10.0 4.3 - 11.1 K/uL    RBC 4.03 (L) 4.05 - 5.2 M/uL    Hemoglobin 10.4 (L) 11.7 - 15.4 g/dL    Hematocrit 33.6 (L) 35.8 - 46.3 %    MCV 83.4 82 - 102 FL    MCH 25.8 (L) 26.1 - 32.9 PG    MCHC 31.0 (L) 31.4 - 35.0 g/dL    RDW 15.8 (H) 11.9 - 14.6 %    Platelets 869 664 - 409 K/uL    MPV 8.9 (L) 9.4 - 12.3 FL    nRBC 0.00 0.0 - 0.2 K/uL    Differential Type AUTOMATED      Seg Neutrophils 82 (H) 43 - 78 %    Lymphocytes 7 (L) 13 - 44 %    Monocytes 9 4.0 - 12.0 %    Eosinophils % 1 0.5 - 7.8 %    Basophils 1 0.0 - 2.0 %    Immature Granulocytes 0 0.0 - 5.0 %    Segs Absolute 8.2 1.7 - 8.2 K/UL    Absolute Lymph # 0.7 0.5 - 4.6 K/UL    Absolute Mono # 0.9 0.1 - 1.3 K/UL    Absolute Eos # 0.1 0.0 - 0.8 K/UL Basophils Absolute 0.1 0.0 - 0.2 K/UL    Absolute Immature Granulocyte 0.0 0.0 - 0.5 K/UL   Basic Metabolic Panel    Collection Time: 03/15/23  6:04 AM   Result Value Ref Range    Sodium 140 133 - 143 mmol/L    Potassium 3.8 3.5 - 5.1 mmol/L    Chloride 112 (H) 101 - 110 mmol/L    CO2 27 21 - 32 mmol/L    Anion Gap 1 (L) 2 - 11 mmol/L    Glucose 104 (H) 65 - 100 mg/dL    BUN 8 8 - 23 MG/DL    Creatinine 0.50 (L) 0.6 - 1.0 MG/DL    Est, Glom Filt Rate >60 >60 ml/min/1.73m2    Calcium 8.2 (L) 8.3 - 10.4 MG/DL   Phosphorus    Collection Time: 03/15/23  6:04 AM   Result Value Ref Range    Phosphorus 2.7 2.3 - 3.7 MG/DL   Magnesium    Collection Time: 03/15/23  6:04 AM   Result Value Ref Range    Magnesium 2.1 1.8 - 2.4 mg/dL     Xray Result (most recent):  XR CHEST PORTABLE 03/15/2023    Narrative  Portable chest xray    COMPARISON: Yesterday    CLINICAL HISTORY: Post VATS. FINDINGS:    Stable right-sided chest tubes. There is no significant pneumothorax. Mild right  basilar opacity, likely atelectasis. No pulmonary edema. Cardiomediastinal  silhouette and the surrounding bones are stable. Impression  1. Stable right chest tube. No significant pneumothorax. 2. Mild right lung base atelectasis. 2. No significant change compared to prior exam.        Assessment:     Principal Problem:    Bronchogenic cancer of right lung (HCC)  Active Problems:    Right lower lobe lung mass  Resolved Problems:    * No resolved hospital problems.  *        Plan/Recommendations/Medical Decision Making:     Posterior chest tube removed  Keep anterior tube to water seal  Regular diet  Encouraged continued increased level of activity, PT/OT following  IS  Daily CXR  DVT prophylaxis, GI prophylaxis      Janiya Montoya PA-C

## 2023-03-16 ENCOUNTER — APPOINTMENT (OUTPATIENT)
Dept: GENERAL RADIOLOGY | Age: 83
DRG: 164 | End: 2023-03-16
Attending: SURGERY
Payer: MEDICARE

## 2023-03-16 LAB
ANION GAP SERPL CALC-SCNC: 5 MMOL/L (ref 2–11)
BASOPHILS # BLD: 0.1 K/UL (ref 0–0.2)
BASOPHILS NFR BLD: 1 % (ref 0–2)
BUN SERPL-MCNC: 11 MG/DL (ref 8–23)
CALCIUM SERPL-MCNC: 8.4 MG/DL (ref 8.3–10.4)
CHLORIDE SERPL-SCNC: 108 MMOL/L (ref 101–110)
CO2 SERPL-SCNC: 28 MMOL/L (ref 21–32)
CREAT SERPL-MCNC: 0.6 MG/DL (ref 0.6–1)
DIFFERENTIAL METHOD BLD: ABNORMAL
EOSINOPHIL # BLD: 0.2 K/UL (ref 0–0.8)
EOSINOPHIL NFR BLD: 2 % (ref 0.5–7.8)
ERYTHROCYTE [DISTWIDTH] IN BLOOD BY AUTOMATED COUNT: 15.9 % (ref 11.9–14.6)
GLUCOSE SERPL-MCNC: 95 MG/DL (ref 65–100)
HCT VFR BLD AUTO: 34.4 % (ref 35.8–46.3)
HGB BLD-MCNC: 10.9 G/DL (ref 11.7–15.4)
IMM GRANULOCYTES # BLD AUTO: 0.1 K/UL (ref 0–0.5)
IMM GRANULOCYTES NFR BLD AUTO: 1 % (ref 0–5)
LYMPHOCYTES # BLD: 1.1 K/UL (ref 0.5–4.6)
LYMPHOCYTES NFR BLD: 9 % (ref 13–44)
MAGNESIUM SERPL-MCNC: 2 MG/DL (ref 1.8–2.4)
MCH RBC QN AUTO: 26.1 PG (ref 26.1–32.9)
MCHC RBC AUTO-ENTMCNC: 31.7 G/DL (ref 31.4–35)
MCV RBC AUTO: 82.3 FL (ref 82–102)
MONOCYTES # BLD: 0.9 K/UL (ref 0.1–1.3)
MONOCYTES NFR BLD: 7 % (ref 4–12)
NEUTS SEG # BLD: 10.1 K/UL (ref 1.7–8.2)
NEUTS SEG NFR BLD: 80 % (ref 43–78)
NRBC # BLD: 0 K/UL (ref 0–0.2)
PHOSPHATE SERPL-MCNC: 2.6 MG/DL (ref 2.3–3.7)
PLATELET # BLD AUTO: 354 K/UL (ref 150–450)
PMV BLD AUTO: 9.3 FL (ref 9.4–12.3)
POTASSIUM SERPL-SCNC: 3.5 MMOL/L (ref 3.5–5.1)
RBC # BLD AUTO: 4.18 M/UL (ref 4.05–5.2)
SODIUM SERPL-SCNC: 141 MMOL/L (ref 133–143)
WBC # BLD AUTO: 12.5 K/UL (ref 4.3–11.1)

## 2023-03-16 PROCEDURE — 84100 ASSAY OF PHOSPHORUS: CPT

## 2023-03-16 PROCEDURE — 6360000002 HC RX W HCPCS: Performed by: PHYSICIAN ASSISTANT

## 2023-03-16 PROCEDURE — 6370000000 HC RX 637 (ALT 250 FOR IP): Performed by: PHYSICIAN ASSISTANT

## 2023-03-16 PROCEDURE — 80048 BASIC METABOLIC PNL TOTAL CA: CPT

## 2023-03-16 PROCEDURE — 2060000000 HC ICU INTERMEDIATE R&B

## 2023-03-16 PROCEDURE — 71045 X-RAY EXAM CHEST 1 VIEW: CPT

## 2023-03-16 PROCEDURE — 97530 THERAPEUTIC ACTIVITIES: CPT

## 2023-03-16 PROCEDURE — 2580000003 HC RX 258: Performed by: PHYSICIAN ASSISTANT

## 2023-03-16 PROCEDURE — 83735 ASSAY OF MAGNESIUM: CPT

## 2023-03-16 PROCEDURE — 2500000003 HC RX 250 WO HCPCS: Performed by: PHYSICIAN ASSISTANT

## 2023-03-16 PROCEDURE — 97110 THERAPEUTIC EXERCISES: CPT

## 2023-03-16 PROCEDURE — 6370000000 HC RX 637 (ALT 250 FOR IP): Performed by: SURGERY

## 2023-03-16 PROCEDURE — 36415 COLL VENOUS BLD VENIPUNCTURE: CPT

## 2023-03-16 PROCEDURE — 85025 COMPLETE CBC W/AUTO DIFF WBC: CPT

## 2023-03-16 RX ORDER — BISACODYL 10 MG
10 SUPPOSITORY, RECTAL RECTAL ONCE
Status: COMPLETED | OUTPATIENT
Start: 2023-03-16 | End: 2023-03-16

## 2023-03-16 RX ADMIN — GABAPENTIN 300 MG: 300 CAPSULE ORAL at 13:53

## 2023-03-16 RX ADMIN — GUAIFENESIN 200 MG: 200 SOLUTION ORAL at 16:55

## 2023-03-16 RX ADMIN — PRAVASTATIN SODIUM 40 MG: 20 TABLET ORAL at 16:55

## 2023-03-16 RX ADMIN — BISACODYL 10 MG: 10 SUPPOSITORY RECTAL at 08:48

## 2023-03-16 RX ADMIN — AMLODIPINE BESYLATE 5 MG: 5 TABLET ORAL at 08:48

## 2023-03-16 RX ADMIN — GUAIFENESIN 200 MG: 200 SOLUTION ORAL at 10:20

## 2023-03-16 RX ADMIN — OXYCODONE 5 MG: 5 TABLET ORAL at 10:23

## 2023-03-16 RX ADMIN — FAMOTIDINE 20 MG: 20 TABLET ORAL at 08:47

## 2023-03-16 RX ADMIN — LOSARTAN POTASSIUM 100 MG: 50 TABLET, FILM COATED ORAL at 08:48

## 2023-03-16 RX ADMIN — ENOXAPARIN SODIUM 30 MG: 100 INJECTION SUBCUTANEOUS at 08:48

## 2023-03-16 RX ADMIN — SENNOSIDES AND DOCUSATE SODIUM 2 TABLET: 8.6; 5 TABLET ORAL at 08:48

## 2023-03-16 RX ADMIN — SODIUM CHLORIDE, PRESERVATIVE FREE 10 ML: 5 INJECTION INTRAVENOUS at 08:48

## 2023-03-16 RX ADMIN — GABAPENTIN 300 MG: 300 CAPSULE ORAL at 20:38

## 2023-03-16 RX ADMIN — SODIUM CHLORIDE, PRESERVATIVE FREE 10 ML: 5 INJECTION INTRAVENOUS at 20:39

## 2023-03-16 RX ADMIN — GABAPENTIN 300 MG: 300 CAPSULE ORAL at 08:48

## 2023-03-16 RX ADMIN — MAGNESIUM HYDROXIDE 45 ML: 2400 SUSPENSION ORAL at 08:48

## 2023-03-16 ASSESSMENT — PAIN DESCRIPTION - DESCRIPTORS: DESCRIPTORS: CRAMPING

## 2023-03-16 ASSESSMENT — PAIN DESCRIPTION - ORIENTATION
ORIENTATION: RIGHT
ORIENTATION: ANTERIOR;LOWER

## 2023-03-16 ASSESSMENT — PAIN DESCRIPTION - LOCATION
LOCATION: BACK;FLANK
LOCATION: ABDOMEN

## 2023-03-16 ASSESSMENT — PAIN SCALES - GENERAL
PAINLEVEL_OUTOF10: 5
PAINLEVEL_OUTOF10: 3

## 2023-03-16 NOTE — PROGRESS NOTES
END OF SHIFT NOTE:    INTAKE/OUTPUT  03/15 0701 - 03/16 0700  In: 1623.9 [P.O.:600; I.V.:1023.9]  Out: 1452 [Urine:1300]  Voiding: Yes  Catheter: No  Drain:              Flatus: Patient does have flatus present. Stool: 3 occurrences. Characteristics:           Stool Assessment  Last BM (including prior to admit): 03/08/23    Emesis: 0 occurrences. Characteristics:        VITAL SIGNS  Patient Vitals for the past 12 hrs:   Temp Pulse Resp BP SpO2   03/16/23 1526 99.7 °F (37.6 °C) 94 18 (!) 113/51 93 %   03/16/23 1115 99.3 °F (37.4 °C) 92 18 (!) 130/54 92 %   03/16/23 0708 99 °F (37.2 °C) 94 18 (!) 132/56 92 %       Pain Assessment  Pain Level: 5 (03/16/23 1023)  Pain Location: Abdomen  Patient's Stated Pain Goal: 0 - No pain    Ambulating  Yes    Shift report given to oncoming nurse at the bedside.     Adelso Carter RN

## 2023-03-16 NOTE — PROGRESS NOTES
END OF SHIFT NOTE:    INTAKE/OUTPUT  03/15 0701 - 03/16 0700  In: 1623.9 [P.O.:600; I.V.:1023.9]  Out: 1452 [Urine:1300]  Voiding: Yes  Catheter: No  Drain:     Chest tube marked @ 188mL     Chest Tube Right 1 (Active)   Chest Tube Airleak No 03/15/23 1930   Status Gravity 03/15/23 1930   Suction To water seal 03/15/23 1930   Y Connector Used Yes 03/15/23 1930   Drainage Description Bright red 03/15/23 1930   Dressing Status Clean, dry & intact 03/15/23 1930   Chest Tube Dressing Petroleum Jelly 03/15/23 1930   Site Assessment Clean, dry & intact 03/15/23 1930   Surrounding Skin Clean, dry & intact 03/15/23 1930   Output (ml) 58 ml 03/16/23 0555               Flatus: Patient does not have flatus present. Stool: 0 occurrences. Characteristics:           Stool Assessment  Last BM (including prior to admit): 03/08/23 (per pt)    Emesis: 0 occurrences. Characteristics:        VITAL SIGNS  Patient Vitals for the past 12 hrs:   Temp Pulse Resp BP SpO2   03/16/23 0404 98.6 °F (37 °C) 95 18 130/62 94 %   03/15/23 2320 99 °F (37.2 °C) 88 18 132/64 92 %   03/15/23 2051 -- -- 16 -- --   03/15/23 1940 97.9 °F (36.6 °C) 86 18 (!) 143/52 95 %   03/15/23 1930 -- 86 20 -- --       Pain Assessment  Pain Level: 6 (03/15/23 2021)  Pain Location: Back, Flank  Patient's Stated Pain Goal: 0 - No pain    Ambulating  Yes    Shift report given to oncoming nurse at the bedside.     Ivy Dodge RN

## 2023-03-16 NOTE — PROGRESS NOTES
ACUTE PHYSICAL THERAPY GOALS:   (Developed with and agreed upon by patient and/or caregiver. )      LTG:  (1.)Ms. Chad March  will move from supine to sit and sit to supine via logrolling  to side in flat bed without siderails with independence within 7 day(s). (2.)Ms. Chad March  will transfer from bed to chair and chair to bed with modified independence using the least restrictive/no device within 7 day(s). (3.)Ms. Chad March  will ambulate with supervision/set-up for 150+ feet with the least restrictive/no device within 7 day(s). (4.)Ms. Chad March  will ambulate up/down 3 steps with bilateral railing with CGA with no device within  7  day(s). PHYSICAL THERAPY: Daily Note PM   (Link to Caseload Tracking: PT Visit Days : 2  Time In/Out PT Charge Capture  Rehab Caseload Tracker  Orders    Cely Weber is a [de-identified] y.o. female   PRIMARY DIAGNOSIS: Bronchogenic cancer of right lung ([facility] [address]  Squamous cell carcinoma of lung, stage II, right (HCC) [C34.91]  Right lower lobe lung mass [R91.8]  Procedure(s) (LRB):  RIGHT THORACOSCOPY, RIGHT LOWER LOBECTOMY, MEDIASTINAL LYMPHADENECTOMY (Right)  3 Days Post-Op  Inpatient: Payor: Yaya Carreon / Plan: Tani Johnson PPO / Product Type: Medicare /     ASSESSMENT:     REHAB RECOMMENDATIONS:   Recommendation to date pending progress:  Setting:  Home Health Therapy    Equipment:    Rolling Walker     ASSESSMENT:  Ms. Chad March was supine in bed on arrival. She is agreeable to PT and plans to go home soon. She becomes dizzy with transfers. She does well with bed mobility but unsteady with gait. She felt better with RW and was able to ambulate about 120' with one standing rest breaks. BLE exercises performed while sitting edge of bed. Pt left supine with needs in reach. SUBJECTIVE:   Ms. Chad March states, \"I'm a little dizzy headed\"     Social/Functional Additional Comments: Lives alone and is independent at baseline.  Plans to d/c home with daughter who lives in a tri level house with a walk in shower. No AD, no DME. OBJECTIVE:     PAIN: Catha Elwood / O2: PRECAUTION / Primus Maben / Daniella Alfredo:   Pre Treatment:  not rated         Post Treatment: not rated Vitals        Oxygen    None    RESTRICTIONS/PRECAUTIONS:        MOBILITY: I Mod I S SBA CGA Min Mod Max Total  NT x2 Comments:   Bed Mobility    Rolling [] [] [] [] [] [] [] [] [] [] []    Supine to Sit [] [] [] [x] [] [] [] [] [] [] []    Scooting [] [] [] [x] [] [] [] [] [] [] []    Sit to Supine [] [] [] [x] [] [] [] [] [] [] []    Transfers    Sit to Stand [] [] [] [] [] [x] [] [] [] [] []    Bed to Chair [] [] [] [] [] [x] [] [] [] [] []    Stand to Sit [] [] [] [] [] [x] [] [] [] [] []     [] [] [] [] [] [] [] [] [] [] []    I=Independent, Mod I=Modified Independent, S=Supervision, SBA=Standby Assistance, CGA=Contact Guard Assistance,   Min=Minimal Assistance, Mod=Moderate Assistance, Max=Maximal Assistance, Total=Total Assistance, NT=Not Tested    BALANCE: Good Fair+ Fair Fair- Poor NT Comments   Sitting Static [x] [] [] [] [] []    Sitting Dynamic [x] [] [] [] [] []              Standing Static [] [x] [] [] [] []    Standing Dynamic [] [x] [] [] [] []      GAIT: I Mod I S SBA CGA Min Mod Max Total  NT x2 Comments:   Level of Assistance [] [] [] [] [] [x] [] [] [] [] []    Distance 120 feet    DME Rolling Walker    Gait Quality Decreased step clearance, Decreased step length, and Decreased stance    Weightbearing Status      Stairs      I=Independent, Mod I=Modified Independent, S=Supervision, SBA=Standby Assistance, CGA=Contact Guard Assistance,   Min=Minimal Assistance, Mod=Moderate Assistance, Max=Maximal Assistance, Total=Total Assistance, NT=Not Tested    PLAN:   FREQUENCY AND DURATION: 3 times/week for duration of hospital stay or until stated goals are met, whichever comes first.    TREATMENT:   TREATMENT:   Therapeutic Activity (15 Minutes):  Therapeutic activity included Supine to Sit, Sit to Supine, Scooting, Ambulation on level ground, Sitting balance , and Standing balance to improve functional Activity tolerance, Balance, Mobility, and Strength. Therapeutic Exercise (10 Minutes): Therapeutic exercises noted below to improve functional activity tolerance, strength, and mobility.      TREATMENT GRID:   Date:  3/16/23 Date:   Date:     Activity/Exercise Parameters Parameters Parameters   Heel/toe taps 10 B     LAQs 10 B     marching 10 B     Hip ABD/ADD 10 B                         AFTER TREATMENT PRECAUTIONS: Bed, Bed/Chair Locked, Call light within reach, and Needs within reach    INTERDISCIPLINARY COLLABORATION:  RN/ PCT    EDUCATION:      TIME IN/OUT:  Time In: 1410  Time Out: 1435  Minutes: [address] PTA

## 2023-03-16 NOTE — PROGRESS NOTES
Admit Date: 3/13/2023    POD 3 Days Post-Op    Procedure:  Procedure(s):  RIGHT THORACOSCOPY, RIGHT LOWER LOBECTOMY, MEDIASTINAL LYMPHADENECTOMY    Subjective: The patient is laying in bed. Continues to complain of incisional pain. She is tolerating a diet. She denies any nausea or emesis. She denies shortness of breath. Objective:       Vitals:    03/15/23 2051 03/15/23 2320 23 0404 23 0708   BP:  132/64 130/62 (!) 132/56   Pulse:  88 95 94   Resp:     Temp:  99 °F (37.2 °C) 98.6 °F (37 °C) 99 °F (37.2 °C)   TempSrc:       SpO2:  92% 94% 92%   Weight:       Height:           Temp (24hrs), Av.5 °F (36.9 °C), Min:97.9 °F (36.6 °C), Max:99 °F (37.2 °C)  . I&O reviewed as documented. Constitutional: Alert, oriented, cooperative patient in no acute distress; appears stated age    CV: RRR. Normal perfusion  Resp: No JVD. Breathing is  non-labored; no audible wheezing. Chest tube intact with no evidence of air leak. SS drainage.  Post chest tube dressing intact  GI: soft and non-distended       Labs:   Recent Results (from the past 24 hour(s))   CBC with Auto Differential    Collection Time: 23  5:06 AM   Result Value Ref Range    WBC 12.5 (H) 4.3 - 11.1 K/uL    RBC 4.18 4.05 - 5.2 M/uL    Hemoglobin 10.9 (L) 11.7 - 15.4 g/dL    Hematocrit 34.4 (L) 35.8 - 46.3 %    MCV 82.3 82 - 102 FL    MCH 26.1 26.1 - 32.9 PG    MCHC 31.7 31.4 - 35.0 g/dL    RDW 15.9 (H) 11.9 - 14.6 %    Platelets 611 467 - 079 K/uL    MPV 9.3 (L) 9.4 - 12.3 FL    nRBC 0.00 0.0 - 0.2 K/uL    Differential Type AUTOMATED      Seg Neutrophils 80 (H) 43 - 78 %    Lymphocytes 9 (L) 13 - 44 %    Monocytes 7 4.0 - 12.0 %    Eosinophils % 2 0.5 - 7.8 %    Basophils 1 0.0 - 2.0 %    Immature Granulocytes 1 0.0 - 5.0 %    Segs Absolute 10.1 (H) 1.7 - 8.2 K/UL    Absolute Lymph # 1.1 0.5 - 4.6 K/UL    Absolute Mono # 0.9 0.1 - 1.3 K/UL    Absolute Eos # 0.2 0.0 - 0.8 K/UL    Basophils Absolute 0.1 0.0 - 0.2 K/UL Absolute Immature Granulocyte 0.1 0.0 - 0.5 K/UL   Basic Metabolic Panel    Collection Time: 03/16/23  5:06 AM   Result Value Ref Range    Sodium 141 133 - 143 mmol/L    Potassium 3.5 3.5 - 5.1 mmol/L    Chloride 108 101 - 110 mmol/L    CO2 28 21 - 32 mmol/L    Anion Gap 5 2 - 11 mmol/L    Glucose 95 65 - 100 mg/dL    BUN 11 8 - 23 MG/DL    Creatinine 0.60 0.6 - 1.0 MG/DL    Est, Glom Filt Rate >60 >60 ml/min/1.73m2    Calcium 8.4 8.3 - 10.4 MG/DL   Phosphorus    Collection Time: 03/16/23  5:06 AM   Result Value Ref Range    Phosphorus 2.6 2.3 - 3.7 MG/DL   Magnesium    Collection Time: 03/16/23  5:06 AM   Result Value Ref Range    Magnesium 2.0 1.8 - 2.4 mg/dL     Xray Result (most recent):  XR CHEST PORTABLE 03/16/2023    Narrative  Portable chest xray    COMPARISON: Post VATS, follow-up    INDICATION: Yesterday    FINDINGS:    Stable right apical chest tube. Previous right basilar chest tube has been  removed. There is no significant pneumothorax. Mild bibasilar opacities, likely  atelectasis. No pulmonary edema. Cardiac mediastinal silhouette and surrounding  bones are stable.    Impression  1. Interval removal of one of the 2 right-sided chest tubes. No significant  pneumothorax.        Assessment:     Principal Problem:    Bronchogenic cancer of right lung (HCC)  Active Problems:    Right lower lobe lung mass  Resolved Problems:    * No resolved hospital problems. *        Plan/Recommendations/Medical Decision Making:     Anterior chest tube removed  Keep Dressings intact x 48 hours  Regular diet  Encouraged continued increased level of activity, PT/OT following  IS  Daily CXR  DVT prophylaxis, GI prophylaxis  Plan to d/c home tomorrow AM      Reyes Holloway PA-C

## 2023-03-17 ENCOUNTER — APPOINTMENT (OUTPATIENT)
Dept: GENERAL RADIOLOGY | Age: 83
DRG: 164 | End: 2023-03-17
Attending: SURGERY
Payer: MEDICARE

## 2023-03-17 ENCOUNTER — CARE COORDINATION (OUTPATIENT)
Dept: CARE COORDINATION | Facility: CLINIC | Age: 83
End: 2023-03-17

## 2023-03-17 VITALS
RESPIRATION RATE: 16 BRPM | HEIGHT: 61 IN | WEIGHT: 86 LBS | HEART RATE: 85 BPM | BODY MASS INDEX: 16.24 KG/M2 | OXYGEN SATURATION: 96 % | TEMPERATURE: 98.8 F | DIASTOLIC BLOOD PRESSURE: 46 MMHG | SYSTOLIC BLOOD PRESSURE: 111 MMHG

## 2023-03-17 LAB
ANION GAP SERPL CALC-SCNC: 6 MMOL/L (ref 2–11)
BASOPHILS # BLD: 0.1 K/UL (ref 0–0.2)
BASOPHILS NFR BLD: 0 % (ref 0–2)
BUN SERPL-MCNC: 13 MG/DL (ref 8–23)
CALCIUM SERPL-MCNC: 7.9 MG/DL (ref 8.3–10.4)
CHLORIDE SERPL-SCNC: 105 MMOL/L (ref 101–110)
CO2 SERPL-SCNC: 26 MMOL/L (ref 21–32)
CREAT SERPL-MCNC: 0.6 MG/DL (ref 0.6–1)
DIFFERENTIAL METHOD BLD: ABNORMAL
EOSINOPHIL # BLD: 0.2 K/UL (ref 0–0.8)
EOSINOPHIL NFR BLD: 2 % (ref 0.5–7.8)
ERYTHROCYTE [DISTWIDTH] IN BLOOD BY AUTOMATED COUNT: 15.6 % (ref 11.9–14.6)
GLUCOSE SERPL-MCNC: 88 MG/DL (ref 65–100)
HCT VFR BLD AUTO: 33.7 % (ref 35.8–46.3)
HGB BLD-MCNC: 10.5 G/DL (ref 11.7–15.4)
IMM GRANULOCYTES # BLD AUTO: 0.1 K/UL (ref 0–0.5)
IMM GRANULOCYTES NFR BLD AUTO: 0 % (ref 0–5)
LYMPHOCYTES # BLD: 1 K/UL (ref 0.5–4.6)
LYMPHOCYTES NFR BLD: 9 % (ref 13–44)
MAGNESIUM SERPL-MCNC: 2.6 MG/DL (ref 1.8–2.4)
MCH RBC QN AUTO: 26.1 PG (ref 26.1–32.9)
MCHC RBC AUTO-ENTMCNC: 31.2 G/DL (ref 31.4–35)
MCV RBC AUTO: 83.6 FL (ref 82–102)
MONOCYTES # BLD: 0.9 K/UL (ref 0.1–1.3)
MONOCYTES NFR BLD: 8 % (ref 4–12)
NEUTS SEG # BLD: 9.3 K/UL (ref 1.7–8.2)
NEUTS SEG NFR BLD: 81 % (ref 43–78)
NRBC # BLD: 0 K/UL (ref 0–0.2)
PHOSPHATE SERPL-MCNC: 2.9 MG/DL (ref 2.3–3.7)
PLATELET # BLD AUTO: 353 K/UL (ref 150–450)
PMV BLD AUTO: 9.2 FL (ref 9.4–12.3)
POTASSIUM SERPL-SCNC: 4 MMOL/L (ref 3.5–5.1)
RBC # BLD AUTO: 4.03 M/UL (ref 4.05–5.2)
SODIUM SERPL-SCNC: 137 MMOL/L (ref 133–143)
WBC # BLD AUTO: 11.5 K/UL (ref 4.3–11.1)

## 2023-03-17 PROCEDURE — 80048 BASIC METABOLIC PNL TOTAL CA: CPT

## 2023-03-17 PROCEDURE — 6360000002 HC RX W HCPCS: Performed by: PHYSICIAN ASSISTANT

## 2023-03-17 PROCEDURE — 83735 ASSAY OF MAGNESIUM: CPT

## 2023-03-17 PROCEDURE — 97530 THERAPEUTIC ACTIVITIES: CPT

## 2023-03-17 PROCEDURE — 36415 COLL VENOUS BLD VENIPUNCTURE: CPT

## 2023-03-17 PROCEDURE — 6370000000 HC RX 637 (ALT 250 FOR IP): Performed by: PHYSICIAN ASSISTANT

## 2023-03-17 PROCEDURE — 2580000003 HC RX 258: Performed by: PHYSICIAN ASSISTANT

## 2023-03-17 PROCEDURE — 85025 COMPLETE CBC W/AUTO DIFF WBC: CPT

## 2023-03-17 PROCEDURE — 71045 X-RAY EXAM CHEST 1 VIEW: CPT

## 2023-03-17 PROCEDURE — 84100 ASSAY OF PHOSPHORUS: CPT

## 2023-03-17 PROCEDURE — 2500000003 HC RX 250 WO HCPCS: Performed by: PHYSICIAN ASSISTANT

## 2023-03-17 PROCEDURE — 6370000000 HC RX 637 (ALT 250 FOR IP): Performed by: SURGERY

## 2023-03-17 RX ORDER — GABAPENTIN 300 MG/1
300 CAPSULE ORAL 3 TIMES DAILY
Qty: 30 CAPSULE | Refills: 0 | Status: SHIPPED | OUTPATIENT
Start: 2023-03-17 | End: 2023-03-27

## 2023-03-17 RX ADMIN — GUAIFENESIN 200 MG: 200 SOLUTION ORAL at 12:24

## 2023-03-17 RX ADMIN — AMLODIPINE BESYLATE 5 MG: 5 TABLET ORAL at 08:39

## 2023-03-17 RX ADMIN — ENOXAPARIN SODIUM 30 MG: 100 INJECTION SUBCUTANEOUS at 08:38

## 2023-03-17 RX ADMIN — FAMOTIDINE 20 MG: 20 TABLET ORAL at 08:39

## 2023-03-17 RX ADMIN — SENNOSIDES AND DOCUSATE SODIUM 2 TABLET: 8.6; 5 TABLET ORAL at 08:39

## 2023-03-17 RX ADMIN — SODIUM CHLORIDE, PRESERVATIVE FREE 10 ML: 5 INJECTION INTRAVENOUS at 08:39

## 2023-03-17 RX ADMIN — LOSARTAN POTASSIUM 100 MG: 50 TABLET, FILM COATED ORAL at 08:39

## 2023-03-17 RX ADMIN — GABAPENTIN 300 MG: 300 CAPSULE ORAL at 08:39

## 2023-03-17 NOTE — DISCHARGE INSTRUCTIONS
Discharge Instructions/Follow-up Plans:   MD Instructions: Follow-up with Dr. Evita Barnett in 7-10 days    Incisions  the dressing on your old chest tube site may remain in place for 48 hours (cover with saran wrap for showers). After 48 hours, remove the dressing and no replacement dressing is needed (gently wash and pat dry)      Do not apply lotions, creams or ointments to incisions. Showers are allowed - gently wash and pat dry your incisions. No tub baths or soaking/submerging until cleared by follow up provider. Diet -   Diet as tolerated - Soft foods diet for 2 days after surgery. If you have tolerated soft foods/easy to chew foods for the 1st two days after surgery, you may advance your diet to your regular diet as tolerated. Activity   No heavy lifting >10 lb for the first week after surgery. Lift nothing heavier than 25 lbs for 4  weeks after surgery. Walking and non-strenuous exercise promotes good oxygenated blood supply to body tissues and will assist in recovery. Walking and non-strenuous exercise also promotes good lung mechanics and reduces chance of developing pneumonia. Medications  No driving while taking narcotics/(prescription strength pain medication). Do not drink alcohol while taking narcotics. Resume other home medications. Narcotic pain medication is known to cause constipation as narcotic pain medication slows gut motility. Even if you are not taking oral narcotic pain medication, you have likely been given narcotic pain medication intravenously during your surgical procedure. Do not get constipated! No more than 2 days without a bm. If 2 days no bm, then take colace stool softener twice a day. If 24 hours of colace does not produce a bm , then keep taking colace and add miralax laxative twice a day until you have a bm. Once you are having regular bms, you can use colace and miralax as needed.      If problems (fever, signs of infection, uncontrolled bleeding or drainage from surgical incision, uncontrolled pain, uncontrolled nausea and/or vomiting) or any surgical questions arise, please call our office at [phone]. [address]

## 2023-03-17 NOTE — DISCHARGE SUMMARY
[address], [address] [facility]  [phone]   Discharge Summary     Niesha Cabrera  MRN: [id number]     : 1940     Age: [de-identified] y.o. Admit date: 3/13/2023     Discharge date: 3/17/2023  Attending Physician: Akash Henley MD  Primary Discharge Diagnosis:   Principal Problem:    Bronchogenic cancer of right lung [facility])  Active Problems:    Right lower lobe lung mass  Resolved Problems:    * No resolved hospital problems. *    Primary Operations or Procedures Performed :  Procedure(s):  RIGHT THORACOSCOPY, RIGHT LOWER LOBECTOMY, MEDIASTINAL LYMPHADENECTOMY     Brief History and Reason for Admission: Niesha Cabrera was admitted with the following history of present illness. Niesha Cabrera is a [de-identified] y.o. female who presented 3/13/23 with a right lung squamous cancer  for discussion of surgical treatment with lobectomy an lymphadenectomy. The patient has a significant history of pancreatitis which she has been following with GI. She had concern for a possible pancreatic mass, however this was worked up by GI. On this workup, she was found to have a 14u12wj mass in the RLL on 2022. The patient had a biopsy of this nodule on 2023 which revealed malignant neoplasm with squamous features. The patient had a PET scan on 2023 which revealed hypermetabolic superior segment in the RLL concerning for malignancy. The patient has not had PFTs performed. The patient was recently discharged with another episode of pancreatitis on 2023. She denies any respiratory symptoms. She denies any shortness of breath, coughing, or wheezing. She does have a significant history of smoking. She has been smoking at least 55 years 1-2ppd. She has been trying to quit since her diagnosis and is smoking 3 cigarettes per day. She denies drinking alcohol.      The patient has a significant surgical history of an appendectomy, cholecystectomy, hysterectomy, hemicolectomy, and repair of a perforated gastric ulcer. She has a past medical history significant for stage I breast cancer treated with lumpectomy in 2018. Hospital Course: The patient underwent Procedure(s):  RIGHT THORACOSCOPY, RIGHT LOWER LOBECTOMY, MEDIASTINAL LYMPHADENECTOMY on 3/9/23  Chest tube placed during surgery and removed 3/16/23    The patient progressed satisfactorily meeting milestones necessary for successful discharge including tolerating a diet, adequate mobility, adequate pain control, and active bowel function. Patient was deemed a good candidate for discharge at the time of morning rounding. They are to follow up as indicated in their provided discharge paperwork. The patient helped develop and voices understanding with the plan of care. They are amenable without reservations at this time to moving forward with discharge. Condition at Discharge: Good    Discharge Medications:   Current Discharge Medication List        START taking these medications    Details   gabapentin (NEURONTIN) 300 MG capsule Take 1 capsule by mouth 3 times daily for 10 days. Qty: 30 capsule, Refills: 0           CONTINUE these medications which have NOT CHANGED    Details   morphine (MS CONTIN) 15 MG extended release tablet Take 15 mg by mouth 2 times daily. etanercept (ENBREL) 50 MG/ML injection Administer Enbrel 1 shot every 7 days.   Qty: 12 mL, Refills: 1    Associated Diagnoses: Seropositive rheumatoid arthritis of multiple sites ([facility] [address]      oxyCODONE-acetaminophen (PERCOCET) 5-325 MG per tablet Take 1 tablet by mouth every 4 hours as needed for Pain.      hyoscyamine (LEVSIN/SL) 125 MCG sublingual tablet Place 1 tablet under the tongue every 6 hours as needed for Cramping  Qty: 40 tablet, Refills: 0      losartan (COZAAR) 100 MG tablet Take 1 tablet by mouth daily  Qty: 30 tablet, Refills: 3      amLODIPine (NORVASC) 5 MG tablet Take 1 tablet by mouth daily  Qty: 30 tablet, Refills: 3      phenol 1.4 % LIQD mouth spray Take 1 spray by mouth every 2 hours as needed for Sore Throat  Qty: 1 mL, Refills: 0      pravastatin (PRAVACHOL) 40 MG tablet Take 1 tablet by mouth daily  Qty: 30 tablet, Refills: 5           STOP taking these medications       oxyCODONE 5 MG capsule Comments:   Reason for Stopping:                 Disposition: home    Discharge Instructions/Follow-up Care: Follow-up with Dr. Jaci Vasquez in 7-10 days    Incisions  the dressing on your old chest tube site may remain in place for 48 hours (cover with saran wrap for showers). After 48 hours, remove the dressing and no replacement dressing is needed (gently wash and pat dry)      Do not apply lotions, creams or ointments to incisions. Showers are allowed - gently wash and pat dry your incisions. No tub baths or soaking/submerging until cleared by follow up provider. Diet -   Diet as tolerated - Soft foods diet for 2 days after surgery. If you have tolerated soft foods/easy to chew foods for the 1st two days after surgery, you may advance your diet to your regular diet as tolerated. Activity   No heavy lifting >10 lb for the first week after surgery. Lift nothing heavier than 25 lbs for 4  weeks after surgery. Walking and non-strenuous exercise promotes good oxygenated blood supply to body tissues and will assist in recovery. Walking and non-strenuous exercise also promotes good lung mechanics and reduces chance of developing pneumonia. Medications  No driving while taking narcotics/(prescription strength pain medication). Do not drink alcohol while taking narcotics. Resume other home medications. Narcotic pain medication is known to cause constipation as narcotic pain medication slows gut motility. Even if you are not taking oral narcotic pain medication, you have likely been given narcotic pain medication intravenously during your surgical procedure.      Do not get constipated! No more than 2 days without a bm. If 2 days no bm, then take colace stool softener twice a day. If 24 hours of colace does not produce a bm , then keep taking colace and add miralax laxative twice a day until you have a bm. Once you are having regular bms, you can use colace and miralax as needed. If problems (fever, signs of infection, uncontrolled bleeding or drainage from surgical incision, uncontrolled pain, uncontrolled nausea and/or vomiting) or any surgical questions arise, please call our office at [phone]. [address]     Signed:  ROBERTO Leal NP   3/17/2023  11:54 AM

## 2023-03-17 NOTE — PROGRESS NOTES
Discharge instructions reviewed with patient. Prescriptions sent to patient's pharmacy. R flank dressing reinforced. Patient going to daughter's house. Wheeled down to lobby by primary RN.

## 2023-03-17 NOTE — PROGRESS NOTES
ACUTE PHYSICAL THERAPY GOALS:   (Developed with and agreed upon by patient and/or caregiver.)      LTG:  (1.)Ms. Honeycutt  will move from supine to sit and sit to supine via logrolling  to side in flat bed without siderails with independence within 7 day(s).    (2.)Ms. Honeycutt  will transfer from bed to chair and chair to bed with modified independence using the least restrictive/no device within 7 day(s).    (3.)Ms. Honeycutt  will ambulate with supervision/set-up for 150+ feet with the least restrictive/no device within 7 day(s).   (4.)Ms. Honeycutt  will ambulate up/down 3 steps with bilateral railing with CGA with no device within  7  day(s).    PHYSICAL THERAPY: Daily Note AM   (Link to Caseload Tracking: PT Visit Days : 3  Time In/Out PT Charge Capture  Rehab Caseload Tracker  Orders    Ofe Honeycutt is a 82 y.o. female   PRIMARY DIAGNOSIS: Bronchogenic cancer of right lung (HCC)  Squamous cell carcinoma of lung, stage II, right (HCC) [C34.91]  Right lower lobe lung mass [R91.8]  Procedure(s) (LRB):  RIGHT THORACOSCOPY, RIGHT LOWER LOBECTOMY, MEDIASTINAL LYMPHADENECTOMY (Right)  4 Days Post-Op  Inpatient: Payor: AET MEDICARE / Plan: [employer]ET[employer] MEDICARE-ADVANTAGE PPO / Product Type: Medicare /     ASSESSMENT:     REHAB RECOMMENDATIONS:   Recommendation to date pending progress:  Setting:  Home Health Therapy    Equipment:    Rolling Walker     ASSESSMENT:  Ms. Honeycutt was supine in bed on arrival. She is planning to DC home today with HH. She continues to complain of dizziness once standing. Bed mobility with S. She ambulated over to sink to brush her hair and ambulated out into the hallway with min A. Once in the hallway, she became very dizzy but was able to make it back to her room. BP taken sitting (118/51) and standing (111/46). RN notified. Pt left supine in bed with needs in reach.      SUBJECTIVE:   Ms. Honeycutt states, \"I'm dizzy\"    Social/Functional Additional Comments: Lives alone and is independent at  baseline. Plans to d/c home with daughter who lives in a tri level house with a walk in shower. No AD, no DME. OBJECTIVE:     PAIN: Mosley Haggis / O2: PRECAUTION / Patrick Pearl / Any Barrera:   Pre Treatment:  not rated         Post Treatment: not rated Vitals        Oxygen    None    RESTRICTIONS/PRECAUTIONS:        MOBILITY: I Mod I S SBA CGA Min Mod Max Total  NT x2 Comments:   Bed Mobility    Rolling [] [] [] [] [] [] [] [] [] [] []    Supine to Sit [] [] [x] [] [] [] [] [] [] [] []    Scooting [] [] [x] [] [] [] [] [] [] [] []    Sit to Supine [] [] [x] [] [] [] [] [] [] [] []    Transfers    Sit to Stand [] [] [] [x] [] [] [] [] [] [] []    Bed to Chair [] [] [] [x] [] [] [] [] [] [] []    Stand to Sit [] [] [] [x] [] [] [] [] [] [] []     [] [] [] [] [] [] [] [] [] [] []    I=Independent, Mod I=Modified Independent, S=Supervision, SBA=Standby Assistance, CGA=Contact Guard Assistance,   Min=Minimal Assistance, Mod=Moderate Assistance, Max=Maximal Assistance, Total=Total Assistance, NT=Not Tested    BALANCE: Good Fair+ Fair Fair- Poor NT Comments   Sitting Static [x] [] [] [] [] []    Sitting Dynamic [x] [] [] [] [] []              Standing Static [] [x] [] [] [] []    Standing Dynamic [] [x] [] [] [] []      GAIT: I Mod I S SBA CGA Min Mod Max Total  NT x2 Comments:   Level of Assistance [] [] [] [] [] [x] [] [] [] [] []    Distance 80 feet    DME Rolling Walker    Gait Quality Decreased step clearance, Decreased step length, and Decreased stance    Weightbearing Status      Stairs      I=Independent, Mod I=Modified Independent, S=Supervision, SBA=Standby Assistance, CGA=Contact Guard Assistance,   Min=Minimal Assistance, Mod=Moderate Assistance, Max=Maximal Assistance, Total=Total Assistance, NT=Not Tested    PLAN:   FREQUENCY AND DURATION: 3 times/week for duration of hospital stay or until stated goals are met, whichever comes first.    TREATMENT:   TREATMENT:   Therapeutic Activity (15 Minutes):  Therapeutic activity included Supine to Sit, Sit to Supine, Scooting, Ambulation on level ground, Sitting balance , and Standing balance to improve functional Activity tolerance, Balance, Mobility, and Strength.     TREATMENT GRID:   Date:  3/16/23 Date:   Date:     Activity/Exercise Parameters Parameters Parameters   Heel/toe taps 10 B     LAQs 10 B     marching 10 B     Hip ABD/ADD 10 B                         AFTER TREATMENT PRECAUTIONS: Bed, Bed/Chair Locked, Call light within reach, and Needs within reach    INTERDISCIPLINARY COLLABORATION:  RN/ PCT    EDUCATION:      TIME IN/OUT:  Time In: 1110  Time Out: [address]  Minutes: [address], PTA

## 2023-03-17 NOTE — CARE COORDINATION
Patient with discharge orders for today. PT/OT recommending HH at discharge. Despite encouragement, patient adamantly refusing HH at this time. Family to transport patient home. Patient has met all treatment goals and milestones for discharge. CM following until patient is discharged. 03/17/23 1159   Service Assessment   Patient Orientation Alert and [address] Discharge   Transition of Care Consult (CM Consult) Discharge Planning   Services [address] Provided? No   Mode of Transport at Discharge Self   Confirm Follow Up Transport Self   Condition of Participation: Discharge Planning   The Plan for Transition of Care is related to the following treatment goals: Return to baseline with family support   The Patient and/or Patient Representative was provided with a Choice of Provider? Patient   The Patient and/Or Patient Representative agree with the Discharge Plan? Yes   Freedom of Choice list was provided with basic dialogue that supports the patient's individualized plan of care/goals, treatment preferences, and shares the quality data associated with the providers?   Yes

## 2023-03-17 NOTE — PROGRESS NOTES
END OF SHIFT NOTE:    INTAKE/OUTPUT  03/16 0701 - 03/17 0700  In: 26 [P.O.:470]  Out: 922 [Urine:900]  Voiding: Yes  Catheter: No  Drain:              Flatus: Patient does have flatus present. Stool: 1 occurrences. Characteristics:           Stool Assessment  Last BM (including prior to admit): 03/16/23 (x3 today per pt)    Emesis: 0 occurrences. Characteristics:        VITAL SIGNS  Patient Vitals for the past 12 hrs:   Temp Pulse Resp BP SpO2   03/17/23 0415 -- -- -- (!) 118/56 --   03/17/23 0357 -- 77 -- (!) 117/49 92 %   03/17/23 0356 97.9 °F (36.6 °C) 76 18 (!) 99/49 94 %   03/17/23 0007 -- 82 16 (!) 112/58 --   03/16/23 2355 98.2 °F (36.8 °C) 86 18 (!) 115/45 93 %   03/16/23 2002 99.5 °F (37.5 °C) 92 18 (!) 117/49 93 %   03/16/23 1915 -- 90 18 -- --       Pain Assessment  Pain Level: 5 (03/16/23 1023)  Pain Location: Abdomen  Patient's Stated Pain Goal: 0 - No pain    Ambulating  Yes    Shift report given to oncoming nurse at the bedside.     Vernell Hoyt RN

## 2023-03-17 NOTE — PROGRESS NOTES
Admit Date: 3/13/2023    POD 4 Days Post-Op    Procedure:  Procedure(s):  RIGHT THORACOSCOPY, RIGHT LOWER LOBECTOMY, MEDIASTINAL LYMPHADENECTOMY    Subjective:   Patient alert and oriented x4 with NAD noted. Respirations even and unlabored on room air. Patient denies dyspnea or shortness of breath. Tolerating diet with no nausea or vomiting. right chest wall pain is well controlled with as needed p.o. pain medication. Patient agrees with plan to discharge today  Objective:       Vitals:    23 0740 23 1114 23 1125 23 1130   BP: (!) 125/50 (!) 110/54 (!) 118/51 (!) 111/46   Pulse: 79 85     Resp: 22 16     Temp: 98.1 °F (36.7 °C) 98.8 °F (37.1 °C)     TempSrc: Oral Oral     SpO2: 95% 96%     Weight:       Height:           Temp (24hrs), Av.7 °F (37.1 °C), Min:97.9 °F (36.6 °C), Max:99.7 °F (37.6 °C)  . I&O reviewed as documented. Bowel movement 3/16/2023  Voiding 900 mL and unmeasurable occurrences clear yellow urine output past 24 hours    VSS    Physical Exam  Constitutional:       General: She is not in acute distress. HENT:      Mouth/Throat:      Mouth: Mucous membranes are moist.   Cardiovascular:      Rate and Rhythm: Normal rate and regular rhythm. Pulses: Normal pulses. Heart sounds: Normal heart sounds. No murmur heard. No friction rub. No gallop. Pulmonary:      Effort: Pulmonary effort is normal. No respiratory distress. Breath sounds: Normal breath sounds. Abdominal:      General: Bowel sounds are normal. There is no distension. Palpations: Abdomen is soft. Genitourinary:     Comments: Voiding  Musculoskeletal:         General: No swelling. Normal range of motion. Cervical back: No rigidity. Skin:     General: Skin is warm and dry. Capillary Refill: Capillary refill takes less than 2 seconds.       Comments: Right rib cage old chest tube site with Vaseline occlusive dressing clean dry and intact with no signs of infection Neurological:      Mental Status: She is alert and oriented to person, place, and time. Psychiatric:         Behavior: Behavior normal.      Labs:   Recent Results (from the past 24 hour(s))   CBC with Auto Differential    Collection Time: 03/17/23  5:33 AM   Result Value Ref Range    WBC 11.5 (H) 4.3 - 11.1 K/uL    RBC 4.03 (L) 4.05 - 5.2 M/uL    Hemoglobin 10.5 (L) 11.7 - 15.4 g/dL    Hematocrit 33.7 (L) 35.8 - 46.3 %    MCV 83.6 82 - 102 FL    MCH 26.1 26.1 - 32.9 PG    MCHC 31.2 (L) 31.4 - 35.0 g/dL    RDW 15.6 (H) 11.9 - 14.6 %    Platelets 557 362 - 329 K/uL    MPV 9.2 (L) 9.4 - 12.3 FL    nRBC 0.00 0.0 - 0.2 K/uL    Differential Type AUTOMATED      Seg Neutrophils 81 (H) 43 - 78 %    Lymphocytes 9 (L) 13 - 44 %    Monocytes 8 4.0 - 12.0 %    Eosinophils % 2 0.5 - 7.8 %    Basophils 0 0.0 - 2.0 %    Immature Granulocytes 0 0.0 - 5.0 %    Segs Absolute 9.3 (H) 1.7 - 8.2 K/UL    Absolute Lymph # 1.0 0.5 - 4.6 K/UL    Absolute Mono # 0.9 0.1 - 1.3 K/UL    Absolute Eos # 0.2 0.0 - 0.8 K/UL    Basophils Absolute 0.1 0.0 - 0.2 K/UL    Absolute Immature Granulocyte 0.1 0.0 - 0.5 K/UL       Assessment:     Principal Problem:    Bronchogenic cancer of right lung (HCC)  Active Problems:    Right lower lobe lung mass  Resolved Problems:    * No resolved hospital problems.  *        Plan/Recommendations/Medical Decision Making:     Discharge patient 3/17/2023    ROBERTO Rodriguez NP

## 2023-03-20 ENCOUNTER — CARE COORDINATION (OUTPATIENT)
Dept: CARE COORDINATION | Facility: CLINIC | Age: 83
End: 2023-03-20

## 2023-03-20 NOTE — CARE COORDINATION
Care Transition Nurse provided contact information. Plan for follow-up call in 5-7 days based on severity of symptoms and risk factors.   Plan for next call: symptom management    Lynn Cat RN

## 2023-03-26 ENCOUNTER — APPOINTMENT (OUTPATIENT)
Dept: GENERAL RADIOLOGY | Age: 83
DRG: 871 | End: 2023-03-26
Payer: MEDICARE

## 2023-03-26 ENCOUNTER — APPOINTMENT (OUTPATIENT)
Dept: CT IMAGING | Age: 83
DRG: 871 | End: 2023-03-26
Payer: MEDICARE

## 2023-03-26 ENCOUNTER — HOSPITAL ENCOUNTER (INPATIENT)
Age: 83
LOS: 2 days | Discharge: HOME | DRG: 871 | End: 2023-03-28
Attending: EMERGENCY MEDICINE | Admitting: INTERNAL MEDICINE
Payer: MEDICARE

## 2023-03-26 DIAGNOSIS — J18.9 PNEUMONIA OF RIGHT LUNG DUE TO INFECTIOUS ORGANISM, UNSPECIFIED PART OF LUNG: Primary | ICD-10-CM

## 2023-03-26 DIAGNOSIS — R91.8 RIGHT LOWER LOBE LUNG MASS: ICD-10-CM

## 2023-03-26 DIAGNOSIS — R79.89 ELEVATED PROCALCITONIN: ICD-10-CM

## 2023-03-26 DIAGNOSIS — I95.9 HYPOTENSION, UNSPECIFIED HYPOTENSION TYPE: ICD-10-CM

## 2023-03-26 PROBLEM — Z98.890 S/P THORACOTOMY: Status: ACTIVE | Noted: 2023-03-26

## 2023-03-26 PROBLEM — A41.9 SEPSIS (HCC): Status: ACTIVE | Noted: 2023-03-26

## 2023-03-26 PROBLEM — L03.90 CELLULITIS: Status: ACTIVE | Noted: 2023-03-26

## 2023-03-26 PROBLEM — Z87.11 HISTORY OF PEPTIC ULCER: Status: ACTIVE | Noted: 2018-03-05

## 2023-03-26 PROBLEM — D64.9 NORMOCYTIC ANEMIA: Status: ACTIVE | Noted: 2023-03-26

## 2023-03-26 PROBLEM — R74.01 ELEVATED LIVER TRANSAMINASE LEVEL: Status: ACTIVE | Noted: 2023-03-26

## 2023-03-26 PROBLEM — F17.200 SMOKER: Status: ACTIVE | Noted: 2023-01-01

## 2023-03-26 PROBLEM — C34.90 SQUAMOUS CELL LUNG CANCER (HCC): Status: ACTIVE | Noted: 2023-01-01

## 2023-03-26 LAB
ALBUMIN SERPL-MCNC: 2.6 G/DL (ref 3.2–4.6)
ALBUMIN/GLOB SERPL: 0.8 (ref 0.4–1.6)
ALP SERPL-CCNC: 103 U/L (ref 50–136)
ALT SERPL-CCNC: 93 U/L (ref 12–65)
ANION GAP SERPL CALC-SCNC: 2 MMOL/L (ref 2–11)
APPEARANCE UR: ABNORMAL
AST SERPL-CCNC: 116 U/L (ref 15–37)
BACTERIA SPEC CULT: NORMAL
BACTERIA URNS QL MICRO: ABNORMAL /HPF
BASOPHILS # BLD: 0.1 K/UL (ref 0–0.2)
BASOPHILS NFR BLD: 1 % (ref 0–2)
BILIRUB SERPL-MCNC: 0.3 MG/DL (ref 0.2–1.1)
BILIRUB UR QL: NEGATIVE
BUN SERPL-MCNC: 11 MG/DL (ref 8–23)
CALCIUM SERPL-MCNC: 8.3 MG/DL (ref 8.3–10.4)
CASTS URNS QL MICRO: ABNORMAL /LPF
CHLORIDE SERPL-SCNC: 112 MMOL/L (ref 101–110)
CO2 SERPL-SCNC: 22 MMOL/L (ref 21–32)
COLOR UR: ABNORMAL
CREAT SERPL-MCNC: 0.8 MG/DL (ref 0.6–1)
DIFFERENTIAL METHOD BLD: ABNORMAL
EOSINOPHIL # BLD: 0.5 K/UL (ref 0–0.8)
EOSINOPHIL NFR BLD: 4 % (ref 0.5–7.8)
EPI CELLS #/AREA URNS HPF: ABNORMAL /HPF
ERYTHROCYTE [DISTWIDTH] IN BLOOD BY AUTOMATED COUNT: 16.5 % (ref 11.9–14.6)
GLOBULIN SER CALC-MCNC: 3.3 G/DL (ref 2.8–4.5)
GLUCOSE SERPL-MCNC: 161 MG/DL (ref 65–100)
GLUCOSE UR STRIP.AUTO-MCNC: NEGATIVE MG/DL
HCT VFR BLD AUTO: 35.5 % (ref 35.8–46.3)
HGB BLD-MCNC: 10.8 G/DL (ref 11.7–15.4)
HGB UR QL STRIP: NEGATIVE
IMM GRANULOCYTES # BLD AUTO: 0.1 K/UL (ref 0–0.5)
IMM GRANULOCYTES NFR BLD AUTO: 0 % (ref 0–5)
KETONES UR QL STRIP.AUTO: NEGATIVE MG/DL
LACTATE SERPL-SCNC: 1.2 MMOL/L (ref 0.4–2)
LEUKOCYTE ESTERASE UR QL STRIP.AUTO: NEGATIVE
LYMPHOCYTES # BLD: 1.2 K/UL (ref 0.5–4.6)
LYMPHOCYTES NFR BLD: 9 % (ref 13–44)
MCH RBC QN AUTO: 26.2 PG (ref 26.1–32.9)
MCHC RBC AUTO-ENTMCNC: 30.4 G/DL (ref 31.4–35)
MCV RBC AUTO: 86 FL (ref 82–102)
MONOCYTES # BLD: 0.6 K/UL (ref 0.1–1.3)
MONOCYTES NFR BLD: 4 % (ref 4–12)
NEUTS SEG # BLD: 11.6 K/UL (ref 1.7–8.2)
NEUTS SEG NFR BLD: 83 % (ref 43–78)
NITRITE UR QL STRIP.AUTO: NEGATIVE
NRBC # BLD: 0 K/UL (ref 0–0.2)
OTHER OBSERVATIONS: ABNORMAL
PH UR STRIP: 6.5 (ref 5–9)
PLATELET # BLD AUTO: 498 K/UL (ref 150–450)
PMV BLD AUTO: 8.5 FL (ref 9.4–12.3)
POTASSIUM SERPL-SCNC: 3.8 MMOL/L (ref 3.5–5.1)
PROCALCITONIN SERPL-MCNC: 0.58 NG/ML (ref 0–0.49)
PROT SERPL-MCNC: 5.9 G/DL (ref 6.3–8.2)
PROT UR STRIP-MCNC: ABNORMAL MG/DL
RBC # BLD AUTO: 4.13 M/UL (ref 4.05–5.2)
RBC #/AREA URNS HPF: ABNORMAL /HPF
SERVICE CMNT-IMP: NORMAL
SODIUM SERPL-SCNC: 136 MMOL/L (ref 133–143)
SP GR UR REFRACTOMETRY: 1.02 (ref 1–1.02)
TROPONIN I SERPL HS-MCNC: 4.6 PG/ML (ref 0–37)
UROBILINOGEN UR QL STRIP.AUTO: 1 EU/DL (ref 0.2–1)
WBC # BLD AUTO: 14 K/UL (ref 4.3–11.1)
WBC URNS QL MICRO: ABNORMAL /HPF

## 2023-03-26 PROCEDURE — 6370000000 HC RX 637 (ALT 250 FOR IP): Performed by: INTERNAL MEDICINE

## 2023-03-26 PROCEDURE — 80053 COMPREHEN METABOLIC PANEL: CPT

## 2023-03-26 PROCEDURE — 2580000003 HC RX 258: Performed by: INTERNAL MEDICINE

## 2023-03-26 PROCEDURE — 87641 MR-STAPH DNA AMP PROBE: CPT

## 2023-03-26 PROCEDURE — 81001 URINALYSIS AUTO W/SCOPE: CPT

## 2023-03-26 PROCEDURE — 71046 X-RAY EXAM CHEST 2 VIEWS: CPT

## 2023-03-26 PROCEDURE — 87040 BLOOD CULTURE FOR BACTERIA: CPT

## 2023-03-26 PROCEDURE — 2500000003 HC RX 250 WO HCPCS: Performed by: INTERNAL MEDICINE

## 2023-03-26 PROCEDURE — 96361 HYDRATE IV INFUSION ADD-ON: CPT

## 2023-03-26 PROCEDURE — 80074 ACUTE HEPATITIS PANEL: CPT

## 2023-03-26 PROCEDURE — 96365 THER/PROPH/DIAG IV INF INIT: CPT

## 2023-03-26 PROCEDURE — 83605 ASSAY OF LACTIC ACID: CPT

## 2023-03-26 PROCEDURE — 6360000002 HC RX W HCPCS: Performed by: PHYSICIAN ASSISTANT

## 2023-03-26 PROCEDURE — 2580000003 HC RX 258: Performed by: PHYSICIAN ASSISTANT

## 2023-03-26 PROCEDURE — 70450 CT HEAD/BRAIN W/O DYE: CPT

## 2023-03-26 PROCEDURE — A4216 STERILE WATER/SALINE, 10 ML: HCPCS | Performed by: INTERNAL MEDICINE

## 2023-03-26 PROCEDURE — 6360000002 HC RX W HCPCS: Performed by: INTERNAL MEDICINE

## 2023-03-26 PROCEDURE — 84145 PROCALCITONIN (PCT): CPT

## 2023-03-26 PROCEDURE — 99285 EMERGENCY DEPT VISIT HI MDM: CPT

## 2023-03-26 PROCEDURE — 85025 COMPLETE CBC W/AUTO DIFF WBC: CPT

## 2023-03-26 PROCEDURE — 36415 COLL VENOUS BLD VENIPUNCTURE: CPT

## 2023-03-26 PROCEDURE — 84484 ASSAY OF TROPONIN QUANT: CPT

## 2023-03-26 PROCEDURE — 87086 URINE CULTURE/COLONY COUNT: CPT

## 2023-03-26 PROCEDURE — 1100000000 HC RM PRIVATE

## 2023-03-26 RX ORDER — PRAVASTATIN SODIUM 20 MG
40 TABLET ORAL EVERY EVENING
Status: DISCONTINUED | OUTPATIENT
Start: 2023-03-26 | End: 2023-03-28 | Stop reason: HOSPADM

## 2023-03-26 RX ORDER — ALBUTEROL SULFATE 2.5 MG/3ML
2.5 SOLUTION RESPIRATORY (INHALATION) EVERY 4 HOURS PRN
Status: DISCONTINUED | OUTPATIENT
Start: 2023-03-26 | End: 2023-03-28 | Stop reason: HOSPADM

## 2023-03-26 RX ORDER — ACETAMINOPHEN 325 MG/1
650 TABLET ORAL EVERY 6 HOURS PRN
Status: DISCONTINUED | OUTPATIENT
Start: 2023-03-26 | End: 2023-03-28 | Stop reason: HOSPADM

## 2023-03-26 RX ORDER — LANOLIN ALCOHOL/MO/W.PET/CERES
3 CREAM (GRAM) TOPICAL NIGHTLY PRN
Status: DISCONTINUED | OUTPATIENT
Start: 2023-03-26 | End: 2023-03-28 | Stop reason: HOSPADM

## 2023-03-26 RX ORDER — ACETAMINOPHEN 650 MG/1
650 SUPPOSITORY RECTAL EVERY 6 HOURS PRN
Status: DISCONTINUED | OUTPATIENT
Start: 2023-03-26 | End: 2023-03-28 | Stop reason: HOSPADM

## 2023-03-26 RX ORDER — SODIUM CHLORIDE 0.9 % (FLUSH) 0.9 %
5-40 SYRINGE (ML) INJECTION EVERY 12 HOURS SCHEDULED
Status: DISCONTINUED | OUTPATIENT
Start: 2023-03-26 | End: 2023-03-28 | Stop reason: HOSPADM

## 2023-03-26 RX ORDER — GABAPENTIN 300 MG/1
300 CAPSULE ORAL 3 TIMES DAILY
Status: DISCONTINUED | OUTPATIENT
Start: 2023-03-26 | End: 2023-03-28 | Stop reason: HOSPADM

## 2023-03-26 RX ORDER — GUAIFENESIN/DEXTROMETHORPHAN 100-10MG/5
10 SYRUP ORAL EVERY 4 HOURS PRN
Status: DISCONTINUED | OUTPATIENT
Start: 2023-03-26 | End: 2023-03-28 | Stop reason: HOSPADM

## 2023-03-26 RX ORDER — SODIUM CHLORIDE, SODIUM LACTATE, POTASSIUM CHLORIDE, AND CALCIUM CHLORIDE .6; .31; .03; .02 G/100ML; G/100ML; G/100ML; G/100ML
30 INJECTION, SOLUTION INTRAVENOUS ONCE
Status: COMPLETED | OUTPATIENT
Start: 2023-03-26 | End: 2023-03-26

## 2023-03-26 RX ORDER — AMLODIPINE BESYLATE 5 MG/1
5 TABLET ORAL DAILY
Status: DISCONTINUED | OUTPATIENT
Start: 2023-03-26 | End: 2023-03-28 | Stop reason: HOSPADM

## 2023-03-26 RX ORDER — LOSARTAN POTASSIUM 50 MG/1
100 TABLET ORAL DAILY
Status: DISCONTINUED | OUTPATIENT
Start: 2023-03-26 | End: 2023-03-28 | Stop reason: HOSPADM

## 2023-03-26 RX ORDER — SODIUM CHLORIDE 9 MG/ML
INJECTION, SOLUTION INTRAVENOUS CONTINUOUS
Status: DISCONTINUED | OUTPATIENT
Start: 2023-03-26 | End: 2023-03-28 | Stop reason: HOSPADM

## 2023-03-26 RX ORDER — ONDANSETRON 4 MG/1
8 TABLET, ORALLY DISINTEGRATING ORAL EVERY 8 HOURS PRN
Status: DISCONTINUED | OUTPATIENT
Start: 2023-03-26 | End: 2023-03-28 | Stop reason: HOSPADM

## 2023-03-26 RX ORDER — SODIUM CHLORIDE 0.9 % (FLUSH) 0.9 %
5-40 SYRINGE (ML) INJECTION PRN
Status: DISCONTINUED | OUTPATIENT
Start: 2023-03-26 | End: 2023-03-28 | Stop reason: HOSPADM

## 2023-03-26 RX ORDER — SODIUM CHLORIDE 9 MG/ML
INJECTION, SOLUTION INTRAVENOUS PRN
Status: DISCONTINUED | OUTPATIENT
Start: 2023-03-26 | End: 2023-03-28 | Stop reason: HOSPADM

## 2023-03-26 RX ORDER — POLYETHYLENE GLYCOL 3350 17 G/17G
17 POWDER, FOR SOLUTION ORAL 2 TIMES DAILY PRN
Status: DISCONTINUED | OUTPATIENT
Start: 2023-03-26 | End: 2023-03-28 | Stop reason: HOSPADM

## 2023-03-26 RX ORDER — MAGNESIUM HYDROXIDE/ALUMINUM HYDROXICE/SIMETHICONE 120; 1200; 1200 MG/30ML; MG/30ML; MG/30ML
30 SUSPENSION ORAL EVERY 6 HOURS PRN
Status: DISCONTINUED | OUTPATIENT
Start: 2023-03-26 | End: 2023-03-28 | Stop reason: HOSPADM

## 2023-03-26 RX ADMIN — SODIUM CHLORIDE: 9 INJECTION, SOLUTION INTRAVENOUS at 17:10

## 2023-03-26 RX ADMIN — SODIUM CHLORIDE, POTASSIUM CHLORIDE, SODIUM LACTATE AND CALCIUM CHLORIDE 1563 ML: 600; 310; 30; 20 INJECTION, SOLUTION INTRAVENOUS at 11:20

## 2023-03-26 RX ADMIN — CEFTRIAXONE 1000 MG: 1 INJECTION, POWDER, FOR SOLUTION INTRAMUSCULAR; INTRAVENOUS at 13:39

## 2023-03-26 RX ADMIN — SODIUM CHLORIDE, PRESERVATIVE FREE 10 ML: 5 INJECTION INTRAVENOUS at 21:00

## 2023-03-26 RX ADMIN — CEFEPIME HYDROCHLORIDE 2000 MG: 2 INJECTION, POWDER, FOR SOLUTION INTRAVENOUS at 17:10

## 2023-03-26 RX ADMIN — VANCOMYCIN HYDROCHLORIDE 1000 MG: 1 INJECTION, POWDER, LYOPHILIZED, FOR SOLUTION INTRAVENOUS at 17:50

## 2023-03-26 RX ADMIN — GABAPENTIN 300 MG: 300 CAPSULE ORAL at 21:00

## 2023-03-26 RX ADMIN — FAMOTIDINE 20 MG: 10 INJECTION, SOLUTION INTRAVENOUS at 17:50

## 2023-03-26 ASSESSMENT — ENCOUNTER SYMPTOMS
ALLERGIC/IMMUNOLOGIC NEGATIVE: 1
SHORTNESS OF BREATH: 0
ABDOMINAL PAIN: 0
EYES NEGATIVE: 1
GASTROINTESTINAL NEGATIVE: 1
RESPIRATORY NEGATIVE: 1

## 2023-03-26 ASSESSMENT — LIFESTYLE VARIABLES
HOW MANY STANDARD DRINKS CONTAINING ALCOHOL DO YOU HAVE ON A TYPICAL DAY: PATIENT DOES NOT DRINK
HOW OFTEN DO YOU HAVE A DRINK CONTAINING ALCOHOL: NEVER

## 2023-03-26 ASSESSMENT — PAIN - FUNCTIONAL ASSESSMENT: PAIN_FUNCTIONAL_ASSESSMENT: NONE - DENIES PAIN

## 2023-03-26 NOTE — ED NOTES
TRANSFER - OUT REPORT:    Verbal report given to dave barrera on [place] [facility]  being transferred to  for routine progression of patient care       Report consisted of patient's Situation, Background, Assessment and   Recommendations(SBAR). Information from the following report(s) ED SBAR was reviewed with the receiving nurse. [place] Assessment: Presents to emergency department  because of falls (Syncope, seizure, or loss of consciousness): No, Age > 79: Yes, Altered Mental Status, Intoxication with alcohol or substance confusion (Disorientation, impaired judgment, poor safety awaremess, or inability to follow instructions): No, Impaired Mobility: Ambulates or transfers with assistive devices or assistance; Unable to ambulate or transer.: No  Lines:   Peripheral IV 03/26/23 Left;Proximal Forearm (Active)        Opportunity for questions and clarification was provided.       Patient transported with:  gladys Rey RN  03/26/23 7030

## 2023-03-26 NOTE — ED PROVIDER NOTES
lumpectomy for breast cancer in the left breast and 2 sentinel lymph nodes that were removed in 2018. She states normally they checked her blood pressure in the thigh. Patient has a history of chronic pancreatitis and is actually being followed in [place] for that. She is not having any issues with that today. Patient has no history of stroke or heart attack. She does not take blood thinners. She has no visual changes, vomiting, chest pain, shortness of breath, abdominal pain, swelling/tingling or weakness to her arms or legs, trouble with urination or bowel movements or other new symptoms. Her daughter brought her in today in the wheelchair due to her weakness. The history is provided by the patient (The daughter). Other  This is a new problem. The current episode started 3 to 5 hours ago. The problem occurs constantly. The problem has not changed since onset. Pertinent negatives include no chest pain, no abdominal pain, no headaches and no shortness of breath. The symptoms are aggravated by standing. Nothing relieves the symptoms. She has tried nothing for the symptoms. Review of Systems   Constitutional: Negative. Negative for fever. HENT: Negative. Eyes: Negative. Respiratory: Negative. Negative for shortness of breath. Cardiovascular: Negative. Negative for chest pain. Gastrointestinal: Negative. Negative for abdominal pain. Endocrine: Negative. Genitourinary: Negative. Musculoskeletal: Negative. Skin: Negative. Allergic/Immunologic: Negative. Neurological:  Positive for weakness and light-headedness. Negative for dizziness, tremors, seizures, syncope, facial asymmetry, speech difficulty, numbness and headaches. Hematological: Negative. Psychiatric/Behavioral: Negative. All other systems reviewed and are negative.     Vitals signs and nursing note reviewed:  Patient Vitals for the past 4 hrs:   Temp Pulse Resp BP SpO2   03/26/23 1345 -- 65 (!) 8 (!)

## 2023-03-26 NOTE — ED TRIAGE NOTES
Patient a [de-identified] y/o Female reports to the ED with complaint of hypotension. Patient had surgery 2 weeks ago where she had lymph nodes removed and a biopsy of the right lung. Hx of a Lymph nodes removed on left side in 2018. Patient states she felt dizzy when getting out of bed this morning and has been feeling Nauseated. Patient states BP of 104/40  at home.

## 2023-03-26 NOTE — H&P
lymphnodes removed- no further treatment required    Hypercholesterolemia 12/4/2014    Hypertension     not well controlled--per pt    Intractable vomiting 5/20/2018    Lung cancer ([facility] 02/2023    Dr Imelda De Paz -oncologist    Lung cancer [facility]) 01/2023    Malnutrition ([facility] [facility]     BMI 16.4-- pancreatitis and continued weightloss> 20lbs since 1/2023    Menopausal vaginal dryness     Osteoarthritis 6/10/2016    Osteoporosis 6/10/2016    Pancreatic lesion 03/18/2020    Small pancreatic head lesion. This may represent a lymph node or neuroendocrine tumor. It is not convincingly worrisome in appearance. Biopsies were obtained.     PUD (peptic ulcer disease)     last 2003 which a rupture an extensive surgery    PVD (peripheral vascular disease) with claudication ([facility] 12/08/2020    Rheumatoid arthritis ([facility] [facility]     Rheumatologist: Deena York MD    Sepsis ([facility] [facility] 12/4/2014    Thyroid nodule     Tobacco abuse     1-2 ppd since 1967--       Past Surgical History:   Procedure Laterality Date    APPENDECTOMY  1977    with hysterectomy    BREAST BIOPSY Left 1975    BREAST LUMPECTOMY Left 2/22/2018    LEFT BREAST LUMPECTOMY/ SENTINEL NODE BIOPSY performed by Quinten Thorne MD at [facility] Bilateral     with IOL    CATARACT REMOVAL Bilateral 2018    w/IOL    CHOLECYSTECTOMY      CT NEEDLE BIOPSY LUNG PERCUTANEOUS  01/05/2023    CT NEEDLE BIOPSY LUNG PERCUTANEOUS 1/5/2023 [facility] RADIOLOGY CT SCAN    ENDOSCOPIC ULTRASOUND (LOWER)  12/28/2022    ERCP N/A 02/21/2023    ERCP ENDOSCOPIC RETROGRADE CHOLANGIOPANCREATOGRAPHY Rm 622 performed by Talmadge Galeazzi, MD at [facility] ([facility])  [address]  2003    stomach surgery for ruptured ulcer and extensive rerouting of intestestines per patient     STOMACH SURGERY      ruptured ulcer    THORACOSCOPY Right 3/13/2023    RIGHT THORACOSCOPY, RIGHT LOWER LOBECTOMY, MEDIASTINAL

## 2023-03-27 ENCOUNTER — APPOINTMENT (OUTPATIENT)
Dept: ULTRASOUND IMAGING | Age: 83
DRG: 871 | End: 2023-03-27
Payer: MEDICARE

## 2023-03-27 ENCOUNTER — CARE COORDINATION (OUTPATIENT)
Dept: CARE COORDINATION | Facility: CLINIC | Age: 83
End: 2023-03-27

## 2023-03-27 PROBLEM — I95.9 HYPOTENSION: Status: ACTIVE | Noted: 2023-03-27

## 2023-03-27 LAB
ALBUMIN SERPL-MCNC: 2.3 G/DL (ref 3.2–4.6)
ALBUMIN/GLOB SERPL: 0.8 (ref 0.4–1.6)
ALP SERPL-CCNC: 88 U/L (ref 50–136)
ALT SERPL-CCNC: 56 U/L (ref 12–65)
ANION GAP SERPL CALC-SCNC: 2 MMOL/L (ref 2–11)
AST SERPL-CCNC: 41 U/L (ref 15–37)
BASOPHILS # BLD: 0.1 K/UL (ref 0–0.2)
BASOPHILS NFR BLD: 1 % (ref 0–2)
BILIRUB SERPL-MCNC: 0.3 MG/DL (ref 0.2–1.1)
BUN SERPL-MCNC: 7 MG/DL (ref 8–23)
CALCIUM SERPL-MCNC: 8 MG/DL (ref 8.3–10.4)
CHLORIDE SERPL-SCNC: 116 MMOL/L (ref 101–110)
CO2 SERPL-SCNC: 24 MMOL/L (ref 21–32)
CREAT SERPL-MCNC: 0.6 MG/DL (ref 0.6–1)
DIFFERENTIAL METHOD BLD: ABNORMAL
EOSINOPHIL # BLD: 0.8 K/UL (ref 0–0.8)
EOSINOPHIL NFR BLD: 10 % (ref 0.5–7.8)
ERYTHROCYTE [DISTWIDTH] IN BLOOD BY AUTOMATED COUNT: 16.7 % (ref 11.9–14.6)
GLOBULIN SER CALC-MCNC: 2.9 G/DL (ref 2.8–4.5)
GLUCOSE SERPL-MCNC: 99 MG/DL (ref 65–100)
HAV IGM SER QL: NONREACTIVE
HBV CORE IGM SER QL: NONREACTIVE
HBV SURFACE AG SER QL: NONREACTIVE
HCT VFR BLD AUTO: 33.4 % (ref 35.8–46.3)
HCV AB SER QL: NONREACTIVE
HGB BLD-MCNC: 10.3 G/DL (ref 11.7–15.4)
IMM GRANULOCYTES # BLD AUTO: 0 K/UL (ref 0–0.5)
IMM GRANULOCYTES NFR BLD AUTO: 1 % (ref 0–5)
LYMPHOCYTES # BLD: 1.1 K/UL (ref 0.5–4.6)
LYMPHOCYTES NFR BLD: 14 % (ref 13–44)
MCH RBC QN AUTO: 26.1 PG (ref 26.1–32.9)
MCHC RBC AUTO-ENTMCNC: 30.8 G/DL (ref 31.4–35)
MCV RBC AUTO: 84.8 FL (ref 82–102)
MONOCYTES # BLD: 0.5 K/UL (ref 0.1–1.3)
MONOCYTES NFR BLD: 6 % (ref 4–12)
NEUTS SEG # BLD: 5.6 K/UL (ref 1.7–8.2)
NEUTS SEG NFR BLD: 68 % (ref 43–78)
NRBC # BLD: 0 K/UL (ref 0–0.2)
PLATELET # BLD AUTO: 464 K/UL (ref 150–450)
PMV BLD AUTO: 9.3 FL (ref 9.4–12.3)
POTASSIUM SERPL-SCNC: 3.6 MMOL/L (ref 3.5–5.1)
PROT SERPL-MCNC: 5.2 G/DL (ref 6.3–8.2)
RBC # BLD AUTO: 3.94 M/UL (ref 4.05–5.2)
SODIUM SERPL-SCNC: 142 MMOL/L (ref 133–143)
WBC # BLD AUTO: 8.3 K/UL (ref 4.3–11.1)

## 2023-03-27 PROCEDURE — 85025 COMPLETE CBC W/AUTO DIFF WBC: CPT

## 2023-03-27 PROCEDURE — A4216 STERILE WATER/SALINE, 10 ML: HCPCS | Performed by: INTERNAL MEDICINE

## 2023-03-27 PROCEDURE — 80053 COMPREHEN METABOLIC PANEL: CPT

## 2023-03-27 PROCEDURE — 36415 COLL VENOUS BLD VENIPUNCTURE: CPT

## 2023-03-27 PROCEDURE — 2500000003 HC RX 250 WO HCPCS: Performed by: INTERNAL MEDICINE

## 2023-03-27 PROCEDURE — 2580000003 HC RX 258: Performed by: STUDENT IN AN ORGANIZED HEALTH CARE EDUCATION/TRAINING PROGRAM

## 2023-03-27 PROCEDURE — 1100000000 HC RM PRIVATE

## 2023-03-27 PROCEDURE — 6370000000 HC RX 637 (ALT 250 FOR IP): Performed by: FAMILY MEDICINE

## 2023-03-27 PROCEDURE — 6370000000 HC RX 637 (ALT 250 FOR IP): Performed by: STUDENT IN AN ORGANIZED HEALTH CARE EDUCATION/TRAINING PROGRAM

## 2023-03-27 PROCEDURE — 6370000000 HC RX 637 (ALT 250 FOR IP): Performed by: INTERNAL MEDICINE

## 2023-03-27 PROCEDURE — 76700 US EXAM ABDOM COMPLETE: CPT

## 2023-03-27 PROCEDURE — 2580000003 HC RX 258: Performed by: INTERNAL MEDICINE

## 2023-03-27 PROCEDURE — 6360000002 HC RX W HCPCS: Performed by: INTERNAL MEDICINE

## 2023-03-27 RX ORDER — MIDODRINE HYDROCHLORIDE 5 MG/1
10 TABLET ORAL
Status: DISCONTINUED | OUTPATIENT
Start: 2023-03-28 | End: 2023-03-28 | Stop reason: HOSPADM

## 2023-03-27 RX ORDER — MIDODRINE HYDROCHLORIDE 5 MG/1
5 TABLET ORAL ONCE
Status: COMPLETED | OUTPATIENT
Start: 2023-03-27 | End: 2023-03-27

## 2023-03-27 RX ORDER — 0.9 % SODIUM CHLORIDE 0.9 %
500 INTRAVENOUS SOLUTION INTRAVENOUS ONCE
Status: COMPLETED | OUTPATIENT
Start: 2023-03-27 | End: 2023-03-27

## 2023-03-27 RX ORDER — MIDODRINE HYDROCHLORIDE 5 MG/1
5 TABLET ORAL
Status: DISCONTINUED | OUTPATIENT
Start: 2023-03-27 | End: 2023-03-27

## 2023-03-27 RX ADMIN — SODIUM CHLORIDE, PRESERVATIVE FREE 10 ML: 5 INJECTION INTRAVENOUS at 10:28

## 2023-03-27 RX ADMIN — GABAPENTIN 300 MG: 300 CAPSULE ORAL at 20:10

## 2023-03-27 RX ADMIN — FAMOTIDINE 20 MG: 10 INJECTION, SOLUTION INTRAVENOUS at 10:18

## 2023-03-27 RX ADMIN — CEFEPIME 1000 MG: 1 INJECTION, POWDER, FOR SOLUTION INTRAMUSCULAR; INTRAVENOUS at 03:25

## 2023-03-27 RX ADMIN — CEFEPIME 1000 MG: 1 INJECTION, POWDER, FOR SOLUTION INTRAMUSCULAR; INTRAVENOUS at 15:10

## 2023-03-27 RX ADMIN — TUBERCULIN PURIFIED PROTEIN DERIVATIVE 5 UNITS: 5 INJECTION, SOLUTION INTRADERMAL at 19:21

## 2023-03-27 RX ADMIN — MIDODRINE HYDROCHLORIDE 5 MG: 5 TABLET ORAL at 10:18

## 2023-03-27 RX ADMIN — PHENOL 1 SPRAY: 1.5 LIQUID ORAL at 22:08

## 2023-03-27 RX ADMIN — SODIUM CHLORIDE, PRESERVATIVE FREE 10 ML: 5 INJECTION INTRAVENOUS at 21:33

## 2023-03-27 RX ADMIN — SODIUM CHLORIDE 500 ML: 9 INJECTION, SOLUTION INTRAVENOUS at 15:33

## 2023-03-27 RX ADMIN — GABAPENTIN 300 MG: 300 CAPSULE ORAL at 15:10

## 2023-03-27 RX ADMIN — MIDODRINE HYDROCHLORIDE 5 MG: 5 TABLET ORAL at 15:10

## 2023-03-27 RX ADMIN — SODIUM CHLORIDE 500 ML: 9 INJECTION, SOLUTION INTRAVENOUS at 10:20

## 2023-03-27 RX ADMIN — GABAPENTIN 300 MG: 300 CAPSULE ORAL at 10:18

## 2023-03-27 RX ADMIN — VANCOMYCIN HYDROCHLORIDE 750 MG: 750 INJECTION, POWDER, LYOPHILIZED, FOR SOLUTION INTRAVENOUS at 14:15

## 2023-03-27 ASSESSMENT — PAIN SCALES - GENERAL
PAINLEVEL_OUTOF10: 0
PAINLEVEL_OUTOF10: 0

## 2023-03-27 NOTE — CARE COORDINATION
Patient currently inpatient. Will close episode at this time with possible reassignment upon discharge.

## 2023-03-28 VITALS
SYSTOLIC BLOOD PRESSURE: 111 MMHG | OXYGEN SATURATION: 100 % | TEMPERATURE: 98.2 F | HEIGHT: 61 IN | BODY MASS INDEX: 16.73 KG/M2 | RESPIRATION RATE: 18 BRPM | HEART RATE: 53 BPM | WEIGHT: 88.6 LBS | DIASTOLIC BLOOD PRESSURE: 66 MMHG

## 2023-03-28 PROBLEM — J18.9 PNEUMONIA: Status: RESOLVED | Noted: 2023-03-26 | Resolved: 2023-03-28

## 2023-03-28 PROBLEM — R74.01 ELEVATED LIVER TRANSAMINASE LEVEL: Status: RESOLVED | Noted: 2023-03-26 | Resolved: 2023-03-28

## 2023-03-28 PROBLEM — L03.90 CELLULITIS: Status: RESOLVED | Noted: 2023-03-26 | Resolved: 2023-03-28

## 2023-03-28 PROBLEM — A41.9 SEPSIS (HCC): Status: RESOLVED | Noted: 2023-03-26 | Resolved: 2023-03-28

## 2023-03-28 PROBLEM — I95.9 HYPOTENSION: Status: RESOLVED | Noted: 2023-03-27 | Resolved: 2023-03-28

## 2023-03-28 LAB
ALBUMIN SERPL-MCNC: 2.5 G/DL (ref 3.2–4.6)
ALBUMIN/GLOB SERPL: 1 (ref 0.4–1.6)
ALP SERPL-CCNC: 85 U/L (ref 50–136)
ALT SERPL-CCNC: 47 U/L (ref 12–65)
ANION GAP SERPL CALC-SCNC: 0 MMOL/L (ref 2–11)
AST SERPL-CCNC: 27 U/L (ref 15–37)
BACTERIA SPEC CULT: NORMAL
BASOPHILS # BLD: 0.1 K/UL (ref 0–0.2)
BASOPHILS NFR BLD: 2 % (ref 0–2)
BILIRUB SERPL-MCNC: 0.2 MG/DL (ref 0.2–1.1)
BUN SERPL-MCNC: 5 MG/DL (ref 8–23)
CALCIUM SERPL-MCNC: 8.2 MG/DL (ref 8.3–10.4)
CHLORIDE SERPL-SCNC: 118 MMOL/L (ref 101–110)
CO2 SERPL-SCNC: 24 MMOL/L (ref 21–32)
CREAT SERPL-MCNC: 0.4 MG/DL (ref 0.6–1)
DIFFERENTIAL METHOD BLD: ABNORMAL
EOSINOPHIL # BLD: 0.9 K/UL (ref 0–0.8)
EOSINOPHIL NFR BLD: 12 % (ref 0.5–7.8)
ERYTHROCYTE [DISTWIDTH] IN BLOOD BY AUTOMATED COUNT: 16.8 % (ref 11.9–14.6)
GLOBULIN SER CALC-MCNC: 2.6 G/DL (ref 2.8–4.5)
GLUCOSE SERPL-MCNC: 93 MG/DL (ref 65–100)
HCT VFR BLD AUTO: 34.3 % (ref 35.8–46.3)
HGB BLD-MCNC: 10.7 G/DL (ref 11.7–15.4)
IMM GRANULOCYTES # BLD AUTO: 0 K/UL (ref 0–0.5)
IMM GRANULOCYTES NFR BLD AUTO: 1 % (ref 0–5)
LYMPHOCYTES # BLD: 1.2 K/UL (ref 0.5–4.6)
LYMPHOCYTES NFR BLD: 15 % (ref 13–44)
MCH RBC QN AUTO: 26.3 PG (ref 26.1–32.9)
MCHC RBC AUTO-ENTMCNC: 31.2 G/DL (ref 31.4–35)
MCV RBC AUTO: 84.3 FL (ref 82–102)
MONOCYTES # BLD: 0.5 K/UL (ref 0.1–1.3)
MONOCYTES NFR BLD: 7 % (ref 4–12)
NEUTS SEG # BLD: 5 K/UL (ref 1.7–8.2)
NEUTS SEG NFR BLD: 63 % (ref 43–78)
NRBC # BLD: 0 K/UL (ref 0–0.2)
PLATELET # BLD AUTO: 404 K/UL (ref 150–450)
PMV BLD AUTO: 9.4 FL (ref 9.4–12.3)
POTASSIUM SERPL-SCNC: 3.8 MMOL/L (ref 3.5–5.1)
PROT SERPL-MCNC: 5.1 G/DL (ref 6.3–8.2)
RBC # BLD AUTO: 4.07 M/UL (ref 4.05–5.2)
SERVICE CMNT-IMP: NORMAL
SODIUM SERPL-SCNC: 142 MMOL/L (ref 133–143)
VANCOMYCIN SERPL-MCNC: 8.5 UG/ML
WBC # BLD AUTO: 7.8 K/UL (ref 4.3–11.1)

## 2023-03-28 PROCEDURE — 80053 COMPREHEN METABOLIC PANEL: CPT

## 2023-03-28 PROCEDURE — 6370000000 HC RX 637 (ALT 250 FOR IP): Performed by: INTERNAL MEDICINE

## 2023-03-28 PROCEDURE — 97165 OT EVAL LOW COMPLEX 30 MIN: CPT

## 2023-03-28 PROCEDURE — 97116 GAIT TRAINING THERAPY: CPT

## 2023-03-28 PROCEDURE — 6360000002 HC RX W HCPCS: Performed by: INTERNAL MEDICINE

## 2023-03-28 PROCEDURE — 2580000003 HC RX 258: Performed by: INTERNAL MEDICINE

## 2023-03-28 PROCEDURE — 97530 THERAPEUTIC ACTIVITIES: CPT

## 2023-03-28 PROCEDURE — 36415 COLL VENOUS BLD VENIPUNCTURE: CPT

## 2023-03-28 PROCEDURE — 97161 PT EVAL LOW COMPLEX 20 MIN: CPT

## 2023-03-28 PROCEDURE — 2500000003 HC RX 250 WO HCPCS: Performed by: INTERNAL MEDICINE

## 2023-03-28 PROCEDURE — 6370000000 HC RX 637 (ALT 250 FOR IP): Performed by: STUDENT IN AN ORGANIZED HEALTH CARE EDUCATION/TRAINING PROGRAM

## 2023-03-28 PROCEDURE — 85025 COMPLETE CBC W/AUTO DIFF WBC: CPT

## 2023-03-28 PROCEDURE — A4216 STERILE WATER/SALINE, 10 ML: HCPCS | Performed by: INTERNAL MEDICINE

## 2023-03-28 PROCEDURE — 80202 ASSAY OF VANCOMYCIN: CPT

## 2023-03-28 RX ORDER — CEPHALEXIN 500 MG/1
500 CAPSULE ORAL 4 TIMES DAILY
Qty: 20 CAPSULE | Refills: 0 | Status: SHIPPED | OUTPATIENT
Start: 2023-03-28 | End: 2023-04-02

## 2023-03-28 RX ORDER — DOXYCYCLINE HYCLATE 100 MG
100 TABLET ORAL 2 TIMES DAILY
Qty: 10 TABLET | Refills: 0 | Status: SHIPPED | OUTPATIENT
Start: 2023-03-28 | End: 2023-04-02

## 2023-03-28 RX ORDER — LOSARTAN POTASSIUM 100 MG/1
100 TABLET ORAL DAILY
Qty: 30 TABLET | Refills: 3
Start: 2023-03-28 | End: 2023-03-28 | Stop reason: HOSPADM

## 2023-03-28 RX ORDER — AMLODIPINE BESYLATE 5 MG/1
5 TABLET ORAL DAILY
Qty: 30 TABLET | Refills: 3
Start: 2023-03-28 | End: 2023-03-28 | Stop reason: HOSPADM

## 2023-03-28 RX ORDER — MIDODRINE HYDROCHLORIDE 5 MG/1
5 TABLET ORAL 3 TIMES DAILY
Qty: 21 TABLET | Refills: 0 | Status: SHIPPED | OUTPATIENT
Start: 2023-03-28 | End: 2023-04-04

## 2023-03-28 RX ADMIN — FAMOTIDINE 20 MG: 10 INJECTION, SOLUTION INTRAVENOUS at 08:15

## 2023-03-28 RX ADMIN — CEFEPIME 1000 MG: 1 INJECTION, POWDER, FOR SOLUTION INTRAMUSCULAR; INTRAVENOUS at 03:04

## 2023-03-28 RX ADMIN — MIDODRINE HYDROCHLORIDE 10 MG: 5 TABLET ORAL at 09:27

## 2023-03-28 RX ADMIN — SODIUM CHLORIDE: 9 INJECTION, SOLUTION INTRAVENOUS at 02:37

## 2023-03-28 RX ADMIN — ONDANSETRON 8 MG: 4 TABLET, ORALLY DISINTEGRATING ORAL at 08:14

## 2023-03-28 RX ADMIN — SODIUM CHLORIDE 750 MG: 9 INJECTION, SOLUTION INTRAVENOUS at 10:44

## 2023-03-28 RX ADMIN — MIDODRINE HYDROCHLORIDE 10 MG: 5 TABLET ORAL at 12:22

## 2023-03-28 RX ADMIN — SODIUM CHLORIDE, PRESERVATIVE FREE 10 ML: 5 INJECTION INTRAVENOUS at 08:16

## 2023-03-28 RX ADMIN — GABAPENTIN 300 MG: 300 CAPSULE ORAL at 09:27

## 2023-03-28 ASSESSMENT — PAIN SCALES - GENERAL: PAINLEVEL_OUTOF10: 0

## 2023-03-28 NOTE — PROGRESS NOTES
Admit Date: 3/26/2023    S/P Right VATS with right lower lobectomy and mediastinal lymphadenectomy 2023    Subjective: The patient is feeling well. She has some incisional discomfort. She states she is not having any shortness of breath. She is trying to continue to quit smoking. Objective:       Vitals:    23 2300 23 0341 23 0734 23 1200   BP: (!) 143/55 (!) 137/47 104/73 111/66   Pulse: 62 51 62 53   Resp: 18 18 18 18   Temp: 97.5 °F (36.4 °C) 97.7 °F (36.5 °C) 98.1 °F (36.7 °C) 98.2 °F (36.8 °C)   TempSrc: Oral Oral Oral    SpO2: 96% 99% 98% 100%   Weight:       Height:           Temp (24hrs), Av °F (36.7 °C), Min:97.5 °F (36.4 °C), Max:98.2 °F (36.8 °C)  . I&O reviewed as documented. Constitutional: Alert, oriented, cooperative patient in no acute distress; appears stated age    CV: RRR. Normal perfusion  Resp: No JVD. Breathing is  non-labored; no audible wheezing. Incisions are clean, dry, and intact, no evidence of cellulitis. Pathology:  Date Obtained:   3/13/2023   DIAGNOSIS        A:  \" NUMBER EIGHT LYMPH NODE\":         THREE FRAGMENTS OF LYMPH NODE NEGATIVE FOR METASTATIC TUMOR          B:  \" NUMBER NINE LYMPH NODE\":         SIX FRAGMENTS OF LYMPH NODE NEGATIVE FOR METASTATIC TUMOR          C:  \". HILAR LYMPH NODE\":         FOUR FRAGMENTS OF LYMPH NODE NEGATIVE FOR METASTATIC TUMOR          D:  \" RIGHT LOWER LOBE\":         INFILTRATING SQUAMOUS CARCINOMA, FOCALLY POORLY DIFFERENTIATED   MEASURING APPROXIMATELY 1.8 X 1.8 X 1.7 CM, GROSSLY.  MARGINS OF RESECTION   AND ONE PERIBRONCHIAL LYMPH NODES NEGATIVE FOR MALIGNANT TUMOR          E:  \" NUMBER SEVEN LYMPH NODE\":         FOUR FRAGMENTS OF LYMPH NODE NEGATIVE FOR METASTATIC TUMOR       Assessment:     Principal Problem:    Sepsis ([facility] [address] [facility]  Active Problems:    Squamous cell lung cancer ([facility]    Pancreatitis, unspecified pancreatitis type    History of peptic ulcer    HTN (hypertension)    Chronic
Initial visit by  to convey care and concern and to explore spiritual needs. No needs were voiced during the visit. Chaplains remain available for follow-up care.      Ciara Hunter 70  Board Certified 
Occupational Therapy Note:    OT consult received and chart reviewed---attempted to see pt this AM for initial assessment. Pt refused and irritated therapist woke her up for initial assessment. Attempted to see pt a 2nd time this afternoon--pt asleep and requested earlier of therapist not wake her up. Will continue to follow and attempt to see as schedule allows.     Thank you,  All Yoo, OTR/L
Physical Therapy Note:    Attempted to see patient this AM for physical therapy evaluation session. Patient adamantly refused despite education and encouragement due to wanting to rest. Second attempt this PM, pt sleeping on arrival. Will follow and re-attempt as schedule permits/patient available.  Thank you,    SAKSHI MICHEL, PT     Rehab Caseload Tracker
TRANSFER - IN REPORT:    Verbal report received from ER RN on Raffi Baldwin  being received from ER for routine progression of patient care      Report consisted of patient's Situation, Background, Assessment and   Recommendations(SBAR). Information from the following report(s) Nurse Handoff Report and ED SBAR was reviewed with the receiving nurse. Opportunity for questions and clarification was provided. Assessment completed upon patient's arrival to unit and care assumed.
Teaching and instructions regarding NPO. Nothing to eat or drink after MN. Patient verbalized understanding. No further questions, request or complaints when asked. NPO sign posted on door frame, next to room number.
VANCO DAILY FOLLOW UP NOTE  [address] Pharmacokinetic Monitoring Service - Vancomycin    Consulting Provider: Dr. Nik Gomez   Indication: Sepsis of unknown etiology  Target Concentration: Goal AUC/JANELLE 400-600 mg*hr/L  Day of Therapy: 3  Additional Antimicrobials: Cefepime    Patient eligible for piperacillin-tazobactam to cefepime auto-substitution per P&T approved protocol? N/A    Pertinent Laboratory Values: Wt Readings from Last 1 Encounters:   03/27/23 88 lb 9.6 oz (40.2 kg)     Temp Readings from Last 1 Encounters:   03/28/23 98.1 °F (36.7 °C)     Recent Labs     03/26/23  1051 03/27/23  0636 03/28/23  0613   BUN 11 7* 5*   CREATININE 0.80 0.60 0.40*   WBC 14.0* 8.3 7.8   PROCAL 0.58*  --   --      Estimated Creatinine Clearance: 71 mL/min (A) (based on SCr of 0.4 mg/dL (L)). Lab Results   Component Value Date/Time    VANCORANDOM 8.5 03/28/2023 06:13 AM       MRSA Nasal Swab: N/A.  Non-respiratory infection    Assessment:  Date/Time Dose Concentration AUC   3/28 0613 750 mg q24h 8.5 236   Note: Serum concentrations collected for AUC dosing may appear elevated if collected in close proximity to the dose administered, this is not necessarily an indication of toxicity    Plan:  Current dosing regimen is sub-therapeutic  Increase dose to 750 mg q12h for predicted AUC/Tr of 462/13.2  Repeat vancomycin concentrations will be ordered as clinically appropriate   Pharmacy will continue to monitor patient and adjust therapy as indicated    Thank you for the consult,  Sandra Snell, PharmD, BCOP  Clinical Pharmacist  Contact Via [employer]
VANCO DAILY FOLLOW UP NOTE  [address] [facility] Pharmacokinetic Monitoring Service - Vancomycin    Consulting Provider: Dr. Sujatha Barajas   Indication: Sepsis of unknown etiology  Target Concentration: Goal AUC/JANELLE 400-600 mg*hr/L  Day of Therapy: 1  Additional Antimicrobials: Cefepime    Patient eligible for piperacillin-tazobactam to cefepime auto-substitution per P&T approved protocol? N/A    Pertinent Laboratory Values: Wt Readings from Last 1 Encounters:   03/26/23 87 lb (39.5 kg)     Temp Readings from Last 1 Encounters:   03/26/23 97.7 °F (36.5 °C) (Oral)     Recent Labs     03/26/23  1051   BUN 11   CREATININE 0.80   WBC 14.0*   PROCAL 0.58*     Estimated Creatinine Clearance: 35 mL/min (based on SCr of 0.8 mg/dL). No results found for: Vamshi Hook    MRSA Nasal Swab: N/A.  Non-respiratory infection    Assessment:  Date/Time Dose Concentration AUC         Note: Serum concentrations collected for AUC dosing may appear elevated if collected in close proximity to the dose administered, this is not necessarily an indication of toxicity    Plan:  Dosing recommendations based on Bayesian software  Start vancomycin 1000 mg x 1 followed by 750 mg every 24 hours  Anticipated AUC of 434 and trough concentration of 12.8 at steady state  Renal labs as indicated   Vancomycin concentrations will be ordered as clinically appropriate   Pharmacy will continue to monitor patient and adjust therapy as indicated    Thank you for the consult,  Neena Johns, [facility]
VANCO DAILY FOLLOW UP NOTE  [facility] Pharmacokinetic Monitoring Service - Vancomycin    Consulting Provider: Dr. Kristina Kauffman   Indication: Sepsis of unknown etiology  Target Concentration: Goal AUC/JANELLE 400-600 mg*hr/L  Day of Therapy: 2  Additional Antimicrobials: Cefepime    Patient eligible for piperacillin-tazobactam to cefepime auto-substitution per P&T approved protocol? N/A    Pertinent Laboratory Values: Wt Readings from Last 1 Encounters:   03/27/23 88 lb 9.6 oz (40.2 kg)     Temp Readings from Last 1 Encounters:   03/27/23 97.7 °F (36.5 °C)     Recent Labs     03/26/23  1051 03/27/23  0636   BUN 11 7*   CREATININE 0.80 0.60   WBC 14.0* 8.3   PROCAL 0.58*  --      Estimated Creatinine Clearance: 47 mL/min (based on SCr of 0.6 mg/dL). No results found for: Melissa Stager    MRSA Nasal Swab: N/A.  Non-respiratory infection    Assessment:  Date/Time Dose Concentration AUC         Note: Serum concentrations collected for AUC dosing may appear elevated if collected in close proximity to the dose administered, this is not necessarily an indication of toxicity    Plan:  Current dosing regimen is therapeutic  Continue current dose  Repeat vancomycin concentrations will be ordered as clinically appropriate   Pharmacy will continue to monitor patient and adjust therapy as indicated    Thank you for the consult,  Rosette Mendez, PharmD, BCOP  Clinical Pharmacist  Contact Via [employer]
in the room with no mobility or self-care concerns at this time. Following treatment session, Fabiola Parker was determined to be at their functional baseline with no need for acute OT services. Will d/c OT orders--please re-consult if there are any changes in their functional mobility status. [address] [facility] [place] [facility]\"" [address] [address] [facility] 6 Clicks Daily Activity Inpatient Short Form:    AM-PAC Daily Activity - Inpatient   How much help is needed for putting on and taking off regular lower body clothing?: None  How much help is needed for bathing (which includes washing, rinsing, drying)?: None  How much help is needed for toileting (which includes using toilet, bedpan, or urinal)?: None  How much help is needed for putting on and taking off regular upper body clothing?: None  How much help is needed for taking care of personal grooming?: None  How much help for eating meals?: None  [facility] Inpatient Daily Activity Raw Score: 24  AM-PAC Inpatient ADL T-Scale Score : 57.54  ADL Inpatient CMS 0-100% Score: 0  ADL Inpatient CMS G-Code Modifier : CH      SUBJECTIVE:     Ms. Tayla Bynum states, \"I am only here because my blood pressure was low. \"     Social/Functional Lives With: Alone  Type of Home: Apartment  Home Layout: One level  Home Access: Level entry  Bathroom Toilet: Standard  Bathroom Equipment: None  Home Equipment: None  Receives Help From: Family  ADL Assistance: Independent  Homemaking Assistance: Independent  Ambulation Assistance: Independent  Transfer Assistance: Independent  Active : Yes  Occupation: Retired    OBJECTIVE:     Ileene Mesha / Vlad Landa / AIRWAY: NA    RESTRICTIONS/PRECAUTIONS:       PAIN: Michael Highman / O2:   Pre Treatment:   Pain Assessment: None - Denies Pain      Post Treatment: no change        Vitals          Oxygen            GROSS EVALUATION: INTACT IMPAIRED   (See Comments)   UE AROM [x] []   UE PROM [x] []   Strength [x]       Posture / Balance [x]     Sensation [x]     Coordination [x]       Tone [x]
Care  Education Method: Verbal  Barriers to Learning: None  Education Outcome: Verbalized understanding    TIME IN/OUT:  Time In: 1022  Time Out: [address]  Minutes: [address], PT
K/UL    Absolute Eos # 0.8 0.0 - 0.8 K/UL    Basophils Absolute 0.1 0.0 - 0.2 K/UL    Absolute Immature Granulocyte 0.0 0.0 - 0.5 K/UL   Comprehensive Metabolic Panel w/ Reflex to MG    Collection Time: 03/27/23  6:36 AM   Result Value Ref Range    Sodium 142 133 - 143 mmol/L    Potassium 3.6 3.5 - 5.1 mmol/L    Chloride 116 (H) 101 - 110 mmol/L    CO2 24 21 - 32 mmol/L    Anion Gap 2 2 - 11 mmol/L    Glucose 99 65 - 100 mg/dL    BUN 7 (L) 8 - 23 MG/DL    Creatinine 0.60 0.6 - 1.0 MG/DL    Est, Glom Filt Rate >60 >60 ml/min/1.73m2    Calcium 8.0 (L) 8.3 - 10.4 MG/DL    Total Bilirubin 0.3 0.2 - 1.1 MG/DL    ALT 56 12 - 65 U/L    AST 41 (H) 15 - 37 U/L    Alk Phosphatase 88 50 - 136 U/L    Total Protein 5.2 (L) 6.3 - 8.2 g/dL    Albumin 2.3 (L) 3.2 - 4.6 g/dL    Globulin 2.9 2.8 - 4.5 g/dL    Albumin/Globulin Ratio 0.8 0.4 - 1.6           Other Studies:  XR CHEST (2 VW)   Final Result      Small right pleural effusion with hazy right basilar airspace disease, which may   reflect atelectasis. Superimposed pneumonia not excluded in the appropriate   clinical setting. CT HEAD WO CONTRAST   Final Result      1. No acute intracranial abnormality. 2.  Mild senescent changes, as above.       US ABDOMEN COMPLETE    (Results Pending)       Current Meds:  Current Facility-Administered Medications   Medication Dose Route Frequency    0.9 % sodium chloride infusion   IntraVENous Continuous    sodium chloride flush 0.9 % injection 5-40 mL  5-40 mL IntraVENous 2 times per day    sodium chloride flush 0.9 % injection 5-40 mL  5-40 mL IntraVENous PRN    0.9 % sodium chloride infusion   IntraVENous PRN    famotidine (PEPCID) 20 mg in sodium chloride (PF) 0.9 % 10 mL injection  20 mg IntraVENous Daily    aluminum & magnesium hydroxide-simethicone (MAALOX) 200-200-20 MG/5ML suspension 30 mL  30 mL Oral Q6H PRN    acetaminophen (TYLENOL) tablet 650 mg  650 mg Oral Q6H PRN    Or    acetaminophen (TYLENOL) suppository 650 mg
yes...

## 2023-03-28 NOTE — DISCHARGE INSTRUCTIONS
Check blood pressure daily before medication administration. Hold anti-hypertensives on discharge until PCP follow-up, recommended in 3-5 days of discharge.

## 2023-03-28 NOTE — DISCHARGE SUMMARY
admission for sepsis due to pneumonia vs. surgical site infection/cellulitis. Patient received broad spectrum antibiotics for possible pneumonia vs cellulitis. UA finalized with mixed skin anders. Respiratory status stable at baseline, no reported cough or congestion. Smoking cessation encouraged, she is trying. Leukocytosis and tachypnea resolved. Hypotension improved with fluids and midodrine. Continue to hold anti-hypertensives on discharge. Midodrine prescribed with parameters. PCP follow-up in 3-5 days for re-evaluation. LFT's and thrombocytosis have normalized. General surgery evaluated. Removed surgical staples with placed steri strips. Per surgery, no evidence of surgical site infection. Clear to discharge from surgical perspective. Will need 6 month follow-up with repeat CT scan. Will transition to complete outpatient course with Doxy/Keflex. Patient is requesting discharge home. Patient is medically stable to discharge home today. Discharge plan discussed with patient, care team, Dr. Jayne Goodman, surgical team. All questions answered. Instructions given to call a physician or return if any concerns. Disposition: home  Diet: ADULT DIET; Regular; 3 carb choices (45 gm/meal)  Code Status: Full Code    Follow Ups:   Follow-up Information     Tete Rodríguez MD Follow up in 6 month(s). Specialty: General Surgery  Contact information:  [address] [place] [address] George Fragoso MD Follow up. Specialty: Family Medicine  Why: 3-5 days hospital follow-up  Contact information:  [address] [facility] [address]  [address] [place] [address]  [phone]                       Time spent in patient discharge and coordination 40 minutes.     Follow up labs/diagnostics: CBC / CMP 1 week    Current Discharge Medication List        START taking these medications    Details   doxycycline hyclate (VIBRA-TABS) 100 MG tablet Take 1 tablet by mouth 2 times daily for 5 days  Qty: 10 tablet, Refills: 0

## 2023-03-28 NOTE — CARE COORDINATION
Pt is for discharge home today with no needs/supportive care orders received for CM at this time. Pt will have close follow up with PCP  Patient politely declined any HH services,patient has many family members that work in healthcare that can assist if needed.     Milestones met    ASSESSMENT NOTE    Attending Physician: Liv Trujillo MD  Admit Problem: Sepsis [facility]) [A41.9]  Elevated procalcitonin [R79.89]  Hypotension, unspecified hypotension type [I95.9]  Pneumonia of right lung due to infectious organism, unspecified part of lung [J18.9]  Date/Time of Admission: 3/26/2023 10:40 AM  Problem List:  Patient Active Problem List   Diagnosis    PAD (peripheral artery disease) ([address]    Hypercholesterolemia    Back pain    Malignant neoplasm of upper-outer quadrant of left breast in female, estrogen receptor positive ([address]    History of peptic ulcer    Multiple thyroid nodules    Intractable vomiting    History of acute pancreatitis    Compression fracture of L1 lumbar vertebra (HCC)    Hypomagnesemia    Personal history of tobacco use, presenting hazards to health    HTN (hypertension)    Age-related osteoporosis with current pathological fracture    Chronic obstructive pulmonary disease (HCC)    Eczema    Osteoarthritis    Paraseptal emphysema (HCC)    Hypokalemia    Osteoporosis    S/P lumpectomy, left breast    Acute pancreatitis    EPIFANIO (acute kidney injury) ([address]    Other acute pancreatitis without infection or necrosis    Chronic pancreatitis (HCC)    Acute recurrent pancreatitis    Rheumatoid arthritis ([address]    Unintentional weight loss    Protein-calorie malnutrition, moderate (HCC)    Lung nodule    Squamous cell lung cancer ([address]    Pancreatitis, unspecified pancreatitis type    Right lower lobe lung mass    S/P thoracotomy    Normocytic anemia    Smoker       Service Assessment  Patient Orientation Alert and Oriented   Cognition Alert   History Provided By Medical Record   Primary Caregiver
Identified Issues/Barriers to RETURNING to current housing: Undetermined patient is currently  declining to work with PT and OT  Potential Assistance needed at discharge: (P) [address] ([address] declined previously admission)  Patient expects to discharge to:    Plan for transportation at discharge: (P) Family    Financial  Payor: Manolo Horn / Plan: Piedad Soler PPO / Product Type: Medicare /     Does insurance require precert for SNF: Yes    Potential assistance Purchasing Medications: (P) No      [id number] - [address], [place] - [id number] [place] [address]  Phone: [phone] Fax: [phone]    [address] [place] [address] [phone] - f [phone]  [address] [address]  Phone: [phone] Fax: [phone]    [facility] [phone] - f [phone]  [address] [facility] [address]  Phone: [phone] Fax: [phone]      Factors facilitating achievement of predicted outcomes: Family support    Barriers to discharge: Pain and Medical complications    Additional Case Management Notes: Patient lives alone in 1st floor apartment with level entry. Patient is independent at baseline and drives. Patient has no history of DME, HH, or rehab. Demographics and PCP verified. Patient has declined twice to work with PT or OT. Undetermined at this time if any skilled needs at discharge  Discharge plan is undetermined at this time.   Will continue to follow for discharge planning needs  Please consult  if any new issues arise      The Plan for Transition of Care is related to the following treatment goals of Sepsis ([facility] [A41.9]  Elevated procalcitonin [R79.89]  Hypotension, unspecified hypotension type [I95.9]  Pneumonia of right lung due to infectious organism, unspecified part of lung [J18.9]    IF

## 2023-03-29 ENCOUNTER — CARE COORDINATION (OUTPATIENT)
Dept: CARE COORDINATION | Facility: CLINIC | Age: 83
End: 2023-03-29

## 2023-03-29 NOTE — CARE COORDINATION
available to patient? Yes. Reviewed appropriate site of care based on symptoms and resources available to patient including: PCP  Specialist  Urgent care clinics. The patient agrees to contact the PCP office for questions related to their healthcare. Advance Care Planning:   Does patient have an Advance Directive: not on file. Medication reconciliation was performed with patient, who verbalizes understanding of administration of home medications. Medications reviewed: yes    Was patient discharged with a pulse oximeter? no    Non-face-to-face services provided:  Obtained and reviewed discharge summary and/or continuity of care documents  Education of patient to support self-management-voices understanding. Assessment and support for treatment adherence and medication management-encouraged to follow treatment plan, continue to take medications as prescribed. Offered patient enrollment in the Remote Patient Monitoring (RPM) program for in-home monitoring: NA.    Care Transitions 24 Hour Call    Do you have all of your prescriptions and are they filled?: No (Comment: Reports daughter to  gabapentin from pharmacy.)  Do you have support at home?: Alone  Are you an active caregiver in your home?: No  Care Transitions Interventions         Discussed follow-up appointments. If no appointment was previously scheduled, appointment scheduling offered: Yes. Is follow up appointment scheduled within 7 days of discharge? Attempting to schedule a sooner follow up appointment with PCP. Follow Up  Future Appointments   Date Time Provider Bo Jj   4/12/2023 10:00 AM Sai Lenz MD [facility] AMB   5/1/2023 11:45 AM [facility] MRI UNIT 1 SFDRMRI SFD   5/4/2023  1:00 PM Hany [facility]   5/4/2023  1:45 PM Caterina Kaur MD [facility] AMB   6/30/2023  8:40 AM Leroy Maria  [address], [address] [place] [address]       Care Transition Nurse provided contact information.   Plan for follow-up call in 5-7

## 2023-03-30 ENCOUNTER — TELEPHONE (OUTPATIENT)
Dept: FAMILY MEDICINE CLINIC | Facility: CLINIC | Age: 83
End: 2023-03-30

## 2023-03-30 NOTE — TELEPHONE ENCOUNTER
Care Transitions Initial Follow Up Call    Call within 2 business days of discharge: Yes     Patient: Zuri Bailey Patient : 1940 MRN: [id number]    [unfilled]    RARS: Readmission Risk Score: 26.1       Spoke with: pt    Discharge department/facility[facility]    Non-face-to-face services provided:  Scheduled appointment with [facility]    Follow Up  Future Appointments   Date Time Provider Bo Jj   2023 11:30 AM Tiago Geiger MD [facility] G[facility] AMB   2023 10:00 AM Tiago Geiger MD [facility] AMB   2023 11:45 AM [facility] MRI UNIT 1 SFDRMRI D   2023  1:00 PM Hany [address] [place] [address]   2023  1:45 PM Thais Almonte MD [facility] G[facility] AMB   2023  8:40 AM Deric De La Paz MD [facility] [address], [address] [place] [address]       Tyson Mak RN

## 2023-03-31 ENCOUNTER — CARE COORDINATION (OUTPATIENT)
Dept: CARE COORDINATION | Facility: CLINIC | Age: 83
End: 2023-03-31

## 2023-03-31 NOTE — CARE COORDINATION
Care Transitions Outreach Attempt    Call within 2 business days of discharge: Yes   Attempted to reach patient for transitions of care follow up. Unable to reach patient. Patient was contacted by PCP office for LEONOR on 3/30/23. Verified hospital follow up with PCP on 23 at 1130. Patient: Claudia Malin Patient : 1940 MRN: [id number]    Last Discharge [address]       Date Complaint Diagnosis Description Type Department Provider    3/26/23 Other Pneumonia of right lung due to infectious organism, unspecified part of lung . .. ED to Hosp-Admission (Discharged) (ADMITTED) [id number] Mak Hilario MD; Geraldine Cote . .. Was this an external facility discharge?  No Discharge Facility: S[facility]    Noted following upcoming appointments from discharge chart review:   [facility] follow up appointment(s):   Future Appointments   Date Time Provider Bo Jj   2023 11:30 AM Nam Kevin MD [facility] AMB   2023 10:00 AM Nam Kevin MD [facility] AMB   2023 11:45 AM [facility] MRI UNIT 1 SFDRMRI SFD   2023  1:00 PM Hany [address] [facility]   2023  1:45 PM Hillary Small MD [facility] AMB   2023  8:40 AM Antonio Mcallister MD [facility] AMB     Non-[facility] follow up appointment(s): n/a

## 2023-04-02 ENCOUNTER — HOSPITAL ENCOUNTER (EMERGENCY)
Age: 83
Discharge: HOME | End: 2023-04-02
Attending: EMERGENCY MEDICINE
Payer: MEDICARE

## 2023-04-02 ENCOUNTER — APPOINTMENT (OUTPATIENT)
Dept: GENERAL RADIOLOGY | Age: 83
End: 2023-04-02
Payer: MEDICARE

## 2023-04-02 ENCOUNTER — APPOINTMENT (OUTPATIENT)
Dept: CT IMAGING | Age: 83
End: 2023-04-02
Payer: MEDICARE

## 2023-04-02 VITALS
BODY MASS INDEX: 16.62 KG/M2 | TEMPERATURE: 98.9 F | DIASTOLIC BLOOD PRESSURE: 61 MMHG | WEIGHT: 88 LBS | HEIGHT: 61 IN | HEART RATE: 73 BPM | RESPIRATION RATE: 13 BRPM | OXYGEN SATURATION: 99 % | SYSTOLIC BLOOD PRESSURE: 138 MMHG

## 2023-04-02 DIAGNOSIS — J90 PLEURAL EFFUSION ON RIGHT: ICD-10-CM

## 2023-04-02 DIAGNOSIS — R07.89 CHEST WALL PAIN: Primary | ICD-10-CM

## 2023-04-02 DIAGNOSIS — R91.1 RIGHT MIDDLE LOBE PULMONARY NODULE: ICD-10-CM

## 2023-04-02 LAB
ALBUMIN SERPL-MCNC: 2.8 G/DL (ref 3.2–4.6)
ALBUMIN/GLOB SERPL: 0.8 (ref 0.4–1.6)
ALP SERPL-CCNC: 87 U/L (ref 50–136)
ALT SERPL-CCNC: 25 U/L (ref 12–65)
ANION GAP SERPL CALC-SCNC: 4 MMOL/L (ref 2–11)
AST SERPL-CCNC: 15 U/L (ref 15–37)
BASOPHILS # BLD: 0.1 K/UL (ref 0–0.2)
BASOPHILS NFR BLD: 1 % (ref 0–2)
BILIRUB SERPL-MCNC: 0.6 MG/DL (ref 0.2–1.1)
BUN SERPL-MCNC: 11 MG/DL (ref 8–23)
CALCIUM SERPL-MCNC: 8.9 MG/DL (ref 8.3–10.4)
CHLORIDE SERPL-SCNC: 105 MMOL/L (ref 101–110)
CO2 SERPL-SCNC: 26 MMOL/L (ref 21–32)
CREAT SERPL-MCNC: 0.6 MG/DL (ref 0.6–1)
D DIMER PPP FEU-MCNC: 2.94 UG/ML(FEU)
DIFFERENTIAL METHOD BLD: ABNORMAL
EKG ATRIAL RATE: 75 BPM
EKG DIAGNOSIS: NORMAL
EKG P AXIS: 68 DEGREES
EKG P-R INTERVAL: 164 MS
EKG Q-T INTERVAL: 381 MS
EKG QRS DURATION: 79 MS
EKG QTC CALCULATION (BAZETT): 426 MS
EKG R AXIS: 60 DEGREES
EKG T AXIS: 50 DEGREES
EKG VENTRICULAR RATE: 75 BPM
EOSINOPHIL # BLD: 0.1 K/UL (ref 0–0.8)
EOSINOPHIL NFR BLD: 0 % (ref 0.5–7.8)
ERYTHROCYTE [DISTWIDTH] IN BLOOD BY AUTOMATED COUNT: 16.5 % (ref 11.9–14.6)
GLOBULIN SER CALC-MCNC: 3.7 G/DL (ref 2.8–4.5)
GLUCOSE SERPL-MCNC: 124 MG/DL (ref 65–100)
HCT VFR BLD AUTO: 35.2 % (ref 35.8–46.3)
HGB BLD-MCNC: 11.3 G/DL (ref 11.7–15.4)
IMM GRANULOCYTES # BLD AUTO: 0.1 K/UL (ref 0–0.5)
IMM GRANULOCYTES NFR BLD AUTO: 1 % (ref 0–5)
LACTATE SERPL-SCNC: 1.8 MMOL/L (ref 0.4–2)
LYMPHOCYTES # BLD: 1.2 K/UL (ref 0.5–4.6)
LYMPHOCYTES NFR BLD: 8 % (ref 13–44)
MCH RBC QN AUTO: 26.2 PG (ref 26.1–32.9)
MCHC RBC AUTO-ENTMCNC: 32.1 G/DL (ref 31.4–35)
MCV RBC AUTO: 81.5 FL (ref 82–102)
MONOCYTES # BLD: 1.3 K/UL (ref 0.1–1.3)
MONOCYTES NFR BLD: 8 % (ref 4–12)
NEUTS SEG # BLD: 12.9 K/UL (ref 1.7–8.2)
NEUTS SEG NFR BLD: 82 % (ref 43–78)
NRBC # BLD: 0 K/UL (ref 0–0.2)
PLATELET # BLD AUTO: 383 K/UL (ref 150–450)
PMV BLD AUTO: 9.3 FL (ref 9.4–12.3)
POTASSIUM SERPL-SCNC: 3.3 MMOL/L (ref 3.5–5.1)
PROCALCITONIN SERPL-MCNC: 0.12 NG/ML (ref 0–0.49)
PROT SERPL-MCNC: 6.5 G/DL (ref 6.3–8.2)
RBC # BLD AUTO: 4.32 M/UL (ref 4.05–5.2)
SODIUM SERPL-SCNC: 135 MMOL/L (ref 133–143)
WBC # BLD AUTO: 15.6 K/UL (ref 4.3–11.1)

## 2023-04-02 PROCEDURE — 84145 PROCALCITONIN (PCT): CPT

## 2023-04-02 PROCEDURE — 80053 COMPREHEN METABOLIC PANEL: CPT

## 2023-04-02 PROCEDURE — 85025 COMPLETE CBC W/AUTO DIFF WBC: CPT

## 2023-04-02 PROCEDURE — 6360000002 HC RX W HCPCS

## 2023-04-02 PROCEDURE — 6360000004 HC RX CONTRAST MEDICATION: Performed by: EMERGENCY MEDICINE

## 2023-04-02 PROCEDURE — 99285 EMERGENCY DEPT VISIT HI MDM: CPT

## 2023-04-02 PROCEDURE — 96374 THER/PROPH/DIAG INJ IV PUSH: CPT

## 2023-04-02 PROCEDURE — 71046 X-RAY EXAM CHEST 2 VIEWS: CPT

## 2023-04-02 PROCEDURE — 85379 FIBRIN DEGRADATION QUANT: CPT

## 2023-04-02 PROCEDURE — 71260 CT THORAX DX C+: CPT

## 2023-04-02 PROCEDURE — 93005 ELECTROCARDIOGRAM TRACING: CPT

## 2023-04-02 PROCEDURE — 83605 ASSAY OF LACTIC ACID: CPT

## 2023-04-02 RX ORDER — MORPHINE SULFATE 2 MG/ML
2 INJECTION, SOLUTION INTRAMUSCULAR; INTRAVENOUS ONCE
Status: COMPLETED | OUTPATIENT
Start: 2023-04-02 | End: 2023-04-02

## 2023-04-02 RX ORDER — LIDOCAINE 50 MG/G
1 PATCH TOPICAL DAILY
Qty: 10 PATCH | Refills: 0 | Status: SHIPPED | OUTPATIENT
Start: 2023-04-02 | End: 2023-04-12

## 2023-04-02 RX ADMIN — MORPHINE SULFATE 2 MG: 2 INJECTION, SOLUTION INTRAMUSCULAR; INTRAVENOUS at 12:56

## 2023-04-02 RX ADMIN — IOPAMIDOL 100 ML: 755 INJECTION, SOLUTION INTRAVENOUS at 12:41

## 2023-04-02 ASSESSMENT — PAIN SCALES - GENERAL
PAINLEVEL_OUTOF10: 8

## 2023-04-02 ASSESSMENT — PAIN DESCRIPTION - LOCATION
LOCATION: RIB CAGE
LOCATION: RIB CAGE
LOCATION: BACK;CHEST;ABDOMEN

## 2023-04-02 ASSESSMENT — PAIN DESCRIPTION - ORIENTATION: ORIENTATION: RIGHT

## 2023-04-02 ASSESSMENT — PAIN DESCRIPTION - DESCRIPTORS
DESCRIPTORS: ACHING;SHARP
DESCRIPTORS: SHARP

## 2023-04-02 ASSESSMENT — PAIN DESCRIPTION - FREQUENCY: FREQUENCY: CONTINUOUS

## 2023-04-02 NOTE — DISCHARGE INSTRUCTIONS
Please continue to take the pain medication prescribed by the surgeon. I will provide you lidocaine patches, do not place them over the incision sites. Present for reevaluation if you begin to experience any shortness of breath, chest pain, or high fevers. Please follow-up with your primary care provider on 4/6.

## 2023-04-02 NOTE — ED TRIAGE NOTES
Pt arrives seated on wheelchair with c/o \"lung surgery\" and states she has increased pain at incision sites. Chart show right lower lobectomy and mediastinal lymphadenectomy. Has hx of lung CA. States the pain has progressively worsened over the last four days. Reports taking gabapentin and Tylenol without relief of pain. Denies shortness of breath, chest pain, fever, or body aches.

## 2023-04-02 NOTE — ED NOTES
I have reviewed discharge instructions with the patient. The patient verbalized understanding. Patient left ED via Discharge Method: ambulatory to Home with (daughter). Opportunity for questions and clarification provided. Patient given 1 scripts. To continue your aftercare when you leave the hospital, you may receive an automated call from our care team to check in on how you are doing. This is a free service and part of our promise to provide the best care and service to meet your aftercare needs.  If you have questions, or wish to unsubscribe from this service please call [phone]. Thank you for Choosing our [facility] Emergency Department.        Cat Grider RN  04/02/23 0510

## 2023-04-02 NOTE — ED PROVIDER NOTES
with a chaperone present. Constitutional:       General: She is not in acute distress. Appearance: Normal appearance. She is not ill-appearing, toxic-appearing or diaphoretic. Interventions: She is not intubated. HENT:      Head: Normocephalic and atraumatic. Right Ear: Tympanic membrane, ear canal and external ear normal. There is no impacted cerumen. Left Ear: Tympanic membrane, ear canal and external ear normal. There is no impacted cerumen. Nose: Nose normal. No congestion or rhinorrhea. Mouth/Throat:      Mouth: Mucous membranes are moist.      Pharynx: Oropharynx is clear. No oropharyngeal exudate or posterior oropharyngeal erythema. Neck:      Vascular: No carotid bruit. Cardiovascular:      Rate and Rhythm: Normal rate and regular rhythm. Pulses: Normal pulses. Heart sounds: No murmur heard. No friction rub. No gallop. Pulmonary:      Effort: Pulmonary effort is normal. No tachypnea, bradypnea, accessory muscle usage, prolonged expiration, respiratory distress or retractions. She is not intubated. Breath sounds: No stridor, decreased air movement or transmitted upper airway sounds. Examination of the right-lower field reveals decreased breath sounds. Decreased breath sounds present. No wheezing, rhonchi or rales. Chest:      Chest wall: Tenderness present. No mass, deformity, swelling or crepitus. There is no dullness to percussion. Comments: Well healing incision sites. Tenderness to palpation around the incisions. Does appear to be some evidence of swelling around the incisions, No palpable crepitus. Musculoskeletal:      Cervical back: Normal range of motion. No rigidity or tenderness. Lymphadenopathy:      Cervical: No cervical adenopathy. Neurological:      Mental Status: She is alert.         Procedures     Orders Placed This Encounter   Procedures    XR CHEST (2 VW)    CT CHEST PULMONARY EMBOLISM W CONTRAST    CBC with Auto

## 2023-04-05 ENCOUNTER — CARE COORDINATION (OUTPATIENT)
Dept: CARE COORDINATION | Facility: CLINIC | Age: 83
End: 2023-04-05

## 2023-04-05 NOTE — CARE COORDINATION
[facility] Care Transitions Follow Up Call    Patient Current Location:  Home: [address] [place] [address] Care Coordinator contacted the patient by telephone to follow up after admission on 3/26/23-3/28/23. Returned to ED on 23 due to chest wall pain. Verified name and  with patient as identifiers. Patient: Sandy Ortega  Patient : 1940   MRN: [id number]  Reason for Admission: Sepsis (3/26/23-3/28/23)  Chest Wall pain (ED visit 23)   Discharge Date: 23 RARS: Readmission Risk Score: 26.1      Needs to be reviewed by the provider   Additional needs identified to be addressed with provider: No  none             Method of communication with provider: none. LEONOR for admission 3/26/23-3/28/23 due to Sepsis. Patient was seen again in ED on 23 for chest wall pain. Patient given lidocaine patches to place around laparoscopic operative sites from her lobectomy on 3/13/23. Patient verbalizes  understanding she is not to place lidocaine patches directly on the incisions. Mrs. Kim Chappell states the patches were too expensive and she opted for over the counter strength patches. Discussed [facility] [address] where they have a coupon for her prescribed dosage. Mrs. Kim Chappell states she will have  her daughter look the coupon up as she does not have a smartphone to receive the coupon. Patient has finished her antibiotics prescribed on d/c 3/28/23 and saw her PCP on 23 Patient will follow up with nurse at her PCP  to check her incisions. Reviewed upcoming appointments which Mrs. Kim Chappell states she is aware of. No further questions or concerns voiced at this time. Addressed changes since last contact:   see above  Discussed follow-up appointments. If no appointment was previously scheduled, appointment scheduling offered: No.   Is follow up appointment scheduled within 7 days of discharge? completed.     Follow Up  Future Appointments   Date Time Provider Bo Jj

## 2023-04-19 ENCOUNTER — HOSPITAL ENCOUNTER (INPATIENT)
Age: 83
LOS: 4 days | Discharge: HOME | DRG: 438 | End: 2023-04-23
Attending: EMERGENCY MEDICINE | Admitting: INTERNAL MEDICINE
Payer: MEDICARE

## 2023-04-19 DIAGNOSIS — K85.90 ACUTE PANCREATITIS, UNSPECIFIED COMPLICATION STATUS, UNSPECIFIED PANCREATITIS TYPE: Primary | ICD-10-CM

## 2023-04-19 PROBLEM — R93.3 ABNORMAL FINDING ON GI TRACT IMAGING: Status: ACTIVE | Noted: 2023-04-19

## 2023-04-19 PROBLEM — D64.9 ANEMIA: Chronic | Status: ACTIVE | Noted: 2023-04-19

## 2023-04-19 LAB
ALBUMIN SERPL-MCNC: 3.1 G/DL (ref 3.2–4.6)
ALBUMIN/GLOB SERPL: 0.9 (ref 0.4–1.6)
ALP SERPL-CCNC: 77 U/L (ref 50–136)
ALT SERPL-CCNC: 18 U/L (ref 12–65)
ANION GAP SERPL CALC-SCNC: 7 MMOL/L (ref 2–11)
APPEARANCE UR: CLEAR
AST SERPL-CCNC: 51 U/L (ref 15–37)
BASOPHILS # BLD: 0.1 K/UL (ref 0–0.2)
BASOPHILS NFR BLD: 1 % (ref 0–2)
BILIRUB SERPL-MCNC: 0.4 MG/DL (ref 0.2–1.1)
BILIRUB UR QL: NEGATIVE
BUN SERPL-MCNC: 11 MG/DL (ref 8–23)
CALCIUM SERPL-MCNC: 9 MG/DL (ref 8.3–10.4)
CHLORIDE SERPL-SCNC: 108 MMOL/L (ref 101–110)
CO2 SERPL-SCNC: 22 MMOL/L (ref 21–32)
COLOR UR: NORMAL
CREAT SERPL-MCNC: 0.7 MG/DL (ref 0.6–1)
DIFFERENTIAL METHOD BLD: ABNORMAL
EKG ATRIAL RATE: 77 BPM
EKG DIAGNOSIS: NORMAL
EKG P AXIS: 79 DEGREES
EKG P-R INTERVAL: 185 MS
EKG Q-T INTERVAL: 395 MS
EKG QRS DURATION: 78 MS
EKG QTC CALCULATION (BAZETT): 445 MS
EKG R AXIS: 75 DEGREES
EKG T AXIS: 46 DEGREES
EKG VENTRICULAR RATE: 76 BPM
EOSINOPHIL # BLD: 0.2 K/UL (ref 0–0.8)
EOSINOPHIL NFR BLD: 1 % (ref 0.5–7.8)
ERYTHROCYTE [DISTWIDTH] IN BLOOD BY AUTOMATED COUNT: 17.2 % (ref 11.9–14.6)
GLOBULIN SER CALC-MCNC: 3.3 G/DL (ref 2.8–4.5)
GLUCOSE SERPL-MCNC: 100 MG/DL (ref 65–100)
GLUCOSE UR STRIP.AUTO-MCNC: NEGATIVE MG/DL
HCT VFR BLD AUTO: 35.5 % (ref 35.8–46.3)
HGB BLD-MCNC: 11.4 G/DL (ref 11.7–15.4)
HGB UR QL STRIP: NEGATIVE
IMM GRANULOCYTES # BLD AUTO: 0 K/UL (ref 0–0.5)
IMM GRANULOCYTES NFR BLD AUTO: 0 % (ref 0–5)
KETONES UR QL STRIP.AUTO: NEGATIVE MG/DL
LACTATE SERPL-SCNC: 1.6 MMOL/L (ref 0.4–2)
LEUKOCYTE ESTERASE UR QL STRIP.AUTO: NEGATIVE
LIPASE SERPL-CCNC: 7902 U/L (ref 73–393)
LYMPHOCYTES # BLD: 1.4 K/UL (ref 0.5–4.6)
LYMPHOCYTES NFR BLD: 11 % (ref 13–44)
MAGNESIUM SERPL-MCNC: 1.8 MG/DL (ref 1.8–2.4)
MCH RBC QN AUTO: 25.9 PG (ref 26.1–32.9)
MCHC RBC AUTO-ENTMCNC: 32.1 G/DL (ref 31.4–35)
MCV RBC AUTO: 80.7 FL (ref 82–102)
MONOCYTES # BLD: 0.7 K/UL (ref 0.1–1.3)
MONOCYTES NFR BLD: 6 % (ref 4–12)
NEUTS SEG # BLD: 10.2 K/UL (ref 1.7–8.2)
NEUTS SEG NFR BLD: 81 % (ref 43–78)
NITRITE UR QL STRIP.AUTO: NEGATIVE
NRBC # BLD: 0 K/UL (ref 0–0.2)
PH UR STRIP: 6.5 (ref 5–9)
PLATELET # BLD AUTO: 441 K/UL (ref 150–450)
PMV BLD AUTO: 8.8 FL (ref 9.4–12.3)
POTASSIUM SERPL-SCNC: 4.6 MMOL/L (ref 3.5–5.1)
PROT SERPL-MCNC: 6.4 G/DL (ref 6.3–8.2)
PROT UR STRIP-MCNC: NEGATIVE MG/DL
RBC # BLD AUTO: 4.4 M/UL (ref 4.05–5.2)
SODIUM SERPL-SCNC: 137 MMOL/L (ref 133–143)
SP GR UR REFRACTOMETRY: 1.01 (ref 1–1.02)
TROPONIN I SERPL HS-MCNC: 10.6 PG/ML (ref 0–37)
UROBILINOGEN UR QL STRIP.AUTO: 0.2 EU/DL (ref 0.2–1)
WBC # BLD AUTO: 12.6 K/UL (ref 4.3–11.1)

## 2023-04-19 PROCEDURE — 99285 EMERGENCY DEPT VISIT HI MDM: CPT

## 2023-04-19 PROCEDURE — 2500000003 HC RX 250 WO HCPCS: Performed by: INTERNAL MEDICINE

## 2023-04-19 PROCEDURE — APPSS60 APP SPLIT SHARED TIME 46-60 MINUTES: Performed by: NURSE PRACTITIONER

## 2023-04-19 PROCEDURE — 6370000000 HC RX 637 (ALT 250 FOR IP): Performed by: INTERNAL MEDICINE

## 2023-04-19 PROCEDURE — 99222 1ST HOSP IP/OBS MODERATE 55: CPT | Performed by: INTERNAL MEDICINE

## 2023-04-19 PROCEDURE — 83605 ASSAY OF LACTIC ACID: CPT

## 2023-04-19 PROCEDURE — 2500000003 HC RX 250 WO HCPCS: Performed by: EMERGENCY MEDICINE

## 2023-04-19 PROCEDURE — 93005 ELECTROCARDIOGRAM TRACING: CPT | Performed by: EMERGENCY MEDICINE

## 2023-04-19 PROCEDURE — 6360000002 HC RX W HCPCS: Performed by: INTERNAL MEDICINE

## 2023-04-19 PROCEDURE — 2580000003 HC RX 258: Performed by: EMERGENCY MEDICINE

## 2023-04-19 PROCEDURE — 83735 ASSAY OF MAGNESIUM: CPT

## 2023-04-19 PROCEDURE — 2580000003 HC RX 258: Performed by: INTERNAL MEDICINE

## 2023-04-19 PROCEDURE — 1100000000 HC RM PRIVATE

## 2023-04-19 PROCEDURE — 6360000002 HC RX W HCPCS: Performed by: EMERGENCY MEDICINE

## 2023-04-19 PROCEDURE — 80053 COMPREHEN METABOLIC PANEL: CPT

## 2023-04-19 PROCEDURE — 96374 THER/PROPH/DIAG INJ IV PUSH: CPT

## 2023-04-19 PROCEDURE — 96375 TX/PRO/DX INJ NEW DRUG ADDON: CPT

## 2023-04-19 PROCEDURE — 85025 COMPLETE CBC W/AUTO DIFF WBC: CPT

## 2023-04-19 PROCEDURE — 83690 ASSAY OF LIPASE: CPT

## 2023-04-19 PROCEDURE — A4216 STERILE WATER/SALINE, 10 ML: HCPCS | Performed by: EMERGENCY MEDICINE

## 2023-04-19 PROCEDURE — 84484 ASSAY OF TROPONIN QUANT: CPT

## 2023-04-19 PROCEDURE — 81003 URINALYSIS AUTO W/O SCOPE: CPT

## 2023-04-19 RX ORDER — ENOXAPARIN SODIUM 100 MG/ML
30 INJECTION SUBCUTANEOUS DAILY
Status: DISCONTINUED | OUTPATIENT
Start: 2023-04-20 | End: 2023-04-23 | Stop reason: HOSPADM

## 2023-04-19 RX ORDER — ONDANSETRON 4 MG/1
4 TABLET, ORALLY DISINTEGRATING ORAL EVERY 8 HOURS PRN
Status: DISCONTINUED | OUTPATIENT
Start: 2023-04-19 | End: 2023-04-23 | Stop reason: HOSPADM

## 2023-04-19 RX ORDER — PRAVASTATIN SODIUM 20 MG
40 TABLET ORAL NIGHTLY
Status: DISCONTINUED | OUTPATIENT
Start: 2023-04-19 | End: 2023-04-23 | Stop reason: HOSPADM

## 2023-04-19 RX ORDER — SODIUM CHLORIDE 9 MG/ML
INJECTION, SOLUTION INTRAVENOUS CONTINUOUS
Status: DISCONTINUED | OUTPATIENT
Start: 2023-04-19 | End: 2023-04-20

## 2023-04-19 RX ORDER — 0.9 % SODIUM CHLORIDE 0.9 %
1000 INTRAVENOUS SOLUTION INTRAVENOUS
Status: COMPLETED | OUTPATIENT
Start: 2023-04-19 | End: 2023-04-19

## 2023-04-19 RX ORDER — LOSARTAN POTASSIUM 25 MG/1
25 TABLET ORAL DAILY
Status: DISCONTINUED | OUTPATIENT
Start: 2023-04-19 | End: 2023-04-23 | Stop reason: HOSPADM

## 2023-04-19 RX ORDER — ONDANSETRON 2 MG/ML
4 INJECTION INTRAMUSCULAR; INTRAVENOUS
Status: COMPLETED | OUTPATIENT
Start: 2023-04-19 | End: 2023-04-19

## 2023-04-19 RX ORDER — DONEPEZIL HYDROCHLORIDE 5 MG/1
5 TABLET, FILM COATED ORAL NIGHTLY
Status: DISCONTINUED | OUTPATIENT
Start: 2023-04-19 | End: 2023-04-23 | Stop reason: HOSPADM

## 2023-04-19 RX ORDER — HYDROMORPHONE HYDROCHLORIDE 1 MG/ML
0.5 INJECTION, SOLUTION INTRAMUSCULAR; INTRAVENOUS; SUBCUTANEOUS
Status: COMPLETED | OUTPATIENT
Start: 2023-04-19 | End: 2023-04-19

## 2023-04-19 RX ORDER — ACETAMINOPHEN 325 MG/1
650 TABLET ORAL EVERY 6 HOURS PRN
Status: DISCONTINUED | OUTPATIENT
Start: 2023-04-19 | End: 2023-04-23 | Stop reason: HOSPADM

## 2023-04-19 RX ORDER — POLYETHYLENE GLYCOL 3350 17 G/17G
17 POWDER, FOR SOLUTION ORAL DAILY PRN
Status: DISCONTINUED | OUTPATIENT
Start: 2023-04-19 | End: 2023-04-23 | Stop reason: HOSPADM

## 2023-04-19 RX ORDER — ACETAMINOPHEN 650 MG/1
650 SUPPOSITORY RECTAL EVERY 6 HOURS PRN
Status: DISCONTINUED | OUTPATIENT
Start: 2023-04-19 | End: 2023-04-23 | Stop reason: HOSPADM

## 2023-04-19 RX ORDER — HYDRALAZINE HYDROCHLORIDE 20 MG/ML
5 INJECTION INTRAMUSCULAR; INTRAVENOUS EVERY 6 HOURS PRN
Status: DISCONTINUED | OUTPATIENT
Start: 2023-04-19 | End: 2023-04-22

## 2023-04-19 RX ORDER — SODIUM CHLORIDE 0.9 % (FLUSH) 0.9 %
5-40 SYRINGE (ML) INJECTION PRN
Status: DISCONTINUED | OUTPATIENT
Start: 2023-04-19 | End: 2023-04-23 | Stop reason: HOSPADM

## 2023-04-19 RX ORDER — SODIUM CHLORIDE 9 MG/ML
INJECTION, SOLUTION INTRAVENOUS PRN
Status: DISCONTINUED | OUTPATIENT
Start: 2023-04-19 | End: 2023-04-23 | Stop reason: HOSPADM

## 2023-04-19 RX ORDER — HYDROMORPHONE HYDROCHLORIDE 1 MG/ML
0.25 INJECTION, SOLUTION INTRAMUSCULAR; INTRAVENOUS; SUBCUTANEOUS EVERY 4 HOURS PRN
Status: DISCONTINUED | OUTPATIENT
Start: 2023-04-19 | End: 2023-04-20

## 2023-04-19 RX ORDER — ONDANSETRON 2 MG/ML
4 INJECTION INTRAMUSCULAR; INTRAVENOUS EVERY 6 HOURS PRN
Status: DISCONTINUED | OUTPATIENT
Start: 2023-04-19 | End: 2023-04-23 | Stop reason: HOSPADM

## 2023-04-19 RX ORDER — SODIUM CHLORIDE 0.9 % (FLUSH) 0.9 %
5-40 SYRINGE (ML) INJECTION EVERY 12 HOURS SCHEDULED
Status: DISCONTINUED | OUTPATIENT
Start: 2023-04-19 | End: 2023-04-23 | Stop reason: HOSPADM

## 2023-04-19 RX ADMIN — ONDANSETRON 4 MG: 2 INJECTION INTRAMUSCULAR; INTRAVENOUS at 07:20

## 2023-04-19 RX ADMIN — SODIUM CHLORIDE, PRESERVATIVE FREE 10 ML: 5 INJECTION INTRAVENOUS at 23:24

## 2023-04-19 RX ADMIN — FAMOTIDINE 20 MG: 10 INJECTION, SOLUTION INTRAVENOUS at 07:21

## 2023-04-19 RX ADMIN — ONDANSETRON 4 MG: 4 TABLET, ORALLY DISINTEGRATING ORAL at 20:53

## 2023-04-19 RX ADMIN — ONDANSETRON 4 MG: 4 TABLET, ORALLY DISINTEGRATING ORAL at 10:25

## 2023-04-19 RX ADMIN — SODIUM CHLORIDE: 9 INJECTION, SOLUTION INTRAVENOUS at 11:00

## 2023-04-19 RX ADMIN — SODIUM CHLORIDE 1000 ML: 9 INJECTION, SOLUTION INTRAVENOUS at 09:08

## 2023-04-19 RX ADMIN — HYDROMORPHONE HYDROCHLORIDE 0.5 MG: 1 INJECTION, SOLUTION INTRAMUSCULAR; INTRAVENOUS; SUBCUTANEOUS at 07:20

## 2023-04-19 RX ADMIN — TUBERCULIN PURIFIED PROTEIN DERIVATIVE 5 UNITS: 5 INJECTION, SOLUTION INTRADERMAL at 09:56

## 2023-04-19 RX ADMIN — PRAVASTATIN SODIUM 40 MG: 20 TABLET ORAL at 20:51

## 2023-04-19 RX ADMIN — DONEPEZIL HYDROCHLORIDE 5 MG: 5 TABLET, FILM COATED ORAL at 20:52

## 2023-04-19 RX ADMIN — ACETAMINOPHEN 650 MG: 325 TABLET ORAL at 20:58

## 2023-04-19 RX ADMIN — HYDROMORPHONE HYDROCHLORIDE 0.25 MG: 1 INJECTION, SOLUTION INTRAMUSCULAR; INTRAVENOUS; SUBCUTANEOUS at 15:48

## 2023-04-19 RX ADMIN — HYDROMORPHONE HYDROCHLORIDE 0.5 MG: 1 INJECTION, SOLUTION INTRAMUSCULAR; INTRAVENOUS; SUBCUTANEOUS at 09:08

## 2023-04-19 ASSESSMENT — PAIN DESCRIPTION - ORIENTATION
ORIENTATION: MID
ORIENTATION: RIGHT;UPPER
ORIENTATION: LEFT
ORIENTATION: RIGHT;LEFT
ORIENTATION: RIGHT;LEFT
ORIENTATION: MID

## 2023-04-19 ASSESSMENT — PAIN SCALES - GENERAL
PAINLEVEL_OUTOF10: 3
PAINLEVEL_OUTOF10: 9
PAINLEVEL_OUTOF10: 9
PAINLEVEL_OUTOF10: 0
PAINLEVEL_OUTOF10: 10
PAINLEVEL_OUTOF10: 7

## 2023-04-19 ASSESSMENT — PAIN DESCRIPTION - LOCATION
LOCATION: ABDOMEN
LOCATION: CHEST
LOCATION: ABDOMEN

## 2023-04-19 ASSESSMENT — PAIN DESCRIPTION - DESCRIPTORS
DESCRIPTORS: ACHING
DESCRIPTORS: ACHING
DESCRIPTORS: ACHING;SHARP
DESCRIPTORS: ACHING
DESCRIPTORS: ACHING;SHARP

## 2023-04-19 ASSESSMENT — ENCOUNTER SYMPTOMS
ABDOMINAL PAIN: 1
NAUSEA: 1
VOMITING: 1
BLOOD IN STOOL: 0
CONSTIPATION: 0
DIARRHEA: 0

## 2023-04-19 ASSESSMENT — PAIN - FUNCTIONAL ASSESSMENT: PAIN_FUNCTIONAL_ASSESSMENT: 0-10

## 2023-04-20 ENCOUNTER — CARE COORDINATION (OUTPATIENT)
Dept: CARE COORDINATION | Facility: CLINIC | Age: 83
End: 2023-04-20

## 2023-04-20 LAB
ALBUMIN SERPL-MCNC: 2.4 G/DL (ref 3.2–4.6)
ALBUMIN/GLOB SERPL: 0.8 (ref 0.4–1.6)
ALP SERPL-CCNC: 63 U/L (ref 50–136)
ALT SERPL-CCNC: 9 U/L (ref 12–65)
ANION GAP SERPL CALC-SCNC: 3 MMOL/L (ref 2–11)
AST SERPL-CCNC: 10 U/L (ref 15–37)
BASOPHILS # BLD: 0.1 K/UL (ref 0–0.2)
BASOPHILS NFR BLD: 0 % (ref 0–2)
BILIRUB SERPL-MCNC: 0.5 MG/DL (ref 0.2–1.1)
BUN SERPL-MCNC: 5 MG/DL (ref 8–23)
CALCIUM SERPL-MCNC: 7.7 MG/DL (ref 8.3–10.4)
CHLORIDE SERPL-SCNC: 112 MMOL/L (ref 101–110)
CO2 SERPL-SCNC: 22 MMOL/L (ref 21–32)
CREAT SERPL-MCNC: 0.4 MG/DL (ref 0.6–1)
DIFFERENTIAL METHOD BLD: ABNORMAL
EOSINOPHIL # BLD: 0 K/UL (ref 0–0.8)
EOSINOPHIL NFR BLD: 0 % (ref 0.5–7.8)
ERYTHROCYTE [DISTWIDTH] IN BLOOD BY AUTOMATED COUNT: 17.2 % (ref 11.9–14.6)
GLOBULIN SER CALC-MCNC: 3.2 G/DL (ref 2.8–4.5)
GLUCOSE SERPL-MCNC: 86 MG/DL (ref 65–100)
HCT VFR BLD AUTO: 31.6 % (ref 35.8–46.3)
HGB BLD-MCNC: 10 G/DL (ref 11.7–15.4)
IMM GRANULOCYTES # BLD AUTO: 0.1 K/UL (ref 0–0.5)
IMM GRANULOCYTES NFR BLD AUTO: 1 % (ref 0–5)
LIPASE SERPL-CCNC: 2354 U/L (ref 73–393)
LYMPHOCYTES # BLD: 0.8 K/UL (ref 0.5–4.6)
LYMPHOCYTES NFR BLD: 5 % (ref 13–44)
MAGNESIUM SERPL-MCNC: 1.5 MG/DL (ref 1.8–2.4)
MCH RBC QN AUTO: 25.8 PG (ref 26.1–32.9)
MCHC RBC AUTO-ENTMCNC: 31.6 G/DL (ref 31.4–35)
MCV RBC AUTO: 81.4 FL (ref 82–102)
MM INDURATION, POC: 0 MM (ref 0–5)
MONOCYTES # BLD: 1 K/UL (ref 0.1–1.3)
MONOCYTES NFR BLD: 6 % (ref 4–12)
NEUTS SEG # BLD: 14.3 K/UL (ref 1.7–8.2)
NEUTS SEG NFR BLD: 88 % (ref 43–78)
NRBC # BLD: 0 K/UL (ref 0–0.2)
PLATELET # BLD AUTO: 337 K/UL (ref 150–450)
PMV BLD AUTO: 8.7 FL (ref 9.4–12.3)
POTASSIUM SERPL-SCNC: 2.8 MMOL/L (ref 3.5–5.1)
PPD, POC: NEGATIVE
PROT SERPL-MCNC: 5.6 G/DL (ref 6.3–8.2)
RBC # BLD AUTO: 3.88 M/UL (ref 4.05–5.2)
SODIUM SERPL-SCNC: 137 MMOL/L (ref 133–143)
WBC # BLD AUTO: 16.3 K/UL (ref 4.3–11.1)

## 2023-04-20 PROCEDURE — 2580000003 HC RX 258: Performed by: INTERNAL MEDICINE

## 2023-04-20 PROCEDURE — 6360000002 HC RX W HCPCS: Performed by: INTERNAL MEDICINE

## 2023-04-20 PROCEDURE — 80053 COMPREHEN METABOLIC PANEL: CPT

## 2023-04-20 PROCEDURE — 83735 ASSAY OF MAGNESIUM: CPT

## 2023-04-20 PROCEDURE — 83690 ASSAY OF LIPASE: CPT

## 2023-04-20 PROCEDURE — 1100000000 HC RM PRIVATE

## 2023-04-20 PROCEDURE — 6370000000 HC RX 637 (ALT 250 FOR IP): Performed by: INTERNAL MEDICINE

## 2023-04-20 PROCEDURE — 85025 COMPLETE CBC W/AUTO DIFF WBC: CPT

## 2023-04-20 PROCEDURE — 36415 COLL VENOUS BLD VENIPUNCTURE: CPT

## 2023-04-20 RX ORDER — SODIUM CHLORIDE, SODIUM LACTATE, POTASSIUM CHLORIDE, CALCIUM CHLORIDE 600; 310; 30; 20 MG/100ML; MG/100ML; MG/100ML; MG/100ML
INJECTION, SOLUTION INTRAVENOUS CONTINUOUS
Status: DISCONTINUED | OUTPATIENT
Start: 2023-04-20 | End: 2023-04-21

## 2023-04-20 RX ORDER — MORPHINE SULFATE 2 MG/ML
2 INJECTION, SOLUTION INTRAMUSCULAR; INTRAVENOUS EVERY 4 HOURS PRN
Status: DISCONTINUED | OUTPATIENT
Start: 2023-04-20 | End: 2023-04-23 | Stop reason: HOSPADM

## 2023-04-20 RX ADMIN — ONDANSETRON 4 MG: 4 TABLET, ORALLY DISINTEGRATING ORAL at 18:36

## 2023-04-20 RX ADMIN — SODIUM CHLORIDE, POTASSIUM CHLORIDE, SODIUM LACTATE AND CALCIUM CHLORIDE: 600; 310; 30; 20 INJECTION, SOLUTION INTRAVENOUS at 18:27

## 2023-04-20 RX ADMIN — LOSARTAN POTASSIUM 25 MG: 25 TABLET, FILM COATED ORAL at 10:03

## 2023-04-20 RX ADMIN — ONDANSETRON 4 MG: 4 TABLET, ORALLY DISINTEGRATING ORAL at 04:08

## 2023-04-20 RX ADMIN — SODIUM CHLORIDE, PRESERVATIVE FREE 10 ML: 5 INJECTION INTRAVENOUS at 10:04

## 2023-04-20 RX ADMIN — SODIUM CHLORIDE: 9 INJECTION, SOLUTION INTRAVENOUS at 13:41

## 2023-04-20 RX ADMIN — PRAVASTATIN SODIUM 40 MG: 20 TABLET ORAL at 20:20

## 2023-04-20 RX ADMIN — MORPHINE SULFATE 2 MG: 2 INJECTION, SOLUTION INTRAMUSCULAR; INTRAVENOUS at 18:38

## 2023-04-20 RX ADMIN — HYDROMORPHONE HYDROCHLORIDE 0.25 MG: 1 INJECTION, SOLUTION INTRAMUSCULAR; INTRAVENOUS; SUBCUTANEOUS at 04:08

## 2023-04-20 RX ADMIN — ENOXAPARIN SODIUM 30 MG: 100 INJECTION SUBCUTANEOUS at 10:03

## 2023-04-20 RX ADMIN — DONEPEZIL HYDROCHLORIDE 5 MG: 5 TABLET, FILM COATED ORAL at 20:21

## 2023-04-20 RX ADMIN — SODIUM CHLORIDE, PRESERVATIVE FREE 10 ML: 5 INJECTION INTRAVENOUS at 20:24

## 2023-04-20 ASSESSMENT — PAIN SCALES - GENERAL
PAINLEVEL_OUTOF10: 0
PAINLEVEL_OUTOF10: 7
PAINLEVEL_OUTOF10: 5

## 2023-04-20 ASSESSMENT — PAIN DESCRIPTION - DESCRIPTORS: DESCRIPTORS: THROBBING

## 2023-04-20 ASSESSMENT — PAIN DESCRIPTION - LOCATION
LOCATION: UMBILICUS
LOCATION: ABDOMEN

## 2023-04-20 ASSESSMENT — PAIN DESCRIPTION - ORIENTATION: ORIENTATION: ANTERIOR

## 2023-04-20 NOTE — CARE COORDINATION
Admitted to [facility] on 4/10/2023 for Acute recurrent pancreatitis. Plan to follow up pending discharge disposition.

## 2023-04-21 PROBLEM — E77.8 HYPOPROTEINEMIA (HCC): Status: ACTIVE | Noted: 2023-04-21

## 2023-04-21 PROBLEM — E87.6 HYPOKALEMIA: Status: ACTIVE | Noted: 2023-04-18

## 2023-04-21 PROBLEM — E83.42 HYPOMAGNESEMIA: Status: ACTIVE | Noted: 2023-04-18

## 2023-04-21 PROBLEM — E83.39 HYPOPHOSPHATEMIA: Status: ACTIVE | Noted: 2023-04-21

## 2023-04-21 LAB
ALBUMIN SERPL-MCNC: 2.1 G/DL (ref 3.2–4.6)
ALBUMIN/GLOB SERPL: 0.7 (ref 0.4–1.6)
ALP SERPL-CCNC: 56 U/L (ref 50–136)
ALT SERPL-CCNC: 10 U/L (ref 12–65)
ANION GAP SERPL CALC-SCNC: 5 MMOL/L (ref 2–11)
AST SERPL-CCNC: 10 U/L (ref 15–37)
BILIRUB SERPL-MCNC: 0.3 MG/DL (ref 0.2–1.1)
BUN SERPL-MCNC: 4 MG/DL (ref 8–23)
CALCIUM SERPL-MCNC: 8.1 MG/DL (ref 8.3–10.4)
CHLORIDE SERPL-SCNC: 112 MMOL/L (ref 101–110)
CO2 SERPL-SCNC: 20 MMOL/L (ref 21–32)
CREAT SERPL-MCNC: 0.6 MG/DL (ref 0.6–1)
GLOBULIN SER CALC-MCNC: 3.1 G/DL (ref 2.8–4.5)
GLUCOSE SERPL-MCNC: 142 MG/DL (ref 65–100)
MAGNESIUM SERPL-MCNC: 1.5 MG/DL (ref 1.8–2.4)
MM INDURATION, POC: 0 MM (ref 0–5)
PHOSPHATE SERPL-MCNC: 1.4 MG/DL (ref 2.3–3.7)
POTASSIUM SERPL-SCNC: 2.4 MMOL/L (ref 3.5–5.1)
POTASSIUM SERPL-SCNC: 4.9 MMOL/L (ref 3.5–5.1)
PPD, POC: NEGATIVE
PROT SERPL-MCNC: 5.2 G/DL (ref 6.3–8.2)
SODIUM SERPL-SCNC: 137 MMOL/L (ref 133–143)

## 2023-04-21 PROCEDURE — 1100000000 HC RM PRIVATE

## 2023-04-21 PROCEDURE — 36415 COLL VENOUS BLD VENIPUNCTURE: CPT

## 2023-04-21 PROCEDURE — 83735 ASSAY OF MAGNESIUM: CPT

## 2023-04-21 PROCEDURE — 80053 COMPREHEN METABOLIC PANEL: CPT

## 2023-04-21 PROCEDURE — 6370000000 HC RX 637 (ALT 250 FOR IP): Performed by: INTERNAL MEDICINE

## 2023-04-21 PROCEDURE — 6360000002 HC RX W HCPCS: Performed by: INTERNAL MEDICINE

## 2023-04-21 PROCEDURE — 84132 ASSAY OF SERUM POTASSIUM: CPT

## 2023-04-21 PROCEDURE — 84100 ASSAY OF PHOSPHORUS: CPT

## 2023-04-21 PROCEDURE — 2580000003 HC RX 258: Performed by: INTERNAL MEDICINE

## 2023-04-21 RX ORDER — MAGNESIUM SULFATE IN WATER 40 MG/ML
4000 INJECTION, SOLUTION INTRAVENOUS ONCE
Status: COMPLETED | OUTPATIENT
Start: 2023-04-21 | End: 2023-04-21

## 2023-04-21 RX ORDER — POTASSIUM CHLORIDE 20 MEQ/1
40 TABLET, EXTENDED RELEASE ORAL
Status: COMPLETED | OUTPATIENT
Start: 2023-04-21 | End: 2023-04-21

## 2023-04-21 RX ADMIN — ACETAMINOPHEN 650 MG: 325 TABLET ORAL at 11:56

## 2023-04-21 RX ADMIN — PRAVASTATIN SODIUM 40 MG: 20 TABLET ORAL at 21:03

## 2023-04-21 RX ADMIN — SODIUM CHLORIDE, PRESERVATIVE FREE 10 ML: 5 INJECTION INTRAVENOUS at 21:03

## 2023-04-21 RX ADMIN — POTASSIUM CHLORIDE 40 MEQ: 1500 TABLET, EXTENDED RELEASE ORAL at 11:48

## 2023-04-21 RX ADMIN — DONEPEZIL HYDROCHLORIDE 5 MG: 5 TABLET, FILM COATED ORAL at 21:03

## 2023-04-21 RX ADMIN — POTASSIUM & SODIUM PHOSPHATES POWDER PACK 280-160-250 MG 250 MG: 280-160-250 PACK at 22:53

## 2023-04-21 RX ADMIN — MORPHINE SULFATE 2 MG: 2 INJECTION, SOLUTION INTRAMUSCULAR; INTRAVENOUS at 18:51

## 2023-04-21 RX ADMIN — SODIUM CHLORIDE, PRESERVATIVE FREE 10 ML: 5 INJECTION INTRAVENOUS at 09:32

## 2023-04-21 RX ADMIN — POTASSIUM & SODIUM PHOSPHATES POWDER PACK 280-160-250 MG 250 MG: 280-160-250 PACK at 14:42

## 2023-04-21 RX ADMIN — POTASSIUM CHLORIDE: 2 INJECTION, SOLUTION, CONCENTRATE INTRAVENOUS at 14:42

## 2023-04-21 RX ADMIN — POTASSIUM CHLORIDE 40 MEQ: 1500 TABLET, EXTENDED RELEASE ORAL at 18:13

## 2023-04-21 RX ADMIN — ENOXAPARIN SODIUM 30 MG: 100 INJECTION SUBCUTANEOUS at 09:29

## 2023-04-21 RX ADMIN — MAGNESIUM SULFATE HEPTAHYDRATE 4000 MG: 40 INJECTION, SOLUTION INTRAVENOUS at 11:49

## 2023-04-21 RX ADMIN — POTASSIUM & SODIUM PHOSPHATES POWDER PACK 280-160-250 MG 250 MG: 280-160-250 PACK at 18:14

## 2023-04-21 RX ADMIN — POTASSIUM CHLORIDE 40 MEQ: 1500 TABLET, EXTENDED RELEASE ORAL at 09:29

## 2023-04-21 RX ADMIN — LOSARTAN POTASSIUM 25 MG: 25 TABLET, FILM COATED ORAL at 09:29

## 2023-04-21 RX ADMIN — POTASSIUM & SODIUM PHOSPHATES POWDER PACK 280-160-250 MG 250 MG: 280-160-250 PACK at 11:48

## 2023-04-21 ASSESSMENT — PAIN SCALES - GENERAL
PAINLEVEL_OUTOF10: 5
PAINLEVEL_OUTOF10: 0

## 2023-04-21 ASSESSMENT — PAIN DESCRIPTION - LOCATION: LOCATION: UMBILICUS

## 2023-04-21 ASSESSMENT — PAIN DESCRIPTION - DESCRIPTORS: DESCRIPTORS: CRAMPING;SORE

## 2023-04-21 ASSESSMENT — PAIN DESCRIPTION - ORIENTATION: ORIENTATION: ANTERIOR

## 2023-04-22 PROBLEM — E43 SEVERE PROTEIN-CALORIE MALNUTRITION (HCC): Status: ACTIVE | Noted: 2022-12-22

## 2023-04-22 LAB
ALBUMIN SERPL-MCNC: 2.3 G/DL (ref 3.2–4.6)
ANION GAP SERPL CALC-SCNC: 2 MMOL/L (ref 2–11)
BUN SERPL-MCNC: 5 MG/DL (ref 8–23)
CALCIUM SERPL-MCNC: 8.2 MG/DL (ref 8.3–10.4)
CHLORIDE SERPL-SCNC: 114 MMOL/L (ref 101–110)
CO2 SERPL-SCNC: 22 MMOL/L (ref 21–32)
CREAT SERPL-MCNC: 0.5 MG/DL (ref 0.6–1)
GLUCOSE SERPL-MCNC: 80 MG/DL (ref 65–100)
LIPASE SERPL-CCNC: 397 U/L (ref 73–393)
MAGNESIUM SERPL-MCNC: 2.1 MG/DL (ref 1.8–2.4)
PHOSPHATE SERPL-MCNC: 2.5 MG/DL (ref 2.3–3.7)
POTASSIUM SERPL-SCNC: 5 MMOL/L (ref 3.5–5.1)
SODIUM SERPL-SCNC: 138 MMOL/L (ref 133–143)

## 2023-04-22 PROCEDURE — 83690 ASSAY OF LIPASE: CPT

## 2023-04-22 PROCEDURE — 80069 RENAL FUNCTION PANEL: CPT

## 2023-04-22 PROCEDURE — 36415 COLL VENOUS BLD VENIPUNCTURE: CPT

## 2023-04-22 PROCEDURE — 6360000002 HC RX W HCPCS: Performed by: INTERNAL MEDICINE

## 2023-04-22 PROCEDURE — 6370000000 HC RX 637 (ALT 250 FOR IP): Performed by: INTERNAL MEDICINE

## 2023-04-22 PROCEDURE — 83735 ASSAY OF MAGNESIUM: CPT

## 2023-04-22 PROCEDURE — 2580000003 HC RX 258: Performed by: INTERNAL MEDICINE

## 2023-04-22 PROCEDURE — 1100000000 HC RM PRIVATE

## 2023-04-22 RX ORDER — HYDRALAZINE HYDROCHLORIDE 20 MG/ML
10 INJECTION INTRAMUSCULAR; INTRAVENOUS ONCE
Status: COMPLETED | OUTPATIENT
Start: 2023-04-22 | End: 2023-04-22

## 2023-04-22 RX ORDER — HYDRALAZINE HYDROCHLORIDE 20 MG/ML
20 INJECTION INTRAMUSCULAR; INTRAVENOUS EVERY 6 HOURS PRN
Status: DISCONTINUED | OUTPATIENT
Start: 2023-04-22 | End: 2023-04-23 | Stop reason: HOSPADM

## 2023-04-22 RX ADMIN — ONDANSETRON 4 MG: 4 TABLET, ORALLY DISINTEGRATING ORAL at 20:40

## 2023-04-22 RX ADMIN — SODIUM CHLORIDE, PRESERVATIVE FREE 5 ML: 5 INJECTION INTRAVENOUS at 08:38

## 2023-04-22 RX ADMIN — DONEPEZIL HYDROCHLORIDE 5 MG: 5 TABLET, FILM COATED ORAL at 20:33

## 2023-04-22 RX ADMIN — MORPHINE SULFATE 2 MG: 2 INJECTION, SOLUTION INTRAMUSCULAR; INTRAVENOUS at 20:32

## 2023-04-22 RX ADMIN — PRAVASTATIN SODIUM 40 MG: 20 TABLET ORAL at 20:33

## 2023-04-22 RX ADMIN — SODIUM CHLORIDE, PRESERVATIVE FREE 10 ML: 5 INJECTION INTRAVENOUS at 20:33

## 2023-04-22 RX ADMIN — ENOXAPARIN SODIUM 30 MG: 100 INJECTION SUBCUTANEOUS at 08:33

## 2023-04-22 RX ADMIN — LOSARTAN POTASSIUM 25 MG: 25 TABLET, FILM COATED ORAL at 08:33

## 2023-04-22 RX ADMIN — HYDRALAZINE HYDROCHLORIDE 10 MG: 20 INJECTION INTRAMUSCULAR; INTRAVENOUS at 11:28

## 2023-04-22 RX ADMIN — HYDRALAZINE HYDROCHLORIDE 5 MG: 20 INJECTION INTRAMUSCULAR; INTRAVENOUS at 09:14

## 2023-04-22 RX ADMIN — ACETAMINOPHEN 650 MG: 325 TABLET ORAL at 15:40

## 2023-04-22 ASSESSMENT — PAIN DESCRIPTION - DESCRIPTORS
DESCRIPTORS: ACHING
DESCRIPTORS: DISCOMFORT

## 2023-04-22 ASSESSMENT — PAIN DESCRIPTION - LOCATION
LOCATION: ABDOMEN
LOCATION: ABDOMEN

## 2023-04-22 ASSESSMENT — PAIN SCALES - GENERAL
PAINLEVEL_OUTOF10: 3
PAINLEVEL_OUTOF10: 10

## 2023-04-22 ASSESSMENT — PAIN - FUNCTIONAL ASSESSMENT: PAIN_FUNCTIONAL_ASSESSMENT: ACTIVITIES ARE NOT PREVENTED

## 2023-04-22 ASSESSMENT — PAIN DESCRIPTION - ORIENTATION
ORIENTATION: MID
ORIENTATION: LOWER

## 2023-04-23 VITALS
WEIGHT: 85 LBS | HEART RATE: 74 BPM | HEIGHT: 61 IN | BODY MASS INDEX: 16.05 KG/M2 | SYSTOLIC BLOOD PRESSURE: 157 MMHG | DIASTOLIC BLOOD PRESSURE: 67 MMHG | RESPIRATION RATE: 17 BRPM | TEMPERATURE: 99.3 F | OXYGEN SATURATION: 95 %

## 2023-04-23 PROBLEM — E83.39 HYPOPHOSPHATEMIA: Status: RESOLVED | Noted: 2023-04-21 | Resolved: 2023-04-23

## 2023-04-23 PROBLEM — E87.6 HYPOKALEMIA: Status: RESOLVED | Noted: 2023-04-18 | Resolved: 2023-04-23

## 2023-04-23 PROBLEM — E83.42 HYPOMAGNESEMIA: Status: RESOLVED | Noted: 2023-04-18 | Resolved: 2023-04-23

## 2023-04-23 PROBLEM — K85.90 ACUTE RECURRENT PANCREATITIS: Status: RESOLVED | Noted: 2022-01-01 | Resolved: 2023-01-01

## 2023-04-23 LAB
ALBUMIN SERPL-MCNC: 2.5 G/DL (ref 3.2–4.6)
ANION GAP SERPL CALC-SCNC: 3 MMOL/L (ref 2–11)
BUN SERPL-MCNC: 5 MG/DL (ref 8–23)
CALCIUM SERPL-MCNC: 8.5 MG/DL (ref 8.3–10.4)
CHLORIDE SERPL-SCNC: 110 MMOL/L (ref 101–110)
CO2 SERPL-SCNC: 24 MMOL/L (ref 21–32)
CREAT SERPL-MCNC: 0.5 MG/DL (ref 0.6–1)
GLUCOSE SERPL-MCNC: 83 MG/DL (ref 65–100)
MAGNESIUM SERPL-MCNC: 1.8 MG/DL (ref 1.8–2.4)
PHOSPHATE SERPL-MCNC: 4.4 MG/DL (ref 2.3–3.7)
POTASSIUM SERPL-SCNC: 4 MMOL/L (ref 3.5–5.1)
SODIUM SERPL-SCNC: 137 MMOL/L (ref 133–143)

## 2023-04-23 PROCEDURE — 6360000002 HC RX W HCPCS: Performed by: INTERNAL MEDICINE

## 2023-04-23 PROCEDURE — 2580000003 HC RX 258: Performed by: INTERNAL MEDICINE

## 2023-04-23 PROCEDURE — 6370000000 HC RX 637 (ALT 250 FOR IP): Performed by: INTERNAL MEDICINE

## 2023-04-23 PROCEDURE — 80069 RENAL FUNCTION PANEL: CPT

## 2023-04-23 PROCEDURE — 83735 ASSAY OF MAGNESIUM: CPT

## 2023-04-23 PROCEDURE — 36415 COLL VENOUS BLD VENIPUNCTURE: CPT

## 2023-04-23 RX ADMIN — LOSARTAN POTASSIUM 25 MG: 25 TABLET, FILM COATED ORAL at 09:18

## 2023-04-23 RX ADMIN — SODIUM CHLORIDE, PRESERVATIVE FREE 5 ML: 5 INJECTION INTRAVENOUS at 09:20

## 2023-04-23 RX ADMIN — ENOXAPARIN SODIUM 30 MG: 100 INJECTION SUBCUTANEOUS at 09:18

## 2023-04-23 ASSESSMENT — PAIN SCALES - GENERAL: PAINLEVEL_OUTOF10: 0

## 2023-04-24 ENCOUNTER — TELEPHONE (OUTPATIENT)
Dept: RHEUMATOLOGY | Age: 83
End: 2023-04-24

## 2023-04-24 NOTE — TELEPHONE ENCOUNTER
Patient has been approved for assistance with Enbrel through [employer]. She did receive a call ans will receive her shipment on 4/26/23. Has been in the hospital for pancreatitis. This is a recurring issue at least 4 visits to ER since December. She was released yesterday. April 26th she will have a VV with the doctor in [place] and hopefully set up a trip in a couple of weeks to get down there to see him to discuss treatment of her pancreas. She is still trying to recover from her surgery on her lung. She also has a history of breast cancer. Scheduled for MRI of brain 5/1/23. Sees Dr. Kathy Coy at Cancer center 5/4/23 for follow up.

## 2023-04-25 ENCOUNTER — CARE COORDINATION (OUTPATIENT)
Dept: CARE COORDINATION | Facility: CLINIC | Age: 83
End: 2023-04-25

## 2023-04-26 ENCOUNTER — CARE COORDINATION (OUTPATIENT)
Dept: CARE COORDINATION | Facility: CLINIC | Age: 83
End: 2023-04-26

## 2023-04-26 NOTE — CARE COORDINATION
Care Transitions Outreach Attempt    Call within 2 business days of discharge: Yes   Attempted to reach patient for transitions of care follow up. Unable to reach patient. Patient: Sandy Ortega Patient : 1940 MRN: [id number]    Last Discharge  Street       Date Complaint Diagnosis Description Type Department Provider    23 Abdominal Pain Acute pancreatitis, unspecified complication status, unspecified pancreatitis type ED to Hosp-Admission (Discharged) (ADMITTED) [id number] Eileen Salas DO; Anastacio Cardoso. Was this an external facility discharge?  No Discharge Facility: N/A    Noted following upcoming appointments from discharge chart review:   [facility] follow up appointment(s):   Future Appointments   Date Time Provider Bo Jj   2023 11:45 AM [facility] MRI UNIT 1 SFDRMRI SFD   5/3/2023  7:40 AM Marcellus Rodriguez  [address] [facility] GVL AMB   [facility] [address]   2023  1:45 PM Elbert Guevara MD [facility] AMB   2023  8:40 AM Marcellus Rodriguez  [address] [facility] GVL AMB   10/6/2023  9:30 AM Jesus Bal MD [facility] GVL AMB     Non-[facility] follow up appointment(s): none noted

## 2023-05-03 ENCOUNTER — TELEMEDICINE (OUTPATIENT)
Dept: RHEUMATOLOGY | Age: 83
End: 2023-05-03
Payer: MEDICARE

## 2023-05-03 DIAGNOSIS — R91.8 RIGHT LOWER LOBE LUNG MASS: Primary | ICD-10-CM

## 2023-05-03 DIAGNOSIS — Z79.899 LONG-TERM USE OF HIGH-RISK MEDICATION: ICD-10-CM

## 2023-05-03 DIAGNOSIS — Z11.1 SCREENING EXAMINATION FOR PULMONARY TUBERCULOSIS: ICD-10-CM

## 2023-05-03 DIAGNOSIS — D64.9 ANEMIA, UNSPECIFIED TYPE: ICD-10-CM

## 2023-05-03 DIAGNOSIS — M05.79 SEROPOSITIVE RHEUMATOID ARTHRITIS OF MULTIPLE SITES (HCC): Primary | ICD-10-CM

## 2023-05-03 PROCEDURE — 99442 PR PHYS/QHP TELEPHONE EVALUATION 11-20 MIN: CPT | Performed by: INTERNAL MEDICINE

## 2023-05-03 NOTE — PROGRESS NOTES
03/15/2023 31.0 (L)  31.4 - 35.0 g/dL Final    RDW 03/15/2023 15.8 (H)  11.9 - 14.6 % Final    Platelets 98/34/9926 327  150 - 450 K/uL Final    MPV 03/15/2023 8.9 (L)  9.4 - 12.3 FL Final    nRBC 03/15/2023 0.00  0.0 - 0.2 K/uL Final    **Note: Absolute NRBC parameter is now reported with Hemogram**    Differential Type 03/15/2023 AUTOMATED    Final    Seg Neutrophils 03/15/2023 82 (H)  43 - 78 % Final    Lymphocytes 03/15/2023 7 (L)  13 - 44 % Final    Monocytes 03/15/2023 9  4.0 - 12.0 % Final    Eosinophils % 03/15/2023 1  0.5 - 7.8 % Final    Basophils 03/15/2023 1  0.0 - 2.0 % Final    Immature Granulocytes 03/15/2023 0  0.0 - 5.0 % Final    Segs Absolute 03/15/2023 8.2  1.7 - 8.2 K/UL Final    Absolute Lymph # 03/15/2023 0.7  0.5 - 4.6 K/UL Final    Absolute Mono # 03/15/2023 0.9  0.1 - 1.3 K/UL Final    Absolute Eos # 03/15/2023 0.1  0.0 - 0.8 K/UL Final    Basophils Absolute 03/15/2023 0.1  0.0 - 0.2 K/UL Final    Absolute Immature Granulocyte 03/15/2023 0.0  0.0 - 0.5 K/UL Final    Sodium 03/15/2023 140  133 - 143 mmol/L Final    Potassium 03/15/2023 3.8  3.5 - 5.1 mmol/L Final    Chloride 03/15/2023 112 (H)  101 - 110 mmol/L Final    CO2 03/15/2023 27  21 - 32 mmol/L Final    Anion Gap 03/15/2023 1 (L)  2 - 11 mmol/L Final    Glucose 03/15/2023 104 (H)  65 - 100 mg/dL Final    BUN 03/15/2023 8  8 - 23 MG/DL Final    Creatinine 03/15/2023 0.50 (L)  0.6 - 1.0 MG/DL Final    Est, Glom Filt Rate 03/15/2023 >60  >60 ml/min/1.73m2 Final    Comment:      Pediatric calculator link: Efraín.at. org/professionals/kdoqi/gfr_calculatorped       These results are not intended for use in patients <25years of age. eGFR results are calculated without a race factor using  the 2021 CKD-EPI equation. Careful clinical correlation is recommended, particularly when comparing to results calculated using previous equations.   The CKD-EPI equation is less accurate in patients with extremes of muscle mass, extra-renal

## 2023-05-04 ENCOUNTER — OFFICE VISIT (OUTPATIENT)
Dept: ONCOLOGY | Age: 83
End: 2023-05-04

## 2023-05-04 ENCOUNTER — HOSPITAL ENCOUNTER (OUTPATIENT)
Dept: LAB | Age: 83
Discharge: HOME | End: 2023-05-04
Payer: MEDICARE

## 2023-05-04 ENCOUNTER — CARE COORDINATION (OUTPATIENT)
Dept: CARE COORDINATION | Facility: CLINIC | Age: 83
End: 2023-05-04

## 2023-05-04 VITALS
TEMPERATURE: 97.5 F | DIASTOLIC BLOOD PRESSURE: 73 MMHG | BODY MASS INDEX: 15.52 KG/M2 | HEIGHT: 61 IN | RESPIRATION RATE: 16 BRPM | SYSTOLIC BLOOD PRESSURE: 169 MMHG | WEIGHT: 82.2 LBS | HEART RATE: 71 BPM

## 2023-05-04 DIAGNOSIS — R91.1 LUNG NODULE: ICD-10-CM

## 2023-05-04 DIAGNOSIS — R91.8 RIGHT LOWER LOBE LUNG MASS: ICD-10-CM

## 2023-05-04 DIAGNOSIS — Z11.1 SCREENING EXAMINATION FOR PULMONARY TUBERCULOSIS: ICD-10-CM

## 2023-05-04 DIAGNOSIS — Z09 HOSPITAL DISCHARGE FOLLOW-UP: ICD-10-CM

## 2023-05-04 DIAGNOSIS — G93.9 BRAIN LESION: ICD-10-CM

## 2023-05-04 DIAGNOSIS — R63.4 WEIGHT LOSS: ICD-10-CM

## 2023-05-04 DIAGNOSIS — C34.31 MALIGNANT NEOPLASM OF LOWER LOBE OF RIGHT LUNG (HCC): Primary | ICD-10-CM

## 2023-05-04 DIAGNOSIS — D64.9 ANEMIA, UNSPECIFIED TYPE: ICD-10-CM

## 2023-05-04 DIAGNOSIS — K86.1 CHRONIC PANCREATITIS, UNSPECIFIED PANCREATITIS TYPE (HCC): ICD-10-CM

## 2023-05-04 LAB
ALBUMIN SERPL-MCNC: 2.8 G/DL (ref 3.2–4.6)
ALBUMIN/GLOB SERPL: 0.8 (ref 0.4–1.6)
ALP SERPL-CCNC: 88 U/L (ref 50–136)
ALT SERPL-CCNC: 13 U/L (ref 12–65)
ANION GAP SERPL CALC-SCNC: 8 MMOL/L (ref 2–11)
AST SERPL-CCNC: 16 U/L (ref 15–37)
BASOPHILS # BLD: 0.1 K/UL (ref 0–0.2)
BASOPHILS NFR BLD: 1 % (ref 0–2)
BILIRUB SERPL-MCNC: 0.3 MG/DL (ref 0.2–1.1)
BUN SERPL-MCNC: 12 MG/DL (ref 8–23)
CALCIUM SERPL-MCNC: 8.3 MG/DL (ref 8.3–10.4)
CHLORIDE SERPL-SCNC: 112 MMOL/L (ref 101–110)
CO2 SERPL-SCNC: 22 MMOL/L (ref 21–32)
CREAT SERPL-MCNC: 0.8 MG/DL (ref 0.6–1)
DIFFERENTIAL METHOD BLD: ABNORMAL
EOSINOPHIL # BLD: 0.2 K/UL (ref 0–0.8)
EOSINOPHIL NFR BLD: 2 % (ref 0.5–7.8)
ERYTHROCYTE [DISTWIDTH] IN BLOOD BY AUTOMATED COUNT: 17.2 % (ref 11.9–14.6)
FERRITIN SERPL-MCNC: 32 NG/ML (ref 8–388)
GLOBULIN SER CALC-MCNC: 3.6 G/DL (ref 2.8–4.5)
GLUCOSE SERPL-MCNC: 126 MG/DL (ref 65–100)
HCT VFR BLD AUTO: 34.1 % (ref 35.8–46.3)
HGB BLD-MCNC: 11 G/DL (ref 11.7–15.4)
IMM GRANULOCYTES # BLD AUTO: 0 K/UL (ref 0–0.5)
IMM GRANULOCYTES NFR BLD AUTO: 0 % (ref 0–5)
IRON SATN MFR SERPL: 5 %
IRON SERPL-MCNC: 14 UG/DL (ref 35–150)
LYMPHOCYTES # BLD: 1.6 K/UL (ref 0.5–4.6)
LYMPHOCYTES NFR BLD: 17 % (ref 13–44)
MCH RBC QN AUTO: 25.8 PG (ref 26.1–32.9)
MCHC RBC AUTO-ENTMCNC: 32.3 G/DL (ref 31.4–35)
MCV RBC AUTO: 79.9 FL (ref 82–102)
MONOCYTES # BLD: 0.4 K/UL (ref 0.1–1.3)
MONOCYTES NFR BLD: 4 % (ref 4–12)
NEUTS SEG # BLD: 7.1 K/UL (ref 1.7–8.2)
NEUTS SEG NFR BLD: 75 % (ref 43–78)
NRBC # BLD: 0 K/UL (ref 0–0.2)
PLATELET # BLD AUTO: 419 K/UL (ref 150–450)
PMV BLD AUTO: 8.8 FL (ref 9.4–12.3)
POTASSIUM SERPL-SCNC: 3.1 MMOL/L (ref 3.5–5.1)
PROT SERPL-MCNC: 6.4 G/DL (ref 6.3–8.2)
RBC # BLD AUTO: 4.27 M/UL (ref 4.05–5.2)
SODIUM SERPL-SCNC: 142 MMOL/L (ref 133–143)
TIBC SERPL-MCNC: 277 UG/DL (ref 250–450)
WBC # BLD AUTO: 9.4 K/UL (ref 4.3–11.1)

## 2023-05-04 PROCEDURE — 83540 ASSAY OF IRON: CPT

## 2023-05-04 PROCEDURE — 85025 COMPLETE CBC W/AUTO DIFF WBC: CPT

## 2023-05-04 PROCEDURE — 83550 IRON BINDING TEST: CPT

## 2023-05-04 PROCEDURE — 82728 ASSAY OF FERRITIN: CPT

## 2023-05-04 PROCEDURE — 36415 COLL VENOUS BLD VENIPUNCTURE: CPT

## 2023-05-04 PROCEDURE — 80053 COMPREHEN METABOLIC PANEL: CPT

## 2023-05-04 ASSESSMENT — PATIENT HEALTH QUESTIONNAIRE - PHQ9
2. FEELING DOWN, DEPRESSED OR HOPELESS: 0
SUM OF ALL RESPONSES TO PHQ QUESTIONS 1-9: 0

## 2023-05-04 NOTE — CARE COORDINATION
Care Transitions Outreach Attempt    Call within 2 business days of discharge: Yes   Attempted to reach patient for transitions of care follow up. Unable to reach patient. Patient: Tereso Miranda Patient : 1940 MRN: [id number]    Last Discharge  Street       Date Complaint Diagnosis Description Type Department Provider    23 Abdominal Pain Acute pancreatitis, unspecified complication status, unspecified pancreatitis type ED to Hosp-Admission (Discharged) (ADMITTED) [id number] Tegan Moreira DO; Anastacio Cardoso. Was this an external facility discharge?  No Discharge Facility: N/A    Noted following upcoming appointments from discharge chart review:   [facility] follow up appointment(s):   Future Appointments   Date Time Provider [facility]   8/10/2023 12:30 PM [address]   8/10/2023  1:00 PM Magalis Burns MD [facility] GVL AMB   2023  2:00 PM Amara Ferris  [address] [place] [address] GVL AMB   10/6/2023  9:30 AM Myrna Terry MD [facility] AMB     Non-[facility] follow up appointment(s): none noted

## 2023-05-04 NOTE — PATIENT INSTRUCTIONS
Patient Instructions from Today's Visit    Reason for Visit:  Lung Follow Up    Plan:  Repeat a CT chest scan prior to next visit  Repeat a brain MRI prior to next visit  [facility] with [facility] and we will worry about scans after you recover. FU with your Gastroenterologist after you return from [address].     Follow Up:  Office visit in 3 months    Recent Lab Results:  Hospital Outpatient Visit on 05/04/2023   Component Date Value Ref Range Status    WBC 05/04/2023 9.4  4.3 - 11.1 K/uL Final    RBC 05/04/2023 4.27  4.05 - 5.2 M/uL Final    Hemoglobin 05/04/2023 11.0 (L)  11.7 - 15.4 g/dL Final    Hematocrit 05/04/2023 34.1 (L)  35.8 - 46.3 % Final    MCV 05/04/2023 79.9 (L)  82.0 - 102.0 FL Final    MCH 05/04/2023 25.8 (L)  26.1 - 32.9 PG Final    MCHC 05/04/2023 32.3  31.4 - 35.0 g/dL Final    RDW 05/04/2023 17.2 (H)  11.9 - 14.6 % Final    Platelets 03/52/6075 419  150 - 450 K/uL Final    MPV 05/04/2023 8.8 (L)  9.4 - 12.3 FL Final    nRBC 05/04/2023 0.00  0.0 - 0.2 K/uL Final    **Note: Absolute NRBC parameter is now reported with Hemogram**    Differential Type 05/04/2023 AUTOMATED    Final    Seg Neutrophils 05/04/2023 75  43 - 78 % Final    Lymphocytes 05/04/2023 17  13 - 44 % Final    Monocytes 05/04/2023 4  4.0 - 12.0 % Final    Eosinophils % 05/04/2023 2  0.5 - 7.8 % Final    Basophils 05/04/2023 1  0.0 - 2.0 % Final    Immature Granulocytes 05/04/2023 0  0.0 - 5.0 % Final    Segs Absolute 05/04/2023 7.1  1.7 - 8.2 K/UL Final    Absolute Lymph # 05/04/2023 1.6  0.5 - 4.6 K/UL Final    Absolute Mono # 05/04/2023 0.4  0.1 - 1.3 K/UL Final    Absolute Eos # 05/04/2023 0.2  0.0 - 0.8 K/UL Final    Basophils Absolute 05/04/2023 0.1  0.0 - 0.2 K/UL Final    Absolute Immature Granulocyte 05/04/2023 0.0  0.0 - 0.5 K/UL Final       Treatment Summary has been discussed and given to patient: n/a    -------------------------------------------------------------------------------------------------------------------  Please call our

## 2023-05-04 NOTE — PROGRESS NOTES
[facility] Hematology & Oncology: Office Visit Progress Note    Chief Complaint:    RLL lung SCC    History of Present Illness:  Reason for Referral: Lung Mass     Referring Provider:  Inpatient     Primary Care Provider: Dr Carlton Rendon     Family History of Cancer/Hematologic Disorders: Sister with breast cancer and brother with prostate cancer     Presenting Symptoms: lung mass was incidental finding on imaging     Ms. Judith Michel is an [de-identified] y.o.  female who reports to be an everyday smoker of cigarettes. She denies use of alcohol or drug substances. Her medical history reports as EPIFANIO, arthritis, back pain, breast cancer, bronchitis, COPD, dysuria, eczema, fungal dermatitis, headache, hypercholesterolemia, HTN, menopausal vaginal dryness, OA, osteoporosis, pancreatitis, PUD, sepsis, and thyroid nodule. Her surgical history reports as appendectomy, breast biopsy, left breast lumpectomy, cataract removal, cholecystectomy, lung biopsy, hysterectomy, hemicolectomy, gastric surgery for rupture ulcer, tubal ligation, and multiple EGDs. In October 2022, Ms. Judith Michel was admitted 5 days for pancreatitis. She underwent an EGD at that time which was unremarkable. She improved and was discharged on 10/5/22. She returned to the ED in November 2022 with abdominal pain and she was to follow up with GI. Her 11/5/22 CT of abdomen and pelvis reported mild fat stranding around pancreatitic head, mild pancreatic duct dilatation, no hydronephrosis, and no evidence of colitis, diverticulitis or bowel obstruction. On 12/21/22 she returned to ED for abdominal pain and was admitted for recurrent pancreatitis. On 12/22/22 her CT scan of chest reported a new mass in the right lower lobe that measured 13 x 11mm. Her 12/23/22 MRI of abdomen reported findings suspicious for pancreatic head mass. On 12/28/22 she had an endoscopic ultrasound by Dr Jenna Basilio with GI.  The findings reported minimal pancreatic duct dilation and partial

## 2023-05-09 ENCOUNTER — APPOINTMENT (OUTPATIENT)
Dept: GENERAL RADIOLOGY | Age: 83
DRG: 438 | End: 2023-05-09
Payer: MEDICARE

## 2023-05-09 ENCOUNTER — HOSPITAL ENCOUNTER (INPATIENT)
Age: 83
LOS: 2 days | Discharge: HOME | DRG: 438 | End: 2023-05-11
Attending: EMERGENCY MEDICINE | Admitting: HOSPITALIST
Payer: MEDICARE

## 2023-05-09 DIAGNOSIS — K85.90 ACUTE PANCREATITIS, UNSPECIFIED COMPLICATION STATUS, UNSPECIFIED PANCREATITIS TYPE: Primary | ICD-10-CM

## 2023-05-09 DIAGNOSIS — C34.90 SQUAMOUS CELL CARCINOMA OF LUNG, UNSPECIFIED LATERALITY (HCC): ICD-10-CM

## 2023-05-09 DIAGNOSIS — K85.90 ACUTE ON CHRONIC PANCREATITIS (HCC): ICD-10-CM

## 2023-05-09 DIAGNOSIS — K86.1 ACUTE ON CHRONIC PANCREATITIS (HCC): ICD-10-CM

## 2023-05-09 PROBLEM — R10.13 EPIGASTRIC PAIN: Status: ACTIVE | Noted: 2023-05-09

## 2023-05-09 LAB
ALBUMIN SERPL-MCNC: 2.7 G/DL (ref 3.2–4.6)
ALBUMIN/GLOB SERPL: 0.8 (ref 0.4–1.6)
ALP SERPL-CCNC: 87 U/L (ref 50–136)
ALT SERPL-CCNC: 16 U/L (ref 12–65)
ANION GAP SERPL CALC-SCNC: 5 MMOL/L (ref 2–11)
AST SERPL-CCNC: 34 U/L (ref 15–37)
BASOPHILS # BLD: 0.1 K/UL (ref 0–0.2)
BASOPHILS NFR BLD: 1 % (ref 0–2)
BILIRUB SERPL-MCNC: 0.3 MG/DL (ref 0.2–1.1)
BILIRUB UR QL: NEGATIVE
BUN SERPL-MCNC: 21 MG/DL (ref 8–23)
CALCIUM SERPL-MCNC: 8.7 MG/DL (ref 8.3–10.4)
CHLORIDE SERPL-SCNC: 111 MMOL/L (ref 101–110)
CO2 SERPL-SCNC: 22 MMOL/L (ref 21–32)
CREAT SERPL-MCNC: 0.7 MG/DL (ref 0.6–1)
DIFFERENTIAL METHOD BLD: ABNORMAL
EOSINOPHIL # BLD: 0.2 K/UL (ref 0–0.8)
EOSINOPHIL NFR BLD: 2 % (ref 0.5–7.8)
ERYTHROCYTE [DISTWIDTH] IN BLOOD BY AUTOMATED COUNT: 18 % (ref 11.9–14.6)
GLOBULIN SER CALC-MCNC: 3.5 G/DL (ref 2.8–4.5)
GLUCOSE SERPL-MCNC: 107 MG/DL (ref 65–100)
GLUCOSE UR QL STRIP.AUTO: NEGATIVE MG/DL
HCT VFR BLD AUTO: 35 % (ref 35.8–46.3)
HGB BLD-MCNC: 11.4 G/DL (ref 11.7–15.4)
IMM GRANULOCYTES # BLD AUTO: 0 K/UL (ref 0–0.5)
IMM GRANULOCYTES NFR BLD AUTO: 0 % (ref 0–5)
KETONES UR-MCNC: NEGATIVE MG/DL
LEUKOCYTE ESTERASE UR QL STRIP: NEGATIVE
LIPASE SERPL-CCNC: >1500 U/L (ref 73–393)
LYMPHOCYTES # BLD: 1.7 K/UL (ref 0.5–4.6)
LYMPHOCYTES NFR BLD: 18 % (ref 13–44)
MCH RBC QN AUTO: 25.9 PG (ref 26.1–32.9)
MCHC RBC AUTO-ENTMCNC: 32.6 G/DL (ref 31.4–35)
MCV RBC AUTO: 79.4 FL (ref 82–102)
MONOCYTES # BLD: 0.6 K/UL (ref 0.1–1.3)
MONOCYTES NFR BLD: 6 % (ref 4–12)
NEUTS SEG # BLD: 7.1 K/UL (ref 1.7–8.2)
NEUTS SEG NFR BLD: 73 % (ref 43–78)
NITRITE UR QL: NEGATIVE
NRBC # BLD: 0 K/UL (ref 0–0.2)
PH UR: 6.5 (ref 5–9)
PLATELET # BLD AUTO: 402 K/UL (ref 150–450)
PMV BLD AUTO: 9.1 FL (ref 9.4–12.3)
POTASSIUM SERPL-SCNC: 3.4 MMOL/L (ref 3.5–5.1)
PROT SERPL-MCNC: 6.2 G/DL (ref 6.3–8.2)
PROT UR QL: ABNORMAL MG/DL
RBC # BLD AUTO: 4.41 M/UL (ref 4.05–5.2)
RBC # UR STRIP: NEGATIVE
SERVICE CMNT-IMP: ABNORMAL
SODIUM SERPL-SCNC: 138 MMOL/L (ref 133–143)
SP GR UR: 1.02 (ref 1–1.02)
UROBILINOGEN UR QL: 0.2 EU/DL (ref 0.2–1)
WBC # BLD AUTO: 9.8 K/UL (ref 4.3–11.1)

## 2023-05-09 PROCEDURE — 96374 THER/PROPH/DIAG INJ IV PUSH: CPT

## 2023-05-09 PROCEDURE — 74022 RADEX COMPL AQT ABD SERIES: CPT

## 2023-05-09 PROCEDURE — 6370000000 HC RX 637 (ALT 250 FOR IP): Performed by: HOSPITALIST

## 2023-05-09 PROCEDURE — 80053 COMPREHEN METABOLIC PANEL: CPT

## 2023-05-09 PROCEDURE — 1100000000 HC RM PRIVATE

## 2023-05-09 PROCEDURE — 81003 URINALYSIS AUTO W/O SCOPE: CPT

## 2023-05-09 PROCEDURE — 1100000003 HC PRIVATE W/ TELEMETRY

## 2023-05-09 PROCEDURE — 99285 EMERGENCY DEPT VISIT HI MDM: CPT

## 2023-05-09 PROCEDURE — 2580000003 HC RX 258: Performed by: HOSPITALIST

## 2023-05-09 PROCEDURE — 2580000003 HC RX 258: Performed by: EMERGENCY MEDICINE

## 2023-05-09 PROCEDURE — 85025 COMPLETE CBC W/AUTO DIFF WBC: CPT

## 2023-05-09 PROCEDURE — 96375 TX/PRO/DX INJ NEW DRUG ADDON: CPT

## 2023-05-09 PROCEDURE — 93005 ELECTROCARDIOGRAM TRACING: CPT | Performed by: EMERGENCY MEDICINE

## 2023-05-09 PROCEDURE — 6360000002 HC RX W HCPCS: Performed by: EMERGENCY MEDICINE

## 2023-05-09 PROCEDURE — 83690 ASSAY OF LIPASE: CPT

## 2023-05-09 RX ORDER — 0.9 % SODIUM CHLORIDE 0.9 %
1000 INTRAVENOUS SOLUTION INTRAVENOUS ONCE
Status: COMPLETED | OUTPATIENT
Start: 2023-05-09 | End: 2023-05-09

## 2023-05-09 RX ORDER — PRAVASTATIN SODIUM 20 MG
40 TABLET ORAL NIGHTLY
Status: DISCONTINUED | OUTPATIENT
Start: 2023-05-09 | End: 2023-05-11 | Stop reason: HOSPADM

## 2023-05-09 RX ORDER — MAGNESIUM SULFATE IN WATER 40 MG/ML
2000 INJECTION, SOLUTION INTRAVENOUS PRN
Status: DISCONTINUED | OUTPATIENT
Start: 2023-05-09 | End: 2023-05-11 | Stop reason: HOSPADM

## 2023-05-09 RX ORDER — POLYETHYLENE GLYCOL 3350 17 G/17G
17 POWDER, FOR SOLUTION ORAL DAILY PRN
Status: DISCONTINUED | OUTPATIENT
Start: 2023-05-09 | End: 2023-05-11 | Stop reason: HOSPADM

## 2023-05-09 RX ORDER — HYDROMORPHONE HYDROCHLORIDE 1 MG/ML
1 INJECTION, SOLUTION INTRAMUSCULAR; INTRAVENOUS; SUBCUTANEOUS EVERY 4 HOURS PRN
Status: DISCONTINUED | OUTPATIENT
Start: 2023-05-09 | End: 2023-05-11 | Stop reason: HOSPADM

## 2023-05-09 RX ORDER — LOSARTAN POTASSIUM 25 MG/1
25 TABLET ORAL DAILY
Status: DISCONTINUED | OUTPATIENT
Start: 2023-05-10 | End: 2023-05-11 | Stop reason: HOSPADM

## 2023-05-09 RX ORDER — ENOXAPARIN SODIUM 100 MG/ML
30 INJECTION SUBCUTANEOUS DAILY
Status: DISCONTINUED | OUTPATIENT
Start: 2023-05-10 | End: 2023-05-11 | Stop reason: HOSPADM

## 2023-05-09 RX ORDER — ACETAMINOPHEN 650 MG/1
650 SUPPOSITORY RECTAL EVERY 6 HOURS PRN
Status: DISCONTINUED | OUTPATIENT
Start: 2023-05-09 | End: 2023-05-11 | Stop reason: HOSPADM

## 2023-05-09 RX ORDER — DONEPEZIL HYDROCHLORIDE 5 MG/1
5 TABLET, FILM COATED ORAL NIGHTLY
Status: DISCONTINUED | OUTPATIENT
Start: 2023-05-09 | End: 2023-05-11 | Stop reason: HOSPADM

## 2023-05-09 RX ORDER — SODIUM CHLORIDE 0.9 % (FLUSH) 0.9 %
5-40 SYRINGE (ML) INJECTION EVERY 12 HOURS SCHEDULED
Status: DISCONTINUED | OUTPATIENT
Start: 2023-05-09 | End: 2023-05-11 | Stop reason: HOSPADM

## 2023-05-09 RX ORDER — MORPHINE SULFATE 4 MG/ML
4 INJECTION, SOLUTION INTRAMUSCULAR; INTRAVENOUS
Status: COMPLETED | OUTPATIENT
Start: 2023-05-09 | End: 2023-05-09

## 2023-05-09 RX ORDER — DEXTROSE AND SODIUM CHLORIDE 5; .45 G/100ML; G/100ML
INJECTION, SOLUTION INTRAVENOUS CONTINUOUS
Status: ACTIVE | OUTPATIENT
Start: 2023-05-09 | End: 2023-05-11

## 2023-05-09 RX ORDER — SODIUM CHLORIDE 0.9 % (FLUSH) 0.9 %
5-40 SYRINGE (ML) INJECTION PRN
Status: DISCONTINUED | OUTPATIENT
Start: 2023-05-09 | End: 2023-05-11 | Stop reason: HOSPADM

## 2023-05-09 RX ORDER — MAGNESIUM HYDROXIDE/ALUMINUM HYDROXICE/SIMETHICONE 120; 1200; 1200 MG/30ML; MG/30ML; MG/30ML
30 SUSPENSION ORAL EVERY 6 HOURS PRN
Status: DISCONTINUED | OUTPATIENT
Start: 2023-05-09 | End: 2023-05-11 | Stop reason: HOSPADM

## 2023-05-09 RX ORDER — SODIUM CHLORIDE 9 MG/ML
INJECTION, SOLUTION INTRAVENOUS PRN
Status: DISCONTINUED | OUTPATIENT
Start: 2023-05-09 | End: 2023-05-11 | Stop reason: HOSPADM

## 2023-05-09 RX ORDER — POTASSIUM CHLORIDE 7.45 MG/ML
10 INJECTION INTRAVENOUS PRN
Status: DISCONTINUED | OUTPATIENT
Start: 2023-05-09 | End: 2023-05-11 | Stop reason: HOSPADM

## 2023-05-09 RX ORDER — ACETAMINOPHEN 325 MG/1
650 TABLET ORAL EVERY 6 HOURS PRN
Status: DISCONTINUED | OUTPATIENT
Start: 2023-05-09 | End: 2023-05-11 | Stop reason: HOSPADM

## 2023-05-09 RX ORDER — ONDANSETRON 4 MG/1
4 TABLET, ORALLY DISINTEGRATING ORAL EVERY 8 HOURS PRN
Status: DISCONTINUED | OUTPATIENT
Start: 2023-05-09 | End: 2023-05-11 | Stop reason: HOSPADM

## 2023-05-09 RX ORDER — POTASSIUM CHLORIDE 20 MEQ/1
40 TABLET, EXTENDED RELEASE ORAL PRN
Status: DISCONTINUED | OUTPATIENT
Start: 2023-05-09 | End: 2023-05-11 | Stop reason: HOSPADM

## 2023-05-09 RX ORDER — ONDANSETRON 2 MG/ML
4 INJECTION INTRAMUSCULAR; INTRAVENOUS
Status: COMPLETED | OUTPATIENT
Start: 2023-05-09 | End: 2023-05-09

## 2023-05-09 RX ORDER — OXYCODONE HYDROCHLORIDE 5 MG/1
5 TABLET ORAL EVERY 6 HOURS PRN
Status: DISCONTINUED | OUTPATIENT
Start: 2023-05-09 | End: 2023-05-11 | Stop reason: HOSPADM

## 2023-05-09 RX ORDER — ONDANSETRON 2 MG/ML
4 INJECTION INTRAMUSCULAR; INTRAVENOUS EVERY 6 HOURS PRN
Status: DISCONTINUED | OUTPATIENT
Start: 2023-05-09 | End: 2023-05-11 | Stop reason: HOSPADM

## 2023-05-09 RX ORDER — HYDROMORPHONE HYDROCHLORIDE 1 MG/ML
0.5 INJECTION, SOLUTION INTRAMUSCULAR; INTRAVENOUS; SUBCUTANEOUS EVERY 4 HOURS PRN
Status: DISCONTINUED | OUTPATIENT
Start: 2023-05-09 | End: 2023-05-11 | Stop reason: HOSPADM

## 2023-05-09 RX ORDER — DIPHENHYDRAMINE HYDROCHLORIDE 50 MG/ML
25 INJECTION INTRAMUSCULAR; INTRAVENOUS
Status: COMPLETED | OUTPATIENT
Start: 2023-05-09 | End: 2023-05-09

## 2023-05-09 RX ADMIN — MORPHINE SULFATE 4 MG: 4 INJECTION INTRAVENOUS at 20:40

## 2023-05-09 RX ADMIN — DONEPEZIL HYDROCHLORIDE 5 MG: 5 TABLET, FILM COATED ORAL at 23:47

## 2023-05-09 RX ADMIN — DIPHENHYDRAMINE HYDROCHLORIDE 25 MG: 50 INJECTION, SOLUTION INTRAMUSCULAR; INTRAVENOUS at 21:04

## 2023-05-09 RX ADMIN — SODIUM CHLORIDE 1000 ML: 9 INJECTION, SOLUTION INTRAVENOUS at 20:40

## 2023-05-09 RX ADMIN — PRAVASTATIN SODIUM 40 MG: 20 TABLET ORAL at 23:47

## 2023-05-09 RX ADMIN — SODIUM CHLORIDE, PRESERVATIVE FREE 5 ML: 5 INJECTION INTRAVENOUS at 23:47

## 2023-05-09 RX ADMIN — ONDANSETRON 4 MG: 2 INJECTION INTRAMUSCULAR; INTRAVENOUS at 20:41

## 2023-05-09 RX ADMIN — DEXTROSE AND SODIUM CHLORIDE: 5; 450 INJECTION, SOLUTION INTRAVENOUS at 23:33

## 2023-05-09 ASSESSMENT — ENCOUNTER SYMPTOMS
CONSTIPATION: 0
BACK PAIN: 0
COUGH: 0
DIARRHEA: 0
ABDOMINAL PAIN: 1
SHORTNESS OF BREATH: 0
RHINORRHEA: 0
VOMITING: 1
ANAL BLEEDING: 0
NAUSEA: 1
BLOOD IN STOOL: 0

## 2023-05-09 ASSESSMENT — PAIN SCALES - GENERAL: PAINLEVEL_OUTOF10: 0

## 2023-05-09 NOTE — ED PROVIDER NOTES
Laterality Date    APPENDECTOMY  1977    with hysterectomy    BREAST BIOPSY Left 1975    BREAST LUMPECTOMY Left 2/22/2018    LEFT BREAST LUMPECTOMY/ SENTINEL NODE BIOPSY performed by Robert Groves MD at [facility] MAIN OR    CATARACT EXTRACTION Bilateral     with IOL    CATARACT REMOVAL Bilateral 2018    w/IOL    CHOLECYSTECTOMY      CT NEEDLE BIOPSY LUNG PERCUTANEOUS  01/05/2023    CT NEEDLE BIOPSY LUNG PERCUTANEOUS 1/5/2023 [facility] RADIOLOGY CT SCAN    ENDOSCOPIC ULTRASOUND (LOWER)  12/28/2022    ERCP N/A 02/21/2023    ERCP ENDOSCOPIC RETROGRADE CHOLANGIOPANCREATOGRAPHY Rm 622 performed by Dorita Riggins MD at [address] ([address] [facility])  [address]  2003    stomach surgery for ruptured ulcer and extensive rerouting of intestestines per patient     STOMACH SURGERY      ruptured ulcer    THORACOSCOPY Right 3/13/2023    RIGHT THORACOSCOPY, RIGHT LOWER LOBECTOMY, MEDIASTINAL LYMPHADENECTOMY performed by Cinthya Gifford MD at [address]  05/21/2018    empiric dilation    UPPER GASTROINTESTINAL ENDOSCOPY      UPPER GASTROINTESTINAL ENDOSCOPY N/A 10/04/2022    EGD ESOPHAGOGASTRODUODENOSCOPY/  performed by Timbo Granda MD at [address] N/A 12/28/2022    ENDOSCOPIC ULTRASOUND W/ EGD/ / performed by Bel Rey MD at [place] [address] LEFT Left 01/31/2018    US BREAST NEEDLE BIOPSY LEFT 1/31/2018 [facility] RADIOLOGY MAMMO        Social History     Socioeconomic History    Marital status:     Tobacco Use    Smoking status: Every Day     Packs/day: 0.10     Types: Cigarettes     Start date: 5/6/1966     Passive exposure: Past    Smokeless tobacco: Never    Tobacco comments:     1966 started- 3/2/23- states 5-6 cigs daily   Vaping Use    Vaping Use: Never used   Substance and Sexual Activity

## 2023-05-09 NOTE — ED TRIAGE NOTES
Patient arrives to ED pov from home. Patient complains of abdominal pain. Patient reports she thinks it's her pancreas. Denies n/v. Denies diarrhea.

## 2023-05-10 ENCOUNTER — CARE COORDINATION (OUTPATIENT)
Dept: CARE COORDINATION | Facility: CLINIC | Age: 83
End: 2023-05-10

## 2023-05-10 LAB
ALBUMIN SERPL-MCNC: 2.4 G/DL (ref 3.2–4.6)
ALBUMIN/GLOB SERPL: 0.8 (ref 0.4–1.6)
ALP SERPL-CCNC: 74 U/L (ref 50–136)
ALT SERPL-CCNC: 11 U/L (ref 12–65)
ANION GAP SERPL CALC-SCNC: 3 MMOL/L (ref 2–11)
AST SERPL-CCNC: 10 U/L (ref 15–37)
BASOPHILS # BLD: 0.1 K/UL (ref 0–0.2)
BASOPHILS NFR BLD: 1 % (ref 0–2)
BILIRUB SERPL-MCNC: 0.3 MG/DL (ref 0.2–1.1)
BUN SERPL-MCNC: 11 MG/DL (ref 8–23)
CALCIUM SERPL-MCNC: 8 MG/DL (ref 8.3–10.4)
CHLORIDE SERPL-SCNC: 113 MMOL/L (ref 101–110)
CO2 SERPL-SCNC: 24 MMOL/L (ref 21–32)
CREAT SERPL-MCNC: 0.5 MG/DL (ref 0.6–1)
DIFFERENTIAL METHOD BLD: ABNORMAL
EKG ATRIAL RATE: 102 BPM
EKG ATRIAL RATE: 71 BPM
EKG DIAGNOSIS: NORMAL
EKG DIAGNOSIS: NORMAL
EKG P AXIS: 72 DEGREES
EKG P AXIS: 73 DEGREES
EKG P-R INTERVAL: 176 MS
EKG P-R INTERVAL: 190 MS
EKG Q-T INTERVAL: 356 MS
EKG Q-T INTERVAL: 392 MS
EKG QRS DURATION: 74 MS
EKG QRS DURATION: 76 MS
EKG QTC CALCULATION (BAZETT): 425 MS
EKG QTC CALCULATION (BAZETT): 463 MS
EKG R AXIS: 52 DEGREES
EKG R AXIS: 58 DEGREES
EKG T AXIS: 27 DEGREES
EKG T AXIS: 40 DEGREES
EKG VENTRICULAR RATE: 102 BPM
EKG VENTRICULAR RATE: 71 BPM
EOSINOPHIL # BLD: 0.4 K/UL (ref 0–0.8)
EOSINOPHIL NFR BLD: 5 % (ref 0.5–7.8)
ERYTHROCYTE [DISTWIDTH] IN BLOOD BY AUTOMATED COUNT: 17.9 % (ref 11.9–14.6)
GLOBULIN SER CALC-MCNC: 2.9 G/DL (ref 2.8–4.5)
GLUCOSE SERPL-MCNC: 97 MG/DL (ref 65–100)
HCT VFR BLD AUTO: 31.7 % (ref 35.8–46.3)
HGB BLD-MCNC: 10 G/DL (ref 11.7–15.4)
IMM GRANULOCYTES # BLD AUTO: 0 K/UL (ref 0–0.5)
IMM GRANULOCYTES NFR BLD AUTO: 0 % (ref 0–5)
LIPASE SERPL-CCNC: 1269 U/L (ref 73–393)
LYMPHOCYTES # BLD: 1.1 K/UL (ref 0.5–4.6)
LYMPHOCYTES NFR BLD: 15 % (ref 13–44)
MAGNESIUM SERPL-MCNC: 1.6 MG/DL (ref 1.8–2.4)
MCH RBC QN AUTO: 25.4 PG (ref 26.1–32.9)
MCHC RBC AUTO-ENTMCNC: 31.5 G/DL (ref 31.4–35)
MCV RBC AUTO: 80.5 FL (ref 82–102)
MONOCYTES # BLD: 0.5 K/UL (ref 0.1–1.3)
MONOCYTES NFR BLD: 6 % (ref 4–12)
NEUTS SEG # BLD: 5.6 K/UL (ref 1.7–8.2)
NEUTS SEG NFR BLD: 73 % (ref 43–78)
NRBC # BLD: 0 K/UL (ref 0–0.2)
PLATELET # BLD AUTO: 357 K/UL (ref 150–450)
PMV BLD AUTO: 8.8 FL (ref 9.4–12.3)
POTASSIUM SERPL-SCNC: 2.3 MMOL/L (ref 3.5–5.1)
POTASSIUM SERPL-SCNC: 3.2 MMOL/L (ref 3.5–5.1)
PROT SERPL-MCNC: 5.3 G/DL (ref 6.3–8.2)
RBC # BLD AUTO: 3.94 M/UL (ref 4.05–5.2)
SODIUM SERPL-SCNC: 140 MMOL/L (ref 133–143)
WBC # BLD AUTO: 7.7 K/UL (ref 4.3–11.1)

## 2023-05-10 PROCEDURE — 93005 ELECTROCARDIOGRAM TRACING: CPT | Performed by: INTERNAL MEDICINE

## 2023-05-10 PROCEDURE — 83735 ASSAY OF MAGNESIUM: CPT

## 2023-05-10 PROCEDURE — 84132 ASSAY OF SERUM POTASSIUM: CPT

## 2023-05-10 PROCEDURE — 6360000002 HC RX W HCPCS: Performed by: HOSPITALIST

## 2023-05-10 PROCEDURE — 83690 ASSAY OF LIPASE: CPT

## 2023-05-10 PROCEDURE — 1100000003 HC PRIVATE W/ TELEMETRY

## 2023-05-10 PROCEDURE — 85025 COMPLETE CBC W/AUTO DIFF WBC: CPT

## 2023-05-10 PROCEDURE — APPSS45 APP SPLIT SHARED TIME 31-45 MINUTES: Performed by: NURSE PRACTITIONER

## 2023-05-10 PROCEDURE — 36415 COLL VENOUS BLD VENIPUNCTURE: CPT

## 2023-05-10 PROCEDURE — 80053 COMPREHEN METABOLIC PANEL: CPT

## 2023-05-10 PROCEDURE — 99222 1ST HOSP IP/OBS MODERATE 55: CPT | Performed by: INTERNAL MEDICINE

## 2023-05-10 PROCEDURE — 2500000003 HC RX 250 WO HCPCS: Performed by: HOSPITALIST

## 2023-05-10 PROCEDURE — 6370000000 HC RX 637 (ALT 250 FOR IP): Performed by: HOSPITALIST

## 2023-05-10 PROCEDURE — 2580000003 HC RX 258: Performed by: HOSPITALIST

## 2023-05-10 RX ORDER — POTASSIUM CHLORIDE 7.45 MG/ML
10 INJECTION INTRAVENOUS PRN
Status: DISCONTINUED | OUTPATIENT
Start: 2023-05-10 | End: 2023-05-10

## 2023-05-10 RX ADMIN — PRAVASTATIN SODIUM 40 MG: 20 TABLET ORAL at 21:07

## 2023-05-10 RX ADMIN — POTASSIUM CHLORIDE 10 MEQ: 7.46 INJECTION, SOLUTION INTRAVENOUS at 10:23

## 2023-05-10 RX ADMIN — POTASSIUM CHLORIDE 10 MEQ: 7.46 INJECTION, SOLUTION INTRAVENOUS at 14:16

## 2023-05-10 RX ADMIN — SODIUM CHLORIDE, PRESERVATIVE FREE 10 ML: 5 INJECTION INTRAVENOUS at 08:47

## 2023-05-10 RX ADMIN — POTASSIUM CHLORIDE 10 MEQ: 7.46 INJECTION, SOLUTION INTRAVENOUS at 12:14

## 2023-05-10 RX ADMIN — ACETAMINOPHEN 650 MG: 325 TABLET ORAL at 11:43

## 2023-05-10 RX ADMIN — POTASSIUM CHLORIDE 10 MEQ: 7.46 INJECTION, SOLUTION INTRAVENOUS at 15:13

## 2023-05-10 RX ADMIN — POTASSIUM CHLORIDE 10 MEQ: 7.46 INJECTION, SOLUTION INTRAVENOUS at 11:21

## 2023-05-10 RX ADMIN — OXYCODONE 5 MG: 5 TABLET ORAL at 16:41

## 2023-05-10 RX ADMIN — DONEPEZIL HYDROCHLORIDE 5 MG: 5 TABLET, FILM COATED ORAL at 21:08

## 2023-05-10 RX ADMIN — HYDROMORPHONE HYDROCHLORIDE 1 MG: 1 INJECTION, SOLUTION INTRAMUSCULAR; INTRAVENOUS; SUBCUTANEOUS at 18:38

## 2023-05-10 RX ADMIN — POTASSIUM CHLORIDE 10 MEQ: 7.46 INJECTION, SOLUTION INTRAVENOUS at 13:18

## 2023-05-10 RX ADMIN — MAGNESIUM SULFATE 2000 MG: 2 INJECTION INTRAVENOUS at 16:28

## 2023-05-10 RX ADMIN — LOSARTAN POTASSIUM 25 MG: 25 TABLET, FILM COATED ORAL at 08:45

## 2023-05-10 ASSESSMENT — PAIN DESCRIPTION - ORIENTATION
ORIENTATION: MID

## 2023-05-10 ASSESSMENT — PAIN DESCRIPTION - FREQUENCY: FREQUENCY: INTERMITTENT

## 2023-05-10 ASSESSMENT — PAIN SCALES - GENERAL
PAINLEVEL_OUTOF10: 0
PAINLEVEL_OUTOF10: 6
PAINLEVEL_OUTOF10: 3
PAINLEVEL_OUTOF10: 7
PAINLEVEL_OUTOF10: 0

## 2023-05-10 ASSESSMENT — PAIN DESCRIPTION - ONSET: ONSET: GRADUAL

## 2023-05-10 ASSESSMENT — PAIN DESCRIPTION - DESCRIPTORS
DESCRIPTORS: ACHING

## 2023-05-10 ASSESSMENT — PAIN DESCRIPTION - LOCATION
LOCATION: ABDOMEN
LOCATION: ABDOMEN
LOCATION: HEAD

## 2023-05-10 ASSESSMENT — PAIN DESCRIPTION - PAIN TYPE: TYPE: ACUTE PAIN

## 2023-05-10 ASSESSMENT — PAIN - FUNCTIONAL ASSESSMENT: PAIN_FUNCTIONAL_ASSESSMENT: PREVENTS OR INTERFERES SOME ACTIVE ACTIVITIES AND ADLS

## 2023-05-10 NOTE — PROGRESS NOTES
TRANSFER - IN REPORT:    Verbal report received from TU Rosario on Paula Stone  being received from ED for routine progression of patient care      Report consisted of patient's Situation, Background, Assessment and   Recommendations(SBAR). Information from the following report(s) Nurse Handoff Report, ED Encounter Summary, ED SBAR, Adult Overview, and MAR was reviewed with the receiving nurse. Opportunity for questions and clarification was provided. Assessment completed upon patient's arrival to unit and care assumed.

## 2023-05-10 NOTE — ED NOTES
Report given to MAEVE ORTEGA [facility].       Dana Pandya RN  05/09/23 8694

## 2023-05-10 NOTE — PROGRESS NOTES
[facility] Hematology & Oncology        Inpatient Hematology / Oncology Plan of Care    Reason for Consult:  Acute on chronic pancreatitis ([facility] [K85.90, K86.1]  Acute pancreatitis, unspecified complication status, unspecified pancreatitis type [K85.90]  Referring Physician:  Jerrold Phoenix, DO    History of Present Illness:  Ms. Justino Okeefe is a [de-identified] y.o. female admitted on 5/9/2023. The encounter diagnosis was Acute pancreatitis, unspecified complication status, unspecified pancreatitis type. Ms Justino Okeefe has PMH of chronic pancreatitis followed by GI at [address], COPD, current tobacco smoker, RA (on Enbrel), PUD (s/p perforation with repair), HTN. She also has hx of stage I breast cancer seen by Dr Luis Spring and declined AI. She was recently diagnosed with 1.3cm RLL nodule without evidence of metastatic disease on PET/CT but did note FDG activity around pancreas possibly inflammatory. She was referred to Dr Dylan Peters and underwent RLL lobectomy 3/13/23 and final path showed 1.8cm SqCCa, pT1N0. She was incidentally noted to have 4mm focus of enhancement in inferior R cerebellum and Dr Patrica Bain planning short-term follow-up MRI. She was also recently admitted for pancreatitis and CT incidentally noted 4mm RML subpleural nodule and Dr Patrica Bain planning short-term follow-up CT chest.     Ms Justino Okeefe presented on day of admission with abdominal pain typical of her acute on chronic pancreatitis. No abdominal CT completed but lipase elevated at >1500 and rechecked today at 1269. Pain improved. Oncology consulted - known to oncology for lung and breast CA, admitted for recurrent pancreatitis. Previous PET and CT w/ ?pancreatic mass. Review of Systems:  Constitutional +weight loss. Denies fever, chills, appetite changes, fatigue, night sweats. HEENT Denies trauma, blurry vision, hearing loss, ear pain, nosebleeds, sore throat, neck pain and ear discharge. Skin Denies lesions or rashes.    Lungs Denies dyspnea, cough, sputum production or

## 2023-05-10 NOTE — CONSULTS
dyspnea, cough, sputum production or hemoptysis. Cardiovascular Denies chest pain, palpitations, or lower extremity edema. Gastrointestinal Denies nausea, vomiting, changes in bowel habits, bloody or black stools. Abdominal pain.  Denies dysuria, frequency or hesitancy of urination. Neuro Denies headaches, visual changes or ataxia. Denies dizziness, tingling, tremors, sensory change, speech change, focal weakness or headaches. Hematology Denies easy bruising or bleeding, denies gingival bleeding or epistaxis. Endo Denies heat/cold intolerance, denies diabetes or thyroid abnormalities. MSK Denies back pain, arthralgias, myalgias or frequent falls. Psychiatric/Behavioral Denies depression and substance abuse. The patient is not nervous/anxious. Allergies   Allergen Reactions    Azithromycin Other (See Comments)     pancreatitis    Codeine Nausea And Vomiting     Past Medical History:   Diagnosis Date    Acute pancreatitis     EPIFANIO (acute kidney injury) ([facility] [address] 12/04/2014    Back pain 6/10/2016    Breast cancer ([facility] [facility] 2018    Breast lump dx 2/2018    left    Bronchitis     Chronic pancreatitis ([facility]     COPD (chronic obstructive pulmonary disease) ([facility] [address] 02/09/2023    no meds or supplemental O2. Spiromery: Impression: Mild obstruction, supranormal FVC and moderately reduced diffusion capacity suggestive of COPD.     Discharge from the vagina     Dysuria     Eczema 6/10/2016    Fungal dermatitis     Headache 6/10/2016    History of bleeding peptic ulcer     Ruptured ulcer/gastrectomy- 5 units blood transfused    History of blood transfusion 2003    ruptured peptic ulcer- 5 units blood    History of left breast cancer 2018    lumpectomy and lymphnodes removed- no further treatment required    Hypercholesterolemia 12/4/2014    Hypertension     not well controlled--per pt    Intractable vomiting 5/20/2018    Lung cancer ([facility] [address] 02/2023    Dr Paulette Toribio -oncologist    Lung cancer [facility]) 01/2023

## 2023-05-10 NOTE — PLAN OF CARE
Problem: Discharge Planning  Goal: Discharge to home or other facility with appropriate resources  5/10/2023 0735 by Darrell Schmidt RN  Outcome: Progressing  5/10/2023 0019 by Maddy Noguera RN  Outcome: Progressing  Flowsheets (Taken 5/9/2023 2345)  Discharge to home or other facility with appropriate resources: Identify barriers to discharge with patient and caregiver     Problem: Safety - Adult  Goal: Free from fall injury  5/10/2023 0735 by Darrell Schmidt RN  Outcome: Progressing  5/10/2023 0019 by Maddy Noguera RN  Outcome: Progressing  Flowsheets (Taken 5/9/2023 2345)  Free From Fall Injury: Instruct family/caregiver on patient safety

## 2023-05-10 NOTE — PROGRESS NOTES
Occupational Therapy Note:    OT/PT attempted to see pt today. Pt adamantly refused therapies at this time. Pt expressed frustration about not being able to eat. Therapy checked with RN to confirm NPO and RN stated ice chips only which was given to pt. Pt does live alone, but states she is able to care for herself and has even been going to the bathroom on her own while here. Therapists explained to patient if she decides she needs therapy services to please let her RN know. OT will DC at this time. Please reconsult if needed.      Thank you,    Markus Kumar OTR/L

## 2023-05-10 NOTE — PLAN OF CARE
Problem: Discharge Planning  Goal: Discharge to home or other facility with appropriate resources  Outcome: Progressing  Flowsheets (Taken 5/9/2023 2345)  Discharge to home or other facility with appropriate resources: Identify barriers to discharge with patient and caregiver     Problem: Safety - Adult  Goal: Free from fall injury  Outcome: Progressing  Flowsheets (Taken 5/9/2023 2345)  Free From Fall Injury: Instruct family/caregiver on patient safety

## 2023-05-10 NOTE — PROGRESS NOTES
Physical Therapy Note:    Attempted to see patient this AM for physical therapy evaluation session. Patient declines evaluation and states she has no current mobility needs. She adamantly refuses to perform any mobility, despite encouragement, and expresses no desire for therapy intervention at this time. She states she is able to move as needed and has already been up in her room this morning. Educated patient on role of therapy and encouraged to notify us if any needs do arise throughout stay. Will D/C order at this time. Re-consult if new needs arise.  Thank you,    Destin Dos Santos, PT     Rehab Caseload Tracker

## 2023-05-10 NOTE — CARE COORDINATION
LEONOR episode to be resolved. Re-admitted to [facility] on 5/9/2023 for Acute on chronic pancreatitis. Multiple hospital admissions for same. [facility] [address] includes zuhair Schwab. Nurse navigator.

## 2023-05-10 NOTE — H&P
hour(s))   EKG 12 Lead    Collection Time: 05/09/23  5:58 PM   Result Value Ref Range    Ventricular Rate 102 BPM    Atrial Rate 102 BPM    P-R Interval 176 ms    QRS Duration 74 ms    Q-T Interval 356 ms    QTc Calculation (Bazett) 463 ms    P Axis 73 degrees    R Axis 58 degrees    T Axis 40 degrees    Diagnosis       Sinus tachycardia with Premature atrial complexes  Septal infarct , age undetermined  Abnormal ECG  When compared with ECG of 19-APR-2023 07:04,  PREVIOUS ECG IS PRESENT     CMP    Collection Time: 05/09/23  6:22 PM   Result Value Ref Range    Sodium 138 133 - 143 mmol/L    Potassium 3.4 (L) 3.5 - 5.1 mmol/L    Chloride 111 (H) 101 - 110 mmol/L    CO2 22 21 - 32 mmol/L    Anion Gap 5 2 - 11 mmol/L    Glucose 107 (H) 65 - 100 mg/dL    BUN 21 8 - 23 MG/DL    Creatinine 0.70 0.6 - 1.0 MG/DL    Est, Glom Filt Rate >60 >60 ml/min/1.73m2    Calcium 8.7 8.3 - 10.4 MG/DL    Total Bilirubin 0.3 0.2 - 1.1 MG/DL    ALT 16 12 - 65 U/L    AST 34 15 - 37 U/L    Alk Phosphatase 87 50 - 136 U/L    Total Protein 6.2 (L) 6.3 - 8.2 g/dL    Albumin 2.7 (L) 3.2 - 4.6 g/dL    Globulin 3.5 2.8 - 4.5 g/dL    Albumin/Globulin Ratio 0.8 0.4 - 1.6     CBC with Auto Differential    Collection Time: 05/09/23  6:22 PM   Result Value Ref Range    WBC 9.8 4.3 - 11.1 K/uL    RBC 4.41 4.05 - 5.2 M/uL    Hemoglobin 11.4 (L) 11.7 - 15.4 g/dL    Hematocrit 35.0 (L) 35.8 - 46.3 %    MCV 79.4 (L) 82 - 102 FL    MCH 25.9 (L) 26.1 - 32.9 PG    MCHC 32.6 31.4 - 35.0 g/dL    RDW 18.0 (H) 11.9 - 14.6 %    Platelets 764 081 - 683 K/uL    MPV 9.1 (L) 9.4 - 12.3 FL    nRBC 0.00 0.0 - 0.2 K/uL    Differential Type AUTOMATED      Seg Neutrophils 73 43 - 78 %    Lymphocytes 18 13 - 44 %    Monocytes 6 4.0 - 12.0 %    Eosinophils % 2 0.5 - 7.8 %    Basophils 1 0.0 - 2.0 %    Immature Granulocytes 0 0.0 - 5.0 %    Segs Absolute 7.1 1.7 - 8.2 K/UL    Absolute Lymph # 1.7 0.5 - 4.6 K/UL    Absolute Mono # 0.6 0.1 - 1.3 K/UL    Absolute Eos # 0.2 0.0 -

## 2023-05-11 ENCOUNTER — TELEPHONE (OUTPATIENT)
Dept: FAMILY MEDICINE CLINIC | Facility: CLINIC | Age: 83
End: 2023-05-11

## 2023-05-11 VITALS
HEART RATE: 66 BPM | WEIGHT: 81 LBS | BODY MASS INDEX: 14.91 KG/M2 | OXYGEN SATURATION: 100 % | RESPIRATION RATE: 18 BRPM | DIASTOLIC BLOOD PRESSURE: 70 MMHG | HEIGHT: 62 IN | SYSTOLIC BLOOD PRESSURE: 157 MMHG | TEMPERATURE: 97.9 F

## 2023-05-11 LAB
ALBUMIN SERPL-MCNC: 2.2 G/DL (ref 3.2–4.6)
ALBUMIN/GLOB SERPL: 0.8 (ref 0.4–1.6)
ALP SERPL-CCNC: 72 U/L (ref 50–136)
ALT SERPL-CCNC: 11 U/L (ref 12–65)
ANION GAP SERPL CALC-SCNC: 4 MMOL/L (ref 2–11)
AST SERPL-CCNC: 13 U/L (ref 15–37)
BILIRUB SERPL-MCNC: 0.3 MG/DL (ref 0.2–1.1)
BUN SERPL-MCNC: 4 MG/DL (ref 8–23)
CALCIUM SERPL-MCNC: 7.7 MG/DL (ref 8.3–10.4)
CHLORIDE SERPL-SCNC: 115 MMOL/L (ref 101–110)
CO2 SERPL-SCNC: 22 MMOL/L (ref 21–32)
CREAT SERPL-MCNC: 0.4 MG/DL (ref 0.6–1)
GLOBULIN SER CALC-MCNC: 2.9 G/DL (ref 2.8–4.5)
GLUCOSE SERPL-MCNC: 93 MG/DL (ref 65–100)
LIPASE SERPL-CCNC: 291 U/L (ref 73–393)
MAGNESIUM SERPL-MCNC: 2.1 MG/DL (ref 1.8–2.4)
POTASSIUM SERPL-SCNC: 3.3 MMOL/L (ref 3.5–5.1)
PROT SERPL-MCNC: 5.1 G/DL (ref 6.3–8.2)
SODIUM SERPL-SCNC: 141 MMOL/L (ref 133–143)

## 2023-05-11 PROCEDURE — 6370000000 HC RX 637 (ALT 250 FOR IP): Performed by: INTERNAL MEDICINE

## 2023-05-11 PROCEDURE — 6370000000 HC RX 637 (ALT 250 FOR IP): Performed by: HOSPITALIST

## 2023-05-11 PROCEDURE — 80053 COMPREHEN METABOLIC PANEL: CPT

## 2023-05-11 PROCEDURE — 83735 ASSAY OF MAGNESIUM: CPT

## 2023-05-11 PROCEDURE — 6360000002 HC RX W HCPCS: Performed by: HOSPITALIST

## 2023-05-11 PROCEDURE — 2580000003 HC RX 258: Performed by: HOSPITALIST

## 2023-05-11 PROCEDURE — 83690 ASSAY OF LIPASE: CPT

## 2023-05-11 PROCEDURE — 36415 COLL VENOUS BLD VENIPUNCTURE: CPT

## 2023-05-11 RX ORDER — POTASSIUM CHLORIDE 20 MEQ/1
20 TABLET, EXTENDED RELEASE ORAL DAILY
Qty: 30 TABLET | Refills: 5 | Status: SHIPPED | OUTPATIENT
Start: 2023-05-11

## 2023-05-11 RX ORDER — POTASSIUM CHLORIDE 20 MEQ/1
40 TABLET, EXTENDED RELEASE ORAL ONCE
Status: COMPLETED | OUTPATIENT
Start: 2023-05-11 | End: 2023-05-11

## 2023-05-11 RX ORDER — OXYCODONE HYDROCHLORIDE 5 MG/1
5 TABLET ORAL EVERY 6 HOURS PRN
Qty: 12 TABLET | Refills: 0 | Status: SHIPPED | OUTPATIENT
Start: 2023-05-11 | End: 2023-05-14

## 2023-05-11 RX ADMIN — LOSARTAN POTASSIUM 25 MG: 25 TABLET, FILM COATED ORAL at 08:32

## 2023-05-11 RX ADMIN — DEXTROSE AND SODIUM CHLORIDE: 5; 450 INJECTION, SOLUTION INTRAVENOUS at 01:00

## 2023-05-11 RX ADMIN — SODIUM CHLORIDE, PRESERVATIVE FREE 10 ML: 5 INJECTION INTRAVENOUS at 08:33

## 2023-05-11 RX ADMIN — ENOXAPARIN SODIUM 30 MG: 100 INJECTION SUBCUTANEOUS at 08:32

## 2023-05-11 RX ADMIN — POTASSIUM CHLORIDE 40 MEQ: 1500 TABLET, EXTENDED RELEASE ORAL at 08:31

## 2023-05-11 RX ADMIN — POTASSIUM CHLORIDE 40 MEQ: 1500 TABLET, EXTENDED RELEASE ORAL at 01:53

## 2023-05-11 ASSESSMENT — PAIN SCALES - GENERAL: PAINLEVEL_OUTOF10: 0

## 2023-05-11 NOTE — CARE COORDINATION
Pt is medically cleared for dc to home today. MD offered outpatient palliative care services and pt/dtr were agreeable and expressed no preference of agency. CM contacted the liaison for [address] [place] [address] to alert her to the referral.  CM provided pt/dtr with one of their brochures and informed them someone would contact them after dc to arrange the intake/evaluation. They verbalized understanding. No other dc needs or concerns identified or reported. CM remains available to assist as needed. 05/10/23 2399   Service Assessment   Patient Orientation Alert and Oriented   Cognition Alert   History Provided By Medical Record; Patient; Child/Family   Primary Caregiver Self   Support Systems Family Members; Tez Enriquez [age] is: Legal Next of Kin  (she has 3 daughters and she states she will not designated one of them as POA and that it is all three of them as the decision maker.)   PCP Verified by CM Yes   Last Visit to PCP Within last 3 months   Prior Functional Level Independent in ADLs/IADLs;Assistance with the following:;Housework   Current Functional Level Independent in ADLs/IADLs;Housework;Assistance with the following:   Can patient return to prior living arrangement Yes   Ability to make needs known: Good   Family able to assist with home care needs: Yes   Would you like for me to discuss the discharge plan with any other family members/significant others, and if so, who?  No   Financial Resources Medicare   Community Resources None   Social/Functional History   Lives With Alone   Type of Home Apartment   Home Layout One level   Home Access Level entry   Bathroom Shower/Tub Tub/Shower unit   Bathroom Toilet Standard   Bathroom Equipment None   Bathroom Accessibility Accessible   Home Equipment None   Receives Help From Family   ADL Assistance Independent   Homemaking Assistance Independent   Homemaking Responsibilities Yes   Meal Prep Responsibility Primary   Laundry
Responsibility Primary   Bill Paying/Finance Responsibility Primary   Shopping Responsibility Primary   Dependent Care Responsibility Primary   Health Care Management Primary   Ambulation Assistance Independent   Transfer Assistance Independent   Active  No  (family drives to appointments)   Occupation Retired   Discharge Planning   Type of [address] Prior To Admission None   Potential Assistance Needed N/A   DME Ordered? No   Potential Assistance Purchasing Medications No   Type of Home Care Services None   Patient expects to be discharged to: Apartment   One/Two Story Residence One story   Services At/After Discharge   Transition of Care Consult (CM Consult) [facility]   [employer] Information Provided?  No   Mode of Transport at Discharge Other (see comment)  (family)   Confirm Follow Up Transport Family   Condition of Participation: Discharge Planning   The Plan for Transition of Care is related to the following treatment goals: Home with family support to return to baseline functioning   Freedom of Choice list was provided with basic dialogue that supports the patient's individualized plan of care/goals, treatment preferences, and shares the quality data associated with the providers?    (not required)

## 2023-05-11 NOTE — DISCHARGE SUMMARY
LABVLDL 13.6 10/12/2022 11:58 AM    HDL 46 10/12/2022 11:58 AM    CHOLHDLRATIO 2.2 10/12/2022 11:58 AM    TRIG 148 01/17/2023 09:24 AM      Thyroid  Lab Results   Component Value Date/Time    TSHELE 0.35 12/22/2022 03:50 PM        Most Recent UA Lab Results   Component Value Date/Time    COLORU YELLOW/STRAW 04/19/2023 12:05 PM    APPEARANCE CLEAR 04/19/2023 12:05 PM    SPECGRAV 1.011 04/19/2023 12:05 PM    LABPH 6.5 04/19/2023 12:05 PM    PROTEINU Negative 04/19/2023 12:05 PM    GLUCOSEU Negative 05/09/2023 08:47 PM    GLUCOSEU Negative 04/19/2023 12:05 PM    KETUA Negative 04/19/2023 12:05 PM    BILIRUBINUR Negative 04/19/2023 12:05 PM    BLOODU Negative 05/09/2023 08:47 PM    BLOODU Negative 04/19/2023 12:05 PM    UROBILINOGEN 0.2 04/19/2023 12:05 PM    NITRU Negative 04/19/2023 12:05 PM    LEUKOCYTESUR Negative 04/19/2023 12:05 PM    WBCUA 0-3 03/26/2023 11:31 AM    RBCUA 0-3 03/26/2023 11:31 AM    EPITHUA 10-20 03/26/2023 11:31 AM    BACTERIA 1+ 03/26/2023 11:31 AM    LABCAST HYALINE 03/26/2023 11:31 AM    MUCUS 0 02/19/2023 01:20 PM        No results for input(s): CULTURE in the last 720 hours.     All Labs from Last 24 Hrs:  Recent Results (from the past 24 hour(s))   Potassium    Collection Time: 05/10/23  8:34 PM   Result Value Ref Range    Potassium 3.2 (L) 3.5 - 5.1 mmol/L   Comprehensive Metabolic Panel w/ Reflex to MG    Collection Time: 05/11/23  3:18 AM   Result Value Ref Range    Sodium 141 133 - 143 mmol/L    Potassium 3.3 (L) 3.5 - 5.1 mmol/L    Chloride 115 (H) 101 - 110 mmol/L    CO2 22 21 - 32 mmol/L    Anion Gap 4 2 - 11 mmol/L    Glucose 93 65 - 100 mg/dL    BUN 4 (L) 8 - 23 MG/DL    Creatinine 0.40 (L) 0.6 - 1.0 MG/DL    Est, Glom Filt Rate >60 >60 ml/min/1.73m2    Calcium 7.7 (L) 8.3 - 10.4 MG/DL    Total Bilirubin 0.3 0.2 - 1.1 MG/DL    ALT 11 (L) 12 - 65 U/L    AST 13 (L) 15 - 37 U/L    Alk Phosphatase 72 50 - 136 U/L    Total Protein 5.1 (L) 6.3 - 8.2 g/dL    Albumin 2.2 (L) 3.2 - 4.6

## 2023-05-11 NOTE — TELEPHONE ENCOUNTER
Patient d/c today from [facility], dx acute pancreatitis     Needs hospital follow up, please advise on scheduling    Also needs TCV call

## 2023-05-11 NOTE — TELEPHONE ENCOUNTER
Care Transitions Initial Follow Up Call    Call within 2 business days of discharge: Yes     Patient: Chiara Orr Patient : 1940 MRN: [id number]    [unfilled]    RARS: Readmission Risk Score: 30.7       Spoke with: pt    Discharge department/facility: [facility]    Non-face-to-face services provided:  [facility]    Follow Up  Future Appointments   Date Time Provider Bo Macarioi   2023  3:30 PM Jovana Chowdhury MD [facility] AMB   8/10/2023 12:30 PM Hany [address] [place] [address]   8/10/2023  1:00 PM Shey Hess MD [facility] AMB   2023  2:00 PM Rick Adam MD [facility] [place] , [address] [place] [address] GV AMB   10/6/2023  9:30 AM Jovana Chowdhury MD [facility] AMB       Ashley Sow, RN

## 2023-05-12 ENCOUNTER — CARE COORDINATION (OUTPATIENT)
Dept: CARE COORDINATION | Facility: CLINIC | Age: 83
End: 2023-05-12

## 2023-05-12 NOTE — CARE COORDINATION
[facility] Care Transitions Initial Follow Up Call    Call within 2 business days of discharge: Yes    Patient Current Location:  Home: [address] [place] [address]    Care Transition Nurse contacted the patient by telephone to perform post hospital discharge assessment. Verified name and  with patient as identifiers. Provided introduction to self, and explanation of the Care Transition Nurse role. Patient: Eileen Garcia Patient : 1940   MRN: [id number]  Reason for Admission: Acute on chronic pancreatitis  Discharge Date: 23 RARS: Readmission Risk Score: 30.7      Last Discharge [address]       Date Complaint Diagnosis Description Type Department Provider    23 Abdominal Pain Acute pancreatitis, unspecified complication status, unspecified pancreatitis type . .. ED to Hosp-Admission (Discharged) (ADMITTED) [id number] Diamante Marshall DO; Ramiro Trujillo. .. Was this an external facility discharge? No Discharge Facility: [facility]    Challenges to be reviewed by the provider   Additional needs identified to be addressed with provider: No  none               Method of communication with provider: none. Care Transition Nurse reviewed discharge instructions with patient who verbalized understanding. The patient was given an opportunity to ask questions and does not have any further questions or concerns at this time. Were discharge instructions available to patient? Yes. Reviewed appropriate site of care based on symptoms and resources available to patient including: PCP  Specialist  Rustam Barron . The patient agrees to contact the PCP office for questions related to their healthcare. Advance Care Planning:   Does patient have an Advance Directive: decision maker updated. Medication reconciliation was performed with patient, who verbalizes understanding of administration of home medications.  Medications reviewed, 1111F entered: N/A    Was patient

## 2023-05-16 ENCOUNTER — OFFICE VISIT (OUTPATIENT)
Dept: FAMILY MEDICINE CLINIC | Facility: CLINIC | Age: 83
End: 2023-05-16

## 2023-05-16 VITALS
DIASTOLIC BLOOD PRESSURE: 62 MMHG | TEMPERATURE: 98 F | HEIGHT: 62 IN | OXYGEN SATURATION: 98 % | HEART RATE: 78 BPM | SYSTOLIC BLOOD PRESSURE: 132 MMHG | WEIGHT: 83 LBS | BODY MASS INDEX: 15.27 KG/M2

## 2023-05-16 DIAGNOSIS — E87.6 HYPOKALEMIA: ICD-10-CM

## 2023-05-16 DIAGNOSIS — Z09 HOSPITAL DISCHARGE FOLLOW-UP: Primary | ICD-10-CM

## 2023-05-16 DIAGNOSIS — K85.90 ACUTE ON CHRONIC PANCREATITIS (HCC): ICD-10-CM

## 2023-05-16 DIAGNOSIS — K86.1 ACUTE ON CHRONIC PANCREATITIS (HCC): ICD-10-CM

## 2023-05-16 LAB
ANION GAP SERPL CALC-SCNC: 5 MMOL/L (ref 2–11)
BUN SERPL-MCNC: 15 MG/DL (ref 8–23)
CALCIUM SERPL-MCNC: 8.5 MG/DL (ref 8.3–10.4)
CHLORIDE SERPL-SCNC: 114 MMOL/L (ref 101–110)
CO2 SERPL-SCNC: 22 MMOL/L (ref 21–32)
CREAT SERPL-MCNC: 0.7 MG/DL (ref 0.6–1)
GLUCOSE SERPL-MCNC: 107 MG/DL (ref 65–100)
POTASSIUM SERPL-SCNC: 3.6 MMOL/L (ref 3.5–5.1)
SODIUM SERPL-SCNC: 141 MMOL/L (ref 133–143)

## 2023-05-16 RX ORDER — OXYCODONE HYDROCHLORIDE 5 MG/1
5 TABLET ORAL EVERY 12 HOURS PRN
Qty: 10 TABLET | Refills: 0 | Status: ON HOLD
Start: 2023-05-16 | End: 2023-05-25 | Stop reason: HOSPADM

## 2023-05-16 ASSESSMENT — PATIENT HEALTH QUESTIONNAIRE - PHQ9
SUM OF ALL RESPONSES TO PHQ QUESTIONS 1-9: 0
1. LITTLE INTEREST OR PLEASURE IN DOING THINGS: 0
SUM OF ALL RESPONSES TO PHQ9 QUESTIONS 1 & 2: 0
2. FEELING DOWN, DEPRESSED OR HOPELESS: 0
SUM OF ALL RESPONSES TO PHQ QUESTIONS 1-9: 0

## 2023-05-17 ENCOUNTER — CARE COORDINATION (OUTPATIENT)
Dept: CARE COORDINATION | Facility: CLINIC | Age: 83
End: 2023-05-17

## 2023-05-17 NOTE — CARE COORDINATION
CTN outreached to patient for LEONOR follow up call. Patient unable to talk at time of outreach. She agrees to call CTN when available to talk.

## 2023-05-19 ENCOUNTER — CARE COORDINATION (OUTPATIENT)
Dept: CARE COORDINATION | Facility: CLINIC | Age: 83
End: 2023-05-19

## 2023-05-21 ENCOUNTER — HOSPITAL ENCOUNTER (INPATIENT)
Age: 83
LOS: 4 days | Discharge: HOSPICE | DRG: 438 | End: 2023-05-25
Attending: EMERGENCY MEDICINE | Admitting: INTERNAL MEDICINE
Payer: MEDICARE

## 2023-05-21 ENCOUNTER — APPOINTMENT (OUTPATIENT)
Dept: CT IMAGING | Age: 83
DRG: 438 | End: 2023-05-21
Payer: MEDICARE

## 2023-05-21 DIAGNOSIS — K85.90 ACUTE PANCREATITIS, UNSPECIFIED COMPLICATION STATUS, UNSPECIFIED PANCREATITIS TYPE: ICD-10-CM

## 2023-05-21 DIAGNOSIS — K85.90 ACUTE ON CHRONIC PANCREATITIS (HCC): Primary | ICD-10-CM

## 2023-05-21 DIAGNOSIS — K86.1 ACUTE ON CHRONIC PANCREATITIS (HCC): Primary | ICD-10-CM

## 2023-05-21 PROBLEM — R62.7 ADULT FAILURE TO THRIVE: Status: ACTIVE | Noted: 2023-05-21

## 2023-05-21 LAB
ALBUMIN SERPL-MCNC: 3.2 G/DL (ref 3.2–4.6)
ALBUMIN/GLOB SERPL: 0.8 (ref 0.4–1.6)
ALP SERPL-CCNC: 111 U/L (ref 50–136)
ALT SERPL-CCNC: 17 U/L (ref 12–65)
ANION GAP SERPL CALC-SCNC: 6 MMOL/L (ref 2–11)
AST SERPL-CCNC: 18 U/L (ref 15–37)
BASOPHILS # BLD: 0.1 K/UL (ref 0–0.2)
BASOPHILS NFR BLD: 1 % (ref 0–2)
BILIRUB SERPL-MCNC: 0.4 MG/DL (ref 0.2–1.1)
BUN SERPL-MCNC: 11 MG/DL (ref 8–23)
CALCIUM SERPL-MCNC: 8.6 MG/DL (ref 8.3–10.4)
CHLORIDE SERPL-SCNC: 109 MMOL/L (ref 101–110)
CO2 SERPL-SCNC: 22 MMOL/L (ref 21–32)
CREAT SERPL-MCNC: 0.7 MG/DL (ref 0.6–1)
DIFFERENTIAL METHOD BLD: ABNORMAL
EOSINOPHIL # BLD: 0.2 K/UL (ref 0–0.8)
EOSINOPHIL NFR BLD: 1 % (ref 0.5–7.8)
ERYTHROCYTE [DISTWIDTH] IN BLOOD BY AUTOMATED COUNT: 19.7 % (ref 11.9–14.6)
GLOBULIN SER CALC-MCNC: 3.9 G/DL (ref 2.8–4.5)
GLUCOSE SERPL-MCNC: 78 MG/DL (ref 65–100)
HCT VFR BLD AUTO: 38.7 % (ref 35.8–46.3)
HGB BLD-MCNC: 12.2 G/DL (ref 11.7–15.4)
IMM GRANULOCYTES # BLD AUTO: 0.1 K/UL (ref 0–0.5)
IMM GRANULOCYTES NFR BLD AUTO: 0 % (ref 0–5)
LIPASE SERPL-CCNC: 3136 U/L (ref 73–393)
LYMPHOCYTES # BLD: 1.3 K/UL (ref 0.5–4.6)
LYMPHOCYTES NFR BLD: 9 % (ref 13–44)
MCH RBC QN AUTO: 25.7 PG (ref 26.1–32.9)
MCHC RBC AUTO-ENTMCNC: 31.5 G/DL (ref 31.4–35)
MCV RBC AUTO: 81.5 FL (ref 82–102)
MONOCYTES # BLD: 0.8 K/UL (ref 0.1–1.3)
MONOCYTES NFR BLD: 6 % (ref 4–12)
NEUTS SEG # BLD: 11.6 K/UL (ref 1.7–8.2)
NEUTS SEG NFR BLD: 83 % (ref 43–78)
NRBC # BLD: 0 K/UL (ref 0–0.2)
PLATELET # BLD AUTO: 398 K/UL (ref 150–450)
PMV BLD AUTO: 9.5 FL (ref 9.4–12.3)
POTASSIUM SERPL-SCNC: 3.6 MMOL/L (ref 3.5–5.1)
PROT SERPL-MCNC: 7.1 G/DL (ref 6.3–8.2)
RBC # BLD AUTO: 4.75 M/UL (ref 4.05–5.2)
SODIUM SERPL-SCNC: 137 MMOL/L (ref 133–143)
WBC # BLD AUTO: 13.9 K/UL (ref 4.3–11.1)

## 2023-05-21 PROCEDURE — 96374 THER/PROPH/DIAG INJ IV PUSH: CPT

## 2023-05-21 PROCEDURE — 2580000003 HC RX 258: Performed by: INTERNAL MEDICINE

## 2023-05-21 PROCEDURE — 2500000003 HC RX 250 WO HCPCS: Performed by: INTERNAL MEDICINE

## 2023-05-21 PROCEDURE — 85025 COMPLETE CBC W/AUTO DIFF WBC: CPT

## 2023-05-21 PROCEDURE — 83690 ASSAY OF LIPASE: CPT

## 2023-05-21 PROCEDURE — 6360000002 HC RX W HCPCS: Performed by: INTERNAL MEDICINE

## 2023-05-21 PROCEDURE — 80053 COMPREHEN METABOLIC PANEL: CPT

## 2023-05-21 PROCEDURE — 1100000000 HC RM PRIVATE

## 2023-05-21 PROCEDURE — 96375 TX/PRO/DX INJ NEW DRUG ADDON: CPT

## 2023-05-21 PROCEDURE — 6360000002 HC RX W HCPCS: Performed by: PHYSICIAN ASSISTANT

## 2023-05-21 PROCEDURE — 2580000003 HC RX 258: Performed by: EMERGENCY MEDICINE

## 2023-05-21 PROCEDURE — 99285 EMERGENCY DEPT VISIT HI MDM: CPT

## 2023-05-21 PROCEDURE — 74177 CT ABD & PELVIS W/CONTRAST: CPT

## 2023-05-21 PROCEDURE — 6360000004 HC RX CONTRAST MEDICATION: Performed by: INTERNAL MEDICINE

## 2023-05-21 RX ORDER — SODIUM CHLORIDE 0.9 % (FLUSH) 0.9 %
5-40 SYRINGE (ML) INJECTION EVERY 12 HOURS SCHEDULED
Status: DISCONTINUED | OUTPATIENT
Start: 2023-05-21 | End: 2023-05-25 | Stop reason: HOSPADM

## 2023-05-21 RX ORDER — ACETAMINOPHEN 650 MG/1
650 SUPPOSITORY RECTAL EVERY 6 HOURS PRN
Status: DISCONTINUED | OUTPATIENT
Start: 2023-05-21 | End: 2023-05-25 | Stop reason: HOSPADM

## 2023-05-21 RX ORDER — ONDANSETRON 2 MG/ML
4 INJECTION INTRAMUSCULAR; INTRAVENOUS EVERY 6 HOURS PRN
Status: DISCONTINUED | OUTPATIENT
Start: 2023-05-21 | End: 2023-05-25 | Stop reason: HOSPADM

## 2023-05-21 RX ORDER — SODIUM CHLORIDE 0.9 % (FLUSH) 0.9 %
5-40 SYRINGE (ML) INJECTION PRN
Status: DISCONTINUED | OUTPATIENT
Start: 2023-05-21 | End: 2023-05-25 | Stop reason: HOSPADM

## 2023-05-21 RX ORDER — SODIUM CHLORIDE 9 MG/ML
INJECTION, SOLUTION INTRAVENOUS PRN
Status: DISCONTINUED | OUTPATIENT
Start: 2023-05-21 | End: 2023-05-25 | Stop reason: HOSPADM

## 2023-05-21 RX ORDER — LANOLIN ALCOHOL/MO/W.PET/CERES
3 CREAM (GRAM) TOPICAL NIGHTLY PRN
COMMUNITY

## 2023-05-21 RX ORDER — MORPHINE SULFATE 4 MG/ML
4 INJECTION, SOLUTION INTRAMUSCULAR; INTRAVENOUS
Status: ACTIVE | OUTPATIENT
Start: 2023-05-21 | End: 2023-05-21

## 2023-05-21 RX ORDER — SODIUM CHLORIDE, SODIUM LACTATE, POTASSIUM CHLORIDE, CALCIUM CHLORIDE 600; 310; 30; 20 MG/100ML; MG/100ML; MG/100ML; MG/100ML
INJECTION, SOLUTION INTRAVENOUS CONTINUOUS
Status: DISCONTINUED | OUTPATIENT
Start: 2023-05-21 | End: 2023-05-24

## 2023-05-21 RX ORDER — ACETAMINOPHEN 325 MG/1
650 TABLET ORAL EVERY 6 HOURS PRN
Status: DISCONTINUED | OUTPATIENT
Start: 2023-05-21 | End: 2023-05-25 | Stop reason: HOSPADM

## 2023-05-21 RX ORDER — ONDANSETRON 2 MG/ML
4 INJECTION INTRAMUSCULAR; INTRAVENOUS
Status: COMPLETED | OUTPATIENT
Start: 2023-05-21 | End: 2023-05-21

## 2023-05-21 RX ORDER — ONDANSETRON 4 MG/1
4 TABLET, ORALLY DISINTEGRATING ORAL EVERY 8 HOURS PRN
Status: DISCONTINUED | OUTPATIENT
Start: 2023-05-21 | End: 2023-05-25 | Stop reason: HOSPADM

## 2023-05-21 RX ORDER — OXYCODONE HYDROCHLORIDE AND ACETAMINOPHEN 5; 325 MG/1; MG/1
1 TABLET ORAL EVERY 4 HOURS PRN
Status: DISCONTINUED | OUTPATIENT
Start: 2023-05-21 | End: 2023-05-21

## 2023-05-21 RX ORDER — HYDRALAZINE HYDROCHLORIDE 20 MG/ML
5 INJECTION INTRAMUSCULAR; INTRAVENOUS EVERY 6 HOURS PRN
Status: DISCONTINUED | OUTPATIENT
Start: 2023-05-21 | End: 2023-05-25 | Stop reason: HOSPADM

## 2023-05-21 RX ORDER — POLYETHYLENE GLYCOL 3350 17 G/17G
17 POWDER, FOR SOLUTION ORAL DAILY PRN
Status: DISCONTINUED | OUTPATIENT
Start: 2023-05-21 | End: 2023-05-25 | Stop reason: HOSPADM

## 2023-05-21 RX ORDER — ONDANSETRON 2 MG/ML
4 INJECTION INTRAMUSCULAR; INTRAVENOUS EVERY 6 HOURS PRN
Status: DISCONTINUED | OUTPATIENT
Start: 2023-05-21 | End: 2023-05-21

## 2023-05-21 RX ORDER — MORPHINE SULFATE 4 MG/ML
4 INJECTION INTRAVENOUS ONCE
Status: COMPLETED | OUTPATIENT
Start: 2023-05-21 | End: 2023-05-21

## 2023-05-21 RX ORDER — OXYCODONE HYDROCHLORIDE 5 MG/1
10 TABLET ORAL EVERY 4 HOURS PRN
Status: DISCONTINUED | OUTPATIENT
Start: 2023-05-21 | End: 2023-05-22

## 2023-05-21 RX ORDER — ONDANSETRON 2 MG/ML
4 INJECTION INTRAMUSCULAR; INTRAVENOUS
Status: ACTIVE | OUTPATIENT
Start: 2023-05-21 | End: 2023-05-21

## 2023-05-21 RX ORDER — HYDROMORPHONE HYDROCHLORIDE 1 MG/ML
1 INJECTION, SOLUTION INTRAMUSCULAR; INTRAVENOUS; SUBCUTANEOUS EVERY 4 HOURS PRN
Status: DISCONTINUED | OUTPATIENT
Start: 2023-05-21 | End: 2023-05-25 | Stop reason: HOSPADM

## 2023-05-21 RX ORDER — ENOXAPARIN SODIUM 100 MG/ML
30 INJECTION SUBCUTANEOUS DAILY
Status: DISCONTINUED | OUTPATIENT
Start: 2023-05-21 | End: 2023-05-25 | Stop reason: HOSPADM

## 2023-05-21 RX ORDER — MORPHINE SULFATE 2 MG/ML
2 INJECTION, SOLUTION INTRAMUSCULAR; INTRAVENOUS
Status: DISCONTINUED | OUTPATIENT
Start: 2023-05-21 | End: 2023-05-25 | Stop reason: HOSPADM

## 2023-05-21 RX ADMIN — IOPAMIDOL 100 ML: 755 INJECTION, SOLUTION INTRAVENOUS at 08:10

## 2023-05-21 RX ADMIN — MORPHINE SULFATE 4 MG: 4 INJECTION INTRAVENOUS at 06:37

## 2023-05-21 RX ADMIN — SODIUM CHLORIDE, PRESERVATIVE FREE 10 ML: 5 INJECTION INTRAVENOUS at 21:00

## 2023-05-21 RX ADMIN — SODIUM CHLORIDE, PRESERVATIVE FREE 10 ML: 5 INJECTION INTRAVENOUS at 10:13

## 2023-05-21 RX ADMIN — SODIUM CHLORIDE, POTASSIUM CHLORIDE, SODIUM LACTATE AND CALCIUM CHLORIDE: 600; 310; 30; 20 INJECTION, SOLUTION INTRAVENOUS at 22:57

## 2023-05-21 RX ADMIN — HYDROMORPHONE HYDROCHLORIDE 1 MG: 1 INJECTION, SOLUTION INTRAMUSCULAR; INTRAVENOUS; SUBCUTANEOUS at 10:08

## 2023-05-21 RX ADMIN — ENOXAPARIN SODIUM 30 MG: 100 INJECTION SUBCUTANEOUS at 10:11

## 2023-05-21 RX ADMIN — SODIUM CHLORIDE, POTASSIUM CHLORIDE, SODIUM LACTATE AND CALCIUM CHLORIDE: 600; 310; 30; 20 INJECTION, SOLUTION INTRAVENOUS at 15:31

## 2023-05-21 RX ADMIN — ONDANSETRON 4 MG: 2 INJECTION INTRAMUSCULAR; INTRAVENOUS at 06:37

## 2023-05-21 RX ADMIN — SODIUM CHLORIDE, POTASSIUM CHLORIDE, SODIUM LACTATE AND CALCIUM CHLORIDE: 600; 310; 30; 20 INJECTION, SOLUTION INTRAVENOUS at 07:27

## 2023-05-21 ASSESSMENT — ENCOUNTER SYMPTOMS
SHORTNESS OF BREATH: 0
ABDOMINAL PAIN: 1
DIARRHEA: 0
VOMITING: 0
NAUSEA: 1
FACIAL SWELLING: 0

## 2023-05-21 ASSESSMENT — PAIN DESCRIPTION - LOCATION
LOCATION: ABDOMEN

## 2023-05-21 ASSESSMENT — PAIN DESCRIPTION - PAIN TYPE: TYPE: ACUTE PAIN

## 2023-05-21 ASSESSMENT — PAIN DESCRIPTION - DESCRIPTORS: DESCRIPTORS: THROBBING

## 2023-05-21 ASSESSMENT — PAIN SCALES - GENERAL
PAINLEVEL_OUTOF10: 5
PAINLEVEL_OUTOF10: 10
PAINLEVEL_OUTOF10: 10
PAINLEVEL_OUTOF10: 0

## 2023-05-21 ASSESSMENT — PAIN DESCRIPTION - ORIENTATION: ORIENTATION: MID;UPPER

## 2023-05-21 ASSESSMENT — PAIN DESCRIPTION - FREQUENCY: FREQUENCY: CONTINUOUS

## 2023-05-21 ASSESSMENT — PAIN - FUNCTIONAL ASSESSMENT
PAIN_FUNCTIONAL_ASSESSMENT: 0-10
PAIN_FUNCTIONAL_ASSESSMENT: ACTIVITIES ARE NOT PREVENTED

## 2023-05-22 ENCOUNTER — CARE COORDINATION (OUTPATIENT)
Dept: CARE COORDINATION | Facility: CLINIC | Age: 83
End: 2023-05-22

## 2023-05-22 PROBLEM — J90 PLEURAL EFFUSION: Status: ACTIVE | Noted: 2023-05-22

## 2023-05-22 LAB
ALBUMIN SERPL-MCNC: 2 G/DL (ref 3.2–4.6)
ALBUMIN/GLOB SERPL: 0.7 (ref 0.4–1.6)
ALP SERPL-CCNC: 87 U/L (ref 50–136)
ALT SERPL-CCNC: 11 U/L (ref 12–65)
ANION GAP SERPL CALC-SCNC: 10 MMOL/L (ref 2–11)
AST SERPL-CCNC: 17 U/L (ref 15–37)
BASOPHILS # BLD: 0.1 K/UL (ref 0–0.2)
BASOPHILS NFR BLD: 0 % (ref 0–2)
BILIRUB SERPL-MCNC: 0.4 MG/DL (ref 0.2–1.1)
BUN SERPL-MCNC: 9 MG/DL (ref 8–23)
CALCIUM SERPL-MCNC: 7.9 MG/DL (ref 8.3–10.4)
CHLORIDE SERPL-SCNC: 108 MMOL/L (ref 101–110)
CO2 SERPL-SCNC: 21 MMOL/L (ref 21–32)
CREAT SERPL-MCNC: 0.4 MG/DL (ref 0.6–1)
DIFFERENTIAL METHOD BLD: ABNORMAL
EOSINOPHIL # BLD: 0 K/UL (ref 0–0.8)
EOSINOPHIL NFR BLD: 0 % (ref 0.5–7.8)
ERYTHROCYTE [DISTWIDTH] IN BLOOD BY AUTOMATED COUNT: 19.3 % (ref 11.9–14.6)
GLOBULIN SER CALC-MCNC: 3 G/DL (ref 2.8–4.5)
GLUCOSE SERPL-MCNC: 70 MG/DL (ref 65–100)
HCT VFR BLD AUTO: 30.8 % (ref 35.8–46.3)
HGB BLD-MCNC: 9.8 G/DL (ref 11.7–15.4)
IMM GRANULOCYTES # BLD AUTO: 0.1 K/UL (ref 0–0.5)
IMM GRANULOCYTES NFR BLD AUTO: 1 % (ref 0–5)
LIPASE SERPL-CCNC: 1028 U/L (ref 73–393)
LYMPHOCYTES # BLD: 0.5 K/UL (ref 0.5–4.6)
LYMPHOCYTES NFR BLD: 4 % (ref 13–44)
MAGNESIUM SERPL-MCNC: 1.6 MG/DL (ref 1.8–2.4)
MCH RBC QN AUTO: 25.7 PG (ref 26.1–32.9)
MCHC RBC AUTO-ENTMCNC: 31.8 G/DL (ref 31.4–35)
MCV RBC AUTO: 80.8 FL (ref 82–102)
MONOCYTES # BLD: 0.5 K/UL (ref 0.1–1.3)
MONOCYTES NFR BLD: 4 % (ref 4–12)
NEUTS SEG # BLD: 12.6 K/UL (ref 1.7–8.2)
NEUTS SEG NFR BLD: 91 % (ref 43–78)
NRBC # BLD: 0 K/UL (ref 0–0.2)
PLATELET # BLD AUTO: 260 K/UL (ref 150–450)
PMV BLD AUTO: 8.9 FL (ref 9.4–12.3)
POTASSIUM SERPL-SCNC: 3.4 MMOL/L (ref 3.5–5.1)
PROT SERPL-MCNC: 5 G/DL (ref 6.3–8.2)
RBC # BLD AUTO: 3.81 M/UL (ref 4.05–5.2)
SODIUM SERPL-SCNC: 139 MMOL/L (ref 133–143)
WBC # BLD AUTO: 13.8 K/UL (ref 4.3–11.1)

## 2023-05-22 PROCEDURE — 6370000000 HC RX 637 (ALT 250 FOR IP): Performed by: STUDENT IN AN ORGANIZED HEALTH CARE EDUCATION/TRAINING PROGRAM

## 2023-05-22 PROCEDURE — 94640 AIRWAY INHALATION TREATMENT: CPT

## 2023-05-22 PROCEDURE — 85025 COMPLETE CBC W/AUTO DIFF WBC: CPT

## 2023-05-22 PROCEDURE — 36415 COLL VENOUS BLD VENIPUNCTURE: CPT

## 2023-05-22 PROCEDURE — 94760 N-INVAS EAR/PLS OXIMETRY 1: CPT

## 2023-05-22 PROCEDURE — 6360000002 HC RX W HCPCS: Performed by: NURSE PRACTITIONER

## 2023-05-22 PROCEDURE — 99223 1ST HOSP IP/OBS HIGH 75: CPT | Performed by: INTERNAL MEDICINE

## 2023-05-22 PROCEDURE — 6360000002 HC RX W HCPCS: Performed by: STUDENT IN AN ORGANIZED HEALTH CARE EDUCATION/TRAINING PROGRAM

## 2023-05-22 PROCEDURE — 2580000003 HC RX 258: Performed by: EMERGENCY MEDICINE

## 2023-05-22 PROCEDURE — 2500000003 HC RX 250 WO HCPCS: Performed by: INTERNAL MEDICINE

## 2023-05-22 PROCEDURE — 80053 COMPREHEN METABOLIC PANEL: CPT

## 2023-05-22 PROCEDURE — 1100000000 HC RM PRIVATE

## 2023-05-22 PROCEDURE — 6370000000 HC RX 637 (ALT 250 FOR IP): Performed by: NURSE PRACTITIONER

## 2023-05-22 PROCEDURE — 6360000002 HC RX W HCPCS: Performed by: INTERNAL MEDICINE

## 2023-05-22 PROCEDURE — 6370000000 HC RX 637 (ALT 250 FOR IP): Performed by: INTERNAL MEDICINE

## 2023-05-22 PROCEDURE — 83735 ASSAY OF MAGNESIUM: CPT

## 2023-05-22 PROCEDURE — 83690 ASSAY OF LIPASE: CPT

## 2023-05-22 RX ORDER — IPRATROPIUM BROMIDE AND ALBUTEROL SULFATE 2.5; .5 MG/3ML; MG/3ML
1 SOLUTION RESPIRATORY (INHALATION)
Status: DISCONTINUED | OUTPATIENT
Start: 2023-05-22 | End: 2023-05-25 | Stop reason: HOSPADM

## 2023-05-22 RX ORDER — OXYCODONE HYDROCHLORIDE 5 MG/1
15 TABLET ORAL EVERY 4 HOURS PRN
Status: DISCONTINUED | OUTPATIENT
Start: 2023-05-22 | End: 2023-05-25 | Stop reason: HOSPADM

## 2023-05-22 RX ORDER — POTASSIUM CHLORIDE 20 MEQ/1
20 TABLET, EXTENDED RELEASE ORAL 2 TIMES DAILY WITH MEALS
Status: COMPLETED | OUTPATIENT
Start: 2023-05-22 | End: 2023-05-22

## 2023-05-22 RX ORDER — BUDESONIDE 0.5 MG/2ML
0.5 INHALANT ORAL 2 TIMES DAILY
Status: DISCONTINUED | OUTPATIENT
Start: 2023-05-22 | End: 2023-05-25 | Stop reason: HOSPADM

## 2023-05-22 RX ORDER — MAGNESIUM SULFATE IN WATER 40 MG/ML
2000 INJECTION, SOLUTION INTRAVENOUS ONCE
Status: COMPLETED | OUTPATIENT
Start: 2023-05-22 | End: 2023-05-22

## 2023-05-22 RX ADMIN — IPRATROPIUM BROMIDE AND ALBUTEROL SULFATE 1 AMPULE: .5; 3 SOLUTION RESPIRATORY (INHALATION) at 12:37

## 2023-05-22 RX ADMIN — BUDESONIDE INHALATION 500 MCG: 0.5 SUSPENSION RESPIRATORY (INHALATION) at 12:37

## 2023-05-22 RX ADMIN — MAGNESIUM SULFATE HEPTAHYDRATE 2000 MG: 40 INJECTION, SOLUTION INTRAVENOUS at 09:54

## 2023-05-22 RX ADMIN — OXYCODONE 15 MG: 5 TABLET ORAL at 10:10

## 2023-05-22 RX ADMIN — POTASSIUM CHLORIDE 20 MEQ: 1500 TABLET, EXTENDED RELEASE ORAL at 09:54

## 2023-05-22 RX ADMIN — SODIUM CHLORIDE, POTASSIUM CHLORIDE, SODIUM LACTATE AND CALCIUM CHLORIDE: 600; 310; 30; 20 INJECTION, SOLUTION INTRAVENOUS at 06:45

## 2023-05-22 RX ADMIN — IPRATROPIUM BROMIDE AND ALBUTEROL SULFATE 1 AMPULE: .5; 3 SOLUTION RESPIRATORY (INHALATION) at 15:33

## 2023-05-22 RX ADMIN — POTASSIUM CHLORIDE 20 MEQ: 1500 TABLET, EXTENDED RELEASE ORAL at 17:02

## 2023-05-22 RX ADMIN — ONDANSETRON 4 MG: 4 TABLET, ORALLY DISINTEGRATING ORAL at 17:58

## 2023-05-22 RX ADMIN — BUDESONIDE INHALATION 500 MCG: 0.5 SUSPENSION RESPIRATORY (INHALATION) at 20:14

## 2023-05-22 RX ADMIN — SODIUM CHLORIDE, POTASSIUM CHLORIDE, SODIUM LACTATE AND CALCIUM CHLORIDE: 600; 310; 30; 20 INJECTION, SOLUTION INTRAVENOUS at 17:02

## 2023-05-22 RX ADMIN — ACETAMINOPHEN 650 MG: 325 TABLET ORAL at 04:21

## 2023-05-22 RX ADMIN — IPRATROPIUM BROMIDE AND ALBUTEROL SULFATE 1 AMPULE: .5; 3 SOLUTION RESPIRATORY (INHALATION) at 20:14

## 2023-05-22 RX ADMIN — ENOXAPARIN SODIUM 30 MG: 100 INJECTION SUBCUTANEOUS at 09:55

## 2023-05-22 RX ADMIN — HYDROMORPHONE HYDROCHLORIDE 1 MG: 1 INJECTION, SOLUTION INTRAMUSCULAR; INTRAVENOUS; SUBCUTANEOUS at 17:49

## 2023-05-22 ASSESSMENT — PAIN DESCRIPTION - LOCATION
LOCATION: ABDOMEN
LOCATION: ABDOMEN

## 2023-05-22 ASSESSMENT — ENCOUNTER SYMPTOMS
NAUSEA: 1
ABDOMINAL PAIN: 1
VOMITING: 1

## 2023-05-22 ASSESSMENT — PAIN SCALES - GENERAL
PAINLEVEL_OUTOF10: 7
PAINLEVEL_OUTOF10: 5

## 2023-05-22 ASSESSMENT — PAIN DESCRIPTION - DESCRIPTORS
DESCRIPTORS: SORE
DESCRIPTORS: ACHING;PRESSURE

## 2023-05-22 ASSESSMENT — PAIN DESCRIPTION - ORIENTATION
ORIENTATION: ANTERIOR
ORIENTATION: ANTERIOR

## 2023-05-22 NOTE — CARE COORDINATION
Per epic patient currently inpatient. Admitted from Ed with diagnosis of Acute on chronic pancreatitis. Will continue to monitor chart regarding discharge plan and LEONOR follow up.

## 2023-05-22 NOTE — CARE COORDINATION
Patient recently discharged from [facility]. Patient returned to hospital needing medical attention. CM will continue to monitor patient for any CM and/ or discharge planning needs.

## 2023-05-22 NOTE — CARE COORDINATION
CM spoke to patient at bedside on this day. Patient confirmed demographic information. Patient lives alone and is independent with ADLs. Patient has family support from her children. Patient reports that she does not need any DMEs at this time. Patient is agreeable to returning home once medically stable. Patient politely declines home health care services at this time. No CM needs voiced or noted at this time. 05/21/23 1526   Service Assessment   Patient Orientation Alert and Oriented   Cognition Alert   History Provided By Patient   Primary [address]   PCP Verified by CM Yes   Prior Functional Level Independent in ADLs/IADLs   Current Functional Level Other (see comment)  (TBD by clinicals)   Can patient return to prior living arrangement Yes   Ability to make needs known: Good   Family able to assist with home care needs: Yes   Would you like for me to discuss the discharge plan with any other family members/significant others, and if so, who? Yes   Social/Functional History   Lives With Alone   Discharge Planning   Type of Residence Apartment   Living Arrangements Alone   Current Services Prior To Admission None   Potential Assistance Needed N/A   DME Ordered? No   Type of Home Care Services None   Patient expects to be discharged to: Apartment   One/Two Story Residence One story   History of falls? 0   Condition of Participation: Discharge Planning   The Plan for Transition of Care is related to the following treatment goals: anticipates returning home   The Patient and/or Patient Representative was provided with a Choice of Provider? Patient   The Patient and/Or Patient Representative agree with the Discharge Plan? Yes   Freedom of Choice list was provided with basic dialogue that supports the patient's individualized plan of care/goals, treatment preferences, and shares the quality data associated with the providers?   Yes

## 2023-05-23 PROBLEM — D50.9 IRON DEFICIENCY ANEMIA: Chronic | Status: ACTIVE | Noted: 2023-01-01

## 2023-05-23 LAB
ANION GAP SERPL CALC-SCNC: ABNORMAL MMOL/L (ref 2–11)
BUN SERPL-MCNC: 7 MG/DL (ref 8–23)
CALCIUM SERPL-MCNC: 7.4 MG/DL (ref 8.3–10.4)
CHLORIDE SERPL-SCNC: 113 MMOL/L (ref 101–110)
CO2 SERPL-SCNC: 24 MMOL/L (ref 21–32)
CREAT SERPL-MCNC: 0.3 MG/DL (ref 0.6–1)
ERYTHROCYTE [DISTWIDTH] IN BLOOD BY AUTOMATED COUNT: 19.4 % (ref 11.9–14.6)
GLUCOSE SERPL-MCNC: 92 MG/DL (ref 65–100)
HCT VFR BLD AUTO: 28 % (ref 35.8–46.3)
HGB BLD-MCNC: 9 G/DL (ref 11.7–15.4)
LIPASE SERPL-CCNC: 810 U/L (ref 73–393)
MAGNESIUM SERPL-MCNC: 1.8 MG/DL (ref 1.8–2.4)
MCH RBC QN AUTO: 25.7 PG (ref 26.1–32.9)
MCHC RBC AUTO-ENTMCNC: 32.1 G/DL (ref 31.4–35)
MCV RBC AUTO: 80 FL (ref 82–102)
NRBC # BLD: 0 K/UL (ref 0–0.2)
PLATELET # BLD AUTO: 226 K/UL (ref 150–450)
PMV BLD AUTO: 10 FL (ref 9.4–12.3)
POTASSIUM SERPL-SCNC: 3.3 MMOL/L (ref 3.5–5.1)
RBC # BLD AUTO: 3.5 M/UL (ref 4.05–5.2)
SODIUM SERPL-SCNC: 135 MMOL/L (ref 133–143)
WBC # BLD AUTO: 8.1 K/UL (ref 4.3–11.1)

## 2023-05-23 PROCEDURE — 6370000000 HC RX 637 (ALT 250 FOR IP): Performed by: NURSE PRACTITIONER

## 2023-05-23 PROCEDURE — 83690 ASSAY OF LIPASE: CPT

## 2023-05-23 PROCEDURE — 6360000002 HC RX W HCPCS: Performed by: NURSE PRACTITIONER

## 2023-05-23 PROCEDURE — 6360000002 HC RX W HCPCS: Performed by: INTERNAL MEDICINE

## 2023-05-23 PROCEDURE — 80048 BASIC METABOLIC PNL TOTAL CA: CPT

## 2023-05-23 PROCEDURE — 6370000000 HC RX 637 (ALT 250 FOR IP): Performed by: INTERNAL MEDICINE

## 2023-05-23 PROCEDURE — 2580000003 HC RX 258: Performed by: EMERGENCY MEDICINE

## 2023-05-23 PROCEDURE — 6370000000 HC RX 637 (ALT 250 FOR IP): Performed by: STUDENT IN AN ORGANIZED HEALTH CARE EDUCATION/TRAINING PROGRAM

## 2023-05-23 PROCEDURE — 94760 N-INVAS EAR/PLS OXIMETRY 1: CPT

## 2023-05-23 PROCEDURE — 94640 AIRWAY INHALATION TREATMENT: CPT

## 2023-05-23 PROCEDURE — 94664 DEMO&/EVAL PT USE INHALER: CPT

## 2023-05-23 PROCEDURE — 1100000000 HC RM PRIVATE

## 2023-05-23 PROCEDURE — 85027 COMPLETE CBC AUTOMATED: CPT

## 2023-05-23 PROCEDURE — 83735 ASSAY OF MAGNESIUM: CPT

## 2023-05-23 PROCEDURE — 36415 COLL VENOUS BLD VENIPUNCTURE: CPT

## 2023-05-23 RX ORDER — POTASSIUM CHLORIDE 20 MEQ/1
20 TABLET, EXTENDED RELEASE ORAL 2 TIMES DAILY WITH MEALS
Status: DISCONTINUED | OUTPATIENT
Start: 2023-05-23 | End: 2023-05-25 | Stop reason: HOSPADM

## 2023-05-23 RX ORDER — AMLODIPINE BESYLATE 5 MG/1
5 TABLET ORAL DAILY
Status: DISCONTINUED | OUTPATIENT
Start: 2023-05-23 | End: 2023-05-25 | Stop reason: HOSPADM

## 2023-05-23 RX ORDER — FERROUS SULFATE 325(65) MG
325 TABLET ORAL
Status: DISCONTINUED | OUTPATIENT
Start: 2023-05-23 | End: 2023-05-25 | Stop reason: HOSPADM

## 2023-05-23 RX ADMIN — OXYCODONE 15 MG: 5 TABLET ORAL at 20:45

## 2023-05-23 RX ADMIN — SODIUM CHLORIDE, POTASSIUM CHLORIDE, SODIUM LACTATE AND CALCIUM CHLORIDE: 600; 310; 30; 20 INJECTION, SOLUTION INTRAVENOUS at 00:05

## 2023-05-23 RX ADMIN — ONDANSETRON 4 MG: 4 TABLET, ORALLY DISINTEGRATING ORAL at 20:46

## 2023-05-23 RX ADMIN — BUDESONIDE INHALATION 500 MCG: 0.5 SUSPENSION RESPIRATORY (INHALATION) at 07:36

## 2023-05-23 RX ADMIN — IPRATROPIUM BROMIDE AND ALBUTEROL SULFATE 1 AMPULE: .5; 3 SOLUTION RESPIRATORY (INHALATION) at 19:51

## 2023-05-23 RX ADMIN — IPRATROPIUM BROMIDE AND ALBUTEROL SULFATE 1 AMPULE: .5; 3 SOLUTION RESPIRATORY (INHALATION) at 13:53

## 2023-05-23 RX ADMIN — AMLODIPINE BESYLATE 5 MG: 5 TABLET ORAL at 09:28

## 2023-05-23 RX ADMIN — OXYCODONE 15 MG: 5 TABLET ORAL at 09:42

## 2023-05-23 RX ADMIN — FERROUS SULFATE TAB 325 MG (65 MG ELEMENTAL FE) 325 MG: 325 (65 FE) TAB at 09:28

## 2023-05-23 RX ADMIN — ENOXAPARIN SODIUM 30 MG: 100 INJECTION SUBCUTANEOUS at 09:28

## 2023-05-23 RX ADMIN — POTASSIUM CHLORIDE 20 MEQ: 1500 TABLET, EXTENDED RELEASE ORAL at 17:06

## 2023-05-23 RX ADMIN — BUDESONIDE INHALATION 500 MCG: 0.5 SUSPENSION RESPIRATORY (INHALATION) at 19:51

## 2023-05-23 RX ADMIN — IPRATROPIUM BROMIDE AND ALBUTEROL SULFATE 1 AMPULE: .5; 3 SOLUTION RESPIRATORY (INHALATION) at 07:36

## 2023-05-23 RX ADMIN — POTASSIUM CHLORIDE 20 MEQ: 1500 TABLET, EXTENDED RELEASE ORAL at 09:28

## 2023-05-23 RX ADMIN — SODIUM CHLORIDE, POTASSIUM CHLORIDE, SODIUM LACTATE AND CALCIUM CHLORIDE: 600; 310; 30; 20 INJECTION, SOLUTION INTRAVENOUS at 09:28

## 2023-05-23 RX ADMIN — IPRATROPIUM BROMIDE AND ALBUTEROL SULFATE 1 AMPULE: .5; 3 SOLUTION RESPIRATORY (INHALATION) at 16:32

## 2023-05-23 ASSESSMENT — PAIN DESCRIPTION - LOCATION
LOCATION: ABDOMEN
LOCATION: ABDOMEN

## 2023-05-23 ASSESSMENT — PAIN DESCRIPTION - FREQUENCY: FREQUENCY: CONTINUOUS

## 2023-05-23 ASSESSMENT — PAIN DESCRIPTION - DESCRIPTORS
DESCRIPTORS: SORE
DESCRIPTORS: SORE

## 2023-05-23 ASSESSMENT — PAIN - FUNCTIONAL ASSESSMENT: PAIN_FUNCTIONAL_ASSESSMENT: ACTIVITIES ARE NOT PREVENTED

## 2023-05-23 ASSESSMENT — PAIN SCALES - GENERAL
PAINLEVEL_OUTOF10: 6
PAINLEVEL_OUTOF10: 5

## 2023-05-23 ASSESSMENT — PAIN DESCRIPTION - PAIN TYPE: TYPE: ACUTE PAIN

## 2023-05-23 ASSESSMENT — PAIN DESCRIPTION - ORIENTATION
ORIENTATION: ANTERIOR
ORIENTATION: MID;LEFT

## 2023-05-23 ASSESSMENT — PAIN DESCRIPTION - ONSET: ONSET: GRADUAL

## 2023-05-23 ASSESSMENT — ENCOUNTER SYMPTOMS: ABDOMINAL PAIN: 1

## 2023-05-23 NOTE — CONSULTS
Comprehensive Nutrition Assessment    Type and Reason for Visit: Reassess  General Nutrition Management/other reason (Hospitalists)    Nutrition Recommendations/Plan:   Meals and Snacks:  Diet: Continue current order  Progression  per MD.    Nutrition Supplement Therapy:  Medical food supplement therapy:  Initiate Ensure High Protein three times per day (this provides 160 kcal and 16 grams protein per bottle)       Malnutrition Assessment:  Malnutrition Status: Severe malnutrition  Context: Chronic Illness  Findings of clinical characteristics of malnutrition:   Energy Intake:  Unable to assess (pt does not provide a reliable nutrition hx)  Weight Loss:  Greater than 10% over 6 months (12% wt loss since Dec)     Body Fat Loss:  Severe body fat loss Triceps, Fat Overlying Ribs, Buccal region (mild loss orbital)   Muscle Mass Loss:  Severe muscle mass loss Clavicles (pectoralis & deltoids), Hand (interosseous), Scapula (trapezius) (mild to moderate loss temples)  Fluid Accumulation:  No significant fluid accumulation     Strength:  Not Performed     Nutrition Assessment:  Nutrition History: Pt initially reports eating beef broth, chicken broth, later references she eats anything she wants. At one point in interview she states she had company over last week so she made pot roast, dressing and beef gravy states pain has been unbearable since then. She reports last solid food intake on Friday. She states UBW of 116# stating she last weighed 116# in February. Wt hx as below per validated sources in EMR review. She c/o pain management not yet being set up at home despite being told on last admission that this would occur. She reports everything that she consumes \"comes through her. \" When asked for further explanation she references abdominal pain, stooling at times generally yellow in color.        Do You Have Any Cultural, Catholic, or Ethnic Food Preferences?: No   Nutrition Background:       PMH of right lower
Consult Note            Date:5/22/2023        Patient Brian Wang     YOB: 1940     Age:82 y.o. Inpatient consult to Case Management  Consult performed by: Abbey Ford MD  Consult ordered by: Tony Maria DO        Chief Complaint   No chief complaint on file. pancreatitis    History Obtained From   patient    History of Present Illness   Patient is a [de-identified] y/o female with pmh of pancreatitis multiple episodes with negative workup with EUS FNA in [place]. Patient presents with similar symptoms of diffuse abdominal pain described as: 10/10, radiates to the right flank, nothing makes symptoms better or worse, associated with weight loss of 20lbs and vomiting. She denies any melena, hematemesis or hematochezia. Past Medical History     Past Medical History:   Diagnosis Date    Acute pancreatitis     EPIFANIO (acute kidney injury) ([address] 12/04/2014    Back pain 6/10/2016    Breast cancer ([address] 2018    Breast lump dx 2/2018    left    Bronchitis     Chronic pancreatitis ([address]     COPD (chronic obstructive pulmonary disease) ([address] 02/09/2023    no meds or supplemental O2. Spiromery: Impression: Mild obstruction, supranormal FVC and moderately reduced diffusion capacity suggestive of COPD.     Discharge from the vagina     Dysuria     Eczema 6/10/2016    Fungal dermatitis     Headache 6/10/2016    History of bleeding peptic ulcer     Ruptured ulcer/gastrectomy- 5 units blood transfused    History of blood transfusion 2003    ruptured peptic ulcer- 5 units blood    History of left breast cancer 2018    lumpectomy and lymphnodes removed- no further treatment required    Hypercholesterolemia 12/4/2014    Hypertension     not well controlled--per pt    Intractable vomiting 5/20/2018    Lung cancer ([address] 02/2023    Dr Cleveland Mckeon -oncologist    Lung cancer [facility]) 01/2023    Malnutrition ([address]     BMI 16.4-- pancreatitis and continued weightloss> 20lbs since 1/2023    Menopausal vaginal
Patient: Marisa Rendon MRN: [id number]  SSN: [id number]    YOB: 1940  Age: [de-identified] y.o. Sex: female       Date of Request: 5/22/2023  Date of Consult:  5/22/2023  Reason for Consult:  pain and symptom management and goals of care  Requesting Physician: Dr. Shelby Judd     Assessment/Plan:     Principal Diagnosis:    Pain, abdomen  R10.9    Additional Diagnoses:   Counseling, Encounter for Medical Advice  Z71.9  Encounter for Palliative Care  Z51.5    Palliative Performance Scale (PPS):       Medical Decision Making:   Reviewed and summarized labs and imaging from admission. Met with pt at bedside. Pt notes recurrent admissions related to pancreatitis. She has plans to follow up with [address] for further evaluation. She has started a clear liquid diet this am.  Notes some ongoing abdominal pain with some improvement from admission. Will increase oxycodone IR to 15 mg po q 4 hr prn pain. Continue iv dilaudid 1 mg iv q 4 hr prn severe pain. Recommend trying oral meds prior to iv for pain relief. Discussed overall goals. Pt has 3 daughters who would be decision makers should pt ever be unable to make decisions. We discussed code status and pt wishes to remain FULL code. She states she wants every attempt to save  her life. Will follow.      Will discuss findings with members of the interdisciplinary team.      Thank you for this referral.         Subjective:     History obtained from:  Patient and Chart    Chief Complaint: abdominal pain  History of Present Illness:  [de-identified] y.o. 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
24 hour   Intake 2871.46 ml   Output 1400 ml   Net 1471.46 ml     PHYSICAL EXAM   Constitutional:  the patient is elderly, thin  EENMT:  Sclera clear, pupils equal, oral mucosa moist  Respiratory: symmetric chest rise. CTA on RA  Cardiovascular:  RRR without M,G,R. There is no lower extremity edema. Gastrointestinal: soft and non-tender; with positive bowel sounds. Musculoskeletal: warm without cyanosis. Normal muscle tone. Skin:  no jaundice or rashes, no wounds   Neurologic: symmetric strength, fluent speech  Psychiatric:  calm, appropriate, oriented x 4    Imaging: I performed an independent interpretation of the patient's images. CT Chest:       Recent Labs     05/21/23  0607 05/22/23  0624   WBC 13.9* 13.8*   HGB 12.2 9.8*   HCT 38.7 30.8*    260    139   K 3.6 3.4*    108   CO2 22 21   BUN 11 9   CREATININE 0.70 0.40*   MG  --  1.6*   BILITOT 0.4 0.4   AST 18 17   ALT 17 11*   ALKPHOS 111 87       Lab Results   Component Value Date/Time     05/22/2023 06:24 AM    K 3.4 05/22/2023 06:24 AM     05/22/2023 06:24 AM    CO2 21 05/22/2023 06:24 AM    BUN 9 05/22/2023 06:24 AM    CREATININE 0.40 05/22/2023 06:24 AM    GLUCOSE 70 05/22/2023 06:24 AM    CALCIUM 7.9 05/22/2023 06:24 AM      No results found for: BNP    ECHO: 03/06/23    TRANSTHORACIC ECHOCARDIOGRAM (TTE) COMPLETE (CONTRAST/BUBBLE/3D PRN) 03/06/2023 10:30 AM, 03/06/2023 12:00 AM (Final)    Interpretation Summary    Left Ventricle: Normal left ventricular systolic function with a visually estimated EF of 60 - 65%. Left ventricle size is normal. Normal wall thickness. Normal wall motion. Abnormal diastolic function. Aortic Valve: Mildly calcified cusp. Mild regurgitation. No stenosis. AV mean gradient is 8 mmHg. AV peak gradient is 16 mmHg. LVOT:AV VTI Index is 0.44. Mitral Valve: Mild regurgitation. Left Atrium: Left atrium is mildly dilated.     Technical qualifiers: Technically difficult study due to low

## 2023-05-24 ENCOUNTER — APPOINTMENT (OUTPATIENT)
Dept: MRI IMAGING | Age: 83
DRG: 438 | End: 2023-05-24
Payer: MEDICARE

## 2023-05-24 LAB
ANION GAP SERPL CALC-SCNC: 7 MMOL/L (ref 2–11)
BUN SERPL-MCNC: 5 MG/DL (ref 8–23)
CALCIUM SERPL-MCNC: 8.1 MG/DL (ref 8.3–10.4)
CHLORIDE SERPL-SCNC: 108 MMOL/L (ref 101–110)
CO2 SERPL-SCNC: 24 MMOL/L (ref 21–32)
CREAT SERPL-MCNC: 0.4 MG/DL (ref 0.6–1)
GLUCOSE SERPL-MCNC: 67 MG/DL (ref 65–100)
HCT VFR BLD AUTO: 29.6 % (ref 35.8–46.3)
HGB BLD-MCNC: 9.4 G/DL (ref 11.7–15.4)
LIPASE SERPL-CCNC: 329 U/L (ref 73–393)
MAGNESIUM SERPL-MCNC: 1.7 MG/DL (ref 1.8–2.4)
POTASSIUM SERPL-SCNC: 3.5 MMOL/L (ref 3.5–5.1)
SODIUM SERPL-SCNC: 139 MMOL/L (ref 133–143)

## 2023-05-24 PROCEDURE — 6370000000 HC RX 637 (ALT 250 FOR IP): Performed by: NURSE PRACTITIONER

## 2023-05-24 PROCEDURE — 94760 N-INVAS EAR/PLS OXIMETRY 1: CPT

## 2023-05-24 PROCEDURE — 36415 COLL VENOUS BLD VENIPUNCTURE: CPT

## 2023-05-24 PROCEDURE — 2580000003 HC RX 258: Performed by: INTERNAL MEDICINE

## 2023-05-24 PROCEDURE — 6360000002 HC RX W HCPCS: Performed by: INTERNAL MEDICINE

## 2023-05-24 PROCEDURE — 94640 AIRWAY INHALATION TREATMENT: CPT

## 2023-05-24 PROCEDURE — 6370000000 HC RX 637 (ALT 250 FOR IP): Performed by: INTERNAL MEDICINE

## 2023-05-24 PROCEDURE — 6370000000 HC RX 637 (ALT 250 FOR IP): Performed by: STUDENT IN AN ORGANIZED HEALTH CARE EDUCATION/TRAINING PROGRAM

## 2023-05-24 PROCEDURE — 6360000004 HC RX CONTRAST MEDICATION: Performed by: STUDENT IN AN ORGANIZED HEALTH CARE EDUCATION/TRAINING PROGRAM

## 2023-05-24 PROCEDURE — A9579 GAD-BASE MR CONTRAST NOS,1ML: HCPCS | Performed by: STUDENT IN AN ORGANIZED HEALTH CARE EDUCATION/TRAINING PROGRAM

## 2023-05-24 PROCEDURE — 80048 BASIC METABOLIC PNL TOTAL CA: CPT

## 2023-05-24 PROCEDURE — 6360000002 HC RX W HCPCS: Performed by: NURSE PRACTITIONER

## 2023-05-24 PROCEDURE — 85018 HEMOGLOBIN: CPT

## 2023-05-24 PROCEDURE — 1100000000 HC RM PRIVATE

## 2023-05-24 PROCEDURE — 2580000003 HC RX 258: Performed by: STUDENT IN AN ORGANIZED HEALTH CARE EDUCATION/TRAINING PROGRAM

## 2023-05-24 PROCEDURE — 83690 ASSAY OF LIPASE: CPT

## 2023-05-24 PROCEDURE — 85014 HEMATOCRIT: CPT

## 2023-05-24 PROCEDURE — 83735 ASSAY OF MAGNESIUM: CPT

## 2023-05-24 PROCEDURE — 74183 MRI ABD W/O CNTR FLWD CNTR: CPT

## 2023-05-24 RX ORDER — LANOLIN ALCOHOL/MO/W.PET/CERES
400 CREAM (GRAM) TOPICAL 2 TIMES DAILY
Status: DISCONTINUED | OUTPATIENT
Start: 2023-05-24 | End: 2023-05-25 | Stop reason: HOSPADM

## 2023-05-24 RX ADMIN — SODIUM CHLORIDE, POTASSIUM CHLORIDE, SODIUM LACTATE AND CALCIUM CHLORIDE: 600; 310; 30; 20 INJECTION, SOLUTION INTRAVENOUS at 00:28

## 2023-05-24 RX ADMIN — POTASSIUM CHLORIDE 20 MEQ: 1500 TABLET, EXTENDED RELEASE ORAL at 16:19

## 2023-05-24 RX ADMIN — IPRATROPIUM BROMIDE AND ALBUTEROL SULFATE 1 AMPULE: .5; 3 SOLUTION RESPIRATORY (INHALATION) at 07:20

## 2023-05-24 RX ADMIN — SODIUM CHLORIDE, PRESERVATIVE FREE 10 ML: 5 INJECTION INTRAVENOUS at 08:50

## 2023-05-24 RX ADMIN — IPRATROPIUM BROMIDE AND ALBUTEROL SULFATE 1 AMPULE: .5; 3 SOLUTION RESPIRATORY (INHALATION) at 20:37

## 2023-05-24 RX ADMIN — AMLODIPINE BESYLATE 5 MG: 5 TABLET ORAL at 08:50

## 2023-05-24 RX ADMIN — FERROUS SULFATE TAB 325 MG (65 MG ELEMENTAL FE) 325 MG: 325 (65 FE) TAB at 08:50

## 2023-05-24 RX ADMIN — ENOXAPARIN SODIUM 30 MG: 100 INJECTION SUBCUTANEOUS at 09:49

## 2023-05-24 RX ADMIN — BUDESONIDE INHALATION 500 MCG: 0.5 SUSPENSION RESPIRATORY (INHALATION) at 07:21

## 2023-05-24 RX ADMIN — IPRATROPIUM BROMIDE AND ALBUTEROL SULFATE 1 AMPULE: .5; 3 SOLUTION RESPIRATORY (INHALATION) at 15:46

## 2023-05-24 RX ADMIN — OXYCODONE 15 MG: 5 TABLET ORAL at 21:29

## 2023-05-24 RX ADMIN — BUDESONIDE INHALATION 500 MCG: 0.5 SUSPENSION RESPIRATORY (INHALATION) at 20:37

## 2023-05-24 RX ADMIN — POTASSIUM CHLORIDE 20 MEQ: 1500 TABLET, EXTENDED RELEASE ORAL at 08:50

## 2023-05-24 RX ADMIN — MAGNESIUM GLUCONATE 500 MG ORAL TABLET 400 MG: 500 TABLET ORAL at 10:23

## 2023-05-24 RX ADMIN — IPRATROPIUM BROMIDE AND ALBUTEROL SULFATE 1 AMPULE: .5; 3 SOLUTION RESPIRATORY (INHALATION) at 11:09

## 2023-05-24 RX ADMIN — GADOTERIDOL 10 ML: 279.3 INJECTION, SOLUTION INTRAVENOUS at 08:12

## 2023-05-24 RX ADMIN — SODIUM CHLORIDE, PRESERVATIVE FREE 10 ML: 5 INJECTION INTRAVENOUS at 21:29

## 2023-05-24 RX ADMIN — MAGNESIUM GLUCONATE 500 MG ORAL TABLET 400 MG: 500 TABLET ORAL at 21:29

## 2023-05-24 ASSESSMENT — PAIN DESCRIPTION - ORIENTATION: ORIENTATION: RIGHT;UPPER

## 2023-05-24 ASSESSMENT — PAIN DESCRIPTION - DESCRIPTORS: DESCRIPTORS: ACHING

## 2023-05-24 ASSESSMENT — PAIN DESCRIPTION - LOCATION: LOCATION: ABDOMEN

## 2023-05-24 ASSESSMENT — PAIN SCALES - GENERAL
PAINLEVEL_OUTOF10: 0
PAINLEVEL_OUTOF10: 7

## 2023-05-24 NOTE — CARE COORDINATION
RNCM: Patient admitted 5/21 with acute on chronic pancreatitis. Patient to discharge home with [address] [place] [address]. CM following.

## 2023-05-25 ENCOUNTER — TELEPHONE (OUTPATIENT)
Dept: FAMILY MEDICINE CLINIC | Facility: CLINIC | Age: 83
End: 2023-05-25

## 2023-05-25 ENCOUNTER — CARE COORDINATION (OUTPATIENT)
Dept: CARE COORDINATION | Facility: CLINIC | Age: 83
End: 2023-05-25

## 2023-05-25 VITALS
TEMPERATURE: 98.2 F | DIASTOLIC BLOOD PRESSURE: 68 MMHG | HEART RATE: 75 BPM | WEIGHT: 81 LBS | BODY MASS INDEX: 15.29 KG/M2 | HEIGHT: 61 IN | SYSTOLIC BLOOD PRESSURE: 138 MMHG | OXYGEN SATURATION: 96 % | RESPIRATION RATE: 16 BRPM

## 2023-05-25 PROCEDURE — 94760 N-INVAS EAR/PLS OXIMETRY 1: CPT

## 2023-05-25 PROCEDURE — 6360000002 HC RX W HCPCS: Performed by: NURSE PRACTITIONER

## 2023-05-25 PROCEDURE — 6360000002 HC RX W HCPCS: Performed by: INTERNAL MEDICINE

## 2023-05-25 PROCEDURE — 2580000003 HC RX 258: Performed by: INTERNAL MEDICINE

## 2023-05-25 PROCEDURE — 94640 AIRWAY INHALATION TREATMENT: CPT

## 2023-05-25 PROCEDURE — 6370000000 HC RX 637 (ALT 250 FOR IP): Performed by: STUDENT IN AN ORGANIZED HEALTH CARE EDUCATION/TRAINING PROGRAM

## 2023-05-25 PROCEDURE — 6370000000 HC RX 637 (ALT 250 FOR IP): Performed by: NURSE PRACTITIONER

## 2023-05-25 RX ORDER — FERROUS SULFATE 325(65) MG
325 TABLET ORAL
Qty: 30 TABLET | Refills: 3 | Status: SHIPPED | OUTPATIENT
Start: 2023-05-26

## 2023-05-25 RX ORDER — NALOXONE HYDROCHLORIDE 4 MG/.1ML
1 SPRAY NASAL PRN
Qty: 1 EACH | Refills: 1 | Status: SHIPPED | OUTPATIENT
Start: 2023-05-25 | End: 2023-06-01

## 2023-05-25 RX ORDER — LANOLIN ALCOHOL/MO/W.PET/CERES
400 CREAM (GRAM) TOPICAL 2 TIMES DAILY
Qty: 60 TABLET | Refills: 0 | Status: SHIPPED | OUTPATIENT
Start: 2023-05-25 | End: 2023-06-24

## 2023-05-25 RX ORDER — OXYCODONE HYDROCHLORIDE 15 MG/1
15 TABLET ORAL EVERY 4 HOURS PRN
Qty: 18 TABLET | Refills: 0 | Status: SHIPPED | OUTPATIENT
Start: 2023-05-25 | End: 2023-05-28

## 2023-05-25 RX ADMIN — MAGNESIUM GLUCONATE 500 MG ORAL TABLET 400 MG: 500 TABLET ORAL at 08:47

## 2023-05-25 RX ADMIN — ENOXAPARIN SODIUM 30 MG: 100 INJECTION SUBCUTANEOUS at 10:33

## 2023-05-25 RX ADMIN — FERROUS SULFATE TAB 325 MG (65 MG ELEMENTAL FE) 325 MG: 325 (65 FE) TAB at 08:47

## 2023-05-25 RX ADMIN — BUDESONIDE INHALATION 500 MCG: 0.5 SUSPENSION RESPIRATORY (INHALATION) at 09:52

## 2023-05-25 RX ADMIN — POTASSIUM CHLORIDE 20 MEQ: 1500 TABLET, EXTENDED RELEASE ORAL at 08:47

## 2023-05-25 RX ADMIN — IPRATROPIUM BROMIDE AND ALBUTEROL SULFATE 1 AMPULE: .5; 3 SOLUTION RESPIRATORY (INHALATION) at 09:52

## 2023-05-25 RX ADMIN — SODIUM CHLORIDE, PRESERVATIVE FREE 10 ML: 5 INJECTION INTRAVENOUS at 08:47

## 2023-05-25 RX ADMIN — AMLODIPINE BESYLATE 5 MG: 5 TABLET ORAL at 08:47

## 2023-05-25 NOTE — DISCHARGE SUMMARY
oxide (MAG-OX) 400 (240 Mg) MG tablet Take 1 tablet by mouth 2 times daily  Qty: 60 tablet, Refills: 0      naloxone 4 MG/0.1ML LIQD nasal spray 1 spray by Nasal route as needed for Opioid Reversal  Qty: 1 each, Refills: 1           CONTINUE these medications which have CHANGED    Details   oxyCODONE (OXY-IR) 15 MG immediate release tablet Take 1 tablet by mouth every 4 hours as needed for Pain for up to 3 days. Max Daily Amount: 90 mg  Qty: 18 tablet, Refills: 0    Comments: Reduce doses taken as pain becomes manageable  Associated Diagnoses: Acute pancreatitis, unspecified complication status, unspecified pancreatitis type           CONTINUE these medications which have NOT CHANGED    Details   melatonin 3 MG TABS tablet Take 1 tablet by mouth nightly as needed      potassium chloride (KLOR-CON M) 20 MEQ extended release tablet Take 1 tablet by mouth daily  Qty: 30 tablet, Refills: 5      pravastatin (PRAVACHOL) 40 MG tablet Take 1 tablet by mouth daily  Qty: 30 tablet, Refills: 5      donepezil (ARICEPT) 5 MG tablet Take 1 tablet by mouth nightly  Qty: 30 tablet, Refills: 5      losartan (COZAAR) 25 MG tablet Take 1 tablet by mouth daily  Qty: 30 tablet, Refills: 5      etanercept (ENBREL) 50 MG/ML injection Administer Enbrel 1 shot every 7 days. Qty: 12 mL, Refills: 1    Associated Diagnoses: Seropositive rheumatoid arthritis of multiple sites [facility])             Some medications may have been reported old/obsolete and marked \"stop taking\" by the system; in reality pt was already off these meds; defer to outpatient or prescribing providers.     Procedures done this admission:  * No surgery found *    Consults this admission:  IP CONSULT TO GI  IP CONSULT TO PALLIATIVE CARE  IP CONSULT TO DIETITIAN  IP CONSULT TO PULMONOLOGY    Echocardiogram results:  03/06/23    TRANSTHORACIC ECHOCARDIOGRAM (TTE) COMPLETE (CONTRAST/BUBBLE/3D PRN) 03/06/2023 10:30 AM, 03/06/2023 12:00 AM (Final)    Interpretation Summary    Left

## 2023-05-25 NOTE — DISCHARGE INSTRUCTIONS
[employer], [facility] disclaims any warranty or liability for your use of this information. Diet for Chronic Pancreatitis: Care Instructions  Overview     The pancreas is an organ behind the stomach that makes hormones and enzymes to help your body digest food. Sometimes the enzymes attack another part of the pancreas, which can cause pain and swelling. This is called pancreatitis. Chronic pancreatitis may cause you to be in pain much of the time. You may be able to help the pain by avoiding alcohol and eating a low-fat diet. Your doctor and dietitian can help you make an eating plan that does not irritate your digestive system. Always talk with your doctor or dietitian before you make changes in your diet. Follow-up care is a key part of your treatment and safety. Be sure to make and go to all appointments, and call your doctor if you are having problems. It's also a good idea to know your test results and keep a list of the medicines you take. How can you care for yourself at home? Do not drink alcohol. It may make your pain worse and cause other problems. Tell your doctor if you need help to quit. Counseling, support groups, and sometimes medicines can help you stay sober. Ask your doctor if you need to take pancreatic enzyme pills to help your body digest fat and protein. Drink plenty of fluids. If you have kidney, heart, or liver disease and have to limit fluids, talk with your doctor before you increase the amount of fluids you drink. Eat a low-fat diet   Eat many small meals and snacks each day instead of three large meals. Choose lean meats. Eat no more than 5 to 6½ ounces of meat a day. Cut off all fat you can see. Eat chicken and turkey without the skin. Many types of fish, such as salmon, lake trout, tuna, and herring, provide healthy omega-3 fat. But avoid fish canned in oil, such as sardines in olive oil.   Bake, broil, or grill meats, poultry, or fish instead of frying them in

## 2023-05-25 NOTE — PROGRESS NOTES
D/C from unit with belongings and RX. Accompanied by family and hospital personnel. No distress at time of D/C. Transported via w/c.
END OF SHIFT NOTE:    INTAKE/OUTPUT  05/21 0701 - 05/22 0700  In: 2871.5 [I.V.:2871.5]  Out: 1400 [Urine:1400]  Voiding: Yes  Catheter: No  Drain:              Flatus: Patient does have flatus present. Stool:  occurrences. Characteristics:           Stool Assessment  Last BM (including prior to admit): 05/21/23    Emesis:  occurrences. Characteristics:        VITAL SIGNS  Patient Vitals for the past 12 hrs:   Temp Pulse Resp BP SpO2   05/22/23 0351 98.2 °F (36.8 °C) 79 17 (!) 141/62 98 %   05/21/23 2321 98.2 °F (36.8 °C) 82 18 130/60 96 %   05/21/23 1936 99.9 °F (37.7 °C) 100 18 (!) 128/53 94 %       Pain Assessment  Pain Level: 0 (05/21/23 1045)  Pain Location: Abdomen  Patient's Stated Pain Goal: 0 - No pain    Ambulating  Yes    Shift report given to oncoming nurse at the bedside.     Reina Ndiaye RN
END OF SHIFT NOTE:    INTAKE/OUTPUT  05/22 0701 - 05/23 0700  In: 2786 [I.V.:2786]  Out: 1300 [Urine:1300]  Voiding: Yes  Catheter: No  Drain:              Flatus: Patient does have flatus present. Stool:  occurrences. Characteristics:           Stool Assessment  Last BM (including prior to admit): 05/21/23    Emesis:  occurrences. Characteristics:        VITAL SIGNS  Patient Vitals for the past 12 hrs:   Temp Pulse Resp BP SpO2   05/23/23 0416 98.8 °F (37.1 °C) 79 17 (!) 142/71 97 %   05/22/23 2320 97.8 °F (36.6 °C) 88 18 130/60 96 %   05/22/23 1903 97.9 °F (36.6 °C) 86 18 123/61 98 %       Pain Assessment  Pain Level: 7 (05/22/23 1749)  Pain Location: Abdomen  Patient's Stated Pain Goal: 0 - No pain    Ambulating  Yes    Shift report given to oncoming nurse at the bedside.     Cynthia Salinas RN
END OF SHIFT NOTE:    INTAKE/OUTPUT  05/22 0701 - 05/23 0700  In: 2786 [I.V.:2786]  Out: 1300 [Urine:1300]  Voiding: Yes  Catheter: No  Drain:              Flatus: Patient does have flatus present. Stool:  occurrences. Characteristics:           Stool Assessment  Last BM (including prior to admit): 05/21/23    Emesis:  occurrences. Characteristics:        VITAL SIGNS  Patient Vitals for the past 12 hrs:   Temp Pulse Resp BP SpO2   05/23/23 1826 -- -- -- (!) 136/56 --   05/23/23 1353 -- 85 18 -- 96 %   05/23/23 0928 -- -- -- (!) 162/62 --   05/23/23 0737 -- 89 18 -- 98 %   05/23/23 0700 98.5 °F (36.9 °C) 81 18 (!) 182/70 97 %       Pain Assessment  Pain Level: 6 (05/23/23 0942)  Pain Location: Abdomen  Patient's Stated Pain Goal: 0 - No pain    Ambulating  Yes    Shift report given to oncoming nurse at the bedside.     Ifeoma Stoll RN
END OF SHIFT NOTE:    INTAKE/OUTPUT  05/23 0701 - 05/24 0700  In: 3770.5 [P.O.:2036; I.V.:1734.5]  Out: 3400 [Urine:3400]  Voiding: Yes  Catheter: No  Drain:              Flatus: Patient does have flatus present. Stool:  occurrences. Characteristics:           Stool Assessment  Last BM (including prior to admit): 05/21/23    Emesis:  occurrences. Characteristics:        VITAL SIGNS  Patient Vitals for the past 12 hrs:   Temp Pulse Resp BP SpO2   05/24/23 0415 98.8 °F (37.1 °C) 88 17 136/60 96 %   05/23/23 2307 99.3 °F (37.4 °C) 92 17 139/63 95 %   05/23/23 2115 -- -- 18 -- --   05/23/23 2020 99.3 °F (37.4 °C) 95 18 (!) 170/68 98 %   05/23/23 1826 -- -- -- (!) 136/56 --       Pain Assessment  Pain Level: 5 (05/23/23 2045)  Pain Location: Abdomen  Patient's Stated Pain Goal: 0 - No pain    Ambulating  Yes    Shift report given to oncoming nurse at the bedside.     Felicity Norwood RN
END OF SHIFT NOTE:    INTAKE/OUTPUT  05/24 0701 - 05/25 0700  In: 850 [P.O.:850]  Out: 1900 [Urine:1900]  Voiding: Yes  Catheter: No  Drain:              Flatus: Patient does have flatus present. Stool:  occurrences. Characteristics:  Stool Appearance: Soft (per pt)  Stool Color: Brown  Stool Amount: Medium  Stool Assessment  Incontinence: No  Stool Appearance: Soft (per pt)  Stool Color: Brown  Stool Amount: Medium  Stool Source: Rectum  Last BM (including prior to admit): 05/24/23    Emesis:  occurrences. Characteristics:        VITAL SIGNS  Patient Vitals for the past 12 hrs:   Temp Pulse Resp BP SpO2   05/24/23 2037 -- 76 16 -- 96 %   05/24/23 1914 98.4 °F (36.9 °C) 88 17 139/62 99 %       Pain Assessment  Pain Level: 0 (05/24/23 2215)  Pain Location: Abdomen  Patient's Stated Pain Goal: 0 - No pain    Ambulating  Yes    Shift report given to oncoming nurse at the bedside.     Amber Catherine RN
Eloy Landry notified that pt wanted to speak with her. Maria Ines Roman NP came to see the pt. Diet changed. Pt taking full liquid diet at this time. Discharge instructions will be given to pt.
Hospitalist Progress Note   Admit Date:  2023  5:58 AM   Name:  Pérez Stack   Age:  [de-identified] y.o. Sex:  female  :  1940   MRN:  [id number]   Room:      Presenting/Chief Complaint: No chief complaint on file. Reason(s) for Admission: Acute on chronic pancreatitis ([facility] [address] [K85.90, K86.1]     Hospital Course: Pérez Stack is a [de-identified] y.o. 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 She has had multiple hospitalizations over the past several months for recurrent episdoes of acute on chronic pancreatitis exacerbations and has upcoming evaluation at [address] in  for EBUS/ERCP. Attempts at ERCP in the past with Dr. Alayna Tripathi with [facility] was without success. She has been intermittently using oxycodone 5 mg every few days. No known or likely exacerbating factors (no greasy foods, no alcohol use). Home palliative care was supposed to assist with pain control while awaiting [facility] evaluation but for some reason this was never completed. ED Course: lipase level of 3500. WBC count of 13.9. No abdominal imaging completed, prior abdominal US has shown dilated pancreatic duct. Given antiemetics and pain meds. Hospitalist consulted for admission. Subjective & 24hr Events (23): Patient alert, conversational. Denies chest pain, SOB. Ambulating independently. Denies abdominal pain at rest presently. Tolerating full liquid diet. BM today, semi formed.  Awaiting GI re-evaluation
NP to see pt. IVF will be d/c'd.   No need for new IV
Patient will be made NPO after midnight for MRI exam. Please reach out to MRI department in the morning. Will leave a note for the technologist to reach out in the morning as well.
Progress Note  Date:2023       Room:[facility]  Patient Jermaine Garcia     YOB: 1940     Age:82 y.o. Subjective    Subjective:  Symptoms:  (Pain management a little more improved today. ). Diet:  Adequate intake. Activity level: Normal.    Pain:  She complains of pain that is severe. Review of Systems   Gastrointestinal:  Positive for abdominal pain. Objective         Vitals Last 24 Hours:  TEMPERATURE:  Temp  Av.2 °F (36.8 °C)  Min: 97.8 °F (36.6 °C)  Max: 98.8 °F (37.1 °C)  RESPIRATIONS RANGE: Resp  Av.9  Min: 17  Max: 18  PULSE OXIMETRY RANGE: SpO2  Av %  Min: 96 %  Max: 98 %  PULSE RANGE: Pulse  Av.9  Min: 79  Max: 94  BLOOD PRESSURE RANGE: Systolic (13FBN), URE:100 , Min:123 , ICO:361   ; Diastolic (50DEE), CXR:21, Min:49, Max:71    I/O (24Hr): Intake/Output Summary (Last 24 hours) at 2023 1432  Last data filed at 2023 1323  Gross per 24 hour   Intake 4042 ml   Output 2500 ml   Net 1542 ml     Objective:  General Appearance:  Comfortable. Vital signs: (most recent): Blood pressure (!) 162/62, pulse 85, temperature 98.5 °F (36.9 °C), temperature source Oral, resp. rate 18, height 5' 1\" (1.549 m), weight 81 lb (36.7 kg), SpO2 96 %. HEENT: Normal HEENT exam.    Lungs:  Normal effort. Heart: Normal rate. Abdomen: Abdomen is soft. Pupils:  Pupils are equal, round, and reactive to light.     Labs/Imaging/Diagnostics    Labs:  CBC:  Recent Labs     23  0607 23  0624 23  0618   WBC 13.9* 13.8* 8.1   RBC 4.75 3.81* 3.50*   HGB 12.2 9.8* 9.0*   HCT 38.7 30.8* 28.0*   MCV 81.5* 80.8* 80.0*   RDW 19.7* 19.3* 19.4*    260 226     CHEMISTRIES:  Recent Labs     23  0607 23  0624 23  0618    139 135   K 3.6 3.4* 3.3*    108 113*   CO2 22 21 24   BUN 11 9 7*   CREATININE 0.70 0.40* 0.30*   GLUCOSE 78 70 92   MG  --  1.6* 1.8     PT/INR:No results for input(s): PROTIME, INR in the
Pt back from MRI. Medications given. IV infiltrated. Informed that I would need to start a new IV and pt stated \"wait to see if I go home. If I am going home, you will not need to start a new one. \"
Pt's D/C instructions completed.   Verbalized understanding of all instructions including diet, activity, s/sx to alert MD, medications, and f/u appointment
appear normal.  Moist oral mucosa  Neck:  No restricted ROM. Trachea midline   CV:   RRR. No m/r/g. No jugular venous distension. Lungs:   CTAB. No wheezing, rhonchi, or rales. Symmetric expansion. Abdomen:   Soft, nondistended. Epigastric tenderness. Bowel sounds normoactive throughout  Extremities: No cyanosis or clubbing. No edema  Skin:     No rashes and normal coloration. Warm and dry. Neuro:  CN II-XII grossly intact. A&O x 3 no focal deficits  Psych:  Normal mood and affect.       I have personally reviewed labs and tests:  Recent Labs:  Recent Results (from the past 48 hour(s))   Comprehensive Metabolic Panel w/ Reflex to MG    Collection Time: 05/22/23  6:24 AM   Result Value Ref Range    Sodium 139 133 - 143 mmol/L    Potassium 3.4 (L) 3.5 - 5.1 mmol/L    Chloride 108 101 - 110 mmol/L    CO2 21 21 - 32 mmol/L    Anion Gap 10 2 - 11 mmol/L    Glucose 70 65 - 100 mg/dL    BUN 9 8 - 23 MG/DL    Creatinine 0.40 (L) 0.6 - 1.0 MG/DL    Est, Glom Filt Rate >60 >60 ml/min/1.73m2    Calcium 7.9 (L) 8.3 - 10.4 MG/DL    Total Bilirubin 0.4 0.2 - 1.1 MG/DL    ALT 11 (L) 12 - 65 U/L    AST 17 15 - 37 U/L    Alk Phosphatase 87 50 - 136 U/L    Total Protein 5.0 (L) 6.3 - 8.2 g/dL    Albumin 2.0 (L) 3.2 - 4.6 g/dL    Globulin 3.0 2.8 - 4.5 g/dL    Albumin/Globulin Ratio 0.7 0.4 - 1.6     CBC with Auto Differential    Collection Time: 05/22/23  6:24 AM   Result Value Ref Range    WBC 13.8 (H) 4.3 - 11.1 K/uL    RBC 3.81 (L) 4.05 - 5.2 M/uL    Hemoglobin 9.8 (L) 11.7 - 15.4 g/dL    Hematocrit 30.8 (L) 35.8 - 46.3 %    MCV 80.8 (L) 82 - 102 FL    MCH 25.7 (L) 26.1 - 32.9 PG    MCHC 31.8 31.4 - 35.0 g/dL    RDW 19.3 (H) 11.9 - 14.6 %    Platelets 703 635 - 061 K/uL    MPV 8.9 (L) 9.4 - 12.3 FL    nRBC 0.00 0.0 - 0.2 K/uL    Differential Type AUTOMATED      Neutrophils % 91 (H) 43 - 78 %    Lymphocytes % 4 (L) 13 - 44 %    Monocytes % 4 4.0 - 12.0 %    Eosinophils % 0 (L) 0.5 - 7.8 %    Basophils % 0 0.0 - 2.0 %
5.1 mmol/L    Chloride 108 101 - 110 mmol/L    CO2 21 21 - 32 mmol/L    Anion Gap 10 2 - 11 mmol/L    Glucose 70 65 - 100 mg/dL    BUN 9 8 - 23 MG/DL    Creatinine 0.40 (L) 0.6 - 1.0 MG/DL    Est, Glom Filt Rate >60 >60 ml/min/1.73m2    Calcium 7.9 (L) 8.3 - 10.4 MG/DL    Total Bilirubin 0.4 0.2 - 1.1 MG/DL    ALT 11 (L) 12 - 65 U/L    AST 17 15 - 37 U/L    Alk Phosphatase 87 50 - 136 U/L    Total Protein 5.0 (L) 6.3 - 8.2 g/dL    Albumin 2.0 (L) 3.2 - 4.6 g/dL    Globulin 3.0 2.8 - 4.5 g/dL    Albumin/Globulin Ratio 0.7 0.4 - 1.6     CBC with Auto Differential    Collection Time: 05/22/23  6:24 AM   Result Value Ref Range    WBC 13.8 (H) 4.3 - 11.1 K/uL    RBC 3.81 (L) 4.05 - 5.2 M/uL    Hemoglobin 9.8 (L) 11.7 - 15.4 g/dL    Hematocrit 30.8 (L) 35.8 - 46.3 %    MCV 80.8 (L) 82 - 102 FL    MCH 25.7 (L) 26.1 - 32.9 PG    MCHC 31.8 31.4 - 35.0 g/dL    RDW 19.3 (H) 11.9 - 14.6 %    Platelets 016 615 - 239 K/uL    MPV 8.9 (L) 9.4 - 12.3 FL    nRBC 0.00 0.0 - 0.2 K/uL    Differential Type AUTOMATED      Neutrophils % 91 (H) 43 - 78 %    Lymphocytes % 4 (L) 13 - 44 %    Monocytes % 4 4.0 - 12.0 %    Eosinophils % 0 (L) 0.5 - 7.8 %    Basophils % 0 0.0 - 2.0 %    Immature Granulocytes 1 0.0 - 5.0 %    Neutrophils Absolute 12.6 (H) 1.7 - 8.2 K/UL    Lymphocytes Absolute 0.5 0.5 - 4.6 K/UL    Monocytes Absolute 0.5 0.1 - 1.3 K/UL    Eosinophils Absolute 0.0 0.0 - 0.8 K/UL    Basophils Absolute 0.1 0.0 - 0.2 K/UL    Absolute Immature Granulocyte 0.1 0.0 - 0.5 K/UL   Magnesium    Collection Time: 05/22/23  6:24 AM   Result Value Ref Range    Magnesium 1.6 (L) 1.8 - 2.4 mg/dL   Lipase    Collection Time: 05/22/23  6:24 AM   Result Value Ref Range    Lipase 1,028 (H) 73 - 393 U/L       Current Meds:  Current Facility-Administered Medications   Medication Dose Route Frequency    potassium chloride (KLOR-CON M) extended release tablet 20 mEq  20 mEq Oral BID WC    oxyCODONE (ROXICODONE) immediate release tablet 15 mg  15 mg Oral

## 2023-05-25 NOTE — CARE COORDINATION
Patient with discharge orders for today. Patient discharging home with [facility]. Patient encouraged to follow-up with her PCP for pain management until appointment with palliative care. Patient has met all treatment goals and milestones for discharge. Family to provide transportation home. CM following until patient is discharged. 05/25/23 1222   Service Assessment   Patient Orientation Alert and [facility] Discharge   Transition of Care Consult (CM Consult) Discharge Planning   Services At/After Discharge Outpatient care transitions   [address] Provided? No   Mode of Transport at Discharge Self   Confirm Follow Up Transport Self   Condition of Participation: Discharge Planning   The Plan for Transition of Care is related to the following treatment goals: Return to baseline with family support   The Patient and/or Patient Representative was provided with a Choice of Provider? Patient   The Patient and/Or Patient Representative agree with the Discharge Plan? Yes   Freedom of Choice list was provided with basic dialogue that supports the patient's individualized plan of care/goals, treatment preferences, and shares the quality data associated with the providers?   Yes

## 2023-05-25 NOTE — CARE COORDINATION
Patient remains inpatient. Will continue to monitor chart for progress and discharge for ELONOR follow up.

## 2023-05-26 ENCOUNTER — CARE COORDINATION (OUTPATIENT)
Dept: CARE COORDINATION | Facility: CLINIC | Age: 83
End: 2023-05-26

## 2023-05-26 DIAGNOSIS — R91.1 LUNG NODULE: Primary | ICD-10-CM

## 2023-05-26 DIAGNOSIS — J44.9 CHRONIC OBSTRUCTIVE PULMONARY DISEASE, UNSPECIFIED COPD TYPE (HCC): ICD-10-CM

## 2023-05-26 NOTE — TELEPHONE ENCOUNTER
Care Transitions Initial Follow Up Call    Call within 2 business days of discharge: Yes     Patient: Pérez Stack Patient : 1940 MRN: [id number]    [unfilled]    RARS: Readmission Risk Score: 35.2       Spoke with: pt    Discharge department/facility: [facility]    Non-face-to-face services provided:  Scheduled appointment with PCP-David/NICOLAS    Follow Up  Future Appointments   Date Time Provider Bo Jj   2023 10:20 AM ROBERTO Rodriguez - NP [facility] AMB   2023  1:00 PM [facility]   2023  2:20 PM Nick Sosa MD [facility] AMB   8/10/2023 12:30 PM [address] [place] [address] [employer] [employer] [facility] [address] [place] [address]   8/10/2023  1:00 PM Les Valiente MD [facility] AM[facility]   2023  2:00 PM Peg Calixto MD [facility] [address] [facility] AMB   10/6/2023  9:30 AM Bernadette Apple MD [facility] AMB       Mike Bauman RN

## 2023-05-26 NOTE — CARE COORDINATION
[facility] Care Transitions Initial Follow Up Call    Call within 2 business days of discharge: Yes    Patient Current Location:  Home: [address] [place] [address]    Care Transition Nurse contacted the patient by telephone to perform post hospital discharge assessment. Verified name and  with patient as identifiers. Provided introduction to self, and explanation of the Care Transition Nurse role. Patient: Kenyetta Isaac Patient : 1940   MRN: [id number]  Reason for Admission: Acute on Chronic pancreatitis  Discharge Date: 23 RARS: Readmission Risk Score: 35.2      Last Discharge  Street       Date Complaint Diagnosis Description Type Department Provider    23 pancreas  Acute on chronic pancreatitis ([facility] [address] . .. ED to Hosp-Admission (Discharged) (ADMITTED) Bobbe Severance, MD; Yeni Santillan, ... Was this an external facility discharge? No Discharge Facility: [facility]    Challenges to be reviewed by the provider   Additional needs identified to be addressed with provider: No  none               Method of communication with provider: none. Care Transition Nurse reviewed discharge instructions, medical action plan, and red flags with patient who verbalized understanding. The patient was given an opportunity to ask questions and does not have any further questions or concerns at this time. Were discharge instructions available to patient? Yes. Reviewed appropriate site of care based on symptoms and resources available to patient including: PCP  Specialist  [facility] NP . The patient agrees to contact the PCP office for questions related to their healthcare. Advance Care Planning:   Does patient have an Advance Directive: decision maker updated. Medication reconciliation was performed with patient, who verbalizes understanding of administration of home medications.  Medications reviewed, 1111F entered: N/A    Was patient discharged with a pulse

## 2023-05-30 ENCOUNTER — TELEPHONE (OUTPATIENT)
Dept: FAMILY MEDICINE CLINIC | Facility: CLINIC | Age: 83
End: 2023-05-30

## 2023-05-30 ENCOUNTER — OFFICE VISIT (OUTPATIENT)
Dept: FAMILY MEDICINE CLINIC | Facility: CLINIC | Age: 83
End: 2023-05-30

## 2023-05-30 VITALS
HEIGHT: 61 IN | HEART RATE: 52 BPM | DIASTOLIC BLOOD PRESSURE: 82 MMHG | OXYGEN SATURATION: 95 % | SYSTOLIC BLOOD PRESSURE: 160 MMHG | BODY MASS INDEX: 14.91 KG/M2 | TEMPERATURE: 97 F | WEIGHT: 79 LBS

## 2023-05-30 DIAGNOSIS — E83.42 HYPOMAGNESEMIA: ICD-10-CM

## 2023-05-30 DIAGNOSIS — Z09 HOSPITAL DISCHARGE FOLLOW-UP: Primary | ICD-10-CM

## 2023-05-30 DIAGNOSIS — D64.9 ANEMIA, UNSPECIFIED TYPE: ICD-10-CM

## 2023-05-30 DIAGNOSIS — E87.6 HYPOKALEMIA: ICD-10-CM

## 2023-05-30 LAB
ANION GAP SERPL CALC-SCNC: 7 MMOL/L (ref 2–11)
BASOPHILS # BLD: 0.1 K/UL (ref 0–0.2)
BASOPHILS NFR BLD: 1 % (ref 0–2)
BUN SERPL-MCNC: 12 MG/DL (ref 8–23)
CALCIUM SERPL-MCNC: 8.5 MG/DL (ref 8.3–10.4)
CHLORIDE SERPL-SCNC: 107 MMOL/L (ref 101–110)
CO2 SERPL-SCNC: 27 MMOL/L (ref 21–32)
CREAT SERPL-MCNC: 0.8 MG/DL (ref 0.6–1)
DIFFERENTIAL METHOD BLD: ABNORMAL
EOSINOPHIL # BLD: 0.2 K/UL (ref 0–0.8)
EOSINOPHIL NFR BLD: 3 % (ref 0.5–7.8)
ERYTHROCYTE [DISTWIDTH] IN BLOOD BY AUTOMATED COUNT: 18.9 % (ref 11.9–14.6)
GLUCOSE SERPL-MCNC: 97 MG/DL (ref 65–100)
HCT VFR BLD AUTO: 35.8 % (ref 35.8–46.3)
HGB BLD-MCNC: 10.9 G/DL (ref 11.7–15.4)
IMM GRANULOCYTES # BLD AUTO: 0 K/UL (ref 0–0.5)
IMM GRANULOCYTES NFR BLD AUTO: 0 % (ref 0–5)
LYMPHOCYTES # BLD: 1.4 K/UL (ref 0.5–4.6)
LYMPHOCYTES NFR BLD: 18 % (ref 13–44)
MAGNESIUM SERPL-MCNC: 2.1 MG/DL (ref 1.8–2.4)
MCH RBC QN AUTO: 25.6 PG (ref 26.1–32.9)
MCHC RBC AUTO-ENTMCNC: 30.4 G/DL (ref 31.4–35)
MCV RBC AUTO: 84.2 FL (ref 82–102)
MONOCYTES # BLD: 0.5 K/UL (ref 0.1–1.3)
MONOCYTES NFR BLD: 6 % (ref 4–12)
NEUTS SEG # BLD: 5.6 K/UL (ref 1.7–8.2)
NEUTS SEG NFR BLD: 72 % (ref 43–78)
NRBC # BLD: 0 K/UL (ref 0–0.2)
PLATELET # BLD AUTO: 456 K/UL (ref 150–450)
PMV BLD AUTO: 10 FL (ref 9.4–12.3)
POTASSIUM SERPL-SCNC: 4.1 MMOL/L (ref 3.5–5.1)
RBC # BLD AUTO: 4.25 M/UL (ref 4.05–5.2)
SODIUM SERPL-SCNC: 141 MMOL/L (ref 133–143)
WBC # BLD AUTO: 7.8 K/UL (ref 4.3–11.1)

## 2023-05-30 ASSESSMENT — PATIENT HEALTH QUESTIONNAIRE - PHQ9
2. FEELING DOWN, DEPRESSED OR HOPELESS: 0
SUM OF ALL RESPONSES TO PHQ QUESTIONS 1-9: 0
1. LITTLE INTEREST OR PLEASURE IN DOING THINGS: 0
SUM OF ALL RESPONSES TO PHQ9 QUESTIONS 1 & 2: 0
SUM OF ALL RESPONSES TO PHQ QUESTIONS 1-9: 0

## 2023-05-30 NOTE — PROGRESS NOTES
Abnormal finding on GI tract imaging    Hypoproteinemia (HCC)    Acute on chronic pancreatitis (HCC)    Epigastric pain    Adult failure to thrive    Pleural effusion    Iron deficiency anemia       Medications listed as ordered at the time of discharge from hospital     Medication List            Accurate as of May 30, 2023 12:01 PM. If you have any questions, ask your nurse or doctor. CONTINUE taking these medications      donepezil 5 MG tablet  Commonly known as: Aricept  Take 1 tablet by mouth nightly     etanercept 50 MG/ML injection  Commonly known as: ENBREL  Administer Enbrel 1 shot every 7 days.      ferrous sulfate 325 (65 Fe) MG tablet  Commonly known as: IRON 325  Take 1 tablet by mouth daily (with breakfast)     losartan 25 MG tablet  Commonly known as: COZAAR  Take 1 tablet by mouth daily     magnesium oxide 400 (240 Mg) MG tablet  Commonly known as: MAG-OX  Take 1 tablet by mouth 2 times daily     melatonin 3 MG Tabs tablet     naloxone 4 MG/0.1ML Liqd nasal spray  1 spray by Nasal route as needed for Opioid Reversal     potassium chloride 20 MEQ extended release tablet  Commonly known as: KLOR-CON M  Take 1 tablet by mouth daily     pravastatin 40 MG tablet  Commonly known as: PRAVACHOL  Take 1 tablet by mouth daily                Medications marked \"taking\" at this time  Outpatient Medications Marked as Taking for the 5/30/23 encounter (Office Visit) with ROBERTO Gar NP   Medication Sig Dispense Refill    ferrous sulfate (IRON 325) 325 (65 Fe) MG tablet Take 1 tablet by mouth daily (with breakfast) 30 tablet 3    magnesium oxide (MAG-OX) 400 (240 Mg) MG tablet Take 1 tablet by mouth 2 times daily 60 tablet 0    melatonin 3 MG TABS tablet Take 1 tablet by mouth nightly as needed      potassium chloride (KLOR-CON M) 20 MEQ extended release tablet Take 1 tablet by mouth daily 30 tablet 5    pravastatin (PRAVACHOL) 40 MG tablet Take 1 tablet by mouth daily 30 tablet 5

## 2023-06-01 ENCOUNTER — CARE COORDINATION (OUTPATIENT)
Dept: CARE COORDINATION | Facility: CLINIC | Age: 83
End: 2023-06-01

## 2023-06-01 NOTE — CARE COORDINATION
[facility] Care Transitions Follow Up Call    Patient Current Location:  Home: [address] [place] [address] Transition Nurse contacted the patient by telephone to follow up after admission on 2023. Verified name and  with patient as identifiers. Patient: Scarlet Garcia  Patient : 1940   MRN: [id number]  Reason for Admission: Acute on Chronic pancreatitis  Discharge Date: 23 RARS: Readmission Risk Score: 35.2      Needs to be reviewed by the provider   Additional needs identified to be addressed with provider: No  none             Method of communication with provider: none. Addressed changes since last contact:   Patient reports doing fine with pain medication keeping pain under control. Discussed again with patient that [address] [facility] to visit 2023 at 11:30 am. Agrees to contact CTN with any issues/concerns prior to next outreach  Discussed follow-up appointments. If no appointment was previously scheduled, appointment scheduling offered: Yes. Is follow up appointment scheduled within 7 days of discharge? Yes. Follow Up  Future Appointments   Date Time Provider Bo Jj   2023  1:00 PM SFD PULM FUNCTION LAB SFDBGL SFD   2023  2:20 PM Sheryl Terry MD PPS GVL AMB   8/10/2023 12:30 PM [address] [place] [address] OUTREACH INSURANCE GCCOIG [address] [place] [address]   8/10/2023  1:00 PM Rubén Jiang MD [facility] GVL AMB   2023  2:00 PM Sher Burgos  [address], [address] [place] [address] GVL AMB   10/6/2023  9:30 AM Sandie Archibald MD [facility] AMB     [facility] follow up appointment(s): [facility]    Care Transition Nurse reviewed medical action plan and red flags with patient and discussed any barriers to care and/or understanding of plan of care after discharge. Discussed appropriate site of care based on symptoms and resources available to patient including: PCP  Specialist  Urgent care clinics. The patient agrees to contact the PCP office for questions related to their healthcare.

## 2023-06-02 DIAGNOSIS — K86.1 ACUTE ON CHRONIC PANCREATITIS (HCC): Primary | ICD-10-CM

## 2023-06-02 DIAGNOSIS — K85.90 ACUTE ON CHRONIC PANCREATITIS (HCC): Primary | ICD-10-CM

## 2023-06-02 RX ORDER — OXYCODONE HYDROCHLORIDE 15 MG/1
7.5-15 TABLET ORAL DAILY PRN
Qty: 14 TABLET | Refills: 0 | Status: SHIPPED | OUTPATIENT
Start: 2023-06-02 | End: 2023-06-16

## 2023-06-02 NOTE — TELEPHONE ENCOUNTER
----- Message from Pati Litolucrecia sent at 6/2/2023  8:25 AM EDT -----  Subject: Refill Request    QUESTIONS  Name of Medication? oxyCODONE (OXY-IR) 15 MG immediate release tablet  Patient-reported dosage and instructions? 15 mg immediate release tablet   daily   How many days do you have left? 0  Preferred Pharmacy? [id number]  Pharmacy phone number (if available)? [phone]  ---------------------------------------------------------------------------  --------------  [facility] INFO  What is the best way for the office to contact you? OK to leave message on   voicemail  Preferred Call Back Phone Number? [phone]  ---------------------------------------------------------------------------  --------------  SCRIPT ANSWERS  Relationship to Patient?  Self

## 2023-06-08 ENCOUNTER — CARE COORDINATION (OUTPATIENT)
Dept: CARE COORDINATION | Facility: CLINIC | Age: 83
End: 2023-06-08

## 2023-06-08 DIAGNOSIS — K85.90 ACUTE ON CHRONIC PANCREATITIS (HCC): ICD-10-CM

## 2023-06-08 DIAGNOSIS — K86.1 ACUTE ON CHRONIC PANCREATITIS (HCC): ICD-10-CM

## 2023-06-08 NOTE — CARE COORDINATION
[facility] Care Transitions Follow Up Call    Patient Current Location:  Home: [address] [place] [address] Transition Nurse contacted the patient by telephone to follow up after admission on 2023. Verified name and  with patient as identifiers. Patient: Tano Larios  Patient : 1940   MRN: [id number]  Reason for Admission: Acute on Chronic pancreatitis  Discharge Date: 23 RARS: Readmission Risk Score: 35.2      Needs to be reviewed by the provider   Additional needs identified to be addressed with provider: No  none             Method of communication with provider: none. Addressed changes since last contact:  Patient reports she continues to have pancreatic pain. Patient concerned she will run out of her pain medication prior to Palliative Care visit 2023. CTN contacted [facility] requesting an earlier visit if possible. [facility] NP to visit patient 2023 at 11:30 am. Informed patient of scheduled visit and she confirms she will be there for scheduled visit. Discussed follow-up appointments. If no appointment was previously scheduled, appointment scheduling offered: Yes. Is follow up appointment scheduled within 7 days of discharge? Yes. Follow Up  Future Appointments   Date Time Provider Bo Jj   2023  1:00 PM SFD PULM FUNCTION LAB SFDBGL SFD   2023  2:20 PM Janett Nguyen MD [facility] GVL AMB   8/10/2023 12:30 PM [address] [place] [address] OUTREACH INSURANCE GCCOIG [address] [place] [address]   8/10/2023  1:00 PM Vanessa Piña MD [facility] GVL AMB   2023  2:00 PM Irina Dawson  [address], [address] [facility] GVL AMB   10/6/2023  9:30 AM Scot Johnson MD PFP GVL AMB     Non-[facility] follow up appointment(s): [facility]-2023  [facility] NP-2023 at 11:30 am.  Care Transition Nurse reviewed medical action plan and red flags with patient and discussed any barriers to care and/or understanding of plan of care after discharge.  Discussed appropriate site of

## 2023-06-09 RX ORDER — OXYCODONE HYDROCHLORIDE 15 MG/1
7.5-15 TABLET ORAL DAILY PRN
Qty: 14 TABLET | Refills: 0 | Status: SHIPPED | OUTPATIENT
Start: 2023-06-09 | End: 2023-06-23

## 2023-06-22 ENCOUNTER — CARE COORDINATION (OUTPATIENT)
Dept: CARE COORDINATION | Facility: CLINIC | Age: 83
End: 2023-06-22

## 2023-06-22 NOTE — CARE COORDINATION
[address] [facility] Care Transitions Follow Up Call    Patient Current Location:  Home: [address] [place] [address] Transition Nurse contacted the patient by telephone to follow up after admission on 2023. Verified name and  with patient as identifiers. Patient: Chiara Orr  Patient : 1940   MRN: [id number]  Reason for Admission:  Acute on Chronic pancreatitis  Discharge Date: 23 RARS: Readmission Risk Score: 35.2      Needs to be reviewed by the provider   Additional needs identified to be addressed with provider: No  none             Method of communication with provider: none. Addressed changes since last contact:  Patient did attend appointment with [address] 2023. Patient prescribed Creon 36,00 units 2 capsules   with meals and one with snacks. Patient reports the medication hasn't been called in to her pharmacy as of today. CTN contacted  [facility] office at [phone]. Message left requesting a return call regarding the need for medication to be faxed to her pharmacy. Patient informed of the above information. Discussed follow-up appointments. If no appointment was previously scheduled, appointment scheduling offered: Yes. Is follow up appointment scheduled within 14 days of discharge? Yes. Follow Up  Future Appointments   Date Time Provider Bo Jj   2023  1:00 PM SFD PULM FUNCTION LAB SFDBGL SFD   2023  2:20 PM Yamilet Doss MD PPS GVL AMB   8/10/2023 12:30 PM [address] [place] [address] OUTREACH INSURANCE GCCOIG [address] [place] [address]   8/10/2023  1:00 PM Shey Hess MD [facility] GVL AMB   2023  2:00 PM Rick Adam  [address] [facility] GVL AMB   10/6/2023  9:30 AM Jovana Chowdhury MD [facility] GVL AMB     Non-[facility] follow up appointment(s): n/a    Care Transition Nurse reviewed medical action plan and red flags with patient and discussed any barriers to care and/or understanding of plan of care after discharge.  Discussed appropriate site of care based on

## 2023-06-22 NOTE — CARE COORDINATION
[facility] Care Transitions Follow Up Call  CTN attempted outreach to patient for LEONOR follow up call. Unable to reach patient. Message left with contact information requesting a return call. Will continue to outreach per protocol if no return call received. Per chart patient did attend [address] appointment 2023. Patient also now engaged with [facility].   Patient Current Location:  [place]      Patient: Allan Curling  Patient : 1940   MRN: [id number]  Reason for Admission: Acute on chronic pancreatitis  Discharge Date: 23 RARS: Readmission Risk Score: 35.2        Follow Up  Future Appointments   Date Time Provider Bo Jj   2023  1:00 PM SFD PULM FUNCTION LAB SFDBGL SFD   2023  2:20 PM Nehemiah Louie MD PPS GVL AMB   8/10/2023 12:30 PM [address] [place] [address] OUTREACH [address] [place] [address]   8/10/2023  1:00 PM Lucinda Smith MD [facility] GVL AMB   2023  2:00 PM Chiqui Lizarraga  [address] [facility] GVL AMB   10/6/2023  9:30 AM Joon Hastings MD PFP GVL AMB     Non-[facility] follow up appointment(s): n/a      Iris Sapp, TU

## 2023-06-23 ENCOUNTER — HOSPITAL ENCOUNTER (EMERGENCY)
Age: 83
Discharge: HOME | End: 2023-06-23
Attending: EMERGENCY MEDICINE
Payer: MEDICARE

## 2023-06-23 ENCOUNTER — CARE COORDINATION (OUTPATIENT)
Dept: CARE COORDINATION | Facility: CLINIC | Age: 83
End: 2023-06-23

## 2023-06-23 VITALS
DIASTOLIC BLOOD PRESSURE: 53 MMHG | SYSTOLIC BLOOD PRESSURE: 135 MMHG | WEIGHT: 79 LBS | OXYGEN SATURATION: 100 % | RESPIRATION RATE: 20 BRPM | HEART RATE: 70 BPM | BODY MASS INDEX: 14.91 KG/M2 | HEIGHT: 61 IN | TEMPERATURE: 98.7 F

## 2023-06-23 DIAGNOSIS — R74.8 ELEVATED LIPASE: ICD-10-CM

## 2023-06-23 DIAGNOSIS — R10.13 ABDOMINAL PAIN, EPIGASTRIC: Primary | ICD-10-CM

## 2023-06-23 LAB
ALBUMIN SERPL-MCNC: 2.8 G/DL (ref 3.2–4.6)
ALBUMIN/GLOB SERPL: 0.7 (ref 0.4–1.6)
ALP SERPL-CCNC: 156 U/L (ref 50–136)
ALT SERPL-CCNC: 20 U/L (ref 12–65)
ANION GAP SERPL CALC-SCNC: 7 MMOL/L (ref 2–11)
AST SERPL-CCNC: 17 U/L (ref 15–37)
BASOPHILS # BLD: 0.1 K/UL (ref 0–0.2)
BASOPHILS NFR BLD: 1 % (ref 0–2)
BILIRUB SERPL-MCNC: 0.2 MG/DL (ref 0.2–1.1)
BILIRUB UR QL: NEGATIVE
BUN SERPL-MCNC: 17 MG/DL (ref 8–23)
CALCIUM SERPL-MCNC: 9.4 MG/DL (ref 8.3–10.4)
CHLORIDE SERPL-SCNC: 106 MMOL/L (ref 101–110)
CO2 SERPL-SCNC: 26 MMOL/L (ref 21–32)
CREAT SERPL-MCNC: 1.1 MG/DL (ref 0.6–1)
DIFFERENTIAL METHOD BLD: ABNORMAL
EOSINOPHIL # BLD: 0.3 K/UL (ref 0–0.8)
EOSINOPHIL NFR BLD: 3 % (ref 0.5–7.8)
ERYTHROCYTE [DISTWIDTH] IN BLOOD BY AUTOMATED COUNT: 18.9 % (ref 11.9–14.6)
GLOBULIN SER CALC-MCNC: 4 G/DL (ref 2.8–4.5)
GLUCOSE SERPL-MCNC: 150 MG/DL (ref 65–100)
GLUCOSE UR QL STRIP.AUTO: NEGATIVE MG/DL
HCT VFR BLD AUTO: 34.8 % (ref 35.8–46.3)
HGB BLD-MCNC: 10.9 G/DL (ref 11.7–15.4)
IMM GRANULOCYTES # BLD AUTO: 0 K/UL (ref 0–0.5)
IMM GRANULOCYTES NFR BLD AUTO: 0 % (ref 0–5)
KETONES UR-MCNC: NEGATIVE MG/DL
LACTATE SERPL-SCNC: 1.4 MMOL/L (ref 0.4–2)
LEUKOCYTE ESTERASE UR QL STRIP: NEGATIVE
LIPASE SERPL-CCNC: 410 U/L (ref 73–393)
LYMPHOCYTES # BLD: 1.3 K/UL (ref 0.5–4.6)
LYMPHOCYTES NFR BLD: 14 % (ref 13–44)
MCH RBC QN AUTO: 26.1 PG (ref 26.1–32.9)
MCHC RBC AUTO-ENTMCNC: 31.3 G/DL (ref 31.4–35)
MCV RBC AUTO: 83.3 FL (ref 82–102)
MONOCYTES # BLD: 0.5 K/UL (ref 0.1–1.3)
MONOCYTES NFR BLD: 6 % (ref 4–12)
NEUTS SEG # BLD: 7.5 K/UL (ref 1.7–8.2)
NEUTS SEG NFR BLD: 76 % (ref 43–78)
NITRITE UR QL: NEGATIVE
NRBC # BLD: 0 K/UL (ref 0–0.2)
PH UR: 6.5 (ref 5–9)
PLATELET # BLD AUTO: 343 K/UL (ref 150–450)
PMV BLD AUTO: 9.2 FL (ref 9.4–12.3)
POTASSIUM SERPL-SCNC: 4.6 MMOL/L (ref 3.5–5.1)
PROT SERPL-MCNC: 6.8 G/DL (ref 6.3–8.2)
PROT UR QL: NEGATIVE MG/DL
RBC # BLD AUTO: 4.18 M/UL (ref 4.05–5.2)
RBC # UR STRIP: NEGATIVE
SERVICE CMNT-IMP: ABNORMAL
SODIUM SERPL-SCNC: 139 MMOL/L (ref 133–143)
SP GR UR: 1.02 (ref 1–1.02)
UROBILINOGEN UR QL: 0.2 EU/DL (ref 0.2–1)
WBC # BLD AUTO: 9.7 K/UL (ref 4.3–11.1)

## 2023-06-23 PROCEDURE — 2580000003 HC RX 258: Performed by: PHYSICIAN ASSISTANT

## 2023-06-23 PROCEDURE — 81003 URINALYSIS AUTO W/O SCOPE: CPT

## 2023-06-23 PROCEDURE — 96374 THER/PROPH/DIAG INJ IV PUSH: CPT

## 2023-06-23 PROCEDURE — 80053 COMPREHEN METABOLIC PANEL: CPT

## 2023-06-23 PROCEDURE — 83690 ASSAY OF LIPASE: CPT

## 2023-06-23 PROCEDURE — 85025 COMPLETE CBC W/AUTO DIFF WBC: CPT

## 2023-06-23 PROCEDURE — 6360000002 HC RX W HCPCS: Performed by: PHYSICIAN ASSISTANT

## 2023-06-23 PROCEDURE — 96361 HYDRATE IV INFUSION ADD-ON: CPT

## 2023-06-23 PROCEDURE — 6370000000 HC RX 637 (ALT 250 FOR IP): Performed by: PHYSICIAN ASSISTANT

## 2023-06-23 PROCEDURE — 99284 EMERGENCY DEPT VISIT MOD MDM: CPT

## 2023-06-23 PROCEDURE — 83605 ASSAY OF LACTIC ACID: CPT

## 2023-06-23 RX ORDER — ONDANSETRON 2 MG/ML
4 INJECTION INTRAMUSCULAR; INTRAVENOUS
Status: COMPLETED | OUTPATIENT
Start: 2023-06-23 | End: 2023-06-23

## 2023-06-23 RX ORDER — 0.9 % SODIUM CHLORIDE 0.9 %
1000 INTRAVENOUS SOLUTION INTRAVENOUS
Status: COMPLETED | OUTPATIENT
Start: 2023-06-23 | End: 2023-06-23

## 2023-06-23 RX ORDER — HYDROCODONE BITARTRATE AND ACETAMINOPHEN 7.5; 325 MG/1; MG/1
1 TABLET ORAL
Status: COMPLETED | OUTPATIENT
Start: 2023-06-23 | End: 2023-06-23

## 2023-06-23 RX ADMIN — HYDROCODONE BITARTRATE AND ACETAMINOPHEN 1 TABLET: 7.5; 325 TABLET ORAL at 20:30

## 2023-06-23 RX ADMIN — ONDANSETRON 4 MG: 2 INJECTION INTRAMUSCULAR; INTRAVENOUS at 20:29

## 2023-06-23 RX ADMIN — SODIUM CHLORIDE 1000 ML: 9 INJECTION, SOLUTION INTRAVENOUS at 20:31

## 2023-06-23 ASSESSMENT — PAIN DESCRIPTION - LOCATION
LOCATION: ABDOMEN
LOCATION: ABDOMEN

## 2023-06-23 ASSESSMENT — ENCOUNTER SYMPTOMS
ABDOMINAL PAIN: 1
NAUSEA: 1

## 2023-06-23 ASSESSMENT — PAIN - FUNCTIONAL ASSESSMENT: PAIN_FUNCTIONAL_ASSESSMENT: 0-10

## 2023-06-23 ASSESSMENT — PAIN SCALES - GENERAL
PAINLEVEL_OUTOF10: 8
PAINLEVEL_OUTOF10: 8

## 2023-06-23 NOTE — ED TRIAGE NOTES
Per patient intermittent upper quadrant abdominal pain x14 days. States of nausea denies v/d. Decreased urine output. Denies fever/chills.

## 2023-06-23 NOTE — CARE COORDINATION
CTN never received a return call from [facility] regarding patient medications. CTN contacted office again and spoke with nurse Jelani Cadet. Medication was called in to the [facility]. CTN explained this was not the patient's pharmacy, Requested medication be faxed to [facility]. Jelani Cadet agreed to forward medication order the the correct pharmacy and states she will call CTN back with any issues. Attempted to outreach to patient to inform her of the above information. Unable to reach. CTN contacted her daughter Hillary Both and provided her with the above information and reminded her of patient's Pulmonary appointment today at 1:00 pm. Daughter to contact patient and have her outreach to CTN with any questions.

## 2023-06-24 NOTE — ED NOTES
I have reviewed discharge instructions with the patient. The patient verbalized understanding. Patient left ED via Discharge Method: ambulatory to Home with . Opportunity for questions and clarification provided. Patient given 0 scripts. To continue your aftercare when you leave the hospital, you may receive an automated call from our care team to check in on how you are doing. This is a free service and part of our promise to provide the best care and service to meet your aftercare needs.  If you have questions, or wish to unsubscribe from this service please call [phone]. Thank you for Choosing our [facility] Emergency Department.          Fraser Bloch, RN  06/23/23 1311

## 2023-06-24 NOTE — DISCHARGE INSTRUCTIONS
Your labs overall today look okay. There is a slight elevation of lipase but minimal in comparison to previous. Encourage you to follow-up with your family doctor for referral to a new GI group. Return to the ED as needed for new or worsening symptoms.

## 2023-06-24 NOTE — ED PROVIDER NOTES
rebound. Significant epigastric tenderness to palpation, negative Merlinda Laos, McBurney point  MSK: No deformities appreciated. No peripheral edema. Skin: Skin is warm and dry. No rash appreciated. Neuro: Alert and oriented, moves all four extremities. Psych: Pleasant and cooperative. Procedures   Procedures    MDM     Labs Reviewed   CBC WITH AUTO DIFFERENTIAL - Abnormal; Notable for the following components:       Result Value    Hemoglobin 10.9 (*)     Hematocrit 34.8 (*)     MCHC 31.3 (*)     RDW 18.9 (*)     MPV 9.2 (*)     All other components within normal limits   COMPREHENSIVE METABOLIC PANEL - Abnormal; Notable for the following components:    Glucose 150 (*)     Creatinine 1.10 (*)     Est, Glom Filt Rate 51 (*)     Alk Phosphatase 156 (*)     Albumin 2.8 (*)     All other components within normal limits   LIPASE - Abnormal; Notable for the following components:    Lipase 410 (*)     All other components within normal limits   POCT URINALYSIS DIPSTICK - Abnormal; Notable for the following components:    Specific Gravity, Urine, POC 1.025 (*)     All other components within normal limits   LACTIC ACID     Medications   0.9 % sodium chloride bolus (0 mLs IntraVENous Stopped 6/23/23 2132)   HYDROcodone-acetaminophen (NORCO) 7.5-325 MG per tablet 1 tablet (1 tablet Oral Given 6/23/23 2030)   ondansetron (ZOFRAN) injection 4 mg (4 mg IntraVENous Given 6/23/23 2029)     No orders to display     Voice dictation software was used during the making of this note. This software is not perfect and grammatical and other typographical errors may be present. This note has not been completely proofread for errors.      Reginald eVlarde PA-C  06/23/23 2574

## 2023-06-27 ENCOUNTER — CARE COORDINATION (OUTPATIENT)
Dept: CARE COORDINATION | Facility: CLINIC | Age: 83
End: 2023-06-27

## 2023-06-28 ENCOUNTER — HOSPITAL ENCOUNTER (INPATIENT)
Age: 83
LOS: 16 days | Discharge: HOME | DRG: 438 | End: 2023-07-14
Attending: EMERGENCY MEDICINE | Admitting: INTERNAL MEDICINE
Payer: MEDICARE

## 2023-06-28 ENCOUNTER — APPOINTMENT (OUTPATIENT)
Dept: CT IMAGING | Age: 83
DRG: 438 | End: 2023-06-28
Payer: MEDICARE

## 2023-06-28 DIAGNOSIS — D72.829 LEUKOCYTOSIS, UNSPECIFIED TYPE: ICD-10-CM

## 2023-06-28 DIAGNOSIS — K85.90 ACUTE ON CHRONIC PANCREATITIS (HCC): Primary | ICD-10-CM

## 2023-06-28 DIAGNOSIS — K86.1 ACUTE ON CHRONIC PANCREATITIS (HCC): Primary | ICD-10-CM

## 2023-06-28 DIAGNOSIS — R16.0 LIVER MASS: ICD-10-CM

## 2023-06-28 LAB
ALBUMIN SERPL-MCNC: 2.1 G/DL (ref 3.2–4.6)
ALBUMIN/GLOB SERPL: 0.6 (ref 0.4–1.6)
ALP SERPL-CCNC: 128 U/L (ref 50–136)
ALT SERPL-CCNC: 8 U/L (ref 12–65)
ANION GAP SERPL CALC-SCNC: 8 MMOL/L (ref 2–11)
AST SERPL-CCNC: 11 U/L (ref 15–37)
BASOPHILS # BLD: 0 K/UL (ref 0–0.2)
BASOPHILS NFR BLD: 0 % (ref 0–2)
BILIRUB SERPL-MCNC: 0.4 MG/DL (ref 0.2–1.1)
BUN SERPL-MCNC: 12 MG/DL (ref 8–23)
CALCIUM SERPL-MCNC: 8.2 MG/DL (ref 8.3–10.4)
CHLORIDE SERPL-SCNC: 107 MMOL/L (ref 101–110)
CO2 SERPL-SCNC: 22 MMOL/L (ref 21–32)
CREAT SERPL-MCNC: 0.6 MG/DL (ref 0.6–1)
DIFFERENTIAL METHOD BLD: ABNORMAL
EOSINOPHIL # BLD: 0 K/UL (ref 0–0.8)
EOSINOPHIL NFR BLD: 0 % (ref 0.5–7.8)
ERYTHROCYTE [DISTWIDTH] IN BLOOD BY AUTOMATED COUNT: 18.2 % (ref 11.9–14.6)
GLOBULIN SER CALC-MCNC: 3.3 G/DL (ref 2.8–4.5)
GLUCOSE SERPL-MCNC: 73 MG/DL (ref 65–100)
HCT VFR BLD AUTO: 29.8 % (ref 35.8–46.3)
HGB BLD-MCNC: 9.7 G/DL (ref 11.7–15.4)
IMM GRANULOCYTES # BLD AUTO: 0.1 K/UL (ref 0–0.5)
IMM GRANULOCYTES NFR BLD AUTO: 1 % (ref 0–5)
LIPASE SERPL-CCNC: 1172 U/L (ref 73–393)
LYMPHOCYTES # BLD: 0.2 K/UL (ref 0.5–4.6)
LYMPHOCYTES NFR BLD: 2 % (ref 13–44)
MCH RBC QN AUTO: 26.2 PG (ref 26.1–32.9)
MCHC RBC AUTO-ENTMCNC: 32.6 G/DL (ref 31.4–35)
MCV RBC AUTO: 80.5 FL (ref 82–102)
MONOCYTES # BLD: 0.4 K/UL (ref 0.1–1.3)
MONOCYTES NFR BLD: 3 % (ref 4–12)
NEUTS SEG # BLD: 13.7 K/UL (ref 1.7–8.2)
NEUTS SEG NFR BLD: 94 % (ref 43–78)
NRBC # BLD: 0 K/UL (ref 0–0.2)
PLATELET # BLD AUTO: 316 K/UL (ref 150–450)
PMV BLD AUTO: 9.5 FL (ref 9.4–12.3)
POTASSIUM SERPL-SCNC: 3.7 MMOL/L (ref 3.5–5.1)
PROT SERPL-MCNC: 5.4 G/DL (ref 6.3–8.2)
RBC # BLD AUTO: 3.7 M/UL (ref 4.05–5.2)
SODIUM SERPL-SCNC: 137 MMOL/L (ref 133–143)
TROPONIN I SERPL HS-MCNC: 6.2 PG/ML (ref 0–14)
WBC # BLD AUTO: 14.4 K/UL (ref 4.3–11.1)

## 2023-06-28 PROCEDURE — 80053 COMPREHEN METABOLIC PANEL: CPT

## 2023-06-28 PROCEDURE — 96375 TX/PRO/DX INJ NEW DRUG ADDON: CPT

## 2023-06-28 PROCEDURE — 6360000002 HC RX W HCPCS: Performed by: EMERGENCY MEDICINE

## 2023-06-28 PROCEDURE — 6360000004 HC RX CONTRAST MEDICATION: Performed by: EMERGENCY MEDICINE

## 2023-06-28 PROCEDURE — 84484 ASSAY OF TROPONIN QUANT: CPT

## 2023-06-28 PROCEDURE — 99285 EMERGENCY DEPT VISIT HI MDM: CPT

## 2023-06-28 PROCEDURE — 1100000000 HC RM PRIVATE

## 2023-06-28 PROCEDURE — 2580000003 HC RX 258: Performed by: EMERGENCY MEDICINE

## 2023-06-28 PROCEDURE — 96374 THER/PROPH/DIAG INJ IV PUSH: CPT

## 2023-06-28 PROCEDURE — 74177 CT ABD & PELVIS W/CONTRAST: CPT

## 2023-06-28 PROCEDURE — 2500000003 HC RX 250 WO HCPCS: Performed by: INTERNAL MEDICINE

## 2023-06-28 PROCEDURE — 83690 ASSAY OF LIPASE: CPT

## 2023-06-28 PROCEDURE — 2580000003 HC RX 258: Performed by: INTERNAL MEDICINE

## 2023-06-28 PROCEDURE — 96361 HYDRATE IV INFUSION ADD-ON: CPT

## 2023-06-28 PROCEDURE — 85025 COMPLETE CBC W/AUTO DIFF WBC: CPT

## 2023-06-28 RX ORDER — ONDANSETRON 2 MG/ML
4 INJECTION INTRAMUSCULAR; INTRAVENOUS ONCE
Status: COMPLETED | OUTPATIENT
Start: 2023-06-28 | End: 2023-06-29

## 2023-06-28 RX ORDER — 0.9 % SODIUM CHLORIDE 0.9 %
100 INTRAVENOUS SOLUTION INTRAVENOUS
Status: DISCONTINUED | OUTPATIENT
Start: 2023-06-28 | End: 2023-07-14 | Stop reason: HOSPADM

## 2023-06-28 RX ORDER — SODIUM CHLORIDE 0.9 % (FLUSH) 0.9 %
10 SYRINGE (ML) INJECTION
Status: COMPLETED | OUTPATIENT
Start: 2023-06-28 | End: 2023-06-30

## 2023-06-28 RX ORDER — 0.9 % SODIUM CHLORIDE 0.9 %
1000 INTRAVENOUS SOLUTION INTRAVENOUS ONCE
Status: COMPLETED | OUTPATIENT
Start: 2023-06-28 | End: 2023-06-28

## 2023-06-28 RX ORDER — MORPHINE SULFATE 4 MG/ML
4 INJECTION, SOLUTION INTRAMUSCULAR; INTRAVENOUS
Status: COMPLETED | OUTPATIENT
Start: 2023-06-28 | End: 2023-06-28

## 2023-06-28 RX ORDER — HYDROMORPHONE HYDROCHLORIDE 1 MG/ML
0.5 INJECTION, SOLUTION INTRAMUSCULAR; INTRAVENOUS; SUBCUTANEOUS
Status: DISCONTINUED | OUTPATIENT
Start: 2023-06-28 | End: 2023-07-01

## 2023-06-28 RX ORDER — ONDANSETRON 2 MG/ML
4 INJECTION INTRAMUSCULAR; INTRAVENOUS ONCE
Status: COMPLETED | OUTPATIENT
Start: 2023-06-28 | End: 2023-06-28

## 2023-06-28 RX ORDER — SODIUM CHLORIDE, SODIUM LACTATE, POTASSIUM CHLORIDE, CALCIUM CHLORIDE 600; 310; 30; 20 MG/100ML; MG/100ML; MG/100ML; MG/100ML
INJECTION, SOLUTION INTRAVENOUS CONTINUOUS
Status: DISCONTINUED | OUTPATIENT
Start: 2023-06-28 | End: 2023-07-05

## 2023-06-28 RX ADMIN — SODIUM CHLORIDE 1000 ML: 9 INJECTION, SOLUTION INTRAVENOUS at 19:01

## 2023-06-28 RX ADMIN — IOPAMIDOL 100 ML: 755 INJECTION, SOLUTION INTRAVENOUS at 20:20

## 2023-06-28 RX ADMIN — MORPHINE SULFATE 4 MG: 4 INJECTION INTRAVENOUS at 19:02

## 2023-06-28 RX ADMIN — SODIUM CHLORIDE, POTASSIUM CHLORIDE, SODIUM LACTATE AND CALCIUM CHLORIDE: 600; 310; 30; 20 INJECTION, SOLUTION INTRAVENOUS at 23:20

## 2023-06-28 RX ADMIN — HYDROMORPHONE HYDROCHLORIDE 0.5 MG: 1 INJECTION, SOLUTION INTRAMUSCULAR; INTRAVENOUS; SUBCUTANEOUS at 23:29

## 2023-06-28 RX ADMIN — ONDANSETRON 4 MG: 2 INJECTION INTRAMUSCULAR; INTRAVENOUS at 19:02

## 2023-06-28 ASSESSMENT — PAIN DESCRIPTION - LOCATION
LOCATION: ABDOMEN

## 2023-06-28 ASSESSMENT — PAIN SCALES - GENERAL
PAINLEVEL_OUTOF10: 6
PAINLEVEL_OUTOF10: 10
PAINLEVEL_OUTOF10: 10

## 2023-06-28 ASSESSMENT — LIFESTYLE VARIABLES
HOW OFTEN DO YOU HAVE A DRINK CONTAINING ALCOHOL: NEVER
HOW MANY STANDARD DRINKS CONTAINING ALCOHOL DO YOU HAVE ON A TYPICAL DAY: PATIENT DOES NOT DRINK

## 2023-06-28 ASSESSMENT — PAIN - FUNCTIONAL ASSESSMENT: PAIN_FUNCTIONAL_ASSESSMENT: 0-10

## 2023-06-28 ASSESSMENT — PAIN DESCRIPTION - ORIENTATION
ORIENTATION: LEFT;UPPER
ORIENTATION: LEFT;UPPER

## 2023-06-29 ENCOUNTER — CARE COORDINATION (OUTPATIENT)
Dept: CARE COORDINATION | Facility: CLINIC | Age: 83
End: 2023-06-29

## 2023-06-29 PROBLEM — B37.0 ORAL CANDIDIASIS: Status: ACTIVE | Noted: 2023-06-29

## 2023-06-29 LAB
CRP SERPL-MCNC: 10.5 MG/DL (ref 0–0.9)
ERYTHROCYTE [SEDIMENTATION RATE] IN BLOOD: 2 MM/HR (ref 0–30)
FOLATE SERPL-MCNC: 9.3 NG/ML (ref 3.1–17.5)
LDH SERPL L TO P-CCNC: 270 U/L (ref 110–210)
VIT B12 SERPL-MCNC: 455 PG/ML (ref 193–986)

## 2023-06-29 PROCEDURE — 85652 RBC SED RATE AUTOMATED: CPT

## 2023-06-29 PROCEDURE — 82746 ASSAY OF FOLIC ACID SERUM: CPT

## 2023-06-29 PROCEDURE — 2500000003 HC RX 250 WO HCPCS: Performed by: INTERNAL MEDICINE

## 2023-06-29 PROCEDURE — 6360000002 HC RX W HCPCS: Performed by: FAMILY MEDICINE

## 2023-06-29 PROCEDURE — 83615 LACTATE (LD) (LDH) ENZYME: CPT

## 2023-06-29 PROCEDURE — 86301 IMMUNOASSAY TUMOR CA 19-9: CPT

## 2023-06-29 PROCEDURE — 86140 C-REACTIVE PROTEIN: CPT

## 2023-06-29 PROCEDURE — 82164 ANGIOTENSIN I ENZYME TEST: CPT

## 2023-06-29 PROCEDURE — 83516 IMMUNOASSAY NONANTIBODY: CPT

## 2023-06-29 PROCEDURE — 6370000000 HC RX 637 (ALT 250 FOR IP): Performed by: PHYSICIAN ASSISTANT

## 2023-06-29 PROCEDURE — 6360000002 HC RX W HCPCS: Performed by: INTERNAL MEDICINE

## 2023-06-29 PROCEDURE — 1100000000 HC RM PRIVATE

## 2023-06-29 PROCEDURE — 82607 VITAMIN B-12: CPT

## 2023-06-29 PROCEDURE — 6360000002 HC RX W HCPCS: Performed by: PHYSICIAN ASSISTANT

## 2023-06-29 PROCEDURE — 86038 ANTINUCLEAR ANTIBODIES: CPT

## 2023-06-29 PROCEDURE — 82105 ALPHA-FETOPROTEIN SERUM: CPT

## 2023-06-29 PROCEDURE — 86037 ANCA TITER EACH ANTIBODY: CPT

## 2023-06-29 PROCEDURE — 2580000003 HC RX 258: Performed by: INTERNAL MEDICINE

## 2023-06-29 PROCEDURE — 36415 COLL VENOUS BLD VENIPUNCTURE: CPT

## 2023-06-29 RX ORDER — ONDANSETRON 2 MG/ML
4 INJECTION INTRAMUSCULAR; INTRAVENOUS EVERY 6 HOURS PRN
Status: DISCONTINUED | OUTPATIENT
Start: 2023-06-29 | End: 2023-07-14 | Stop reason: HOSPADM

## 2023-06-29 RX ORDER — FLUTICASONE PROPIONATE 50 MCG
1 SPRAY, SUSPENSION (ML) NASAL DAILY
Status: DISCONTINUED | OUTPATIENT
Start: 2023-06-29 | End: 2023-07-14 | Stop reason: HOSPADM

## 2023-06-29 RX ORDER — FLUCONAZOLE 2 MG/ML
200 INJECTION, SOLUTION INTRAVENOUS EVERY 24 HOURS
Status: DISCONTINUED | OUTPATIENT
Start: 2023-06-29 | End: 2023-07-03

## 2023-06-29 RX ADMIN — HYDROMORPHONE HYDROCHLORIDE 0.5 MG: 1 INJECTION, SOLUTION INTRAMUSCULAR; INTRAVENOUS; SUBCUTANEOUS at 19:30

## 2023-06-29 RX ADMIN — HYDROMORPHONE HYDROCHLORIDE 0.5 MG: 1 INJECTION, SOLUTION INTRAMUSCULAR; INTRAVENOUS; SUBCUTANEOUS at 22:56

## 2023-06-29 RX ADMIN — SODIUM CHLORIDE, POTASSIUM CHLORIDE, SODIUM LACTATE AND CALCIUM CHLORIDE: 600; 310; 30; 20 INJECTION, SOLUTION INTRAVENOUS at 10:26

## 2023-06-29 RX ADMIN — ONDANSETRON 4 MG: 2 INJECTION INTRAMUSCULAR; INTRAVENOUS at 02:43

## 2023-06-29 RX ADMIN — SODIUM CHLORIDE, POTASSIUM CHLORIDE, SODIUM LACTATE AND CALCIUM CHLORIDE: 600; 310; 30; 20 INJECTION, SOLUTION INTRAVENOUS at 02:34

## 2023-06-29 RX ADMIN — FLUCONAZOLE IN SODIUM CHLORIDE 200 MG: 2 INJECTION, SOLUTION INTRAVENOUS at 12:58

## 2023-06-29 RX ADMIN — FLUTICASONE PROPIONATE 1 SPRAY: 50 SPRAY, METERED NASAL at 16:53

## 2023-06-29 RX ADMIN — NYSTATIN 10 ML: 100000 SUSPENSION ORAL at 16:53

## 2023-06-29 RX ADMIN — HYDROMORPHONE HYDROCHLORIDE 0.5 MG: 1 INJECTION, SOLUTION INTRAMUSCULAR; INTRAVENOUS; SUBCUTANEOUS at 10:23

## 2023-06-29 RX ADMIN — HYDROMORPHONE HYDROCHLORIDE 0.5 MG: 1 INJECTION, SOLUTION INTRAMUSCULAR; INTRAVENOUS; SUBCUTANEOUS at 15:27

## 2023-06-29 RX ADMIN — HYDROMORPHONE HYDROCHLORIDE 0.5 MG: 1 INJECTION, SOLUTION INTRAMUSCULAR; INTRAVENOUS; SUBCUTANEOUS at 06:51

## 2023-06-29 RX ADMIN — ONDANSETRON 4 MG: 2 INJECTION INTRAMUSCULAR; INTRAVENOUS at 00:38

## 2023-06-29 RX ADMIN — HYDROMORPHONE HYDROCHLORIDE 0.5 MG: 1 INJECTION, SOLUTION INTRAMUSCULAR; INTRAVENOUS; SUBCUTANEOUS at 02:43

## 2023-06-29 RX ADMIN — ONDANSETRON 4 MG: 2 INJECTION INTRAMUSCULAR; INTRAVENOUS at 23:07

## 2023-06-29 ASSESSMENT — PAIN DESCRIPTION - LOCATION
LOCATION: ABDOMEN
LOCATION: ABDOMEN;HEAD
LOCATION: ABDOMEN

## 2023-06-29 ASSESSMENT — PAIN DESCRIPTION - DESCRIPTORS
DESCRIPTORS: ACHING;DISCOMFORT
DESCRIPTORS: SORE;SHARP
DESCRIPTORS: GNAWING;ACHING;DISCOMFORT
DESCRIPTORS: GNAWING;ACHING;DISCOMFORT

## 2023-06-29 ASSESSMENT — PAIN SCALES - GENERAL
PAINLEVEL_OUTOF10: 8
PAINLEVEL_OUTOF10: 6
PAINLEVEL_OUTOF10: 7
PAINLEVEL_OUTOF10: 5
PAINLEVEL_OUTOF10: 7
PAINLEVEL_OUTOF10: 0
PAINLEVEL_OUTOF10: 8
PAINLEVEL_OUTOF10: 2
PAINLEVEL_OUTOF10: 7

## 2023-06-29 ASSESSMENT — PAIN DESCRIPTION - PAIN TYPE
TYPE: CHRONIC PAIN
TYPE: CHRONIC PAIN

## 2023-06-29 ASSESSMENT — PAIN DESCRIPTION - ONSET
ONSET: ON-GOING
ONSET: ON-GOING

## 2023-06-29 ASSESSMENT — PAIN DESCRIPTION - ORIENTATION
ORIENTATION: RIGHT;LEFT;UPPER
ORIENTATION: LEFT;UPPER

## 2023-06-29 ASSESSMENT — PAIN DESCRIPTION - FREQUENCY
FREQUENCY: CONTINUOUS
FREQUENCY: CONTINUOUS

## 2023-06-30 ENCOUNTER — APPOINTMENT (OUTPATIENT)
Dept: ULTRASOUND IMAGING | Age: 83
DRG: 438 | End: 2023-06-30
Payer: MEDICARE

## 2023-06-30 PROBLEM — R16.0 LIVER MASS: Status: ACTIVE | Noted: 2023-06-30

## 2023-06-30 LAB
ACE SERPL-CCNC: 35 U/L (ref 14–82)
AFP-TM SERPL-MCNC: 1.5 NG/ML
ANA SER QL: NEGATIVE
BASOPHILS # BLD: 0 K/UL (ref 0–0.2)
BASOPHILS NFR BLD: 0 % (ref 0–2)
C-ANCA TITR SER IF: NORMAL TITER
DIFFERENTIAL METHOD BLD: ABNORMAL
EOSINOPHIL # BLD: 0 K/UL (ref 0–0.8)
EOSINOPHIL NFR BLD: 0 % (ref 0.5–7.8)
ERYTHROCYTE [DISTWIDTH] IN BLOOD BY AUTOMATED COUNT: 18.1 % (ref 11.9–14.6)
FERRITIN SERPL-MCNC: 59 NG/ML (ref 8–388)
HCT VFR BLD AUTO: 30 % (ref 35.8–46.3)
HGB BLD-MCNC: 9.7 G/DL (ref 11.7–15.4)
IMM GRANULOCYTES # BLD AUTO: 0.1 K/UL (ref 0–0.5)
IMM GRANULOCYTES NFR BLD AUTO: 1 % (ref 0–5)
IRON SATN MFR SERPL: 5 %
IRON SERPL-MCNC: 7 UG/DL (ref 35–150)
LYMPHOCYTES # BLD: 0.5 K/UL (ref 0.5–4.6)
LYMPHOCYTES NFR BLD: 4 % (ref 13–44)
MCH RBC QN AUTO: 25.9 PG (ref 26.1–32.9)
MCHC RBC AUTO-ENTMCNC: 32.3 G/DL (ref 31.4–35)
MCV RBC AUTO: 80 FL (ref 82–102)
MONOCYTES # BLD: 0.7 K/UL (ref 0.1–1.3)
MONOCYTES NFR BLD: 6 % (ref 4–12)
MYELOPEROXIDASE AB SER IA-ACNC: <0.2 UNITS (ref 0–0.9)
NEUTS SEG # BLD: 11 K/UL (ref 1.7–8.2)
NEUTS SEG NFR BLD: 89 % (ref 43–78)
NRBC # BLD: 0 K/UL (ref 0–0.2)
P-ANCA ATYPICAL TITR SER IF: NORMAL TITER
P-ANCA TITR SER IF: NORMAL TITER
PLATELET # BLD AUTO: 262 K/UL (ref 150–450)
PMV BLD AUTO: 8.8 FL (ref 9.4–12.3)
PROTEINASE3 AB SER IA-ACNC: <0.2 UNITS (ref 0–0.9)
RBC # BLD AUTO: 3.75 M/UL (ref 4.05–5.2)
TIBC SERPL-MCNC: 153 UG/DL (ref 250–450)
WBC # BLD AUTO: 12.3 K/UL (ref 4.3–11.1)

## 2023-06-30 PROCEDURE — 88313 SPECIAL STAINS GROUP 2: CPT

## 2023-06-30 PROCEDURE — 2500000003 HC RX 250 WO HCPCS: Performed by: INTERNAL MEDICINE

## 2023-06-30 PROCEDURE — 2580000003 HC RX 258: Performed by: PHYSICIAN ASSISTANT

## 2023-06-30 PROCEDURE — 88307 TISSUE EXAM BY PATHOLOGIST: CPT

## 2023-06-30 PROCEDURE — 2580000003 HC RX 258: Performed by: RADIOLOGY

## 2023-06-30 PROCEDURE — 6360000002 HC RX W HCPCS: Performed by: PHYSICIAN ASSISTANT

## 2023-06-30 PROCEDURE — 83550 IRON BINDING TEST: CPT

## 2023-06-30 PROCEDURE — 36415 COLL VENOUS BLD VENIPUNCTURE: CPT

## 2023-06-30 PROCEDURE — 0FB13ZX EXCISION OF RIGHT LOBE LIVER, PERCUTANEOUS APPROACH, DIAGNOSTIC: ICD-10-PCS | Performed by: INTERNAL MEDICINE

## 2023-06-30 PROCEDURE — 88312 SPECIAL STAINS GROUP 1: CPT

## 2023-06-30 PROCEDURE — 6360000002 HC RX W HCPCS: Performed by: INTERNAL MEDICINE

## 2023-06-30 PROCEDURE — 6360000002 HC RX W HCPCS: Performed by: RADIOLOGY

## 2023-06-30 PROCEDURE — 2709999900 US BIOPSY LIVER PERCUTANEOUS

## 2023-06-30 PROCEDURE — 2580000003 HC RX 258: Performed by: EMERGENCY MEDICINE

## 2023-06-30 PROCEDURE — 83540 ASSAY OF IRON: CPT

## 2023-06-30 PROCEDURE — 1100000000 HC RM PRIVATE

## 2023-06-30 PROCEDURE — 2580000003 HC RX 258: Performed by: INTERNAL MEDICINE

## 2023-06-30 PROCEDURE — 85025 COMPLETE CBC W/AUTO DIFF WBC: CPT

## 2023-06-30 PROCEDURE — 99152 MOD SED SAME PHYS/QHP 5/>YRS: CPT

## 2023-06-30 PROCEDURE — 6370000000 HC RX 637 (ALT 250 FOR IP): Performed by: PHYSICIAN ASSISTANT

## 2023-06-30 PROCEDURE — 82728 ASSAY OF FERRITIN: CPT

## 2023-06-30 PROCEDURE — 2500000003 HC RX 250 WO HCPCS: Performed by: RADIOLOGY

## 2023-06-30 RX ORDER — OXYCODONE HYDROCHLORIDE 5 MG/1
5 TABLET ORAL EVERY 6 HOURS PRN
Status: DISCONTINUED | OUTPATIENT
Start: 2023-06-30 | End: 2023-07-02

## 2023-06-30 RX ORDER — SODIUM CHLORIDE 9 MG/ML
INJECTION, SOLUTION INTRAVENOUS CONTINUOUS PRN
Status: COMPLETED | OUTPATIENT
Start: 2023-06-30 | End: 2023-06-30

## 2023-06-30 RX ORDER — LIDOCAINE HYDROCHLORIDE 20 MG/ML
INJECTION, SOLUTION INFILTRATION; PERINEURAL PRN
Status: COMPLETED | OUTPATIENT
Start: 2023-06-30 | End: 2023-06-30

## 2023-06-30 RX ORDER — MIDAZOLAM HYDROCHLORIDE 1 MG/ML
INJECTION INTRAMUSCULAR; INTRAVENOUS PRN
Status: COMPLETED | OUTPATIENT
Start: 2023-06-30 | End: 2023-06-30

## 2023-06-30 RX ORDER — LOSARTAN POTASSIUM 25 MG/1
25 TABLET ORAL DAILY
Status: DISCONTINUED | OUTPATIENT
Start: 2023-06-30 | End: 2023-07-05

## 2023-06-30 RX ORDER — DONEPEZIL HYDROCHLORIDE 5 MG/1
5 TABLET, FILM COATED ORAL NIGHTLY
Status: DISCONTINUED | OUTPATIENT
Start: 2023-06-30 | End: 2023-07-14 | Stop reason: HOSPADM

## 2023-06-30 RX ORDER — FENTANYL CITRATE 50 UG/ML
INJECTION, SOLUTION INTRAMUSCULAR; INTRAVENOUS PRN
Status: COMPLETED | OUTPATIENT
Start: 2023-06-30 | End: 2023-06-30

## 2023-06-30 RX ADMIN — LIDOCAINE HYDROCHLORIDE 8 ML: 20 INJECTION, SOLUTION INFILTRATION; PERINEURAL at 09:49

## 2023-06-30 RX ADMIN — HYDROMORPHONE HYDROCHLORIDE 0.5 MG: 1 INJECTION, SOLUTION INTRAMUSCULAR; INTRAVENOUS; SUBCUTANEOUS at 05:05

## 2023-06-30 RX ADMIN — SODIUM CHLORIDE 125 MG: 9 INJECTION, SOLUTION INTRAVENOUS at 16:22

## 2023-06-30 RX ADMIN — ONDANSETRON 4 MG: 2 INJECTION INTRAMUSCULAR; INTRAVENOUS at 05:06

## 2023-06-30 RX ADMIN — HYDROMORPHONE HYDROCHLORIDE 0.5 MG: 1 INJECTION, SOLUTION INTRAMUSCULAR; INTRAVENOUS; SUBCUTANEOUS at 14:01

## 2023-06-30 RX ADMIN — OXYCODONE HYDROCHLORIDE 5 MG: 5 TABLET ORAL at 21:03

## 2023-06-30 RX ADMIN — SODIUM CHLORIDE, POTASSIUM CHLORIDE, SODIUM LACTATE AND CALCIUM CHLORIDE: 600; 310; 30; 20 INJECTION, SOLUTION INTRAVENOUS at 02:15

## 2023-06-30 RX ADMIN — FLUTICASONE PROPIONATE 1 SPRAY: 50 SPRAY, METERED NASAL at 08:00

## 2023-06-30 RX ADMIN — SODIUM CHLORIDE 50 ML/HR: 9 INJECTION, SOLUTION INTRAVENOUS at 09:20

## 2023-06-30 RX ADMIN — MIDAZOLAM 0.5 MG: 1 INJECTION INTRAMUSCULAR; INTRAVENOUS at 09:42

## 2023-06-30 RX ADMIN — MIDAZOLAM 0.5 MG: 1 INJECTION INTRAMUSCULAR; INTRAVENOUS at 09:53

## 2023-06-30 RX ADMIN — FENTANYL CITRATE 50 MCG: 50 INJECTION, SOLUTION INTRAMUSCULAR; INTRAVENOUS at 09:42

## 2023-06-30 RX ADMIN — FENTANYL CITRATE 25 MCG: 50 INJECTION, SOLUTION INTRAMUSCULAR; INTRAVENOUS at 09:53

## 2023-06-30 RX ADMIN — PANCRELIPASE LIPASE, PANCRELIPASE PROTEASE, PANCRELIPASE AMYLASE 10000 UNITS: 5000; 17000; 24000 CAPSULE, DELAYED RELEASE ORAL at 16:26

## 2023-06-30 RX ADMIN — LOSARTAN POTASSIUM 25 MG: 25 TABLET, FILM COATED ORAL at 16:26

## 2023-06-30 RX ADMIN — SODIUM CHLORIDE, PRESERVATIVE FREE 10 ML: 5 INJECTION INTRAVENOUS at 21:03

## 2023-06-30 RX ADMIN — FLUCONAZOLE IN SODIUM CHLORIDE 200 MG: 2 INJECTION, SOLUTION INTRAVENOUS at 15:06

## 2023-06-30 RX ADMIN — HYDROMORPHONE HYDROCHLORIDE 0.5 MG: 1 INJECTION, SOLUTION INTRAMUSCULAR; INTRAVENOUS; SUBCUTANEOUS at 08:43

## 2023-06-30 RX ADMIN — ONDANSETRON 4 MG: 2 INJECTION INTRAMUSCULAR; INTRAVENOUS at 14:06

## 2023-06-30 RX ADMIN — HYDROMORPHONE HYDROCHLORIDE 0.5 MG: 1 INJECTION, SOLUTION INTRAMUSCULAR; INTRAVENOUS; SUBCUTANEOUS at 18:01

## 2023-06-30 RX ADMIN — SODIUM CHLORIDE, POTASSIUM CHLORIDE, SODIUM LACTATE AND CALCIUM CHLORIDE: 600; 310; 30; 20 INJECTION, SOLUTION INTRAVENOUS at 18:01

## 2023-06-30 RX ADMIN — DONEPEZIL HYDROCHLORIDE 5 MG: 5 TABLET, FILM COATED ORAL at 21:02

## 2023-06-30 ASSESSMENT — PAIN SCALES - GENERAL
PAINLEVEL_OUTOF10: 6
PAINLEVEL_OUTOF10: 8
PAINLEVEL_OUTOF10: 5
PAINLEVEL_OUTOF10: 6
PAINLEVEL_OUTOF10: 0
PAINLEVEL_OUTOF10: 3

## 2023-06-30 ASSESSMENT — PAIN DESCRIPTION - LOCATION
LOCATION: ABDOMEN

## 2023-06-30 ASSESSMENT — PAIN DESCRIPTION - DESCRIPTORS
DESCRIPTORS: ACHING;DISCOMFORT
DESCRIPTORS: ACHING;SORE
DESCRIPTORS: ACHING;DISCOMFORT

## 2023-07-01 PROCEDURE — 2500000003 HC RX 250 WO HCPCS: Performed by: PHYSICIAN ASSISTANT

## 2023-07-01 PROCEDURE — 97535 SELF CARE MNGMENT TRAINING: CPT

## 2023-07-01 PROCEDURE — 97166 OT EVAL MOD COMPLEX 45 MIN: CPT

## 2023-07-01 PROCEDURE — 6370000000 HC RX 637 (ALT 250 FOR IP): Performed by: FAMILY MEDICINE

## 2023-07-01 PROCEDURE — 6360000002 HC RX W HCPCS: Performed by: INTERNAL MEDICINE

## 2023-07-01 PROCEDURE — 2580000003 HC RX 258: Performed by: PHYSICIAN ASSISTANT

## 2023-07-01 PROCEDURE — 6360000002 HC RX W HCPCS: Performed by: PHYSICIAN ASSISTANT

## 2023-07-01 PROCEDURE — 2500000003 HC RX 250 WO HCPCS: Performed by: INTERNAL MEDICINE

## 2023-07-01 PROCEDURE — 6370000000 HC RX 637 (ALT 250 FOR IP): Performed by: PHYSICIAN ASSISTANT

## 2023-07-01 PROCEDURE — 97530 THERAPEUTIC ACTIVITIES: CPT

## 2023-07-01 PROCEDURE — 97161 PT EVAL LOW COMPLEX 20 MIN: CPT

## 2023-07-01 PROCEDURE — 1100000000 HC RM PRIVATE

## 2023-07-01 RX ORDER — HYDROMORPHONE HYDROCHLORIDE 1 MG/ML
0.25 INJECTION, SOLUTION INTRAMUSCULAR; INTRAVENOUS; SUBCUTANEOUS
Status: DISCONTINUED | OUTPATIENT
Start: 2023-07-01 | End: 2023-07-14 | Stop reason: HOSPADM

## 2023-07-01 RX ORDER — ACETAMINOPHEN 325 MG/1
650 TABLET ORAL EVERY 4 HOURS PRN
Status: DISCONTINUED | OUTPATIENT
Start: 2023-07-01 | End: 2023-07-14 | Stop reason: HOSPADM

## 2023-07-01 RX ADMIN — HYDROMORPHONE HYDROCHLORIDE 0.5 MG: 1 INJECTION, SOLUTION INTRAMUSCULAR; INTRAVENOUS; SUBCUTANEOUS at 13:21

## 2023-07-01 RX ADMIN — FLUCONAZOLE IN SODIUM CHLORIDE 200 MG: 2 INJECTION, SOLUTION INTRAVENOUS at 13:21

## 2023-07-01 RX ADMIN — OXYCODONE HYDROCHLORIDE 5 MG: 5 TABLET ORAL at 16:44

## 2023-07-01 RX ADMIN — LOSARTAN POTASSIUM 25 MG: 25 TABLET, FILM COATED ORAL at 07:44

## 2023-07-01 RX ADMIN — DONEPEZIL HYDROCHLORIDE 5 MG: 5 TABLET, FILM COATED ORAL at 21:42

## 2023-07-01 RX ADMIN — HYDROMORPHONE HYDROCHLORIDE 0.5 MG: 1 INJECTION, SOLUTION INTRAMUSCULAR; INTRAVENOUS; SUBCUTANEOUS at 08:32

## 2023-07-01 RX ADMIN — FLUTICASONE PROPIONATE 1 SPRAY: 50 SPRAY, METERED NASAL at 08:48

## 2023-07-01 RX ADMIN — HYDROMORPHONE HYDROCHLORIDE 0.25 MG: 1 INJECTION, SOLUTION INTRAMUSCULAR; INTRAVENOUS; SUBCUTANEOUS at 18:11

## 2023-07-01 RX ADMIN — ONDANSETRON 4 MG: 2 INJECTION INTRAMUSCULAR; INTRAVENOUS at 16:45

## 2023-07-01 RX ADMIN — ACETAMINOPHEN 650 MG: 325 TABLET ORAL at 02:05

## 2023-07-01 RX ADMIN — PANCRELIPASE LIPASE, PANCRELIPASE PROTEASE, PANCRELIPASE AMYLASE 10000 UNITS: 5000; 17000; 24000 CAPSULE, DELAYED RELEASE ORAL at 07:44

## 2023-07-01 RX ADMIN — SODIUM CHLORIDE 125 MG: 9 INJECTION, SOLUTION INTRAVENOUS at 09:57

## 2023-07-01 RX ADMIN — PANCRELIPASE LIPASE, PANCRELIPASE PROTEASE, PANCRELIPASE AMYLASE 10000 UNITS: 5000; 17000; 24000 CAPSULE, DELAYED RELEASE ORAL at 16:44

## 2023-07-01 RX ADMIN — ONDANSETRON 4 MG: 2 INJECTION INTRAMUSCULAR; INTRAVENOUS at 08:37

## 2023-07-01 ASSESSMENT — PAIN SCALES - GENERAL
PAINLEVEL_OUTOF10: 7
PAINLEVEL_OUTOF10: 0

## 2023-07-01 ASSESSMENT — PAIN DESCRIPTION - LOCATION: LOCATION: HEAD

## 2023-07-01 ASSESSMENT — PAIN DESCRIPTION - DESCRIPTORS: DESCRIPTORS: ACHING

## 2023-07-02 LAB
ALBUMIN SERPL-MCNC: 1.5 G/DL (ref 3.2–4.6)
ALBUMIN/GLOB SERPL: 0.5 (ref 0.4–1.6)
ALP SERPL-CCNC: 132 U/L (ref 50–136)
ALT SERPL-CCNC: 10 U/L (ref 12–65)
ANION GAP SERPL CALC-SCNC: 6 MMOL/L (ref 2–11)
AST SERPL-CCNC: 21 U/L (ref 15–37)
BASOPHILS # BLD: 0 K/UL (ref 0–0.2)
BASOPHILS NFR BLD: 0 % (ref 0–2)
BILIRUB SERPL-MCNC: 0.3 MG/DL (ref 0.2–1.1)
BUN SERPL-MCNC: 8 MG/DL (ref 8–23)
CALCIUM SERPL-MCNC: 7.8 MG/DL (ref 8.3–10.4)
CHLORIDE SERPL-SCNC: 106 MMOL/L (ref 101–110)
CO2 SERPL-SCNC: 28 MMOL/L (ref 21–32)
CREAT SERPL-MCNC: 0.4 MG/DL (ref 0.6–1)
DIFFERENTIAL METHOD BLD: ABNORMAL
EOSINOPHIL # BLD: 0 K/UL (ref 0–0.8)
EOSINOPHIL NFR BLD: 0 % (ref 0.5–7.8)
ERYTHROCYTE [DISTWIDTH] IN BLOOD BY AUTOMATED COUNT: 18 % (ref 11.9–14.6)
GLOBULIN SER CALC-MCNC: 2.8 G/DL (ref 2.8–4.5)
GLUCOSE BLD STRIP.AUTO-MCNC: 97 MG/DL (ref 65–100)
GLUCOSE SERPL-MCNC: 69 MG/DL (ref 65–100)
HCT VFR BLD AUTO: 26.8 % (ref 35.8–46.3)
HGB BLD-MCNC: 8.6 G/DL (ref 11.7–15.4)
IMM GRANULOCYTES # BLD AUTO: 0 K/UL (ref 0–0.5)
IMM GRANULOCYTES NFR BLD AUTO: 0 % (ref 0–5)
LYMPHOCYTES # BLD: 0.3 K/UL (ref 0.5–4.6)
LYMPHOCYTES NFR BLD: 3 % (ref 13–44)
MAGNESIUM SERPL-MCNC: 1.3 MG/DL (ref 1.8–2.4)
MCH RBC QN AUTO: 25.9 PG (ref 26.1–32.9)
MCHC RBC AUTO-ENTMCNC: 32.1 G/DL (ref 31.4–35)
MCV RBC AUTO: 80.7 FL (ref 82–102)
MONOCYTES # BLD: 0.1 K/UL (ref 0.1–1.3)
MONOCYTES NFR BLD: 1 % (ref 4–12)
NEUTS SEG # BLD: 9.6 K/UL (ref 1.7–8.2)
NEUTS SEG NFR BLD: 96 % (ref 43–78)
NRBC # BLD: 0 K/UL (ref 0–0.2)
PLATELET # BLD AUTO: 191 K/UL (ref 150–450)
PLATELET COMMENT: ADEQUATE
PMV BLD AUTO: 10.9 FL (ref 9.4–12.3)
POTASSIUM SERPL-SCNC: 2.8 MMOL/L (ref 3.5–5.1)
PROT SERPL-MCNC: 4.3 G/DL (ref 6.3–8.2)
RBC # BLD AUTO: 3.32 M/UL (ref 4.05–5.2)
RBC MORPH BLD: ABNORMAL
SERVICE CMNT-IMP: NORMAL
SODIUM SERPL-SCNC: 140 MMOL/L (ref 133–143)
WBC # BLD AUTO: 10 K/UL (ref 4.3–11.1)
WBC MORPH BLD: ABNORMAL

## 2023-07-02 PROCEDURE — 85025 COMPLETE CBC W/AUTO DIFF WBC: CPT

## 2023-07-02 PROCEDURE — 2580000003 HC RX 258: Performed by: PHYSICIAN ASSISTANT

## 2023-07-02 PROCEDURE — 2580000003 HC RX 258: Performed by: INTERNAL MEDICINE

## 2023-07-02 PROCEDURE — 82962 GLUCOSE BLOOD TEST: CPT

## 2023-07-02 PROCEDURE — 80053 COMPREHEN METABOLIC PANEL: CPT

## 2023-07-02 PROCEDURE — 36415 COLL VENOUS BLD VENIPUNCTURE: CPT

## 2023-07-02 PROCEDURE — 2500000003 HC RX 250 WO HCPCS: Performed by: PHYSICIAN ASSISTANT

## 2023-07-02 PROCEDURE — 6360000002 HC RX W HCPCS: Performed by: INTERNAL MEDICINE

## 2023-07-02 PROCEDURE — 6360000002 HC RX W HCPCS: Performed by: PHYSICIAN ASSISTANT

## 2023-07-02 PROCEDURE — 6370000000 HC RX 637 (ALT 250 FOR IP): Performed by: FAMILY MEDICINE

## 2023-07-02 PROCEDURE — 6360000002 HC RX W HCPCS: Performed by: FAMILY MEDICINE

## 2023-07-02 PROCEDURE — 1100000000 HC RM PRIVATE

## 2023-07-02 PROCEDURE — 83735 ASSAY OF MAGNESIUM: CPT

## 2023-07-02 PROCEDURE — 6370000000 HC RX 637 (ALT 250 FOR IP): Performed by: PHYSICIAN ASSISTANT

## 2023-07-02 RX ORDER — OXYCODONE HYDROCHLORIDE 5 MG/1
10 TABLET ORAL EVERY 6 HOURS PRN
Status: DISCONTINUED | OUTPATIENT
Start: 2023-07-02 | End: 2023-07-05

## 2023-07-02 RX ORDER — POTASSIUM CHLORIDE 20 MEQ/1
40 TABLET, EXTENDED RELEASE ORAL EVERY 4 HOURS
Status: COMPLETED | OUTPATIENT
Start: 2023-07-02 | End: 2023-07-02

## 2023-07-02 RX ORDER — MAGNESIUM SULFATE IN WATER 40 MG/ML
2000 INJECTION, SOLUTION INTRAVENOUS ONCE
Status: COMPLETED | OUTPATIENT
Start: 2023-07-02 | End: 2023-07-02

## 2023-07-02 RX ADMIN — PANCRELIPASE LIPASE, PANCRELIPASE PROTEASE, PANCRELIPASE AMYLASE 10000 UNITS: 5000; 17000; 24000 CAPSULE, DELAYED RELEASE ORAL at 07:37

## 2023-07-02 RX ADMIN — ONDANSETRON 4 MG: 2 INJECTION INTRAMUSCULAR; INTRAVENOUS at 16:40

## 2023-07-02 RX ADMIN — POTASSIUM CHLORIDE 40 MEQ: 1500 TABLET, EXTENDED RELEASE ORAL at 10:02

## 2023-07-02 RX ADMIN — ONDANSETRON 4 MG: 2 INJECTION INTRAMUSCULAR; INTRAVENOUS at 09:00

## 2023-07-02 RX ADMIN — MAGNESIUM SULFATE HEPTAHYDRATE 2000 MG: 40 INJECTION, SOLUTION INTRAVENOUS at 12:05

## 2023-07-02 RX ADMIN — POTASSIUM CHLORIDE 40 MEQ: 1500 TABLET, EXTENDED RELEASE ORAL at 13:58

## 2023-07-02 RX ADMIN — HYDROMORPHONE HYDROCHLORIDE 0.25 MG: 1 INJECTION, SOLUTION INTRAMUSCULAR; INTRAVENOUS; SUBCUTANEOUS at 16:40

## 2023-07-02 RX ADMIN — FLUCONAZOLE IN SODIUM CHLORIDE 200 MG: 2 INJECTION, SOLUTION INTRAVENOUS at 13:58

## 2023-07-02 RX ADMIN — SODIUM CHLORIDE, POTASSIUM CHLORIDE, SODIUM LACTATE AND CALCIUM CHLORIDE: 600; 310; 30; 20 INJECTION, SOLUTION INTRAVENOUS at 02:55

## 2023-07-02 RX ADMIN — NYSTATIN 10 ML: 100000 SUSPENSION ORAL at 19:42

## 2023-07-02 RX ADMIN — FLUTICASONE PROPIONATE 1 SPRAY: 50 SPRAY, METERED NASAL at 07:37

## 2023-07-02 RX ADMIN — SODIUM CHLORIDE 125 MG: 9 INJECTION, SOLUTION INTRAVENOUS at 09:42

## 2023-07-02 RX ADMIN — LOSARTAN POTASSIUM 25 MG: 25 TABLET, FILM COATED ORAL at 07:37

## 2023-07-02 RX ADMIN — DONEPEZIL HYDROCHLORIDE 5 MG: 5 TABLET, FILM COATED ORAL at 19:43

## 2023-07-02 RX ADMIN — OXYCODONE HYDROCHLORIDE 10 MG: 5 TABLET ORAL at 19:47

## 2023-07-02 RX ADMIN — HYDROMORPHONE HYDROCHLORIDE 0.25 MG: 1 INJECTION, SOLUTION INTRAMUSCULAR; INTRAVENOUS; SUBCUTANEOUS at 09:00

## 2023-07-02 RX ADMIN — PANCRELIPASE LIPASE, PANCRELIPASE PROTEASE, PANCRELIPASE AMYLASE 10000 UNITS: 5000; 17000; 24000 CAPSULE, DELAYED RELEASE ORAL at 12:05

## 2023-07-02 ASSESSMENT — PAIN DESCRIPTION - FREQUENCY
FREQUENCY: CONTINUOUS
FREQUENCY: CONTINUOUS

## 2023-07-02 ASSESSMENT — PAIN SCALES - GENERAL
PAINLEVEL_OUTOF10: 5
PAINLEVEL_OUTOF10: 5
PAINLEVEL_OUTOF10: 0
PAINLEVEL_OUTOF10: 0

## 2023-07-02 ASSESSMENT — PAIN DESCRIPTION - ONSET
ONSET: ON-GOING
ONSET: ON-GOING

## 2023-07-02 ASSESSMENT — PAIN DESCRIPTION - LOCATION
LOCATION: ABDOMEN;BACK
LOCATION: ABDOMEN

## 2023-07-02 ASSESSMENT — PAIN DESCRIPTION - DESCRIPTORS
DESCRIPTORS: ACHING;DISCOMFORT
DESCRIPTORS: ACHING;DISCOMFORT

## 2023-07-02 ASSESSMENT — PAIN DESCRIPTION - PAIN TYPE
TYPE: CHRONIC PAIN
TYPE: CHRONIC PAIN

## 2023-07-02 ASSESSMENT — PAIN DESCRIPTION - ORIENTATION: ORIENTATION: LOWER;RIGHT;LEFT;ANTERIOR

## 2023-07-03 LAB
ANION GAP SERPL CALC-SCNC: 7 MMOL/L (ref 2–11)
BASOPHILS # BLD: 0 K/UL (ref 0–0.2)
BASOPHILS NFR BLD: 0 % (ref 0–2)
BUN SERPL-MCNC: 7 MG/DL (ref 8–23)
CALCIUM SERPL-MCNC: 8 MG/DL (ref 8.3–10.4)
CANCER AG19-9 SERPL-ACNC: 38.2 U/ML (ref 2–37)
CHLORIDE SERPL-SCNC: 106 MMOL/L (ref 101–110)
CO2 SERPL-SCNC: 28 MMOL/L (ref 21–32)
CREAT SERPL-MCNC: 0.5 MG/DL (ref 0.6–1)
DIFFERENTIAL METHOD BLD: ABNORMAL
EOSINOPHIL # BLD: 0.1 K/UL (ref 0–0.8)
EOSINOPHIL NFR BLD: 0 % (ref 0.5–7.8)
ERYTHROCYTE [DISTWIDTH] IN BLOOD BY AUTOMATED COUNT: 17.8 % (ref 11.9–14.6)
GLUCOSE SERPL-MCNC: 97 MG/DL (ref 65–100)
HCT VFR BLD AUTO: 28.3 % (ref 35.8–46.3)
HGB BLD-MCNC: 9.2 G/DL (ref 11.7–15.4)
IMM GRANULOCYTES # BLD AUTO: 0.1 K/UL (ref 0–0.5)
IMM GRANULOCYTES NFR BLD AUTO: 1 % (ref 0–5)
LYMPHOCYTES # BLD: 0.8 K/UL (ref 0.5–4.6)
LYMPHOCYTES NFR BLD: 6 % (ref 13–44)
MAGNESIUM SERPL-MCNC: 1.6 MG/DL (ref 1.8–2.4)
MCH RBC QN AUTO: 25.6 PG (ref 26.1–32.9)
MCHC RBC AUTO-ENTMCNC: 32.5 G/DL (ref 31.4–35)
MCV RBC AUTO: 78.8 FL (ref 82–102)
MONOCYTES # BLD: 0.7 K/UL (ref 0.1–1.3)
MONOCYTES NFR BLD: 6 % (ref 4–12)
NEUTS SEG # BLD: 11.4 K/UL (ref 1.7–8.2)
NEUTS SEG NFR BLD: 87 % (ref 43–78)
NRBC # BLD: 0 K/UL (ref 0–0.2)
PLATELET # BLD AUTO: 161 K/UL (ref 150–450)
PMV BLD AUTO: 10.6 FL (ref 9.4–12.3)
POTASSIUM SERPL-SCNC: 2.3 MMOL/L (ref 3.5–5.1)
POTASSIUM SERPL-SCNC: 2.9 MMOL/L (ref 3.5–5.1)
RBC # BLD AUTO: 3.59 M/UL (ref 4.05–5.2)
SODIUM SERPL-SCNC: 141 MMOL/L (ref 133–143)
WBC # BLD AUTO: 13.1 K/UL (ref 4.3–11.1)

## 2023-07-03 PROCEDURE — 1100000000 HC RM PRIVATE

## 2023-07-03 PROCEDURE — 84132 ASSAY OF SERUM POTASSIUM: CPT

## 2023-07-03 PROCEDURE — 83735 ASSAY OF MAGNESIUM: CPT

## 2023-07-03 PROCEDURE — 80048 BASIC METABOLIC PNL TOTAL CA: CPT

## 2023-07-03 PROCEDURE — 6370000000 HC RX 637 (ALT 250 FOR IP): Performed by: FAMILY MEDICINE

## 2023-07-03 PROCEDURE — 6370000000 HC RX 637 (ALT 250 FOR IP): Performed by: PHYSICIAN ASSISTANT

## 2023-07-03 PROCEDURE — 85025 COMPLETE CBC W/AUTO DIFF WBC: CPT

## 2023-07-03 PROCEDURE — 36415 COLL VENOUS BLD VENIPUNCTURE: CPT

## 2023-07-03 PROCEDURE — 6360000002 HC RX W HCPCS: Performed by: FAMILY MEDICINE

## 2023-07-03 RX ORDER — MAGNESIUM SULFATE IN WATER 40 MG/ML
2000 INJECTION, SOLUTION INTRAVENOUS ONCE
Status: COMPLETED | OUTPATIENT
Start: 2023-07-03 | End: 2023-07-03

## 2023-07-03 RX ORDER — FLUCONAZOLE 100 MG/1
200 TABLET ORAL DAILY
Status: COMPLETED | OUTPATIENT
Start: 2023-07-03 | End: 2023-07-05

## 2023-07-03 RX ORDER — HYDRALAZINE HYDROCHLORIDE 20 MG/ML
5 INJECTION INTRAMUSCULAR; INTRAVENOUS EVERY 6 HOURS PRN
Status: DISCONTINUED | OUTPATIENT
Start: 2023-07-03 | End: 2023-07-14 | Stop reason: HOSPADM

## 2023-07-03 RX ORDER — POTASSIUM CHLORIDE 20 MEQ/1
40 TABLET, EXTENDED RELEASE ORAL EVERY 4 HOURS
Status: COMPLETED | OUTPATIENT
Start: 2023-07-03 | End: 2023-07-03

## 2023-07-03 RX ADMIN — POTASSIUM CHLORIDE 40 MEQ: 1500 TABLET, EXTENDED RELEASE ORAL at 20:07

## 2023-07-03 RX ADMIN — HYDRALAZINE HYDROCHLORIDE 5 MG: 20 INJECTION INTRAMUSCULAR; INTRAVENOUS at 13:14

## 2023-07-03 RX ADMIN — POTASSIUM CHLORIDE 40 MEQ: 1500 TABLET, EXTENDED RELEASE ORAL at 16:28

## 2023-07-03 RX ADMIN — PANCRELIPASE LIPASE, PANCRELIPASE PROTEASE, PANCRELIPASE AMYLASE 10000 UNITS: 5000; 17000; 24000 CAPSULE, DELAYED RELEASE ORAL at 15:48

## 2023-07-03 RX ADMIN — MAGNESIUM SULFATE HEPTAHYDRATE 2000 MG: 40 INJECTION, SOLUTION INTRAVENOUS at 08:29

## 2023-07-03 RX ADMIN — POTASSIUM CHLORIDE 40 MEQ: 1500 TABLET, EXTENDED RELEASE ORAL at 11:20

## 2023-07-03 RX ADMIN — POTASSIUM CHLORIDE 40 MEQ: 1500 TABLET, EXTENDED RELEASE ORAL at 08:29

## 2023-07-03 RX ADMIN — PANCRELIPASE LIPASE, PANCRELIPASE PROTEASE, PANCRELIPASE AMYLASE 10000 UNITS: 5000; 17000; 24000 CAPSULE, DELAYED RELEASE ORAL at 11:20

## 2023-07-03 RX ADMIN — FLUTICASONE PROPIONATE 1 SPRAY: 50 SPRAY, METERED NASAL at 07:45

## 2023-07-03 RX ADMIN — FLUCONAZOLE 200 MG: 100 TABLET ORAL at 12:47

## 2023-07-03 RX ADMIN — LOSARTAN POTASSIUM 25 MG: 25 TABLET, FILM COATED ORAL at 07:44

## 2023-07-03 RX ADMIN — PANCRELIPASE LIPASE, PANCRELIPASE PROTEASE, PANCRELIPASE AMYLASE 10000 UNITS: 5000; 17000; 24000 CAPSULE, DELAYED RELEASE ORAL at 07:42

## 2023-07-03 RX ADMIN — OXYCODONE HYDROCHLORIDE 10 MG: 5 TABLET ORAL at 09:54

## 2023-07-03 ASSESSMENT — PAIN SCALES - GENERAL
PAINLEVEL_OUTOF10: 0
PAINLEVEL_OUTOF10: 0

## 2023-07-04 LAB
ANION GAP SERPL CALC-SCNC: 6 MMOL/L (ref 2–11)
APPEARANCE UR: CLEAR
BACTERIA URNS QL MICRO: ABNORMAL /HPF
BASOPHILS # BLD: 0 K/UL (ref 0–0.2)
BASOPHILS NFR BLD: 0 % (ref 0–2)
BILIRUB UR QL: NEGATIVE
BUN SERPL-MCNC: 8 MG/DL (ref 8–23)
CALCIUM SERPL-MCNC: 8 MG/DL (ref 8.3–10.4)
CASTS URNS QL MICRO: 0 /LPF
CHLORIDE SERPL-SCNC: 107 MMOL/L (ref 101–110)
CO2 SERPL-SCNC: 24 MMOL/L (ref 21–32)
COLOR UR: ABNORMAL
CREAT SERPL-MCNC: 0.5 MG/DL (ref 0.6–1)
CRYSTALS URNS QL MICRO: 0 /LPF
DIFFERENTIAL METHOD BLD: ABNORMAL
EOSINOPHIL # BLD: 0 K/UL (ref 0–0.8)
EOSINOPHIL NFR BLD: 0 % (ref 0.5–7.8)
EPI CELLS #/AREA URNS HPF: ABNORMAL /HPF
ERYTHROCYTE [DISTWIDTH] IN BLOOD BY AUTOMATED COUNT: 17.9 % (ref 11.9–14.6)
GLUCOSE SERPL-MCNC: 104 MG/DL (ref 65–100)
GLUCOSE UR STRIP.AUTO-MCNC: NEGATIVE MG/DL
HCT VFR BLD AUTO: 29 % (ref 35.8–46.3)
HGB BLD-MCNC: 9.5 G/DL (ref 11.7–15.4)
HGB UR QL STRIP: ABNORMAL
IMM GRANULOCYTES # BLD AUTO: 0.1 K/UL (ref 0–0.5)
IMM GRANULOCYTES NFR BLD AUTO: 1 % (ref 0–5)
KETONES UR QL STRIP.AUTO: NEGATIVE MG/DL
LEUKOCYTE ESTERASE UR QL STRIP.AUTO: ABNORMAL
LYMPHOCYTES # BLD: 0.4 K/UL (ref 0.5–4.6)
LYMPHOCYTES NFR BLD: 2 % (ref 13–44)
MAGNESIUM SERPL-MCNC: 1.7 MG/DL (ref 1.8–2.4)
MCH RBC QN AUTO: 25.7 PG (ref 26.1–32.9)
MCHC RBC AUTO-ENTMCNC: 32.8 G/DL (ref 31.4–35)
MCV RBC AUTO: 78.4 FL (ref 82–102)
MONOCYTES # BLD: 0.4 K/UL (ref 0.1–1.3)
MONOCYTES NFR BLD: 2 % (ref 4–12)
MUCOUS THREADS URNS QL MICRO: ABNORMAL /LPF
NEUTS SEG # BLD: 17.6 K/UL (ref 1.7–8.2)
NEUTS SEG NFR BLD: 95 % (ref 43–78)
NITRITE UR QL STRIP.AUTO: NEGATIVE
NRBC # BLD: 0 K/UL (ref 0–0.2)
OTHER OBSERVATIONS: ABNORMAL
PH UR STRIP: 7 (ref 5–9)
PLATELET # BLD AUTO: 133 K/UL (ref 150–450)
PMV BLD AUTO: 10.9 FL (ref 9.4–12.3)
POTASSIUM SERPL-SCNC: 4.3 MMOL/L (ref 3.5–5.1)
PROCALCITONIN SERPL-MCNC: 6.17 NG/ML (ref 0–0.49)
PROT UR STRIP-MCNC: ABNORMAL MG/DL
RBC # BLD AUTO: 3.7 M/UL (ref 4.05–5.2)
RBC #/AREA URNS HPF: ABNORMAL /HPF
SODIUM SERPL-SCNC: 137 MMOL/L (ref 133–143)
SP GR UR REFRACTOMETRY: 1.02 (ref 1–1.02)
URINE CULTURE IF INDICATED: ABNORMAL
UROBILINOGEN UR QL STRIP.AUTO: 1 EU/DL (ref 0.2–1)
WBC # BLD AUTO: 18.6 K/UL (ref 4.3–11.1)
WBC URNS QL MICRO: ABNORMAL /HPF

## 2023-07-04 PROCEDURE — 81001 URINALYSIS AUTO W/SCOPE: CPT

## 2023-07-04 PROCEDURE — 84145 PROCALCITONIN (PCT): CPT

## 2023-07-04 PROCEDURE — 6370000000 HC RX 637 (ALT 250 FOR IP): Performed by: PHYSICIAN ASSISTANT

## 2023-07-04 PROCEDURE — 83735 ASSAY OF MAGNESIUM: CPT

## 2023-07-04 PROCEDURE — 97530 THERAPEUTIC ACTIVITIES: CPT

## 2023-07-04 PROCEDURE — 36415 COLL VENOUS BLD VENIPUNCTURE: CPT

## 2023-07-04 PROCEDURE — 97112 NEUROMUSCULAR REEDUCATION: CPT

## 2023-07-04 PROCEDURE — 85025 COMPLETE CBC W/AUTO DIFF WBC: CPT

## 2023-07-04 PROCEDURE — 6370000000 HC RX 637 (ALT 250 FOR IP): Performed by: FAMILY MEDICINE

## 2023-07-04 PROCEDURE — 6360000002 HC RX W HCPCS: Performed by: FAMILY MEDICINE

## 2023-07-04 PROCEDURE — 80048 BASIC METABOLIC PNL TOTAL CA: CPT

## 2023-07-04 PROCEDURE — 1100000003 HC PRIVATE W/ TELEMETRY

## 2023-07-04 PROCEDURE — 2580000003 HC RX 258: Performed by: FAMILY MEDICINE

## 2023-07-04 RX ORDER — MAGNESIUM SULFATE IN WATER 40 MG/ML
2000 INJECTION, SOLUTION INTRAVENOUS ONCE
Status: COMPLETED | OUTPATIENT
Start: 2023-07-04 | End: 2023-07-04

## 2023-07-04 RX ADMIN — FLUTICASONE PROPIONATE 1 SPRAY: 50 SPRAY, METERED NASAL at 08:43

## 2023-07-04 RX ADMIN — OXYCODONE HYDROCHLORIDE 10 MG: 5 TABLET ORAL at 08:37

## 2023-07-04 RX ADMIN — PANCRELIPASE LIPASE, PANCRELIPASE PROTEASE, PANCRELIPASE AMYLASE 10000 UNITS: 5000; 17000; 24000 CAPSULE, DELAYED RELEASE ORAL at 08:38

## 2023-07-04 RX ADMIN — SODIUM CHLORIDE, POTASSIUM CHLORIDE, SODIUM LACTATE AND CALCIUM CHLORIDE: 600; 310; 30; 20 INJECTION, SOLUTION INTRAVENOUS at 08:37

## 2023-07-04 RX ADMIN — MAGNESIUM SULFATE HEPTAHYDRATE 2000 MG: 40 INJECTION, SOLUTION INTRAVENOUS at 08:37

## 2023-07-04 RX ADMIN — PANCRELIPASE LIPASE, PANCRELIPASE PROTEASE, PANCRELIPASE AMYLASE 10000 UNITS: 5000; 17000; 24000 CAPSULE, DELAYED RELEASE ORAL at 17:25

## 2023-07-04 RX ADMIN — OXYCODONE HYDROCHLORIDE 10 MG: 5 TABLET ORAL at 20:32

## 2023-07-04 RX ADMIN — PANCRELIPASE LIPASE, PANCRELIPASE PROTEASE, PANCRELIPASE AMYLASE 10000 UNITS: 5000; 17000; 24000 CAPSULE, DELAYED RELEASE ORAL at 13:02

## 2023-07-04 RX ADMIN — FLUCONAZOLE 200 MG: 100 TABLET ORAL at 08:37

## 2023-07-04 RX ADMIN — LOSARTAN POTASSIUM 25 MG: 25 TABLET, FILM COATED ORAL at 08:38

## 2023-07-04 ASSESSMENT — PAIN DESCRIPTION - LOCATION
LOCATION: ABDOMEN
LOCATION: GENERALIZED

## 2023-07-04 ASSESSMENT — PAIN SCALES - GENERAL
PAINLEVEL_OUTOF10: 0
PAINLEVEL_OUTOF10: 0
PAINLEVEL_OUTOF10: 5
PAINLEVEL_OUTOF10: 0
PAINLEVEL_OUTOF10: 10

## 2023-07-04 ASSESSMENT — PAIN - FUNCTIONAL ASSESSMENT: PAIN_FUNCTIONAL_ASSESSMENT: ACTIVITIES ARE NOT PREVENTED

## 2023-07-04 ASSESSMENT — PAIN DESCRIPTION - DESCRIPTORS: DESCRIPTORS: ACHING;DISCOMFORT

## 2023-07-04 ASSESSMENT — PAIN DESCRIPTION - ORIENTATION: ORIENTATION: MID

## 2023-07-04 ASSESSMENT — PAIN DESCRIPTION - ONSET: ONSET: ON-GOING

## 2023-07-04 ASSESSMENT — PAIN DESCRIPTION - PAIN TYPE: TYPE: ACUTE PAIN

## 2023-07-04 ASSESSMENT — PAIN DESCRIPTION - FREQUENCY: FREQUENCY: INTERMITTENT

## 2023-07-05 PROBLEM — E88.09 HYPOALBUMINEMIA: Status: ACTIVE | Noted: 2023-01-01

## 2023-07-05 LAB
ALBUMIN SERPL-MCNC: 1.4 G/DL (ref 3.2–4.6)
ALBUMIN/GLOB SERPL: 0.5 (ref 0.4–1.6)
ALP SERPL-CCNC: 124 U/L (ref 50–136)
ALT SERPL-CCNC: 11 U/L (ref 12–65)
ANION GAP SERPL CALC-SCNC: 4 MMOL/L (ref 2–11)
AST SERPL-CCNC: 19 U/L (ref 15–37)
BASOPHILS # BLD: 0.1 K/UL (ref 0–0.2)
BASOPHILS NFR BLD: 0 % (ref 0–2)
BILIRUB SERPL-MCNC: 0.5 MG/DL (ref 0.2–1.1)
BUN SERPL-MCNC: 9 MG/DL (ref 8–23)
CALCIUM SERPL-MCNC: 7.6 MG/DL (ref 8.3–10.4)
CHLORIDE SERPL-SCNC: 107 MMOL/L (ref 101–110)
CO2 SERPL-SCNC: 27 MMOL/L (ref 21–32)
CREAT SERPL-MCNC: 0.5 MG/DL (ref 0.6–1)
DIFFERENTIAL METHOD BLD: ABNORMAL
EOSINOPHIL # BLD: 0.1 K/UL (ref 0–0.8)
EOSINOPHIL NFR BLD: 0 % (ref 0.5–7.8)
ERYTHROCYTE [DISTWIDTH] IN BLOOD BY AUTOMATED COUNT: 18.1 % (ref 11.9–14.6)
GLOBULIN SER CALC-MCNC: 2.9 G/DL (ref 2.8–4.5)
GLUCOSE SERPL-MCNC: 79 MG/DL (ref 65–100)
HCT VFR BLD AUTO: 27.3 % (ref 35.8–46.3)
HGB BLD-MCNC: 8.9 G/DL (ref 11.7–15.4)
IMM GRANULOCYTES # BLD AUTO: 0.2 K/UL (ref 0–0.5)
IMM GRANULOCYTES NFR BLD AUTO: 1 % (ref 0–5)
LYMPHOCYTES # BLD: 0.8 K/UL (ref 0.5–4.6)
LYMPHOCYTES NFR BLD: 4 % (ref 13–44)
MAGNESIUM SERPL-MCNC: 1.7 MG/DL (ref 1.8–2.4)
MCH RBC QN AUTO: 25.7 PG (ref 26.1–32.9)
MCHC RBC AUTO-ENTMCNC: 32.6 G/DL (ref 31.4–35)
MCV RBC AUTO: 78.9 FL (ref 82–102)
MONOCYTES # BLD: 1.1 K/UL (ref 0.1–1.3)
MONOCYTES NFR BLD: 6 % (ref 4–12)
NEUTS SEG # BLD: 16.5 K/UL (ref 1.7–8.2)
NEUTS SEG NFR BLD: 88 % (ref 43–78)
NRBC # BLD: 0 K/UL (ref 0–0.2)
PLATELET # BLD AUTO: 106 K/UL (ref 150–450)
PMV BLD AUTO: 10.8 FL (ref 9.4–12.3)
POTASSIUM SERPL-SCNC: 3.7 MMOL/L (ref 3.5–5.1)
PROT SERPL-MCNC: 4.3 G/DL (ref 6.3–8.2)
RBC # BLD AUTO: 3.46 M/UL (ref 4.05–5.2)
SODIUM SERPL-SCNC: 138 MMOL/L (ref 133–143)
WBC # BLD AUTO: 18.7 K/UL (ref 4.3–11.1)

## 2023-07-05 PROCEDURE — 80053 COMPREHEN METABOLIC PANEL: CPT

## 2023-07-05 PROCEDURE — 6370000000 HC RX 637 (ALT 250 FOR IP): Performed by: PHYSICIAN ASSISTANT

## 2023-07-05 PROCEDURE — 85025 COMPLETE CBC W/AUTO DIFF WBC: CPT

## 2023-07-05 PROCEDURE — 2500000003 HC RX 250 WO HCPCS: Performed by: PHYSICIAN ASSISTANT

## 2023-07-05 PROCEDURE — 36415 COLL VENOUS BLD VENIPUNCTURE: CPT

## 2023-07-05 PROCEDURE — 6370000000 HC RX 637 (ALT 250 FOR IP): Performed by: FAMILY MEDICINE

## 2023-07-05 PROCEDURE — 1100000003 HC PRIVATE W/ TELEMETRY

## 2023-07-05 PROCEDURE — 6360000002 HC RX W HCPCS: Performed by: INTERNAL MEDICINE

## 2023-07-05 PROCEDURE — 1100000000 HC RM PRIVATE

## 2023-07-05 PROCEDURE — 6370000000 HC RX 637 (ALT 250 FOR IP): Performed by: INTERNAL MEDICINE

## 2023-07-05 PROCEDURE — 83735 ASSAY OF MAGNESIUM: CPT

## 2023-07-05 RX ORDER — MAGNESIUM SULFATE IN WATER 40 MG/ML
2000 INJECTION, SOLUTION INTRAVENOUS ONCE
Status: COMPLETED | OUTPATIENT
Start: 2023-07-05 | End: 2023-07-05

## 2023-07-05 RX ORDER — LOSARTAN POTASSIUM 50 MG/1
50 TABLET ORAL DAILY
Status: DISCONTINUED | OUTPATIENT
Start: 2023-07-06 | End: 2023-07-14 | Stop reason: HOSPADM

## 2023-07-05 RX ORDER — LOSARTAN POTASSIUM 25 MG/1
25 TABLET ORAL ONCE
Status: COMPLETED | OUTPATIENT
Start: 2023-07-05 | End: 2023-07-05

## 2023-07-05 RX ORDER — POTASSIUM CHLORIDE 20 MEQ/1
40 TABLET, EXTENDED RELEASE ORAL 2 TIMES DAILY WITH MEALS
Status: DISCONTINUED | OUTPATIENT
Start: 2023-07-05 | End: 2023-07-05

## 2023-07-05 RX ORDER — OXYCODONE HYDROCHLORIDE 15 MG/1
15 TABLET ORAL EVERY 6 HOURS PRN
Status: DISCONTINUED | OUTPATIENT
Start: 2023-07-05 | End: 2023-07-14 | Stop reason: HOSPADM

## 2023-07-05 RX ADMIN — HYDROMORPHONE HYDROCHLORIDE 0.25 MG: 1 INJECTION, SOLUTION INTRAMUSCULAR; INTRAVENOUS; SUBCUTANEOUS at 12:20

## 2023-07-05 RX ADMIN — LOSARTAN POTASSIUM 25 MG: 25 TABLET, FILM COATED ORAL at 09:23

## 2023-07-05 RX ADMIN — PANCRELIPASE LIPASE, PANCRELIPASE PROTEASE, PANCRELIPASE AMYLASE 10000 UNITS: 5000; 17000; 24000 CAPSULE, DELAYED RELEASE ORAL at 09:20

## 2023-07-05 RX ADMIN — LOSARTAN POTASSIUM 25 MG: 25 TABLET, FILM COATED ORAL at 16:54

## 2023-07-05 RX ADMIN — OXYCODONE HYDROCHLORIDE 15 MG: 15 TABLET ORAL at 16:53

## 2023-07-05 RX ADMIN — OXYCODONE HYDROCHLORIDE 10 MG: 5 TABLET ORAL at 09:19

## 2023-07-05 RX ADMIN — ACETAMINOPHEN 650 MG: 325 TABLET ORAL at 17:46

## 2023-07-05 RX ADMIN — PANCRELIPASE LIPASE, PANCRELIPASE PROTEASE, PANCRELIPASE AMYLASE 10000 UNITS: 5000; 17000; 24000 CAPSULE, DELAYED RELEASE ORAL at 16:54

## 2023-07-05 RX ADMIN — FLUTICASONE PROPIONATE 1 SPRAY: 50 SPRAY, METERED NASAL at 09:19

## 2023-07-05 RX ADMIN — PANCRELIPASE LIPASE, PANCRELIPASE PROTEASE, PANCRELIPASE AMYLASE 10000 UNITS: 5000; 17000; 24000 CAPSULE, DELAYED RELEASE ORAL at 12:14

## 2023-07-05 RX ADMIN — POTASSIUM CHLORIDE 40 MEQ: 1500 TABLET, EXTENDED RELEASE ORAL at 09:19

## 2023-07-05 RX ADMIN — FLUCONAZOLE 200 MG: 100 TABLET ORAL at 09:23

## 2023-07-05 RX ADMIN — MAGNESIUM SULFATE HEPTAHYDRATE 2000 MG: 40 INJECTION, SOLUTION INTRAVENOUS at 09:19

## 2023-07-05 ASSESSMENT — PAIN DESCRIPTION - ORIENTATION
ORIENTATION: LOWER
ORIENTATION: LOWER
ORIENTATION: MID
ORIENTATION: LOWER

## 2023-07-05 ASSESSMENT — PAIN DESCRIPTION - DESCRIPTORS
DESCRIPTORS: SHARP;STABBING
DESCRIPTORS: SHARP;STABBING
DESCRIPTORS: SORE
DESCRIPTORS: SHARP;SORE;STABBING

## 2023-07-05 ASSESSMENT — PAIN DESCRIPTION - FREQUENCY: FREQUENCY: INTERMITTENT

## 2023-07-05 ASSESSMENT — PAIN SCALES - GENERAL
PAINLEVEL_OUTOF10: 7
PAINLEVEL_OUTOF10: 0
PAINLEVEL_OUTOF10: 0
PAINLEVEL_OUTOF10: 5
PAINLEVEL_OUTOF10: 6
PAINLEVEL_OUTOF10: 4
PAINLEVEL_OUTOF10: 5
PAINLEVEL_OUTOF10: 5

## 2023-07-05 ASSESSMENT — PAIN DESCRIPTION - ONSET: ONSET: ON-GOING

## 2023-07-05 ASSESSMENT — PAIN DESCRIPTION - LOCATION
LOCATION: ABDOMEN

## 2023-07-06 ENCOUNTER — APPOINTMENT (OUTPATIENT)
Dept: GENERAL RADIOLOGY | Age: 83
DRG: 438 | End: 2023-07-06
Payer: MEDICARE

## 2023-07-06 LAB
AMMONIA PLAS-SCNC: 46 UMOL/L (ref 11–32)
ANION GAP SERPL CALC-SCNC: 5 MMOL/L (ref 2–11)
APPEARANCE UR: ABNORMAL
BACTERIA URNS QL MICRO: NEGATIVE /HPF
BASOPHILS # BLD: 0.1 K/UL (ref 0–0.2)
BASOPHILS NFR BLD: 0 % (ref 0–2)
BILIRUB UR QL: NEGATIVE
BUN SERPL-MCNC: 9 MG/DL (ref 8–23)
CALCIUM SERPL-MCNC: 7.8 MG/DL (ref 8.3–10.4)
CASTS URNS QL MICRO: ABNORMAL /LPF
CHLORIDE SERPL-SCNC: 108 MMOL/L (ref 101–110)
CO2 SERPL-SCNC: 20 MMOL/L (ref 21–32)
COLOR UR: ABNORMAL
CREAT SERPL-MCNC: 0.5 MG/DL (ref 0.6–1)
DIFFERENTIAL METHOD BLD: ABNORMAL
EOSINOPHIL # BLD: 0 K/UL (ref 0–0.8)
EOSINOPHIL NFR BLD: 0 % (ref 0.5–7.8)
EPI CELLS #/AREA URNS HPF: ABNORMAL /HPF
ERYTHROCYTE [DISTWIDTH] IN BLOOD BY AUTOMATED COUNT: 18.2 % (ref 11.9–14.6)
GLUCOSE SERPL-MCNC: 79 MG/DL (ref 65–100)
GLUCOSE UR STRIP.AUTO-MCNC: NEGATIVE MG/DL
HCT VFR BLD AUTO: 31.2 % (ref 35.8–46.3)
HGB BLD-MCNC: 10.2 G/DL (ref 11.7–15.4)
HGB UR QL STRIP: ABNORMAL
IMM GRANULOCYTES # BLD AUTO: 0.2 K/UL (ref 0–0.5)
IMM GRANULOCYTES NFR BLD AUTO: 1 % (ref 0–5)
KETONES UR QL STRIP.AUTO: NEGATIVE MG/DL
LEUKOCYTE ESTERASE UR QL STRIP.AUTO: NEGATIVE
LYMPHOCYTES # BLD: 1 K/UL (ref 0.5–4.6)
LYMPHOCYTES NFR BLD: 4 % (ref 13–44)
MAGNESIUM SERPL-MCNC: 2.1 MG/DL (ref 1.8–2.4)
MCH RBC QN AUTO: 25.8 PG (ref 26.1–32.9)
MCHC RBC AUTO-ENTMCNC: 32.7 G/DL (ref 31.4–35)
MCV RBC AUTO: 79 FL (ref 82–102)
MONOCYTES # BLD: 1 K/UL (ref 0.1–1.3)
MONOCYTES NFR BLD: 4 % (ref 4–12)
MUCOUS THREADS URNS QL MICRO: 0 /LPF
NEUTS SEG # BLD: 23.6 K/UL (ref 1.7–8.2)
NEUTS SEG NFR BLD: 91 % (ref 43–78)
NITRITE UR QL STRIP.AUTO: NEGATIVE
NRBC # BLD: 0 K/UL (ref 0–0.2)
PH UR STRIP: 6.5 (ref 5–9)
PLATELET # BLD AUTO: 111 K/UL (ref 150–450)
PMV BLD AUTO: 11.2 FL (ref 9.4–12.3)
POTASSIUM SERPL-SCNC: 4.5 MMOL/L (ref 3.5–5.1)
PROT UR STRIP-MCNC: NEGATIVE MG/DL
RBC # BLD AUTO: 3.95 M/UL (ref 4.05–5.2)
RBC #/AREA URNS HPF: ABNORMAL /HPF
SODIUM SERPL-SCNC: 133 MMOL/L (ref 133–143)
SP GR UR REFRACTOMETRY: 1.01 (ref 1–1.02)
URINE CULTURE IF INDICATED: ABNORMAL
UROBILINOGEN UR QL STRIP.AUTO: 1 EU/DL (ref 0.2–1)
WBC # BLD AUTO: 25.9 K/UL (ref 4.3–11.1)
WBC URNS QL MICRO: ABNORMAL /HPF

## 2023-07-06 PROCEDURE — 6370000000 HC RX 637 (ALT 250 FOR IP): Performed by: FAMILY MEDICINE

## 2023-07-06 PROCEDURE — 6370000000 HC RX 637 (ALT 250 FOR IP): Performed by: INTERNAL MEDICINE

## 2023-07-06 PROCEDURE — 83735 ASSAY OF MAGNESIUM: CPT

## 2023-07-06 PROCEDURE — 6370000000 HC RX 637 (ALT 250 FOR IP): Performed by: PHYSICIAN ASSISTANT

## 2023-07-06 PROCEDURE — 80048 BASIC METABOLIC PNL TOTAL CA: CPT

## 2023-07-06 PROCEDURE — 2580000003 HC RX 258: Performed by: INTERNAL MEDICINE

## 2023-07-06 PROCEDURE — 82140 ASSAY OF AMMONIA: CPT

## 2023-07-06 PROCEDURE — 81001 URINALYSIS AUTO W/SCOPE: CPT

## 2023-07-06 PROCEDURE — 6360000002 HC RX W HCPCS: Performed by: INTERNAL MEDICINE

## 2023-07-06 PROCEDURE — 85025 COMPLETE CBC W/AUTO DIFF WBC: CPT

## 2023-07-06 PROCEDURE — 1100000000 HC RM PRIVATE

## 2023-07-06 PROCEDURE — 36415 COLL VENOUS BLD VENIPUNCTURE: CPT

## 2023-07-06 PROCEDURE — 71045 X-RAY EXAM CHEST 1 VIEW: CPT

## 2023-07-06 PROCEDURE — 87040 BLOOD CULTURE FOR BACTERIA: CPT

## 2023-07-06 PROCEDURE — 87641 MR-STAPH DNA AMP PROBE: CPT

## 2023-07-06 RX ADMIN — ACETAMINOPHEN 650 MG: 325 TABLET ORAL at 13:48

## 2023-07-06 RX ADMIN — LOSARTAN POTASSIUM 50 MG: 50 TABLET, FILM COATED ORAL at 08:19

## 2023-07-06 RX ADMIN — CEFEPIME 2000 MG: 2 INJECTION, POWDER, FOR SOLUTION INTRAVENOUS at 13:36

## 2023-07-06 RX ADMIN — VANCOMYCIN HYDROCHLORIDE 1000 MG: 1 INJECTION, POWDER, LYOPHILIZED, FOR SOLUTION INTRAVENOUS at 14:14

## 2023-07-06 RX ADMIN — PANCRELIPASE LIPASE, PANCRELIPASE PROTEASE, PANCRELIPASE AMYLASE 10000 UNITS: 5000; 17000; 24000 CAPSULE, DELAYED RELEASE ORAL at 08:19

## 2023-07-06 RX ADMIN — DONEPEZIL HYDROCHLORIDE 5 MG: 5 TABLET, FILM COATED ORAL at 21:51

## 2023-07-06 RX ADMIN — FLUTICASONE PROPIONATE 1 SPRAY: 50 SPRAY, METERED NASAL at 08:20

## 2023-07-06 RX ADMIN — OXYCODONE HYDROCHLORIDE 15 MG: 15 TABLET ORAL at 11:04

## 2023-07-06 RX ADMIN — OXYCODONE HYDROCHLORIDE 15 MG: 15 TABLET ORAL at 23:15

## 2023-07-06 RX ADMIN — PANCRELIPASE LIPASE, PANCRELIPASE PROTEASE, PANCRELIPASE AMYLASE 10000 UNITS: 5000; 17000; 24000 CAPSULE, DELAYED RELEASE ORAL at 11:04

## 2023-07-06 RX ADMIN — PANCRELIPASE LIPASE, PANCRELIPASE PROTEASE, PANCRELIPASE AMYLASE 10000 UNITS: 5000; 17000; 24000 CAPSULE, DELAYED RELEASE ORAL at 17:53

## 2023-07-06 ASSESSMENT — PAIN SCALES - GENERAL
PAINLEVEL_OUTOF10: 8
PAINLEVEL_OUTOF10: 6
PAINLEVEL_OUTOF10: 2
PAINLEVEL_OUTOF10: 0
PAINLEVEL_OUTOF10: 3

## 2023-07-06 ASSESSMENT — PAIN DESCRIPTION - DESCRIPTORS
DESCRIPTORS: ACHING;CRAMPING
DESCRIPTORS: DISCOMFORT
DESCRIPTORS: ACHING;CRAMPING

## 2023-07-06 ASSESSMENT — PAIN DESCRIPTION - ORIENTATION
ORIENTATION: UPPER
ORIENTATION: UPPER

## 2023-07-06 ASSESSMENT — PAIN DESCRIPTION - LOCATION
LOCATION: ABDOMEN

## 2023-07-06 ASSESSMENT — PAIN - FUNCTIONAL ASSESSMENT
PAIN_FUNCTIONAL_ASSESSMENT: PREVENTS OR INTERFERES SOME ACTIVE ACTIVITIES AND ADLS
PAIN_FUNCTIONAL_ASSESSMENT: PREVENTS OR INTERFERES SOME ACTIVE ACTIVITIES AND ADLS

## 2023-07-07 ENCOUNTER — APPOINTMENT (OUTPATIENT)
Dept: GENERAL RADIOLOGY | Age: 83
DRG: 438 | End: 2023-07-07
Payer: MEDICARE

## 2023-07-07 PROBLEM — J96.01 ACUTE RESPIRATORY FAILURE WITH HYPOXIA (HCC): Status: ACTIVE | Noted: 2023-07-07

## 2023-07-07 LAB
ANION GAP SERPL CALC-SCNC: 5 MMOL/L (ref 2–11)
BUN SERPL-MCNC: 10 MG/DL (ref 8–23)
CALCIUM SERPL-MCNC: 8.5 MG/DL (ref 8.3–10.4)
CHLORIDE SERPL-SCNC: 106 MMOL/L (ref 101–110)
CO2 SERPL-SCNC: 27 MMOL/L (ref 21–32)
CREAT SERPL-MCNC: 0.5 MG/DL (ref 0.6–1)
GLUCOSE SERPL-MCNC: 93 MG/DL (ref 65–100)
MAGNESIUM SERPL-MCNC: 1.7 MG/DL (ref 1.8–2.4)
MRSA DNA SPEC QL NAA+PROBE: NOT DETECTED
NT PRO BNP: ABNORMAL PG/ML
POTASSIUM SERPL-SCNC: 4 MMOL/L (ref 3.5–5.1)
S AUREUS CPE NOSE QL NAA+PROBE: NOT DETECTED
SODIUM SERPL-SCNC: 138 MMOL/L (ref 133–143)
VANCOMYCIN SERPL-MCNC: 12.3 UG/ML
WBC # BLD AUTO: 22.9 K/UL (ref 4.3–11.1)

## 2023-07-07 PROCEDURE — 83880 ASSAY OF NATRIURETIC PEPTIDE: CPT

## 2023-07-07 PROCEDURE — 6370000000 HC RX 637 (ALT 250 FOR IP): Performed by: PHYSICIAN ASSISTANT

## 2023-07-07 PROCEDURE — 97112 NEUROMUSCULAR REEDUCATION: CPT

## 2023-07-07 PROCEDURE — 2500000003 HC RX 250 WO HCPCS: Performed by: PHYSICIAN ASSISTANT

## 2023-07-07 PROCEDURE — 97530 THERAPEUTIC ACTIVITIES: CPT

## 2023-07-07 PROCEDURE — 83735 ASSAY OF MAGNESIUM: CPT

## 2023-07-07 PROCEDURE — 97535 SELF CARE MNGMENT TRAINING: CPT

## 2023-07-07 PROCEDURE — 36415 COLL VENOUS BLD VENIPUNCTURE: CPT

## 2023-07-07 PROCEDURE — 71045 X-RAY EXAM CHEST 1 VIEW: CPT

## 2023-07-07 PROCEDURE — 80048 BASIC METABOLIC PNL TOTAL CA: CPT

## 2023-07-07 PROCEDURE — 80202 ASSAY OF VANCOMYCIN: CPT

## 2023-07-07 PROCEDURE — 2580000003 HC RX 258: Performed by: INTERNAL MEDICINE

## 2023-07-07 PROCEDURE — 1100000000 HC RM PRIVATE

## 2023-07-07 PROCEDURE — 6370000000 HC RX 637 (ALT 250 FOR IP): Performed by: INTERNAL MEDICINE

## 2023-07-07 PROCEDURE — 2500000003 HC RX 250 WO HCPCS: Performed by: INTERNAL MEDICINE

## 2023-07-07 PROCEDURE — 85048 AUTOMATED LEUKOCYTE COUNT: CPT

## 2023-07-07 PROCEDURE — 6360000002 HC RX W HCPCS: Performed by: INTERNAL MEDICINE

## 2023-07-07 RX ORDER — GUAIFENESIN 600 MG/1
600 TABLET, EXTENDED RELEASE ORAL 2 TIMES DAILY
Status: DISCONTINUED | OUTPATIENT
Start: 2023-07-07 | End: 2023-07-11

## 2023-07-07 RX ORDER — MAGNESIUM SULFATE IN WATER 40 MG/ML
2000 INJECTION, SOLUTION INTRAVENOUS ONCE
Status: COMPLETED | OUTPATIENT
Start: 2023-07-07 | End: 2023-07-07

## 2023-07-07 RX ORDER — FUROSEMIDE 10 MG/ML
20 INJECTION INTRAMUSCULAR; INTRAVENOUS ONCE
Status: COMPLETED | OUTPATIENT
Start: 2023-07-07 | End: 2023-07-07

## 2023-07-07 RX ADMIN — PANCRELIPASE LIPASE, PANCRELIPASE PROTEASE, PANCRELIPASE AMYLASE 10000 UNITS: 5000; 17000; 24000 CAPSULE, DELAYED RELEASE ORAL at 08:28

## 2023-07-07 RX ADMIN — FLUTICASONE PROPIONATE 1 SPRAY: 50 SPRAY, METERED NASAL at 08:28

## 2023-07-07 RX ADMIN — OXYCODONE HYDROCHLORIDE 15 MG: 15 TABLET ORAL at 12:28

## 2023-07-07 RX ADMIN — FUROSEMIDE 20 MG: 10 INJECTION, SOLUTION INTRAMUSCULAR; INTRAVENOUS at 15:36

## 2023-07-07 RX ADMIN — PANCRELIPASE LIPASE, PANCRELIPASE PROTEASE, PANCRELIPASE AMYLASE 10000 UNITS: 5000; 17000; 24000 CAPSULE, DELAYED RELEASE ORAL at 12:18

## 2023-07-07 RX ADMIN — MAGNESIUM SULFATE HEPTAHYDRATE 2000 MG: 40 INJECTION, SOLUTION INTRAVENOUS at 15:36

## 2023-07-07 RX ADMIN — HYDROMORPHONE HYDROCHLORIDE 0.25 MG: 1 INJECTION, SOLUTION INTRAMUSCULAR; INTRAVENOUS; SUBCUTANEOUS at 01:37

## 2023-07-07 RX ADMIN — GUAIFENESIN 600 MG: 600 TABLET ORAL at 21:59

## 2023-07-07 RX ADMIN — CEFEPIME 2000 MG: 2 INJECTION, POWDER, FOR SOLUTION INTRAVENOUS at 01:20

## 2023-07-07 RX ADMIN — GUAIFENESIN 600 MG: 600 TABLET ORAL at 14:53

## 2023-07-07 RX ADMIN — LOSARTAN POTASSIUM 50 MG: 50 TABLET, FILM COATED ORAL at 08:28

## 2023-07-07 RX ADMIN — VANCOMYCIN HYDROCHLORIDE 500 MG: 500 INJECTION, POWDER, LYOPHILIZED, FOR SOLUTION INTRAVENOUS at 02:26

## 2023-07-07 RX ADMIN — PANCRELIPASE LIPASE, PANCRELIPASE PROTEASE, PANCRELIPASE AMYLASE 10000 UNITS: 5000; 17000; 24000 CAPSULE, DELAYED RELEASE ORAL at 16:48

## 2023-07-07 RX ADMIN — CEFEPIME 2000 MG: 2 INJECTION, POWDER, FOR SOLUTION INTRAVENOUS at 12:19

## 2023-07-07 RX ADMIN — TUBERCULIN PURIFIED PROTEIN DERIVATIVE 5 UNITS: 5 INJECTION, SOLUTION INTRADERMAL at 14:53

## 2023-07-07 RX ADMIN — DONEPEZIL HYDROCHLORIDE 5 MG: 5 TABLET, FILM COATED ORAL at 21:59

## 2023-07-07 ASSESSMENT — PAIN DESCRIPTION - DESCRIPTORS: DESCRIPTORS: ACHING;DISCOMFORT

## 2023-07-07 ASSESSMENT — PAIN DESCRIPTION - ORIENTATION: ORIENTATION: RIGHT;UPPER

## 2023-07-07 ASSESSMENT — PAIN DESCRIPTION - LOCATION: LOCATION: ABDOMEN

## 2023-07-07 ASSESSMENT — PAIN SCALES - GENERAL: PAINLEVEL_OUTOF10: 8

## 2023-07-07 ASSESSMENT — PAIN - FUNCTIONAL ASSESSMENT: PAIN_FUNCTIONAL_ASSESSMENT: ACTIVITIES ARE NOT PREVENTED

## 2023-07-08 LAB
ANION GAP SERPL CALC-SCNC: 5 MMOL/L (ref 2–11)
BASOPHILS # BLD: 0 K/UL (ref 0–0.2)
BASOPHILS NFR BLD: 0 % (ref 0–2)
BUN SERPL-MCNC: 12 MG/DL (ref 8–23)
CALCIUM SERPL-MCNC: 8.2 MG/DL (ref 8.3–10.4)
CHLORIDE SERPL-SCNC: 106 MMOL/L (ref 101–110)
CO2 SERPL-SCNC: 27 MMOL/L (ref 21–32)
CREAT SERPL-MCNC: 0.6 MG/DL (ref 0.6–1)
DIFFERENTIAL METHOD BLD: ABNORMAL
EOSINOPHIL # BLD: 0 K/UL (ref 0–0.8)
EOSINOPHIL NFR BLD: 0 % (ref 0.5–7.8)
ERYTHROCYTE [DISTWIDTH] IN BLOOD BY AUTOMATED COUNT: 18.1 % (ref 11.9–14.6)
GLUCOSE SERPL-MCNC: 81 MG/DL (ref 65–100)
HCT VFR BLD AUTO: 29.8 % (ref 35.8–46.3)
HGB BLD-MCNC: 9.6 G/DL (ref 11.7–15.4)
IMM GRANULOCYTES # BLD AUTO: 0.2 K/UL (ref 0–0.5)
IMM GRANULOCYTES NFR BLD AUTO: 1 % (ref 0–5)
LYMPHOCYTES # BLD: 0.7 K/UL (ref 0.5–4.6)
LYMPHOCYTES NFR BLD: 3 % (ref 13–44)
MAGNESIUM SERPL-MCNC: 2 MG/DL (ref 1.8–2.4)
MCH RBC QN AUTO: 25.5 PG (ref 26.1–32.9)
MCHC RBC AUTO-ENTMCNC: 32.2 G/DL (ref 31.4–35)
MCV RBC AUTO: 79 FL (ref 82–102)
MM INDURATION, POC: 0 MM (ref 0–5)
MONOCYTES # BLD: 0.6 K/UL (ref 0.1–1.3)
MONOCYTES NFR BLD: 3 % (ref 4–12)
NEUTS SEG # BLD: 19.7 K/UL (ref 1.7–8.2)
NEUTS SEG NFR BLD: 93 % (ref 43–78)
NRBC # BLD: 0 K/UL (ref 0–0.2)
PLATELET # BLD AUTO: 161 K/UL (ref 150–450)
PMV BLD AUTO: 11.3 FL (ref 9.4–12.3)
POTASSIUM SERPL-SCNC: 3.4 MMOL/L (ref 3.5–5.1)
PPD, POC: NEGATIVE
RBC # BLD AUTO: 3.77 M/UL (ref 4.05–5.2)
SODIUM SERPL-SCNC: 138 MMOL/L (ref 133–143)
WBC # BLD AUTO: 21.2 K/UL (ref 4.3–11.1)

## 2023-07-08 PROCEDURE — 85025 COMPLETE CBC W/AUTO DIFF WBC: CPT

## 2023-07-08 PROCEDURE — 6370000000 HC RX 637 (ALT 250 FOR IP): Performed by: INTERNAL MEDICINE

## 2023-07-08 PROCEDURE — 2500000003 HC RX 250 WO HCPCS: Performed by: PHYSICIAN ASSISTANT

## 2023-07-08 PROCEDURE — 87205 SMEAR GRAM STAIN: CPT

## 2023-07-08 PROCEDURE — 6360000002 HC RX W HCPCS: Performed by: INTERNAL MEDICINE

## 2023-07-08 PROCEDURE — 94760 N-INVAS EAR/PLS OXIMETRY 1: CPT

## 2023-07-08 PROCEDURE — 2580000003 HC RX 258: Performed by: INTERNAL MEDICINE

## 2023-07-08 PROCEDURE — 6370000000 HC RX 637 (ALT 250 FOR IP): Performed by: PHYSICIAN ASSISTANT

## 2023-07-08 PROCEDURE — 36415 COLL VENOUS BLD VENIPUNCTURE: CPT

## 2023-07-08 PROCEDURE — 87070 CULTURE OTHR SPECIMN AEROBIC: CPT

## 2023-07-08 PROCEDURE — 83735 ASSAY OF MAGNESIUM: CPT

## 2023-07-08 PROCEDURE — 1100000000 HC RM PRIVATE

## 2023-07-08 PROCEDURE — 80048 BASIC METABOLIC PNL TOTAL CA: CPT

## 2023-07-08 PROCEDURE — 2700000000 HC OXYGEN THERAPY PER DAY

## 2023-07-08 RX ORDER — FUROSEMIDE 10 MG/ML
40 INJECTION INTRAMUSCULAR; INTRAVENOUS ONCE
Status: COMPLETED | OUTPATIENT
Start: 2023-07-08 | End: 2023-07-08

## 2023-07-08 RX ORDER — POTASSIUM CHLORIDE 20 MEQ/1
40 TABLET, EXTENDED RELEASE ORAL 2 TIMES DAILY WITH MEALS
Status: COMPLETED | OUTPATIENT
Start: 2023-07-08 | End: 2023-07-09

## 2023-07-08 RX ADMIN — PANCRELIPASE LIPASE, PANCRELIPASE PROTEASE, PANCRELIPASE AMYLASE 10000 UNITS: 5000; 17000; 24000 CAPSULE, DELAYED RELEASE ORAL at 17:19

## 2023-07-08 RX ADMIN — PANCRELIPASE LIPASE, PANCRELIPASE PROTEASE, PANCRELIPASE AMYLASE 10000 UNITS: 5000; 17000; 24000 CAPSULE, DELAYED RELEASE ORAL at 12:00

## 2023-07-08 RX ADMIN — GUAIFENESIN 600 MG: 600 TABLET ORAL at 19:33

## 2023-07-08 RX ADMIN — HYDROMORPHONE HYDROCHLORIDE 0.25 MG: 1 INJECTION, SOLUTION INTRAMUSCULAR; INTRAVENOUS; SUBCUTANEOUS at 18:04

## 2023-07-08 RX ADMIN — FUROSEMIDE 40 MG: 10 INJECTION, SOLUTION INTRAMUSCULAR; INTRAVENOUS at 09:00

## 2023-07-08 RX ADMIN — DONEPEZIL HYDROCHLORIDE 5 MG: 5 TABLET, FILM COATED ORAL at 19:32

## 2023-07-08 RX ADMIN — ONDANSETRON 4 MG: 2 INJECTION INTRAMUSCULAR; INTRAVENOUS at 19:32

## 2023-07-08 RX ADMIN — GUAIFENESIN 600 MG: 600 TABLET ORAL at 07:34

## 2023-07-08 RX ADMIN — OXYCODONE HYDROCHLORIDE 15 MG: 15 TABLET ORAL at 13:36

## 2023-07-08 RX ADMIN — PANCRELIPASE LIPASE, PANCRELIPASE PROTEASE, PANCRELIPASE AMYLASE 10000 UNITS: 5000; 17000; 24000 CAPSULE, DELAYED RELEASE ORAL at 07:34

## 2023-07-08 RX ADMIN — LOSARTAN POTASSIUM 50 MG: 50 TABLET, FILM COATED ORAL at 07:34

## 2023-07-08 RX ADMIN — CEFEPIME 2000 MG: 2 INJECTION, POWDER, FOR SOLUTION INTRAVENOUS at 00:49

## 2023-07-08 RX ADMIN — CEFEPIME 2000 MG: 2 INJECTION, POWDER, FOR SOLUTION INTRAVENOUS at 13:24

## 2023-07-08 RX ADMIN — POTASSIUM CHLORIDE 40 MEQ: 1500 TABLET, EXTENDED RELEASE ORAL at 17:19

## 2023-07-08 RX ADMIN — FLUTICASONE PROPIONATE 1 SPRAY: 50 SPRAY, METERED NASAL at 07:30

## 2023-07-08 ASSESSMENT — PAIN - FUNCTIONAL ASSESSMENT
PAIN_FUNCTIONAL_ASSESSMENT: PREVENTS OR INTERFERES SOME ACTIVE ACTIVITIES AND ADLS
PAIN_FUNCTIONAL_ASSESSMENT: ACTIVITIES ARE NOT PREVENTED

## 2023-07-08 ASSESSMENT — PAIN DESCRIPTION - LOCATION
LOCATION: ABDOMEN
LOCATION: ABDOMEN

## 2023-07-08 ASSESSMENT — PAIN DESCRIPTION - ORIENTATION
ORIENTATION: LEFT;UPPER
ORIENTATION: LEFT;RIGHT;MID

## 2023-07-08 ASSESSMENT — PAIN SCALES - GENERAL
PAINLEVEL_OUTOF10: 8
PAINLEVEL_OUTOF10: 7
PAINLEVEL_OUTOF10: 0

## 2023-07-08 ASSESSMENT — PAIN DESCRIPTION - DESCRIPTORS
DESCRIPTORS: STABBING;SHARP
DESCRIPTORS: ACHING;STABBING

## 2023-07-09 LAB
ANION GAP SERPL CALC-SCNC: 7 MMOL/L (ref 2–11)
BASOPHILS # BLD: 0 K/UL (ref 0–0.2)
BASOPHILS NFR BLD: 0 % (ref 0–2)
BUN SERPL-MCNC: 12 MG/DL (ref 8–23)
CALCIUM SERPL-MCNC: 8 MG/DL (ref 8.3–10.4)
CHLORIDE SERPL-SCNC: 105 MMOL/L (ref 101–110)
CO2 SERPL-SCNC: 26 MMOL/L (ref 21–32)
CREAT SERPL-MCNC: 0.5 MG/DL (ref 0.6–1)
DIFFERENTIAL METHOD BLD: ABNORMAL
EOSINOPHIL # BLD: 0.1 K/UL (ref 0–0.8)
EOSINOPHIL NFR BLD: 0 % (ref 0.5–7.8)
ERYTHROCYTE [DISTWIDTH] IN BLOOD BY AUTOMATED COUNT: 18.1 % (ref 11.9–14.6)
GLUCOSE SERPL-MCNC: 88 MG/DL (ref 65–100)
HCT VFR BLD AUTO: 29.8 % (ref 35.8–46.3)
HGB BLD-MCNC: 9.6 G/DL (ref 11.7–15.4)
IMM GRANULOCYTES # BLD AUTO: 0.1 K/UL (ref 0–0.5)
IMM GRANULOCYTES NFR BLD AUTO: 1 % (ref 0–5)
LYMPHOCYTES # BLD: 0.7 K/UL (ref 0.5–4.6)
LYMPHOCYTES NFR BLD: 4 % (ref 13–44)
MAGNESIUM SERPL-MCNC: 1.6 MG/DL (ref 1.8–2.4)
MCH RBC QN AUTO: 25.6 PG (ref 26.1–32.9)
MCHC RBC AUTO-ENTMCNC: 32.2 G/DL (ref 31.4–35)
MCV RBC AUTO: 79.5 FL (ref 82–102)
MM INDURATION, POC: 0 MM (ref 0–5)
MONOCYTES # BLD: 0.6 K/UL (ref 0.1–1.3)
MONOCYTES NFR BLD: 4 % (ref 4–12)
NEUTS SEG # BLD: 14 K/UL (ref 1.7–8.2)
NEUTS SEG NFR BLD: 91 % (ref 43–78)
NRBC # BLD: 0 K/UL (ref 0–0.2)
PLATELET # BLD AUTO: 190 K/UL (ref 150–450)
PMV BLD AUTO: 10.7 FL (ref 9.4–12.3)
POTASSIUM SERPL-SCNC: 3.2 MMOL/L (ref 3.5–5.1)
PPD, POC: NEGATIVE
RBC # BLD AUTO: 3.75 M/UL (ref 4.05–5.2)
SODIUM SERPL-SCNC: 138 MMOL/L (ref 133–143)
WBC # BLD AUTO: 15.5 K/UL (ref 4.3–11.1)

## 2023-07-09 PROCEDURE — 6370000000 HC RX 637 (ALT 250 FOR IP): Performed by: PHYSICIAN ASSISTANT

## 2023-07-09 PROCEDURE — 6360000002 HC RX W HCPCS: Performed by: INTERNAL MEDICINE

## 2023-07-09 PROCEDURE — 83735 ASSAY OF MAGNESIUM: CPT

## 2023-07-09 PROCEDURE — 6370000000 HC RX 637 (ALT 250 FOR IP): Performed by: INTERNAL MEDICINE

## 2023-07-09 PROCEDURE — 94760 N-INVAS EAR/PLS OXIMETRY 1: CPT

## 2023-07-09 PROCEDURE — 2500000003 HC RX 250 WO HCPCS: Performed by: PHYSICIAN ASSISTANT

## 2023-07-09 PROCEDURE — 2700000000 HC OXYGEN THERAPY PER DAY

## 2023-07-09 PROCEDURE — 1100000000 HC RM PRIVATE

## 2023-07-09 PROCEDURE — 85025 COMPLETE CBC W/AUTO DIFF WBC: CPT

## 2023-07-09 PROCEDURE — 80048 BASIC METABOLIC PNL TOTAL CA: CPT

## 2023-07-09 PROCEDURE — 2580000003 HC RX 258: Performed by: INTERNAL MEDICINE

## 2023-07-09 PROCEDURE — 36415 COLL VENOUS BLD VENIPUNCTURE: CPT

## 2023-07-09 RX ORDER — FUROSEMIDE 10 MG/ML
40 INJECTION INTRAMUSCULAR; INTRAVENOUS ONCE
Status: COMPLETED | OUTPATIENT
Start: 2023-07-09 | End: 2023-07-09

## 2023-07-09 RX ORDER — LOPERAMIDE HYDROCHLORIDE 2 MG/1
2 CAPSULE ORAL 4 TIMES DAILY PRN
Status: DISCONTINUED | OUTPATIENT
Start: 2023-07-09 | End: 2023-07-14 | Stop reason: HOSPADM

## 2023-07-09 RX ORDER — MAGNESIUM SULFATE IN WATER 40 MG/ML
2000 INJECTION, SOLUTION INTRAVENOUS ONCE
Status: COMPLETED | OUTPATIENT
Start: 2023-07-09 | End: 2023-07-09

## 2023-07-09 RX ORDER — POTASSIUM CHLORIDE 20 MEQ/1
40 TABLET, EXTENDED RELEASE ORAL
Status: COMPLETED | OUTPATIENT
Start: 2023-07-09 | End: 2023-07-09

## 2023-07-09 RX ADMIN — OXYCODONE HYDROCHLORIDE 15 MG: 15 TABLET ORAL at 21:52

## 2023-07-09 RX ADMIN — LOSARTAN POTASSIUM 50 MG: 50 TABLET, FILM COATED ORAL at 08:52

## 2023-07-09 RX ADMIN — OXYCODONE HYDROCHLORIDE 15 MG: 15 TABLET ORAL at 09:16

## 2023-07-09 RX ADMIN — GUAIFENESIN 600 MG: 600 TABLET ORAL at 08:52

## 2023-07-09 RX ADMIN — CEFEPIME 2000 MG: 2 INJECTION, POWDER, FOR SOLUTION INTRAVENOUS at 12:41

## 2023-07-09 RX ADMIN — PANCRELIPASE LIPASE, PANCRELIPASE PROTEASE, PANCRELIPASE AMYLASE 10000 UNITS: 5000; 17000; 24000 CAPSULE, DELAYED RELEASE ORAL at 16:50

## 2023-07-09 RX ADMIN — FUROSEMIDE 40 MG: 10 INJECTION, SOLUTION INTRAMUSCULAR; INTRAVENOUS at 08:58

## 2023-07-09 RX ADMIN — DONEPEZIL HYDROCHLORIDE 5 MG: 5 TABLET, FILM COATED ORAL at 21:35

## 2023-07-09 RX ADMIN — POTASSIUM CHLORIDE 40 MEQ: 1500 TABLET, EXTENDED RELEASE ORAL at 14:27

## 2023-07-09 RX ADMIN — GUAIFENESIN 600 MG: 600 TABLET ORAL at 21:35

## 2023-07-09 RX ADMIN — PANCRELIPASE LIPASE, PANCRELIPASE PROTEASE, PANCRELIPASE AMYLASE 10000 UNITS: 5000; 17000; 24000 CAPSULE, DELAYED RELEASE ORAL at 12:41

## 2023-07-09 RX ADMIN — POTASSIUM CHLORIDE 40 MEQ: 1500 TABLET, EXTENDED RELEASE ORAL at 08:51

## 2023-07-09 RX ADMIN — FLUTICASONE PROPIONATE 1 SPRAY: 50 SPRAY, METERED NASAL at 08:26

## 2023-07-09 RX ADMIN — PANCRELIPASE LIPASE, PANCRELIPASE PROTEASE, PANCRELIPASE AMYLASE 10000 UNITS: 5000; 17000; 24000 CAPSULE, DELAYED RELEASE ORAL at 08:51

## 2023-07-09 RX ADMIN — MAGNESIUM SULFATE HEPTAHYDRATE 2000 MG: 40 INJECTION, SOLUTION INTRAVENOUS at 14:28

## 2023-07-09 RX ADMIN — HYDROMORPHONE HYDROCHLORIDE 0.25 MG: 1 INJECTION, SOLUTION INTRAMUSCULAR; INTRAVENOUS; SUBCUTANEOUS at 12:48

## 2023-07-09 RX ADMIN — CEFEPIME 2000 MG: 2 INJECTION, POWDER, FOR SOLUTION INTRAVENOUS at 01:25

## 2023-07-09 RX ADMIN — POTASSIUM CHLORIDE 40 MEQ: 1500 TABLET, EXTENDED RELEASE ORAL at 16:50

## 2023-07-09 ASSESSMENT — PAIN DESCRIPTION - ORIENTATION
ORIENTATION: RIGHT;LEFT;UPPER
ORIENTATION: RIGHT;LEFT
ORIENTATION: LEFT;MID;UPPER;RIGHT

## 2023-07-09 ASSESSMENT — PAIN DESCRIPTION - DESCRIPTORS
DESCRIPTORS: ACHING;DISCOMFORT
DESCRIPTORS: JABBING;CRAMPING
DESCRIPTORS: THROBBING;STABBING;SHOOTING

## 2023-07-09 ASSESSMENT — PAIN DESCRIPTION - LOCATION
LOCATION: ABDOMEN
LOCATION: BACK
LOCATION: ABDOMEN

## 2023-07-09 ASSESSMENT — PAIN - FUNCTIONAL ASSESSMENT
PAIN_FUNCTIONAL_ASSESSMENT: ACTIVITIES ARE NOT PREVENTED
PAIN_FUNCTIONAL_ASSESSMENT: PREVENTS OR INTERFERES SOME ACTIVE ACTIVITIES AND ADLS
PAIN_FUNCTIONAL_ASSESSMENT: PREVENTS OR INTERFERES SOME ACTIVE ACTIVITIES AND ADLS

## 2023-07-09 ASSESSMENT — PAIN DESCRIPTION - PAIN TYPE: TYPE: ACUTE PAIN

## 2023-07-09 ASSESSMENT — PAIN DESCRIPTION - ONSET: ONSET: ON-GOING

## 2023-07-09 ASSESSMENT — PAIN SCALES - GENERAL
PAINLEVEL_OUTOF10: 6
PAINLEVEL_OUTOF10: 7
PAINLEVEL_OUTOF10: 3
PAINLEVEL_OUTOF10: 0
PAINLEVEL_OUTOF10: 0
PAINLEVEL_OUTOF10: 7

## 2023-07-09 ASSESSMENT — PAIN DESCRIPTION - FREQUENCY: FREQUENCY: INTERMITTENT

## 2023-07-10 LAB
ANION GAP SERPL CALC-SCNC: 2 MMOL/L (ref 2–11)
BACTERIA SPEC CULT: NORMAL
BASOPHILS # BLD: 0 K/UL (ref 0–0.2)
BASOPHILS NFR BLD: 0 % (ref 0–2)
BUN SERPL-MCNC: 10 MG/DL (ref 8–23)
CALCIUM SERPL-MCNC: 8 MG/DL (ref 8.3–10.4)
CHLORIDE SERPL-SCNC: 105 MMOL/L (ref 101–110)
CO2 SERPL-SCNC: 30 MMOL/L (ref 21–32)
CREAT SERPL-MCNC: 0.6 MG/DL (ref 0.6–1)
DIFFERENTIAL METHOD BLD: ABNORMAL
EOSINOPHIL # BLD: 0.1 K/UL (ref 0–0.8)
EOSINOPHIL NFR BLD: 0 % (ref 0.5–7.8)
ERYTHROCYTE [DISTWIDTH] IN BLOOD BY AUTOMATED COUNT: 17.8 % (ref 11.9–14.6)
GLUCOSE SERPL-MCNC: 106 MG/DL (ref 65–100)
GRAM STN SPEC: NORMAL
HCT VFR BLD AUTO: 28.8 % (ref 35.8–46.3)
HGB BLD-MCNC: 9 G/DL (ref 11.7–15.4)
IMM GRANULOCYTES # BLD AUTO: 0.1 K/UL (ref 0–0.5)
IMM GRANULOCYTES NFR BLD AUTO: 1 % (ref 0–5)
LYMPHOCYTES # BLD: 0.6 K/UL (ref 0.5–4.6)
LYMPHOCYTES NFR BLD: 4 % (ref 13–44)
MCH RBC QN AUTO: 25.3 PG (ref 26.1–32.9)
MCHC RBC AUTO-ENTMCNC: 31.3 G/DL (ref 31.4–35)
MCV RBC AUTO: 80.9 FL (ref 82–102)
MONOCYTES # BLD: 0.5 K/UL (ref 0.1–1.3)
MONOCYTES NFR BLD: 3 % (ref 4–12)
NEUTS SEG # BLD: 15.1 K/UL (ref 1.7–8.2)
NEUTS SEG NFR BLD: 92 % (ref 43–78)
NRBC # BLD: 0 K/UL (ref 0–0.2)
PLATELET # BLD AUTO: 187 K/UL (ref 150–450)
PMV BLD AUTO: 10.9 FL (ref 9.4–12.3)
POTASSIUM SERPL-SCNC: 3.9 MMOL/L (ref 3.5–5.1)
RBC # BLD AUTO: 3.56 M/UL (ref 4.05–5.2)
SERVICE CMNT-IMP: NORMAL
SODIUM SERPL-SCNC: 137 MMOL/L (ref 133–143)
WBC # BLD AUTO: 16.5 K/UL (ref 4.3–11.1)

## 2023-07-10 PROCEDURE — 6360000002 HC RX W HCPCS: Performed by: INTERNAL MEDICINE

## 2023-07-10 PROCEDURE — 6370000000 HC RX 637 (ALT 250 FOR IP): Performed by: INTERNAL MEDICINE

## 2023-07-10 PROCEDURE — 6370000000 HC RX 637 (ALT 250 FOR IP): Performed by: PHYSICIAN ASSISTANT

## 2023-07-10 PROCEDURE — 1100000000 HC RM PRIVATE

## 2023-07-10 PROCEDURE — 85025 COMPLETE CBC W/AUTO DIFF WBC: CPT

## 2023-07-10 PROCEDURE — 2700000000 HC OXYGEN THERAPY PER DAY

## 2023-07-10 PROCEDURE — 97530 THERAPEUTIC ACTIVITIES: CPT

## 2023-07-10 PROCEDURE — 2580000003 HC RX 258: Performed by: INTERNAL MEDICINE

## 2023-07-10 PROCEDURE — 80048 BASIC METABOLIC PNL TOTAL CA: CPT

## 2023-07-10 PROCEDURE — 36415 COLL VENOUS BLD VENIPUNCTURE: CPT

## 2023-07-10 RX ORDER — FUROSEMIDE 10 MG/ML
40 INJECTION INTRAMUSCULAR; INTRAVENOUS DAILY
Status: DISCONTINUED | OUTPATIENT
Start: 2023-07-10 | End: 2023-07-11

## 2023-07-10 RX ORDER — POTASSIUM CHLORIDE 20 MEQ/1
40 TABLET, EXTENDED RELEASE ORAL ONCE
Status: COMPLETED | OUTPATIENT
Start: 2023-07-10 | End: 2023-07-10

## 2023-07-10 RX ADMIN — PANCRELIPASE LIPASE, PANCRELIPASE PROTEASE, PANCRELIPASE AMYLASE 10000 UNITS: 5000; 17000; 24000 CAPSULE, DELAYED RELEASE ORAL at 08:12

## 2023-07-10 RX ADMIN — DONEPEZIL HYDROCHLORIDE 5 MG: 5 TABLET, FILM COATED ORAL at 20:36

## 2023-07-10 RX ADMIN — POTASSIUM CHLORIDE 40 MEQ: 1500 TABLET, EXTENDED RELEASE ORAL at 14:09

## 2023-07-10 RX ADMIN — PANCRELIPASE LIPASE, PANCRELIPASE PROTEASE, PANCRELIPASE AMYLASE 10000 UNITS: 5000; 17000; 24000 CAPSULE, DELAYED RELEASE ORAL at 16:46

## 2023-07-10 RX ADMIN — PANCRELIPASE LIPASE, PANCRELIPASE PROTEASE, PANCRELIPASE AMYLASE 10000 UNITS: 5000; 17000; 24000 CAPSULE, DELAYED RELEASE ORAL at 12:02

## 2023-07-10 RX ADMIN — CEFEPIME 2000 MG: 2 INJECTION, POWDER, FOR SOLUTION INTRAVENOUS at 12:02

## 2023-07-10 RX ADMIN — LOSARTAN POTASSIUM 50 MG: 50 TABLET, FILM COATED ORAL at 08:13

## 2023-07-10 RX ADMIN — CEFEPIME 2000 MG: 2 INJECTION, POWDER, FOR SOLUTION INTRAVENOUS at 01:28

## 2023-07-10 RX ADMIN — FUROSEMIDE 40 MG: 10 INJECTION, SOLUTION INTRAMUSCULAR; INTRAVENOUS at 14:09

## 2023-07-10 RX ADMIN — OXYCODONE HYDROCHLORIDE 15 MG: 15 TABLET ORAL at 20:36

## 2023-07-10 RX ADMIN — OXYCODONE HYDROCHLORIDE 15 MG: 15 TABLET ORAL at 08:21

## 2023-07-10 RX ADMIN — GUAIFENESIN 600 MG: 600 TABLET ORAL at 08:13

## 2023-07-10 RX ADMIN — GUAIFENESIN 600 MG: 600 TABLET ORAL at 20:36

## 2023-07-10 RX ADMIN — FLUTICASONE PROPIONATE 1 SPRAY: 50 SPRAY, METERED NASAL at 08:12

## 2023-07-10 ASSESSMENT — PAIN DESCRIPTION - ONSET
ONSET: ON-GOING
ONSET: GRADUAL

## 2023-07-10 ASSESSMENT — PAIN SCALES - GENERAL
PAINLEVEL_OUTOF10: 10
PAINLEVEL_OUTOF10: 0
PAINLEVEL_OUTOF10: 4

## 2023-07-10 ASSESSMENT — PAIN DESCRIPTION - PAIN TYPE
TYPE: CHRONIC PAIN
TYPE: CHRONIC PAIN

## 2023-07-10 ASSESSMENT — PAIN DESCRIPTION - DESCRIPTORS
DESCRIPTORS: ACHING
DESCRIPTORS: THROBBING

## 2023-07-10 ASSESSMENT — PAIN DESCRIPTION - ORIENTATION
ORIENTATION: POSTERIOR;LOWER
ORIENTATION: RIGHT;LEFT;UPPER

## 2023-07-10 ASSESSMENT — PAIN DESCRIPTION - LOCATION
LOCATION: ABDOMEN
LOCATION: BACK

## 2023-07-10 ASSESSMENT — PAIN DESCRIPTION - FREQUENCY
FREQUENCY: CONTINUOUS
FREQUENCY: CONTINUOUS

## 2023-07-10 NOTE — CARE COORDINATION
LOS 12d   RNCM spoke with daughter Guru Larios and patient in room 526 about [facility]   Patient is agreeable to [facility] choices list within [facility] given to patient and daughter. CM asked that at least 3 choices be made in the event  no beds. Discharge plan is [facility]  Pending insurance approval and bed availability at [facility] facility of patient choice. Will continue to follow for discharge planning needs  Please consult  if any new issues arise.     8550 Referral sent to the following [facility] facilities  [facility] Acute  Referral sent via EMR ([address])

## 2023-07-11 ENCOUNTER — APPOINTMENT (OUTPATIENT)
Dept: GENERAL RADIOLOGY | Age: 83
DRG: 438 | End: 2023-07-11
Payer: MEDICARE

## 2023-07-11 LAB
ANION GAP SERPL CALC-SCNC: 4 MMOL/L (ref 2–11)
BACTERIA SPEC CULT: NORMAL
BACTERIA SPEC CULT: NORMAL
BASOPHILS # BLD: 0 K/UL (ref 0–0.2)
BASOPHILS NFR BLD: 0 % (ref 0–2)
BUN SERPL-MCNC: 10 MG/DL (ref 8–23)
CALCIUM SERPL-MCNC: 8.1 MG/DL (ref 8.3–10.4)
CHLORIDE SERPL-SCNC: 103 MMOL/L (ref 101–110)
CO2 SERPL-SCNC: 30 MMOL/L (ref 21–32)
CREAT SERPL-MCNC: 0.5 MG/DL (ref 0.6–1)
DIFFERENTIAL METHOD BLD: ABNORMAL
EOSINOPHIL # BLD: 0.1 K/UL (ref 0–0.8)
EOSINOPHIL NFR BLD: 1 % (ref 0.5–7.8)
ERYTHROCYTE [DISTWIDTH] IN BLOOD BY AUTOMATED COUNT: 17.6 % (ref 11.9–14.6)
GLUCOSE SERPL-MCNC: 88 MG/DL (ref 65–100)
HCT VFR BLD AUTO: 27.3 % (ref 35.8–46.3)
HGB BLD-MCNC: 8.6 G/DL (ref 11.7–15.4)
IMM GRANULOCYTES # BLD AUTO: 0.1 K/UL (ref 0–0.5)
IMM GRANULOCYTES NFR BLD AUTO: 1 % (ref 0–5)
LYMPHOCYTES # BLD: 0.7 K/UL (ref 0.5–4.6)
LYMPHOCYTES NFR BLD: 5 % (ref 13–44)
MAGNESIUM SERPL-MCNC: 1.7 MG/DL (ref 1.8–2.4)
MCH RBC QN AUTO: 25.1 PG (ref 26.1–32.9)
MCHC RBC AUTO-ENTMCNC: 31.5 G/DL (ref 31.4–35)
MCV RBC AUTO: 79.6 FL (ref 82–102)
MONOCYTES # BLD: 0.6 K/UL (ref 0.1–1.3)
MONOCYTES NFR BLD: 4 % (ref 4–12)
NEUTS SEG # BLD: 12.7 K/UL (ref 1.7–8.2)
NEUTS SEG NFR BLD: 89 % (ref 43–78)
NRBC # BLD: 0 K/UL (ref 0–0.2)
NT PRO BNP: ABNORMAL PG/ML
PLATELET # BLD AUTO: 194 K/UL (ref 150–450)
PMV BLD AUTO: 10.7 FL (ref 9.4–12.3)
POTASSIUM SERPL-SCNC: 3.3 MMOL/L (ref 3.5–5.1)
RBC # BLD AUTO: 3.43 M/UL (ref 4.05–5.2)
SERVICE CMNT-IMP: NORMAL
SERVICE CMNT-IMP: NORMAL
SODIUM SERPL-SCNC: 137 MMOL/L (ref 133–143)
WBC # BLD AUTO: 14.3 K/UL (ref 4.3–11.1)

## 2023-07-11 PROCEDURE — 97530 THERAPEUTIC ACTIVITIES: CPT

## 2023-07-11 PROCEDURE — 71045 X-RAY EXAM CHEST 1 VIEW: CPT

## 2023-07-11 PROCEDURE — 85025 COMPLETE CBC W/AUTO DIFF WBC: CPT

## 2023-07-11 PROCEDURE — 2580000003 HC RX 258: Performed by: INTERNAL MEDICINE

## 2023-07-11 PROCEDURE — 80048 BASIC METABOLIC PNL TOTAL CA: CPT

## 2023-07-11 PROCEDURE — 36415 COLL VENOUS BLD VENIPUNCTURE: CPT

## 2023-07-11 PROCEDURE — 6370000000 HC RX 637 (ALT 250 FOR IP): Performed by: FAMILY MEDICINE

## 2023-07-11 PROCEDURE — 97535 SELF CARE MNGMENT TRAINING: CPT

## 2023-07-11 PROCEDURE — 83735 ASSAY OF MAGNESIUM: CPT

## 2023-07-11 PROCEDURE — 83880 ASSAY OF NATRIURETIC PEPTIDE: CPT

## 2023-07-11 PROCEDURE — 6360000002 HC RX W HCPCS: Performed by: INTERNAL MEDICINE

## 2023-07-11 PROCEDURE — 6370000000 HC RX 637 (ALT 250 FOR IP): Performed by: PHYSICIAN ASSISTANT

## 2023-07-11 PROCEDURE — 6370000000 HC RX 637 (ALT 250 FOR IP): Performed by: INTERNAL MEDICINE

## 2023-07-11 PROCEDURE — 97112 NEUROMUSCULAR REEDUCATION: CPT

## 2023-07-11 PROCEDURE — 1100000000 HC RM PRIVATE

## 2023-07-11 RX ORDER — GUAIFENESIN 600 MG/1
1200 TABLET, EXTENDED RELEASE ORAL 2 TIMES DAILY
Status: DISCONTINUED | OUTPATIENT
Start: 2023-07-11 | End: 2023-07-14 | Stop reason: HOSPADM

## 2023-07-11 RX ORDER — POTASSIUM CHLORIDE 20 MEQ/1
40 TABLET, EXTENDED RELEASE ORAL
Status: COMPLETED | OUTPATIENT
Start: 2023-07-11 | End: 2023-07-11

## 2023-07-11 RX ORDER — FUROSEMIDE 10 MG/ML
20 INJECTION INTRAMUSCULAR; INTRAVENOUS ONCE
Status: COMPLETED | OUTPATIENT
Start: 2023-07-11 | End: 2023-07-11

## 2023-07-11 RX ORDER — MAGNESIUM SULFATE IN WATER 40 MG/ML
4000 INJECTION, SOLUTION INTRAVENOUS ONCE
Status: COMPLETED | OUTPATIENT
Start: 2023-07-11 | End: 2023-07-11

## 2023-07-11 RX ORDER — FUROSEMIDE 10 MG/ML
60 INJECTION INTRAMUSCULAR; INTRAVENOUS DAILY
Status: DISCONTINUED | OUTPATIENT
Start: 2023-07-12 | End: 2023-07-13

## 2023-07-11 RX ADMIN — POTASSIUM CHLORIDE 40 MEQ: 20 TABLET, EXTENDED RELEASE ORAL at 11:04

## 2023-07-11 RX ADMIN — PANCRELIPASE LIPASE, PANCRELIPASE PROTEASE, PANCRELIPASE AMYLASE 10000 UNITS: 5000; 17000; 24000 CAPSULE, DELAYED RELEASE ORAL at 16:49

## 2023-07-11 RX ADMIN — CEFEPIME 2000 MG: 2 INJECTION, POWDER, FOR SOLUTION INTRAVENOUS at 00:41

## 2023-07-11 RX ADMIN — MAGNESIUM SULFATE HEPTAHYDRATE 4000 MG: 40 INJECTION, SOLUTION INTRAVENOUS at 09:11

## 2023-07-11 RX ADMIN — PANCRELIPASE LIPASE, PANCRELIPASE PROTEASE, PANCRELIPASE AMYLASE 10000 UNITS: 5000; 17000; 24000 CAPSULE, DELAYED RELEASE ORAL at 11:04

## 2023-07-11 RX ADMIN — FUROSEMIDE 20 MG: 10 INJECTION, SOLUTION INTRAMUSCULAR; INTRAVENOUS at 13:13

## 2023-07-11 RX ADMIN — POTASSIUM CHLORIDE 40 MEQ: 20 TABLET, EXTENDED RELEASE ORAL at 09:08

## 2023-07-11 RX ADMIN — LOSARTAN POTASSIUM 50 MG: 50 TABLET, FILM COATED ORAL at 09:08

## 2023-07-11 RX ADMIN — FLUTICASONE PROPIONATE 1 SPRAY: 50 SPRAY, METERED NASAL at 09:10

## 2023-07-11 RX ADMIN — PANCRELIPASE LIPASE, PANCRELIPASE PROTEASE, PANCRELIPASE AMYLASE 10000 UNITS: 5000; 17000; 24000 CAPSULE, DELAYED RELEASE ORAL at 08:00

## 2023-07-11 RX ADMIN — DONEPEZIL HYDROCHLORIDE 5 MG: 5 TABLET, FILM COATED ORAL at 19:25

## 2023-07-11 RX ADMIN — ACETAMINOPHEN 650 MG: 325 TABLET ORAL at 11:03

## 2023-07-11 RX ADMIN — OXYCODONE HYDROCHLORIDE 15 MG: 15 TABLET ORAL at 09:08

## 2023-07-11 RX ADMIN — OXYCODONE HYDROCHLORIDE 15 MG: 15 TABLET ORAL at 17:01

## 2023-07-11 RX ADMIN — POTASSIUM CHLORIDE 40 MEQ: 20 TABLET, EXTENDED RELEASE ORAL at 16:49

## 2023-07-11 RX ADMIN — CEFEPIME 2000 MG: 2 INJECTION, POWDER, FOR SOLUTION INTRAVENOUS at 13:17

## 2023-07-11 RX ADMIN — FUROSEMIDE 40 MG: 10 INJECTION, SOLUTION INTRAMUSCULAR; INTRAVENOUS at 09:09

## 2023-07-11 RX ADMIN — OXYCODONE HYDROCHLORIDE 15 MG: 15 TABLET ORAL at 23:37

## 2023-07-11 RX ADMIN — ACETAMINOPHEN 650 MG: 325 TABLET ORAL at 19:38

## 2023-07-11 RX ADMIN — GUAIFENESIN 1200 MG: 600 TABLET ORAL at 19:25

## 2023-07-11 ASSESSMENT — PAIN SCALES - GENERAL
PAINLEVEL_OUTOF10: 7
PAINLEVEL_OUTOF10: 6
PAINLEVEL_OUTOF10: 3
PAINLEVEL_OUTOF10: 5
PAINLEVEL_OUTOF10: 0
PAINLEVEL_OUTOF10: 4
PAINLEVEL_OUTOF10: 10

## 2023-07-11 ASSESSMENT — PAIN DESCRIPTION - ONSET: ONSET: ON-GOING

## 2023-07-11 ASSESSMENT — PAIN DESCRIPTION - FREQUENCY: FREQUENCY: CONTINUOUS

## 2023-07-11 ASSESSMENT — PAIN DESCRIPTION - DESCRIPTORS
DESCRIPTORS: ACHING;SHARP
DESCRIPTORS: JABBING

## 2023-07-11 ASSESSMENT — PAIN DESCRIPTION - LOCATION
LOCATION: ABDOMEN

## 2023-07-11 ASSESSMENT — PAIN DESCRIPTION - ORIENTATION
ORIENTATION: LEFT;LOWER;MID
ORIENTATION: LEFT;LOWER
ORIENTATION: MID

## 2023-07-11 ASSESSMENT — PAIN DESCRIPTION - PAIN TYPE: TYPE: CHRONIC PAIN

## 2023-07-11 NOTE — PLAN OF CARE
Problem: Pain  Goal: Verbalizes/displays adequate comfort level or baseline comfort level  Outcome: Progressing     Problem: Safety - Adult  Goal: Free from fall injury  Outcome: Progressing     Problem: Pain  Goal: Verbalizes/displays adequate comfort level or baseline comfort level  Outcome: Progressing

## 2023-07-12 LAB
ANION GAP SERPL CALC-SCNC: 3 MMOL/L (ref 2–11)
BASOPHILS # BLD: 0 K/UL (ref 0–0.2)
BASOPHILS NFR BLD: 0 % (ref 0–2)
BUN SERPL-MCNC: 12 MG/DL (ref 8–23)
CALCIUM SERPL-MCNC: 7.9 MG/DL (ref 8.3–10.4)
CHLORIDE SERPL-SCNC: 103 MMOL/L (ref 101–110)
CO2 SERPL-SCNC: 29 MMOL/L (ref 21–32)
CREAT SERPL-MCNC: 0.5 MG/DL (ref 0.6–1)
DIFFERENTIAL METHOD BLD: ABNORMAL
EOSINOPHIL # BLD: 0.1 K/UL (ref 0–0.8)
EOSINOPHIL NFR BLD: 1 % (ref 0.5–7.8)
ERYTHROCYTE [DISTWIDTH] IN BLOOD BY AUTOMATED COUNT: 17.5 % (ref 11.9–14.6)
GLUCOSE SERPL-MCNC: 83 MG/DL (ref 65–100)
HCT VFR BLD AUTO: 30.1 % (ref 35.8–46.3)
HGB BLD-MCNC: 9.5 G/DL (ref 11.7–15.4)
IMM GRANULOCYTES # BLD AUTO: 0.1 K/UL (ref 0–0.5)
IMM GRANULOCYTES NFR BLD AUTO: 1 % (ref 0–5)
LYMPHOCYTES # BLD: 0.8 K/UL (ref 0.5–4.6)
LYMPHOCYTES NFR BLD: 6 % (ref 13–44)
MCH RBC QN AUTO: 25.5 PG (ref 26.1–32.9)
MCHC RBC AUTO-ENTMCNC: 31.6 G/DL (ref 31.4–35)
MCV RBC AUTO: 80.7 FL (ref 82–102)
MONOCYTES # BLD: 0.4 K/UL (ref 0.1–1.3)
MONOCYTES NFR BLD: 3 % (ref 4–12)
NEUTS SEG # BLD: 12.9 K/UL (ref 1.7–8.2)
NEUTS SEG NFR BLD: 89 % (ref 43–78)
NRBC # BLD: 0 K/UL (ref 0–0.2)
PLATELET # BLD AUTO: 257 K/UL (ref 150–450)
PMV BLD AUTO: 10.3 FL (ref 9.4–12.3)
POTASSIUM SERPL-SCNC: 5.2 MMOL/L (ref 3.5–5.1)
RBC # BLD AUTO: 3.73 M/UL (ref 4.05–5.2)
SODIUM SERPL-SCNC: 135 MMOL/L (ref 133–143)
WBC # BLD AUTO: 14.3 K/UL (ref 4.3–11.1)

## 2023-07-12 PROCEDURE — 6370000000 HC RX 637 (ALT 250 FOR IP): Performed by: INTERNAL MEDICINE

## 2023-07-12 PROCEDURE — 97535 SELF CARE MNGMENT TRAINING: CPT

## 2023-07-12 PROCEDURE — 85025 COMPLETE CBC W/AUTO DIFF WBC: CPT

## 2023-07-12 PROCEDURE — 2700000000 HC OXYGEN THERAPY PER DAY

## 2023-07-12 PROCEDURE — 6370000000 HC RX 637 (ALT 250 FOR IP): Performed by: PHYSICIAN ASSISTANT

## 2023-07-12 PROCEDURE — 6360000002 HC RX W HCPCS: Performed by: INTERNAL MEDICINE

## 2023-07-12 PROCEDURE — 80048 BASIC METABOLIC PNL TOTAL CA: CPT

## 2023-07-12 PROCEDURE — 97530 THERAPEUTIC ACTIVITIES: CPT

## 2023-07-12 PROCEDURE — 1100000000 HC RM PRIVATE

## 2023-07-12 PROCEDURE — 2580000003 HC RX 258: Performed by: INTERNAL MEDICINE

## 2023-07-12 PROCEDURE — 94760 N-INVAS EAR/PLS OXIMETRY 1: CPT

## 2023-07-12 PROCEDURE — 6370000000 HC RX 637 (ALT 250 FOR IP): Performed by: FAMILY MEDICINE

## 2023-07-12 PROCEDURE — 36415 COLL VENOUS BLD VENIPUNCTURE: CPT

## 2023-07-12 RX ADMIN — PANCRELIPASE LIPASE, PANCRELIPASE PROTEASE, PANCRELIPASE AMYLASE 10000 UNITS: 5000; 17000; 24000 CAPSULE, DELAYED RELEASE ORAL at 11:44

## 2023-07-12 RX ADMIN — GUAIFENESIN 1200 MG: 600 TABLET ORAL at 07:56

## 2023-07-12 RX ADMIN — FUROSEMIDE 60 MG: 10 INJECTION, SOLUTION INTRAMUSCULAR; INTRAVENOUS at 07:55

## 2023-07-12 RX ADMIN — GUAIFENESIN 1200 MG: 600 TABLET ORAL at 20:26

## 2023-07-12 RX ADMIN — PANCRELIPASE LIPASE, PANCRELIPASE PROTEASE, PANCRELIPASE AMYLASE 10000 UNITS: 5000; 17000; 24000 CAPSULE, DELAYED RELEASE ORAL at 07:55

## 2023-07-12 RX ADMIN — CEFEPIME 2000 MG: 2 INJECTION, POWDER, FOR SOLUTION INTRAVENOUS at 00:35

## 2023-07-12 RX ADMIN — ACETAMINOPHEN 650 MG: 325 TABLET ORAL at 11:45

## 2023-07-12 RX ADMIN — DONEPEZIL HYDROCHLORIDE 5 MG: 5 TABLET, FILM COATED ORAL at 20:26

## 2023-07-12 RX ADMIN — CEFEPIME 2000 MG: 2 INJECTION, POWDER, FOR SOLUTION INTRAVENOUS at 13:14

## 2023-07-12 RX ADMIN — OXYCODONE HYDROCHLORIDE 15 MG: 15 TABLET ORAL at 10:32

## 2023-07-12 RX ADMIN — PANCRELIPASE LIPASE, PANCRELIPASE PROTEASE, PANCRELIPASE AMYLASE 10000 UNITS: 5000; 17000; 24000 CAPSULE, DELAYED RELEASE ORAL at 16:13

## 2023-07-12 RX ADMIN — OXYCODONE HYDROCHLORIDE 15 MG: 15 TABLET ORAL at 20:32

## 2023-07-12 RX ADMIN — LOSARTAN POTASSIUM 50 MG: 50 TABLET, FILM COATED ORAL at 07:56

## 2023-07-12 RX ADMIN — FLUTICASONE PROPIONATE 1 SPRAY: 50 SPRAY, METERED NASAL at 07:56

## 2023-07-12 ASSESSMENT — PAIN DESCRIPTION - ORIENTATION
ORIENTATION: LEFT;LOWER
ORIENTATION: LEFT;LOWER

## 2023-07-12 ASSESSMENT — PAIN DESCRIPTION - LOCATION
LOCATION: ABDOMEN

## 2023-07-12 ASSESSMENT — PAIN DESCRIPTION - PAIN TYPE: TYPE: CHRONIC PAIN

## 2023-07-12 ASSESSMENT — PAIN SCALES - GENERAL
PAINLEVEL_OUTOF10: 0
PAINLEVEL_OUTOF10: 8
PAINLEVEL_OUTOF10: 6
PAINLEVEL_OUTOF10: 3
PAINLEVEL_OUTOF10: 0
PAINLEVEL_OUTOF10: 0

## 2023-07-12 ASSESSMENT — PAIN DESCRIPTION - ONSET: ONSET: ON-GOING

## 2023-07-12 ASSESSMENT — PAIN DESCRIPTION - DESCRIPTORS: DESCRIPTORS: ACHING;DISCOMFORT

## 2023-07-12 ASSESSMENT — PAIN DESCRIPTION - FREQUENCY: FREQUENCY: CONTINUOUS

## 2023-07-12 NOTE — PLAN OF CARE
Problem: Pain  Goal: Verbalizes/displays adequate comfort level or baseline comfort level  7/12/2023 0956 by Yolie Alvarenga RN  Outcome: Progressing  7/12/2023 0359 by Adia Martinez RN  Outcome: Progressing     Problem: Skin/Tissue Integrity  Goal: Absence of new skin breakdown  Description: 1. Monitor for areas of redness and/or skin breakdown  2. Assess vascular access sites hourly  3. Every 4-6 hours minimum:  Change oxygen saturation probe site  4. Every 4-6 hours:  If on nasal continuous positive airway pressure, respiratory therapy assess nares and determine need for appliance change or resting period.   7/12/2023 0956 by Yolie Alvarenga RN  Outcome: Progressing  7/12/2023 0359 by Adia Martinez RN  Outcome: Progressing     Problem: Safety - Adult  Goal: Free from fall injury  7/12/2023 0359 by Adia Martinez RN  Outcome: Progressing  Flowsheets  Taken 7/12/2023 0215  Free From Fall Injury: Instruct family/caregiver on patient safety  Taken 7/11/2023 1930  Free From Fall Injury: Instruct family/caregiver on patient safety

## 2023-07-12 NOTE — CARE COORDINATION
LOS 14d  Chart reviewed by [facility] for discharge planning  Patient accepted and selected for [facility] to [address] will begin preauth per IDR patient should be ready for discharge on 7/14. Packet started will be in the other CM office on the 5th floor. Discharge plan is [facility]  to [address] [facility] pending insurance auth    Will continue to follow for discharge planning needs  Please consult  if any new issues arise    Patient will need a rapid Covid  at least 24 hrs before discharge to facility. 26 Daughter in room updated on STR selection. Requested that  assist with HCPOA at this time. Consult sent  Daughter also asked about patient being screened for potential Medicaid. E-mail sent to BRADLY Ledesma at [facility] to assess.

## 2023-07-12 NOTE — PLAN OF CARE
Problem: Pain  Goal: Verbalizes/displays adequate comfort level or baseline comfort level  Outcome: Progressing     Problem: Skin/Tissue Integrity  Goal: Absence of new skin breakdown  Description: 1. Monitor for areas of redness and/or skin breakdown  2. Assess vascular access sites hourly  3. Every 4-6 hours minimum:  Change oxygen saturation probe site  4. Every 4-6 hours:  If on nasal continuous positive airway pressure, respiratory therapy assess nares and determine need for appliance change or resting period.   Outcome: Progressing     Problem: Safety - Adult  Goal: Free from fall injury  Outcome: Progressing  Flowsheets  Taken 7/12/2023 0215  Free From Fall Injury: Instruct family/caregiver on patient safety  Taken 7/11/2023 1930  Free From Fall Injury: Instruct family/caregiver on patient safety

## 2023-07-13 LAB
ANION GAP SERPL CALC-SCNC: 2 MMOL/L (ref 2–11)
BUN SERPL-MCNC: 13 MG/DL (ref 8–23)
CALCIUM SERPL-MCNC: 8.4 MG/DL (ref 8.3–10.4)
CHLORIDE SERPL-SCNC: 101 MMOL/L (ref 101–110)
CO2 SERPL-SCNC: 32 MMOL/L (ref 21–32)
CREAT SERPL-MCNC: 0.6 MG/DL (ref 0.6–1)
GLUCOSE SERPL-MCNC: 85 MG/DL (ref 65–100)
MAGNESIUM SERPL-MCNC: 1.9 MG/DL (ref 1.8–2.4)
POTASSIUM SERPL-SCNC: 3.3 MMOL/L (ref 3.5–5.1)
PROCALCITONIN SERPL-MCNC: 4.79 NG/ML (ref 0–0.49)
SODIUM SERPL-SCNC: 135 MMOL/L (ref 133–143)

## 2023-07-13 PROCEDURE — 84145 PROCALCITONIN (PCT): CPT

## 2023-07-13 PROCEDURE — 36415 COLL VENOUS BLD VENIPUNCTURE: CPT

## 2023-07-13 PROCEDURE — 80048 BASIC METABOLIC PNL TOTAL CA: CPT

## 2023-07-13 PROCEDURE — 94762 N-INVAS EAR/PLS OXIMTRY CONT: CPT

## 2023-07-13 PROCEDURE — 6370000000 HC RX 637 (ALT 250 FOR IP): Performed by: INTERNAL MEDICINE

## 2023-07-13 PROCEDURE — 6360000002 HC RX W HCPCS: Performed by: INTERNAL MEDICINE

## 2023-07-13 PROCEDURE — 83735 ASSAY OF MAGNESIUM: CPT

## 2023-07-13 PROCEDURE — 6370000000 HC RX 637 (ALT 250 FOR IP): Performed by: PHYSICIAN ASSISTANT

## 2023-07-13 PROCEDURE — 1100000000 HC RM PRIVATE

## 2023-07-13 PROCEDURE — 2580000003 HC RX 258: Performed by: INTERNAL MEDICINE

## 2023-07-13 RX ORDER — FUROSEMIDE 40 MG/1
40 TABLET ORAL DAILY
Status: DISCONTINUED | OUTPATIENT
Start: 2023-07-14 | End: 2023-07-14 | Stop reason: HOSPADM

## 2023-07-13 RX ORDER — POTASSIUM CHLORIDE 20 MEQ/1
40 TABLET, EXTENDED RELEASE ORAL 2 TIMES DAILY WITH MEALS
Status: COMPLETED | OUTPATIENT
Start: 2023-07-13 | End: 2023-07-13

## 2023-07-13 RX ADMIN — OXYCODONE HYDROCHLORIDE 15 MG: 15 TABLET ORAL at 03:00

## 2023-07-13 RX ADMIN — DONEPEZIL HYDROCHLORIDE 5 MG: 5 TABLET, FILM COATED ORAL at 23:09

## 2023-07-13 RX ADMIN — GUAIFENESIN 1200 MG: 600 TABLET ORAL at 23:09

## 2023-07-13 RX ADMIN — PANCRELIPASE LIPASE, PANCRELIPASE PROTEASE, PANCRELIPASE AMYLASE 10000 UNITS: 5000; 17000; 24000 CAPSULE, DELAYED RELEASE ORAL at 08:14

## 2023-07-13 RX ADMIN — FLUTICASONE PROPIONATE 1 SPRAY: 50 SPRAY, METERED NASAL at 08:13

## 2023-07-13 RX ADMIN — POTASSIUM CHLORIDE 40 MEQ: 20 TABLET, EXTENDED RELEASE ORAL at 11:52

## 2023-07-13 RX ADMIN — GUAIFENESIN 1200 MG: 600 TABLET ORAL at 08:14

## 2023-07-13 RX ADMIN — OXYCODONE HYDROCHLORIDE 15 MG: 15 TABLET ORAL at 23:25

## 2023-07-13 RX ADMIN — OXYCODONE HYDROCHLORIDE 15 MG: 15 TABLET ORAL at 08:18

## 2023-07-13 RX ADMIN — FUROSEMIDE 60 MG: 10 INJECTION, SOLUTION INTRAMUSCULAR; INTRAVENOUS at 08:15

## 2023-07-13 RX ADMIN — CEFEPIME 2000 MG: 2 INJECTION, POWDER, FOR SOLUTION INTRAVENOUS at 00:29

## 2023-07-13 RX ADMIN — OXYCODONE HYDROCHLORIDE 15 MG: 15 TABLET ORAL at 17:10

## 2023-07-13 RX ADMIN — POTASSIUM CHLORIDE 40 MEQ: 20 TABLET, EXTENDED RELEASE ORAL at 17:06

## 2023-07-13 RX ADMIN — PANCRELIPASE LIPASE, PANCRELIPASE PROTEASE, PANCRELIPASE AMYLASE 10000 UNITS: 5000; 17000; 24000 CAPSULE, DELAYED RELEASE ORAL at 11:52

## 2023-07-13 RX ADMIN — LOSARTAN POTASSIUM 50 MG: 50 TABLET, FILM COATED ORAL at 08:15

## 2023-07-13 RX ADMIN — PANCRELIPASE LIPASE, PANCRELIPASE PROTEASE, PANCRELIPASE AMYLASE 10000 UNITS: 5000; 17000; 24000 CAPSULE, DELAYED RELEASE ORAL at 17:06

## 2023-07-13 ASSESSMENT — PAIN DESCRIPTION - DESCRIPTORS
DESCRIPTORS: ACHING;DISCOMFORT
DESCRIPTORS: ACHING;SORE
DESCRIPTORS: ACHING;SHARP
DESCRIPTORS: SHARP

## 2023-07-13 ASSESSMENT — PAIN DESCRIPTION - LOCATION
LOCATION: ABDOMEN;BACK
LOCATION: ABDOMEN

## 2023-07-13 ASSESSMENT — PAIN DESCRIPTION - ONSET
ONSET: GRADUAL
ONSET: ON-GOING

## 2023-07-13 ASSESSMENT — PAIN DESCRIPTION - PAIN TYPE
TYPE: CHRONIC PAIN

## 2023-07-13 ASSESSMENT — PAIN SCALES - GENERAL
PAINLEVEL_OUTOF10: 0
PAINLEVEL_OUTOF10: 8
PAINLEVEL_OUTOF10: 4
PAINLEVEL_OUTOF10: 6
PAINLEVEL_OUTOF10: 3
PAINLEVEL_OUTOF10: 3
PAINLEVEL_OUTOF10: 8
PAINLEVEL_OUTOF10: 3

## 2023-07-13 ASSESSMENT — PAIN DESCRIPTION - FREQUENCY
FREQUENCY: INTERMITTENT

## 2023-07-13 ASSESSMENT — PAIN DESCRIPTION - ORIENTATION
ORIENTATION: MID
ORIENTATION: ANTERIOR
ORIENTATION: RIGHT;LEFT
ORIENTATION: LOWER;LEFT;POSTERIOR

## 2023-07-13 NOTE — PLAN OF CARE
Problem: Pain  Goal: Verbalizes/displays adequate comfort level or baseline comfort level  Outcome: Progressing     Problem: Skin/Tissue Integrity  Goal: Absence of new skin breakdown  Description: 1. Monitor for areas of redness and/or skin breakdown  2. Assess vascular access sites hourly  3. Every 4-6 hours minimum:  Change oxygen saturation probe site  4. Every 4-6 hours:  If on nasal continuous positive airway pressure, respiratory therapy assess nares and determine need for appliance change or resting period.   Outcome: Progressing     Problem: Safety - Adult  Goal: Free from fall injury  Outcome: Progressing  Flowsheets (Taken 7/12/2023 2015)  Free From Fall Injury: Instruct family/caregiver on patient safety     Problem: Neurosensory - Adult  Goal: Achieves stable or improved neurological status  Outcome: Progressing

## 2023-07-13 NOTE — ACP (ADVANCE CARE PLANNING)
Advance Care Planning     Advance Care Planning Inpatient Note  [facility] Department    Today's Date: 7/13/2023  Unit: [address] CANCER [place]    Received request from family. Upon review of chart and communication with care team, patient's decision making abilities are not in question. . Patient and Child/Children was/were present in the room during visit. Goals of ACP Conversation:  Discuss advance care planning documents    Health Care Decision Makers:       Primary Decision Maker: Lester Lozano - Child - [phone]  Summary:  Completed [facility]    Advance Care Planning Documents (Patient Wishes):  Healthcare Power of /Advance Directive Appointment of Health Care Agent     Assessment:  Patient is preparing to move to a facility and requested to complete a HCPOA    Interventions:  Provided education on documents for clarity and greater understanding  Discussed and provided education on state decision maker hierarchy  Assisted in the completion of documents according to patient's wishes at this time  Encouraged ongoing ACP conversation with future decision makers and loved ones    Care Preferences Communicated:   No    Outcomes/Plan:  ACP Discussion: Completed  New advance directive completed. Returned original document(s) to patient, as well as copies for distribution to appointed agents  Copy of advance directive given to staff to scan into medical record. Routed ACP note to attending provider or other IDT member.   Teach Back Method used to verify the patient's and/or Healthcare Decision Maker's understanding of key information in the advance directive documents    Electronically signed by Jason Mccain on 7/13/2023 at 6:25 PM

## 2023-07-14 VITALS
SYSTOLIC BLOOD PRESSURE: 121 MMHG | OXYGEN SATURATION: 92 % | HEIGHT: 61 IN | TEMPERATURE: 99 F | BODY MASS INDEX: 15.53 KG/M2 | WEIGHT: 82.23 LBS | DIASTOLIC BLOOD PRESSURE: 55 MMHG | RESPIRATION RATE: 17 BRPM | HEART RATE: 74 BPM

## 2023-07-14 LAB
ANION GAP SERPL CALC-SCNC: 9 MMOL/L (ref 2–11)
BUN SERPL-MCNC: 14 MG/DL (ref 8–23)
CALCIUM SERPL-MCNC: 8.6 MG/DL (ref 8.3–10.4)
CHLORIDE SERPL-SCNC: 104 MMOL/L (ref 101–110)
CO2 SERPL-SCNC: 27 MMOL/L (ref 21–32)
CREAT SERPL-MCNC: 0.5 MG/DL (ref 0.6–1)
GLUCOSE SERPL-MCNC: 90 MG/DL (ref 65–100)
POTASSIUM SERPL-SCNC: 4 MMOL/L (ref 3.5–5.1)
SARS-COV-2 RDRP RESP QL NAA+PROBE: NOT DETECTED
SODIUM SERPL-SCNC: 140 MMOL/L (ref 133–143)
SOURCE: NORMAL

## 2023-07-14 PROCEDURE — 6370000000 HC RX 637 (ALT 250 FOR IP): Performed by: PHYSICIAN ASSISTANT

## 2023-07-14 PROCEDURE — 87635 SARS-COV-2 COVID-19 AMP PRB: CPT

## 2023-07-14 PROCEDURE — 36415 COLL VENOUS BLD VENIPUNCTURE: CPT

## 2023-07-14 PROCEDURE — 80048 BASIC METABOLIC PNL TOTAL CA: CPT

## 2023-07-14 PROCEDURE — 6370000000 HC RX 637 (ALT 250 FOR IP): Performed by: INTERNAL MEDICINE

## 2023-07-14 RX ORDER — FUROSEMIDE 40 MG/1
40 TABLET ORAL DAILY
Qty: 60 TABLET | Refills: 1 | Status: SHIPPED | OUTPATIENT
Start: 2023-07-15

## 2023-07-14 RX ORDER — OXYCODONE HYDROCHLORIDE 15 MG/1
15 TABLET ORAL EVERY 6 HOURS PRN
Qty: 12 TABLET | Refills: 0 | Status: SHIPPED | OUTPATIENT
Start: 2023-07-14 | End: 2023-07-17

## 2023-07-14 RX ORDER — CARVEDILOL 6.25 MG/1
6.25 TABLET ORAL 2 TIMES DAILY WITH MEALS
Status: DISCONTINUED | OUTPATIENT
Start: 2023-07-14 | End: 2023-07-14 | Stop reason: HOSPADM

## 2023-07-14 RX ORDER — POTASSIUM CHLORIDE 20 MEQ/1
20 TABLET, EXTENDED RELEASE ORAL
Status: DISCONTINUED | OUTPATIENT
Start: 2023-07-14 | End: 2023-07-14 | Stop reason: HOSPADM

## 2023-07-14 RX ORDER — CARVEDILOL 6.25 MG/1
6.25 TABLET ORAL 2 TIMES DAILY WITH MEALS
Qty: 60 TABLET | Refills: 1 | Status: SHIPPED | OUTPATIENT
Start: 2023-07-14

## 2023-07-14 RX ORDER — NALOXONE HYDROCHLORIDE 2 MG/.4ML
2 INJECTION, SOLUTION INTRAMUSCULAR; SUBCUTANEOUS
Qty: 0.4 ML | Refills: 0 | Status: SHIPPED | OUTPATIENT
Start: 2023-07-14 | End: 2023-07-14

## 2023-07-14 RX ADMIN — FUROSEMIDE 40 MG: 40 TABLET ORAL at 08:41

## 2023-07-14 RX ADMIN — OXYCODONE HYDROCHLORIDE 15 MG: 15 TABLET ORAL at 08:47

## 2023-07-14 RX ADMIN — PANCRELIPASE LIPASE, PANCRELIPASE PROTEASE, PANCRELIPASE AMYLASE 10000 UNITS: 5000; 17000; 24000 CAPSULE, DELAYED RELEASE ORAL at 08:42

## 2023-07-14 RX ADMIN — GUAIFENESIN 1200 MG: 600 TABLET ORAL at 08:42

## 2023-07-14 RX ADMIN — FLUTICASONE PROPIONATE 1 SPRAY: 50 SPRAY, METERED NASAL at 08:42

## 2023-07-14 RX ADMIN — PANCRELIPASE LIPASE, PANCRELIPASE PROTEASE, PANCRELIPASE AMYLASE 10000 UNITS: 5000; 17000; 24000 CAPSULE, DELAYED RELEASE ORAL at 13:19

## 2023-07-14 RX ADMIN — CARVEDILOL 6.25 MG: 6.25 TABLET, FILM COATED ORAL at 08:41

## 2023-07-14 RX ADMIN — POTASSIUM CHLORIDE 20 MEQ: 1500 TABLET, EXTENDED RELEASE ORAL at 08:42

## 2023-07-14 RX ADMIN — LOSARTAN POTASSIUM 50 MG: 50 TABLET, FILM COATED ORAL at 08:42

## 2023-07-14 ASSESSMENT — PAIN DESCRIPTION - LOCATION: LOCATION: ABDOMEN

## 2023-07-14 ASSESSMENT — PAIN DESCRIPTION - PAIN TYPE: TYPE: CHRONIC PAIN

## 2023-07-14 ASSESSMENT — PAIN DESCRIPTION - DESCRIPTORS: DESCRIPTORS: ACHING;SORE

## 2023-07-14 ASSESSMENT — PAIN SCALES - GENERAL
PAINLEVEL_OUTOF10: 3
PAINLEVEL_OUTOF10: 7
PAINLEVEL_OUTOF10: 0

## 2023-07-14 ASSESSMENT — PAIN DESCRIPTION - FREQUENCY: FREQUENCY: INTERMITTENT

## 2023-07-14 ASSESSMENT — PAIN DESCRIPTION - ONSET: ONSET: GRADUAL

## 2023-07-14 ASSESSMENT — PAIN - FUNCTIONAL ASSESSMENT: PAIN_FUNCTIONAL_ASSESSMENT: PREVENTS OR INTERFERES WITH MANY ACTIVE NOT PASSIVE ACTIVITIES

## 2023-07-14 NOTE — CARE COORDINATION
Dispo update:  Per Ms. Britt Partida at [facility] pre-cert for transfer is still pending. Updated Ms. Colin Zapien in room 526.

## 2023-07-14 NOTE — CARE COORDINATION
Discharge note:  Just now got [employer] Medicare auth to transfer to [facility] for Gerard, room 315, report [phone]. Updated RN Tamra Vizcaino and Ms. Kaylan Oshea in room 526.

## 2023-07-14 NOTE — DISCHARGE SUMMARY
Hospitalist Discharge Summary   Admit Date:  2023  6:17 PM   DC Note date: 2023  Name:  Harish Brewer   Age:  82 y.o. Sex:  female  :  1940   MRN:  [id number]   Room:  Formerly named [facility]  PCP:  Ledy Ray MD    Presenting Complaint: Pancreatitis     Initial Admission Diagnosis: Liver mass [R16.0]  Leukocytosis, unspecified type [D72.829]  Acute on chronic pancreatitis ([address]) [K85.90, K86.1]     Problem List for this Hospitalization (present on admission):    Principal Problem:    Acute on chronic pancreatitis ([address])  Active Problems:    Rheumatoid arthritis ([address])    Severe protein-calorie malnutrition ([address])    Hypomagnesemia    HTN (hypertension)    Chronic obstructive pulmonary disease (HCC)    Hypokalemia    Sepsis ([address])    Multifocal pneumonia    Hypoalbuminemia    Iron deficiency anemia    Oral candidiasis    Liver mass    Acute respiratory failure with hypoxia ([address])  Resolved Problems:    * No resolved hospital problems. [facility] Course:  Please refer to the admission H&P for details of presentation. In summary, Harish Brewer is a 82 y.o. female with past medical history significant for right lower lobe squamous cell lung cancer status post right thoracotomy with right lower lobe lobectomy in 3/2023, active smoker, chronic pancreatitis, peptic ulcer disease with history of perforated gastric ulcer s/p repair, breast cancer status post lumpectomy, rheumatoid arthritis on Enbrel, dyslipidemia, HTN, COPD, severe protein calorie malnutrition, dilated pancreatic duct/pancreatic head mass presented to the ER on  with abdominal pain that is identical in presentation to prior episodes of pancreatitis. Patient has had multiple admissions for recurrent bouts of pancreatitis, she was even evaluated by GI at [address] 2 weeks ago and underwent uncomplicated EUS that showed unremarkable body/tail but swelling and inflammation of the pancreatic head.  She was given a script for Creon that she

## 2023-07-14 NOTE — PLAN OF CARE
Problem: Pain  Goal: Verbalizes/displays adequate comfort level or baseline comfort level  Outcome: Progressing  Flowsheets (Taken 7/13/2023 2350)  Verbalizes/displays adequate comfort level or baseline comfort level: Encourage patient to monitor pain and request assistance     Problem: Skin/Tissue Integrity  Goal: Absence of new skin breakdown  Description: 1. Monitor for areas of redness and/or skin breakdown  2. Assess vascular access sites hourly  3. Every 4-6 hours minimum:  Change oxygen saturation probe site  4. Every 4-6 hours:  If on nasal continuous positive airway pressure, respiratory therapy assess nares and determine need for appliance change or resting period.   Outcome: Progressing     Problem: Safety - Adult  Goal: Free from fall injury  Outcome: Progressing  Flowsheets (Taken 7/13/2023 2314)  Free From Fall Injury: Instruct family/caregiver on patient safety     Problem: Neurosensory - Adult  Goal: Achieves stable or improved neurological status  Outcome: Progressing  Flowsheets (Taken 7/13/2023 2314)  Achieves stable or improved neurological status: Assess for and report changes in neurological status     Problem: Cardiovascular - Adult  Goal: Maintains optimal cardiac output and hemodynamic stability  Outcome: Progressing  Flowsheets (Taken 7/13/2023 2314)  Maintains optimal cardiac output and hemodynamic stability: Monitor blood pressure and heart rate     Problem: Skin/Tissue Integrity - Adult  Goal: Skin integrity remains intact  Outcome: Progressing  Flowsheets (Taken 7/13/2023 2314)  Skin Integrity Remains Intact: Monitor for areas of redness and/or skin breakdown     Problem: Musculoskeletal - Adult  Goal: Return mobility to safest level of function  Outcome: Progressing  Flowsheets (Taken 7/13/2023 2314)  Return Mobility to Safest Level of Function: Assess patient stability and activity tolerance for standing, transferring and ambulating with or without assistive devices     Problem:

## 2023-07-17 ENCOUNTER — CARE COORDINATION (OUTPATIENT)
Dept: CARE COORDINATION | Facility: CLINIC | Age: 83
End: 2023-07-17

## 2023-07-17 NOTE — CARE COORDINATION
Transition of care outreach postponed for 7 days due to patient's discharge to [facility].

## 2023-07-21 ENCOUNTER — TELEPHONE (OUTPATIENT)
Dept: FAMILY MEDICINE CLINIC | Facility: CLINIC | Age: 83
End: 2023-07-21

## 2023-07-21 NOTE — TELEPHONE ENCOUNTER
Care Transitions Initial Follow Up Call    Outreach made within 2 business days of discharge: Yes    Patient: Jonathan Boothe Patient : 1940   MRN: [id number]  Reason for Admission: There are no discharge diagnoses documented for the most recent discharge. Discharge Date: 23       Spoke with: Ofe    Discharge department/facility: [facility]    TCM Interactive Patient Contact:  Was patient able to fill all prescriptions: Yes  Was patient instructed to bring all medications to the follow-up visit: Yes  Is patient taking all medications as directed in the discharge summary?  Yes  Does patient understand their discharge instructions: Yes  Does patient have questions or concerns that need addressed prior to 7-14 day follow up office visit: no    Scheduled appointment with PCP within 7-14 days    Follow Up  Future Appointments   Date Time Provider [address] [place] [address]   2023  3:30 PM Carol Arellano MD [facility] AMB   8/10/2023 12:30 PM [facility]   8/10/2023  1:00 PM Rigoberto Jarrett MD [facility]   2023  2:00 PM [facility]   10/6/2023  9:30 AM Carol Arellano MD [facility] AMB       Savannah Le LPN

## 2023-07-21 NOTE — TELEPHONE ENCOUNTER
DOTTY pt was in [facility] from 6/28 - 7/14 then d/c to rehab [facility], they called today and state she is d/c tomorrow 7/22 and made her a f/u for Tues at 3:30 as there was an opening.

## 2023-07-25 ENCOUNTER — OFFICE VISIT (OUTPATIENT)
Dept: FAMILY MEDICINE CLINIC | Facility: CLINIC | Age: 83
End: 2023-07-25

## 2023-07-25 VITALS
OXYGEN SATURATION: 97 % | TEMPERATURE: 97 F | DIASTOLIC BLOOD PRESSURE: 70 MMHG | SYSTOLIC BLOOD PRESSURE: 112 MMHG | WEIGHT: 77 LBS | HEIGHT: 61 IN | HEART RATE: 84 BPM | BODY MASS INDEX: 14.54 KG/M2

## 2023-07-25 DIAGNOSIS — Z09 HOSPITAL DISCHARGE FOLLOW-UP: Primary | ICD-10-CM

## 2023-07-25 DIAGNOSIS — D50.9 IRON DEFICIENCY ANEMIA, UNSPECIFIED IRON DEFICIENCY ANEMIA TYPE: ICD-10-CM

## 2023-07-25 DIAGNOSIS — K86.1 CHRONIC PANCREATITIS, UNSPECIFIED PANCREATITIS TYPE (HCC): ICD-10-CM

## 2023-07-25 DIAGNOSIS — E46 PROTEIN-CALORIE MALNUTRITION, UNSPECIFIED SEVERITY (HCC): ICD-10-CM

## 2023-07-25 DIAGNOSIS — S31.000A WOUND OF SACRAL REGION, INITIAL ENCOUNTER: ICD-10-CM

## 2023-07-25 DIAGNOSIS — R64 CACHECTIC (HCC): ICD-10-CM

## 2023-07-25 RX ORDER — NALOXONE HYDROCHLORIDE 4 MG/.1ML
4 SPRAY NASAL
COMMUNITY
Start: 2023-07-21

## 2023-07-25 RX ORDER — OXYCODONE HYDROCHLORIDE 15 MG/1
15 TABLET ORAL EVERY 6 HOURS PRN
COMMUNITY
Start: 2023-07-21 | End: 2023-07-28

## 2023-07-25 NOTE — PROGRESS NOTES
History of acute pancreatitis    Compression fracture of L1 lumbar vertebra (HCC)    Hypomagnesemia    Personal history of tobacco use, presenting hazards to health    HTN (hypertension)    Age-related osteoporosis with current pathological fracture    Chronic obstructive pulmonary disease (HCC)    Eczema    Osteoarthritis    Paraseptal emphysema (HCC)    Hypokalemia    Osteoporosis    S/P lumpectomy, left breast    EPIFANIO (acute kidney injury) ([address])    Chronic pancreatitis (HCC)    Rheumatoid arthritis (HCC)    Unintentional weight loss    Severe protein-calorie malnutrition (HCC)    Lung nodule    Squamous cell lung cancer ([address])    Acute pancreatitis    Right lower lobe lung mass    Sepsis ([address])    Multifocal pneumonia    S/P thoracotomy    Normocytic anemia    Smoker    Anemia    Abnormal finding on GI tract imaging    Hypoalbuminemia    Acute on chronic pancreatitis (HCC)    Epigastric pain    Adult failure to thrive    Pleural effusion    Iron deficiency anemia    Oral candidiasis    Liver mass    Acute respiratory failure with hypoxia (HCC)    Protein calorie malnutrition       Medications listed as ordered at the time of discharge from hospital     Medication List            Accurate as of July 25, 2023 11:59 PM. If you have any questions, ask your nurse or doctor. CONTINUE taking these medications      carvedilol 6.25 MG tablet  Commonly known as: COREG  Take 1 tablet by mouth 2 times daily (with meals)     donepezil 5 MG tablet  Commonly known as: Aricept  Take 1 tablet by mouth nightly     etanercept 50 MG/ML injection  Commonly known as: ENBREL  Administer Enbrel 1 shot every 7 days.      ferrous sulfate 325 (65 Fe) MG tablet  Commonly known as: IRON 325  Take 1 tablet by mouth daily (with breakfast)     furosemide 40 MG tablet  Commonly known as: LASIX  Take 1 tablet by mouth daily     lipase-protease-amylase 5000-20868 units Cpep delayed release capsule  Commonly known as: ZENPEP  Take by

## 2023-07-27 ENCOUNTER — TELEPHONE (OUTPATIENT)
Dept: FAMILY MEDICINE CLINIC | Facility: CLINIC | Age: 83
End: 2023-07-27

## 2023-07-27 DIAGNOSIS — R62.7 FAILURE TO THRIVE IN ADULT: ICD-10-CM

## 2023-07-27 DIAGNOSIS — E88.09 HYPOALBUMINEMIA: ICD-10-CM

## 2023-07-27 DIAGNOSIS — E43 SEVERE PROTEIN-CALORIE MALNUTRITION (HCC): Primary | ICD-10-CM

## 2023-07-27 DIAGNOSIS — E43 SEVERE MALNUTRITION (HCC): ICD-10-CM

## 2023-07-27 DIAGNOSIS — C34.90 SQUAMOUS CELL CARCINOMA OF LUNG, UNSPECIFIED LATERALITY (HCC): ICD-10-CM

## 2023-07-27 DIAGNOSIS — K86.1 CHRONIC PANCREATITIS, UNSPECIFIED PANCREATITIS TYPE (HCC): ICD-10-CM

## 2023-07-27 NOTE — TELEPHONE ENCOUNTER
Pt has decided to do Hospice. Family would like referral to [address] in [facility].

## 2023-07-31 ENCOUNTER — CARE COORDINATION (OUTPATIENT)
Dept: CARE COORDINATION | Facility: CLINIC | Age: 83
End: 2023-07-31

## 2023-08-02 ENCOUNTER — TELEPHONE (OUTPATIENT)
Dept: FAMILY MEDICINE CLINIC | Facility: CLINIC | Age: 83
End: 2023-08-02

## 2023-08-03 DIAGNOSIS — D72.829 LEUKOCYTOSIS, UNSPECIFIED TYPE: Primary | ICD-10-CM

## 2023-08-03 DIAGNOSIS — R79.9 ABNORMAL FINDING OF BLOOD CHEMISTRY, UNSPECIFIED: ICD-10-CM

## 2023-08-07 ENCOUNTER — APPOINTMENT (OUTPATIENT)
Dept: CT IMAGING | Age: 83
DRG: 871 | End: 2023-08-07
Payer: MEDICARE

## 2023-08-07 ENCOUNTER — HOSPITAL ENCOUNTER (INPATIENT)
Age: 83
LOS: 32 days | Discharge: HOSPICE | DRG: 871 | End: 2023-09-08
Attending: EMERGENCY MEDICINE | Admitting: FAMILY MEDICINE
Payer: MEDICARE

## 2023-08-07 DIAGNOSIS — I95.9 HYPOTENSION, UNSPECIFIED HYPOTENSION TYPE: ICD-10-CM

## 2023-08-07 DIAGNOSIS — I26.99 OTHER ACUTE PULMONARY EMBOLISM, UNSPECIFIED WHETHER ACUTE COR PULMONALE PRESENT (HCC): ICD-10-CM

## 2023-08-07 DIAGNOSIS — K85.90 ACUTE PANCREATITIS, UNSPECIFIED COMPLICATION STATUS, UNSPECIFIED PANCREATITIS TYPE: Primary | ICD-10-CM

## 2023-08-07 DIAGNOSIS — A41.9 SEPTICEMIA (HCC): ICD-10-CM

## 2023-08-07 DIAGNOSIS — D64.9 ANEMIA, UNSPECIFIED TYPE: ICD-10-CM

## 2023-08-07 DIAGNOSIS — I48.0 PAROXYSMAL ATRIAL FIBRILLATION (HCC): ICD-10-CM

## 2023-08-07 PROBLEM — R65.21 SEPTIC SHOCK (HCC): Status: ACTIVE | Noted: 2023-01-01

## 2023-08-07 PROBLEM — R93.3 ABNORMAL FINDING ON GI TRACT IMAGING: Status: RESOLVED | Noted: 2023-04-19 | Resolved: 2023-08-07

## 2023-08-07 PROBLEM — M80.00XA AGE-RELATED OSTEOPOROSIS WITH CURRENT PATHOLOGICAL FRACTURE: Status: ACTIVE | Noted: 2018-10-08

## 2023-08-07 PROBLEM — R65.20 SEVERE SEPSIS (HCC): Status: ACTIVE | Noted: 2023-01-01

## 2023-08-07 PROBLEM — R57.9 SHOCK (HCC): Status: ACTIVE | Noted: 2023-01-01

## 2023-08-07 PROBLEM — E77.8 HYPOPROTEINEMIA (HCC): Status: ACTIVE | Noted: 2023-08-07

## 2023-08-07 PROBLEM — F17.200 SMOKER: Status: RESOLVED | Noted: 2023-01-01 | Resolved: 2023-01-01

## 2023-08-07 LAB
ALBUMIN SERPL-MCNC: 1.4 G/DL (ref 3.2–4.6)
ALBUMIN/GLOB SERPL: 0.4 (ref 0.4–1.6)
ALP SERPL-CCNC: 149 U/L (ref 50–136)
ALT SERPL-CCNC: 15 U/L (ref 12–65)
ANION GAP SERPL CALC-SCNC: 5 MMOL/L (ref 2–11)
AST SERPL-CCNC: 11 U/L (ref 15–37)
BASOPHILS # BLD: 0 K/UL (ref 0–0.2)
BASOPHILS NFR BLD: 0 % (ref 0–2)
BILIRUB SERPL-MCNC: 0.4 MG/DL (ref 0.2–1.1)
BUN SERPL-MCNC: 16 MG/DL (ref 8–23)
CALCIUM SERPL-MCNC: 7.2 MG/DL (ref 8.3–10.4)
CHLORIDE SERPL-SCNC: 113 MMOL/L (ref 101–110)
CO2 SERPL-SCNC: 21 MMOL/L (ref 21–32)
CREAT SERPL-MCNC: 0.9 MG/DL (ref 0.6–1)
DIFFERENTIAL METHOD BLD: ABNORMAL
EOSINOPHIL # BLD: 0 K/UL (ref 0–0.8)
EOSINOPHIL NFR BLD: 0 % (ref 0.5–7.8)
ERYTHROCYTE [DISTWIDTH] IN BLOOD BY AUTOMATED COUNT: 21.7 % (ref 11.9–14.6)
FERRITIN SERPL-MCNC: 292 NG/ML (ref 8–388)
GLOBULIN SER CALC-MCNC: 3.4 G/DL (ref 2.8–4.5)
GLUCOSE SERPL-MCNC: 87 MG/DL (ref 65–100)
HCT VFR BLD AUTO: 23.5 % (ref 35.8–46.3)
HGB BLD-MCNC: 7.1 G/DL (ref 11.7–15.4)
IMM GRANULOCYTES # BLD AUTO: 0.2 K/UL (ref 0–0.5)
IMM GRANULOCYTES NFR BLD AUTO: 1 % (ref 0–5)
IRON SATN MFR SERPL: 9 %
IRON SERPL-MCNC: 11 UG/DL (ref 35–150)
LACTATE SERPL-SCNC: 2.7 MMOL/L (ref 0.4–2)
LIPASE SERPL-CCNC: 730 U/L (ref 73–393)
LYMPHOCYTES # BLD: 1.2 K/UL (ref 0.5–4.6)
LYMPHOCYTES NFR BLD: 5 % (ref 13–44)
MCH RBC QN AUTO: 25.5 PG (ref 26.1–32.9)
MCHC RBC AUTO-ENTMCNC: 30.2 G/DL (ref 31.4–35)
MCV RBC AUTO: 84.5 FL (ref 82–102)
MONOCYTES # BLD: 0.6 K/UL (ref 0.1–1.3)
MONOCYTES NFR BLD: 2 % (ref 4–12)
NEUTS SEG # BLD: 22.4 K/UL (ref 1.7–8.2)
NEUTS SEG NFR BLD: 92 % (ref 43–78)
NRBC # BLD: 0 K/UL (ref 0–0.2)
PLATELET # BLD AUTO: 233 K/UL (ref 150–450)
PMV BLD AUTO: 9.1 FL (ref 9.4–12.3)
POTASSIUM SERPL-SCNC: 4.7 MMOL/L (ref 3.5–5.1)
PROCALCITONIN SERPL-MCNC: 92.27 NG/ML (ref 0–0.49)
PROT SERPL-MCNC: 4.8 G/DL (ref 6.3–8.2)
RBC # BLD AUTO: 2.78 M/UL (ref 4.05–5.2)
SODIUM SERPL-SCNC: 139 MMOL/L (ref 133–143)
TIBC SERPL-MCNC: 128 UG/DL (ref 250–450)
WBC # BLD AUTO: 24.4 K/UL (ref 4.3–11.1)

## 2023-08-07 PROCEDURE — 85014 HEMATOCRIT: CPT

## 2023-08-07 PROCEDURE — 80053 COMPREHEN METABOLIC PANEL: CPT

## 2023-08-07 PROCEDURE — 6360000004 HC RX CONTRAST MEDICATION: Performed by: EMERGENCY MEDICINE

## 2023-08-07 PROCEDURE — 96365 THER/PROPH/DIAG IV INF INIT: CPT

## 2023-08-07 PROCEDURE — 86900 BLOOD TYPING SEROLOGIC ABO: CPT

## 2023-08-07 PROCEDURE — 82728 ASSAY OF FERRITIN: CPT

## 2023-08-07 PROCEDURE — 2000000000 HC ICU R&B

## 2023-08-07 PROCEDURE — 1100000000 HC RM PRIVATE

## 2023-08-07 PROCEDURE — 2580000003 HC RX 258: Performed by: EMERGENCY MEDICINE

## 2023-08-07 PROCEDURE — 93005 ELECTROCARDIOGRAM TRACING: CPT | Performed by: EMERGENCY MEDICINE

## 2023-08-07 PROCEDURE — 99285 EMERGENCY DEPT VISIT HI MDM: CPT

## 2023-08-07 PROCEDURE — 83605 ASSAY OF LACTIC ACID: CPT

## 2023-08-07 PROCEDURE — 85018 HEMOGLOBIN: CPT

## 2023-08-07 PROCEDURE — 2500000003 HC RX 250 WO HCPCS: Performed by: EMERGENCY MEDICINE

## 2023-08-07 PROCEDURE — 84145 PROCALCITONIN (PCT): CPT

## 2023-08-07 PROCEDURE — 6360000002 HC RX W HCPCS: Performed by: EMERGENCY MEDICINE

## 2023-08-07 PROCEDURE — 86923 COMPATIBILITY TEST ELECTRIC: CPT

## 2023-08-07 PROCEDURE — 81001 URINALYSIS AUTO W/SCOPE: CPT

## 2023-08-07 PROCEDURE — 87040 BLOOD CULTURE FOR BACTERIA: CPT

## 2023-08-07 PROCEDURE — 86901 BLOOD TYPING SEROLOGIC RH(D): CPT

## 2023-08-07 PROCEDURE — 83690 ASSAY OF LIPASE: CPT

## 2023-08-07 PROCEDURE — 94762 N-INVAS EAR/PLS OXIMTRY CONT: CPT

## 2023-08-07 PROCEDURE — 86850 RBC ANTIBODY SCREEN: CPT

## 2023-08-07 PROCEDURE — 74177 CT ABD & PELVIS W/CONTRAST: CPT

## 2023-08-07 PROCEDURE — 96361 HYDRATE IV INFUSION ADD-ON: CPT

## 2023-08-07 PROCEDURE — 85025 COMPLETE CBC W/AUTO DIFF WBC: CPT

## 2023-08-07 PROCEDURE — 83540 ASSAY OF IRON: CPT

## 2023-08-07 PROCEDURE — 83550 IRON BINDING TEST: CPT

## 2023-08-07 RX ORDER — SODIUM CHLORIDE, SODIUM LACTATE, POTASSIUM CHLORIDE, CALCIUM CHLORIDE 600; 310; 30; 20 MG/100ML; MG/100ML; MG/100ML; MG/100ML
INJECTION, SOLUTION INTRAVENOUS CONTINUOUS
Status: DISCONTINUED | OUTPATIENT
Start: 2023-08-07 | End: 2023-08-10

## 2023-08-07 RX ORDER — ONDANSETRON 2 MG/ML
4 INJECTION INTRAMUSCULAR; INTRAVENOUS ONCE
Status: COMPLETED | OUTPATIENT
Start: 2023-08-07 | End: 2023-08-08

## 2023-08-07 RX ORDER — HYDROMORPHONE HYDROCHLORIDE 1 MG/ML
0.5 INJECTION, SOLUTION INTRAMUSCULAR; INTRAVENOUS; SUBCUTANEOUS
Status: DISCONTINUED | OUTPATIENT
Start: 2023-08-07 | End: 2023-08-13

## 2023-08-07 RX ORDER — 0.9 % SODIUM CHLORIDE 0.9 %
1000 INTRAVENOUS SOLUTION INTRAVENOUS ONCE
Status: COMPLETED | OUTPATIENT
Start: 2023-08-07 | End: 2023-08-07

## 2023-08-07 RX ORDER — ACETAMINOPHEN 650 MG/1
650 SUPPOSITORY RECTAL EVERY 6 HOURS PRN
Status: DISCONTINUED | OUTPATIENT
Start: 2023-08-07 | End: 2023-09-08 | Stop reason: HOSPADM

## 2023-08-07 RX ORDER — MIDODRINE HYDROCHLORIDE 5 MG/1
10 TABLET ORAL 3 TIMES DAILY
Status: DISCONTINUED | OUTPATIENT
Start: 2023-08-08 | End: 2023-08-08

## 2023-08-07 RX ORDER — SODIUM CHLORIDE 0.9 % (FLUSH) 0.9 %
5-40 SYRINGE (ML) INJECTION PRN
Status: DISCONTINUED | OUTPATIENT
Start: 2023-08-07 | End: 2023-08-21

## 2023-08-07 RX ORDER — SODIUM CHLORIDE 9 MG/ML
INJECTION, SOLUTION INTRAVENOUS PRN
Status: DISCONTINUED | OUTPATIENT
Start: 2023-08-07 | End: 2023-08-24

## 2023-08-07 RX ORDER — NOREPINEPHRINE BITARTRATE 0.02 MG/ML
1-100 INJECTION, SOLUTION INTRAVENOUS CONTINUOUS
Status: DISCONTINUED | OUTPATIENT
Start: 2023-08-07 | End: 2023-08-08

## 2023-08-07 RX ORDER — SODIUM CHLORIDE 9 MG/ML
INJECTION, SOLUTION INTRAVENOUS PRN
Status: DISCONTINUED | OUTPATIENT
Start: 2023-08-07 | End: 2023-08-14

## 2023-08-07 RX ORDER — ACETAMINOPHEN 325 MG/1
650 TABLET ORAL EVERY 6 HOURS PRN
Status: DISCONTINUED | OUTPATIENT
Start: 2023-08-07 | End: 2023-09-08 | Stop reason: HOSPADM

## 2023-08-07 RX ORDER — FUROSEMIDE 10 MG/ML
40 INJECTION INTRAMUSCULAR; INTRAVENOUS
Status: ACTIVE | OUTPATIENT
Start: 2023-08-07 | End: 2023-08-08

## 2023-08-07 RX ORDER — DEXTROSE AND SODIUM CHLORIDE 5; .9 G/100ML; G/100ML
INJECTION, SOLUTION INTRAVENOUS ONCE
Status: DISCONTINUED | OUTPATIENT
Start: 2023-08-07 | End: 2023-08-07

## 2023-08-07 RX ORDER — SODIUM CHLORIDE, SODIUM LACTATE, POTASSIUM CHLORIDE, CALCIUM CHLORIDE 600; 310; 30; 20 MG/100ML; MG/100ML; MG/100ML; MG/100ML
INJECTION, SOLUTION INTRAVENOUS CONTINUOUS
Status: DISCONTINUED | OUTPATIENT
Start: 2023-08-08 | End: 2023-08-07

## 2023-08-07 RX ORDER — HYDROMORPHONE HYDROCHLORIDE 1 MG/ML
1 INJECTION, SOLUTION INTRAMUSCULAR; INTRAVENOUS; SUBCUTANEOUS
Status: DISCONTINUED | OUTPATIENT
Start: 2023-08-07 | End: 2023-08-13

## 2023-08-07 RX ORDER — MIDODRINE HYDROCHLORIDE 5 MG/1
5 TABLET ORAL 3 TIMES DAILY
Status: DISCONTINUED | OUTPATIENT
Start: 2023-08-07 | End: 2023-08-07

## 2023-08-07 RX ORDER — SODIUM CHLORIDE 0.9 % (FLUSH) 0.9 %
5-40 SYRINGE (ML) INJECTION EVERY 12 HOURS SCHEDULED
Status: DISCONTINUED | OUTPATIENT
Start: 2023-08-08 | End: 2023-09-08 | Stop reason: HOSPADM

## 2023-08-07 RX ORDER — OXYCODONE HYDROCHLORIDE 5 MG/1
10 TABLET ORAL
Status: DISCONTINUED | OUTPATIENT
Start: 2023-08-07 | End: 2023-08-14

## 2023-08-07 RX ADMIN — SODIUM CHLORIDE 1000 ML: 9 INJECTION, SOLUTION INTRAVENOUS at 19:15

## 2023-08-07 RX ADMIN — SODIUM CHLORIDE 1000 ML: 9 INJECTION, SOLUTION INTRAVENOUS at 20:22

## 2023-08-07 RX ADMIN — IOPAMIDOL 100 ML: 755 INJECTION, SOLUTION INTRAVENOUS at 19:27

## 2023-08-07 RX ADMIN — NOREPINEPHRINE BITARTRATE 5 MCG/MIN: 1 SOLUTION INTRAVENOUS at 22:35

## 2023-08-07 RX ADMIN — CEFEPIME 2000 MG: 2 INJECTION, POWDER, FOR SOLUTION INTRAVENOUS at 21:02

## 2023-08-07 ASSESSMENT — ENCOUNTER SYMPTOMS
COUGH: 0
VOMITING: 0
SORE THROAT: 0
BLOOD IN STOOL: 0
BACK PAIN: 1
DIARRHEA: 0
SHORTNESS OF BREATH: 0
ABDOMINAL PAIN: 1
NAUSEA: 0
CONSTIPATION: 0

## 2023-08-07 NOTE — ED PROVIDER NOTES
[employer] Emergency Department Provider Note                   PCP:                Eloisa Dancer, MD               Age: 82 y.o. Sex: female     MEDICAL DECISION MAKING  Complexity of Problems Addressed:   1 or more chronic illness with an exacerbation or progression    Data Reviewed and Analyzed:  Category 1:    I have reviewed outside records from an external source for any pertinent PMH, ED visits, primary care visits, specialist visits, labs, EKG, and/or radiologic studies. Category 2:         I independently ordered and reviewed the labs. I have reviewed the Radiologist interpretation of the radiologic studies. The patient was admitted and I have discussed patient management with the admitting provider. Category 3:     Discussion of management or test interpretation:    MDM  Number of Diagnoses or Management Options  Acute pancreatitis, unspecified complication status, unspecified pancreatitis type  Anemia, unspecified type  Hypotension, unspecified hypotension type  Septicemia ([address])  Diagnosis management comments: Patient with chronic pancreatitis and recent admission for same presenting for abdominal pain and hypotension. Patient's home blood pressure has been 80s over 40s. Patient was given 2 L of IV fluid which covers for sepsis fluid bolus of 30 cc/kg. Patient had little improvement of blood pressure and was started on Levophed drip. Patient's lactate is elevated 2.7 with a white count of 24,000 which could be from sepsis. Patient was started on broad-spectrum antibiotic after blood cultures drawn. Patient's abdomen pelvic CT shows pancreatic pseudocyst in the tail as well as increased size of liver mass. Patient's hemoglobin came back low at 7.1. Patient does have anemia but her baseline is usually 8-9 range. I did order a transfusion of packed red cells. Hospitalist was consulted for admission.           Sara Bonner is a 82 y.o. female who presents to the Emergency

## 2023-08-07 NOTE — ED TRIAGE NOTES
Pt arrived via EMS from home. Pt c/o abd pain with nausea. Pt has hx of chronic pancreatitis. Pt able to eat last night. Pain worsened this morning at 0500. Pt took home oxy. BP on scene 80s/50s. Pt given 500ml saline.

## 2023-08-08 LAB
ALBUMIN SERPL-MCNC: 1.2 G/DL (ref 3.2–4.6)
ALBUMIN/GLOB SERPL: 0.4 (ref 0.4–1.6)
ALP SERPL-CCNC: 140 U/L (ref 50–136)
ALT SERPL-CCNC: 11 U/L (ref 12–65)
ANION GAP SERPL CALC-SCNC: 8 MMOL/L (ref 2–11)
APPEARANCE UR: CLEAR
AST SERPL-CCNC: 12 U/L (ref 15–37)
BACTERIA URNS QL MICRO: ABNORMAL /HPF
BASOPHILS # BLD: 0 K/UL (ref 0–0.2)
BASOPHILS NFR BLD: 0 % (ref 0–2)
BILIRUB SERPL-MCNC: 0.5 MG/DL (ref 0.2–1.1)
BILIRUB UR QL: NEGATIVE
BUN SERPL-MCNC: 13 MG/DL (ref 8–23)
CALCIUM SERPL-MCNC: 6.9 MG/DL (ref 8.3–10.4)
CASTS URNS QL MICRO: ABNORMAL /LPF
CHLORIDE SERPL-SCNC: 115 MMOL/L (ref 101–110)
CO2 SERPL-SCNC: 18 MMOL/L (ref 21–32)
COLOR UR: ABNORMAL
CREAT SERPL-MCNC: 0.6 MG/DL (ref 0.6–1)
DIFFERENTIAL METHOD BLD: ABNORMAL
EKG ATRIAL RATE: 69 BPM
EKG DIAGNOSIS: NORMAL
EKG P AXIS: 73 DEGREES
EKG P-R INTERVAL: 170 MS
EKG Q-T INTERVAL: 421 MS
EKG QRS DURATION: 89 MS
EKG QTC CALCULATION (BAZETT): 442 MS
EKG R AXIS: 69 DEGREES
EKG T AXIS: 77 DEGREES
EKG VENTRICULAR RATE: 66 BPM
EOSINOPHIL # BLD: 0 K/UL (ref 0–0.8)
EOSINOPHIL NFR BLD: 0 % (ref 0.5–7.8)
EPI CELLS #/AREA URNS HPF: ABNORMAL /HPF
ERYTHROCYTE [DISTWIDTH] IN BLOOD BY AUTOMATED COUNT: 19.9 % (ref 11.9–14.6)
GLOBULIN SER CALC-MCNC: 3.3 G/DL (ref 2.8–4.5)
GLUCOSE SERPL-MCNC: 80 MG/DL (ref 65–100)
GLUCOSE UR STRIP.AUTO-MCNC: NEGATIVE MG/DL
HCT VFR BLD AUTO: 24.3 % (ref 35.8–46.3)
HCT VFR BLD AUTO: 28 % (ref 35.8–46.3)
HCT VFR BLD AUTO: 29 % (ref 35.8–46.3)
HCT VFR BLD AUTO: 29.5 % (ref 35.8–46.3)
HEMOCCULT STL QL: POSITIVE
HGB BLD-MCNC: 7 G/DL (ref 11.7–15.4)
HGB BLD-MCNC: 8.6 G/DL (ref 11.7–15.4)
HGB BLD-MCNC: 8.8 G/DL (ref 11.7–15.4)
HGB BLD-MCNC: 9.1 G/DL (ref 11.7–15.4)
HGB UR QL STRIP: NEGATIVE
HISTORY CHECK: NORMAL
IMM GRANULOCYTES # BLD AUTO: 0.2 K/UL (ref 0–0.5)
IMM GRANULOCYTES NFR BLD AUTO: 1 % (ref 0–5)
KETONES UR QL STRIP.AUTO: NEGATIVE MG/DL
LACTATE SERPL-SCNC: 1.1 MMOL/L (ref 0.4–2)
LACTATE SERPL-SCNC: 1.8 MMOL/L (ref 0.4–2)
LEUKOCYTE ESTERASE UR QL STRIP.AUTO: ABNORMAL
LYMPHOCYTES # BLD: 1.4 K/UL (ref 0.5–4.6)
LYMPHOCYTES NFR BLD: 6 % (ref 13–44)
MAGNESIUM SERPL-MCNC: 1.5 MG/DL (ref 1.8–2.4)
MCH RBC QN AUTO: 26.4 PG (ref 26.1–32.9)
MCHC RBC AUTO-ENTMCNC: 31.4 G/DL (ref 31.4–35)
MCV RBC AUTO: 84.1 FL (ref 82–102)
MONOCYTES # BLD: 0.5 K/UL (ref 0.1–1.3)
MONOCYTES NFR BLD: 2 % (ref 4–12)
MUCOUS THREADS URNS QL MICRO: 0 /LPF
NEUTS SEG # BLD: 22.9 K/UL (ref 1.7–8.2)
NEUTS SEG NFR BLD: 91 % (ref 43–78)
NITRITE UR QL STRIP.AUTO: NEGATIVE
NRBC # BLD: 0 K/UL (ref 0–0.2)
PH UR STRIP: 6 (ref 5–9)
PLATELET # BLD AUTO: 154 K/UL (ref 150–450)
PMV BLD AUTO: 9.7 FL (ref 9.4–12.3)
POTASSIUM SERPL-SCNC: 3.4 MMOL/L (ref 3.5–5.1)
PROT SERPL-MCNC: 4.5 G/DL (ref 6.3–8.2)
PROT UR STRIP-MCNC: NEGATIVE MG/DL
RBC # BLD AUTO: 3.33 M/UL (ref 4.05–5.2)
RBC #/AREA URNS HPF: ABNORMAL /HPF
SODIUM SERPL-SCNC: 141 MMOL/L (ref 133–143)
SP GR UR REFRACTOMETRY: >1.035 (ref 1–1.02)
URINE CULTURE IF INDICATED: ABNORMAL
UROBILINOGEN UR QL STRIP.AUTO: 0.2 EU/DL (ref 0.2–1)
WBC # BLD AUTO: 25.1 K/UL (ref 4.3–11.1)
WBC URNS QL MICRO: ABNORMAL /HPF

## 2023-08-08 PROCEDURE — 30233N1 TRANSFUSION OF NONAUTOLOGOUS RED BLOOD CELLS INTO PERIPHERAL VEIN, PERCUTANEOUS APPROACH: ICD-10-PCS | Performed by: FAMILY MEDICINE

## 2023-08-08 PROCEDURE — 99222 1ST HOSP IP/OBS MODERATE 55: CPT | Performed by: INTERNAL MEDICINE

## 2023-08-08 PROCEDURE — 2500000003 HC RX 250 WO HCPCS: Performed by: FAMILY MEDICINE

## 2023-08-08 PROCEDURE — 85018 HEMOGLOBIN: CPT

## 2023-08-08 PROCEDURE — A4216 STERILE WATER/SALINE, 10 ML: HCPCS | Performed by: FAMILY MEDICINE

## 2023-08-08 PROCEDURE — P9016 RBC LEUKOCYTES REDUCED: HCPCS

## 2023-08-08 PROCEDURE — 1100000003 HC PRIVATE W/ TELEMETRY

## 2023-08-08 PROCEDURE — 83735 ASSAY OF MAGNESIUM: CPT

## 2023-08-08 PROCEDURE — 97112 NEUROMUSCULAR REEDUCATION: CPT

## 2023-08-08 PROCEDURE — 6370000000 HC RX 637 (ALT 250 FOR IP): Performed by: FAMILY MEDICINE

## 2023-08-08 PROCEDURE — 85014 HEMATOCRIT: CPT

## 2023-08-08 PROCEDURE — 85025 COMPLETE CBC W/AUTO DIFF WBC: CPT

## 2023-08-08 PROCEDURE — C9113 INJ PANTOPRAZOLE SODIUM, VIA: HCPCS | Performed by: FAMILY MEDICINE

## 2023-08-08 PROCEDURE — 6360000002 HC RX W HCPCS: Performed by: FAMILY MEDICINE

## 2023-08-08 PROCEDURE — 99223 1ST HOSP IP/OBS HIGH 75: CPT | Performed by: INTERNAL MEDICINE

## 2023-08-08 PROCEDURE — 2580000003 HC RX 258: Performed by: FAMILY MEDICINE

## 2023-08-08 PROCEDURE — 36430 TRANSFUSION BLD/BLD COMPNT: CPT

## 2023-08-08 PROCEDURE — 80053 COMPREHEN METABOLIC PANEL: CPT

## 2023-08-08 PROCEDURE — 36415 COLL VENOUS BLD VENIPUNCTURE: CPT

## 2023-08-08 PROCEDURE — 82272 OCCULT BLD FECES 1-3 TESTS: CPT

## 2023-08-08 PROCEDURE — 97530 THERAPEUTIC ACTIVITIES: CPT

## 2023-08-08 PROCEDURE — 83605 ASSAY OF LACTIC ACID: CPT

## 2023-08-08 PROCEDURE — 97161 PT EVAL LOW COMPLEX 20 MIN: CPT

## 2023-08-08 PROCEDURE — 97165 OT EVAL LOW COMPLEX 30 MIN: CPT

## 2023-08-08 PROCEDURE — 6360000002 HC RX W HCPCS: Performed by: EMERGENCY MEDICINE

## 2023-08-08 PROCEDURE — 93010 ELECTROCARDIOGRAM REPORT: CPT | Performed by: INTERNAL MEDICINE

## 2023-08-08 RX ORDER — MAGNESIUM SULFATE IN WATER 40 MG/ML
2000 INJECTION, SOLUTION INTRAVENOUS ONCE
Status: COMPLETED | OUTPATIENT
Start: 2023-08-08 | End: 2023-08-08

## 2023-08-08 RX ORDER — SODIUM BICARBONATE 650 MG/1
1300 TABLET ORAL 2 TIMES DAILY
Status: DISCONTINUED | OUTPATIENT
Start: 2023-08-08 | End: 2023-08-24

## 2023-08-08 RX ORDER — POTASSIUM CHLORIDE 7.45 MG/ML
10 INJECTION INTRAVENOUS
Status: COMPLETED | OUTPATIENT
Start: 2023-08-08 | End: 2023-08-08

## 2023-08-08 RX ADMIN — SODIUM CHLORIDE, POTASSIUM CHLORIDE, SODIUM LACTATE AND CALCIUM CHLORIDE: 600; 310; 30; 20 INJECTION, SOLUTION INTRAVENOUS at 08:51

## 2023-08-08 RX ADMIN — SODIUM CHLORIDE, PRESERVATIVE FREE 40 MG: 5 INJECTION INTRAVENOUS at 08:27

## 2023-08-08 RX ADMIN — OXYCODONE HYDROCHLORIDE 10 MG: 5 TABLET ORAL at 08:37

## 2023-08-08 RX ADMIN — PANCRELIPASE LIPASE, PANCRELIPASE PROTEASE, PANCRELIPASE AMYLASE 5000 UNITS: 5000; 17000; 24000 CAPSULE, DELAYED RELEASE ORAL at 08:28

## 2023-08-08 RX ADMIN — OXYCODONE HYDROCHLORIDE 10 MG: 5 TABLET ORAL at 13:20

## 2023-08-08 RX ADMIN — PIPERACILLIN SODIUM AND TAZOBACTAM SODIUM 3375 MG: 3; .375 INJECTION, POWDER, LYOPHILIZED, FOR SOLUTION INTRAVENOUS at 23:23

## 2023-08-08 RX ADMIN — POTASSIUM CHLORIDE 10 MEQ: 7.46 INJECTION, SOLUTION INTRAVENOUS at 12:21

## 2023-08-08 RX ADMIN — SODIUM CHLORIDE: 9 INJECTION, SOLUTION INTRAVENOUS at 00:38

## 2023-08-08 RX ADMIN — PIPERACILLIN SODIUM AND TAZOBACTAM SODIUM 3375 MG: 3; .375 INJECTION, POWDER, LYOPHILIZED, FOR SOLUTION INTRAVENOUS at 08:28

## 2023-08-08 RX ADMIN — PIPERACILLIN AND TAZOBACTAM 4500 MG: 4; .5 INJECTION, POWDER, FOR SOLUTION INTRAVENOUS at 00:42

## 2023-08-08 RX ADMIN — HYDROMORPHONE HYDROCHLORIDE 1 MG: 1 INJECTION, SOLUTION INTRAMUSCULAR; INTRAVENOUS; SUBCUTANEOUS at 00:30

## 2023-08-08 RX ADMIN — OXYCODONE HYDROCHLORIDE 10 MG: 5 TABLET ORAL at 18:27

## 2023-08-08 RX ADMIN — SODIUM CHLORIDE, PRESERVATIVE FREE 40 MG: 5 INJECTION INTRAVENOUS at 21:05

## 2023-08-08 RX ADMIN — SODIUM CHLORIDE, PRESERVATIVE FREE 10 ML: 5 INJECTION INTRAVENOUS at 08:42

## 2023-08-08 RX ADMIN — PANCRELIPASE LIPASE, PANCRELIPASE PROTEASE, PANCRELIPASE AMYLASE 5000 UNITS: 5000; 17000; 24000 CAPSULE, DELAYED RELEASE ORAL at 12:21

## 2023-08-08 RX ADMIN — SODIUM BICARBONATE 650 MG TABLET 1300 MG: at 21:05

## 2023-08-08 RX ADMIN — VANCOMYCIN HYDROCHLORIDE 750 MG: 750 INJECTION, POWDER, LYOPHILIZED, FOR SOLUTION INTRAVENOUS at 23:23

## 2023-08-08 RX ADMIN — TUBERCULIN PURIFIED PROTEIN DERIVATIVE 5 UNITS: 5 INJECTION, SOLUTION INTRADERMAL at 09:01

## 2023-08-08 RX ADMIN — MAGNESIUM SULFATE IN WATER 2000 MG: 40 INJECTION, SOLUTION INTRAVENOUS at 08:28

## 2023-08-08 RX ADMIN — ONDANSETRON 4 MG: 2 INJECTION INTRAMUSCULAR; INTRAVENOUS at 00:36

## 2023-08-08 RX ADMIN — SODIUM CHLORIDE, POTASSIUM CHLORIDE, SODIUM LACTATE AND CALCIUM CHLORIDE: 600; 310; 30; 20 INJECTION, SOLUTION INTRAVENOUS at 00:19

## 2023-08-08 RX ADMIN — POTASSIUM CHLORIDE 10 MEQ: 7.46 INJECTION, SOLUTION INTRAVENOUS at 08:29

## 2023-08-08 RX ADMIN — SODIUM CHLORIDE, POTASSIUM CHLORIDE, SODIUM LACTATE AND CALCIUM CHLORIDE: 600; 310; 30; 20 INJECTION, SOLUTION INTRAVENOUS at 20:05

## 2023-08-08 RX ADMIN — POTASSIUM CHLORIDE 10 MEQ: 7.46 INJECTION, SOLUTION INTRAVENOUS at 13:20

## 2023-08-08 RX ADMIN — SODIUM CHLORIDE, PRESERVATIVE FREE 10 ML: 5 INJECTION INTRAVENOUS at 21:05

## 2023-08-08 RX ADMIN — VANCOMYCIN HYDROCHLORIDE 1000 MG: 1 INJECTION, POWDER, LYOPHILIZED, FOR SOLUTION INTRAVENOUS at 01:56

## 2023-08-08 RX ADMIN — POTASSIUM CHLORIDE 10 MEQ: 7.46 INJECTION, SOLUTION INTRAVENOUS at 11:16

## 2023-08-08 RX ADMIN — PIPERACILLIN SODIUM AND TAZOBACTAM SODIUM 3375 MG: 3; .375 INJECTION, POWDER, LYOPHILIZED, FOR SOLUTION INTRAVENOUS at 17:14

## 2023-08-08 RX ADMIN — SODIUM CHLORIDE, PRESERVATIVE FREE 40 MG: 5 INJECTION INTRAVENOUS at 00:29

## 2023-08-08 ASSESSMENT — PAIN DESCRIPTION - DESCRIPTORS
DESCRIPTORS: ACHING
DESCRIPTORS: ACHING
DESCRIPTORS: SORE

## 2023-08-08 ASSESSMENT — PAIN SCALES - GENERAL
PAINLEVEL_OUTOF10: 8
PAINLEVEL_OUTOF10: 5
PAINLEVEL_OUTOF10: 3
PAINLEVEL_OUTOF10: 0
PAINLEVEL_OUTOF10: 3
PAINLEVEL_OUTOF10: 7
PAINLEVEL_OUTOF10: 3
PAINLEVEL_OUTOF10: 10
PAINLEVEL_OUTOF10: 8
PAINLEVEL_OUTOF10: 8

## 2023-08-08 ASSESSMENT — PAIN DESCRIPTION - FREQUENCY
FREQUENCY: CONTINUOUS
FREQUENCY: CONTINUOUS

## 2023-08-08 ASSESSMENT — PAIN DESCRIPTION - ONSET
ONSET: ON-GOING
ONSET: ON-GOING

## 2023-08-08 ASSESSMENT — PAIN DESCRIPTION - LOCATION
LOCATION: ABDOMEN

## 2023-08-08 ASSESSMENT — PAIN DESCRIPTION - PAIN TYPE
TYPE: CHRONIC PAIN
TYPE: CHRONIC PAIN

## 2023-08-08 ASSESSMENT — PAIN DESCRIPTION - ORIENTATION
ORIENTATION: LEFT;LOWER;RIGHT
ORIENTATION: RIGHT
ORIENTATION: LEFT

## 2023-08-08 NOTE — CONSENT
Informed Consent for Blood Component Transfusion Note    I have discussed with the patient the rationale for blood component transfusion; its benefits in treating or preventing fatigue, organ damage, or death; and its risk which includes mild transfusion reactions, rare risk of blood borne infection, or more serious but rare reactions. I have discussed the alternatives to transfusion, including the risk and consequences of not receiving transfusion. The patient had an opportunity to ask questions and had agreed to proceed with transfusion of blood components.     Electronically signed by Chris Villanueva MD on 8/7/23 at 9:30 PM EDT

## 2023-08-08 NOTE — INTERDISCIPLINARY ROUNDS
Multi-D Rounds/Checklist (leapfrog):  Lines: can any be removed?: None    External Urinary Catheter (Active)      DVT Prophylaxis: Ordered  Vent: N/A  Nutrition Ordered/appropriate: Per Primary Team  Can antibiotics or other drugs be stopped? Yes/No  Is Nozin performed:  NA  Yes/End Date set Yes/No  Inpat Anti-Infectives (From admission, onward)       Start     Ordered Stop    08/09/23 0200  vancomycin (VANCOCIN) 750 mg in sodium chloride 0.9 % 250 mL IVPB ([id number])  750 mg,   IntraVENous,   EVERY 24 HOURS         08/08/23 0345 08/15/23 0159    08/08/23 0800  piperacillin-tazobactam (ZOSYN) 3,375 mg in sodium chloride 0.9 % 50 mL IVPB (mini-bag)  3,375 mg,   IntraVENous,   Every 8 hours         08/07/23 2352 --                  Consults needed:  hematology/oncology  A: Is pain control adequate? (has PRNs? Stop drip?) Yes  B: Sedation break and SBT? N/A  C: Is sedation choice appropriate? N/A  D: Delirium/CAM-ICU? No  E: Mobility goals/appropriateness? Yes  F: Family update and plan? Daughter is primary contact and is being updated daily by primary attending and nursing staff.     JACINTO Guzman

## 2023-08-08 NOTE — WOUND CARE
Right sacral area with 2x1cm open area with surrounding erythema, stage 2, and coccyx/ top of cleft with 2x1cm stage 2. Perianal areas with erythema and partial thickness loss/ irritation MASD. Recommend foam dressing to sacral/ coccyx area and zinc barrier to perianal areas. Continue diligent turning and incontinence care. Patient is in briefs and purwick is in use.

## 2023-08-08 NOTE — ED NOTES
TRANSFER - OUT REPORT:    Verbal report given to Kwesi Dorothy on Russell Ward  being transferred to [facility] for routine progression of patient care       Report consisted of patient's Situation, Background, Assessment and   Recommendations(SBAR). Information from the following report(s) Nurse Handoff Report was reviewed with the receiving nurse. Withams Fall Assessment:                           Lines:   Peripheral IV 08/07/23 Left;Proximal;Anterior Forearm (Active)        Opportunity for questions and clarification was provided. Patient transported with:  Registered Nurse           Michael Reynolds.  [address]  08/07/23 9564

## 2023-08-08 NOTE — H&P
Hospitalist History and Physical   Admit Date:  2023  5:28 PM   Name:  Farooq Reyes   Age:  82 y.o. Sex:  female  :  1940   MRN:  [id number]   Room:  [place] [address]    Presenting/Chief Complaint: abd pain, nausea    Reason(s) for Admission: No admission diagnoses are documented for this encounter. History of Present Illness:   Farooq Reyes is a 82 y.o. female with medical history of chronic pancreatitis, remote perforated duodenal ulcer requiring Billroth II anastomosis, rheumatoid arthritis on Enbrel, chronic anemia with iron and B12 deficiency, current smoker, squamous cell lung cancer (s/p R thoracoscopy, RLL lobectomy, mediastinal lymphadenectomy 3/13/23), COPD, breast cancer, HTN, PAD, and osteoporosis who presented via EMS from home with c/o abdominal pain and nausea that started this AM 0500. Upon EMS arrival, BP 80s/50s. She rec'd 500 cc bolus NS en route. Scr 0.9 (Bcr </=0.6) albumin 1.4 alk phos 149 lipase 730 WBC 24.4k hgb 7.1 (down from BL ~9-10) LA 2.7     CT chest, a/p IV contast      1. There is a filling defect, clot within the right main pulmonary artery at the   level of the resection margin. This is favored to represent postoperative   change rather than embolization of deep vein thrombosis. 2. A lobular lesion in the hepatic dome has again enlarged over the interim. Metastatic disease should be strongly considered. 3. Lobular pocket of fluid along the pancreatic tail. This most likely   represents pseudocyst formation. 4. Small right pleural effusion. 5. Mesenteric edema and trace abdominopelvic free fluid. In ed, she rec'd cefepime 2g, 2 L NS, 1 Uprbc ordered     C/o severe abdominal pain similar to prior episodes of pancreatitis as well as increased thirst and myalgias.        Assessment & Plan:     Shock - septic vs hypovolemic w/SIRS due to acute on chronic pancreatitis with pseudocyst and sympmtomatic anemia   S/p 2.5 L NS total   S/p

## 2023-08-09 ENCOUNTER — APPOINTMENT (OUTPATIENT)
Dept: CT IMAGING | Age: 83
DRG: 871 | End: 2023-08-09
Payer: MEDICARE

## 2023-08-09 LAB
ABO + RH BLD: NORMAL
ALBUMIN SERPL-MCNC: 1.1 G/DL (ref 3.2–4.6)
ALBUMIN/GLOB SERPL: 0.4 (ref 0.4–1.6)
ALP SERPL-CCNC: 140 U/L (ref 50–136)
ALT SERPL-CCNC: 10 U/L (ref 12–65)
ANION GAP SERPL CALC-SCNC: 5 MMOL/L (ref 2–11)
AST SERPL-CCNC: 12 U/L (ref 15–37)
BASOPHILS # BLD: 0 K/UL (ref 0–0.2)
BASOPHILS NFR BLD: 0 % (ref 0–2)
BILIRUB SERPL-MCNC: 0.5 MG/DL (ref 0.2–1.1)
BLD PROD TYP BPU: NORMAL
BLOOD BANK CMNT PATIENT-IMP: NORMAL
BLOOD BANK DISPENSE STATUS: NORMAL
BLOOD GROUP ANTIBODIES SERPL: NORMAL
BPU ID: NORMAL
BUN SERPL-MCNC: 11 MG/DL (ref 8–23)
CALCIUM SERPL-MCNC: 7 MG/DL (ref 8.3–10.4)
CHLORIDE SERPL-SCNC: 114 MMOL/L (ref 101–110)
CO2 SERPL-SCNC: 20 MMOL/L (ref 21–32)
CREAT SERPL-MCNC: 0.5 MG/DL (ref 0.6–1)
CROSSMATCH RESULT: NORMAL
DIFFERENTIAL METHOD BLD: ABNORMAL
EOSINOPHIL # BLD: 0 K/UL (ref 0–0.8)
EOSINOPHIL NFR BLD: 0 % (ref 0.5–7.8)
ERYTHROCYTE [DISTWIDTH] IN BLOOD BY AUTOMATED COUNT: 20.8 % (ref 11.9–14.6)
GLOBULIN SER CALC-MCNC: 2.8 G/DL (ref 2.8–4.5)
GLUCOSE SERPL-MCNC: 85 MG/DL (ref 65–100)
HCT VFR BLD AUTO: 26.5 % (ref 35.8–46.3)
HCT VFR BLD AUTO: 26.7 % (ref 35.8–46.3)
HGB BLD-MCNC: 8.4 G/DL (ref 11.7–15.4)
HGB BLD-MCNC: 8.4 G/DL (ref 11.7–15.4)
IMM GRANULOCYTES # BLD AUTO: 0.1 K/UL (ref 0–0.5)
IMM GRANULOCYTES NFR BLD AUTO: 1 % (ref 0–5)
LIPASE SERPL-CCNC: 414 U/L (ref 73–393)
LYMPHOCYTES # BLD: 1.1 K/UL (ref 0.5–4.6)
LYMPHOCYTES NFR BLD: 7 % (ref 13–44)
MAGNESIUM SERPL-MCNC: 1.9 MG/DL (ref 1.8–2.4)
MCH RBC QN AUTO: 26.4 PG (ref 26.1–32.9)
MCHC RBC AUTO-ENTMCNC: 31.7 G/DL (ref 31.4–35)
MCV RBC AUTO: 83.3 FL (ref 82–102)
MONOCYTES # BLD: 0.4 K/UL (ref 0.1–1.3)
MONOCYTES NFR BLD: 3 % (ref 4–12)
NEUTS SEG # BLD: 14.9 K/UL (ref 1.7–8.2)
NEUTS SEG NFR BLD: 90 % (ref 43–78)
NRBC # BLD: 0 K/UL (ref 0–0.2)
PLATELET # BLD AUTO: 168 K/UL (ref 150–450)
PMV BLD AUTO: 9.5 FL (ref 9.4–12.3)
POTASSIUM SERPL-SCNC: 3.8 MMOL/L (ref 3.5–5.1)
PROT SERPL-MCNC: 3.9 G/DL (ref 6.3–8.2)
RBC # BLD AUTO: 3.18 M/UL (ref 4.05–5.2)
SODIUM SERPL-SCNC: 139 MMOL/L (ref 133–143)
SPECIMEN EXP DATE BLD: NORMAL
UNIT DIVISION: 0
VANCOMYCIN SERPL-MCNC: 14.8 UG/ML
WBC # BLD AUTO: 16.5 K/UL (ref 4.3–11.1)

## 2023-08-09 PROCEDURE — 80202 ASSAY OF VANCOMYCIN: CPT

## 2023-08-09 PROCEDURE — 6360000004 HC RX CONTRAST MEDICATION: Performed by: PHYSICIAN ASSISTANT

## 2023-08-09 PROCEDURE — 2580000003 HC RX 258: Performed by: PHYSICIAN ASSISTANT

## 2023-08-09 PROCEDURE — 2500000003 HC RX 250 WO HCPCS: Performed by: FAMILY MEDICINE

## 2023-08-09 PROCEDURE — 80053 COMPREHEN METABOLIC PANEL: CPT

## 2023-08-09 PROCEDURE — 99222 1ST HOSP IP/OBS MODERATE 55: CPT | Performed by: SURGERY

## 2023-08-09 PROCEDURE — 83690 ASSAY OF LIPASE: CPT

## 2023-08-09 PROCEDURE — 1100000003 HC PRIVATE W/ TELEMETRY

## 2023-08-09 PROCEDURE — 85018 HEMOGLOBIN: CPT

## 2023-08-09 PROCEDURE — 6370000000 HC RX 637 (ALT 250 FOR IP): Performed by: FAMILY MEDICINE

## 2023-08-09 PROCEDURE — 83735 ASSAY OF MAGNESIUM: CPT

## 2023-08-09 PROCEDURE — C9113 INJ PANTOPRAZOLE SODIUM, VIA: HCPCS | Performed by: FAMILY MEDICINE

## 2023-08-09 PROCEDURE — 6360000002 HC RX W HCPCS: Performed by: FAMILY MEDICINE

## 2023-08-09 PROCEDURE — 6360000002 HC RX W HCPCS: Performed by: INTERNAL MEDICINE

## 2023-08-09 PROCEDURE — 85014 HEMATOCRIT: CPT

## 2023-08-09 PROCEDURE — 99232 SBSQ HOSP IP/OBS MODERATE 35: CPT | Performed by: INTERNAL MEDICINE

## 2023-08-09 PROCEDURE — 36415 COLL VENOUS BLD VENIPUNCTURE: CPT

## 2023-08-09 PROCEDURE — A4216 STERILE WATER/SALINE, 10 ML: HCPCS | Performed by: FAMILY MEDICINE

## 2023-08-09 PROCEDURE — 2580000003 HC RX 258: Performed by: FAMILY MEDICINE

## 2023-08-09 PROCEDURE — 85025 COMPLETE CBC W/AUTO DIFF WBC: CPT

## 2023-08-09 PROCEDURE — 51798 US URINE CAPACITY MEASURE: CPT

## 2023-08-09 PROCEDURE — 2580000003 HC RX 258: Performed by: INTERNAL MEDICINE

## 2023-08-09 PROCEDURE — 74160 CT ABDOMEN W/CONTRAST: CPT

## 2023-08-09 RX ORDER — SODIUM CHLORIDE 0.9 % (FLUSH) 0.9 %
10 SYRINGE (ML) INJECTION
Status: DISCONTINUED | OUTPATIENT
Start: 2023-08-09 | End: 2023-08-24

## 2023-08-09 RX ORDER — 0.9 % SODIUM CHLORIDE 0.9 %
100 INTRAVENOUS SOLUTION INTRAVENOUS ONCE
Status: COMPLETED | OUTPATIENT
Start: 2023-08-09 | End: 2023-08-09

## 2023-08-09 RX ADMIN — SODIUM CHLORIDE, PRESERVATIVE FREE 10 ML: 5 INJECTION INTRAVENOUS at 08:13

## 2023-08-09 RX ADMIN — SODIUM CHLORIDE 100 ML: 9 INJECTION, SOLUTION INTRAVENOUS at 13:51

## 2023-08-09 RX ADMIN — SODIUM BICARBONATE 650 MG TABLET 1300 MG: at 08:11

## 2023-08-09 RX ADMIN — HYDROMORPHONE HYDROCHLORIDE 1 MG: 1 INJECTION, SOLUTION INTRAMUSCULAR; INTRAVENOUS; SUBCUTANEOUS at 18:31

## 2023-08-09 RX ADMIN — SODIUM CHLORIDE, PRESERVATIVE FREE 40 MG: 5 INJECTION INTRAVENOUS at 20:47

## 2023-08-09 RX ADMIN — SODIUM BICARBONATE 650 MG TABLET 1300 MG: at 20:48

## 2023-08-09 RX ADMIN — HYDROMORPHONE HYDROCHLORIDE 1 MG: 1 INJECTION, SOLUTION INTRAMUSCULAR; INTRAVENOUS; SUBCUTANEOUS at 11:37

## 2023-08-09 RX ADMIN — PIPERACILLIN SODIUM AND TAZOBACTAM SODIUM 3375 MG: 3; .375 INJECTION, POWDER, LYOPHILIZED, FOR SOLUTION INTRAVENOUS at 08:08

## 2023-08-09 RX ADMIN — SODIUM CHLORIDE, POTASSIUM CHLORIDE, SODIUM LACTATE AND CALCIUM CHLORIDE: 600; 310; 30; 20 INJECTION, SOLUTION INTRAVENOUS at 11:32

## 2023-08-09 RX ADMIN — PANCRELIPASE LIPASE, PANCRELIPASE PROTEASE, PANCRELIPASE AMYLASE 5000 UNITS: 5000; 17000; 24000 CAPSULE, DELAYED RELEASE ORAL at 11:40

## 2023-08-09 RX ADMIN — DIATRIZOATE MEGLUMINE AND DIATRIZOATE SODIUM 15 ML: 660; 100 LIQUID ORAL; RECTAL at 13:56

## 2023-08-09 RX ADMIN — PIPERACILLIN SODIUM AND TAZOBACTAM SODIUM 3375 MG: 3; .375 INJECTION, POWDER, LYOPHILIZED, FOR SOLUTION INTRAVENOUS at 16:32

## 2023-08-09 RX ADMIN — PANCRELIPASE LIPASE, PANCRELIPASE PROTEASE, PANCRELIPASE AMYLASE 5000 UNITS: 5000; 17000; 24000 CAPSULE, DELAYED RELEASE ORAL at 16:32

## 2023-08-09 RX ADMIN — IOPAMIDOL 100 ML: 755 INJECTION, SOLUTION INTRAVENOUS at 15:18

## 2023-08-09 RX ADMIN — SODIUM CHLORIDE, POTASSIUM CHLORIDE, SODIUM LACTATE AND CALCIUM CHLORIDE: 600; 310; 30; 20 INJECTION, SOLUTION INTRAVENOUS at 19:30

## 2023-08-09 RX ADMIN — SODIUM CHLORIDE, PRESERVATIVE FREE 40 MG: 5 INJECTION INTRAVENOUS at 08:14

## 2023-08-09 RX ADMIN — PANCRELIPASE LIPASE, PANCRELIPASE PROTEASE, PANCRELIPASE AMYLASE 5000 UNITS: 5000; 17000; 24000 CAPSULE, DELAYED RELEASE ORAL at 08:14

## 2023-08-09 RX ADMIN — SODIUM CHLORIDE, PRESERVATIVE FREE 10 ML: 5 INJECTION INTRAVENOUS at 20:48

## 2023-08-09 RX ADMIN — VANCOMYCIN HYDROCHLORIDE 750 MG: 750 INJECTION, POWDER, LYOPHILIZED, FOR SOLUTION INTRAVENOUS at 13:50

## 2023-08-09 ASSESSMENT — PAIN SCALES - GENERAL
PAINLEVEL_OUTOF10: 10
PAINLEVEL_OUTOF10: 10
PAINLEVEL_OUTOF10: 0
PAINLEVEL_OUTOF10: 8
PAINLEVEL_OUTOF10: 0

## 2023-08-09 ASSESSMENT — PAIN DESCRIPTION - LOCATION
LOCATION: ABDOMEN

## 2023-08-09 ASSESSMENT — PAIN DESCRIPTION - DESCRIPTORS
DESCRIPTORS: ACHING
DESCRIPTORS: ACHING

## 2023-08-10 ENCOUNTER — APPOINTMENT (OUTPATIENT)
Dept: CT IMAGING | Age: 83
DRG: 871 | End: 2023-08-10
Payer: MEDICARE

## 2023-08-10 LAB
ALBUMIN SERPL-MCNC: 0.9 G/DL (ref 3.2–4.6)
ALBUMIN/GLOB SERPL: 0.3 (ref 0.4–1.6)
ALP SERPL-CCNC: 128 U/L (ref 50–136)
ALT SERPL-CCNC: 11 U/L (ref 12–65)
ANION GAP SERPL CALC-SCNC: 8 MMOL/L (ref 2–11)
APTT PPP: 86.6 SEC (ref 24.5–34.2)
AST SERPL-CCNC: 18 U/L (ref 15–37)
BASOPHILS # BLD: 0 K/UL (ref 0–0.2)
BASOPHILS NFR BLD: 0 % (ref 0–2)
BILIRUB SERPL-MCNC: 0.4 MG/DL (ref 0.2–1.1)
BUN SERPL-MCNC: 8 MG/DL (ref 8–23)
CALCIUM SERPL-MCNC: 7.2 MG/DL (ref 8.3–10.4)
CHLORIDE SERPL-SCNC: 115 MMOL/L (ref 101–110)
CO2 SERPL-SCNC: 19 MMOL/L (ref 21–32)
CREAT SERPL-MCNC: 0.4 MG/DL (ref 0.6–1)
DIFFERENTIAL METHOD BLD: ABNORMAL
EOSINOPHIL # BLD: 0 K/UL (ref 0–0.8)
EOSINOPHIL NFR BLD: 0 % (ref 0.5–7.8)
ERYTHROCYTE [DISTWIDTH] IN BLOOD BY AUTOMATED COUNT: 21 % (ref 11.9–14.6)
ERYTHROCYTE [DISTWIDTH] IN BLOOD BY AUTOMATED COUNT: 21.3 % (ref 11.9–14.6)
GLOBULIN SER CALC-MCNC: 3 G/DL (ref 2.8–4.5)
GLUCOSE SERPL-MCNC: 79 MG/DL (ref 65–100)
HCT VFR BLD AUTO: 24.7 % (ref 35.8–46.3)
HCT VFR BLD AUTO: 27.7 % (ref 35.8–46.3)
HGB BLD-MCNC: 7.8 G/DL (ref 11.7–15.4)
HGB BLD-MCNC: 8.4 G/DL (ref 11.7–15.4)
IMM GRANULOCYTES # BLD AUTO: 0.1 K/UL (ref 0–0.5)
IMM GRANULOCYTES NFR BLD AUTO: 1 % (ref 0–5)
INR PPP: 1.5
LYMPHOCYTES # BLD: 0.9 K/UL (ref 0.5–4.6)
LYMPHOCYTES NFR BLD: 7 % (ref 13–44)
MAGNESIUM SERPL-MCNC: 1.5 MG/DL (ref 1.8–2.4)
MCH RBC QN AUTO: 26.3 PG (ref 26.1–32.9)
MCH RBC QN AUTO: 26.4 PG (ref 26.1–32.9)
MCHC RBC AUTO-ENTMCNC: 30.3 G/DL (ref 31.4–35)
MCHC RBC AUTO-ENTMCNC: 31.6 G/DL (ref 31.4–35)
MCV RBC AUTO: 83.7 FL (ref 82–102)
MCV RBC AUTO: 86.8 FL (ref 82–102)
MM INDURATION, POC: 0 MM (ref 0–5)
MONOCYTES # BLD: 0.4 K/UL (ref 0.1–1.3)
MONOCYTES NFR BLD: 3 % (ref 4–12)
NEUTS SEG # BLD: 11.7 K/UL (ref 1.7–8.2)
NEUTS SEG NFR BLD: 89 % (ref 43–78)
NRBC # BLD: 0 K/UL (ref 0–0.2)
NRBC # BLD: 0 K/UL (ref 0–0.2)
PLATELET # BLD AUTO: 157 K/UL (ref 150–450)
PLATELET # BLD AUTO: 72 K/UL (ref 150–450)
PMV BLD AUTO: 10.2 FL (ref 9.4–12.3)
PMV BLD AUTO: 10.8 FL (ref 9.4–12.3)
POTASSIUM SERPL-SCNC: 3.3 MMOL/L (ref 3.5–5.1)
PPD, POC: NEGATIVE
PROT SERPL-MCNC: 3.9 G/DL (ref 6.3–8.2)
PROTHROMBIN TIME: 18.9 SEC (ref 12.6–14.3)
RBC # BLD AUTO: 2.95 M/UL (ref 4.05–5.2)
RBC # BLD AUTO: 3.19 M/UL (ref 4.05–5.2)
SODIUM SERPL-SCNC: 142 MMOL/L (ref 133–143)
UFH PPP CHRO-ACNC: 0.11 IU/ML (ref 0.3–0.7)
WBC # BLD AUTO: 12.5 K/UL (ref 4.3–11.1)
WBC # BLD AUTO: 13.2 K/UL (ref 4.3–11.1)

## 2023-08-10 PROCEDURE — 99232 SBSQ HOSP IP/OBS MODERATE 35: CPT | Performed by: SURGERY

## 2023-08-10 PROCEDURE — 80053 COMPREHEN METABOLIC PANEL: CPT

## 2023-08-10 PROCEDURE — 2500000003 HC RX 250 WO HCPCS: Performed by: INTERNAL MEDICINE

## 2023-08-10 PROCEDURE — C9113 INJ PANTOPRAZOLE SODIUM, VIA: HCPCS | Performed by: FAMILY MEDICINE

## 2023-08-10 PROCEDURE — 85025 COMPLETE CBC W/AUTO DIFF WBC: CPT

## 2023-08-10 PROCEDURE — 36415 COLL VENOUS BLD VENIPUNCTURE: CPT

## 2023-08-10 PROCEDURE — 71260 CT THORAX DX C+: CPT

## 2023-08-10 PROCEDURE — 99232 SBSQ HOSP IP/OBS MODERATE 35: CPT | Performed by: INTERNAL MEDICINE

## 2023-08-10 PROCEDURE — 6360000002 HC RX W HCPCS: Performed by: INTERNAL MEDICINE

## 2023-08-10 PROCEDURE — 83735 ASSAY OF MAGNESIUM: CPT

## 2023-08-10 PROCEDURE — 1100000000 HC RM PRIVATE

## 2023-08-10 PROCEDURE — 2500000003 HC RX 250 WO HCPCS: Performed by: FAMILY MEDICINE

## 2023-08-10 PROCEDURE — 2580000003 HC RX 258: Performed by: INTERNAL MEDICINE

## 2023-08-10 PROCEDURE — 6370000000 HC RX 637 (ALT 250 FOR IP): Performed by: INTERNAL MEDICINE

## 2023-08-10 PROCEDURE — 6370000000 HC RX 637 (ALT 250 FOR IP): Performed by: FAMILY MEDICINE

## 2023-08-10 PROCEDURE — 85520 HEPARIN ASSAY: CPT

## 2023-08-10 PROCEDURE — 6360000002 HC RX W HCPCS: Performed by: FAMILY MEDICINE

## 2023-08-10 PROCEDURE — 2580000003 HC RX 258: Performed by: FAMILY MEDICINE

## 2023-08-10 PROCEDURE — 85027 COMPLETE CBC AUTOMATED: CPT

## 2023-08-10 PROCEDURE — A4216 STERILE WATER/SALINE, 10 ML: HCPCS | Performed by: FAMILY MEDICINE

## 2023-08-10 PROCEDURE — 85730 THROMBOPLASTIN TIME PARTIAL: CPT

## 2023-08-10 PROCEDURE — 6360000004 HC RX CONTRAST MEDICATION: Performed by: INTERNAL MEDICINE

## 2023-08-10 PROCEDURE — 85610 PROTHROMBIN TIME: CPT

## 2023-08-10 RX ORDER — HEPARIN SODIUM 1000 [USP'U]/ML
40 INJECTION, SOLUTION INTRAVENOUS; SUBCUTANEOUS PRN
Status: DISCONTINUED | OUTPATIENT
Start: 2023-08-10 | End: 2023-08-13 | Stop reason: ALTCHOICE

## 2023-08-10 RX ORDER — HEPARIN SODIUM 1000 [USP'U]/ML
80 INJECTION, SOLUTION INTRAVENOUS; SUBCUTANEOUS ONCE
Status: COMPLETED | OUTPATIENT
Start: 2023-08-10 | End: 2023-08-10

## 2023-08-10 RX ORDER — LORAZEPAM 0.5 MG/1
0.5 TABLET ORAL EVERY 4 HOURS PRN
Status: DISCONTINUED | OUTPATIENT
Start: 2023-08-10 | End: 2023-08-28

## 2023-08-10 RX ORDER — HEPARIN SODIUM 10000 [USP'U]/100ML
5-30 INJECTION, SOLUTION INTRAVENOUS CONTINUOUS
Status: DISCONTINUED | OUTPATIENT
Start: 2023-08-10 | End: 2023-08-13

## 2023-08-10 RX ORDER — 0.9 % SODIUM CHLORIDE 0.9 %
100 INTRAVENOUS SOLUTION INTRAVENOUS
Status: COMPLETED | OUTPATIENT
Start: 2023-08-10 | End: 2023-08-10

## 2023-08-10 RX ORDER — MAGNESIUM SULFATE IN WATER 40 MG/ML
4000 INJECTION, SOLUTION INTRAVENOUS ONCE
Status: COMPLETED | OUTPATIENT
Start: 2023-08-10 | End: 2023-08-10

## 2023-08-10 RX ORDER — HEPARIN SODIUM 1000 [USP'U]/ML
80 INJECTION, SOLUTION INTRAVENOUS; SUBCUTANEOUS PRN
Status: DISCONTINUED | OUTPATIENT
Start: 2023-08-10 | End: 2023-08-13 | Stop reason: ALTCHOICE

## 2023-08-10 RX ORDER — SUCRALFATE 1 G/1
1 TABLET ORAL
Status: DISCONTINUED | OUTPATIENT
Start: 2023-08-10 | End: 2023-08-15

## 2023-08-10 RX ORDER — SODIUM CHLORIDE 0.9 % (FLUSH) 0.9 %
10 SYRINGE (ML) INJECTION
Status: COMPLETED | OUTPATIENT
Start: 2023-08-10 | End: 2023-08-10

## 2023-08-10 RX ADMIN — HYDROMORPHONE HYDROCHLORIDE 1 MG: 1 INJECTION, SOLUTION INTRAMUSCULAR; INTRAVENOUS; SUBCUTANEOUS at 18:47

## 2023-08-10 RX ADMIN — SODIUM CHLORIDE, PRESERVATIVE FREE 40 MG: 5 INJECTION INTRAVENOUS at 20:53

## 2023-08-10 RX ADMIN — PANCRELIPASE LIPASE, PANCRELIPASE PROTEASE, PANCRELIPASE AMYLASE 5000 UNITS: 5000; 17000; 24000 CAPSULE, DELAYED RELEASE ORAL at 16:33

## 2023-08-10 RX ADMIN — HYDROMORPHONE HYDROCHLORIDE 1 MG: 1 INJECTION, SOLUTION INTRAMUSCULAR; INTRAVENOUS; SUBCUTANEOUS at 11:18

## 2023-08-10 RX ADMIN — SUCRALFATE 1 G: 1 TABLET ORAL at 11:22

## 2023-08-10 RX ADMIN — SUCRALFATE 1 G: 1 TABLET ORAL at 16:33

## 2023-08-10 RX ADMIN — PIPERACILLIN SODIUM AND TAZOBACTAM SODIUM 3375 MG: 3; .375 INJECTION, POWDER, LYOPHILIZED, FOR SOLUTION INTRAVENOUS at 01:10

## 2023-08-10 RX ADMIN — SODIUM CHLORIDE 100 ML: 9 INJECTION, SOLUTION INTRAVENOUS at 17:37

## 2023-08-10 RX ADMIN — SUCRALFATE 1 G: 1 TABLET ORAL at 20:50

## 2023-08-10 RX ADMIN — IOPAMIDOL 100 ML: 755 INJECTION, SOLUTION INTRAVENOUS at 17:35

## 2023-08-10 RX ADMIN — VANCOMYCIN HYDROCHLORIDE 750 MG: 750 INJECTION, POWDER, LYOPHILIZED, FOR SOLUTION INTRAVENOUS at 02:31

## 2023-08-10 RX ADMIN — PANCRELIPASE LIPASE, PANCRELIPASE PROTEASE, PANCRELIPASE AMYLASE 5000 UNITS: 5000; 17000; 24000 CAPSULE, DELAYED RELEASE ORAL at 07:58

## 2023-08-10 RX ADMIN — PANCRELIPASE LIPASE, PANCRELIPASE PROTEASE, PANCRELIPASE AMYLASE 5000 UNITS: 5000; 17000; 24000 CAPSULE, DELAYED RELEASE ORAL at 11:22

## 2023-08-10 RX ADMIN — HEPARIN SODIUM 18 UNITS/KG/HR: 10000 INJECTION, SOLUTION INTRAVENOUS at 18:32

## 2023-08-10 RX ADMIN — SODIUM BICARBONATE 650 MG TABLET 1300 MG: at 08:07

## 2023-08-10 RX ADMIN — MAGNESIUM SULFATE HEPTAHYDRATE 4000 MG: 40 INJECTION, SOLUTION INTRAVENOUS at 07:58

## 2023-08-10 RX ADMIN — POTASSIUM BICARBONATE 40 MEQ: 782 TABLET, EFFERVESCENT ORAL at 20:51

## 2023-08-10 RX ADMIN — SODIUM CHLORIDE, PRESERVATIVE FREE 40 MG: 5 INJECTION INTRAVENOUS at 07:54

## 2023-08-10 RX ADMIN — SODIUM CHLORIDE, POTASSIUM CHLORIDE, SODIUM LACTATE AND CALCIUM CHLORIDE: 600; 310; 30; 20 INJECTION, SOLUTION INTRAVENOUS at 01:26

## 2023-08-10 RX ADMIN — VANCOMYCIN HYDROCHLORIDE 750 MG: 750 INJECTION, POWDER, LYOPHILIZED, FOR SOLUTION INTRAVENOUS at 20:43

## 2023-08-10 RX ADMIN — HEPARIN SODIUM 3680 UNITS: 1000 INJECTION INTRAVENOUS; SUBCUTANEOUS at 18:26

## 2023-08-10 RX ADMIN — SODIUM CHLORIDE, PRESERVATIVE FREE 10 ML: 5 INJECTION INTRAVENOUS at 17:37

## 2023-08-10 RX ADMIN — PIPERACILLIN SODIUM AND TAZOBACTAM SODIUM 3375 MG: 3; .375 INJECTION, POWDER, LYOPHILIZED, FOR SOLUTION INTRAVENOUS at 08:06

## 2023-08-10 RX ADMIN — SODIUM CHLORIDE, PRESERVATIVE FREE 10 ML: 5 INJECTION INTRAVENOUS at 08:07

## 2023-08-10 RX ADMIN — OXYCODONE HYDROCHLORIDE 10 MG: 5 TABLET ORAL at 02:47

## 2023-08-10 RX ADMIN — SODIUM BICARBONATE 650 MG TABLET 1300 MG: at 20:50

## 2023-08-10 RX ADMIN — POTASSIUM BICARBONATE 40 MEQ: 782 TABLET, EFFERVESCENT ORAL at 08:06

## 2023-08-10 RX ADMIN — PIPERACILLIN SODIUM AND TAZOBACTAM SODIUM 3375 MG: 3; .375 INJECTION, POWDER, LYOPHILIZED, FOR SOLUTION INTRAVENOUS at 16:32

## 2023-08-10 ASSESSMENT — PAIN SCALES - GENERAL
PAINLEVEL_OUTOF10: 10
PAINLEVEL_OUTOF10: 8
PAINLEVEL_OUTOF10: 0
PAINLEVEL_OUTOF10: 10
PAINLEVEL_OUTOF10: 4
PAINLEVEL_OUTOF10: 0
PAINLEVEL_OUTOF10: 0

## 2023-08-10 ASSESSMENT — PAIN DESCRIPTION - LOCATION
LOCATION: ABDOMEN
LOCATION: GENERALIZED
LOCATION: ABDOMEN
LOCATION: ABDOMEN

## 2023-08-10 ASSESSMENT — PAIN DESCRIPTION - DESCRIPTORS
DESCRIPTORS: ACHING;DISCOMFORT
DESCRIPTORS: ACHING
DESCRIPTORS: ACHING;CRAMPING

## 2023-08-10 NOTE — PRE-PROCEDURE INSTRUCTIONS
Interventional Radiology Inpatient Communication       Interventional Radiology has received a consult for an Ultrasound Guided Liver Mass Biopsy and Bilateral Thoracenteses. Any diagnostic labs to be performed on collected specimen must be entered into [facility] prior to transport to Interventional Radiology. We anticipate this procedure will be completed on Friday August 11th, 2023. Primary Care Nurse: Nelson Belcher        Please ensure the following is completed:     Patient is NPO: Yes  *NPO @0015 on 8/11/2023 except for medications with sips of H2O    Blood thinners held: Yes  *Place Heparin gtt on HOLD @0200 on 8/11/2023    Patient must have working IV: Yes       Patient must be in a hospital gown with snaps and be transported via stretcher to Interventional Radiology. Please send hospital chart and labels. If the patient is unable to provide consent for the procedure, please contact Interventional Radiology at [phone].

## 2023-08-11 ENCOUNTER — APPOINTMENT (OUTPATIENT)
Dept: ULTRASOUND IMAGING | Age: 83
DRG: 871 | End: 2023-08-11
Payer: MEDICARE

## 2023-08-11 ENCOUNTER — APPOINTMENT (OUTPATIENT)
Dept: INTERVENTIONAL RADIOLOGY/VASCULAR | Age: 83
DRG: 871 | End: 2023-08-11
Attending: INTERNAL MEDICINE
Payer: MEDICARE

## 2023-08-11 ENCOUNTER — APPOINTMENT (OUTPATIENT)
Dept: NON INVASIVE DIAGNOSTICS | Age: 83
DRG: 871 | End: 2023-08-11
Payer: MEDICARE

## 2023-08-11 LAB
ALBUMIN SERPL-MCNC: 0.9 G/DL (ref 3.2–4.6)
ALBUMIN/GLOB SERPL: 0.3 (ref 0.4–1.6)
ALP SERPL-CCNC: 138 U/L (ref 50–136)
ALT SERPL-CCNC: 9 U/L (ref 12–65)
ANION GAP SERPL CALC-SCNC: 5 MMOL/L (ref 2–11)
APPEARANCE FLD: CLEAR
AST SERPL-CCNC: 18 U/L (ref 15–37)
BASOPHILS # BLD: 0 K/UL (ref 0–0.2)
BASOPHILS NFR BLD: 0 % (ref 0–2)
BILIRUB SERPL-MCNC: 0.4 MG/DL (ref 0.2–1.1)
BUN SERPL-MCNC: 6 MG/DL (ref 8–23)
CALCIUM SERPL-MCNC: 7.2 MG/DL (ref 8.3–10.4)
CHLORIDE SERPL-SCNC: 112 MMOL/L (ref 101–110)
CO2 SERPL-SCNC: 23 MMOL/L (ref 21–32)
COLOR FLD: YELLOW
CREAT SERPL-MCNC: 0.4 MG/DL (ref 0.6–1)
DIFFERENTIAL METHOD BLD: ABNORMAL
ECHO AO ASC DIAM: 2.9 CM
ECHO AO ASCENDING AORTA INDEX: 2 CM/M2
ECHO AO ROOT DIAM: 3.2 CM
ECHO AO ROOT INDEX: 2.21 CM/M2
ECHO AV AREA PEAK VELOCITY: 1.1 CM2
ECHO AV AREA VTI: 1 CM2
ECHO AV AREA/BSA PEAK VELOCITY: 0.8 CM2/M2
ECHO AV AREA/BSA VTI: 0.7 CM2/M2
ECHO AV MEAN GRADIENT: 10 MMHG
ECHO AV MEAN VELOCITY: 1.5 M/S
ECHO AV PEAK GRADIENT: 19 MMHG
ECHO AV PEAK VELOCITY: 2.2 M/S
ECHO AV VELOCITY RATIO: 0.41
ECHO AV VTI: 55.1 CM
ECHO BSA: 1.29 M2
ECHO EST RA PRESSURE: 3 MMHG
ECHO IVC PROX: 1.2 CM
ECHO LA AREA 2C: 11.9 CM2
ECHO LA AREA 4C: 16.8 CM2
ECHO LA DIAMETER INDEX: 2.41 CM/M2
ECHO LA DIAMETER: 3.5 CM
ECHO LA MAJOR AXIS: 4.9 CM
ECHO LA MINOR AXIS: 3.4 CM
ECHO LA TO AORTIC ROOT RATIO: 1.09
ECHO LA VOL 2C: 33 ML (ref 22–52)
ECHO LA VOL 4C: 43 ML (ref 22–52)
ECHO LA VOLUME INDEX A2C: 23 ML/M2 (ref 16–34)
ECHO LA VOLUME INDEX A4C: 30 ML/M2 (ref 16–34)
ECHO LV E' LATERAL VELOCITY: 13 CM/S
ECHO LV E' SEPTAL VELOCITY: 8 CM/S
ECHO LV EDV 3D: 122 ML
ECHO LV EDV INDEX 3D: 84 ML/M2
ECHO LV EJECTION FRACTION 3D: 57 %
ECHO LV ESV 3D: 52 ML
ECHO LV ESV INDEX 3D: 36 ML/M2
ECHO LV FRACTIONAL SHORTENING: 40 % (ref 28–44)
ECHO LV INTERNAL DIMENSION DIASTOLE INDEX: 3.31 CM/M2
ECHO LV INTERNAL DIMENSION DIASTOLIC: 4.8 CM (ref 3.9–5.3)
ECHO LV INTERNAL DIMENSION SYSTOLIC INDEX: 2 CM/M2
ECHO LV INTERNAL DIMENSION SYSTOLIC: 2.9 CM
ECHO LV IVSD: 0.8 CM (ref 0.6–0.9)
ECHO LV MASS 2D: 137.1 G (ref 67–162)
ECHO LV MASS 3D INDEX: 76.6 G/M2
ECHO LV MASS 3D: 111 G
ECHO LV MASS INDEX 2D: 94.5 G/M2 (ref 43–95)
ECHO LV POSTERIOR WALL DIASTOLIC: 0.9 CM (ref 0.6–0.9)
ECHO LV RELATIVE WALL THICKNESS RATIO: 0.38
ECHO LVOT AREA: 2.8 CM2
ECHO LVOT AV VTI INDEX: 0.36
ECHO LVOT DIAM: 1.9 CM
ECHO LVOT MEAN GRADIENT: 1 MMHG
ECHO LVOT PEAK GRADIENT: 3 MMHG
ECHO LVOT PEAK VELOCITY: 0.9 M/S
ECHO LVOT STROKE VOLUME INDEX: 38.3 ML/M2
ECHO LVOT SV: 55.5 ML
ECHO LVOT VTI: 19.6 CM
ECHO MV A VELOCITY: 1.22 M/S
ECHO MV AREA VTI: 1.5 CM2
ECHO MV E DECELERATION TIME (DT): 174 MS
ECHO MV E VELOCITY: 1.03 M/S
ECHO MV E/A RATIO: 0.84
ECHO MV E/E' LATERAL: 7.92
ECHO MV E/E' RATIO (AVERAGED): 10.4
ECHO MV E/E' SEPTAL: 12.88
ECHO MV LVOT VTI INDEX: 1.88
ECHO MV MAX VELOCITY: 1.3 M/S
ECHO MV MEAN GRADIENT: 2 MMHG
ECHO MV MEAN VELOCITY: 0.6 M/S
ECHO MV PEAK GRADIENT: 7 MMHG
ECHO MV VTI: 36.9 CM
ECHO PV ACCELERATION TIME (AT): 95 MS
ECHO PV MAX VELOCITY: 0.8 M/S
ECHO PV PEAK GRADIENT: 3 MMHG
ECHO RIGHT VENTRICULAR SYSTOLIC PRESSURE (RVSP): 57 MMHG
ECHO RV BASAL DIMENSION: 3.7 CM
ECHO RV FREE WALL PEAK S': 15 CM/S
ECHO RV TAPSE: 2 CM (ref 1.7–?)
ECHO TV REGURGITANT MAX VELOCITY: 3.66 M/S
ECHO TV REGURGITANT PEAK GRADIENT: 54 MMHG
EOSINOPHIL # BLD: 0 K/UL (ref 0–0.8)
EOSINOPHIL NFR BLD: 0 % (ref 0.5–7.8)
ERYTHROCYTE [DISTWIDTH] IN BLOOD BY AUTOMATED COUNT: 21 % (ref 11.9–14.6)
GLOBULIN SER CALC-MCNC: 3.2 G/DL (ref 2.8–4.5)
GLUCOSE SERPL-MCNC: 81 MG/DL (ref 65–100)
HCT VFR BLD AUTO: 24.6 % (ref 35.8–46.3)
HGB BLD-MCNC: 7.6 G/DL (ref 11.7–15.4)
IMM GRANULOCYTES # BLD AUTO: 0 K/UL (ref 0–0.5)
IMM GRANULOCYTES NFR BLD AUTO: 0 % (ref 0–5)
LYMPHOCYTES # BLD: 1.1 K/UL (ref 0.5–4.6)
LYMPHOCYTES NFR BLD: 11 % (ref 13–44)
LYMPHOCYTES NFR BRONCH MANUAL: 6 %
MACROPHAGES NFR BRONCH MANUAL: 4 %
MAGNESIUM SERPL-MCNC: 2.1 MG/DL (ref 1.8–2.4)
MCH RBC QN AUTO: 26.2 PG (ref 26.1–32.9)
MCHC RBC AUTO-ENTMCNC: 30.9 G/DL (ref 31.4–35)
MCV RBC AUTO: 84.8 FL (ref 82–102)
MONOCYTES # BLD: 0.4 K/UL (ref 0.1–1.3)
MONOCYTES NFR BLD: 4 % (ref 4–12)
NEUTROPHILS NFR BRONCH MANUAL: 90 %
NEUTS SEG # BLD: 9 K/UL (ref 1.7–8.2)
NEUTS SEG NFR BLD: 85 % (ref 43–78)
NRBC # BLD: 0 K/UL (ref 0–0.2)
NUC CELL # FLD: 1103 /CU MM
PLATELET # BLD AUTO: 155 K/UL (ref 150–450)
PMV BLD AUTO: 10.3 FL (ref 9.4–12.3)
POTASSIUM SERPL-SCNC: 3.1 MMOL/L (ref 3.5–5.1)
PROT SERPL-MCNC: 4.1 G/DL (ref 6.3–8.2)
RBC # BLD AUTO: 2.9 M/UL (ref 4.05–5.2)
RBC # FLD: 1000 /CU MM
SODIUM SERPL-SCNC: 140 MMOL/L (ref 133–143)
SPECIMEN SOURCE FLD: NORMAL
UFH PPP CHRO-ACNC: <0.1 IU/ML (ref 0.3–0.7)
VANCOMYCIN SERPL-MCNC: 14.2 UG/ML
WBC # BLD AUTO: 10.5 K/UL (ref 4.3–11.1)

## 2023-08-11 PROCEDURE — 88341 IMHCHEM/IMCYTCHM EA ADD ANTB: CPT

## 2023-08-11 PROCEDURE — 80053 COMPREHEN METABOLIC PANEL: CPT

## 2023-08-11 PROCEDURE — C9113 INJ PANTOPRAZOLE SODIUM, VIA: HCPCS | Performed by: FAMILY MEDICINE

## 2023-08-11 PROCEDURE — 6370000000 HC RX 637 (ALT 250 FOR IP): Performed by: FAMILY MEDICINE

## 2023-08-11 PROCEDURE — 2720000010 US BIOPSY LIVER PERCUTANEOUS

## 2023-08-11 PROCEDURE — 88112 CYTOPATH CELL ENHANCE TECH: CPT

## 2023-08-11 PROCEDURE — A4216 STERILE WATER/SALINE, 10 ML: HCPCS | Performed by: FAMILY MEDICINE

## 2023-08-11 PROCEDURE — 0FB13ZX EXCISION OF RIGHT LOBE LIVER, PERCUTANEOUS APPROACH, DIAGNOSTIC: ICD-10-PCS | Performed by: RADIOLOGY

## 2023-08-11 PROCEDURE — 85520 HEPARIN ASSAY: CPT

## 2023-08-11 PROCEDURE — 88305 TISSUE EXAM BY PATHOLOGIST: CPT

## 2023-08-11 PROCEDURE — 88360 TUMOR IMMUNOHISTOCHEM/MANUAL: CPT

## 2023-08-11 PROCEDURE — 2580000003 HC RX 258: Performed by: RADIOLOGY

## 2023-08-11 PROCEDURE — 6360000002 HC RX W HCPCS: Performed by: RADIOLOGY

## 2023-08-11 PROCEDURE — 93970 EXTREMITY STUDY: CPT

## 2023-08-11 PROCEDURE — 2500000003 HC RX 250 WO HCPCS: Performed by: INTERNAL MEDICINE

## 2023-08-11 PROCEDURE — 36415 COLL VENOUS BLD VENIPUNCTURE: CPT

## 2023-08-11 PROCEDURE — 0W9B3ZZ DRAINAGE OF LEFT PLEURAL CAVITY, PERCUTANEOUS APPROACH: ICD-10-PCS | Performed by: RADIOLOGY

## 2023-08-11 PROCEDURE — 89050 BODY FLUID CELL COUNT: CPT

## 2023-08-11 PROCEDURE — 1100000000 HC RM PRIVATE

## 2023-08-11 PROCEDURE — 6360000002 HC RX W HCPCS: Performed by: FAMILY MEDICINE

## 2023-08-11 PROCEDURE — 99232 SBSQ HOSP IP/OBS MODERATE 35: CPT | Performed by: INTERNAL MEDICINE

## 2023-08-11 PROCEDURE — 87070 CULTURE OTHR SPECIMN AEROBIC: CPT

## 2023-08-11 PROCEDURE — 76942 ECHO GUIDE FOR BIOPSY: CPT

## 2023-08-11 PROCEDURE — 6360000002 HC RX W HCPCS: Performed by: INTERNAL MEDICINE

## 2023-08-11 PROCEDURE — 0W993ZZ DRAINAGE OF RIGHT PLEURAL CAVITY, PERCUTANEOUS APPROACH: ICD-10-PCS | Performed by: RADIOLOGY

## 2023-08-11 PROCEDURE — 2580000003 HC RX 258: Performed by: FAMILY MEDICINE

## 2023-08-11 PROCEDURE — 2500000003 HC RX 250 WO HCPCS: Performed by: FAMILY MEDICINE

## 2023-08-11 PROCEDURE — 88307 TISSUE EXAM BY PATHOLOGIST: CPT

## 2023-08-11 PROCEDURE — 88361 TUMOR IMMUNOHISTOCHEM/COMPUT: CPT

## 2023-08-11 PROCEDURE — 6370000000 HC RX 637 (ALT 250 FOR IP): Performed by: INTERNAL MEDICINE

## 2023-08-11 PROCEDURE — APPSS30 APP SPLIT SHARED TIME 16-30 MINUTES: Performed by: NURSE PRACTITIONER

## 2023-08-11 PROCEDURE — 93306 TTE W/DOPPLER COMPLETE: CPT

## 2023-08-11 PROCEDURE — 80202 ASSAY OF VANCOMYCIN: CPT

## 2023-08-11 PROCEDURE — 85025 COMPLETE CBC W/AUTO DIFF WBC: CPT

## 2023-08-11 PROCEDURE — 88342 IMHCHEM/IMCYTCHM 1ST ANTB: CPT

## 2023-08-11 PROCEDURE — 83735 ASSAY OF MAGNESIUM: CPT

## 2023-08-11 PROCEDURE — 88313 SPECIAL STAINS GROUP 2: CPT

## 2023-08-11 PROCEDURE — C1729 CATH, DRAINAGE: HCPCS

## 2023-08-11 PROCEDURE — 87205 SMEAR GRAM STAIN: CPT

## 2023-08-11 RX ORDER — FENTANYL CITRATE 50 UG/ML
INJECTION, SOLUTION INTRAMUSCULAR; INTRAVENOUS PRN
Status: COMPLETED | OUTPATIENT
Start: 2023-08-11 | End: 2023-08-11

## 2023-08-11 RX ORDER — SODIUM CHLORIDE 9 MG/ML
INJECTION, SOLUTION INTRAVENOUS CONTINUOUS
Status: ACTIVE | OUTPATIENT
Start: 2023-08-11 | End: 2023-08-11

## 2023-08-11 RX ORDER — OXYCODONE HYDROCHLORIDE 5 MG/1
5 TABLET ORAL EVERY 4 HOURS PRN
Status: DISCONTINUED | OUTPATIENT
Start: 2023-08-11 | End: 2023-08-28

## 2023-08-11 RX ORDER — OXYCODONE HYDROCHLORIDE 5 MG/1
10 TABLET ORAL EVERY 4 HOURS PRN
Status: DISCONTINUED | OUTPATIENT
Start: 2023-08-11 | End: 2023-08-28

## 2023-08-11 RX ORDER — TRAZODONE HYDROCHLORIDE 50 MG/1
50 TABLET ORAL NIGHTLY
Status: DISCONTINUED | OUTPATIENT
Start: 2023-08-11 | End: 2023-09-08 | Stop reason: HOSPADM

## 2023-08-11 RX ORDER — ONDANSETRON 2 MG/ML
4 INJECTION INTRAMUSCULAR; INTRAVENOUS EVERY 8 HOURS PRN
Status: DISCONTINUED | OUTPATIENT
Start: 2023-08-11 | End: 2023-09-08 | Stop reason: HOSPADM

## 2023-08-11 RX ORDER — MIDAZOLAM HYDROCHLORIDE 1 MG/ML
INJECTION INTRAMUSCULAR; INTRAVENOUS PRN
Status: COMPLETED | OUTPATIENT
Start: 2023-08-11 | End: 2023-08-11

## 2023-08-11 RX ADMIN — SODIUM CHLORIDE: 900 INJECTION INTRAVENOUS at 15:03

## 2023-08-11 RX ADMIN — PIPERACILLIN SODIUM AND TAZOBACTAM SODIUM 3375 MG: 3; .375 INJECTION, POWDER, LYOPHILIZED, FOR SOLUTION INTRAVENOUS at 15:54

## 2023-08-11 RX ADMIN — FENTANYL CITRATE 25 MCG: 50 INJECTION, SOLUTION INTRAMUSCULAR; INTRAVENOUS at 11:28

## 2023-08-11 RX ADMIN — SODIUM CHLORIDE, PRESERVATIVE FREE 40 MG: 5 INJECTION INTRAVENOUS at 08:54

## 2023-08-11 RX ADMIN — PIPERACILLIN SODIUM AND TAZOBACTAM SODIUM 3375 MG: 3; .375 INJECTION, POWDER, LYOPHILIZED, FOR SOLUTION INTRAVENOUS at 00:49

## 2023-08-11 RX ADMIN — HYDROMORPHONE HYDROCHLORIDE 1 MG: 1 INJECTION, SOLUTION INTRAMUSCULAR; INTRAVENOUS; SUBCUTANEOUS at 15:26

## 2023-08-11 RX ADMIN — HYDROMORPHONE HYDROCHLORIDE 1 MG: 1 INJECTION, SOLUTION INTRAMUSCULAR; INTRAVENOUS; SUBCUTANEOUS at 10:04

## 2023-08-11 RX ADMIN — HEPARIN SODIUM 3680 UNITS: 1000 INJECTION INTRAVENOUS; SUBCUTANEOUS at 06:54

## 2023-08-11 RX ADMIN — SODIUM BICARBONATE 650 MG TABLET 1300 MG: at 21:47

## 2023-08-11 RX ADMIN — HEPARIN SODIUM 22 UNITS/KG/HR: 10000 INJECTION, SOLUTION INTRAVENOUS at 18:52

## 2023-08-11 RX ADMIN — TRAZODONE HYDROCHLORIDE 50 MG: 50 TABLET ORAL at 21:48

## 2023-08-11 RX ADMIN — SODIUM CHLORIDE, PRESERVATIVE FREE 40 MG: 5 INJECTION INTRAVENOUS at 21:48

## 2023-08-11 RX ADMIN — MIDAZOLAM 0.5 MG: 1 INJECTION INTRAMUSCULAR; INTRAVENOUS at 11:28

## 2023-08-11 RX ADMIN — MIDAZOLAM 0.5 MG: 1 INJECTION INTRAMUSCULAR; INTRAVENOUS at 11:31

## 2023-08-11 RX ADMIN — PANCRELIPASE LIPASE, PANCRELIPASE PROTEASE, PANCRELIPASE AMYLASE 5000 UNITS: 5000; 17000; 24000 CAPSULE, DELAYED RELEASE ORAL at 16:51

## 2023-08-11 RX ADMIN — PIPERACILLIN SODIUM AND TAZOBACTAM SODIUM 3375 MG: 3; .375 INJECTION, POWDER, LYOPHILIZED, FOR SOLUTION INTRAVENOUS at 08:55

## 2023-08-11 RX ADMIN — SODIUM CHLORIDE, PRESERVATIVE FREE 10 ML: 5 INJECTION INTRAVENOUS at 21:48

## 2023-08-11 RX ADMIN — SUCRALFATE 1 G: 1 TABLET ORAL at 16:51

## 2023-08-11 RX ADMIN — SUCRALFATE 1 G: 1 TABLET ORAL at 05:55

## 2023-08-11 RX ADMIN — HYDROMORPHONE HYDROCHLORIDE 1 MG: 1 INJECTION, SOLUTION INTRAMUSCULAR; INTRAVENOUS; SUBCUTANEOUS at 04:01

## 2023-08-11 RX ADMIN — FENTANYL CITRATE 25 MCG: 50 INJECTION, SOLUTION INTRAMUSCULAR; INTRAVENOUS at 11:31

## 2023-08-11 RX ADMIN — SODIUM CHLORIDE, PRESERVATIVE FREE 10 ML: 5 INJECTION INTRAVENOUS at 08:57

## 2023-08-11 RX ADMIN — SUCRALFATE 1 G: 1 TABLET ORAL at 21:48

## 2023-08-11 RX ADMIN — HYDROMORPHONE HYDROCHLORIDE 1 MG: 1 INJECTION, SOLUTION INTRAMUSCULAR; INTRAVENOUS; SUBCUTANEOUS at 18:35

## 2023-08-11 ASSESSMENT — PAIN DESCRIPTION - DESCRIPTORS
DESCRIPTORS: ACHING

## 2023-08-11 ASSESSMENT — PAIN DESCRIPTION - LOCATION
LOCATION: ABDOMEN
LOCATION: GENERALIZED
LOCATION: ABDOMEN
LOCATION: GENERALIZED

## 2023-08-11 ASSESSMENT — PAIN SCALES - GENERAL
PAINLEVEL_OUTOF10: 5
PAINLEVEL_OUTOF10: 0
PAINLEVEL_OUTOF10: 9
PAINLEVEL_OUTOF10: 9
PAINLEVEL_OUTOF10: 8
PAINLEVEL_OUTOF10: 9
PAINLEVEL_OUTOF10: 0
PAINLEVEL_OUTOF10: 8
PAINLEVEL_OUTOF10: 0
PAINLEVEL_OUTOF10: 0

## 2023-08-11 NOTE — OR NURSING
Interventional Radiology Post Paracentesis/Thoracentesis Note    8/11/2023    Procedure(s): Ultrasound guided Diagnostic Thoracentesis Performed with 8 Haitian catheter total volume 400 ml. Samples sent to lab. Preliminary Findings: moderate yellow. Complications: None    Plan:  Observation, check labs if drawn.           Chest X-Ray:  no

## 2023-08-11 NOTE — OR NURSING
Interventional Radiology Post Paracentesis/Thoracentesis Note    8/11/2023    Procedure(s): Ultrasound guided RIGHT SIDE Diagnostic Thoracentesis Performed with 8 British catheter total volume 700 ml. Samples sent to lab. Preliminary Findings: large yellow. Complications: None    Plan:  Observation, check labs if drawn.           Chest X-Ray:  no

## 2023-08-11 NOTE — BRIEF OP NOTE
Department of Interventional Radiology  [phone]        Interventional Radiology Brief Procedure Note    Patient: Esteban Fournier MRN: [id number]  SSN: [id number]    YOB: 1940  Age: 82 y.o. Sex: female      Date of Procedure: 8/11/2023    Pre-Procedure Diagnosis: Liver mass. Ascites. Post-Procedure Diagnosis: SAME    Procedure(s):  Image Guided Biopsy    Brief Description of Procedure: as above    Performed By: Cynthia Tovar MD     Assistants: None    Anesthesia:Moderate Sedation    Estimated Blood Loss: Less than 10ml    Specimens:  Pathology    Implants:  None    Findings: Ascites. Liver mass near the dome. Complications: None    Recommendations: 3 hour bedrest.       Follow Up: Thoracentesis.       Signed By: Cynthia Tovar MD     August 11, 2023

## 2023-08-11 NOTE — SIGNIFICANT EVENT
Spoke with radiologist, Dr. Shameka Rogers. CT chest shows   1. Nonocclusive subsegmental pulmonary emboli are present in both upper lobes. There is no evidence of right heart strain. 2. Unchanged filling defect in the right pulmonary artery along the resection   margin. 3. Moderate to large bilateral pleural effusions. 4. Passive bibasilar atelectasis. 5. Atherosclerosis, including the coronary arteries. 6. Emphysema. 7. Mesenteric edema and small volume of ascites in the upper abdomen. Patient noted to be on Heparin drip already. Will continue. Echo and doppler bilat LE ordered.

## 2023-08-11 NOTE — PLAN OF CARE
Problem: Discharge Planning  Goal: Discharge to home or other facility with appropriate resources  Outcome: Progressing     Problem: Pain  Goal: Verbalizes/displays adequate comfort level or baseline comfort level  Outcome: Progressing     Problem: Skin/Tissue Integrity  Goal: Absence of new skin breakdown  Description: 1. Monitor for areas of redness and/or skin breakdown  2. Assess vascular access sites hourly  3. Every 4-6 hours minimum:  Change oxygen saturation probe site  4. Every 4-6 hours:  If on nasal continuous positive airway pressure, respiratory therapy assess nares and determine need for appliance change or resting period.   Outcome: Progressing     Problem: Safety - Adult  Goal: Free from fall injury  Outcome: Progressing  Flowsheets (Taken 8/10/2023 1923 by Patsy Vuong RN)  Free From Fall Injury: Instruct family/caregiver on patient safety     Problem: ABCDS Injury Assessment  Goal: Absence of physical injury  Outcome: Progressing  Flowsheets (Taken 8/10/2023 1923 by Patsy Vuong RN)  Absence of Physical Injury: Implement safety measures based on patient assessment

## 2023-08-11 NOTE — BRIEF OP NOTE
Department of Interventional Radiology  [phone]        Interventional Radiology Brief Procedure Note    Patient: Destinee Metz MRN: [id number]  SSN: [id number]    YOB: 1940  Age: 82 y.o. Sex: female      Date of Procedure: 8/11/2023    Pre-Procedure Diagnosis: Bi Pleural Effusions. Post-Procedure Diagnosis: SAME    Procedure(s):  Bi Thoracentesis. Brief Description of Procedure: as above    Performed By: Gennaro Davis MD     Assistants: None    Anesthesia:Lidocaine    Estimated Blood Loss: Less than 10ml    Specimens:  Cytology    Implants:  None    Findings: Bi large volume pleural effusions.       Complications: None    Recommendations: OK to resume Heparin at 19:00 tonight     Follow Up: PRN    Signed By: Gennaro Davis MD     August 11, 2023

## 2023-08-12 LAB
ANION GAP SERPL CALC-SCNC: 7 MMOL/L (ref 2–11)
APPEARANCE FLD: NORMAL
BACTERIA SPEC CULT: NORMAL
BACTERIA SPEC CULT: NORMAL
BUN SERPL-MCNC: 4 MG/DL (ref 8–23)
CALCIUM SERPL-MCNC: 7.8 MG/DL (ref 8.3–10.4)
CHLORIDE SERPL-SCNC: 111 MMOL/L (ref 101–110)
CO2 SERPL-SCNC: 24 MMOL/L (ref 21–32)
COLOR FLD: YELLOW
CREAT SERPL-MCNC: 0.5 MG/DL (ref 0.6–1)
ERYTHROCYTE [DISTWIDTH] IN BLOOD BY AUTOMATED COUNT: 21 % (ref 11.9–14.6)
GLUCOSE SERPL-MCNC: 106 MG/DL (ref 65–100)
HCT VFR BLD AUTO: 24.2 % (ref 35.8–46.3)
HGB BLD-MCNC: 7.6 G/DL (ref 11.7–15.4)
LYMPHOCYTES NFR BRONCH MANUAL: 5 %
MACROPHAGES NFR BRONCH MANUAL: 3 %
MAGNESIUM SERPL-MCNC: 1.8 MG/DL (ref 1.8–2.4)
MCH RBC QN AUTO: 25.9 PG (ref 26.1–32.9)
MCHC RBC AUTO-ENTMCNC: 31.4 G/DL (ref 31.4–35)
MCV RBC AUTO: 82.6 FL (ref 82–102)
MM INDURATION, POC: 0 MM (ref 0–5)
NEUTROPHILS NFR BRONCH MANUAL: 92 %
NRBC # BLD: 0 K/UL (ref 0–0.2)
NUC CELL # FLD: 3771 /CU MM
PLATELET # BLD AUTO: 149 K/UL (ref 150–450)
PMV BLD AUTO: 9.1 FL (ref 9.4–12.3)
POTASSIUM SERPL-SCNC: 2.4 MMOL/L (ref 3.5–5.1)
PPD, POC: NEGATIVE
RBC # BLD AUTO: 2.93 M/UL (ref 4.05–5.2)
RBC # FLD: <1000 /CU MM
SERVICE CMNT-IMP: NORMAL
SERVICE CMNT-IMP: NORMAL
SODIUM SERPL-SCNC: 142 MMOL/L (ref 133–143)
SPECIMEN SOURCE FLD: NORMAL
UFH PPP CHRO-ACNC: 0.29 IU/ML (ref 0.3–0.7)
UFH PPP CHRO-ACNC: 0.41 IU/ML (ref 0.3–0.7)
UFH PPP CHRO-ACNC: <0.1 IU/ML (ref 0.3–0.7)
WBC # BLD AUTO: 9.1 K/UL (ref 4.3–11.1)

## 2023-08-12 PROCEDURE — 6370000000 HC RX 637 (ALT 250 FOR IP): Performed by: INTERNAL MEDICINE

## 2023-08-12 PROCEDURE — 85520 HEPARIN ASSAY: CPT

## 2023-08-12 PROCEDURE — 2580000003 HC RX 258: Performed by: FAMILY MEDICINE

## 2023-08-12 PROCEDURE — 6360000002 HC RX W HCPCS: Performed by: INTERNAL MEDICINE

## 2023-08-12 PROCEDURE — 2500000003 HC RX 250 WO HCPCS: Performed by: INTERNAL MEDICINE

## 2023-08-12 PROCEDURE — 6370000000 HC RX 637 (ALT 250 FOR IP): Performed by: FAMILY MEDICINE

## 2023-08-12 PROCEDURE — 80048 BASIC METABOLIC PNL TOTAL CA: CPT

## 2023-08-12 PROCEDURE — A4216 STERILE WATER/SALINE, 10 ML: HCPCS | Performed by: FAMILY MEDICINE

## 2023-08-12 PROCEDURE — 2500000003 HC RX 250 WO HCPCS: Performed by: FAMILY MEDICINE

## 2023-08-12 PROCEDURE — 85027 COMPLETE CBC AUTOMATED: CPT

## 2023-08-12 PROCEDURE — 1100000000 HC RM PRIVATE

## 2023-08-12 PROCEDURE — 76937 US GUIDE VASCULAR ACCESS: CPT

## 2023-08-12 PROCEDURE — 6360000002 HC RX W HCPCS: Performed by: FAMILY MEDICINE

## 2023-08-12 PROCEDURE — C9113 INJ PANTOPRAZOLE SODIUM, VIA: HCPCS | Performed by: FAMILY MEDICINE

## 2023-08-12 PROCEDURE — 36415 COLL VENOUS BLD VENIPUNCTURE: CPT

## 2023-08-12 PROCEDURE — 83735 ASSAY OF MAGNESIUM: CPT

## 2023-08-12 RX ADMIN — HYDROMORPHONE HYDROCHLORIDE 1 MG: 1 INJECTION, SOLUTION INTRAMUSCULAR; INTRAVENOUS; SUBCUTANEOUS at 06:08

## 2023-08-12 RX ADMIN — SUCRALFATE 1 G: 1 TABLET ORAL at 06:08

## 2023-08-12 RX ADMIN — PIPERACILLIN SODIUM AND TAZOBACTAM SODIUM 3375 MG: 3; .375 INJECTION, POWDER, LYOPHILIZED, FOR SOLUTION INTRAVENOUS at 00:00

## 2023-08-12 RX ADMIN — HYDROMORPHONE HYDROCHLORIDE 1 MG: 1 INJECTION, SOLUTION INTRAMUSCULAR; INTRAVENOUS; SUBCUTANEOUS at 17:13

## 2023-08-12 RX ADMIN — TRAZODONE HYDROCHLORIDE 50 MG: 50 TABLET ORAL at 20:49

## 2023-08-12 RX ADMIN — PANCRELIPASE LIPASE, PANCRELIPASE PROTEASE, PANCRELIPASE AMYLASE 5000 UNITS: 5000; 17000; 24000 CAPSULE, DELAYED RELEASE ORAL at 17:14

## 2023-08-12 RX ADMIN — SODIUM BICARBONATE 650 MG TABLET 1300 MG: at 09:23

## 2023-08-12 RX ADMIN — PIPERACILLIN SODIUM AND TAZOBACTAM SODIUM 3375 MG: 3; .375 INJECTION, POWDER, LYOPHILIZED, FOR SOLUTION INTRAVENOUS at 23:24

## 2023-08-12 RX ADMIN — SODIUM CHLORIDE, PRESERVATIVE FREE 10 ML: 5 INJECTION INTRAVENOUS at 09:31

## 2023-08-12 RX ADMIN — HYDROMORPHONE HYDROCHLORIDE 0.5 MG: 1 INJECTION, SOLUTION INTRAMUSCULAR; INTRAVENOUS; SUBCUTANEOUS at 01:59

## 2023-08-12 RX ADMIN — SODIUM BICARBONATE 650 MG TABLET 1300 MG: at 20:49

## 2023-08-12 RX ADMIN — SODIUM CHLORIDE, PRESERVATIVE FREE 40 MG: 5 INJECTION INTRAVENOUS at 09:23

## 2023-08-12 RX ADMIN — HYDROMORPHONE HYDROCHLORIDE 1 MG: 1 INJECTION, SOLUTION INTRAMUSCULAR; INTRAVENOUS; SUBCUTANEOUS at 09:41

## 2023-08-12 RX ADMIN — PANCRELIPASE LIPASE, PANCRELIPASE PROTEASE, PANCRELIPASE AMYLASE 5000 UNITS: 5000; 17000; 24000 CAPSULE, DELAYED RELEASE ORAL at 09:31

## 2023-08-12 RX ADMIN — SODIUM CHLORIDE, PRESERVATIVE FREE 40 MG: 5 INJECTION INTRAVENOUS at 20:49

## 2023-08-12 RX ADMIN — HEPARIN SODIUM 3680 UNITS: 1000 INJECTION INTRAVENOUS; SUBCUTANEOUS at 05:14

## 2023-08-12 RX ADMIN — PANCRELIPASE LIPASE, PANCRELIPASE PROTEASE, PANCRELIPASE AMYLASE 5000 UNITS: 5000; 17000; 24000 CAPSULE, DELAYED RELEASE ORAL at 12:06

## 2023-08-12 RX ADMIN — SUCRALFATE 1 G: 1 TABLET ORAL at 09:23

## 2023-08-12 RX ADMIN — HEPARIN SODIUM 1840 UNITS: 1000 INJECTION INTRAVENOUS; SUBCUTANEOUS at 18:43

## 2023-08-12 RX ADMIN — SODIUM CHLORIDE, PRESERVATIVE FREE 10 ML: 5 INJECTION INTRAVENOUS at 20:50

## 2023-08-12 RX ADMIN — SUCRALFATE 1 G: 1 TABLET ORAL at 17:13

## 2023-08-12 RX ADMIN — HYDROMORPHONE HYDROCHLORIDE 1 MG: 1 INJECTION, SOLUTION INTRAMUSCULAR; INTRAVENOUS; SUBCUTANEOUS at 13:45

## 2023-08-12 RX ADMIN — SUCRALFATE 1 G: 1 TABLET ORAL at 20:49

## 2023-08-12 RX ADMIN — HEPARIN SODIUM 26 UNITS/KG/HR: 10000 INJECTION, SOLUTION INTRAVENOUS at 16:20

## 2023-08-12 RX ADMIN — POTASSIUM BICARBONATE 40 MEQ: 782 TABLET, EFFERVESCENT ORAL at 12:06

## 2023-08-12 RX ADMIN — HYDROMORPHONE HYDROCHLORIDE 1 MG: 1 INJECTION, SOLUTION INTRAMUSCULAR; INTRAVENOUS; SUBCUTANEOUS at 20:55

## 2023-08-12 ASSESSMENT — PAIN DESCRIPTION - DESCRIPTORS
DESCRIPTORS: ACHING
DESCRIPTORS: STABBING
DESCRIPTORS: STABBING
DESCRIPTORS: CRUSHING
DESCRIPTORS: STABBING

## 2023-08-12 ASSESSMENT — PAIN SCALES - GENERAL
PAINLEVEL_OUTOF10: 8
PAINLEVEL_OUTOF10: 8
PAINLEVEL_OUTOF10: 0
PAINLEVEL_OUTOF10: 0
PAINLEVEL_OUTOF10: 9
PAINLEVEL_OUTOF10: 3
PAINLEVEL_OUTOF10: 8
PAINLEVEL_OUTOF10: 3
PAINLEVEL_OUTOF10: 9
PAINLEVEL_OUTOF10: 8

## 2023-08-12 ASSESSMENT — PAIN DESCRIPTION - ONSET
ONSET: PROGRESSIVE
ONSET: PROGRESSIVE

## 2023-08-12 ASSESSMENT — PAIN DESCRIPTION - LOCATION
LOCATION: BACK
LOCATION: ABDOMEN

## 2023-08-12 ASSESSMENT — PAIN DESCRIPTION - PAIN TYPE
TYPE: CHRONIC PAIN
TYPE: CHRONIC PAIN

## 2023-08-12 ASSESSMENT — PAIN DESCRIPTION - ORIENTATION
ORIENTATION: RIGHT
ORIENTATION: RIGHT

## 2023-08-12 ASSESSMENT — PAIN DESCRIPTION - FREQUENCY
FREQUENCY: CONTINUOUS
FREQUENCY: CONTINUOUS

## 2023-08-12 NOTE — FLOWSHEET NOTE
08/12/23 0745   Handoff   Communication Given Shift Handoff   Handoff Received From [facility] Communication Face to Face   Time Handoff Given 0700

## 2023-08-12 NOTE — FLOWSHEET NOTE
08/12/23 1904   Handoff   Communication Given Shift Handoff   Handoff Given To [facility] Communication Face to Face   Time Handoff Given 1904   End of Shift Check Performed Yes

## 2023-08-13 LAB
ANION GAP SERPL CALC-SCNC: 5 MMOL/L (ref 2–11)
BACTERIA SPEC CULT: NORMAL
BUN SERPL-MCNC: 6 MG/DL (ref 8–23)
CALCIUM SERPL-MCNC: 7.8 MG/DL (ref 8.3–10.4)
CHLORIDE SERPL-SCNC: 109 MMOL/L (ref 101–110)
CO2 SERPL-SCNC: 25 MMOL/L (ref 21–32)
CREAT SERPL-MCNC: 0.5 MG/DL (ref 0.6–1)
EKG ATRIAL RATE: 131 BPM
EKG DIAGNOSIS: NORMAL
EKG Q-T INTERVAL: 272 MS
EKG QRS DURATION: 72 MS
EKG QTC CALCULATION (BAZETT): 378 MS
EKG R AXIS: 28 DEGREES
EKG T AXIS: 113 DEGREES
EKG VENTRICULAR RATE: 116 BPM
GLUCOSE SERPL-MCNC: 89 MG/DL (ref 65–100)
GRAM STN SPEC: NORMAL
GRAM STN SPEC: NORMAL
MAGNESIUM SERPL-MCNC: 1.8 MG/DL (ref 1.8–2.4)
PHOSPHATE SERPL-MCNC: 2.8 MG/DL (ref 2.3–3.7)
POTASSIUM SERPL-SCNC: 2.8 MMOL/L (ref 3.5–5.1)
SERVICE CMNT-IMP: NORMAL
SODIUM SERPL-SCNC: 139 MMOL/L (ref 133–143)
UFH PPP CHRO-ACNC: 0.6 IU/ML (ref 0.3–0.7)
UFH PPP CHRO-ACNC: 0.61 IU/ML (ref 0.3–0.7)

## 2023-08-13 PROCEDURE — 93010 ELECTROCARDIOGRAM REPORT: CPT | Performed by: INTERNAL MEDICINE

## 2023-08-13 PROCEDURE — 36415 COLL VENOUS BLD VENIPUNCTURE: CPT

## 2023-08-13 PROCEDURE — 6370000000 HC RX 637 (ALT 250 FOR IP): Performed by: INTERNAL MEDICINE

## 2023-08-13 PROCEDURE — 6360000002 HC RX W HCPCS: Performed by: HOSPITALIST

## 2023-08-13 PROCEDURE — A4216 STERILE WATER/SALINE, 10 ML: HCPCS | Performed by: FAMILY MEDICINE

## 2023-08-13 PROCEDURE — C9113 INJ PANTOPRAZOLE SODIUM, VIA: HCPCS | Performed by: FAMILY MEDICINE

## 2023-08-13 PROCEDURE — 2500000003 HC RX 250 WO HCPCS: Performed by: FAMILY MEDICINE

## 2023-08-13 PROCEDURE — 6370000000 HC RX 637 (ALT 250 FOR IP): Performed by: HOSPITALIST

## 2023-08-13 PROCEDURE — 6370000000 HC RX 637 (ALT 250 FOR IP): Performed by: FAMILY MEDICINE

## 2023-08-13 PROCEDURE — 2140000000 HC CCU INTERMEDIATE R&B

## 2023-08-13 PROCEDURE — 2580000003 HC RX 258: Performed by: HOSPITALIST

## 2023-08-13 PROCEDURE — 6360000002 HC RX W HCPCS: Performed by: FAMILY MEDICINE

## 2023-08-13 PROCEDURE — 2500000003 HC RX 250 WO HCPCS: Performed by: HOSPITALIST

## 2023-08-13 PROCEDURE — 51798 US URINE CAPACITY MEASURE: CPT

## 2023-08-13 PROCEDURE — 2580000003 HC RX 258: Performed by: FAMILY MEDICINE

## 2023-08-13 PROCEDURE — 85520 HEPARIN ASSAY: CPT

## 2023-08-13 PROCEDURE — 6370000000 HC RX 637 (ALT 250 FOR IP): Performed by: RADIOLOGY

## 2023-08-13 PROCEDURE — 84100 ASSAY OF PHOSPHORUS: CPT

## 2023-08-13 PROCEDURE — 83735 ASSAY OF MAGNESIUM: CPT

## 2023-08-13 PROCEDURE — 93005 ELECTROCARDIOGRAM TRACING: CPT | Performed by: HOSPITALIST

## 2023-08-13 PROCEDURE — 2500000003 HC RX 250 WO HCPCS: Performed by: INTERNAL MEDICINE

## 2023-08-13 PROCEDURE — 80048 BASIC METABOLIC PNL TOTAL CA: CPT

## 2023-08-13 RX ORDER — HYDROMORPHONE HYDROCHLORIDE 1 MG/ML
0.5 INJECTION, SOLUTION INTRAMUSCULAR; INTRAVENOUS; SUBCUTANEOUS
Status: DISCONTINUED | OUTPATIENT
Start: 2023-08-13 | End: 2023-08-16

## 2023-08-13 RX ORDER — DILTIAZEM HYDROCHLORIDE 5 MG/ML
5 INJECTION INTRAVENOUS
Status: COMPLETED | OUTPATIENT
Start: 2023-08-13 | End: 2023-08-13

## 2023-08-13 RX ORDER — POTASSIUM CHLORIDE 7.45 MG/ML
10 INJECTION INTRAVENOUS
Status: COMPLETED | OUTPATIENT
Start: 2023-08-13 | End: 2023-08-13

## 2023-08-13 RX ORDER — DILTIAZEM HYDROCHLORIDE 5 MG/ML
5 INJECTION INTRAVENOUS ONCE
Status: DISCONTINUED | OUTPATIENT
Start: 2023-08-13 | End: 2023-08-13

## 2023-08-13 RX ORDER — LANOLIN ALCOHOL/MO/W.PET/CERES
400 CREAM (GRAM) TOPICAL DAILY
Status: DISCONTINUED | OUTPATIENT
Start: 2023-08-13 | End: 2023-08-24

## 2023-08-13 RX ORDER — ENOXAPARIN SODIUM 100 MG/ML
1 INJECTION SUBCUTANEOUS EVERY 12 HOURS
Status: DISCONTINUED | OUTPATIENT
Start: 2023-08-13 | End: 2023-08-24

## 2023-08-13 RX ORDER — MIDODRINE HYDROCHLORIDE 5 MG/1
2.5 TABLET ORAL ONCE
Status: COMPLETED | OUTPATIENT
Start: 2023-08-13 | End: 2023-08-13

## 2023-08-13 RX ORDER — 0.9 % SODIUM CHLORIDE 0.9 %
500 INTRAVENOUS SOLUTION INTRAVENOUS ONCE
Status: COMPLETED | OUTPATIENT
Start: 2023-08-13 | End: 2023-08-13

## 2023-08-13 RX ADMIN — HEPARIN SODIUM 28 UNITS/KG/HR: 10000 INJECTION, SOLUTION INTRAVENOUS at 13:08

## 2023-08-13 RX ADMIN — POTASSIUM CHLORIDE 10 MEQ: 7.46 INJECTION, SOLUTION INTRAVENOUS at 14:29

## 2023-08-13 RX ADMIN — SODIUM BICARBONATE 650 MG TABLET 1300 MG: at 20:42

## 2023-08-13 RX ADMIN — PANCRELIPASE LIPASE, PANCRELIPASE PROTEASE, PANCRELIPASE AMYLASE 5000 UNITS: 5000; 17000; 24000 CAPSULE, DELAYED RELEASE ORAL at 11:18

## 2023-08-13 RX ADMIN — POTASSIUM CHLORIDE 10 MEQ: 7.46 INJECTION, SOLUTION INTRAVENOUS at 13:03

## 2023-08-13 RX ADMIN — MAGNESIUM GLUCONATE 500 MG ORAL TABLET 400 MG: 500 TABLET ORAL at 11:25

## 2023-08-13 RX ADMIN — SUCRALFATE 1 G: 1 TABLET ORAL at 15:50

## 2023-08-13 RX ADMIN — SODIUM CHLORIDE 5 MG/HR: 900 INJECTION, SOLUTION INTRAVENOUS at 19:30

## 2023-08-13 RX ADMIN — PANCRELIPASE LIPASE, PANCRELIPASE PROTEASE, PANCRELIPASE AMYLASE 5000 UNITS: 5000; 17000; 24000 CAPSULE, DELAYED RELEASE ORAL at 15:50

## 2023-08-13 RX ADMIN — OXYCODONE HYDROCHLORIDE 5 MG: 5 TABLET ORAL at 14:38

## 2023-08-13 RX ADMIN — ENOXAPARIN SODIUM 40 MG: 100 INJECTION SUBCUTANEOUS at 18:36

## 2023-08-13 RX ADMIN — SODIUM CHLORIDE 500 ML: 9 INJECTION, SOLUTION INTRAVENOUS at 08:10

## 2023-08-13 RX ADMIN — SODIUM CHLORIDE, PRESERVATIVE FREE 40 MG: 5 INJECTION INTRAVENOUS at 20:42

## 2023-08-13 RX ADMIN — POTASSIUM CHLORIDE 10 MEQ: 7.46 INJECTION, SOLUTION INTRAVENOUS at 15:39

## 2023-08-13 RX ADMIN — SODIUM CHLORIDE, PRESERVATIVE FREE 10 ML: 5 INJECTION INTRAVENOUS at 20:43

## 2023-08-13 RX ADMIN — PANCRELIPASE LIPASE, PANCRELIPASE PROTEASE, PANCRELIPASE AMYLASE 5000 UNITS: 5000; 17000; 24000 CAPSULE, DELAYED RELEASE ORAL at 09:03

## 2023-08-13 RX ADMIN — TRAZODONE HYDROCHLORIDE 50 MG: 50 TABLET ORAL at 20:42

## 2023-08-13 RX ADMIN — DILTIAZEM HYDROCHLORIDE 5 MG: 5 INJECTION INTRAVENOUS at 15:56

## 2023-08-13 RX ADMIN — SODIUM CHLORIDE, PRESERVATIVE FREE 40 MG: 5 INJECTION INTRAVENOUS at 09:03

## 2023-08-13 RX ADMIN — SUCRALFATE 1 G: 1 TABLET ORAL at 20:42

## 2023-08-13 RX ADMIN — SODIUM BICARBONATE 650 MG TABLET 1300 MG: at 09:04

## 2023-08-13 RX ADMIN — SUCRALFATE 1 G: 1 TABLET ORAL at 06:13

## 2023-08-13 RX ADMIN — HYDROMORPHONE HYDROCHLORIDE 1 MG: 1 INJECTION, SOLUTION INTRAMUSCULAR; INTRAVENOUS; SUBCUTANEOUS at 02:28

## 2023-08-13 RX ADMIN — MIDODRINE HYDROCHLORIDE 2.5 MG: 5 TABLET ORAL at 08:15

## 2023-08-13 RX ADMIN — SODIUM CHLORIDE, PRESERVATIVE FREE 10 ML: 5 INJECTION INTRAVENOUS at 09:20

## 2023-08-13 RX ADMIN — SUCRALFATE 1 G: 1 TABLET ORAL at 11:18

## 2023-08-13 RX ADMIN — PIPERACILLIN SODIUM AND TAZOBACTAM SODIUM 3375 MG: 3; .375 INJECTION, POWDER, LYOPHILIZED, FOR SOLUTION INTRAVENOUS at 10:27

## 2023-08-13 RX ADMIN — PIPERACILLIN SODIUM AND TAZOBACTAM SODIUM 3375 MG: 3; .375 INJECTION, POWDER, LYOPHILIZED, FOR SOLUTION INTRAVENOUS at 15:46

## 2023-08-13 RX ADMIN — OXYCODONE HYDROCHLORIDE 10 MG: 5 TABLET ORAL at 18:43

## 2023-08-13 ASSESSMENT — PAIN SCALES - GENERAL
PAINLEVEL_OUTOF10: 8
PAINLEVEL_OUTOF10: 6
PAINLEVEL_OUTOF10: 0
PAINLEVEL_OUTOF10: 8
PAINLEVEL_OUTOF10: 8

## 2023-08-13 ASSESSMENT — PAIN DESCRIPTION - LOCATION
LOCATION: ABDOMEN
LOCATION: ABDOMEN;LEG
LOCATION: ABDOMEN

## 2023-08-13 ASSESSMENT — PAIN DESCRIPTION - DESCRIPTORS
DESCRIPTORS: ACHING;CRAMPING
DESCRIPTORS: ACHING;BURNING;DISCOMFORT

## 2023-08-13 ASSESSMENT — PAIN DESCRIPTION - ORIENTATION
ORIENTATION: ANTERIOR
ORIENTATION: MID

## 2023-08-13 NOTE — FLOWSHEET NOTE
08/13/23 0228   Vital Signs   Respirations 20   Pain Assessment   Pain Assessment 0-10   Pain Level 8   Pain Location Abdomen;Leg   Opioid-Induced Sedation   POSS Score 1

## 2023-08-13 NOTE — FLOWSHEET NOTE
08/12/23 2055   Vital Signs   Respirations 18   Pain Assessment   Pain Assessment 0-10   Pain Level 8   Pain Location Abdomen   Opioid-Induced Sedation   POSS Score 1     Dilaudid 1mg IV given

## 2023-08-13 NOTE — FLOWSHEET NOTE
08/13/23 0258   Vital Signs   Respirations 18   Pain Assessment   Pain Assessment None - Denies Pain   Pain Level 0   Response to Pain Intervention Patient satisfied   Side Effects No reported side effects   Opioid-Induced Sedation   POSS Score S

## 2023-08-13 NOTE — ICUWATCH
RRT Clinical Rounding Nurse Assistance    Called to assist primary RN with  IV diltiazem push. Patient connected to bedside monitor. A.fib 140s-150s. BP stable. Patient denies CP/SOB or palpitations. Pushed 5mg diltiazem IV as ordered. HR transiently decreased to 80s-90s, before climbing back to 130s-140s. Remains a.fib. BP stable. Updated attending provider and patient to be transferred to telemetry for diltiazem gtt.      Jamia Shell RN  [place]: [address]: [phone]

## 2023-08-13 NOTE — FLOWSHEET NOTE
08/12/23 2125   Vital Signs   Respirations 17   Pain Assessment   Pain Assessment None - Denies Pain   Pain Level 0   Response to Pain Intervention Patient satisfied   Side Effects No reported side effects   Opioid-Induced Sedation   POSS Score S

## 2023-08-14 LAB
ANION GAP SERPL CALC-SCNC: 7 MMOL/L (ref 2–11)
BACTERIA SPEC CULT: NORMAL
BUN SERPL-MCNC: 7 MG/DL (ref 8–23)
CALCIUM SERPL-MCNC: 7.1 MG/DL (ref 8.3–10.4)
CHLORIDE SERPL-SCNC: 112 MMOL/L (ref 101–110)
CO2 SERPL-SCNC: 24 MMOL/L (ref 21–32)
CREAT SERPL-MCNC: 0.4 MG/DL (ref 0.6–1)
CYTOLOGY-NON GYN: NORMAL
ERYTHROCYTE [DISTWIDTH] IN BLOOD BY AUTOMATED COUNT: 20.9 % (ref 11.9–14.6)
GLUCOSE SERPL-MCNC: 86 MG/DL (ref 65–100)
GRAM STN SPEC: NORMAL
GRAM STN SPEC: NORMAL
HCT VFR BLD AUTO: 22.4 % (ref 35.8–46.3)
HCT VFR BLD AUTO: 32.5 % (ref 35.8–46.3)
HGB BLD-MCNC: 10.6 G/DL (ref 11.7–15.4)
HGB BLD-MCNC: 6.9 G/DL (ref 11.7–15.4)
HISTORY CHECK: NORMAL
MAGNESIUM SERPL-MCNC: 1.6 MG/DL (ref 1.8–2.4)
MCH RBC QN AUTO: 25.6 PG (ref 26.1–32.9)
MCHC RBC AUTO-ENTMCNC: 30.8 G/DL (ref 31.4–35)
MCV RBC AUTO: 83 FL (ref 82–102)
NRBC # BLD: 0 K/UL (ref 0–0.2)
PLATELET # BLD AUTO: 180 K/UL (ref 150–450)
PMV BLD AUTO: 9.5 FL (ref 9.4–12.3)
POTASSIUM SERPL-SCNC: 2.7 MMOL/L (ref 3.5–5.1)
RBC # BLD AUTO: 2.7 M/UL (ref 4.05–5.2)
SERVICE CMNT-IMP: NORMAL
SODIUM SERPL-SCNC: 143 MMOL/L (ref 133–143)
SPECIMEN SOURCE: NORMAL
WBC # BLD AUTO: 6.3 K/UL (ref 4.3–11.1)

## 2023-08-14 PROCEDURE — 2140000000 HC CCU INTERMEDIATE R&B

## 2023-08-14 PROCEDURE — 6370000000 HC RX 637 (ALT 250 FOR IP): Performed by: FAMILY MEDICINE

## 2023-08-14 PROCEDURE — 85027 COMPLETE CBC AUTOMATED: CPT

## 2023-08-14 PROCEDURE — 86923 COMPATIBILITY TEST ELECTRIC: CPT

## 2023-08-14 PROCEDURE — 86901 BLOOD TYPING SEROLOGIC RH(D): CPT

## 2023-08-14 PROCEDURE — 6360000002 HC RX W HCPCS: Performed by: FAMILY MEDICINE

## 2023-08-14 PROCEDURE — 6360000002 HC RX W HCPCS: Performed by: HOSPITALIST

## 2023-08-14 PROCEDURE — 97112 NEUROMUSCULAR REEDUCATION: CPT

## 2023-08-14 PROCEDURE — 97535 SELF CARE MNGMENT TRAINING: CPT

## 2023-08-14 PROCEDURE — 85014 HEMATOCRIT: CPT

## 2023-08-14 PROCEDURE — P9016 RBC LEUKOCYTES REDUCED: HCPCS

## 2023-08-14 PROCEDURE — 36415 COLL VENOUS BLD VENIPUNCTURE: CPT

## 2023-08-14 PROCEDURE — C9113 INJ PANTOPRAZOLE SODIUM, VIA: HCPCS | Performed by: FAMILY MEDICINE

## 2023-08-14 PROCEDURE — 86900 BLOOD TYPING SEROLOGIC ABO: CPT

## 2023-08-14 PROCEDURE — 6370000000 HC RX 637 (ALT 250 FOR IP): Performed by: RADIOLOGY

## 2023-08-14 PROCEDURE — A4216 STERILE WATER/SALINE, 10 ML: HCPCS | Performed by: FAMILY MEDICINE

## 2023-08-14 PROCEDURE — 2580000003 HC RX 258: Performed by: FAMILY MEDICINE

## 2023-08-14 PROCEDURE — 83735 ASSAY OF MAGNESIUM: CPT

## 2023-08-14 PROCEDURE — 6370000000 HC RX 637 (ALT 250 FOR IP): Performed by: INTERNAL MEDICINE

## 2023-08-14 PROCEDURE — 86850 RBC ANTIBODY SCREEN: CPT

## 2023-08-14 PROCEDURE — 85018 HEMOGLOBIN: CPT

## 2023-08-14 PROCEDURE — 36430 TRANSFUSION BLD/BLD COMPNT: CPT

## 2023-08-14 PROCEDURE — 80048 BASIC METABOLIC PNL TOTAL CA: CPT

## 2023-08-14 PROCEDURE — 6370000000 HC RX 637 (ALT 250 FOR IP): Performed by: HOSPITALIST

## 2023-08-14 PROCEDURE — 97530 THERAPEUTIC ACTIVITIES: CPT

## 2023-08-14 RX ORDER — SODIUM CHLORIDE 9 MG/ML
INJECTION, SOLUTION INTRAVENOUS PRN
Status: DISCONTINUED | OUTPATIENT
Start: 2023-08-14 | End: 2023-08-21

## 2023-08-14 RX ORDER — PANTOPRAZOLE SODIUM 40 MG/1
40 TABLET, DELAYED RELEASE ORAL
Status: DISCONTINUED | OUTPATIENT
Start: 2023-08-14 | End: 2023-08-24

## 2023-08-14 RX ORDER — POTASSIUM CHLORIDE 7.45 MG/ML
10 INJECTION INTRAVENOUS
Status: COMPLETED | OUTPATIENT
Start: 2023-08-14 | End: 2023-08-14

## 2023-08-14 RX ORDER — MAGNESIUM SULFATE IN WATER 40 MG/ML
2000 INJECTION, SOLUTION INTRAVENOUS
Status: COMPLETED | OUTPATIENT
Start: 2023-08-14 | End: 2023-08-14

## 2023-08-14 RX ADMIN — SUCRALFATE 1 G: 1 TABLET ORAL at 11:35

## 2023-08-14 RX ADMIN — PANCRELIPASE LIPASE, PANCRELIPASE PROTEASE, PANCRELIPASE AMYLASE 5000 UNITS: 5000; 17000; 24000 CAPSULE, DELAYED RELEASE ORAL at 17:43

## 2023-08-14 RX ADMIN — OXYCODONE HYDROCHLORIDE 5 MG: 5 TABLET ORAL at 20:36

## 2023-08-14 RX ADMIN — ENOXAPARIN SODIUM 40 MG: 100 INJECTION SUBCUTANEOUS at 05:31

## 2023-08-14 RX ADMIN — PIPERACILLIN SODIUM AND TAZOBACTAM SODIUM 3375 MG: 3; .375 INJECTION, POWDER, LYOPHILIZED, FOR SOLUTION INTRAVENOUS at 09:05

## 2023-08-14 RX ADMIN — SUCRALFATE 1 G: 1 TABLET ORAL at 17:43

## 2023-08-14 RX ADMIN — MAGNESIUM SULFATE HEPTAHYDRATE 2000 MG: 40 INJECTION, SOLUTION INTRAVENOUS at 09:39

## 2023-08-14 RX ADMIN — POTASSIUM BICARBONATE 40 MEQ: 391 TABLET, EFFERVESCENT ORAL at 09:05

## 2023-08-14 RX ADMIN — PIPERACILLIN SODIUM AND TAZOBACTAM SODIUM 3375 MG: 3; .375 INJECTION, POWDER, LYOPHILIZED, FOR SOLUTION INTRAVENOUS at 16:32

## 2023-08-14 RX ADMIN — HYDROMORPHONE HYDROCHLORIDE 0.5 MG: 1 INJECTION, SOLUTION INTRAMUSCULAR; INTRAVENOUS; SUBCUTANEOUS at 16:30

## 2023-08-14 RX ADMIN — PANTOPRAZOLE SODIUM 40 MG: 40 TABLET, DELAYED RELEASE ORAL at 16:32

## 2023-08-14 RX ADMIN — SODIUM CHLORIDE, PRESERVATIVE FREE 40 MG: 5 INJECTION INTRAVENOUS at 09:05

## 2023-08-14 RX ADMIN — SODIUM CHLORIDE, PRESERVATIVE FREE 10 ML: 5 INJECTION INTRAVENOUS at 09:07

## 2023-08-14 RX ADMIN — TRAZODONE HYDROCHLORIDE 50 MG: 50 TABLET ORAL at 20:21

## 2023-08-14 RX ADMIN — PANCRELIPASE LIPASE, PANCRELIPASE PROTEASE, PANCRELIPASE AMYLASE 5000 UNITS: 5000; 17000; 24000 CAPSULE, DELAYED RELEASE ORAL at 11:35

## 2023-08-14 RX ADMIN — POTASSIUM CHLORIDE 10 MEQ: 7.46 INJECTION, SOLUTION INTRAVENOUS at 12:49

## 2023-08-14 RX ADMIN — ENOXAPARIN SODIUM 40 MG: 100 INJECTION SUBCUTANEOUS at 17:43

## 2023-08-14 RX ADMIN — PIPERACILLIN SODIUM AND TAZOBACTAM SODIUM 3375 MG: 3; .375 INJECTION, POWDER, LYOPHILIZED, FOR SOLUTION INTRAVENOUS at 00:28

## 2023-08-14 RX ADMIN — OXYCODONE HYDROCHLORIDE 10 MG: 5 TABLET ORAL at 09:28

## 2023-08-14 RX ADMIN — SODIUM BICARBONATE 650 MG TABLET 1300 MG: at 20:21

## 2023-08-14 RX ADMIN — MAGNESIUM SULFATE HEPTAHYDRATE 2000 MG: 40 INJECTION, SOLUTION INTRAVENOUS at 11:11

## 2023-08-14 RX ADMIN — SODIUM BICARBONATE 650 MG TABLET 1300 MG: at 09:29

## 2023-08-14 RX ADMIN — SODIUM CHLORIDE, PRESERVATIVE FREE 10 ML: 5 INJECTION INTRAVENOUS at 20:25

## 2023-08-14 RX ADMIN — POTASSIUM CHLORIDE 10 MEQ: 7.46 INJECTION, SOLUTION INTRAVENOUS at 11:11

## 2023-08-14 RX ADMIN — SUCRALFATE 1 G: 1 TABLET ORAL at 05:31

## 2023-08-14 RX ADMIN — POTASSIUM CHLORIDE 10 MEQ: 7.46 INJECTION, SOLUTION INTRAVENOUS at 09:06

## 2023-08-14 RX ADMIN — PANCRELIPASE LIPASE, PANCRELIPASE PROTEASE, PANCRELIPASE AMYLASE 5000 UNITS: 5000; 17000; 24000 CAPSULE, DELAYED RELEASE ORAL at 09:04

## 2023-08-14 RX ADMIN — SUCRALFATE 1 G: 1 TABLET ORAL at 20:21

## 2023-08-14 RX ADMIN — MAGNESIUM GLUCONATE 500 MG ORAL TABLET 400 MG: 500 TABLET ORAL at 09:04

## 2023-08-14 ASSESSMENT — PAIN DESCRIPTION - FREQUENCY
FREQUENCY: CONTINUOUS

## 2023-08-14 ASSESSMENT — PAIN DESCRIPTION - ONSET
ONSET: ON-GOING

## 2023-08-14 ASSESSMENT — PAIN SCALES - GENERAL
PAINLEVEL_OUTOF10: 6
PAINLEVEL_OUTOF10: 3
PAINLEVEL_OUTOF10: 8
PAINLEVEL_OUTOF10: 6
PAINLEVEL_OUTOF10: 0
PAINLEVEL_OUTOF10: 6
PAINLEVEL_OUTOF10: 10
PAINLEVEL_OUTOF10: 3

## 2023-08-14 ASSESSMENT — PAIN DESCRIPTION - ORIENTATION
ORIENTATION: ANTERIOR
ORIENTATION: ANTERIOR
ORIENTATION: MID;LOWER
ORIENTATION: LOWER
ORIENTATION: RIGHT

## 2023-08-14 ASSESSMENT — PAIN DESCRIPTION - LOCATION
LOCATION: ABDOMEN
LOCATION: BACK

## 2023-08-14 ASSESSMENT — PAIN DESCRIPTION - PAIN TYPE
TYPE: ACUTE PAIN

## 2023-08-14 ASSESSMENT — PAIN DESCRIPTION - DESCRIPTORS
DESCRIPTORS: GNAWING;STABBING
DESCRIPTORS: SORE
DESCRIPTORS: ACHING;GNAWING;STABBING
DESCRIPTORS: ACHING
DESCRIPTORS: ACHING;GNAWING;STABBING

## 2023-08-15 ENCOUNTER — APPOINTMENT (OUTPATIENT)
Dept: CT IMAGING | Age: 83
DRG: 871 | End: 2023-08-15
Payer: MEDICARE

## 2023-08-15 LAB
ABO + RH BLD: NORMAL
ALBUMIN SERPL-MCNC: 1 G/DL (ref 3.2–4.6)
ALBUMIN/GLOB SERPL: 0.3 (ref 0.4–1.6)
ALP SERPL-CCNC: 99 U/L (ref 50–136)
ALT SERPL-CCNC: 8 U/L (ref 12–65)
ANION GAP SERPL CALC-SCNC: 9 MMOL/L (ref 2–11)
AST SERPL-CCNC: 14 U/L (ref 15–37)
BILIRUB SERPL-MCNC: 0.5 MG/DL (ref 0.2–1.1)
BLD PROD TYP BPU: NORMAL
BLOOD BANK DISPENSE STATUS: NORMAL
BLOOD GROUP ANTIBODIES SERPL: NORMAL
BPU ID: NORMAL
BUN SERPL-MCNC: 8 MG/DL (ref 8–23)
CALCIUM SERPL-MCNC: 7.1 MG/DL (ref 8.3–10.4)
CHLORIDE SERPL-SCNC: 112 MMOL/L (ref 101–110)
CO2 SERPL-SCNC: 22 MMOL/L (ref 21–32)
CREAT SERPL-MCNC: 0.4 MG/DL (ref 0.6–1)
CROSSMATCH RESULT: NORMAL
GLOBULIN SER CALC-MCNC: 3.3 G/DL (ref 2.8–4.5)
GLUCOSE SERPL-MCNC: 77 MG/DL (ref 65–100)
MAGNESIUM SERPL-MCNC: 2.1 MG/DL (ref 1.8–2.4)
POTASSIUM SERPL-SCNC: 3.1 MMOL/L (ref 3.5–5.1)
POTASSIUM SERPL-SCNC: 3.3 MMOL/L (ref 3.5–5.1)
PROT SERPL-MCNC: 4.3 G/DL (ref 6.3–8.2)
SODIUM SERPL-SCNC: 143 MMOL/L (ref 133–143)
SPECIMEN EXP DATE BLD: NORMAL
UNIT DIVISION: 0

## 2023-08-15 PROCEDURE — 6370000000 HC RX 637 (ALT 250 FOR IP): Performed by: RADIOLOGY

## 2023-08-15 PROCEDURE — 83735 ASSAY OF MAGNESIUM: CPT

## 2023-08-15 PROCEDURE — 6370000000 HC RX 637 (ALT 250 FOR IP): Performed by: FAMILY MEDICINE

## 2023-08-15 PROCEDURE — 36415 COLL VENOUS BLD VENIPUNCTURE: CPT

## 2023-08-15 PROCEDURE — 2580000003 HC RX 258: Performed by: FAMILY MEDICINE

## 2023-08-15 PROCEDURE — 74177 CT ABD & PELVIS W/CONTRAST: CPT

## 2023-08-15 PROCEDURE — 6360000004 HC RX CONTRAST MEDICATION: Performed by: FAMILY MEDICINE

## 2023-08-15 PROCEDURE — 2140000000 HC CCU INTERMEDIATE R&B

## 2023-08-15 PROCEDURE — 84132 ASSAY OF SERUM POTASSIUM: CPT

## 2023-08-15 PROCEDURE — 6370000000 HC RX 637 (ALT 250 FOR IP): Performed by: INTERNAL MEDICINE

## 2023-08-15 PROCEDURE — 2500000003 HC RX 250 WO HCPCS: Performed by: HOSPITALIST

## 2023-08-15 PROCEDURE — 6360000002 HC RX W HCPCS: Performed by: FAMILY MEDICINE

## 2023-08-15 PROCEDURE — 6360000002 HC RX W HCPCS: Performed by: HOSPITALIST

## 2023-08-15 PROCEDURE — 80053 COMPREHEN METABOLIC PANEL: CPT

## 2023-08-15 PROCEDURE — 6370000000 HC RX 637 (ALT 250 FOR IP): Performed by: HOSPITALIST

## 2023-08-15 RX ORDER — 0.9 % SODIUM CHLORIDE 0.9 %
100 INTRAVENOUS SOLUTION INTRAVENOUS
Status: COMPLETED | OUTPATIENT
Start: 2023-08-15 | End: 2023-08-15

## 2023-08-15 RX ORDER — POTASSIUM CHLORIDE 20 MEQ/1
40 TABLET, EXTENDED RELEASE ORAL 2 TIMES DAILY WITH MEALS
Status: COMPLETED | OUTPATIENT
Start: 2023-08-15 | End: 2023-08-16

## 2023-08-15 RX ORDER — SODIUM CHLORIDE 0.9 % (FLUSH) 0.9 %
10 SYRINGE (ML) INJECTION
Status: COMPLETED | OUTPATIENT
Start: 2023-08-15 | End: 2023-08-15

## 2023-08-15 RX ADMIN — HYDROMORPHONE HYDROCHLORIDE 0.5 MG: 1 INJECTION, SOLUTION INTRAMUSCULAR; INTRAVENOUS; SUBCUTANEOUS at 13:18

## 2023-08-15 RX ADMIN — PANCRELIPASE LIPASE, PANCRELIPASE PROTEASE, PANCRELIPASE AMYLASE 5000 UNITS: 5000; 17000; 24000 CAPSULE, DELAYED RELEASE ORAL at 08:06

## 2023-08-15 RX ADMIN — PIPERACILLIN SODIUM AND TAZOBACTAM SODIUM 3375 MG: 3; .375 INJECTION, POWDER, LYOPHILIZED, FOR SOLUTION INTRAVENOUS at 08:13

## 2023-08-15 RX ADMIN — SODIUM BICARBONATE 650 MG TABLET 1300 MG: at 08:05

## 2023-08-15 RX ADMIN — OXYCODONE HYDROCHLORIDE 10 MG: 5 TABLET ORAL at 20:29

## 2023-08-15 RX ADMIN — OXYCODONE HYDROCHLORIDE 10 MG: 5 TABLET ORAL at 05:50

## 2023-08-15 RX ADMIN — SODIUM CHLORIDE 100 ML: 9 INJECTION, SOLUTION INTRAVENOUS at 11:07

## 2023-08-15 RX ADMIN — SODIUM CHLORIDE, PRESERVATIVE FREE 10 ML: 5 INJECTION INTRAVENOUS at 11:07

## 2023-08-15 RX ADMIN — TRAZODONE HYDROCHLORIDE 50 MG: 50 TABLET ORAL at 20:29

## 2023-08-15 RX ADMIN — POTASSIUM CHLORIDE 40 MEQ: 1500 TABLET, EXTENDED RELEASE ORAL at 08:19

## 2023-08-15 RX ADMIN — DIATRIZOATE MEGLUMINE AND DIATRIZOATE SODIUM 15 ML: 660; 100 LIQUID ORAL; RECTAL at 08:42

## 2023-08-15 RX ADMIN — PANTOPRAZOLE SODIUM 40 MG: 40 TABLET, DELAYED RELEASE ORAL at 05:28

## 2023-08-15 RX ADMIN — MAGNESIUM GLUCONATE 500 MG ORAL TABLET 400 MG: 500 TABLET ORAL at 08:06

## 2023-08-15 RX ADMIN — SODIUM BICARBONATE 650 MG TABLET 1300 MG: at 20:29

## 2023-08-15 RX ADMIN — SODIUM CHLORIDE, PRESERVATIVE FREE 10 ML: 5 INJECTION INTRAVENOUS at 20:32

## 2023-08-15 RX ADMIN — ENOXAPARIN SODIUM 40 MG: 100 INJECTION SUBCUTANEOUS at 17:38

## 2023-08-15 RX ADMIN — ENOXAPARIN SODIUM 40 MG: 100 INJECTION SUBCUTANEOUS at 05:30

## 2023-08-15 RX ADMIN — OXYCODONE HYDROCHLORIDE 10 MG: 5 TABLET ORAL at 16:28

## 2023-08-15 RX ADMIN — PIPERACILLIN SODIUM AND TAZOBACTAM SODIUM 3375 MG: 3; .375 INJECTION, POWDER, LYOPHILIZED, FOR SOLUTION INTRAVENOUS at 00:28

## 2023-08-15 RX ADMIN — SUCRALFATE 1 G: 1 TABLET ORAL at 05:28

## 2023-08-15 RX ADMIN — SODIUM CHLORIDE, PRESERVATIVE FREE 10 ML: 5 INJECTION INTRAVENOUS at 08:07

## 2023-08-15 RX ADMIN — PANCRELIPASE LIPASE, PANCRELIPASE PROTEASE, PANCRELIPASE AMYLASE 5000 UNITS: 5000; 17000; 24000 CAPSULE, DELAYED RELEASE ORAL at 16:29

## 2023-08-15 RX ADMIN — PANTOPRAZOLE SODIUM 40 MG: 40 TABLET, DELAYED RELEASE ORAL at 16:29

## 2023-08-15 RX ADMIN — PANCRELIPASE LIPASE, PANCRELIPASE PROTEASE, PANCRELIPASE AMYLASE 5000 UNITS: 5000; 17000; 24000 CAPSULE, DELAYED RELEASE ORAL at 12:09

## 2023-08-15 RX ADMIN — POTASSIUM CHLORIDE 40 MEQ: 1500 TABLET, EXTENDED RELEASE ORAL at 16:29

## 2023-08-15 RX ADMIN — IOPAMIDOL 100 ML: 755 INJECTION, SOLUTION INTRAVENOUS at 11:07

## 2023-08-15 ASSESSMENT — PAIN SCALES - GENERAL
PAINLEVEL_OUTOF10: 0
PAINLEVEL_OUTOF10: 6
PAINLEVEL_OUTOF10: 0
PAINLEVEL_OUTOF10: 6
PAINLEVEL_OUTOF10: 0
PAINLEVEL_OUTOF10: 5
PAINLEVEL_OUTOF10: 3
PAINLEVEL_OUTOF10: 0
PAINLEVEL_OUTOF10: 0

## 2023-08-15 ASSESSMENT — PAIN DESCRIPTION - LOCATION
LOCATION: ABDOMEN

## 2023-08-15 ASSESSMENT — PAIN DESCRIPTION - ORIENTATION
ORIENTATION: RIGHT
ORIENTATION: MID;LOWER
ORIENTATION: MID

## 2023-08-15 ASSESSMENT — PAIN DESCRIPTION - DESCRIPTORS
DESCRIPTORS: SORE
DESCRIPTORS: ACHING
DESCRIPTORS: ACHING

## 2023-08-16 PROBLEM — Z71.89 COUNSELING REGARDING ADVANCE CARE PLANNING AND GOALS OF CARE: Status: ACTIVE | Noted: 2023-08-16

## 2023-08-16 LAB
ALBUMIN SERPL-MCNC: 1 G/DL (ref 3.2–4.6)
ALBUMIN/GLOB SERPL: 0.3 (ref 0.4–1.6)
ALP SERPL-CCNC: 102 U/L (ref 50–136)
ALT SERPL-CCNC: 11 U/L (ref 12–65)
ANION GAP SERPL CALC-SCNC: 6 MMOL/L (ref 2–11)
AST SERPL-CCNC: 12 U/L (ref 15–37)
BILIRUB SERPL-MCNC: 0.4 MG/DL (ref 0.2–1.1)
BUN SERPL-MCNC: 7 MG/DL (ref 8–23)
CALCIUM SERPL-MCNC: 7.4 MG/DL (ref 8.3–10.4)
CHLORIDE SERPL-SCNC: 113 MMOL/L (ref 101–110)
CO2 SERPL-SCNC: 26 MMOL/L (ref 21–32)
CREAT SERPL-MCNC: 0.4 MG/DL (ref 0.6–1)
ERYTHROCYTE [DISTWIDTH] IN BLOOD BY AUTOMATED COUNT: 19.2 % (ref 11.9–14.6)
GLOBULIN SER CALC-MCNC: 3.4 G/DL (ref 2.8–4.5)
GLUCOSE SERPL-MCNC: 79 MG/DL (ref 65–100)
HCT VFR BLD AUTO: 32.8 % (ref 35.8–46.3)
HGB BLD-MCNC: 10.2 G/DL (ref 11.7–15.4)
MAGNESIUM SERPL-MCNC: 2.2 MG/DL (ref 1.8–2.4)
MCH RBC QN AUTO: 26.4 PG (ref 26.1–32.9)
MCHC RBC AUTO-ENTMCNC: 31.1 G/DL (ref 31.4–35)
MCV RBC AUTO: 85 FL (ref 82–102)
NRBC # BLD: 0 K/UL (ref 0–0.2)
PLATELET # BLD AUTO: 193 K/UL (ref 150–450)
PMV BLD AUTO: 10.1 FL (ref 9.4–12.3)
POTASSIUM SERPL-SCNC: 3.4 MMOL/L (ref 3.5–5.1)
PROT SERPL-MCNC: 4.4 G/DL (ref 6.3–8.2)
RBC # BLD AUTO: 3.86 M/UL (ref 4.05–5.2)
SODIUM SERPL-SCNC: 145 MMOL/L (ref 133–143)
WBC # BLD AUTO: 6.9 K/UL (ref 4.3–11.1)

## 2023-08-16 PROCEDURE — 6360000002 HC RX W HCPCS: Performed by: HOSPITALIST

## 2023-08-16 PROCEDURE — 6370000000 HC RX 637 (ALT 250 FOR IP): Performed by: RADIOLOGY

## 2023-08-16 PROCEDURE — 6370000000 HC RX 637 (ALT 250 FOR IP): Performed by: FAMILY MEDICINE

## 2023-08-16 PROCEDURE — 80053 COMPREHEN METABOLIC PANEL: CPT

## 2023-08-16 PROCEDURE — 2500000003 HC RX 250 WO HCPCS: Performed by: FAMILY MEDICINE

## 2023-08-16 PROCEDURE — 6370000000 HC RX 637 (ALT 250 FOR IP): Performed by: INTERNAL MEDICINE

## 2023-08-16 PROCEDURE — 83735 ASSAY OF MAGNESIUM: CPT

## 2023-08-16 PROCEDURE — 2500000003 HC RX 250 WO HCPCS: Performed by: HOSPITALIST

## 2023-08-16 PROCEDURE — 85027 COMPLETE CBC AUTOMATED: CPT

## 2023-08-16 PROCEDURE — 36415 COLL VENOUS BLD VENIPUNCTURE: CPT

## 2023-08-16 PROCEDURE — 2140000000 HC CCU INTERMEDIATE R&B

## 2023-08-16 PROCEDURE — 6370000000 HC RX 637 (ALT 250 FOR IP): Performed by: HOSPITALIST

## 2023-08-16 PROCEDURE — 2580000003 HC RX 258: Performed by: FAMILY MEDICINE

## 2023-08-16 RX ORDER — HYDROMORPHONE HYDROCHLORIDE 1 MG/ML
0.5 INJECTION, SOLUTION INTRAMUSCULAR; INTRAVENOUS; SUBCUTANEOUS
Status: DISCONTINUED | OUTPATIENT
Start: 2023-08-16 | End: 2023-08-23

## 2023-08-16 RX ADMIN — PANCRELIPASE LIPASE, PANCRELIPASE PROTEASE, PANCRELIPASE AMYLASE 5000 UNITS: 5000; 17000; 24000 CAPSULE, DELAYED RELEASE ORAL at 10:01

## 2023-08-16 RX ADMIN — POTASSIUM CHLORIDE 40 MEQ: 1500 TABLET, EXTENDED RELEASE ORAL at 10:02

## 2023-08-16 RX ADMIN — SODIUM CHLORIDE, PRESERVATIVE FREE 10 ML: 5 INJECTION INTRAVENOUS at 21:11

## 2023-08-16 RX ADMIN — HYDROMORPHONE HYDROCHLORIDE 0.5 MG: 1 INJECTION, SOLUTION INTRAMUSCULAR; INTRAVENOUS; SUBCUTANEOUS at 16:10

## 2023-08-16 RX ADMIN — HYDROMORPHONE HYDROCHLORIDE 0.5 MG: 1 INJECTION, SOLUTION INTRAMUSCULAR; INTRAVENOUS; SUBCUTANEOUS at 04:24

## 2023-08-16 RX ADMIN — OXYCODONE HYDROCHLORIDE 10 MG: 5 TABLET ORAL at 02:04

## 2023-08-16 RX ADMIN — OXYCODONE HYDROCHLORIDE 5 MG: 5 TABLET ORAL at 13:06

## 2023-08-16 RX ADMIN — HYDROMORPHONE HYDROCHLORIDE 0.5 MG: 1 INJECTION, SOLUTION INTRAMUSCULAR; INTRAVENOUS; SUBCUTANEOUS at 21:10

## 2023-08-16 RX ADMIN — MAGNESIUM GLUCONATE 500 MG ORAL TABLET 400 MG: 500 TABLET ORAL at 10:33

## 2023-08-16 RX ADMIN — HYDROMORPHONE HYDROCHLORIDE 0.5 MG: 1 INJECTION, SOLUTION INTRAMUSCULAR; INTRAVENOUS; SUBCUTANEOUS at 10:28

## 2023-08-16 RX ADMIN — ENOXAPARIN SODIUM 40 MG: 100 INJECTION SUBCUTANEOUS at 18:33

## 2023-08-16 RX ADMIN — TRAZODONE HYDROCHLORIDE 50 MG: 50 TABLET ORAL at 21:11

## 2023-08-16 RX ADMIN — PANTOPRAZOLE SODIUM 40 MG: 40 TABLET, DELAYED RELEASE ORAL at 06:19

## 2023-08-16 RX ADMIN — PANCRELIPASE LIPASE, PANCRELIPASE PROTEASE, PANCRELIPASE AMYLASE 5000 UNITS: 5000; 17000; 24000 CAPSULE, DELAYED RELEASE ORAL at 12:52

## 2023-08-16 RX ADMIN — ENOXAPARIN SODIUM 40 MG: 100 INJECTION SUBCUTANEOUS at 06:18

## 2023-08-16 RX ADMIN — SODIUM BICARBONATE 650 MG TABLET 1300 MG: at 10:01

## 2023-08-16 RX ADMIN — SODIUM CHLORIDE, PRESERVATIVE FREE 10 ML: 5 INJECTION INTRAVENOUS at 10:33

## 2023-08-16 RX ADMIN — PANCRELIPASE LIPASE, PANCRELIPASE PROTEASE, PANCRELIPASE AMYLASE 5000 UNITS: 5000; 17000; 24000 CAPSULE, DELAYED RELEASE ORAL at 18:33

## 2023-08-16 RX ADMIN — SODIUM BICARBONATE 650 MG TABLET 1300 MG: at 21:11

## 2023-08-16 RX ADMIN — PANTOPRAZOLE SODIUM 40 MG: 40 TABLET, DELAYED RELEASE ORAL at 16:00

## 2023-08-16 ASSESSMENT — PAIN SCALES - GENERAL
PAINLEVEL_OUTOF10: 6
PAINLEVEL_OUTOF10: 8
PAINLEVEL_OUTOF10: 6
PAINLEVEL_OUTOF10: 7
PAINLEVEL_OUTOF10: 3
PAINLEVEL_OUTOF10: 0
PAINLEVEL_OUTOF10: 7
PAINLEVEL_OUTOF10: 8
PAINLEVEL_OUTOF10: 8

## 2023-08-16 ASSESSMENT — PAIN DESCRIPTION - FREQUENCY
FREQUENCY: CONTINUOUS

## 2023-08-16 ASSESSMENT — PAIN DESCRIPTION - ORIENTATION
ORIENTATION: ANTERIOR;LEFT;RIGHT
ORIENTATION: RIGHT
ORIENTATION: ANTERIOR
ORIENTATION: RIGHT;LEFT
ORIENTATION: RIGHT
ORIENTATION: RIGHT
ORIENTATION: LEFT;RIGHT

## 2023-08-16 ASSESSMENT — PAIN DESCRIPTION - LOCATION
LOCATION: ABDOMEN

## 2023-08-16 ASSESSMENT — PAIN DESCRIPTION - DESCRIPTORS
DESCRIPTORS: ACHING

## 2023-08-16 ASSESSMENT — PAIN DESCRIPTION - ONSET
ONSET: ON-GOING

## 2023-08-16 ASSESSMENT — PAIN DESCRIPTION - PAIN TYPE
TYPE: CHRONIC PAIN

## 2023-08-16 ASSESSMENT — PAIN - FUNCTIONAL ASSESSMENT: PAIN_FUNCTIONAL_ASSESSMENT: PREVENTS OR INTERFERES SOME ACTIVE ACTIVITIES AND ADLS

## 2023-08-17 LAB
ALBUMIN SERPL-MCNC: 1.1 G/DL (ref 3.2–4.6)
ALBUMIN/GLOB SERPL: 0.3 (ref 0.4–1.6)
ALP SERPL-CCNC: 114 U/L (ref 50–136)
ALT SERPL-CCNC: 10 U/L (ref 12–65)
ANION GAP SERPL CALC-SCNC: 7 MMOL/L (ref 2–11)
AST SERPL-CCNC: 11 U/L (ref 15–37)
BILIRUB SERPL-MCNC: 0.5 MG/DL (ref 0.2–1.1)
BUN SERPL-MCNC: 6 MG/DL (ref 8–23)
CALCIUM SERPL-MCNC: 7.9 MG/DL (ref 8.3–10.4)
CHLORIDE SERPL-SCNC: 111 MMOL/L (ref 101–110)
CO2 SERPL-SCNC: 26 MMOL/L (ref 21–32)
CREAT SERPL-MCNC: 0.5 MG/DL (ref 0.6–1)
GLOBULIN SER CALC-MCNC: 3.8 G/DL (ref 2.8–4.5)
GLUCOSE SERPL-MCNC: 119 MG/DL (ref 65–100)
MAGNESIUM SERPL-MCNC: 1.7 MG/DL (ref 1.8–2.4)
POTASSIUM SERPL-SCNC: 3.4 MMOL/L (ref 3.5–5.1)
PROT SERPL-MCNC: 4.9 G/DL (ref 6.3–8.2)
SODIUM SERPL-SCNC: 144 MMOL/L (ref 133–143)

## 2023-08-17 PROCEDURE — 80053 COMPREHEN METABOLIC PANEL: CPT

## 2023-08-17 PROCEDURE — 6370000000 HC RX 637 (ALT 250 FOR IP): Performed by: INTERNAL MEDICINE

## 2023-08-17 PROCEDURE — 2580000003 HC RX 258: Performed by: FAMILY MEDICINE

## 2023-08-17 PROCEDURE — 36415 COLL VENOUS BLD VENIPUNCTURE: CPT

## 2023-08-17 PROCEDURE — 97112 NEUROMUSCULAR REEDUCATION: CPT

## 2023-08-17 PROCEDURE — 6370000000 HC RX 637 (ALT 250 FOR IP): Performed by: FAMILY MEDICINE

## 2023-08-17 PROCEDURE — 6360000002 HC RX W HCPCS: Performed by: HOSPITALIST

## 2023-08-17 PROCEDURE — 6360000002 HC RX W HCPCS: Performed by: FAMILY MEDICINE

## 2023-08-17 PROCEDURE — 2500000003 HC RX 250 WO HCPCS: Performed by: FAMILY MEDICINE

## 2023-08-17 PROCEDURE — 6370000000 HC RX 637 (ALT 250 FOR IP): Performed by: HOSPITALIST

## 2023-08-17 PROCEDURE — 83735 ASSAY OF MAGNESIUM: CPT

## 2023-08-17 PROCEDURE — 6370000000 HC RX 637 (ALT 250 FOR IP): Performed by: RADIOLOGY

## 2023-08-17 PROCEDURE — 97530 THERAPEUTIC ACTIVITIES: CPT

## 2023-08-17 PROCEDURE — 2140000000 HC CCU INTERMEDIATE R&B

## 2023-08-17 RX ORDER — MAGNESIUM SULFATE IN WATER 40 MG/ML
2000 INJECTION, SOLUTION INTRAVENOUS ONCE
Status: COMPLETED | OUTPATIENT
Start: 2023-08-17 | End: 2023-08-17

## 2023-08-17 RX ADMIN — PANCRELIPASE LIPASE, PANCRELIPASE PROTEASE, PANCRELIPASE AMYLASE 5000 UNITS: 5000; 17000; 24000 CAPSULE, DELAYED RELEASE ORAL at 16:32

## 2023-08-17 RX ADMIN — PANTOPRAZOLE SODIUM 40 MG: 40 TABLET, DELAYED RELEASE ORAL at 05:48

## 2023-08-17 RX ADMIN — TRAZODONE HYDROCHLORIDE 50 MG: 50 TABLET ORAL at 21:37

## 2023-08-17 RX ADMIN — ENOXAPARIN SODIUM 40 MG: 100 INJECTION SUBCUTANEOUS at 16:32

## 2023-08-17 RX ADMIN — OXYCODONE HYDROCHLORIDE 10 MG: 5 TABLET ORAL at 10:30

## 2023-08-17 RX ADMIN — SODIUM CHLORIDE, PRESERVATIVE FREE 10 ML: 5 INJECTION INTRAVENOUS at 21:37

## 2023-08-17 RX ADMIN — ENOXAPARIN SODIUM 40 MG: 100 INJECTION SUBCUTANEOUS at 05:48

## 2023-08-17 RX ADMIN — HYDROMORPHONE HYDROCHLORIDE 0.5 MG: 1 INJECTION, SOLUTION INTRAMUSCULAR; INTRAVENOUS; SUBCUTANEOUS at 11:18

## 2023-08-17 RX ADMIN — SODIUM BICARBONATE 650 MG TABLET 1300 MG: at 08:11

## 2023-08-17 RX ADMIN — PANCRELIPASE LIPASE, PANCRELIPASE PROTEASE, PANCRELIPASE AMYLASE 5000 UNITS: 5000; 17000; 24000 CAPSULE, DELAYED RELEASE ORAL at 11:18

## 2023-08-17 RX ADMIN — MAGNESIUM SULFATE HEPTAHYDRATE 2000 MG: 40 INJECTION, SOLUTION INTRAVENOUS at 15:43

## 2023-08-17 RX ADMIN — PANTOPRAZOLE SODIUM 40 MG: 40 TABLET, DELAYED RELEASE ORAL at 15:43

## 2023-08-17 RX ADMIN — SODIUM BICARBONATE 650 MG TABLET 1300 MG: at 21:37

## 2023-08-17 RX ADMIN — SODIUM CHLORIDE, PRESERVATIVE FREE 10 ML: 5 INJECTION INTRAVENOUS at 08:11

## 2023-08-17 RX ADMIN — HYDROMORPHONE HYDROCHLORIDE 0.5 MG: 1 INJECTION, SOLUTION INTRAMUSCULAR; INTRAVENOUS; SUBCUTANEOUS at 04:19

## 2023-08-17 RX ADMIN — MAGNESIUM GLUCONATE 500 MG ORAL TABLET 400 MG: 500 TABLET ORAL at 08:11

## 2023-08-17 RX ADMIN — HYDROMORPHONE HYDROCHLORIDE 0.5 MG: 1 INJECTION, SOLUTION INTRAMUSCULAR; INTRAVENOUS; SUBCUTANEOUS at 21:43

## 2023-08-17 RX ADMIN — HYDROMORPHONE HYDROCHLORIDE 0.5 MG: 1 INJECTION, SOLUTION INTRAMUSCULAR; INTRAVENOUS; SUBCUTANEOUS at 08:08

## 2023-08-17 RX ADMIN — HYDROMORPHONE HYDROCHLORIDE 0.5 MG: 1 INJECTION, SOLUTION INTRAMUSCULAR; INTRAVENOUS; SUBCUTANEOUS at 16:31

## 2023-08-17 RX ADMIN — PANCRELIPASE LIPASE, PANCRELIPASE PROTEASE, PANCRELIPASE AMYLASE 5000 UNITS: 5000; 17000; 24000 CAPSULE, DELAYED RELEASE ORAL at 08:11

## 2023-08-17 ASSESSMENT — PAIN SCALES - GENERAL
PAINLEVEL_OUTOF10: 4
PAINLEVEL_OUTOF10: 6
PAINLEVEL_OUTOF10: 8
PAINLEVEL_OUTOF10: 10
PAINLEVEL_OUTOF10: 7
PAINLEVEL_OUTOF10: 7
PAINLEVEL_OUTOF10: 8
PAINLEVEL_OUTOF10: 8
PAINLEVEL_OUTOF10: 5
PAINLEVEL_OUTOF10: 4
PAINLEVEL_OUTOF10: 7
PAINLEVEL_OUTOF10: 6

## 2023-08-17 ASSESSMENT — PAIN DESCRIPTION - ORIENTATION
ORIENTATION: RIGHT;LEFT;UPPER
ORIENTATION: RIGHT;LEFT;MID;UPPER
ORIENTATION: LEFT;MID;POSTERIOR;RIGHT

## 2023-08-17 ASSESSMENT — PAIN DESCRIPTION - PAIN TYPE: TYPE: CHRONIC PAIN

## 2023-08-17 ASSESSMENT — PAIN DESCRIPTION - LOCATION
LOCATION: ABDOMEN

## 2023-08-17 ASSESSMENT — PAIN DESCRIPTION - DESCRIPTORS
DESCRIPTORS: ACHING;BURNING;DISCOMFORT
DESCRIPTORS: ACHING
DESCRIPTORS: ACHING
DESCRIPTORS: ACHING;BURNING;CRAMPING
DESCRIPTORS: ACHING
DESCRIPTORS: ACHING;BURNING;DISCOMFORT

## 2023-08-17 ASSESSMENT — PAIN SCALES - WONG BAKER
WONGBAKER_NUMERICALRESPONSE: 0
WONGBAKER_NUMERICALRESPONSE: 0

## 2023-08-18 LAB
ANION GAP SERPL CALC-SCNC: 6 MMOL/L (ref 2–11)
BUN SERPL-MCNC: 10 MG/DL (ref 8–23)
CALCIUM SERPL-MCNC: 7.2 MG/DL (ref 8.3–10.4)
CHLORIDE SERPL-SCNC: 109 MMOL/L (ref 101–110)
CO2 SERPL-SCNC: 27 MMOL/L (ref 21–32)
CREAT SERPL-MCNC: 0.4 MG/DL (ref 0.6–1)
ERYTHROCYTE [DISTWIDTH] IN BLOOD BY AUTOMATED COUNT: 18.8 % (ref 11.9–14.6)
GLUCOSE SERPL-MCNC: 65 MG/DL (ref 65–100)
HCT VFR BLD AUTO: 29.9 % (ref 35.8–46.3)
HGB BLD-MCNC: 9.1 G/DL (ref 11.7–15.4)
MAGNESIUM SERPL-MCNC: 2.1 MG/DL (ref 1.8–2.4)
MCH RBC QN AUTO: 26.1 PG (ref 26.1–32.9)
MCHC RBC AUTO-ENTMCNC: 30.4 G/DL (ref 31.4–35)
MCV RBC AUTO: 85.9 FL (ref 82–102)
NRBC # BLD: 0 K/UL (ref 0–0.2)
PLATELET # BLD AUTO: 179 K/UL (ref 150–450)
PMV BLD AUTO: 9.8 FL (ref 9.4–12.3)
POTASSIUM SERPL-SCNC: 3 MMOL/L (ref 3.5–5.1)
POTASSIUM SERPL-SCNC: 4.3 MMOL/L (ref 3.5–5.1)
RBC # BLD AUTO: 3.48 M/UL (ref 4.05–5.2)
SODIUM SERPL-SCNC: 142 MMOL/L (ref 133–143)
WBC # BLD AUTO: 8.5 K/UL (ref 4.3–11.1)

## 2023-08-18 PROCEDURE — 2500000003 HC RX 250 WO HCPCS: Performed by: FAMILY MEDICINE

## 2023-08-18 PROCEDURE — 84132 ASSAY OF SERUM POTASSIUM: CPT

## 2023-08-18 PROCEDURE — 83735 ASSAY OF MAGNESIUM: CPT

## 2023-08-18 PROCEDURE — 85027 COMPLETE CBC AUTOMATED: CPT

## 2023-08-18 PROCEDURE — 2580000003 HC RX 258: Performed by: FAMILY MEDICINE

## 2023-08-18 PROCEDURE — 6370000000 HC RX 637 (ALT 250 FOR IP): Performed by: FAMILY MEDICINE

## 2023-08-18 PROCEDURE — 80048 BASIC METABOLIC PNL TOTAL CA: CPT

## 2023-08-18 PROCEDURE — 6370000000 HC RX 637 (ALT 250 FOR IP): Performed by: RADIOLOGY

## 2023-08-18 PROCEDURE — 6360000002 HC RX W HCPCS: Performed by: FAMILY MEDICINE

## 2023-08-18 PROCEDURE — 2140000000 HC CCU INTERMEDIATE R&B

## 2023-08-18 PROCEDURE — 36415 COLL VENOUS BLD VENIPUNCTURE: CPT

## 2023-08-18 PROCEDURE — 6360000002 HC RX W HCPCS: Performed by: HOSPITALIST

## 2023-08-18 PROCEDURE — 6370000000 HC RX 637 (ALT 250 FOR IP): Performed by: INTERNAL MEDICINE

## 2023-08-18 PROCEDURE — 6370000000 HC RX 637 (ALT 250 FOR IP): Performed by: HOSPITALIST

## 2023-08-18 RX ORDER — POTASSIUM CHLORIDE 7.45 MG/ML
10 INJECTION INTRAVENOUS ONCE
Status: COMPLETED | OUTPATIENT
Start: 2023-08-18 | End: 2023-08-18

## 2023-08-18 RX ORDER — POTASSIUM CHLORIDE 20 MEQ/1
40 TABLET, EXTENDED RELEASE ORAL 2 TIMES DAILY WITH MEALS
Status: COMPLETED | OUTPATIENT
Start: 2023-08-18 | End: 2023-08-19

## 2023-08-18 RX ADMIN — PANCRELIPASE LIPASE, PANCRELIPASE PROTEASE, PANCRELIPASE AMYLASE 5000 UNITS: 5000; 17000; 24000 CAPSULE, DELAYED RELEASE ORAL at 08:37

## 2023-08-18 RX ADMIN — TRAZODONE HYDROCHLORIDE 50 MG: 50 TABLET ORAL at 20:34

## 2023-08-18 RX ADMIN — POTASSIUM CHLORIDE 40 MEQ: 1500 TABLET, EXTENDED RELEASE ORAL at 17:28

## 2023-08-18 RX ADMIN — SODIUM BICARBONATE 650 MG TABLET 1300 MG: at 20:34

## 2023-08-18 RX ADMIN — SODIUM CHLORIDE, PRESERVATIVE FREE 10 ML: 5 INJECTION INTRAVENOUS at 08:37

## 2023-08-18 RX ADMIN — PANCRELIPASE LIPASE, PANCRELIPASE PROTEASE, PANCRELIPASE AMYLASE 5000 UNITS: 5000; 17000; 24000 CAPSULE, DELAYED RELEASE ORAL at 17:28

## 2023-08-18 RX ADMIN — PANTOPRAZOLE SODIUM 40 MG: 40 TABLET, DELAYED RELEASE ORAL at 15:52

## 2023-08-18 RX ADMIN — ENOXAPARIN SODIUM 40 MG: 100 INJECTION SUBCUTANEOUS at 17:37

## 2023-08-18 RX ADMIN — POTASSIUM CHLORIDE 10 MEQ: 7.46 INJECTION, SOLUTION INTRAVENOUS at 13:25

## 2023-08-18 RX ADMIN — OXYCODONE HYDROCHLORIDE 5 MG: 5 TABLET ORAL at 17:27

## 2023-08-18 RX ADMIN — OXYCODONE HYDROCHLORIDE 5 MG: 5 TABLET ORAL at 13:31

## 2023-08-18 RX ADMIN — HYDROMORPHONE HYDROCHLORIDE 0.5 MG: 1 INJECTION, SOLUTION INTRAMUSCULAR; INTRAVENOUS; SUBCUTANEOUS at 14:43

## 2023-08-18 RX ADMIN — SODIUM CHLORIDE, PRESERVATIVE FREE 10 ML: 5 INJECTION INTRAVENOUS at 20:35

## 2023-08-18 RX ADMIN — PANTOPRAZOLE SODIUM 40 MG: 40 TABLET, DELAYED RELEASE ORAL at 05:08

## 2023-08-18 RX ADMIN — SODIUM BICARBONATE 650 MG TABLET 1300 MG: at 08:37

## 2023-08-18 RX ADMIN — HYDROMORPHONE HYDROCHLORIDE 0.5 MG: 1 INJECTION, SOLUTION INTRAMUSCULAR; INTRAVENOUS; SUBCUTANEOUS at 05:08

## 2023-08-18 RX ADMIN — PANCRELIPASE LIPASE, PANCRELIPASE PROTEASE, PANCRELIPASE AMYLASE 5000 UNITS: 5000; 17000; 24000 CAPSULE, DELAYED RELEASE ORAL at 11:14

## 2023-08-18 RX ADMIN — HYDROMORPHONE HYDROCHLORIDE 0.5 MG: 1 INJECTION, SOLUTION INTRAMUSCULAR; INTRAVENOUS; SUBCUTANEOUS at 11:13

## 2023-08-18 RX ADMIN — HYDROMORPHONE HYDROCHLORIDE 0.5 MG: 1 INJECTION, SOLUTION INTRAMUSCULAR; INTRAVENOUS; SUBCUTANEOUS at 08:07

## 2023-08-18 RX ADMIN — ENOXAPARIN SODIUM 40 MG: 100 INJECTION SUBCUTANEOUS at 05:08

## 2023-08-18 RX ADMIN — MAGNESIUM GLUCONATE 500 MG ORAL TABLET 400 MG: 500 TABLET ORAL at 08:37

## 2023-08-18 RX ADMIN — HYDROMORPHONE HYDROCHLORIDE 0.5 MG: 1 INJECTION, SOLUTION INTRAMUSCULAR; INTRAVENOUS; SUBCUTANEOUS at 20:34

## 2023-08-18 ASSESSMENT — PAIN SCALES - GENERAL
PAINLEVEL_OUTOF10: 8
PAINLEVEL_OUTOF10: 8
PAINLEVEL_OUTOF10: 6
PAINLEVEL_OUTOF10: 7
PAINLEVEL_OUTOF10: 0
PAINLEVEL_OUTOF10: 8
PAINLEVEL_OUTOF10: 4
PAINLEVEL_OUTOF10: 0
PAINLEVEL_OUTOF10: 3
PAINLEVEL_OUTOF10: 8

## 2023-08-18 ASSESSMENT — PAIN DESCRIPTION - DESCRIPTORS
DESCRIPTORS: DULL
DESCRIPTORS: ACHING;SORE;SHARP;PRESSURE
DESCRIPTORS: STABBING
DESCRIPTORS: DULL

## 2023-08-18 ASSESSMENT — PAIN DESCRIPTION - LOCATION
LOCATION: ABDOMEN

## 2023-08-18 ASSESSMENT — PAIN - FUNCTIONAL ASSESSMENT: PAIN_FUNCTIONAL_ASSESSMENT: PREVENTS OR INTERFERES SOME ACTIVE ACTIVITIES AND ADLS

## 2023-08-18 ASSESSMENT — PAIN DESCRIPTION - ORIENTATION
ORIENTATION: LEFT;RIGHT
ORIENTATION: RIGHT;LEFT
ORIENTATION: RIGHT;UPPER
ORIENTATION: MID
ORIENTATION: LEFT;RIGHT

## 2023-08-18 ASSESSMENT — PAIN DESCRIPTION - PAIN TYPE
TYPE: CHRONIC PAIN
TYPE: CHRONIC PAIN

## 2023-08-18 ASSESSMENT — PAIN DESCRIPTION - ONSET: ONSET: ON-GOING

## 2023-08-18 ASSESSMENT — PAIN DESCRIPTION - FREQUENCY: FREQUENCY: CONTINUOUS

## 2023-08-19 LAB
ANION GAP SERPL CALC-SCNC: 5 MMOL/L (ref 2–11)
BUN SERPL-MCNC: 11 MG/DL (ref 8–23)
CALCIUM SERPL-MCNC: 7.5 MG/DL (ref 8.3–10.4)
CHLORIDE SERPL-SCNC: 110 MMOL/L (ref 101–110)
CO2 SERPL-SCNC: 31 MMOL/L (ref 21–32)
CREAT SERPL-MCNC: 0.4 MG/DL (ref 0.6–1)
GLUCOSE SERPL-MCNC: 85 MG/DL (ref 65–100)
MAGNESIUM SERPL-MCNC: 1.9 MG/DL (ref 1.8–2.4)
POTASSIUM SERPL-SCNC: 3.5 MMOL/L (ref 3.5–5.1)
SODIUM SERPL-SCNC: 146 MMOL/L (ref 133–143)

## 2023-08-19 PROCEDURE — 6370000000 HC RX 637 (ALT 250 FOR IP): Performed by: FAMILY MEDICINE

## 2023-08-19 PROCEDURE — 2500000003 HC RX 250 WO HCPCS: Performed by: FAMILY MEDICINE

## 2023-08-19 PROCEDURE — 2580000003 HC RX 258: Performed by: FAMILY MEDICINE

## 2023-08-19 PROCEDURE — 83735 ASSAY OF MAGNESIUM: CPT

## 2023-08-19 PROCEDURE — 6370000000 HC RX 637 (ALT 250 FOR IP): Performed by: INTERNAL MEDICINE

## 2023-08-19 PROCEDURE — 80048 BASIC METABOLIC PNL TOTAL CA: CPT

## 2023-08-19 PROCEDURE — 6360000002 HC RX W HCPCS: Performed by: HOSPITALIST

## 2023-08-19 PROCEDURE — 6370000000 HC RX 637 (ALT 250 FOR IP): Performed by: HOSPITALIST

## 2023-08-19 PROCEDURE — 2140000000 HC CCU INTERMEDIATE R&B

## 2023-08-19 PROCEDURE — 6370000000 HC RX 637 (ALT 250 FOR IP): Performed by: RADIOLOGY

## 2023-08-19 PROCEDURE — 36415 COLL VENOUS BLD VENIPUNCTURE: CPT

## 2023-08-19 RX ORDER — DIMETHICONE, CAMPHOR (SYNTHETIC), MENTHOL, AND PHENOL 1.1; .5; .625; .5 G/100G; G/100G; G/100G; G/100G
OINTMENT TOPICAL PRN
Status: DISCONTINUED | OUTPATIENT
Start: 2023-08-19 | End: 2023-09-08 | Stop reason: HOSPADM

## 2023-08-19 RX ADMIN — ENOXAPARIN SODIUM 40 MG: 100 INJECTION SUBCUTANEOUS at 05:33

## 2023-08-19 RX ADMIN — HYDROMORPHONE HYDROCHLORIDE 0.5 MG: 1 INJECTION, SOLUTION INTRAMUSCULAR; INTRAVENOUS; SUBCUTANEOUS at 12:09

## 2023-08-19 RX ADMIN — SODIUM CHLORIDE, PRESERVATIVE FREE 10 ML: 5 INJECTION INTRAVENOUS at 20:11

## 2023-08-19 RX ADMIN — PANCRELIPASE LIPASE, PANCRELIPASE PROTEASE, PANCRELIPASE AMYLASE 5000 UNITS: 5000; 17000; 24000 CAPSULE, DELAYED RELEASE ORAL at 11:58

## 2023-08-19 RX ADMIN — PANTOPRAZOLE SODIUM 40 MG: 40 TABLET, DELAYED RELEASE ORAL at 16:27

## 2023-08-19 RX ADMIN — HYDROMORPHONE HYDROCHLORIDE 0.5 MG: 1 INJECTION, SOLUTION INTRAMUSCULAR; INTRAVENOUS; SUBCUTANEOUS at 21:17

## 2023-08-19 RX ADMIN — ENOXAPARIN SODIUM 40 MG: 100 INJECTION SUBCUTANEOUS at 18:08

## 2023-08-19 RX ADMIN — SODIUM CHLORIDE, PRESERVATIVE FREE 10 ML: 5 INJECTION INTRAVENOUS at 09:05

## 2023-08-19 RX ADMIN — TRAZODONE HYDROCHLORIDE 50 MG: 50 TABLET ORAL at 20:10

## 2023-08-19 RX ADMIN — HYDROMORPHONE HYDROCHLORIDE 0.5 MG: 1 INJECTION, SOLUTION INTRAMUSCULAR; INTRAVENOUS; SUBCUTANEOUS at 09:04

## 2023-08-19 RX ADMIN — PANTOPRAZOLE SODIUM 40 MG: 40 TABLET, DELAYED RELEASE ORAL at 04:37

## 2023-08-19 RX ADMIN — PANCRELIPASE LIPASE, PANCRELIPASE PROTEASE, PANCRELIPASE AMYLASE 5000 UNITS: 5000; 17000; 24000 CAPSULE, DELAYED RELEASE ORAL at 16:27

## 2023-08-19 RX ADMIN — POTASSIUM CHLORIDE 40 MEQ: 1500 TABLET, EXTENDED RELEASE ORAL at 09:05

## 2023-08-19 RX ADMIN — SODIUM BICARBONATE 650 MG TABLET 1300 MG: at 09:05

## 2023-08-19 RX ADMIN — HYDROMORPHONE HYDROCHLORIDE 0.5 MG: 1 INJECTION, SOLUTION INTRAMUSCULAR; INTRAVENOUS; SUBCUTANEOUS at 16:27

## 2023-08-19 RX ADMIN — OXYCODONE HYDROCHLORIDE 10 MG: 5 TABLET ORAL at 04:36

## 2023-08-19 RX ADMIN — PANCRELIPASE LIPASE, PANCRELIPASE PROTEASE, PANCRELIPASE AMYLASE 5000 UNITS: 5000; 17000; 24000 CAPSULE, DELAYED RELEASE ORAL at 09:05

## 2023-08-19 RX ADMIN — HYDROMORPHONE HYDROCHLORIDE 0.5 MG: 1 INJECTION, SOLUTION INTRAMUSCULAR; INTRAVENOUS; SUBCUTANEOUS at 00:09

## 2023-08-19 RX ADMIN — MAGNESIUM GLUCONATE 500 MG ORAL TABLET 400 MG: 500 TABLET ORAL at 09:05

## 2023-08-19 RX ADMIN — SODIUM BICARBONATE 650 MG TABLET 1300 MG: at 20:10

## 2023-08-19 ASSESSMENT — PAIN DESCRIPTION - LOCATION
LOCATION: ABDOMEN

## 2023-08-19 ASSESSMENT — PAIN SCALES - GENERAL
PAINLEVEL_OUTOF10: 8
PAINLEVEL_OUTOF10: 5
PAINLEVEL_OUTOF10: 7
PAINLEVEL_OUTOF10: 0
PAINLEVEL_OUTOF10: 3
PAINLEVEL_OUTOF10: 8
PAINLEVEL_OUTOF10: 7
PAINLEVEL_OUTOF10: 7
PAINLEVEL_OUTOF10: 0
PAINLEVEL_OUTOF10: 0

## 2023-08-19 ASSESSMENT — PAIN DESCRIPTION - DESCRIPTORS
DESCRIPTORS: SORE;SHARP;ACHING
DESCRIPTORS: ACHING;DULL
DESCRIPTORS: ACHING
DESCRIPTORS: ACHING;SORE;TENDER
DESCRIPTORS: SHARP
DESCRIPTORS: ACHING;SORE;PRESSURE

## 2023-08-19 ASSESSMENT — PAIN DESCRIPTION - FREQUENCY
FREQUENCY: INTERMITTENT
FREQUENCY: CONTINUOUS

## 2023-08-19 ASSESSMENT — PAIN DESCRIPTION - ORIENTATION
ORIENTATION: UPPER
ORIENTATION: MID
ORIENTATION: MID;RIGHT
ORIENTATION: MID
ORIENTATION: MID;RIGHT
ORIENTATION: LEFT;ANTERIOR

## 2023-08-19 ASSESSMENT — PAIN DESCRIPTION - ONSET
ONSET: PROGRESSIVE
ONSET: GRADUAL
ONSET: GRADUAL

## 2023-08-19 ASSESSMENT — PAIN - FUNCTIONAL ASSESSMENT
PAIN_FUNCTIONAL_ASSESSMENT: PREVENTS OR INTERFERES SOME ACTIVE ACTIVITIES AND ADLS

## 2023-08-19 ASSESSMENT — PAIN DESCRIPTION - PAIN TYPE
TYPE: CHRONIC PAIN

## 2023-08-20 LAB
ERYTHROCYTE [DISTWIDTH] IN BLOOD BY AUTOMATED COUNT: 18.7 % (ref 11.9–14.6)
HCT VFR BLD AUTO: 30.2 % (ref 35.8–46.3)
HGB BLD-MCNC: 9.1 G/DL (ref 11.7–15.4)
MCH RBC QN AUTO: 26.5 PG (ref 26.1–32.9)
MCHC RBC AUTO-ENTMCNC: 30.1 G/DL (ref 31.4–35)
MCV RBC AUTO: 88 FL (ref 82–102)
NRBC # BLD: 0 K/UL (ref 0–0.2)
PLATELET # BLD AUTO: 216 K/UL (ref 150–450)
PMV BLD AUTO: 9.8 FL (ref 9.4–12.3)
RBC # BLD AUTO: 3.43 M/UL (ref 4.05–5.2)
WBC # BLD AUTO: 6.9 K/UL (ref 4.3–11.1)

## 2023-08-20 PROCEDURE — 6370000000 HC RX 637 (ALT 250 FOR IP): Performed by: FAMILY MEDICINE

## 2023-08-20 PROCEDURE — 6370000000 HC RX 637 (ALT 250 FOR IP): Performed by: INTERNAL MEDICINE

## 2023-08-20 PROCEDURE — 6370000000 HC RX 637 (ALT 250 FOR IP): Performed by: RADIOLOGY

## 2023-08-20 PROCEDURE — 85027 COMPLETE CBC AUTOMATED: CPT

## 2023-08-20 PROCEDURE — 2580000003 HC RX 258: Performed by: FAMILY MEDICINE

## 2023-08-20 PROCEDURE — 2500000003 HC RX 250 WO HCPCS: Performed by: FAMILY MEDICINE

## 2023-08-20 PROCEDURE — 6360000002 HC RX W HCPCS: Performed by: HOSPITALIST

## 2023-08-20 PROCEDURE — 6370000000 HC RX 637 (ALT 250 FOR IP): Performed by: HOSPITALIST

## 2023-08-20 PROCEDURE — 36415 COLL VENOUS BLD VENIPUNCTURE: CPT

## 2023-08-20 PROCEDURE — 2140000000 HC CCU INTERMEDIATE R&B

## 2023-08-20 RX ADMIN — ENOXAPARIN SODIUM 40 MG: 100 INJECTION SUBCUTANEOUS at 05:55

## 2023-08-20 RX ADMIN — HYDROMORPHONE HYDROCHLORIDE 0.5 MG: 1 INJECTION, SOLUTION INTRAMUSCULAR; INTRAVENOUS; SUBCUTANEOUS at 03:28

## 2023-08-20 RX ADMIN — PANCRELIPASE LIPASE, PANCRELIPASE PROTEASE, PANCRELIPASE AMYLASE 5000 UNITS: 5000; 17000; 24000 CAPSULE, DELAYED RELEASE ORAL at 08:00

## 2023-08-20 RX ADMIN — PANCRELIPASE LIPASE, PANCRELIPASE PROTEASE, PANCRELIPASE AMYLASE 5000 UNITS: 5000; 17000; 24000 CAPSULE, DELAYED RELEASE ORAL at 12:01

## 2023-08-20 RX ADMIN — PANTOPRAZOLE SODIUM 40 MG: 40 TABLET, DELAYED RELEASE ORAL at 05:55

## 2023-08-20 RX ADMIN — HYDROMORPHONE HYDROCHLORIDE 0.5 MG: 1 INJECTION, SOLUTION INTRAMUSCULAR; INTRAVENOUS; SUBCUTANEOUS at 12:30

## 2023-08-20 RX ADMIN — TRAZODONE HYDROCHLORIDE 50 MG: 50 TABLET ORAL at 20:51

## 2023-08-20 RX ADMIN — SODIUM CHLORIDE, PRESERVATIVE FREE 5 ML: 5 INJECTION INTRAVENOUS at 08:01

## 2023-08-20 RX ADMIN — ENOXAPARIN SODIUM 40 MG: 100 INJECTION SUBCUTANEOUS at 17:30

## 2023-08-20 RX ADMIN — OXYCODONE HYDROCHLORIDE 10 MG: 5 TABLET ORAL at 06:00

## 2023-08-20 RX ADMIN — HYDROMORPHONE HYDROCHLORIDE 0.5 MG: 1 INJECTION, SOLUTION INTRAMUSCULAR; INTRAVENOUS; SUBCUTANEOUS at 17:21

## 2023-08-20 RX ADMIN — SODIUM BICARBONATE 650 MG TABLET 1300 MG: at 20:51

## 2023-08-20 RX ADMIN — HYDROMORPHONE HYDROCHLORIDE 0.5 MG: 1 INJECTION, SOLUTION INTRAMUSCULAR; INTRAVENOUS; SUBCUTANEOUS at 09:21

## 2023-08-20 RX ADMIN — MAGNESIUM GLUCONATE 500 MG ORAL TABLET 400 MG: 500 TABLET ORAL at 08:01

## 2023-08-20 RX ADMIN — HYDROMORPHONE HYDROCHLORIDE 0.5 MG: 1 INJECTION, SOLUTION INTRAMUSCULAR; INTRAVENOUS; SUBCUTANEOUS at 20:51

## 2023-08-20 RX ADMIN — SODIUM BICARBONATE 650 MG TABLET 1300 MG: at 08:00

## 2023-08-20 RX ADMIN — PANCRELIPASE LIPASE, PANCRELIPASE PROTEASE, PANCRELIPASE AMYLASE 5000 UNITS: 5000; 17000; 24000 CAPSULE, DELAYED RELEASE ORAL at 16:07

## 2023-08-20 RX ADMIN — PANTOPRAZOLE SODIUM 40 MG: 40 TABLET, DELAYED RELEASE ORAL at 16:07

## 2023-08-20 ASSESSMENT — PAIN DESCRIPTION - FREQUENCY: FREQUENCY: INTERMITTENT

## 2023-08-20 ASSESSMENT — PAIN - FUNCTIONAL ASSESSMENT
PAIN_FUNCTIONAL_ASSESSMENT: ACTIVITIES ARE NOT PREVENTED
PAIN_FUNCTIONAL_ASSESSMENT: PREVENTS OR INTERFERES SOME ACTIVE ACTIVITIES AND ADLS
PAIN_FUNCTIONAL_ASSESSMENT: ACTIVITIES ARE NOT PREVENTED

## 2023-08-20 ASSESSMENT — PAIN DESCRIPTION - ORIENTATION
ORIENTATION: MID;RIGHT
ORIENTATION: UPPER
ORIENTATION: RIGHT;MID
ORIENTATION: MID;RIGHT

## 2023-08-20 ASSESSMENT — PAIN SCALES - GENERAL
PAINLEVEL_OUTOF10: 7
PAINLEVEL_OUTOF10: 0
PAINLEVEL_OUTOF10: 7
PAINLEVEL_OUTOF10: 7
PAINLEVEL_OUTOF10: 0
PAINLEVEL_OUTOF10: 6
PAINLEVEL_OUTOF10: 9
PAINLEVEL_OUTOF10: 7

## 2023-08-20 ASSESSMENT — PAIN DESCRIPTION - LOCATION
LOCATION: ABDOMEN

## 2023-08-20 ASSESSMENT — PAIN DESCRIPTION - DESCRIPTORS
DESCRIPTORS: SHARP
DESCRIPTORS: ACHING;SORE;STABBING;TENDER
DESCRIPTORS: ACHING;SORE;PRESSURE
DESCRIPTORS: ACHING;SORE;PRESSURE
DESCRIPTORS: SHARP
DESCRIPTORS: SHARP

## 2023-08-20 ASSESSMENT — PAIN DESCRIPTION - PAIN TYPE
TYPE: CHRONIC PAIN

## 2023-08-20 ASSESSMENT — PAIN DESCRIPTION - ONSET: ONSET: GRADUAL

## 2023-08-21 ENCOUNTER — APPOINTMENT (OUTPATIENT)
Dept: ULTRASOUND IMAGING | Age: 83
DRG: 871 | End: 2023-08-21
Payer: MEDICARE

## 2023-08-21 PROCEDURE — 2140000000 HC CCU INTERMEDIATE R&B

## 2023-08-21 PROCEDURE — 6370000000 HC RX 637 (ALT 250 FOR IP): Performed by: FAMILY MEDICINE

## 2023-08-21 PROCEDURE — 6370000000 HC RX 637 (ALT 250 FOR IP): Performed by: INTERNAL MEDICINE

## 2023-08-21 PROCEDURE — 6370000000 HC RX 637 (ALT 250 FOR IP): Performed by: RADIOLOGY

## 2023-08-21 PROCEDURE — 76705 ECHO EXAM OF ABDOMEN: CPT

## 2023-08-21 PROCEDURE — 2580000003 HC RX 258: Performed by: FAMILY MEDICINE

## 2023-08-21 PROCEDURE — 6370000000 HC RX 637 (ALT 250 FOR IP): Performed by: HOSPITALIST

## 2023-08-21 PROCEDURE — 6360000002 HC RX W HCPCS: Performed by: HOSPITALIST

## 2023-08-21 PROCEDURE — 6360000002 HC RX W HCPCS: Performed by: RADIOLOGY

## 2023-08-21 PROCEDURE — 2500000003 HC RX 250 WO HCPCS: Performed by: FAMILY MEDICINE

## 2023-08-21 RX ADMIN — HYDROMORPHONE HYDROCHLORIDE 0.5 MG: 1 INJECTION, SOLUTION INTRAMUSCULAR; INTRAVENOUS; SUBCUTANEOUS at 08:46

## 2023-08-21 RX ADMIN — SODIUM BICARBONATE 650 MG TABLET 1300 MG: at 08:44

## 2023-08-21 RX ADMIN — OXYCODONE HYDROCHLORIDE 10 MG: 5 TABLET ORAL at 18:01

## 2023-08-21 RX ADMIN — HYDROMORPHONE HYDROCHLORIDE 0.5 MG: 1 INJECTION, SOLUTION INTRAMUSCULAR; INTRAVENOUS; SUBCUTANEOUS at 20:08

## 2023-08-21 RX ADMIN — ENOXAPARIN SODIUM 40 MG: 100 INJECTION SUBCUTANEOUS at 17:49

## 2023-08-21 RX ADMIN — ONDANSETRON 4 MG: 2 INJECTION INTRAMUSCULAR; INTRAVENOUS at 06:03

## 2023-08-21 RX ADMIN — PANCRELIPASE LIPASE, PANCRELIPASE PROTEASE, PANCRELIPASE AMYLASE 5000 UNITS: 5000; 17000; 24000 CAPSULE, DELAYED RELEASE ORAL at 11:51

## 2023-08-21 RX ADMIN — HYDROMORPHONE HYDROCHLORIDE 0.5 MG: 1 INJECTION, SOLUTION INTRAMUSCULAR; INTRAVENOUS; SUBCUTANEOUS at 16:25

## 2023-08-21 RX ADMIN — PANTOPRAZOLE SODIUM 40 MG: 40 TABLET, DELAYED RELEASE ORAL at 16:26

## 2023-08-21 RX ADMIN — PANTOPRAZOLE SODIUM 40 MG: 40 TABLET, DELAYED RELEASE ORAL at 05:26

## 2023-08-21 RX ADMIN — SODIUM BICARBONATE 650 MG TABLET 1300 MG: at 20:08

## 2023-08-21 RX ADMIN — PANCRELIPASE LIPASE, PANCRELIPASE PROTEASE, PANCRELIPASE AMYLASE 5000 UNITS: 5000; 17000; 24000 CAPSULE, DELAYED RELEASE ORAL at 16:26

## 2023-08-21 RX ADMIN — SODIUM CHLORIDE, PRESERVATIVE FREE 10 ML: 5 INJECTION INTRAVENOUS at 20:09

## 2023-08-21 RX ADMIN — TRAZODONE HYDROCHLORIDE 50 MG: 50 TABLET ORAL at 20:08

## 2023-08-21 RX ADMIN — PANCRELIPASE LIPASE, PANCRELIPASE PROTEASE, PANCRELIPASE AMYLASE 5000 UNITS: 5000; 17000; 24000 CAPSULE, DELAYED RELEASE ORAL at 08:44

## 2023-08-21 RX ADMIN — HYDROMORPHONE HYDROCHLORIDE 0.5 MG: 1 INJECTION, SOLUTION INTRAMUSCULAR; INTRAVENOUS; SUBCUTANEOUS at 05:25

## 2023-08-21 RX ADMIN — ENOXAPARIN SODIUM 40 MG: 100 INJECTION SUBCUTANEOUS at 05:26

## 2023-08-21 RX ADMIN — MAGNESIUM GLUCONATE 500 MG ORAL TABLET 400 MG: 500 TABLET ORAL at 08:44

## 2023-08-21 RX ADMIN — SODIUM CHLORIDE, PRESERVATIVE FREE 10 ML: 5 INJECTION INTRAVENOUS at 08:44

## 2023-08-21 RX ADMIN — HYDROMORPHONE HYDROCHLORIDE 0.5 MG: 1 INJECTION, SOLUTION INTRAMUSCULAR; INTRAVENOUS; SUBCUTANEOUS at 11:51

## 2023-08-21 ASSESSMENT — PAIN SCALES - GENERAL
PAINLEVEL_OUTOF10: 0
PAINLEVEL_OUTOF10: 6
PAINLEVEL_OUTOF10: 6
PAINLEVEL_OUTOF10: 0
PAINLEVEL_OUTOF10: 0
PAINLEVEL_OUTOF10: 6
PAINLEVEL_OUTOF10: 6
PAINLEVEL_OUTOF10: 5
PAINLEVEL_OUTOF10: 0
PAINLEVEL_OUTOF10: 0
PAINLEVEL_OUTOF10: 6

## 2023-08-21 ASSESSMENT — PAIN DESCRIPTION - FREQUENCY
FREQUENCY: CONTINUOUS

## 2023-08-21 ASSESSMENT — PAIN SCALES - WONG BAKER
WONGBAKER_NUMERICALRESPONSE: 0
WONGBAKER_NUMERICALRESPONSE: 2
WONGBAKER_NUMERICALRESPONSE: 2
WONGBAKER_NUMERICALRESPONSE: 4
WONGBAKER_NUMERICALRESPONSE: 0
WONGBAKER_NUMERICALRESPONSE: 2

## 2023-08-21 ASSESSMENT — PAIN DESCRIPTION - ONSET
ONSET: PROGRESSIVE

## 2023-08-21 ASSESSMENT — PAIN DESCRIPTION - DESCRIPTORS
DESCRIPTORS: SORE;SHARP
DESCRIPTORS: DISCOMFORT;SHARP;SORE
DESCRIPTORS: ACHING;SORE;SHARP
DESCRIPTORS: DISCOMFORT;SORE
DESCRIPTORS: SHARP

## 2023-08-21 ASSESSMENT — PAIN DESCRIPTION - ORIENTATION
ORIENTATION: RIGHT;MID
ORIENTATION: RIGHT;MID
ORIENTATION: MID;RIGHT
ORIENTATION: ANTERIOR
ORIENTATION: MID;RIGHT

## 2023-08-21 ASSESSMENT — PAIN - FUNCTIONAL ASSESSMENT
PAIN_FUNCTIONAL_ASSESSMENT: PREVENTS OR INTERFERES SOME ACTIVE ACTIVITIES AND ADLS

## 2023-08-21 ASSESSMENT — PAIN DESCRIPTION - LOCATION
LOCATION: ABDOMEN

## 2023-08-21 ASSESSMENT — PAIN DESCRIPTION - PAIN TYPE
TYPE: ACUTE PAIN
TYPE: ACUTE PAIN;CHRONIC PAIN
TYPE: ACUTE PAIN
TYPE: CHRONIC PAIN

## 2023-08-22 ENCOUNTER — ANESTHESIA EVENT (OUTPATIENT)
Dept: SURGERY | Age: 83
End: 2023-08-22
Payer: MEDICARE

## 2023-08-22 ENCOUNTER — APPOINTMENT (OUTPATIENT)
Dept: ULTRASOUND IMAGING | Age: 83
DRG: 871 | End: 2023-08-22
Payer: MEDICARE

## 2023-08-22 PROBLEM — L02.211 ABDOMINAL WALL ABSCESS: Status: ACTIVE | Noted: 2023-08-22

## 2023-08-22 PROBLEM — Z51.5 PALLIATIVE CARE ENCOUNTER: Status: ACTIVE | Noted: 2023-08-22

## 2023-08-22 PROCEDURE — 6370000000 HC RX 637 (ALT 250 FOR IP): Performed by: FAMILY MEDICINE

## 2023-08-22 PROCEDURE — 2500000003 HC RX 250 WO HCPCS: Performed by: FAMILY MEDICINE

## 2023-08-22 PROCEDURE — 2580000003 HC RX 258: Performed by: FAMILY MEDICINE

## 2023-08-22 PROCEDURE — 2140000000 HC CCU INTERMEDIATE R&B

## 2023-08-22 PROCEDURE — 6360000002 HC RX W HCPCS: Performed by: FAMILY MEDICINE

## 2023-08-22 PROCEDURE — 99223 1ST HOSP IP/OBS HIGH 75: CPT | Performed by: SURGERY

## 2023-08-22 PROCEDURE — 6370000000 HC RX 637 (ALT 250 FOR IP): Performed by: RADIOLOGY

## 2023-08-22 PROCEDURE — 6360000002 HC RX W HCPCS: Performed by: HOSPITALIST

## 2023-08-22 PROCEDURE — 6370000000 HC RX 637 (ALT 250 FOR IP): Performed by: INTERNAL MEDICINE

## 2023-08-22 PROCEDURE — 6370000000 HC RX 637 (ALT 250 FOR IP): Performed by: HOSPITALIST

## 2023-08-22 PROCEDURE — 97530 THERAPEUTIC ACTIVITIES: CPT

## 2023-08-22 RX ADMIN — SODIUM BICARBONATE 650 MG TABLET 1300 MG: at 20:12

## 2023-08-22 RX ADMIN — PIPERACILLIN SODIUM AND TAZOBACTAM SODIUM 3375 MG: 3; .375 INJECTION, POWDER, LYOPHILIZED, FOR SOLUTION INTRAVENOUS at 23:45

## 2023-08-22 RX ADMIN — MAGNESIUM GLUCONATE 500 MG ORAL TABLET 400 MG: 500 TABLET ORAL at 08:04

## 2023-08-22 RX ADMIN — PANTOPRAZOLE SODIUM 40 MG: 40 TABLET, DELAYED RELEASE ORAL at 16:30

## 2023-08-22 RX ADMIN — HYDROMORPHONE HYDROCHLORIDE 0.5 MG: 1 INJECTION, SOLUTION INTRAMUSCULAR; INTRAVENOUS; SUBCUTANEOUS at 11:16

## 2023-08-22 RX ADMIN — SODIUM BICARBONATE 650 MG TABLET 1300 MG: at 08:05

## 2023-08-22 RX ADMIN — PANTOPRAZOLE SODIUM 40 MG: 40 TABLET, DELAYED RELEASE ORAL at 06:18

## 2023-08-22 RX ADMIN — PIPERACILLIN SODIUM AND TAZOBACTAM SODIUM 3375 MG: 3; .375 INJECTION, POWDER, LYOPHILIZED, FOR SOLUTION INTRAVENOUS at 16:30

## 2023-08-22 RX ADMIN — HYDROMORPHONE HYDROCHLORIDE 0.5 MG: 1 INJECTION, SOLUTION INTRAMUSCULAR; INTRAVENOUS; SUBCUTANEOUS at 20:12

## 2023-08-22 RX ADMIN — OXYCODONE HYDROCHLORIDE 10 MG: 5 TABLET ORAL at 06:22

## 2023-08-22 RX ADMIN — PIPERACILLIN SODIUM AND TAZOBACTAM SODIUM 3375 MG: 3; .375 INJECTION, POWDER, LYOPHILIZED, FOR SOLUTION INTRAVENOUS at 11:05

## 2023-08-22 RX ADMIN — PANCRELIPASE LIPASE, PANCRELIPASE PROTEASE, PANCRELIPASE AMYLASE 5000 UNITS: 5000; 17000; 24000 CAPSULE, DELAYED RELEASE ORAL at 11:16

## 2023-08-22 RX ADMIN — TRAZODONE HYDROCHLORIDE 50 MG: 50 TABLET ORAL at 20:12

## 2023-08-22 RX ADMIN — SODIUM CHLORIDE, PRESERVATIVE FREE 10 ML: 5 INJECTION INTRAVENOUS at 08:09

## 2023-08-22 RX ADMIN — HYDROMORPHONE HYDROCHLORIDE 0.5 MG: 1 INJECTION, SOLUTION INTRAMUSCULAR; INTRAVENOUS; SUBCUTANEOUS at 14:38

## 2023-08-22 RX ADMIN — VANCOMYCIN HYDROCHLORIDE 1000 MG: 1 INJECTION, POWDER, LYOPHILIZED, FOR SOLUTION INTRAVENOUS at 08:05

## 2023-08-22 RX ADMIN — ENOXAPARIN SODIUM 40 MG: 100 INJECTION SUBCUTANEOUS at 06:18

## 2023-08-22 RX ADMIN — SODIUM CHLORIDE, PRESERVATIVE FREE 10 ML: 5 INJECTION INTRAVENOUS at 20:12

## 2023-08-22 RX ADMIN — PANCRELIPASE LIPASE, PANCRELIPASE PROTEASE, PANCRELIPASE AMYLASE 5000 UNITS: 5000; 17000; 24000 CAPSULE, DELAYED RELEASE ORAL at 08:04

## 2023-08-22 ASSESSMENT — PAIN DESCRIPTION - ORIENTATION
ORIENTATION: RIGHT;MID
ORIENTATION: LEFT;MID
ORIENTATION: ANTERIOR

## 2023-08-22 ASSESSMENT — PAIN SCALES - GENERAL
PAINLEVEL_OUTOF10: 6
PAINLEVEL_OUTOF10: 5
PAINLEVEL_OUTOF10: 8
PAINLEVEL_OUTOF10: 6
PAINLEVEL_OUTOF10: 6

## 2023-08-22 ASSESSMENT — PAIN DESCRIPTION - DESCRIPTORS
DESCRIPTORS: ACHING;DISCOMFORT;SORE
DESCRIPTORS: ACHING;DISCOMFORT
DESCRIPTORS: ACHING

## 2023-08-22 ASSESSMENT — COPD QUESTIONNAIRES: CAT_SEVERITY: MODERATE

## 2023-08-22 ASSESSMENT — PAIN DESCRIPTION - LOCATION
LOCATION: ABDOMEN

## 2023-08-22 ASSESSMENT — PAIN - FUNCTIONAL ASSESSMENT: PAIN_FUNCTIONAL_ASSESSMENT: 0-10

## 2023-08-22 ASSESSMENT — LIFESTYLE VARIABLES: SMOKING_STATUS: 1

## 2023-08-22 NOTE — PRE-PROCEDURE INSTRUCTIONS
Interventional Radiology Inpatient Communication       Interventional Radiology has received a consult for US guided Liver Biopsy. We anticipate this procedure will be completed on Wednesday August 23rd, 2023. Primary Care Nurse: Yuri President        Please ensure the following is completed:     Patient is NPO: Yes  *NPO @0015 on 8/23/2023 except med with sips    Blood thinners held: Yes  *HOLD any/all blood thinners    Patient must have working IV: Yes       Patient must be in a hospital gown with snaps and be transported via stretcher to Interventional Radiology. Please send hospital chart and labels. If the patient is unable to provide consent for the procedure, please contact Interventional Radiology at [phone].

## 2023-08-23 ENCOUNTER — APPOINTMENT (OUTPATIENT)
Dept: ULTRASOUND IMAGING | Age: 83
DRG: 871 | End: 2023-08-23
Attending: FAMILY MEDICINE
Payer: MEDICARE

## 2023-08-23 ENCOUNTER — APPOINTMENT (OUTPATIENT)
Dept: INTERVENTIONAL RADIOLOGY/VASCULAR | Age: 83
DRG: 871 | End: 2023-08-23
Attending: FAMILY MEDICINE
Payer: MEDICARE

## 2023-08-23 ENCOUNTER — ANESTHESIA (OUTPATIENT)
Dept: SURGERY | Age: 83
End: 2023-08-23
Payer: MEDICARE

## 2023-08-23 PROBLEM — K65.1 PERITONEAL ABSCESS (HCC): Status: ACTIVE | Noted: 2023-01-01

## 2023-08-23 LAB — VANCOMYCIN SERPL-MCNC: 19.1 UG/ML

## 2023-08-23 PROCEDURE — 87206 SMEAR FLUORESCENT/ACID STAI: CPT

## 2023-08-23 PROCEDURE — 87205 SMEAR GRAM STAIN: CPT

## 2023-08-23 PROCEDURE — 6360000002 HC RX W HCPCS: Performed by: PHYSICIAN ASSISTANT

## 2023-08-23 PROCEDURE — 6370000000 HC RX 637 (ALT 250 FOR IP): Performed by: FAMILY MEDICINE

## 2023-08-23 PROCEDURE — 2709999900 HC NON-CHARGEABLE SUPPLY: Performed by: SURGERY

## 2023-08-23 PROCEDURE — 80202 ASSAY OF VANCOMYCIN: CPT

## 2023-08-23 PROCEDURE — C1729 CATH, DRAINAGE: HCPCS

## 2023-08-23 PROCEDURE — 2580000003 HC RX 258: Performed by: PHYSICIAN ASSISTANT

## 2023-08-23 PROCEDURE — 2580000003 HC RX 258: Performed by: FAMILY MEDICINE

## 2023-08-23 PROCEDURE — 6370000000 HC RX 637 (ALT 250 FOR IP): Performed by: PHYSICIAN ASSISTANT

## 2023-08-23 PROCEDURE — 36415 COLL VENOUS BLD VENIPUNCTURE: CPT

## 2023-08-23 PROCEDURE — 49020 DRAINAGE ABDOM ABSCESS OPEN: CPT | Performed by: SURGERY

## 2023-08-23 PROCEDURE — 2500000003 HC RX 250 WO HCPCS: Performed by: FAMILY MEDICINE

## 2023-08-23 PROCEDURE — 2140000000 HC CCU INTERMEDIATE R&B

## 2023-08-23 PROCEDURE — 3700000001 HC ADD 15 MINUTES (ANESTHESIA): Performed by: SURGERY

## 2023-08-23 PROCEDURE — 87070 CULTURE OTHR SPECIMN AEROBIC: CPT

## 2023-08-23 PROCEDURE — 7100000000 HC PACU RECOVERY - FIRST 15 MIN: Performed by: SURGERY

## 2023-08-23 PROCEDURE — 0J980ZZ DRAINAGE OF ABDOMEN SUBCUTANEOUS TISSUE AND FASCIA, OPEN APPROACH: ICD-10-PCS | Performed by: SURGERY

## 2023-08-23 PROCEDURE — 6370000000 HC RX 637 (ALT 250 FOR IP): Performed by: HOSPITALIST

## 2023-08-23 PROCEDURE — 0W9G3ZZ DRAINAGE OF PERITONEAL CAVITY, PERCUTANEOUS APPROACH: ICD-10-PCS | Performed by: RADIOLOGY

## 2023-08-23 PROCEDURE — 2500000003 HC RX 250 WO HCPCS: Performed by: PHYSICIAN ASSISTANT

## 2023-08-23 PROCEDURE — 87075 CULTR BACTERIA EXCEPT BLOOD: CPT

## 2023-08-23 PROCEDURE — 2580000003 HC RX 258: Performed by: NURSE ANESTHETIST, CERTIFIED REGISTERED

## 2023-08-23 PROCEDURE — 3600000012 HC SURGERY LEVEL 2 ADDTL 15MIN: Performed by: SURGERY

## 2023-08-23 PROCEDURE — 2500000003 HC RX 250 WO HCPCS: Performed by: NURSE ANESTHETIST, CERTIFIED REGISTERED

## 2023-08-23 PROCEDURE — 49083 ABD PARACENTESIS W/IMAGING: CPT

## 2023-08-23 PROCEDURE — 3700000000 HC ANESTHESIA ATTENDED CARE: Performed by: SURGERY

## 2023-08-23 PROCEDURE — 87186 SC STD MICRODIL/AGAR DIL: CPT

## 2023-08-23 PROCEDURE — 87102 FUNGUS ISOLATION CULTURE: CPT

## 2023-08-23 PROCEDURE — 6360000002 HC RX W HCPCS: Performed by: FAMILY MEDICINE

## 2023-08-23 PROCEDURE — 7100000001 HC PACU RECOVERY - ADDTL 15 MIN: Performed by: SURGERY

## 2023-08-23 PROCEDURE — 6360000002 HC RX W HCPCS: Performed by: NURSE ANESTHETIST, CERTIFIED REGISTERED

## 2023-08-23 PROCEDURE — 87077 CULTURE AEROBIC IDENTIFY: CPT

## 2023-08-23 PROCEDURE — 3600000002 HC SURGERY LEVEL 2 BASE: Performed by: SURGERY

## 2023-08-23 RX ORDER — SUCCINYLCHOLINE/SOD CL,ISO/PF 200MG/10ML
SYRINGE (ML) INTRAVENOUS PRN
Status: DISCONTINUED | OUTPATIENT
Start: 2023-08-23 | End: 2023-08-23 | Stop reason: SDUPTHER

## 2023-08-23 RX ORDER — PROCHLORPERAZINE EDISYLATE 5 MG/ML
5 INJECTION INTRAMUSCULAR; INTRAVENOUS
Status: DISCONTINUED | OUTPATIENT
Start: 2023-08-23 | End: 2023-08-23 | Stop reason: HOSPADM

## 2023-08-23 RX ORDER — HYDROMORPHONE HYDROCHLORIDE 1 MG/ML
1 INJECTION, SOLUTION INTRAMUSCULAR; INTRAVENOUS; SUBCUTANEOUS
Status: DISCONTINUED | OUTPATIENT
Start: 2023-08-23 | End: 2023-09-08 | Stop reason: HOSPADM

## 2023-08-23 RX ORDER — DEXAMETHASONE SODIUM PHOSPHATE 4 MG/ML
INJECTION, SOLUTION INTRA-ARTICULAR; INTRALESIONAL; INTRAMUSCULAR; INTRAVENOUS; SOFT TISSUE PRN
Status: DISCONTINUED | OUTPATIENT
Start: 2023-08-23 | End: 2023-08-23 | Stop reason: SDUPTHER

## 2023-08-23 RX ORDER — FENTANYL CITRATE 50 UG/ML
INJECTION, SOLUTION INTRAMUSCULAR; INTRAVENOUS PRN
Status: DISCONTINUED | OUTPATIENT
Start: 2023-08-23 | End: 2023-08-23 | Stop reason: SDUPTHER

## 2023-08-23 RX ORDER — EPHEDRINE SULFATE/0.9% NACL/PF 50 MG/5 ML
SYRINGE (ML) INTRAVENOUS PRN
Status: DISCONTINUED | OUTPATIENT
Start: 2023-08-23 | End: 2023-08-23 | Stop reason: SDUPTHER

## 2023-08-23 RX ORDER — ONDANSETRON 2 MG/ML
4 INJECTION INTRAMUSCULAR; INTRAVENOUS
Status: DISCONTINUED | OUTPATIENT
Start: 2023-08-23 | End: 2023-08-23 | Stop reason: HOSPADM

## 2023-08-23 RX ORDER — PROPOFOL 10 MG/ML
INJECTION, EMULSION INTRAVENOUS PRN
Status: DISCONTINUED | OUTPATIENT
Start: 2023-08-23 | End: 2023-08-23 | Stop reason: SDUPTHER

## 2023-08-23 RX ORDER — SODIUM CHLORIDE, SODIUM LACTATE, POTASSIUM CHLORIDE, CALCIUM CHLORIDE 600; 310; 30; 20 MG/100ML; MG/100ML; MG/100ML; MG/100ML
INJECTION, SOLUTION INTRAVENOUS CONTINUOUS PRN
Status: DISCONTINUED | OUTPATIENT
Start: 2023-08-23 | End: 2023-08-23 | Stop reason: SDUPTHER

## 2023-08-23 RX ORDER — HYDROMORPHONE HYDROCHLORIDE 1 MG/ML
0.5 INJECTION, SOLUTION INTRAMUSCULAR; INTRAVENOUS; SUBCUTANEOUS
Status: DISCONTINUED | OUTPATIENT
Start: 2023-08-23 | End: 2023-09-08 | Stop reason: HOSPADM

## 2023-08-23 RX ORDER — ONDANSETRON 2 MG/ML
INJECTION INTRAMUSCULAR; INTRAVENOUS PRN
Status: DISCONTINUED | OUTPATIENT
Start: 2023-08-23 | End: 2023-08-23 | Stop reason: SDUPTHER

## 2023-08-23 RX ORDER — HYDROMORPHONE HYDROCHLORIDE 2 MG/ML
0.25 INJECTION, SOLUTION INTRAMUSCULAR; INTRAVENOUS; SUBCUTANEOUS EVERY 5 MIN PRN
Status: DISCONTINUED | OUTPATIENT
Start: 2023-08-23 | End: 2023-08-23 | Stop reason: HOSPADM

## 2023-08-23 RX ORDER — LIDOCAINE HYDROCHLORIDE 20 MG/ML
INJECTION, SOLUTION EPIDURAL; INFILTRATION; INTRACAUDAL; PERINEURAL PRN
Status: DISCONTINUED | OUTPATIENT
Start: 2023-08-23 | End: 2023-08-23 | Stop reason: SDUPTHER

## 2023-08-23 RX ADMIN — LIDOCAINE HYDROCHLORIDE 80 MG: 20 INJECTION, SOLUTION EPIDURAL; INFILTRATION; INTRACAUDAL; PERINEURAL at 12:16

## 2023-08-23 RX ADMIN — Medication 10 MG: at 12:23

## 2023-08-23 RX ADMIN — PROPOFOL 80 MG: 10 INJECTION, EMULSION INTRAVENOUS at 12:16

## 2023-08-23 RX ADMIN — PANCRELIPASE LIPASE, PANCRELIPASE PROTEASE, PANCRELIPASE AMYLASE 5000 UNITS: 5000; 17000; 24000 CAPSULE, DELAYED RELEASE ORAL at 16:23

## 2023-08-23 RX ADMIN — PIPERACILLIN SODIUM AND TAZOBACTAM SODIUM 3375 MG: 3; .375 INJECTION, POWDER, LYOPHILIZED, FOR SOLUTION INTRAVENOUS at 16:23

## 2023-08-23 RX ADMIN — MAGNESIUM GLUCONATE 500 MG ORAL TABLET 400 MG: 500 TABLET ORAL at 08:19

## 2023-08-23 RX ADMIN — DEXAMETHASONE SODIUM PHOSPHATE 4 MG: 4 INJECTION, SOLUTION INTRAMUSCULAR; INTRAVENOUS at 12:26

## 2023-08-23 RX ADMIN — PANTOPRAZOLE SODIUM 40 MG: 40 TABLET, DELAYED RELEASE ORAL at 16:23

## 2023-08-23 RX ADMIN — HYDROMORPHONE HYDROCHLORIDE 1 MG: 1 INJECTION, SOLUTION INTRAMUSCULAR; INTRAVENOUS; SUBCUTANEOUS at 20:12

## 2023-08-23 RX ADMIN — HYDROMORPHONE HYDROCHLORIDE 0.5 MG: 1 INJECTION, SOLUTION INTRAMUSCULAR; INTRAVENOUS; SUBCUTANEOUS at 08:27

## 2023-08-23 RX ADMIN — Medication 100 MG: at 12:17

## 2023-08-23 RX ADMIN — TRAZODONE HYDROCHLORIDE 50 MG: 50 TABLET ORAL at 20:12

## 2023-08-23 RX ADMIN — VANCOMYCIN HYDROCHLORIDE 750 MG: 750 INJECTION, POWDER, LYOPHILIZED, FOR SOLUTION INTRAVENOUS at 02:52

## 2023-08-23 RX ADMIN — VANCOMYCIN HYDROCHLORIDE 750 MG: 750 INJECTION, POWDER, LYOPHILIZED, FOR SOLUTION INTRAVENOUS at 20:21

## 2023-08-23 RX ADMIN — SODIUM CHLORIDE, PRESERVATIVE FREE 10 ML: 5 INJECTION INTRAVENOUS at 20:21

## 2023-08-23 RX ADMIN — SODIUM BICARBONATE 650 MG TABLET 1300 MG: at 08:19

## 2023-08-23 RX ADMIN — SODIUM BICARBONATE 650 MG TABLET 1300 MG: at 20:12

## 2023-08-23 RX ADMIN — HYDROMORPHONE HYDROCHLORIDE 1 MG: 1 INJECTION, SOLUTION INTRAMUSCULAR; INTRAVENOUS; SUBCUTANEOUS at 16:29

## 2023-08-23 RX ADMIN — ONDANSETRON 4 MG: 2 INJECTION INTRAMUSCULAR; INTRAVENOUS at 12:26

## 2023-08-23 RX ADMIN — FENTANYL CITRATE 25 MCG: 50 INJECTION, SOLUTION INTRAMUSCULAR; INTRAVENOUS at 12:15

## 2023-08-23 RX ADMIN — SODIUM CHLORIDE, SODIUM LACTATE, POTASSIUM CHLORIDE, AND CALCIUM CHLORIDE: 600; 310; 30; 20 INJECTION, SOLUTION INTRAVENOUS at 12:04

## 2023-08-23 RX ADMIN — PANTOPRAZOLE SODIUM 40 MG: 40 TABLET, DELAYED RELEASE ORAL at 06:10

## 2023-08-23 ASSESSMENT — PAIN DESCRIPTION - DESCRIPTORS
DESCRIPTORS: ACHING;DISCOMFORT;SORE
DESCRIPTORS: ACHING;SORE

## 2023-08-23 ASSESSMENT — PAIN DESCRIPTION - LOCATION
LOCATION: ABDOMEN

## 2023-08-23 ASSESSMENT — PAIN SCALES - GENERAL
PAINLEVEL_OUTOF10: 5
PAINLEVEL_OUTOF10: 6
PAINLEVEL_OUTOF10: 7
PAINLEVEL_OUTOF10: 6
PAINLEVEL_OUTOF10: 9

## 2023-08-23 ASSESSMENT — PAIN DESCRIPTION - ORIENTATION
ORIENTATION: MID;RIGHT
ORIENTATION: MID

## 2023-08-23 ASSESSMENT — PAIN - FUNCTIONAL ASSESSMENT: PAIN_FUNCTIONAL_ASSESSMENT: 0-10

## 2023-08-23 NOTE — ANESTHESIA POSTPROCEDURE EVALUATION
Department of Anesthesiology  Postprocedure Note    Patient: Cristiano Medeiros  MRN: [id number]  YOB: 1940  Date of evaluation: 8/23/2023      Procedure Summary     Date: 08/23/23 Room / Location: [facility] MAIN OR 02 / [facility] MAIN OR    Anesthesia Start: 1202 Anesthesia Stop: 1251    Procedure: INCISION AND DRAINAGE OF ABDOMINAL WALL ABSCESS Diagnosis:       Abscess of abdominal wall      (Abscess of abdominal wall [L02.211])    Providers: Selene Riley MD Responsible Provider: Rosalba Miller MD    Anesthesia Type: General ASA Status: 4          Anesthesia Type: General    Vipin Phase I: Vipin Score: 8    Vipin Phase II:        Anesthesia Post Evaluation    Patient location during evaluation: PACU  Patient participation: complete - patient participated  Level of consciousness: awake  Airway patency: patent  Nausea & Vomiting: no nausea and no vomiting  Complications: no  Cardiovascular status: blood pressure returned to baseline  Respiratory status: acceptable  Hydration status: euvolemic  Comments: --------------------            08/23/23               1621     --------------------   BP:     (!) 160/70   Pulse:               Resp:                Temp:                SpO2:               --------------------    Pain management: adequate

## 2023-08-23 NOTE — BRIEF OP NOTE
Brief Postoperative Note      Patient: Tawanna Rivas  YOB: 1940  MRN: [id number]    Date of Procedure: 8/23/2023    Preop: abdominal abscess    Post-Op Diagnosis: intraperitoneal abscess, likely infected pseudocyst       Procedures:  1 drainage of peritoneal abscess    Surgeon:  Iram Eller MD    Assistant:  * No surgical staff found *    Anesthesia: general    Estimated Blood Loss (mL): minimal    Complications: none    Specimens:   abscess    Implants:  packing      Drains:   External Urinary Catheter (Active)   Site Assessment Clean,dry & intact 08/23/23 0827   Placement Replaced 08/21/23 2038   Securement Method Securing device (Describe) 08/21/23 1625   Catheter Care Catheter/Wick replaced 08/21/23 2038   Perineal Care Yes 08/21/23 2038   Suction 40 mmgHg continuous 08/21/23 2038   Urine Color Yellow 08/21/23 2038   Urine Appearance Cloudy 08/21/23 1625   Urine Odor Malodorous 08/21/23 2038   Output (mL) 500 mL 08/23/23 0428       [REMOVED] Urinary Catheter 08/13/23 Akhtar (Removed)   Catheter Indications Urinary retention (acute or chronic), continuous bladder irrigation or bladder outlet obstruction 08/19/23 0802   Site Assessment No urethral drainage 08/19/23 0802   Urine Color Vanessa 08/19/23 0802   Urine Appearance Clear 08/19/23 0802   Urine Odor Other (Comment) 08/19/23 0802   Collection Container Standard 08/19/23 0802   Securement Method Securing device (Describe) 08/19/23 0802   Catheter Care  Perineal wipes 08/19/23 0802   Catheter Best Practices  Drainage tube clipped to bed;Catheter secured to thigh; Tamper seal intact; Bag below bladder;Lack of dependent loop in tubing;Bag not on floor;Drainage bag less than half full 08/19/23 0802   Status Patent;Draining 08/19/23 0802   Output (mL) 300 mL 08/19/23 0802       [REMOVED] External Urinary Catheter (Removed)   Site Assessment Clean,dry & intact 07/13/23 2314   Placement Repositioned 07/13/23 2314   Securement Method Securing device

## 2023-08-23 NOTE — PERIOP NOTE
TRANSFER - IN REPORT:    Verbal report received from Tristan Akins on Tanvir Dance  being received from rm. [phone] for ordered procedure      Report consisted of patient's Situation, Background, Assessment and   Recommendations(SBAR). Information from the following report(s) Procedure Verification was reviewed with the receiving nurse. Opportunity for questions and clarification was provided. Assessment completed upon patient's arrival to unit and care assumed.

## 2023-08-23 NOTE — OP NOTE
[facility]  OPERATIVE REPORT    Name:  Harish Brewer  MR#:  [id number]  :  1940  ACCOUNT #:  [de-identified]  DATE OF SERVICE:  2023    PREOPERATIVE DIAGNOSIS:  Abdominal wall abscess. POSTOPERATIVE DIAGNOSIS:  Intraperitoneal abscess, likely infected pseudocyst.    PROCEDURE PERFORMED:  Drainage of peritoneal abscess. SURGEON:  Giovanni Kwok MD    ANESTHESIA:  general.    COMPLICATIONS:  none. SPECIMENS REMOVED:  abscess. IMPLANTS:  packing. ESTIMATED BLOOD LOSS:  minimal.    INDICATION:  This is an 82-year-old female. She has complicated medical issues. She presented to hospital recently with worsening of her chronic pancreatitis. She was found to have a pseudocyst and she also has new diagnosis of metastatic breast cancer in her liver. Over last few days, she was found to have swelling in her epigastrium. This clearly turned into an abscess. I felt this maybe an infected pseudocyst finding its way out. I offered her a surgical drainage and she understood risks and benefits, agreed to proceed. FINDINGS:  This is a large abdominal wall abscess clearly connected with intraperitoneal abscess through a pinhole on the fascia. Presumably, this intraperitoneal abscess is infected pseudocyst.    PROCEDURE:  After informed consent obtained, the patient brought into the operating room. General anesthesia was administered. Her abdomen was prepped and draped in a routine fashion. I opened her old midline incision right over the swollen area. Large abscess was immediately drained. The incision was opened bigger and the wound was further explored. With compression and palpation, a pinhole was found at the fascia and there is clearly abscess coming out from inside the peritoneal cavity through this pinhole. This was then enlarged so that the fascial opening accommodate the finger and I was able to put a finger.   Gentle dissection was performed to break down the pus

## 2023-08-23 NOTE — OR NURSING
I was present and supervised Janay Jacobs PTA. Notes were reviewed and care agreed upon. Interventional Radiology Post Paracentesis/Thoracentesis Note    8/23/2023    Procedure(s): Ultrasound guided Therapeutic Paracentesis Performed with 8 [place] catheter total volume 1000 ml. Preliminary Findings: small clear, yellow, and thin . Complications: None    Plan:  Observation, check labs if drawn. Chest X-Ray:  no    Full dictated report to follow      Catheter removed intact. Pressure applied and hemostasis obtained. Skin glue applied to the site.

## 2023-08-24 LAB
ANION GAP SERPL CALC-SCNC: 3 MMOL/L (ref 2–11)
BASOPHILS # BLD: 0 K/UL (ref 0–0.2)
BASOPHILS NFR BLD: 0 % (ref 0–2)
BUN SERPL-MCNC: 12 MG/DL (ref 8–23)
CALCIUM SERPL-MCNC: 7.6 MG/DL (ref 8.3–10.4)
CHLORIDE SERPL-SCNC: 107 MMOL/L (ref 101–110)
CO2 SERPL-SCNC: 36 MMOL/L (ref 21–32)
CREAT SERPL-MCNC: 0.6 MG/DL (ref 0.6–1)
DIFFERENTIAL METHOD BLD: ABNORMAL
EOSINOPHIL # BLD: 0 K/UL (ref 0–0.8)
EOSINOPHIL NFR BLD: 0 % (ref 0.5–7.8)
ERYTHROCYTE [DISTWIDTH] IN BLOOD BY AUTOMATED COUNT: 17.7 % (ref 11.9–14.6)
GLUCOSE SERPL-MCNC: 123 MG/DL (ref 65–100)
HCT VFR BLD AUTO: 25.1 % (ref 35.8–46.3)
HGB BLD-MCNC: 7.6 G/DL (ref 11.7–15.4)
IMM GRANULOCYTES # BLD AUTO: 0 K/UL (ref 0–0.5)
IMM GRANULOCYTES NFR BLD AUTO: 0 % (ref 0–5)
LYMPHOCYTES # BLD: 0.7 K/UL (ref 0.5–4.6)
LYMPHOCYTES NFR BLD: 11 % (ref 13–44)
MCH RBC QN AUTO: 26.7 PG (ref 26.1–32.9)
MCHC RBC AUTO-ENTMCNC: 30.3 G/DL (ref 31.4–35)
MCV RBC AUTO: 88.1 FL (ref 82–102)
MONOCYTES # BLD: 0.2 K/UL (ref 0.1–1.3)
MONOCYTES NFR BLD: 4 % (ref 4–12)
NEUTS SEG # BLD: 5.1 K/UL (ref 1.7–8.2)
NEUTS SEG NFR BLD: 85 % (ref 43–78)
NRBC # BLD: 0 K/UL (ref 0–0.2)
PLATELET # BLD AUTO: 186 K/UL (ref 150–450)
PLATELET COMMENT: ADEQUATE
PMV BLD AUTO: 10.1 FL (ref 9.4–12.3)
POTASSIUM SERPL-SCNC: 2 MMOL/L (ref 3.5–5.1)
RBC # BLD AUTO: 2.85 M/UL (ref 4.05–5.2)
RBC MORPH BLD: ABNORMAL
SODIUM SERPL-SCNC: 146 MMOL/L (ref 133–143)
WBC # BLD AUTO: 6 K/UL (ref 4.3–11.1)
WBC MORPH BLD: ABNORMAL

## 2023-08-24 PROCEDURE — 2580000003 HC RX 258: Performed by: PHYSICIAN ASSISTANT

## 2023-08-24 PROCEDURE — 6360000002 HC RX W HCPCS: Performed by: FAMILY MEDICINE

## 2023-08-24 PROCEDURE — 51702 INSERT TEMP BLADDER CATH: CPT

## 2023-08-24 PROCEDURE — 6370000000 HC RX 637 (ALT 250 FOR IP): Performed by: PHYSICIAN ASSISTANT

## 2023-08-24 PROCEDURE — 6360000002 HC RX W HCPCS: Performed by: PHYSICIAN ASSISTANT

## 2023-08-24 PROCEDURE — 36415 COLL VENOUS BLD VENIPUNCTURE: CPT

## 2023-08-24 PROCEDURE — 6370000000 HC RX 637 (ALT 250 FOR IP): Performed by: FAMILY MEDICINE

## 2023-08-24 PROCEDURE — 80048 BASIC METABOLIC PNL TOTAL CA: CPT

## 2023-08-24 PROCEDURE — 2580000003 HC RX 258: Performed by: FAMILY MEDICINE

## 2023-08-24 PROCEDURE — 85025 COMPLETE CBC W/AUTO DIFF WBC: CPT

## 2023-08-24 PROCEDURE — 2500000003 HC RX 250 WO HCPCS: Performed by: PHYSICIAN ASSISTANT

## 2023-08-24 PROCEDURE — 2140000000 HC CCU INTERMEDIATE R&B

## 2023-08-24 RX ORDER — POTASSIUM CHLORIDE 7.45 MG/ML
10 INJECTION INTRAVENOUS PRN
Status: DISCONTINUED | OUTPATIENT
Start: 2023-08-24 | End: 2023-08-24

## 2023-08-24 RX ORDER — MAGNESIUM SULFATE IN WATER 40 MG/ML
2000 INJECTION, SOLUTION INTRAVENOUS PRN
Status: DISCONTINUED | OUTPATIENT
Start: 2023-08-24 | End: 2023-08-24

## 2023-08-24 RX ORDER — POTASSIUM CHLORIDE 20 MEQ/1
40 TABLET, EXTENDED RELEASE ORAL PRN
Status: DISCONTINUED | OUTPATIENT
Start: 2023-08-24 | End: 2023-08-24

## 2023-08-24 RX ADMIN — HYDROMORPHONE HYDROCHLORIDE 1 MG: 1 INJECTION, SOLUTION INTRAMUSCULAR; INTRAVENOUS; SUBCUTANEOUS at 16:32

## 2023-08-24 RX ADMIN — POTASSIUM CHLORIDE 10 MEQ: 7.46 INJECTION, SOLUTION INTRAVENOUS at 13:31

## 2023-08-24 RX ADMIN — PHENOL 1 SPRAY: 1.5 LIQUID ORAL at 18:20

## 2023-08-24 RX ADMIN — SODIUM CHLORIDE, PRESERVATIVE FREE 10 ML: 5 INJECTION INTRAVENOUS at 08:48

## 2023-08-24 RX ADMIN — PANCRELIPASE LIPASE, PANCRELIPASE PROTEASE, PANCRELIPASE AMYLASE 5000 UNITS: 5000; 17000; 24000 CAPSULE, DELAYED RELEASE ORAL at 16:31

## 2023-08-24 RX ADMIN — HYDROMORPHONE HYDROCHLORIDE 1 MG: 1 INJECTION, SOLUTION INTRAMUSCULAR; INTRAVENOUS; SUBCUTANEOUS at 07:35

## 2023-08-24 RX ADMIN — SODIUM CHLORIDE, PRESERVATIVE FREE 10 ML: 5 INJECTION INTRAVENOUS at 20:35

## 2023-08-24 RX ADMIN — PANCRELIPASE LIPASE, PANCRELIPASE PROTEASE, PANCRELIPASE AMYLASE 5000 UNITS: 5000; 17000; 24000 CAPSULE, DELAYED RELEASE ORAL at 12:21

## 2023-08-24 RX ADMIN — PANTOPRAZOLE SODIUM 40 MG: 40 TABLET, DELAYED RELEASE ORAL at 05:53

## 2023-08-24 RX ADMIN — MAGNESIUM GLUCONATE 500 MG ORAL TABLET 400 MG: 500 TABLET ORAL at 08:49

## 2023-08-24 RX ADMIN — VANCOMYCIN HYDROCHLORIDE 750 MG: 750 INJECTION, POWDER, LYOPHILIZED, FOR SOLUTION INTRAVENOUS at 13:42

## 2023-08-24 RX ADMIN — HYDROMORPHONE HYDROCHLORIDE 0.5 MG: 1 INJECTION, SOLUTION INTRAMUSCULAR; INTRAVENOUS; SUBCUTANEOUS at 20:39

## 2023-08-24 RX ADMIN — POTASSIUM CHLORIDE 10 MEQ: 7.46 INJECTION, SOLUTION INTRAVENOUS at 15:11

## 2023-08-24 RX ADMIN — PANCRELIPASE LIPASE, PANCRELIPASE PROTEASE, PANCRELIPASE AMYLASE 5000 UNITS: 5000; 17000; 24000 CAPSULE, DELAYED RELEASE ORAL at 08:49

## 2023-08-24 RX ADMIN — PIPERACILLIN SODIUM AND TAZOBACTAM SODIUM 3375 MG: 3; .375 INJECTION, POWDER, LYOPHILIZED, FOR SOLUTION INTRAVENOUS at 01:39

## 2023-08-24 RX ADMIN — TRAZODONE HYDROCHLORIDE 50 MG: 50 TABLET ORAL at 20:35

## 2023-08-24 RX ADMIN — HYDROMORPHONE HYDROCHLORIDE 1 MG: 1 INJECTION, SOLUTION INTRAMUSCULAR; INTRAVENOUS; SUBCUTANEOUS at 12:21

## 2023-08-24 RX ADMIN — PIPERACILLIN SODIUM AND TAZOBACTAM SODIUM 3375 MG: 3; .375 INJECTION, POWDER, LYOPHILIZED, FOR SOLUTION INTRAVENOUS at 16:30

## 2023-08-24 RX ADMIN — SODIUM BICARBONATE 650 MG TABLET 1300 MG: at 08:49

## 2023-08-24 RX ADMIN — PANTOPRAZOLE SODIUM 40 MG: 40 TABLET, DELAYED RELEASE ORAL at 15:12

## 2023-08-24 RX ADMIN — PIPERACILLIN SODIUM AND TAZOBACTAM SODIUM 3375 MG: 3; .375 INJECTION, POWDER, LYOPHILIZED, FOR SOLUTION INTRAVENOUS at 08:50

## 2023-08-24 ASSESSMENT — PAIN DESCRIPTION - LOCATION
LOCATION: ABDOMEN

## 2023-08-24 ASSESSMENT — PAIN DESCRIPTION - ORIENTATION
ORIENTATION: MID
ORIENTATION: MID

## 2023-08-24 ASSESSMENT — PAIN SCALES - WONG BAKER
WONGBAKER_NUMERICALRESPONSE: 4
WONGBAKER_NUMERICALRESPONSE: 0
WONGBAKER_NUMERICALRESPONSE: 4
WONGBAKER_NUMERICALRESPONSE: 0

## 2023-08-24 ASSESSMENT — PAIN SCALES - GENERAL
PAINLEVEL_OUTOF10: 5
PAINLEVEL_OUTOF10: 0
PAINLEVEL_OUTOF10: 8
PAINLEVEL_OUTOF10: 7
PAINLEVEL_OUTOF10: 7

## 2023-08-24 ASSESSMENT — PAIN DESCRIPTION - ONSET
ONSET: GRADUAL
ONSET: GRADUAL

## 2023-08-24 ASSESSMENT — PAIN DESCRIPTION - FREQUENCY
FREQUENCY: CONTINUOUS
FREQUENCY: CONTINUOUS

## 2023-08-24 ASSESSMENT — PAIN DESCRIPTION - DESCRIPTORS
DESCRIPTORS: ACHING;SHARP;SORE
DESCRIPTORS: ACHING;SORE;BURNING;DISCOMFORT

## 2023-08-24 ASSESSMENT — PAIN DESCRIPTION - PAIN TYPE
TYPE: ACUTE PAIN
TYPE: ACUTE PAIN;SURGICAL PAIN

## 2023-08-25 LAB
FUNGAL CULT/SMEAR: NORMAL
SPECIMEN PROCESSING: NORMAL
SPECIMEN SOURCE: NORMAL

## 2023-08-25 PROCEDURE — 6370000000 HC RX 637 (ALT 250 FOR IP): Performed by: PHYSICIAN ASSISTANT

## 2023-08-25 PROCEDURE — 2580000003 HC RX 258: Performed by: FAMILY MEDICINE

## 2023-08-25 PROCEDURE — 97605 NEG PRS WND THER DME<=50SQCM: CPT

## 2023-08-25 PROCEDURE — 6360000002 HC RX W HCPCS: Performed by: PHYSICIAN ASSISTANT

## 2023-08-25 PROCEDURE — 2580000003 HC RX 258: Performed by: PHYSICIAN ASSISTANT

## 2023-08-25 PROCEDURE — 2500000003 HC RX 250 WO HCPCS: Performed by: PHYSICIAN ASSISTANT

## 2023-08-25 PROCEDURE — 51702 INSERT TEMP BLADDER CATH: CPT

## 2023-08-25 PROCEDURE — 6360000002 HC RX W HCPCS: Performed by: FAMILY MEDICINE

## 2023-08-25 PROCEDURE — 2400000000

## 2023-08-25 PROCEDURE — 2140000000 HC CCU INTERMEDIATE R&B

## 2023-08-25 RX ADMIN — PIPERACILLIN SODIUM AND TAZOBACTAM SODIUM 3375 MG: 3; .375 INJECTION, POWDER, LYOPHILIZED, FOR SOLUTION INTRAVENOUS at 10:08

## 2023-08-25 RX ADMIN — OXYCODONE HYDROCHLORIDE 5 MG: 5 TABLET ORAL at 12:42

## 2023-08-25 RX ADMIN — OXYCODONE HYDROCHLORIDE 5 MG: 5 TABLET ORAL at 04:46

## 2023-08-25 RX ADMIN — SODIUM CHLORIDE, PRESERVATIVE FREE 5 ML: 5 INJECTION INTRAVENOUS at 10:08

## 2023-08-25 RX ADMIN — HYDROMORPHONE HYDROCHLORIDE 1 MG: 1 INJECTION, SOLUTION INTRAMUSCULAR; INTRAVENOUS; SUBCUTANEOUS at 07:15

## 2023-08-25 RX ADMIN — HYDROMORPHONE HYDROCHLORIDE 1 MG: 1 INJECTION, SOLUTION INTRAMUSCULAR; INTRAVENOUS; SUBCUTANEOUS at 19:07

## 2023-08-25 RX ADMIN — OXYCODONE HYDROCHLORIDE 5 MG: 5 TABLET ORAL at 16:56

## 2023-08-25 RX ADMIN — HYDROMORPHONE HYDROCHLORIDE 1 MG: 1 INJECTION, SOLUTION INTRAMUSCULAR; INTRAVENOUS; SUBCUTANEOUS at 11:03

## 2023-08-25 RX ADMIN — SODIUM CHLORIDE, PRESERVATIVE FREE 10 ML: 5 INJECTION INTRAVENOUS at 20:36

## 2023-08-25 RX ADMIN — TRAZODONE HYDROCHLORIDE 50 MG: 50 TABLET ORAL at 20:36

## 2023-08-25 RX ADMIN — VANCOMYCIN HYDROCHLORIDE 750 MG: 750 INJECTION, POWDER, LYOPHILIZED, FOR SOLUTION INTRAVENOUS at 10:09

## 2023-08-25 RX ADMIN — PIPERACILLIN SODIUM AND TAZOBACTAM SODIUM 3375 MG: 3; .375 INJECTION, POWDER, LYOPHILIZED, FOR SOLUTION INTRAVENOUS at 16:47

## 2023-08-25 RX ADMIN — HYDROMORPHONE HYDROCHLORIDE 1 MG: 1 INJECTION, SOLUTION INTRAMUSCULAR; INTRAVENOUS; SUBCUTANEOUS at 14:45

## 2023-08-25 RX ADMIN — PIPERACILLIN SODIUM AND TAZOBACTAM SODIUM 3375 MG: 3; .375 INJECTION, POWDER, LYOPHILIZED, FOR SOLUTION INTRAVENOUS at 00:22

## 2023-08-25 ASSESSMENT — PAIN SCALES - GENERAL
PAINLEVEL_OUTOF10: 8
PAINLEVEL_OUTOF10: 7
PAINLEVEL_OUTOF10: 0
PAINLEVEL_OUTOF10: 0
PAINLEVEL_OUTOF10: 2
PAINLEVEL_OUTOF10: 3
PAINLEVEL_OUTOF10: 0
PAINLEVEL_OUTOF10: 8
PAINLEVEL_OUTOF10: 0
PAINLEVEL_OUTOF10: 7
PAINLEVEL_OUTOF10: 8

## 2023-08-25 ASSESSMENT — PAIN DESCRIPTION - DESCRIPTORS
DESCRIPTORS: ACHING
DESCRIPTORS: ACHING
DESCRIPTORS: ACHING;OTHER (COMMENT)
DESCRIPTORS: ACHING
DESCRIPTORS: ACHING

## 2023-08-25 ASSESSMENT — PAIN DESCRIPTION - ORIENTATION
ORIENTATION: RIGHT;LOWER
ORIENTATION: MID
ORIENTATION: RIGHT;LOWER

## 2023-08-25 ASSESSMENT — PAIN DESCRIPTION - LOCATION
LOCATION: ABDOMEN

## 2023-08-25 ASSESSMENT — PAIN SCALES - WONG BAKER
WONGBAKER_NUMERICALRESPONSE: 0
WONGBAKER_NUMERICALRESPONSE: 0

## 2023-08-25 NOTE — WOUND CARE
Recent I&D of wound abdomen. Noted patient is considering hospice. Discussed wound vac with JACINTO Steinberg that wound vac would not be continued at hospice, order to start vac today and if patient chooses hospice to return to dry dressings 3 times per day and prn. The wound has large amounts of drainage. Discussed discharge plan with , no current plans for discharge on hospice, family will not take home at this time. Abdominal wound  is pale pink (concern for poor nutrition) patient had dietitian however signed off due to patient stating she wants hospice. Goals of care are very unclear at this time. Discussed with patient, she feels she may need hospice, however knows her family can't take care of her she does not like the frequent dressing changes and it is causing her a great deal of pain, she also wishes it could be dry and not so wet, informed her the wound vac would need changed 3 times per week that it would be painful when it is changed and could help with the drainage she states she would like that for as long as possible. In this case until patient can qualify for hospice will use wound vac. If patient desires to return to dry dressings secondary to wound vac pain will accommodate as her goals are comfort based. Wound Vac applied 2.6x1. 4x0.8cm wound with undermining 2-4cm circumferentially. Wick of foam in wound with \"mushroom\" cap over, drape used to protect periwound. Will plan 3 times per week dressing changes while in acute care.

## 2023-08-26 LAB
BACTERIA SPEC CULT: ABNORMAL
BACTERIA SPEC CULT: ABNORMAL
GRAM STN SPEC: ABNORMAL
GRAM STN SPEC: ABNORMAL
SERVICE CMNT-IMP: ABNORMAL

## 2023-08-26 PROCEDURE — 2580000003 HC RX 258: Performed by: FAMILY MEDICINE

## 2023-08-26 PROCEDURE — 6370000000 HC RX 637 (ALT 250 FOR IP): Performed by: PHYSICIAN ASSISTANT

## 2023-08-26 PROCEDURE — 6370000000 HC RX 637 (ALT 250 FOR IP): Performed by: FAMILY MEDICINE

## 2023-08-26 PROCEDURE — 6360000002 HC RX W HCPCS: Performed by: FAMILY MEDICINE

## 2023-08-26 PROCEDURE — 6360000002 HC RX W HCPCS: Performed by: PHYSICIAN ASSISTANT

## 2023-08-26 PROCEDURE — 2500000003 HC RX 250 WO HCPCS: Performed by: PHYSICIAN ASSISTANT

## 2023-08-26 PROCEDURE — 2580000003 HC RX 258: Performed by: PHYSICIAN ASSISTANT

## 2023-08-26 PROCEDURE — 2140000000 HC CCU INTERMEDIATE R&B

## 2023-08-26 RX ORDER — SODIUM CHLORIDE 9 MG/ML
INJECTION, SOLUTION INTRAVENOUS
Status: DISPENSED
Start: 2023-08-26 | End: 2023-08-26

## 2023-08-26 RX ORDER — SULFAMETHOXAZOLE AND TRIMETHOPRIM 800; 160 MG/1; MG/1
1 TABLET ORAL EVERY 12 HOURS SCHEDULED
Status: COMPLETED | OUTPATIENT
Start: 2023-08-26 | End: 2023-08-30

## 2023-08-26 RX ADMIN — HYDROMORPHONE HYDROCHLORIDE 1 MG: 1 INJECTION, SOLUTION INTRAMUSCULAR; INTRAVENOUS; SUBCUTANEOUS at 00:13

## 2023-08-26 RX ADMIN — PIPERACILLIN SODIUM AND TAZOBACTAM SODIUM 3375 MG: 3; .375 INJECTION, POWDER, LYOPHILIZED, FOR SOLUTION INTRAVENOUS at 09:36

## 2023-08-26 RX ADMIN — VANCOMYCIN HYDROCHLORIDE 750 MG: 750 INJECTION, POWDER, LYOPHILIZED, FOR SOLUTION INTRAVENOUS at 02:56

## 2023-08-26 RX ADMIN — PIPERACILLIN SODIUM AND TAZOBACTAM SODIUM 3375 MG: 3; .375 INJECTION, POWDER, LYOPHILIZED, FOR SOLUTION INTRAVENOUS at 00:13

## 2023-08-26 RX ADMIN — OXYCODONE HYDROCHLORIDE 10 MG: 5 TABLET ORAL at 09:46

## 2023-08-26 RX ADMIN — SODIUM CHLORIDE, PRESERVATIVE FREE 10 ML: 5 INJECTION INTRAVENOUS at 09:36

## 2023-08-26 RX ADMIN — OXYCODONE HYDROCHLORIDE 10 MG: 5 TABLET ORAL at 14:24

## 2023-08-26 RX ADMIN — HYDROMORPHONE HYDROCHLORIDE 1 MG: 1 INJECTION, SOLUTION INTRAMUSCULAR; INTRAVENOUS; SUBCUTANEOUS at 07:20

## 2023-08-26 RX ADMIN — HYDROMORPHONE HYDROCHLORIDE 1 MG: 1 INJECTION, SOLUTION INTRAMUSCULAR; INTRAVENOUS; SUBCUTANEOUS at 18:02

## 2023-08-26 RX ADMIN — TRAZODONE HYDROCHLORIDE 50 MG: 50 TABLET ORAL at 21:20

## 2023-08-26 RX ADMIN — SODIUM CHLORIDE, PRESERVATIVE FREE 10 ML: 5 INJECTION INTRAVENOUS at 21:20

## 2023-08-26 RX ADMIN — SULFAMETHOXAZOLE AND TRIMETHOPRIM 1 TABLET: 800; 160 TABLET ORAL at 21:20

## 2023-08-26 RX ADMIN — HYDROMORPHONE HYDROCHLORIDE 1 MG: 1 INJECTION, SOLUTION INTRAMUSCULAR; INTRAVENOUS; SUBCUTANEOUS at 21:33

## 2023-08-26 ASSESSMENT — PAIN DESCRIPTION - LOCATION
LOCATION: ABDOMEN

## 2023-08-26 ASSESSMENT — PAIN SCALES - WONG BAKER
WONGBAKER_NUMERICALRESPONSE: 0

## 2023-08-26 ASSESSMENT — PAIN SCALES - GENERAL
PAINLEVEL_OUTOF10: 8
PAINLEVEL_OUTOF10: 1
PAINLEVEL_OUTOF10: 0
PAINLEVEL_OUTOF10: 8
PAINLEVEL_OUTOF10: 6
PAINLEVEL_OUTOF10: 6

## 2023-08-27 PROCEDURE — 6370000000 HC RX 637 (ALT 250 FOR IP): Performed by: FAMILY MEDICINE

## 2023-08-27 PROCEDURE — 2580000003 HC RX 258: Performed by: PHYSICIAN ASSISTANT

## 2023-08-27 PROCEDURE — 6370000000 HC RX 637 (ALT 250 FOR IP): Performed by: PHYSICIAN ASSISTANT

## 2023-08-27 PROCEDURE — 2500000003 HC RX 250 WO HCPCS: Performed by: PHYSICIAN ASSISTANT

## 2023-08-27 PROCEDURE — 2140000000 HC CCU INTERMEDIATE R&B

## 2023-08-27 RX ADMIN — SODIUM CHLORIDE, PRESERVATIVE FREE 10 ML: 5 INJECTION INTRAVENOUS at 09:30

## 2023-08-27 RX ADMIN — SODIUM CHLORIDE, PRESERVATIVE FREE 10 ML: 5 INJECTION INTRAVENOUS at 20:59

## 2023-08-27 RX ADMIN — OXYCODONE HYDROCHLORIDE 10 MG: 5 TABLET ORAL at 06:26

## 2023-08-27 RX ADMIN — HYDROMORPHONE HYDROCHLORIDE 1 MG: 1 INJECTION, SOLUTION INTRAMUSCULAR; INTRAVENOUS; SUBCUTANEOUS at 09:29

## 2023-08-27 RX ADMIN — OXYCODONE HYDROCHLORIDE 10 MG: 5 TABLET ORAL at 18:52

## 2023-08-27 RX ADMIN — TRAZODONE HYDROCHLORIDE 50 MG: 50 TABLET ORAL at 20:58

## 2023-08-27 RX ADMIN — SULFAMETHOXAZOLE AND TRIMETHOPRIM 1 TABLET: 800; 160 TABLET ORAL at 20:59

## 2023-08-27 RX ADMIN — SULFAMETHOXAZOLE AND TRIMETHOPRIM 1 TABLET: 800; 160 TABLET ORAL at 09:30

## 2023-08-27 RX ADMIN — HYDROMORPHONE HYDROCHLORIDE 1 MG: 1 INJECTION, SOLUTION INTRAMUSCULAR; INTRAVENOUS; SUBCUTANEOUS at 14:24

## 2023-08-27 RX ADMIN — HYDROMORPHONE HYDROCHLORIDE 1 MG: 1 INJECTION, SOLUTION INTRAMUSCULAR; INTRAVENOUS; SUBCUTANEOUS at 17:26

## 2023-08-27 RX ADMIN — HYDROMORPHONE HYDROCHLORIDE 1 MG: 1 INJECTION, SOLUTION INTRAMUSCULAR; INTRAVENOUS; SUBCUTANEOUS at 20:58

## 2023-08-27 ASSESSMENT — PAIN DESCRIPTION - LOCATION
LOCATION: ABDOMEN

## 2023-08-27 ASSESSMENT — PAIN SCALES - GENERAL
PAINLEVEL_OUTOF10: 0
PAINLEVEL_OUTOF10: 7
PAINLEVEL_OUTOF10: 7
PAINLEVEL_OUTOF10: 8
PAINLEVEL_OUTOF10: 8
PAINLEVEL_OUTOF10: 0

## 2023-08-27 ASSESSMENT — PAIN SCALES - WONG BAKER: WONGBAKER_NUMERICALRESPONSE: 0

## 2023-08-27 ASSESSMENT — PAIN DESCRIPTION - DESCRIPTORS: DESCRIPTORS: ACHING

## 2023-08-28 LAB
BACTERIA SPEC CULT: NORMAL
GLUCOSE BLD STRIP.AUTO-MCNC: 103 MG/DL (ref 65–100)
GLUCOSE BLD STRIP.AUTO-MCNC: 105 MG/DL (ref 65–100)
SERVICE CMNT-IMP: ABNORMAL
SERVICE CMNT-IMP: ABNORMAL
SERVICE CMNT-IMP: NORMAL

## 2023-08-28 PROCEDURE — 82962 GLUCOSE BLOOD TEST: CPT

## 2023-08-28 PROCEDURE — 6370000000 HC RX 637 (ALT 250 FOR IP): Performed by: FAMILY MEDICINE

## 2023-08-28 PROCEDURE — 2500000003 HC RX 250 WO HCPCS: Performed by: PHYSICIAN ASSISTANT

## 2023-08-28 PROCEDURE — 6370000000 HC RX 637 (ALT 250 FOR IP): Performed by: PHYSICIAN ASSISTANT

## 2023-08-28 PROCEDURE — 2140000000 HC CCU INTERMEDIATE R&B

## 2023-08-28 PROCEDURE — 2580000003 HC RX 258: Performed by: PHYSICIAN ASSISTANT

## 2023-08-28 RX ORDER — OXYCODONE HCL 20 MG/ML
5 CONCENTRATE, ORAL ORAL
Status: DISCONTINUED | OUTPATIENT
Start: 2023-08-28 | End: 2023-09-08 | Stop reason: HOSPADM

## 2023-08-28 RX ORDER — LORAZEPAM 2 MG/ML
1 INJECTION INTRAMUSCULAR EVERY 6 HOURS PRN
Status: DISCONTINUED | OUTPATIENT
Start: 2023-08-28 | End: 2023-09-08 | Stop reason: HOSPADM

## 2023-08-28 RX ADMIN — HYDROMORPHONE HYDROCHLORIDE 1 MG: 1 INJECTION, SOLUTION INTRAMUSCULAR; INTRAVENOUS; SUBCUTANEOUS at 17:47

## 2023-08-28 RX ADMIN — SULFAMETHOXAZOLE AND TRIMETHOPRIM 1 TABLET: 800; 160 TABLET ORAL at 08:47

## 2023-08-28 RX ADMIN — SODIUM CHLORIDE, PRESERVATIVE FREE 10 ML: 5 INJECTION INTRAVENOUS at 08:50

## 2023-08-28 RX ADMIN — SODIUM CHLORIDE, PRESERVATIVE FREE 10 ML: 5 INJECTION INTRAVENOUS at 20:16

## 2023-08-28 RX ADMIN — Medication 5 MG: at 20:13

## 2023-08-28 RX ADMIN — TRAZODONE HYDROCHLORIDE 50 MG: 50 TABLET ORAL at 20:13

## 2023-08-28 RX ADMIN — HYDROMORPHONE HYDROCHLORIDE 1 MG: 1 INJECTION, SOLUTION INTRAMUSCULAR; INTRAVENOUS; SUBCUTANEOUS at 13:29

## 2023-08-28 RX ADMIN — HYDROMORPHONE HYDROCHLORIDE 1 MG: 1 INJECTION, SOLUTION INTRAMUSCULAR; INTRAVENOUS; SUBCUTANEOUS at 08:47

## 2023-08-28 RX ADMIN — SULFAMETHOXAZOLE AND TRIMETHOPRIM 1 TABLET: 800; 160 TABLET ORAL at 20:14

## 2023-08-28 ASSESSMENT — PAIN SCALES - GENERAL
PAINLEVEL_OUTOF10: 8
PAINLEVEL_OUTOF10: 7
PAINLEVEL_OUTOF10: 7

## 2023-08-28 ASSESSMENT — PAIN DESCRIPTION - ORIENTATION
ORIENTATION: ANTERIOR

## 2023-08-28 ASSESSMENT — PAIN DESCRIPTION - LOCATION
LOCATION: ABDOMEN
LOCATION: ABDOMEN
LOCATION: ABDOMEN;GENERALIZED

## 2023-08-28 ASSESSMENT — PAIN DESCRIPTION - DESCRIPTORS
DESCRIPTORS: ACHING;SORE
DESCRIPTORS: ACHING;SORE;STABBING
DESCRIPTORS: ACHING;SORE;STABBING;TENDER

## 2023-08-28 ASSESSMENT — PAIN - FUNCTIONAL ASSESSMENT: PAIN_FUNCTIONAL_ASSESSMENT: PREVENTS OR INTERFERES WITH ALL ACTIVE AND SOME PASSIVE ACTIVITIES

## 2023-08-29 LAB
FUNGUS SMEAR: NORMAL
GLUCOSE BLD STRIP.AUTO-MCNC: 120 MG/DL (ref 65–100)
SERVICE CMNT-IMP: ABNORMAL
SPECIMEN SOURCE: NORMAL

## 2023-08-29 PROCEDURE — 2140000000 HC CCU INTERMEDIATE R&B

## 2023-08-29 PROCEDURE — 82962 GLUCOSE BLOOD TEST: CPT

## 2023-08-29 PROCEDURE — 6370000000 HC RX 637 (ALT 250 FOR IP): Performed by: FAMILY MEDICINE

## 2023-08-29 PROCEDURE — 6370000000 HC RX 637 (ALT 250 FOR IP): Performed by: PHYSICIAN ASSISTANT

## 2023-08-29 PROCEDURE — 2500000003 HC RX 250 WO HCPCS: Performed by: PHYSICIAN ASSISTANT

## 2023-08-29 PROCEDURE — 2580000003 HC RX 258: Performed by: PHYSICIAN ASSISTANT

## 2023-08-29 RX ORDER — OXYCODONE HCL 20 MG/ML
5 CONCENTRATE, ORAL ORAL 3 TIMES DAILY
Status: DISCONTINUED | OUTPATIENT
Start: 2023-08-29 | End: 2023-09-08 | Stop reason: HOSPADM

## 2023-08-29 RX ADMIN — SULFAMETHOXAZOLE AND TRIMETHOPRIM 1 TABLET: 800; 160 TABLET ORAL at 19:37

## 2023-08-29 RX ADMIN — SODIUM CHLORIDE, PRESERVATIVE FREE 10 ML: 5 INJECTION INTRAVENOUS at 19:43

## 2023-08-29 RX ADMIN — HYDROMORPHONE HYDROCHLORIDE 1 MG: 1 INJECTION, SOLUTION INTRAMUSCULAR; INTRAVENOUS; SUBCUTANEOUS at 14:25

## 2023-08-29 RX ADMIN — HYDROMORPHONE HYDROCHLORIDE 1 MG: 1 INJECTION, SOLUTION INTRAMUSCULAR; INTRAVENOUS; SUBCUTANEOUS at 07:51

## 2023-08-29 RX ADMIN — SULFAMETHOXAZOLE AND TRIMETHOPRIM 1 TABLET: 800; 160 TABLET ORAL at 07:53

## 2023-08-29 RX ADMIN — TRAZODONE HYDROCHLORIDE 50 MG: 50 TABLET ORAL at 19:37

## 2023-08-29 RX ADMIN — Medication 5 MG: at 10:49

## 2023-08-29 RX ADMIN — Medication 5 MG: at 19:38

## 2023-08-29 ASSESSMENT — PAIN DESCRIPTION - LOCATION
LOCATION: ABDOMEN;GENERALIZED
LOCATION: ABDOMEN

## 2023-08-29 ASSESSMENT — PAIN SCALES - GENERAL
PAINLEVEL_OUTOF10: 5
PAINLEVEL_OUTOF10: 8
PAINLEVEL_OUTOF10: 7

## 2023-08-29 ASSESSMENT — PAIN DESCRIPTION - ORIENTATION
ORIENTATION: ANTERIOR;UPPER
ORIENTATION: ANTERIOR;RIGHT

## 2023-08-29 ASSESSMENT — PAIN DESCRIPTION - DESCRIPTORS
DESCRIPTORS: ACHING;STABBING
DESCRIPTORS: THROBBING

## 2023-08-30 PROCEDURE — 6370000000 HC RX 637 (ALT 250 FOR IP): Performed by: FAMILY MEDICINE

## 2023-08-30 PROCEDURE — 6370000000 HC RX 637 (ALT 250 FOR IP): Performed by: PHYSICIAN ASSISTANT

## 2023-08-30 PROCEDURE — 2580000003 HC RX 258: Performed by: PHYSICIAN ASSISTANT

## 2023-08-30 PROCEDURE — 2500000003 HC RX 250 WO HCPCS: Performed by: PHYSICIAN ASSISTANT

## 2023-08-30 PROCEDURE — 2140000000 HC CCU INTERMEDIATE R&B

## 2023-08-30 RX ADMIN — HYDROMORPHONE HYDROCHLORIDE 1 MG: 1 INJECTION, SOLUTION INTRAMUSCULAR; INTRAVENOUS; SUBCUTANEOUS at 09:27

## 2023-08-30 RX ADMIN — Medication 5 MG: at 14:27

## 2023-08-30 RX ADMIN — SODIUM CHLORIDE, PRESERVATIVE FREE 10 ML: 5 INJECTION INTRAVENOUS at 07:46

## 2023-08-30 RX ADMIN — Medication 5 MG: at 21:04

## 2023-08-30 RX ADMIN — SULFAMETHOXAZOLE AND TRIMETHOPRIM 1 TABLET: 800; 160 TABLET ORAL at 07:42

## 2023-08-30 RX ADMIN — SODIUM CHLORIDE, PRESERVATIVE FREE 10 ML: 5 INJECTION INTRAVENOUS at 21:06

## 2023-08-30 RX ADMIN — TRAZODONE HYDROCHLORIDE 50 MG: 50 TABLET ORAL at 21:06

## 2023-08-30 RX ADMIN — Medication 5 MG: at 07:42

## 2023-08-30 RX ADMIN — HYDROMORPHONE HYDROCHLORIDE 1 MG: 1 INJECTION, SOLUTION INTRAMUSCULAR; INTRAVENOUS; SUBCUTANEOUS at 18:42

## 2023-08-30 ASSESSMENT — PAIN DESCRIPTION - DESCRIPTORS
DESCRIPTORS: THROBBING
DESCRIPTORS: THROBBING
DESCRIPTORS: ACHING;THROBBING
DESCRIPTORS: ACHING
DESCRIPTORS: ACHING;THROBBING
DESCRIPTORS: THROBBING;ACHING

## 2023-08-30 ASSESSMENT — PAIN DESCRIPTION - LOCATION
LOCATION: ABDOMEN

## 2023-08-30 ASSESSMENT — PAIN SCALES - GENERAL
PAINLEVEL_OUTOF10: 7
PAINLEVEL_OUTOF10: 3
PAINLEVEL_OUTOF10: 8
PAINLEVEL_OUTOF10: 2
PAINLEVEL_OUTOF10: 7
PAINLEVEL_OUTOF10: 3
PAINLEVEL_OUTOF10: 2

## 2023-08-30 ASSESSMENT — PAIN - FUNCTIONAL ASSESSMENT
PAIN_FUNCTIONAL_ASSESSMENT: PREVENTS OR INTERFERES WITH MANY ACTIVE NOT PASSIVE ACTIVITIES
PAIN_FUNCTIONAL_ASSESSMENT: PREVENTS OR INTERFERES SOME ACTIVE ACTIVITIES AND ADLS
PAIN_FUNCTIONAL_ASSESSMENT: PREVENTS OR INTERFERES SOME ACTIVE ACTIVITIES AND ADLS

## 2023-08-30 ASSESSMENT — PAIN DESCRIPTION - ORIENTATION
ORIENTATION: LOWER
ORIENTATION: LOWER

## 2023-08-30 ASSESSMENT — PAIN DESCRIPTION - PAIN TYPE: TYPE: CHRONIC PAIN

## 2023-08-31 PROCEDURE — 2140000000 HC CCU INTERMEDIATE R&B

## 2023-08-31 PROCEDURE — 2500000003 HC RX 250 WO HCPCS: Performed by: PHYSICIAN ASSISTANT

## 2023-08-31 PROCEDURE — 2580000003 HC RX 258: Performed by: PHYSICIAN ASSISTANT

## 2023-08-31 PROCEDURE — 6370000000 HC RX 637 (ALT 250 FOR IP): Performed by: PHYSICIAN ASSISTANT

## 2023-08-31 RX ADMIN — SODIUM CHLORIDE, PRESERVATIVE FREE 10 ML: 5 INJECTION INTRAVENOUS at 09:13

## 2023-08-31 RX ADMIN — HYDROMORPHONE HYDROCHLORIDE 1 MG: 1 INJECTION, SOLUTION INTRAMUSCULAR; INTRAVENOUS; SUBCUTANEOUS at 11:20

## 2023-08-31 RX ADMIN — Medication 5 MG: at 09:11

## 2023-08-31 RX ADMIN — SODIUM CHLORIDE, PRESERVATIVE FREE 10 ML: 5 INJECTION INTRAVENOUS at 21:05

## 2023-08-31 RX ADMIN — Medication 5 MG: at 16:14

## 2023-08-31 RX ADMIN — HYDROMORPHONE HYDROCHLORIDE 1 MG: 1 INJECTION, SOLUTION INTRAMUSCULAR; INTRAVENOUS; SUBCUTANEOUS at 04:14

## 2023-08-31 RX ADMIN — Medication 5 MG: at 12:36

## 2023-08-31 RX ADMIN — HYDROMORPHONE HYDROCHLORIDE 1 MG: 1 INJECTION, SOLUTION INTRAMUSCULAR; INTRAVENOUS; SUBCUTANEOUS at 18:51

## 2023-08-31 ASSESSMENT — PAIN - FUNCTIONAL ASSESSMENT
PAIN_FUNCTIONAL_ASSESSMENT: PREVENTS OR INTERFERES WITH MANY ACTIVE NOT PASSIVE ACTIVITIES

## 2023-08-31 ASSESSMENT — PAIN DESCRIPTION - ORIENTATION
ORIENTATION: LEFT;LOWER

## 2023-08-31 ASSESSMENT — PAIN DESCRIPTION - DESCRIPTORS
DESCRIPTORS: ACHING;NAGGING
DESCRIPTORS: ACHING;DISCOMFORT;DULL
DESCRIPTORS: ACHING;DISCOMFORT;DULL
DESCRIPTORS: ACHING;GNAWING
DESCRIPTORS: ACHING;THROBBING

## 2023-08-31 ASSESSMENT — PAIN SCALES - GENERAL
PAINLEVEL_OUTOF10: 8
PAINLEVEL_OUTOF10: 7
PAINLEVEL_OUTOF10: 8
PAINLEVEL_OUTOF10: 6
PAINLEVEL_OUTOF10: 5
PAINLEVEL_OUTOF10: 6
PAINLEVEL_OUTOF10: 7
PAINLEVEL_OUTOF10: 5

## 2023-08-31 ASSESSMENT — PAIN DESCRIPTION - LOCATION
LOCATION: ABDOMEN

## 2023-09-01 PROCEDURE — 6370000000 HC RX 637 (ALT 250 FOR IP): Performed by: PHYSICIAN ASSISTANT

## 2023-09-01 PROCEDURE — 2140000000 HC CCU INTERMEDIATE R&B

## 2023-09-01 PROCEDURE — 2500000003 HC RX 250 WO HCPCS: Performed by: PHYSICIAN ASSISTANT

## 2023-09-01 PROCEDURE — 2580000003 HC RX 258: Performed by: PHYSICIAN ASSISTANT

## 2023-09-01 RX ADMIN — SODIUM CHLORIDE, PRESERVATIVE FREE 10 ML: 5 INJECTION INTRAVENOUS at 21:10

## 2023-09-01 RX ADMIN — HYDROMORPHONE HYDROCHLORIDE 1 MG: 1 INJECTION, SOLUTION INTRAMUSCULAR; INTRAVENOUS; SUBCUTANEOUS at 16:02

## 2023-09-01 RX ADMIN — TRAZODONE HYDROCHLORIDE 50 MG: 50 TABLET ORAL at 21:11

## 2023-09-01 RX ADMIN — HYDROMORPHONE HYDROCHLORIDE 1 MG: 1 INJECTION, SOLUTION INTRAMUSCULAR; INTRAVENOUS; SUBCUTANEOUS at 23:21

## 2023-09-01 RX ADMIN — Medication 5 MG: at 21:11

## 2023-09-01 RX ADMIN — HYDROMORPHONE HYDROCHLORIDE 1 MG: 1 INJECTION, SOLUTION INTRAMUSCULAR; INTRAVENOUS; SUBCUTANEOUS at 09:52

## 2023-09-01 RX ADMIN — SODIUM CHLORIDE, PRESERVATIVE FREE 10 ML: 5 INJECTION INTRAVENOUS at 08:51

## 2023-09-01 RX ADMIN — Medication 5 MG: at 01:21

## 2023-09-01 ASSESSMENT — PAIN DESCRIPTION - LOCATION
LOCATION: ABDOMEN

## 2023-09-01 ASSESSMENT — PAIN DESCRIPTION - DESCRIPTORS
DESCRIPTORS: ACHING
DESCRIPTORS: ACHING;DISCOMFORT
DESCRIPTORS: ACHING
DESCRIPTORS: ACHING;DISCOMFORT

## 2023-09-01 ASSESSMENT — PAIN SCALES - GENERAL
PAINLEVEL_OUTOF10: 7
PAINLEVEL_OUTOF10: 0
PAINLEVEL_OUTOF10: 3
PAINLEVEL_OUTOF10: 7
PAINLEVEL_OUTOF10: 5

## 2023-09-01 ASSESSMENT — PAIN DESCRIPTION - ORIENTATION
ORIENTATION: MID
ORIENTATION: MID;UPPER
ORIENTATION: MID

## 2023-09-01 ASSESSMENT — PAIN - FUNCTIONAL ASSESSMENT: PAIN_FUNCTIONAL_ASSESSMENT: ACTIVITIES ARE NOT PREVENTED

## 2023-09-02 PROCEDURE — 2500000003 HC RX 250 WO HCPCS: Performed by: PHYSICIAN ASSISTANT

## 2023-09-02 PROCEDURE — 2140000000 HC CCU INTERMEDIATE R&B

## 2023-09-02 RX ADMIN — HYDROMORPHONE HYDROCHLORIDE 1 MG: 1 INJECTION, SOLUTION INTRAMUSCULAR; INTRAVENOUS; SUBCUTANEOUS at 17:25

## 2023-09-02 RX ADMIN — HYDROMORPHONE HYDROCHLORIDE 1 MG: 1 INJECTION, SOLUTION INTRAMUSCULAR; INTRAVENOUS; SUBCUTANEOUS at 09:28

## 2023-09-02 RX ADMIN — HYDROMORPHONE HYDROCHLORIDE 1 MG: 1 INJECTION, SOLUTION INTRAMUSCULAR; INTRAVENOUS; SUBCUTANEOUS at 13:01

## 2023-09-02 ASSESSMENT — PAIN SCALES - GENERAL
PAINLEVEL_OUTOF10: 8
PAINLEVEL_OUTOF10: 7
PAINLEVEL_OUTOF10: 9
PAINLEVEL_OUTOF10: 0

## 2023-09-02 ASSESSMENT — PAIN DESCRIPTION - LOCATION
LOCATION: ABDOMEN

## 2023-09-02 ASSESSMENT — PAIN DESCRIPTION - ORIENTATION
ORIENTATION: MID
ORIENTATION: MID;LOWER
ORIENTATION: MID

## 2023-09-02 ASSESSMENT — PAIN DESCRIPTION - DESCRIPTORS
DESCRIPTORS: ACHING

## 2023-09-03 PROCEDURE — 2580000003 HC RX 258: Performed by: PHYSICIAN ASSISTANT

## 2023-09-03 PROCEDURE — 2500000003 HC RX 250 WO HCPCS: Performed by: PHYSICIAN ASSISTANT

## 2023-09-03 PROCEDURE — 2140000000 HC CCU INTERMEDIATE R&B

## 2023-09-03 PROCEDURE — 6370000000 HC RX 637 (ALT 250 FOR IP): Performed by: PHYSICIAN ASSISTANT

## 2023-09-03 RX ADMIN — SODIUM CHLORIDE, PRESERVATIVE FREE 10 ML: 5 INJECTION INTRAVENOUS at 20:50

## 2023-09-03 RX ADMIN — HYDROMORPHONE HYDROCHLORIDE 1 MG: 1 INJECTION, SOLUTION INTRAMUSCULAR; INTRAVENOUS; SUBCUTANEOUS at 07:28

## 2023-09-03 RX ADMIN — HYDROMORPHONE HYDROCHLORIDE 1 MG: 1 INJECTION, SOLUTION INTRAMUSCULAR; INTRAVENOUS; SUBCUTANEOUS at 11:36

## 2023-09-03 RX ADMIN — HYDROMORPHONE HYDROCHLORIDE 1 MG: 1 INJECTION, SOLUTION INTRAMUSCULAR; INTRAVENOUS; SUBCUTANEOUS at 01:10

## 2023-09-03 RX ADMIN — TRAZODONE HYDROCHLORIDE 50 MG: 50 TABLET ORAL at 20:48

## 2023-09-03 RX ADMIN — HYDROMORPHONE HYDROCHLORIDE 1 MG: 1 INJECTION, SOLUTION INTRAMUSCULAR; INTRAVENOUS; SUBCUTANEOUS at 18:06

## 2023-09-03 RX ADMIN — Medication 5 MG: at 20:47

## 2023-09-03 ASSESSMENT — PAIN DESCRIPTION - LOCATION
LOCATION: ABDOMEN
LOCATION: ABDOMEN
LOCATION: GENERALIZED
LOCATION: ABDOMEN

## 2023-09-03 ASSESSMENT — PAIN SCALES - GENERAL
PAINLEVEL_OUTOF10: 0
PAINLEVEL_OUTOF10: 0
PAINLEVEL_OUTOF10: 8
PAINLEVEL_OUTOF10: 0
PAINLEVEL_OUTOF10: 0
PAINLEVEL_OUTOF10: 7
PAINLEVEL_OUTOF10: 8
PAINLEVEL_OUTOF10: 0
PAINLEVEL_OUTOF10: 0
PAINLEVEL_OUTOF10: 8
PAINLEVEL_OUTOF10: 0

## 2023-09-03 ASSESSMENT — PAIN SCALES - WONG BAKER
WONGBAKER_NUMERICALRESPONSE: 0

## 2023-09-03 ASSESSMENT — PAIN DESCRIPTION - DESCRIPTORS
DESCRIPTORS: ACHING

## 2023-09-03 ASSESSMENT — PAIN DESCRIPTION - ORIENTATION
ORIENTATION: MID
ORIENTATION: MID;UPPER
ORIENTATION: MID

## 2023-09-04 PROCEDURE — 51702 INSERT TEMP BLADDER CATH: CPT

## 2023-09-04 PROCEDURE — 6370000000 HC RX 637 (ALT 250 FOR IP): Performed by: PHYSICIAN ASSISTANT

## 2023-09-04 PROCEDURE — 2580000003 HC RX 258: Performed by: PHYSICIAN ASSISTANT

## 2023-09-04 PROCEDURE — 2140000000 HC CCU INTERMEDIATE R&B

## 2023-09-04 PROCEDURE — 2500000003 HC RX 250 WO HCPCS: Performed by: PHYSICIAN ASSISTANT

## 2023-09-04 RX ADMIN — Medication 5 MG: at 08:04

## 2023-09-04 RX ADMIN — Medication 5 MG: at 15:21

## 2023-09-04 RX ADMIN — HYDROMORPHONE HYDROCHLORIDE 1 MG: 1 INJECTION, SOLUTION INTRAMUSCULAR; INTRAVENOUS; SUBCUTANEOUS at 09:51

## 2023-09-04 RX ADMIN — SODIUM CHLORIDE, PRESERVATIVE FREE 10 ML: 5 INJECTION INTRAVENOUS at 19:46

## 2023-09-04 RX ADMIN — Medication 5 MG: at 19:46

## 2023-09-04 RX ADMIN — TRAZODONE HYDROCHLORIDE 50 MG: 50 TABLET ORAL at 19:46

## 2023-09-04 RX ADMIN — SODIUM CHLORIDE, PRESERVATIVE FREE 10 ML: 5 INJECTION INTRAVENOUS at 08:05

## 2023-09-04 ASSESSMENT — PAIN DESCRIPTION - ORIENTATION
ORIENTATION: MID

## 2023-09-04 ASSESSMENT — PAIN DESCRIPTION - LOCATION
LOCATION: ABDOMEN

## 2023-09-04 ASSESSMENT — PAIN SCALES - GENERAL
PAINLEVEL_OUTOF10: 6
PAINLEVEL_OUTOF10: 7
PAINLEVEL_OUTOF10: 5
PAINLEVEL_OUTOF10: 9
PAINLEVEL_OUTOF10: 7
PAINLEVEL_OUTOF10: 9
PAINLEVEL_OUTOF10: 5
PAINLEVEL_OUTOF10: 4

## 2023-09-04 ASSESSMENT — PAIN SCALES - WONG BAKER: WONGBAKER_NUMERICALRESPONSE: 0

## 2023-09-04 ASSESSMENT — PAIN DESCRIPTION - DESCRIPTORS
DESCRIPTORS: ACHING;DISCOMFORT
DESCRIPTORS: THROBBING

## 2023-09-04 ASSESSMENT — PAIN DESCRIPTION - FREQUENCY: FREQUENCY: INTERMITTENT

## 2023-09-04 ASSESSMENT — PAIN DESCRIPTION - PAIN TYPE
TYPE: SURGICAL PAIN
TYPE: CHRONIC PAIN

## 2023-09-04 ASSESSMENT — PAIN - FUNCTIONAL ASSESSMENT: PAIN_FUNCTIONAL_ASSESSMENT: PREVENTS OR INTERFERES SOME ACTIVE ACTIVITIES AND ADLS

## 2023-09-04 ASSESSMENT — PAIN DESCRIPTION - ONSET: ONSET: ON-GOING

## 2023-09-05 PROCEDURE — 6370000000 HC RX 637 (ALT 250 FOR IP): Performed by: PHYSICIAN ASSISTANT

## 2023-09-05 PROCEDURE — 51702 INSERT TEMP BLADDER CATH: CPT

## 2023-09-05 PROCEDURE — 2140000000 HC CCU INTERMEDIATE R&B

## 2023-09-05 PROCEDURE — 2500000003 HC RX 250 WO HCPCS: Performed by: PHYSICIAN ASSISTANT

## 2023-09-05 PROCEDURE — 2580000003 HC RX 258: Performed by: PHYSICIAN ASSISTANT

## 2023-09-05 RX ADMIN — SODIUM CHLORIDE, PRESERVATIVE FREE 10 ML: 5 INJECTION INTRAVENOUS at 08:07

## 2023-09-05 RX ADMIN — TRAZODONE HYDROCHLORIDE 50 MG: 50 TABLET ORAL at 20:27

## 2023-09-05 RX ADMIN — SODIUM CHLORIDE, PRESERVATIVE FREE 10 ML: 5 INJECTION INTRAVENOUS at 20:28

## 2023-09-05 RX ADMIN — Medication 5 MG: at 11:51

## 2023-09-05 RX ADMIN — HYDROMORPHONE HYDROCHLORIDE 1 MG: 1 INJECTION, SOLUTION INTRAMUSCULAR; INTRAVENOUS; SUBCUTANEOUS at 09:23

## 2023-09-05 RX ADMIN — Medication 5 MG: at 08:07

## 2023-09-05 RX ADMIN — Medication 5 MG: at 20:27

## 2023-09-05 RX ADMIN — Medication 5 MG: at 15:26

## 2023-09-05 ASSESSMENT — PAIN DESCRIPTION - PAIN TYPE: TYPE: CHRONIC PAIN

## 2023-09-05 ASSESSMENT — PAIN DESCRIPTION - LOCATION
LOCATION: ABDOMEN

## 2023-09-05 ASSESSMENT — PAIN SCALES - GENERAL
PAINLEVEL_OUTOF10: 0
PAINLEVEL_OUTOF10: 7
PAINLEVEL_OUTOF10: 0
PAINLEVEL_OUTOF10: 10
PAINLEVEL_OUTOF10: 0
PAINLEVEL_OUTOF10: 8
PAINLEVEL_OUTOF10: 8
PAINLEVEL_OUTOF10: 7
PAINLEVEL_OUTOF10: 7
PAINLEVEL_OUTOF10: 0
PAINLEVEL_OUTOF10: 0

## 2023-09-05 ASSESSMENT — PAIN DESCRIPTION - DESCRIPTORS
DESCRIPTORS: ACHING;CRAMPING
DESCRIPTORS: SHOOTING;SHARP
DESCRIPTORS: ACHING;SHARP
DESCRIPTORS: ACHING
DESCRIPTORS: ACHING;CRAMPING

## 2023-09-05 ASSESSMENT — PAIN DESCRIPTION - ORIENTATION
ORIENTATION: LOWER;POSTERIOR
ORIENTATION: LOWER
ORIENTATION: LOWER;ANTERIOR
ORIENTATION: ANTERIOR
ORIENTATION: LOWER;POSTERIOR

## 2023-09-05 ASSESSMENT — PAIN DESCRIPTION - ONSET: ONSET: ON-GOING

## 2023-09-05 ASSESSMENT — PAIN - FUNCTIONAL ASSESSMENT: PAIN_FUNCTIONAL_ASSESSMENT: PREVENTS OR INTERFERES SOME ACTIVE ACTIVITIES AND ADLS

## 2023-09-05 NOTE — PLAN OF CARE
Problem: Discharge Planning  Goal: Discharge to home or other facility with appropriate resources  Outcome: Progressing  Flowsheets (Taken 9/5/2023 0807)  Discharge to home or other facility with appropriate resources:   Identify barriers to discharge with patient and caregiver   Arrange for needed discharge resources and transportation as appropriate   Identify discharge learning needs (meds, wound care, etc)

## 2023-09-06 PROCEDURE — 2140000000 HC CCU INTERMEDIATE R&B

## 2023-09-06 PROCEDURE — 2580000003 HC RX 258: Performed by: PHYSICIAN ASSISTANT

## 2023-09-06 PROCEDURE — 2500000003 HC RX 250 WO HCPCS: Performed by: PHYSICIAN ASSISTANT

## 2023-09-06 PROCEDURE — 6370000000 HC RX 637 (ALT 250 FOR IP): Performed by: PHYSICIAN ASSISTANT

## 2023-09-06 RX ADMIN — HYDROMORPHONE HYDROCHLORIDE 1 MG: 1 INJECTION, SOLUTION INTRAMUSCULAR; INTRAVENOUS; SUBCUTANEOUS at 10:36

## 2023-09-06 RX ADMIN — SODIUM CHLORIDE, PRESERVATIVE FREE 5 ML: 5 INJECTION INTRAVENOUS at 08:31

## 2023-09-06 RX ADMIN — SODIUM CHLORIDE, PRESERVATIVE FREE 10 ML: 5 INJECTION INTRAVENOUS at 20:08

## 2023-09-06 RX ADMIN — Medication 5 MG: at 17:17

## 2023-09-06 RX ADMIN — Medication 5 MG: at 08:31

## 2023-09-06 RX ADMIN — Medication 5 MG: at 20:08

## 2023-09-06 ASSESSMENT — PAIN - FUNCTIONAL ASSESSMENT: PAIN_FUNCTIONAL_ASSESSMENT: PREVENTS OR INTERFERES SOME ACTIVE ACTIVITIES AND ADLS

## 2023-09-06 ASSESSMENT — PAIN SCALES - GENERAL
PAINLEVEL_OUTOF10: 7
PAINLEVEL_OUTOF10: 7
PAINLEVEL_OUTOF10: 0
PAINLEVEL_OUTOF10: 7
PAINLEVEL_OUTOF10: 0
PAINLEVEL_OUTOF10: 7

## 2023-09-06 ASSESSMENT — PAIN DESCRIPTION - LOCATION
LOCATION: ABDOMEN

## 2023-09-06 ASSESSMENT — PAIN DESCRIPTION - DESCRIPTORS
DESCRIPTORS: ACHING
DESCRIPTORS: ACHING
DESCRIPTORS: ACHING;CRAMPING
DESCRIPTORS: ACHING

## 2023-09-06 ASSESSMENT — PAIN DESCRIPTION - ORIENTATION
ORIENTATION: LOWER
ORIENTATION: LOWER
ORIENTATION: ANTERIOR
ORIENTATION: LOWER

## 2023-09-06 ASSESSMENT — PAIN DESCRIPTION - PAIN TYPE: TYPE: CHRONIC PAIN

## 2023-09-06 NOTE — PLAN OF CARE
Problem: Discharge Planning  Goal: Discharge to home or other facility with appropriate resources  Outcome: Progressing  Flowsheets  Taken 9/6/2023 0831 by Lois Whiting RN  Discharge to home or other facility with appropriate resources:   Identify barriers to discharge with patient and caregiver   Arrange for needed discharge resources and transportation as appropriate   Identify discharge learning needs (meds, wound care, etc)  Taken 9/5/2023 2027 by Ngozi Rodríguez RN  Discharge to home or other facility with appropriate resources:   Identify barriers to discharge with patient and caregiver   Arrange for needed discharge resources and transportation as appropriate   Identify discharge learning needs (meds, wound care, etc)

## 2023-09-07 PROCEDURE — 6360000002 HC RX W HCPCS: Performed by: FAMILY MEDICINE

## 2023-09-07 PROCEDURE — 1100000000 HC RM PRIVATE

## 2023-09-07 PROCEDURE — 2580000003 HC RX 258: Performed by: PHYSICIAN ASSISTANT

## 2023-09-07 PROCEDURE — 2500000003 HC RX 250 WO HCPCS: Performed by: PHYSICIAN ASSISTANT

## 2023-09-07 PROCEDURE — 6370000000 HC RX 637 (ALT 250 FOR IP): Performed by: PHYSICIAN ASSISTANT

## 2023-09-07 RX ORDER — MORPHINE SULFATE 2 MG/ML
1 INJECTION, SOLUTION INTRAMUSCULAR; INTRAVENOUS ONCE
Status: COMPLETED | OUTPATIENT
Start: 2023-09-07 | End: 2023-09-07

## 2023-09-07 RX ADMIN — Medication 5 MG: at 08:40

## 2023-09-07 RX ADMIN — HYDROMORPHONE HYDROCHLORIDE 1 MG: 1 INJECTION, SOLUTION INTRAMUSCULAR; INTRAVENOUS; SUBCUTANEOUS at 13:52

## 2023-09-07 RX ADMIN — MORPHINE SULFATE 1 MG: 2 INJECTION, SOLUTION INTRAMUSCULAR; INTRAVENOUS at 06:38

## 2023-09-07 RX ADMIN — SODIUM CHLORIDE, PRESERVATIVE FREE 5 ML: 5 INJECTION INTRAVENOUS at 08:41

## 2023-09-07 RX ADMIN — HYDROMORPHONE HYDROCHLORIDE 0.5 MG: 1 INJECTION, SOLUTION INTRAMUSCULAR; INTRAVENOUS; SUBCUTANEOUS at 04:53

## 2023-09-07 RX ADMIN — HYDROMORPHONE HYDROCHLORIDE 1 MG: 1 INJECTION, SOLUTION INTRAMUSCULAR; INTRAVENOUS; SUBCUTANEOUS at 10:32

## 2023-09-07 RX ADMIN — Medication 5 MG: at 20:58

## 2023-09-07 RX ADMIN — TRAZODONE HYDROCHLORIDE 50 MG: 50 TABLET ORAL at 20:58

## 2023-09-07 RX ADMIN — SODIUM CHLORIDE, PRESERVATIVE FREE 10 ML: 5 INJECTION INTRAVENOUS at 20:59

## 2023-09-07 ASSESSMENT — PAIN DESCRIPTION - LOCATION
LOCATION: ABDOMEN

## 2023-09-07 ASSESSMENT — PAIN DESCRIPTION - DESCRIPTORS
DESCRIPTORS: ACHING
DESCRIPTORS: ACHING
DESCRIPTORS: ACHING;CRAMPING
DESCRIPTORS: STABBING;SHOOTING

## 2023-09-07 ASSESSMENT — PAIN SCALES - GENERAL
PAINLEVEL_OUTOF10: 0
PAINLEVEL_OUTOF10: 0
PAINLEVEL_OUTOF10: 5
PAINLEVEL_OUTOF10: 7
PAINLEVEL_OUTOF10: 0
PAINLEVEL_OUTOF10: 0
PAINLEVEL_OUTOF10: 7
PAINLEVEL_OUTOF10: 6
PAINLEVEL_OUTOF10: 0
PAINLEVEL_OUTOF10: 3
PAINLEVEL_OUTOF10: 7
PAINLEVEL_OUTOF10: 0

## 2023-09-07 ASSESSMENT — PAIN DESCRIPTION - ORIENTATION
ORIENTATION: LOWER
ORIENTATION: MID;LOWER

## 2023-09-07 NOTE — PLAN OF CARE
Problem: Discharge Planning  Goal: Discharge to home or other facility with appropriate resources  Outcome: Progressing  Flowsheets  Taken 9/7/2023 0840 by Marisela Wang RN  Discharge to home or other facility with appropriate resources:   Identify barriers to discharge with patient and caregiver   Arrange for needed discharge resources and transportation as appropriate   Identify discharge learning needs (meds, wound care, etc)  Taken 9/6/2023 2008 by Shaun Chappell RN  Discharge to home or other facility with appropriate resources:   Identify barriers to discharge with patient and caregiver   Arrange for needed discharge resources and transportation as appropriate   Identify discharge learning needs (meds, wound care, etc)     Problem: Pain  Goal: Verbalizes/displays adequate comfort level or baseline comfort level  Outcome: Progressing  Flowsheets  Taken 9/7/2023 0020 by Shaun Chappell RN  Verbalizes/displays adequate comfort level or baseline comfort level:   Encourage patient to monitor pain and request assistance   Assess pain using appropriate pain scale   Administer analgesics based on type and severity of pain and evaluate response  Taken 9/6/2023 2008 by Shaun Chappell RN  Verbalizes/displays adequate comfort level or baseline comfort level:   Encourage patient to monitor pain and request assistance   Assess pain using appropriate pain scale   Administer analgesics based on type and severity of pain and evaluate response   Implement non-pharmacological measures as appropriate and evaluate response

## 2023-09-08 VITALS
BODY MASS INDEX: 15.44 KG/M2 | OXYGEN SATURATION: 100 % | HEART RATE: 78 BPM | RESPIRATION RATE: 16 BRPM | HEIGHT: 61 IN | TEMPERATURE: 97.2 F | SYSTOLIC BLOOD PRESSURE: 80 MMHG | DIASTOLIC BLOOD PRESSURE: 49 MMHG | WEIGHT: 81.79 LBS

## 2023-09-08 LAB
SARS-COV-2 RDRP RESP QL NAA+PROBE: NOT DETECTED
SOURCE: NORMAL

## 2023-09-08 PROCEDURE — 6370000000 HC RX 637 (ALT 250 FOR IP): Performed by: PHYSICIAN ASSISTANT

## 2023-09-08 PROCEDURE — 2580000003 HC RX 258: Performed by: PHYSICIAN ASSISTANT

## 2023-09-08 PROCEDURE — 2500000003 HC RX 250 WO HCPCS: Performed by: PHYSICIAN ASSISTANT

## 2023-09-08 PROCEDURE — 87635 SARS-COV-2 COVID-19 AMP PRB: CPT

## 2023-09-08 RX ADMIN — HYDROMORPHONE HYDROCHLORIDE 1 MG: 1 INJECTION, SOLUTION INTRAMUSCULAR; INTRAVENOUS; SUBCUTANEOUS at 10:31

## 2023-09-08 RX ADMIN — HYDROMORPHONE HYDROCHLORIDE 0.5 MG: 1 INJECTION, SOLUTION INTRAMUSCULAR; INTRAVENOUS; SUBCUTANEOUS at 07:07

## 2023-09-08 RX ADMIN — Medication 5 MG: at 08:38

## 2023-09-08 RX ADMIN — Medication 5 MG: at 15:22

## 2023-09-08 RX ADMIN — Medication: at 10:31

## 2023-09-08 RX ADMIN — SODIUM CHLORIDE, PRESERVATIVE FREE 10 ML: 5 INJECTION INTRAVENOUS at 08:38

## 2023-09-08 ASSESSMENT — PAIN DESCRIPTION - DESCRIPTORS
DESCRIPTORS: ACHING
DESCRIPTORS: ACHING

## 2023-09-08 ASSESSMENT — PAIN DESCRIPTION - ORIENTATION
ORIENTATION: MID;LOWER;RIGHT;LEFT
ORIENTATION: RIGHT;LEFT;LOWER
ORIENTATION: RIGHT;LEFT;LOWER;MID

## 2023-09-08 ASSESSMENT — PAIN SCALES - GENERAL
PAINLEVEL_OUTOF10: 6
PAINLEVEL_OUTOF10: 5
PAINLEVEL_OUTOF10: 9

## 2023-09-08 ASSESSMENT — PAIN DESCRIPTION - LOCATION
LOCATION: ABDOMEN

## 2023-09-08 NOTE — CARE COORDINATION
08/08/23 1250   Service Assessment   Patient Orientation Alert and Oriented   Cognition Alert   History Provided By Patient; Child/Family   Primary Caregiver Self   Support Systems Children;Family Members   Patient's Healthcare Decision Maker is: Named in [place] [place]   PCP Verified by CM Yes   Last Visit to PCP Within last 3 months   Prior Functional Level Assistance with the following:   Current Functional Level Assistance with the following:   Can patient return to prior living arrangement Yes   Ability to make needs known: Good   Family able to assist with home care needs: Yes   Financial Resources Medicare   Social/Functional History   Lives With Alone   Type of Home Apartment   ADL Assistance Needs assistance   [facility] Needs assistance   Homemaking Responsibilities Yes   Ambulation Assistance Needs assistance   Transfer Assistance Needs assistance   Active  No     Readmitted  Patient's daughter at bedside. Patient was discharged from [facility] and went to [facility] and discharged home with [facility]. She's using a walker to ambulate. Family checks on her and takes her to medical appointments. Patient is also current with [address] but family doesn't feel they are able to manage her pain so they are considering hospice. Patient's spouse used [facility] and the family are considering this as an option. DME: walker, sc  Confirmed patient has a pcp. CM following.
5 CM contacted patient's daughter, Sharona Torres, and informed her patient does not meet GIP today and the family needs to develop a plan for the patient to be discharged. CM offered to assist with any needs. JASEN met with [facility] nurse last evening after she assessed the patient's status. She reported she met with patient and her daughter/HCPOA, Namita. Patient was nibbling on a hamburger and fries. Nurse reports patient is not GIP. CM discussed the situation with Abraham Whittington RN, JASEN and Dr Myrna Elaine in [address]. Patient is not hospice house qualified at this time and a family meeting is needed to discuss the discharge plan.
Addendum: 1845  CM called by registration and informed patient has not been approved for [facility] according to current records. Addendum:  [address], Zaina [place], spoke with Namita by phone,another daughter, Perry Hodgson and granddaughter here visiting. [facility] medical director deciding patient meets GIP for several days. Patient could admit as early as today. However, when patient doesn't meet GIP, a discharge plan made by family to either pay room/board or discharge from [address] [place] [address] to home hospice agency needs to be arranged prior to acceptance for admission. Granddaughter reports 2 alternative homes could be available for home with hospice but additional caregivers would be needed. Sisters would need to discuss and come to a decision. CM offered consideration of a family member taking Family Medical Leave. Zaina Puckett reports back that Namita is coming to the hospital to discuss with patient. [facility] can admit over the weekend. CM met with patient to discuss DCP. Patient is calm and engaging. Patient reports she would like to do whatever her daughter/HCPOA,Namita, wants. CM contacted Namita to follow up last conversation. Namita states she prefers her mom discharging to [address] [place] [address]. CM sent hospice referral followed by a phone call to [facility], at the facility. Kellen reports there is bed availability at the facility and will review patient's records and speak to Namita regarding GIP. CM will follow up.
Addendum: Namita requested a meeting with this CM to discuss plans for hospice. CM asked Dr Brionna Gardiner to meet with Namita and patient's GD, and myself. Patient reiterated she does not want CPR or intubation. Patient reports she is agreeable to be reassessed by [facility]. CM sent message to [facility] for a reassessment. CM attempted to contact patient's housing unit , [employer] ([phone])DV request patient be allowed a caregiver overnight if patient returns home with home hospice. CM awaiting return call. CM informed Namita of request and she will also try to follow up with management. CM contacted by Yessica Chaparro in [facility] assistance. 1 \[facility] [place] [facility]\"" reports Medicaid forms were obtained under [facility] skilled nursing. S/P I&D abdominal wall abcess. Drainage on 4 X 4.   IV Vancomycin/ Pip/Tazo, day 3 of 5. CM met with granddaughter, who visiting. She reports Namita is on her way to the hospital. CM will follow up when Namita arrives.
Awaiting return call from Sherry W Ranulfo Pyle with [address].
CM continues to follow for discharge needs. HH is recommended at discharge.
CM following after IDR. Pathology pf liver biopsy revealed metastatic breast cancer. Oncology consulted for recommendation. .  Oncology and Dr Lor Griffith have met with patient and her daughter/HCPOA , Heather Ingram [phone]. Patient is tearful and stating the surprise of the extent of the cancer. CM followed up with Namita by phone. Namita reports they are interested in [facility] and hoping she'll qualify for IP. Namita and her 2 sisters work FT and are not available for the 24/7 care they feel she'll need. Namita plans to return on 8/17 to continue to discuss and make plans with patient. PT/OT will continue to work with patient recommending no further therapy at discharge.
CM informed by [facility] nurse patient does not meet GIP today. Patient still taking some po. CM sent message to [facility] with [facility] to inquire for a Medicaid pending bed.
CM reviewed clinical notes. Patient's status discussed in IDR. Patient declining hospice services. PT/OT recommending SNF. CM will discuss with patient and obtain preferences if interested in STR at discharge.
CM reviewed clinical record and patient's status in IDR. Per covering CM, [facility] reassessed patient and does not meet GIP. CM met with patient to discuss disposition at discharge. Patient reports she would like to go home without hospice but realizes she is no longer able to even walk to the bathroom. Patient reports she will do what her daughter's decide. Patient lives alone in [address] apartment which does not allow anyone to stay at the apartment with patient. Per IDR, General Surgery to consult for abdominal abcess. Dr Bijan Rosario consulted and has scheduled I&D on 8/23.
Daughter has requested to speak with hospice, no preference. Referral complete to [facility]. Liaison will review information and reach out to the daughter. CMN will continue to follow.
Dayna with [facility] contacted this CM after assessing patient. Patient not qualified to accept due to lack of discharge plan from the family and not currently imminent. Sanchez Franco reports they will follow daily due to her status could change quickly. CM informed by wound care nurse, [facility], that General Surgery wishes to place wound vac to abdominal wound. If patient discharged to hospice over the weekend, wound vac would be removed with a W-D dry applied.
JASEN met with pt's dtr to discuss hospice and plans for dc. She stated she had spoken to someone at the [facility] and pt does not qualify for admission at present. She inquired about other options such as STR or SNF. JASEN explained pt is not appropriate for STR and a SNF placement would be private pay. She stated the family is most likely going to take her home with home hospice services through [address]. He stated she plans to talk to her family ASAP and will contact JASEN today with their decision. JASEN following.
LOS 32d  Patient transferred from 3rd floor to room 524. Patient is Comfort Care at this time. Discharge plan ongoing, no further concerns as of present. Please consult  if any new issues arise.
Met with patient's daughter and Odalys Griffin, at bedside. We had a long conversation about the patient's medical status and need to develop a discharge plan. Namita voiced understanding along with some frustration that patient's needs seem to be in a \"gray area\" somewhere between acute level of care, hospice house, and home hospice or skilled nursing care with hospice. Namita explained that the family is not equipped to care for the patient in the home as they do not have the ability to be with the patient around the clock, nor do they have the financial means to pay to provide someone in that capacity. They do not have the funds to private pay for a SNF bed either. The patient lives in subsidized housing and has no assets to help support these needs. When the family went to apply for Medicaid, the patient was $30 per month over the maximum income to qualify for basic Medicaid. However, upon further conversation, the patient is within the income range to qualify for long term care Medicaid. I explained to Namita the three ways to submit the application (on computer at [facility] or by telephone appointment or in-person appointment). Namita states she is comfortable with a computer and will get online this weekend to fill out the application for her mother. Once the application is completed and supporting documents submitted, the patient would be \"Medicaid pending. \" Some skilled nursing facilities will accept a patient with Medicaid pending if they can verify that the patient meets the criteria to be approved, as they will be paid retroactively once it is officially approved. We agreed that we will attempt to move forward with a \"Medicaid Pending\" placement unless the patients condition continues to deteriorate to the point that she becomes GIP appropriate. If that occurs, we will proceed with hospice house placement.  Agreed that we will revisit where we stand with the application process on Tuesday after the Labor
Patent's status discussed in IDR> Patient is not a candidate for STR or able to meed GIP in hospice house. Patient and family cannot afford placement. CM sent message to Antoinette Fernando in Medicaid assistance to initiate straight Medicaid application.
Received a phone call from one of patient's daughters, Esther Stern, who expressed dissatisfaction with our discharge planning. Went through great detail in explaining to her that her mother, the patient, is no longer requiring acute care and is ready to transition to the next level of care. She does not currently meet the GIP status for hospice, but could go home with hospice support. Karla's reply was \"Who is supposed to care for her there? \" To which I replied that it would be the family responsibility or that they could hire caregivers in the home, or pay for her to go to a skilled nursing facility. Reiterated that we will be happy to assist the family with any of those arrangements that the family feels is best for the patient and themselves. Unfortunately, Nick James voiced that this \"does not make sense\" and that if her mother goes home, Lorenzo Cortez will die. \" Agreed that the patient likely will die soon, as she is at the end of her life. But unfortunately, that will likely be the case whether she goes home or to another level of care. Nick James repeated that \"this doesn't make sense. \" She then asked that I go to the patient's room to talk with her sister, Ten Bolivar, who is the [facility] and primary spokesperson. Assured her that I will make an attempt to meet with Namita as well. At this point, Nick James hung up the phone. Case Management will continue to work with the family as they process the issues at hand and attempt to help them to make a decision for the next level of care.
assistance   Transfer Assisstance Needs assistance   Active  No   Patient's  Info     Mode of Transportation     Education     Occupation (P) Retired   Type of Occupation       Discharge Planning   Type of Residence (P) Apartment   Living Arrangements (P) [address] [place] [address] Road, Family Members   Current Services Prior To Admission (P) [facility]   [address] (P) Transportation (Adelina Hammans to [employer])   DME     DME     DME Ordered? (P) No   Potential Assistance Purchasing Medications (P) No   Meds-to-Beds: Does the patient want to have any new prescriptions delivered to bedside prior to discharge? Type of Home Care Services (P) None   Patient expects to be discharged to: (P) Hospice (comment) [facility])   Follow Up Appointment: Best Day/Time     One/Two Story Residence: (P) One story   # of Interior Steps     Height of Each Step (in)     [employer] Available     History of Falls? (P) No     Services At/After Discharge  Transition of Care Consult (CM Consult): (P) Discharge Planning   Internal Home Health     Internal Hospice     Reason Outside Agency [facility]     Partner SNF     Reason Why Partner SNF Not Chosen     Internal Comfort Care     Reason Outside [address] Discharge (P) Hospice [facility])   [address] Provided? (P) No   Mode of Transport at Discharge (P) [facility] [facility] Transport Time of Discharge     Confirm Follow Up Transport (P) Other (see comment) (facility)     Condition of Participation: Discharge Planning  The plan for Transition of Care is related to the following treatment goals: (P) Patient to reach baseline ADL's goals at time of discharge if not then provide appropriate next level of care options to obtain a safe discharge   The Patient and/or Patient Representative was provided with a Choice of Provider?  (P)

## 2023-09-08 NOTE — DISCHARGE SUMMARY
Hospitalist Discharge Summary   Admit Date:  2023  5:28 PM   DC Note date: 2023  Name:  Princess Smith   Age:  82 y.o. Sex:  female  :  1940   MRN:  [id number]   Room:  [facility]  PCP:  Kingston Santillan MD    Presenting Complaint: No chief complaint on file. Initial Admission Diagnosis: Septicemia ([address]) [A41.9]  Septic shock ([address]) [A41.9, R65.21]  Severe sepsis ([address]) [A41.9, R65.20]  Hypotension, unspecified hypotension type [I95.9]  Anemia, unspecified type [D64.9]  Acute pancreatitis, unspecified complication status, unspecified pancreatitis type [K85.90]     Problem List for this Hospitalization (present on admission):    Principal Problem:    Shock ([address])  Active Problems:    Rheumatoid arthritis ([address])    Severe protein-calorie malnutrition ([address])    Squamous cell lung cancer ([address])    Acute pancreatitis    PAD (peripheral artery disease) ([address])    Malignant neoplasm of upper-outer quadrant of left breast in female, estrogen receptor positive ([address])    History of peptic ulcer    Hypomagnesemia    Age-related osteoporosis with current pathological fracture    Chronic obstructive pulmonary disease (HCC)    Paraseptal emphysema (HCC)    Hypokalemia    S/P thoracotomy    Acute on chronic pancreatitis (HCC)    Hypoproteinemia (HCC)    Septic shock ([address])    Counseling regarding advance care planning and goals of care    Palliative care encounter    Abdominal wall abscess    Peritoneal abscess ([address])  Resolved Problems:    * No resolved hospital problems. [facility] Course:   Princess Smith is an 82 y.o. female with a PMH of chronic pancreatitis, remote perforated duodenal ulcer requiring Billroth II anastomosis, rheumatoid arthritis on Enbrel, chronic anemia with iron and B12 deficiency, current smoker, squamous cell lung cancer (s/p R thoracoscopy, RLL lobectomy, mediastinal lymphadenectomy 3/13/23), COPD, breast cancer, HTN, PAD, and osteoporosis who presented via EMS from home  with

## 2023-09-08 NOTE — PLAN OF CARE
Problem: Discharge Planning  Goal: Discharge to home or other facility with appropriate resources  9/8/2023 0353 by Janay Carlson RN  Outcome: Progressing  Flowsheets (Taken 9/7/2023 2205)  Discharge to home or other facility with appropriate resources: Identify barriers to discharge with patient and caregiver     Problem: Pain  Goal: Verbalizes/displays adequate comfort level or baseline comfort level  9/8/2023 0353 by Janay Carlson RN  Outcome: Progressing  Flowsheets (Taken 9/7/2023 2205)  Verbalizes/displays adequate comfort level or baseline comfort level: Encourage patient to monitor pain and request assistance     Problem: Skin/Tissue Integrity  Goal: Absence of new skin breakdown  Description: 1. Monitor for areas of redness and/or skin breakdown  2. Assess vascular access sites hourly  3. Every 4-6 hours minimum:  Change oxygen saturation probe site  4. Every 4-6 hours:  If on nasal continuous positive airway pressure, respiratory therapy assess nares and determine need for appliance change or resting period.   9/8/2023 0353 by Janay Carlson RN  Outcome: Progressing

## 2023-09-14 ENCOUNTER — TELEPHONE (OUTPATIENT)
Dept: FAMILY MEDICINE CLINIC | Facility: CLINIC | Age: 83
End: 2023-09-14

## 2023-09-14 NOTE — TELEPHONE ENCOUNTER
----- Message from Oswaldo Jeffersonsandra sent at 9/14/2023  2:46 PM EDT -----  Subject: Message to Provider    QUESTIONS  Information for Provider? Pt's daughter called to inform office that pt   passed away yesterday 9/13. Pt's daughter, Ann Choi would like a   call back from the office please. I cancelled future appt and turned off   notifications.  ---------------------------------------------------------------------------  --------------  Ronit Israel INFO  [phone]; OK to leave message on voicemail  ---------------------------------------------------------------------------  --------------  SCRIPT ANSWERS  Relationship to Patient? Other/Third Party  Representative Name? Ann Choi  Is the representative on the Communication Release of Information (FARHAN)   form in Epic?  Yes

## 2023-09-20 LAB
FUNGAL CULT/SMEAR: NORMAL
FUNGUS (MYCOLOGY) CULTURE: NORMAL
FUNGUS SMEAR: NORMAL
REFLEX TO ID: NORMAL
SPECIMEN PROCESSING: NORMAL
SPECIMEN SOURCE: NORMAL
SPECIMEN SOURCE: NORMAL
